# Patient Record
Sex: FEMALE | Race: WHITE | NOT HISPANIC OR LATINO
[De-identification: names, ages, dates, MRNs, and addresses within clinical notes are randomized per-mention and may not be internally consistent; named-entity substitution may affect disease eponyms.]

---

## 2022-01-01 ENCOUNTER — APPOINTMENT (OUTPATIENT)
Dept: OPHTHALMOLOGY | Facility: CLINIC | Age: 87
End: 2022-01-01

## 2022-01-01 ENCOUNTER — NON-APPOINTMENT (OUTPATIENT)
Age: 87
End: 2022-01-01

## 2022-01-01 ENCOUNTER — TRANSCRIPTION ENCOUNTER (OUTPATIENT)
Age: 87
End: 2022-01-01

## 2022-01-01 ENCOUNTER — RX RENEWAL (OUTPATIENT)
Age: 87
End: 2022-01-01

## 2022-01-01 ENCOUNTER — LABORATORY RESULT (OUTPATIENT)
Age: 87
End: 2022-01-01

## 2022-01-01 ENCOUNTER — APPOINTMENT (OUTPATIENT)
Dept: HOME HEALTH SERVICES | Facility: HOME HEALTH | Age: 87
End: 2022-01-01

## 2022-01-01 ENCOUNTER — FORM ENCOUNTER (OUTPATIENT)
Age: 87
End: 2022-01-01

## 2022-01-01 ENCOUNTER — APPOINTMENT (OUTPATIENT)
Dept: GERIATRICS | Facility: CLINIC | Age: 87
End: 2022-01-01

## 2022-01-01 ENCOUNTER — EMERGENCY (EMERGENCY)
Facility: HOSPITAL | Age: 87
LOS: 1 days | Discharge: HOME | End: 2022-01-01
Attending: EMERGENCY MEDICINE
Payer: MEDICARE

## 2022-01-01 ENCOUNTER — INPATIENT (INPATIENT)
Facility: HOSPITAL | Age: 87
LOS: 36 days | Discharge: HOME HEALTH | End: 2022-09-01
Attending: STUDENT IN AN ORGANIZED HEALTH CARE EDUCATION/TRAINING PROGRAM | Admitting: STUDENT IN AN ORGANIZED HEALTH CARE EDUCATION/TRAINING PROGRAM

## 2022-01-01 ENCOUNTER — APPOINTMENT (OUTPATIENT)
Dept: HOME HEALTH SERVICES | Facility: HOME HEALTH | Age: 87
End: 2022-01-01
Payer: MEDICARE

## 2022-01-01 VITALS
WEIGHT: 149.91 LBS | RESPIRATION RATE: 20 BRPM | SYSTOLIC BLOOD PRESSURE: 120 MMHG | OXYGEN SATURATION: 88 % | DIASTOLIC BLOOD PRESSURE: 70 MMHG | HEART RATE: 70 BPM | HEIGHT: 63 IN

## 2022-01-01 VITALS
SYSTOLIC BLOOD PRESSURE: 129 MMHG | HEART RATE: 81 BPM | OXYGEN SATURATION: 90 % | TEMPERATURE: 97 F | RESPIRATION RATE: 18 BRPM | DIASTOLIC BLOOD PRESSURE: 57 MMHG

## 2022-01-01 VITALS
SYSTOLIC BLOOD PRESSURE: 136 MMHG | DIASTOLIC BLOOD PRESSURE: 65 MMHG | RESPIRATION RATE: 20 BRPM | TEMPERATURE: 98 F | HEART RATE: 93 BPM | HEIGHT: 63 IN

## 2022-01-01 VITALS
HEART RATE: 69 BPM | RESPIRATION RATE: 18 BRPM | OXYGEN SATURATION: 100 % | DIASTOLIC BLOOD PRESSURE: 62 MMHG | SYSTOLIC BLOOD PRESSURE: 128 MMHG

## 2022-01-01 VITALS
OXYGEN SATURATION: 93 % | HEART RATE: 76 BPM | RESPIRATION RATE: 18 BRPM | TEMPERATURE: 98.8 F | SYSTOLIC BLOOD PRESSURE: 110 MMHG | DIASTOLIC BLOOD PRESSURE: 60 MMHG

## 2022-01-01 VITALS
DIASTOLIC BLOOD PRESSURE: 60 MMHG | TEMPERATURE: 97.9 F | HEART RATE: 78 BPM | OXYGEN SATURATION: 94 % | RESPIRATION RATE: 18 BRPM | SYSTOLIC BLOOD PRESSURE: 110 MMHG

## 2022-01-01 DIAGNOSIS — J44.9 CHRONIC OBSTRUCTIVE PULMONARY DISEASE, UNSPECIFIED: ICD-10-CM

## 2022-01-01 DIAGNOSIS — R74.8 ABNORMAL LEVELS OF OTHER SERUM ENZYMES: ICD-10-CM

## 2022-01-01 DIAGNOSIS — E11.9 TYPE 2 DIABETES MELLITUS WITHOUT COMPLICATIONS: ICD-10-CM

## 2022-01-01 DIAGNOSIS — B02.9 ZOSTER WITHOUT COMPLICATIONS: ICD-10-CM

## 2022-01-01 DIAGNOSIS — R33.9 RETENTION OF URINE, UNSPECIFIED: ICD-10-CM

## 2022-01-01 DIAGNOSIS — R30.0 DYSURIA: ICD-10-CM

## 2022-01-01 DIAGNOSIS — Z51.5 ENCOUNTER FOR PALLIATIVE CARE: ICD-10-CM

## 2022-01-01 DIAGNOSIS — G50.0 TRIGEMINAL NEURALGIA: ICD-10-CM

## 2022-01-01 DIAGNOSIS — Z23 ENCOUNTER FOR IMMUNIZATION: ICD-10-CM

## 2022-01-01 DIAGNOSIS — E44.0 MODERATE PROTEIN-CALORIE MALNUTRITION: ICD-10-CM

## 2022-01-01 DIAGNOSIS — Z79.899 OTHER LONG TERM (CURRENT) DRUG THERAPY: ICD-10-CM

## 2022-01-01 DIAGNOSIS — B02.9 ZOSTER W/OUT COMPLICATIONS: ICD-10-CM

## 2022-01-01 DIAGNOSIS — Z97.8 PRESENCE OF OTHER SPECIFIED DEVICES: ICD-10-CM

## 2022-01-01 DIAGNOSIS — R21 RASH AND OTHER NONSPECIFIC SKIN ERUPTION: ICD-10-CM

## 2022-01-01 DIAGNOSIS — Z95.5 PRESENCE OF CORONARY ANGIOPLASTY IMPLANT AND GRAFT: Chronic | ICD-10-CM

## 2022-01-01 DIAGNOSIS — B02.23 POSTHERPETIC POLYNEUROPATHY: ICD-10-CM

## 2022-01-01 DIAGNOSIS — K59.00 CONSTIPATION, UNSPECIFIED: ICD-10-CM

## 2022-01-01 DIAGNOSIS — G91.2 (IDIOPATHIC) NORMAL PRESSURE HYDROCEPHALUS: ICD-10-CM

## 2022-01-01 DIAGNOSIS — Z98.2 PRESENCE OF CEREBROSPINAL FLUID DRAINAGE DEVICE: ICD-10-CM

## 2022-01-01 DIAGNOSIS — E87.8 OTHER DISORDERS OF ELECTROLYTE AND FLUID BALANCE, NOT ELSEWHERE CLASSIFIED: ICD-10-CM

## 2022-01-01 DIAGNOSIS — D50.9 IRON DEFICIENCY ANEMIA, UNSPECIFIED: ICD-10-CM

## 2022-01-01 DIAGNOSIS — I65.29 OCCLUSION AND STENOSIS OF UNSPECIFIED CAROTID ARTERY: ICD-10-CM

## 2022-01-01 DIAGNOSIS — N39.0 URINARY TRACT INFECTION, SITE NOT SPECIFIED: ICD-10-CM

## 2022-01-01 DIAGNOSIS — I25.10 ATHEROSCLEROTIC HEART DISEASE OF NATIVE CORONARY ARTERY W/OUT ANGINA PECTORIS: ICD-10-CM

## 2022-01-01 DIAGNOSIS — Z29.8 ENCOUNTER FOR OTHER SPECIFIED PROPHYLACTIC MEASURES: ICD-10-CM

## 2022-01-01 DIAGNOSIS — R53.81 OTHER MALAISE: ICD-10-CM

## 2022-01-01 DIAGNOSIS — J96.11 CHRONIC RESPIRATORY FAILURE WITH HYPOXIA: ICD-10-CM

## 2022-01-01 DIAGNOSIS — G43.119 MIGRAINE WITH AURA, INTRACTABLE, W/OUT STATUS MIGRAINOSUS: ICD-10-CM

## 2022-01-01 DIAGNOSIS — B02.29 OTHER POSTHERPETIC NERVOUS SYSTEM INVOLVEMENT: ICD-10-CM

## 2022-01-01 DIAGNOSIS — Z87.09 PERSONAL HISTORY OF OTHER DISEASES OF THE RESPIRATORY SYSTEM: ICD-10-CM

## 2022-01-01 DIAGNOSIS — J69.0 PNEUMONITIS DUE TO INHALATION OF FOOD AND VOMIT: ICD-10-CM

## 2022-01-01 DIAGNOSIS — F11.20 OPIOID DEPENDENCE, UNCOMPLICATED: ICD-10-CM

## 2022-01-01 DIAGNOSIS — R45.1 RESTLESSNESS AND AGITATION: ICD-10-CM

## 2022-01-01 DIAGNOSIS — I25.10 ATHEROSCLEROTIC HEART DISEASE OF NATIVE CORONARY ARTERY WITHOUT ANGINA PECTORIS: ICD-10-CM

## 2022-01-01 DIAGNOSIS — Z87.898 PERSONAL HISTORY OF OTHER SPECIFIED CONDITIONS: ICD-10-CM

## 2022-01-01 DIAGNOSIS — I77.9 DISORDER OF ARTERIES AND ARTERIOLES, UNSPECIFIED: ICD-10-CM

## 2022-01-01 DIAGNOSIS — R13.12 DYSPHAGIA, OROPHARYNGEAL PHASE: ICD-10-CM

## 2022-01-01 DIAGNOSIS — K80.20 CALCULUS OF GALLBLADDER W/OUT CHOLECYSTITIS W/OUT OBSTRUCTION: ICD-10-CM

## 2022-01-01 DIAGNOSIS — Z86.61 PERSONAL HISTORY OF INFECTIONS OF THE CENTRAL NERVOUS SYSTEM: ICD-10-CM

## 2022-01-01 DIAGNOSIS — F51.04 PSYCHOPHYSIOLOGIC INSOMNIA: ICD-10-CM

## 2022-01-01 DIAGNOSIS — Z99.81 DEPENDENCE ON SUPPLEMENTAL OXYGEN: ICD-10-CM

## 2022-01-01 DIAGNOSIS — Z29.9 ENCOUNTER FOR PROPHYLACTIC MEASURES, UNSPECIFIED: ICD-10-CM

## 2022-01-01 DIAGNOSIS — I82.B11 ACUTE EMBOLISM AND THROMBOSIS OF RIGHT SUBCLAVIAN VEIN: ICD-10-CM

## 2022-01-01 DIAGNOSIS — K92.2 GASTROINTESTINAL HEMORRHAGE, UNSPECIFIED: ICD-10-CM

## 2022-01-01 DIAGNOSIS — R63.0 ANOREXIA: ICD-10-CM

## 2022-01-01 DIAGNOSIS — Z86.69 PERSONAL HISTORY OF OTHER DISEASES OF THE NERVOUS SYSTEM AND SENSE ORGANS: ICD-10-CM

## 2022-01-01 DIAGNOSIS — B36.9 SUPERFICIAL MYCOSIS, UNSPECIFIED: ICD-10-CM

## 2022-01-01 DIAGNOSIS — G89.29 HEADACHE, UNSPECIFIED: ICD-10-CM

## 2022-01-01 DIAGNOSIS — Z95.5 PRESENCE OF CORONARY ANGIOPLASTY IMPLANT AND GRAFT: ICD-10-CM

## 2022-01-01 DIAGNOSIS — A49.8 OTHER BACTERIAL INFECTIONS OF UNSPECIFIED SITE: ICD-10-CM

## 2022-01-01 DIAGNOSIS — Z74.01 BED CONFINEMENT STATUS: ICD-10-CM

## 2022-01-01 DIAGNOSIS — E83.42 HYPOMAGNESEMIA: ICD-10-CM

## 2022-01-01 DIAGNOSIS — Z86.39 PERSONAL HISTORY OF OTHER ENDOCRINE, NUTRITIONAL AND METABOLIC DISEASE: ICD-10-CM

## 2022-01-01 DIAGNOSIS — L89.156 PRESSURE-INDUCED DEEP TISSUE DAMAGE OF SACRAL REGION: ICD-10-CM

## 2022-01-01 DIAGNOSIS — Z99.81 CHRONIC RESPIRATORY FAILURE WITH HYPOXIA: ICD-10-CM

## 2022-01-01 DIAGNOSIS — D64.9 ANEMIA, UNSPECIFIED: ICD-10-CM

## 2022-01-01 DIAGNOSIS — B02.30 ZOSTER OCULAR DISEASE, UNSPECIFIED: ICD-10-CM

## 2022-01-01 DIAGNOSIS — J30.2 OTHER SEASONAL ALLERGIC RHINITIS: ICD-10-CM

## 2022-01-01 DIAGNOSIS — I82.621 ACUTE EMBOLISM AND THROMBOSIS OF DEEP VEINS OF RIGHT UPPER EXTREMITY: ICD-10-CM

## 2022-01-01 DIAGNOSIS — E87.6 HYPOKALEMIA: ICD-10-CM

## 2022-01-01 DIAGNOSIS — R51.9 HEADACHE, UNSPECIFIED: ICD-10-CM

## 2022-01-01 DIAGNOSIS — Z16.29 OTHER BACTERIAL INFECTIONS OF UNSPECIFIED SITE: ICD-10-CM

## 2022-01-01 DIAGNOSIS — R41.82 ALTERED MENTAL STATUS, UNSPECIFIED: ICD-10-CM

## 2022-01-01 DIAGNOSIS — B02.0 ZOSTER ENCEPHALITIS: ICD-10-CM

## 2022-01-01 DIAGNOSIS — K21.9 GASTRO-ESOPHAGEAL REFLUX DISEASE W/OUT ESOPHAGITIS: ICD-10-CM

## 2022-01-01 DIAGNOSIS — I10 ESSENTIAL (PRIMARY) HYPERTENSION: ICD-10-CM

## 2022-01-01 DIAGNOSIS — N17.9 ACUTE KIDNEY FAILURE, UNSPECIFIED: ICD-10-CM

## 2022-01-01 DIAGNOSIS — Z87.891 PERSONAL HISTORY OF NICOTINE DEPENDENCE: ICD-10-CM

## 2022-01-01 LAB
-  AMIKACIN: SIGNIFICANT CHANGE UP
-  AMOXICILLIN/CLAVULANIC ACID: SIGNIFICANT CHANGE UP
-  AMPICILLIN/SULBACTAM: SIGNIFICANT CHANGE UP
-  AMPICILLIN: SIGNIFICANT CHANGE UP
-  AZTREONAM: SIGNIFICANT CHANGE UP
-  CEFAZOLIN: SIGNIFICANT CHANGE UP
-  CEFEPIME: SIGNIFICANT CHANGE UP
-  CEFTRIAXONE: SIGNIFICANT CHANGE UP
-  CIPROFLOXACIN: SIGNIFICANT CHANGE UP
-  CLINDAMYCIN: SIGNIFICANT CHANGE UP
-  CLINDAMYCIN: SIGNIFICANT CHANGE UP
-  ERTAPENEM: SIGNIFICANT CHANGE UP
-  ERYTHROMYCIN: SIGNIFICANT CHANGE UP
-  ERYTHROMYCIN: SIGNIFICANT CHANGE UP
-  GENTAMICIN: SIGNIFICANT CHANGE UP
-  IMIPENEM: SIGNIFICANT CHANGE UP
-  LEVOFLOXACIN: SIGNIFICANT CHANGE UP
-  MEROPENEM: SIGNIFICANT CHANGE UP
-  NITROFURANTOIN: SIGNIFICANT CHANGE UP
-  OXACILLIN: SIGNIFICANT CHANGE UP
-  OXACILLIN: SIGNIFICANT CHANGE UP
-  PENICILLIN: SIGNIFICANT CHANGE UP
-  PENICILLIN: SIGNIFICANT CHANGE UP
-  PIPERACILLIN/TAZOBACTAM: SIGNIFICANT CHANGE UP
-  RIFAMPIN: SIGNIFICANT CHANGE UP
-  RIFAMPIN: SIGNIFICANT CHANGE UP
-  STAPHYLOCOCCUS EPIDERMIDIS, METHICILLIN RESISTANT: SIGNIFICANT CHANGE UP
-  TETRACYCLINE: SIGNIFICANT CHANGE UP
-  TETRACYCLINE: SIGNIFICANT CHANGE UP
-  TIGECYCLINE: SIGNIFICANT CHANGE UP
-  TOBRAMYCIN: SIGNIFICANT CHANGE UP
-  TRIMETHOPRIM/SULFAMETHOXAZOLE: SIGNIFICANT CHANGE UP
-  VANCOMYCIN: SIGNIFICANT CHANGE UP
-  VANCOMYCIN: SIGNIFICANT CHANGE UP
A1C WITH ESTIMATED AVERAGE GLUCOSE RESULT: 7.2 % — HIGH (ref 4–5.6)
ALBUMIN SERPL ELPH-MCNC: 2.6 G/DL — LOW (ref 3.3–5)
ALBUMIN SERPL ELPH-MCNC: 2.7 G/DL — LOW (ref 3.3–5)
ALBUMIN SERPL ELPH-MCNC: 2.7 G/DL — LOW (ref 3.3–5)
ALBUMIN SERPL ELPH-MCNC: 2.8 G/DL — LOW (ref 3.3–5)
ALBUMIN SERPL ELPH-MCNC: 2.9 G/DL — LOW (ref 3.3–5)
ALBUMIN SERPL ELPH-MCNC: 2.9 G/DL — LOW (ref 3.3–5)
ALBUMIN SERPL ELPH-MCNC: 3 G/DL — LOW (ref 3.3–5)
ALBUMIN SERPL ELPH-MCNC: 3.1 G/DL — LOW (ref 3.3–5)
ALBUMIN SERPL ELPH-MCNC: 3.7 G/DL — SIGNIFICANT CHANGE UP (ref 3.3–5)
ALBUMIN SERPL ELPH-MCNC: 4.4 G/DL — SIGNIFICANT CHANGE UP (ref 3.3–5)
ALP SERPL-CCNC: 102 U/L — SIGNIFICANT CHANGE UP (ref 40–120)
ALP SERPL-CCNC: 103 U/L — SIGNIFICANT CHANGE UP (ref 40–120)
ALP SERPL-CCNC: 106 U/L — SIGNIFICANT CHANGE UP (ref 40–120)
ALP SERPL-CCNC: 110 U/L — SIGNIFICANT CHANGE UP (ref 40–120)
ALP SERPL-CCNC: 110 U/L — SIGNIFICANT CHANGE UP (ref 40–120)
ALP SERPL-CCNC: 116 U/L — SIGNIFICANT CHANGE UP (ref 40–120)
ALP SERPL-CCNC: 125 U/L — HIGH (ref 40–120)
ALP SERPL-CCNC: 80 U/L — SIGNIFICANT CHANGE UP (ref 40–120)
ALP SERPL-CCNC: 87 U/L — SIGNIFICANT CHANGE UP (ref 40–120)
ALP SERPL-CCNC: 89 U/L — SIGNIFICANT CHANGE UP (ref 40–120)
ALP SERPL-CCNC: 89 U/L — SIGNIFICANT CHANGE UP (ref 40–120)
ALP SERPL-CCNC: 94 U/L — SIGNIFICANT CHANGE UP (ref 40–120)
ALP SERPL-CCNC: 97 U/L — SIGNIFICANT CHANGE UP (ref 40–120)
ALP SERPL-CCNC: 97 U/L — SIGNIFICANT CHANGE UP (ref 40–120)
ALT FLD-CCNC: 10 U/L — SIGNIFICANT CHANGE UP (ref 4–33)
ALT FLD-CCNC: 12 U/L — SIGNIFICANT CHANGE UP (ref 4–33)
ALT FLD-CCNC: 14 U/L — SIGNIFICANT CHANGE UP (ref 4–33)
ALT FLD-CCNC: 16 U/L — SIGNIFICANT CHANGE UP (ref 4–33)
ALT FLD-CCNC: 22 U/L — SIGNIFICANT CHANGE UP (ref 4–33)
ALT FLD-CCNC: 29 U/L — SIGNIFICANT CHANGE UP (ref 4–33)
ALT FLD-CCNC: 29 U/L — SIGNIFICANT CHANGE UP (ref 4–33)
ALT FLD-CCNC: 6 U/L — SIGNIFICANT CHANGE UP (ref 4–33)
ALT FLD-CCNC: 7 U/L — SIGNIFICANT CHANGE UP (ref 4–33)
ALT FLD-CCNC: 7 U/L — SIGNIFICANT CHANGE UP (ref 4–33)
ALT FLD-CCNC: 8 U/L — LOW (ref 10–45)
ALT FLD-CCNC: 8 U/L — SIGNIFICANT CHANGE UP (ref 4–33)
ALT FLD-CCNC: 8 U/L — SIGNIFICANT CHANGE UP (ref 4–33)
ALT FLD-CCNC: <5 U/L — LOW (ref 4–33)
ANION GAP SERPL CALC-SCNC: 10 MMOL/L — SIGNIFICANT CHANGE UP (ref 5–17)
ANION GAP SERPL CALC-SCNC: 10 MMOL/L — SIGNIFICANT CHANGE UP (ref 7–14)
ANION GAP SERPL CALC-SCNC: 11 MMOL/L — SIGNIFICANT CHANGE UP (ref 7–14)
ANION GAP SERPL CALC-SCNC: 12 MMOL/L — SIGNIFICANT CHANGE UP (ref 7–14)
ANION GAP SERPL CALC-SCNC: 13 MMOL/L — SIGNIFICANT CHANGE UP (ref 7–14)
ANION GAP SERPL CALC-SCNC: 13 MMOL/L — SIGNIFICANT CHANGE UP (ref 7–14)
ANION GAP SERPL CALC-SCNC: 14 MMOL/L — SIGNIFICANT CHANGE UP (ref 7–14)
ANION GAP SERPL CALC-SCNC: 14 MMOL/L — SIGNIFICANT CHANGE UP (ref 7–14)
ANION GAP SERPL CALC-SCNC: 15 MMOL/L — HIGH (ref 7–14)
ANION GAP SERPL CALC-SCNC: 15 MMOL/L — HIGH (ref 7–14)
ANION GAP SERPL CALC-SCNC: 17 MMOL/L — HIGH (ref 7–14)
ANION GAP SERPL CALC-SCNC: 24 MMOL/L — HIGH (ref 7–14)
ANION GAP SERPL CALC-SCNC: 7 MMOL/L — SIGNIFICANT CHANGE UP (ref 7–14)
ANION GAP SERPL CALC-SCNC: 8 MMOL/L — SIGNIFICANT CHANGE UP (ref 7–14)
ANION GAP SERPL CALC-SCNC: 9 MMOL/L — SIGNIFICANT CHANGE UP (ref 7–14)
ANISOCYTOSIS BLD QL: SLIGHT — SIGNIFICANT CHANGE UP
ANISOCYTOSIS BLD QL: SLIGHT — SIGNIFICANT CHANGE UP
APPEARANCE UR: ABNORMAL
APPEARANCE UR: CLEAR — SIGNIFICANT CHANGE UP
APTT BLD: 38.1 SEC — HIGH (ref 27.5–35.5)
AST SERPL-CCNC: 12 U/L — SIGNIFICANT CHANGE UP (ref 4–32)
AST SERPL-CCNC: 14 U/L — SIGNIFICANT CHANGE UP (ref 10–40)
AST SERPL-CCNC: 15 U/L — SIGNIFICANT CHANGE UP (ref 4–32)
AST SERPL-CCNC: 17 U/L — SIGNIFICANT CHANGE UP (ref 4–32)
AST SERPL-CCNC: 18 U/L — SIGNIFICANT CHANGE UP (ref 4–32)
AST SERPL-CCNC: 22 U/L — SIGNIFICANT CHANGE UP (ref 4–32)
AST SERPL-CCNC: 22 U/L — SIGNIFICANT CHANGE UP (ref 4–32)
AST SERPL-CCNC: 23 U/L — SIGNIFICANT CHANGE UP (ref 4–32)
AST SERPL-CCNC: 33 U/L — HIGH (ref 4–32)
AST SERPL-CCNC: 34 U/L — HIGH (ref 4–32)
AST SERPL-CCNC: 46 U/L — HIGH (ref 4–32)
AST SERPL-CCNC: 49 U/L — HIGH (ref 4–32)
B PERT DNA SPEC QL NAA+PROBE: SIGNIFICANT CHANGE UP
B PERT+PARAPERT DNA PNL SPEC NAA+PROBE: SIGNIFICANT CHANGE UP
BACTERIA # UR AUTO: ABNORMAL
BACTERIA # UR AUTO: ABNORMAL
BACTERIA # UR AUTO: NEGATIVE — SIGNIFICANT CHANGE UP
BASE EXCESS BLDV CALC-SCNC: -1.5 MMOL/L — SIGNIFICANT CHANGE UP (ref -2–3)
BASE EXCESS BLDV CALC-SCNC: -5.3 MMOL/L — LOW (ref -2–3)
BASOPHILS # BLD AUTO: 0 K/UL — SIGNIFICANT CHANGE UP (ref 0–0.2)
BASOPHILS # BLD AUTO: 0 K/UL — SIGNIFICANT CHANGE UP (ref 0–0.2)
BASOPHILS # BLD AUTO: 0.02 K/UL — SIGNIFICANT CHANGE UP (ref 0–0.2)
BASOPHILS # BLD AUTO: 0.03 K/UL — SIGNIFICANT CHANGE UP (ref 0–0.2)
BASOPHILS # BLD AUTO: 0.04 K/UL — SIGNIFICANT CHANGE UP (ref 0–0.2)
BASOPHILS NFR BLD AUTO: 0 % — SIGNIFICANT CHANGE UP (ref 0–2)
BASOPHILS NFR BLD AUTO: 0 % — SIGNIFICANT CHANGE UP (ref 0–2)
BASOPHILS NFR BLD AUTO: 0.2 % — SIGNIFICANT CHANGE UP (ref 0–2)
BASOPHILS NFR BLD AUTO: 0.4 % — SIGNIFICANT CHANGE UP (ref 0–2)
BASOPHILS NFR BLD AUTO: 0.4 % — SIGNIFICANT CHANGE UP (ref 0–2)
BASOPHILS NFR BLD AUTO: 0.5 % — SIGNIFICANT CHANGE UP (ref 0–2)
BILIRUB DIRECT SERPL-MCNC: <0.2 MG/DL — SIGNIFICANT CHANGE UP (ref 0–0.3)
BILIRUB INDIRECT FLD-MCNC: >0.1 MG/DL — SIGNIFICANT CHANGE UP (ref 0–1)
BILIRUB INDIRECT FLD-MCNC: SIGNIFICANT CHANGE UP MG/DL (ref 0–1)
BILIRUB INDIRECT FLD-MCNC: SIGNIFICANT CHANGE UP MG/DL (ref 0–1)
BILIRUB SERPL-MCNC: 0.2 MG/DL — SIGNIFICANT CHANGE UP (ref 0.2–1.2)
BILIRUB SERPL-MCNC: 0.3 MG/DL — SIGNIFICANT CHANGE UP (ref 0.2–1.2)
BILIRUB SERPL-MCNC: 0.4 MG/DL — SIGNIFICANT CHANGE UP (ref 0.2–1.2)
BILIRUB SERPL-MCNC: 0.4 MG/DL — SIGNIFICANT CHANGE UP (ref 0.2–1.2)
BILIRUB SERPL-MCNC: <0.2 MG/DL — SIGNIFICANT CHANGE UP (ref 0.2–1.2)
BILIRUB UR-MCNC: NEGATIVE — SIGNIFICANT CHANGE UP
BLD GP AB SCN SERPL QL: NEGATIVE — SIGNIFICANT CHANGE UP
BLOOD GAS ARTERIAL COMPREHENSIVE RESULT: SIGNIFICANT CHANGE UP
BLOOD GAS VENOUS COMPREHENSIVE RESULT: SIGNIFICANT CHANGE UP
BLOOD GAS VENOUS COMPREHENSIVE RESULT: SIGNIFICANT CHANGE UP
BORDETELLA PARAPERTUSSIS (RAPRVP): SIGNIFICANT CHANGE UP
BUN SERPL-MCNC: 10 MG/DL — SIGNIFICANT CHANGE UP (ref 7–23)
BUN SERPL-MCNC: 12 MG/DL — SIGNIFICANT CHANGE UP (ref 7–23)
BUN SERPL-MCNC: 12 MG/DL — SIGNIFICANT CHANGE UP (ref 7–23)
BUN SERPL-MCNC: 13 MG/DL — SIGNIFICANT CHANGE UP (ref 7–23)
BUN SERPL-MCNC: 13 MG/DL — SIGNIFICANT CHANGE UP (ref 7–23)
BUN SERPL-MCNC: 14 MG/DL — SIGNIFICANT CHANGE UP (ref 7–23)
BUN SERPL-MCNC: 16 MG/DL — SIGNIFICANT CHANGE UP (ref 7–23)
BUN SERPL-MCNC: 40 MG/DL — HIGH (ref 7–23)
BUN SERPL-MCNC: 5 MG/DL — LOW (ref 7–23)
BUN SERPL-MCNC: 5 MG/DL — LOW (ref 7–23)
BUN SERPL-MCNC: 6 MG/DL — LOW (ref 7–23)
BUN SERPL-MCNC: 65 MG/DL — HIGH (ref 7–23)
BUN SERPL-MCNC: 7 MG/DL — SIGNIFICANT CHANGE UP (ref 7–23)
BUN SERPL-MCNC: 8 MG/DL — SIGNIFICANT CHANGE UP (ref 7–23)
BUN SERPL-MCNC: 9 MG/DL — SIGNIFICANT CHANGE UP (ref 7–23)
C PNEUM DNA SPEC QL NAA+PROBE: SIGNIFICANT CHANGE UP
CALCIUM SERPL-MCNC: 8.1 MG/DL — LOW (ref 8.4–10.5)
CALCIUM SERPL-MCNC: 8.3 MG/DL — LOW (ref 8.4–10.5)
CALCIUM SERPL-MCNC: 8.4 MG/DL — SIGNIFICANT CHANGE UP (ref 8.4–10.5)
CALCIUM SERPL-MCNC: 8.5 MG/DL — SIGNIFICANT CHANGE UP (ref 8.4–10.5)
CALCIUM SERPL-MCNC: 8.5 MG/DL — SIGNIFICANT CHANGE UP (ref 8.4–10.5)
CALCIUM SERPL-MCNC: 8.6 MG/DL — SIGNIFICANT CHANGE UP (ref 8.4–10.5)
CALCIUM SERPL-MCNC: 8.7 MG/DL — SIGNIFICANT CHANGE UP (ref 8.4–10.5)
CALCIUM SERPL-MCNC: 8.8 MG/DL — SIGNIFICANT CHANGE UP (ref 8.4–10.5)
CALCIUM SERPL-MCNC: 8.9 MG/DL — SIGNIFICANT CHANGE UP (ref 8.4–10.5)
CALCIUM SERPL-MCNC: 8.9 MG/DL — SIGNIFICANT CHANGE UP (ref 8.4–10.5)
CALCIUM SERPL-MCNC: 9 MG/DL — SIGNIFICANT CHANGE UP (ref 8.4–10.5)
CALCIUM SERPL-MCNC: 9 MG/DL — SIGNIFICANT CHANGE UP (ref 8.4–10.5)
CALCIUM SERPL-MCNC: 9.1 MG/DL — SIGNIFICANT CHANGE UP (ref 8.4–10.5)
CALCIUM SERPL-MCNC: 9.3 MG/DL — SIGNIFICANT CHANGE UP (ref 8.4–10.5)
CALCIUM SERPL-MCNC: 9.6 MG/DL — SIGNIFICANT CHANGE UP (ref 8.4–10.5)
CHLORIDE BLDV-SCNC: 102 MMOL/L — SIGNIFICANT CHANGE UP (ref 96–108)
CHLORIDE BLDV-SCNC: 98 MMOL/L — SIGNIFICANT CHANGE UP (ref 96–108)
CHLORIDE SERPL-SCNC: 100 MMOL/L — SIGNIFICANT CHANGE UP (ref 98–107)
CHLORIDE SERPL-SCNC: 101 MMOL/L — SIGNIFICANT CHANGE UP (ref 98–107)
CHLORIDE SERPL-SCNC: 102 MMOL/L — SIGNIFICANT CHANGE UP (ref 98–107)
CHLORIDE SERPL-SCNC: 103 MMOL/L — SIGNIFICANT CHANGE UP (ref 98–107)
CHLORIDE SERPL-SCNC: 103 MMOL/L — SIGNIFICANT CHANGE UP (ref 98–107)
CHLORIDE SERPL-SCNC: 104 MMOL/L — SIGNIFICANT CHANGE UP (ref 98–107)
CHLORIDE SERPL-SCNC: 105 MMOL/L — SIGNIFICANT CHANGE UP (ref 98–107)
CHLORIDE SERPL-SCNC: 106 MMOL/L — SIGNIFICANT CHANGE UP (ref 98–107)
CHLORIDE SERPL-SCNC: 92 MMOL/L — LOW (ref 98–107)
CHLORIDE SERPL-SCNC: 96 MMOL/L — LOW (ref 98–107)
CHLORIDE SERPL-SCNC: 96 MMOL/L — LOW (ref 98–107)
CHLORIDE SERPL-SCNC: 97 MMOL/L — LOW (ref 98–107)
CHLORIDE SERPL-SCNC: 98 MMOL/L — SIGNIFICANT CHANGE UP (ref 96–108)
CHLORIDE SERPL-SCNC: 98 MMOL/L — SIGNIFICANT CHANGE UP (ref 98–107)
CHLORIDE SERPL-SCNC: 99 MMOL/L — SIGNIFICANT CHANGE UP (ref 98–107)
CO2 BLDV-SCNC: 26.3 MMOL/L — HIGH (ref 22–26)
CO2 BLDV-SCNC: 27.2 MMOL/L — HIGH (ref 22–26)
CO2 SERPL-SCNC: 19 MMOL/L — LOW (ref 22–31)
CO2 SERPL-SCNC: 21 MMOL/L — LOW (ref 22–31)
CO2 SERPL-SCNC: 22 MMOL/L — SIGNIFICANT CHANGE UP (ref 22–31)
CO2 SERPL-SCNC: 22 MMOL/L — SIGNIFICANT CHANGE UP (ref 22–31)
CO2 SERPL-SCNC: 23 MMOL/L — SIGNIFICANT CHANGE UP (ref 22–31)
CO2 SERPL-SCNC: 24 MMOL/L — SIGNIFICANT CHANGE UP (ref 22–31)
CO2 SERPL-SCNC: 25 MMOL/L — SIGNIFICANT CHANGE UP (ref 22–31)
CO2 SERPL-SCNC: 26 MMOL/L — SIGNIFICANT CHANGE UP (ref 22–31)
CO2 SERPL-SCNC: 27 MMOL/L — SIGNIFICANT CHANGE UP (ref 22–31)
CO2 SERPL-SCNC: 28 MMOL/L — SIGNIFICANT CHANGE UP (ref 22–31)
CO2 SERPL-SCNC: 29 MMOL/L — SIGNIFICANT CHANGE UP (ref 22–31)
CO2 SERPL-SCNC: 30 MMOL/L — SIGNIFICANT CHANGE UP (ref 22–31)
CO2 SERPL-SCNC: 32 MMOL/L — HIGH (ref 22–31)
COLOR SPEC: COLORLESS — SIGNIFICANT CHANGE UP
COLOR SPEC: COLORLESS — SIGNIFICANT CHANGE UP
COLOR SPEC: YELLOW — SIGNIFICANT CHANGE UP
COMMENT - URINE: SIGNIFICANT CHANGE UP
COMMENT - URINE: SIGNIFICANT CHANGE UP
CREAT SERPL-MCNC: 0.45 MG/DL — LOW (ref 0.5–1.3)
CREAT SERPL-MCNC: 0.47 MG/DL — LOW (ref 0.5–1.3)
CREAT SERPL-MCNC: 0.49 MG/DL — LOW (ref 0.5–1.3)
CREAT SERPL-MCNC: 0.52 MG/DL — SIGNIFICANT CHANGE UP (ref 0.5–1.3)
CREAT SERPL-MCNC: 0.53 MG/DL — SIGNIFICANT CHANGE UP (ref 0.5–1.3)
CREAT SERPL-MCNC: 0.53 MG/DL — SIGNIFICANT CHANGE UP (ref 0.5–1.3)
CREAT SERPL-MCNC: 0.54 MG/DL — SIGNIFICANT CHANGE UP (ref 0.5–1.3)
CREAT SERPL-MCNC: 0.55 MG/DL — SIGNIFICANT CHANGE UP (ref 0.5–1.3)
CREAT SERPL-MCNC: 0.56 MG/DL — SIGNIFICANT CHANGE UP (ref 0.5–1.3)
CREAT SERPL-MCNC: 0.56 MG/DL — SIGNIFICANT CHANGE UP (ref 0.5–1.3)
CREAT SERPL-MCNC: 0.57 MG/DL — SIGNIFICANT CHANGE UP (ref 0.5–1.3)
CREAT SERPL-MCNC: 0.59 MG/DL — SIGNIFICANT CHANGE UP (ref 0.5–1.3)
CREAT SERPL-MCNC: 0.59 MG/DL — SIGNIFICANT CHANGE UP (ref 0.5–1.3)
CREAT SERPL-MCNC: 0.61 MG/DL — SIGNIFICANT CHANGE UP (ref 0.5–1.3)
CREAT SERPL-MCNC: 0.61 MG/DL — SIGNIFICANT CHANGE UP (ref 0.5–1.3)
CREAT SERPL-MCNC: 0.62 MG/DL — SIGNIFICANT CHANGE UP (ref 0.5–1.3)
CREAT SERPL-MCNC: 0.62 MG/DL — SIGNIFICANT CHANGE UP (ref 0.5–1.3)
CREAT SERPL-MCNC: 0.63 MG/DL — SIGNIFICANT CHANGE UP (ref 0.5–1.3)
CREAT SERPL-MCNC: 0.63 MG/DL — SIGNIFICANT CHANGE UP (ref 0.5–1.3)
CREAT SERPL-MCNC: 0.64 MG/DL — SIGNIFICANT CHANGE UP (ref 0.5–1.3)
CREAT SERPL-MCNC: 0.64 MG/DL — SIGNIFICANT CHANGE UP (ref 0.5–1.3)
CREAT SERPL-MCNC: 0.65 MG/DL — SIGNIFICANT CHANGE UP (ref 0.5–1.3)
CREAT SERPL-MCNC: 0.65 MG/DL — SIGNIFICANT CHANGE UP (ref 0.5–1.3)
CREAT SERPL-MCNC: 0.67 MG/DL — SIGNIFICANT CHANGE UP (ref 0.5–1.3)
CREAT SERPL-MCNC: 0.67 MG/DL — SIGNIFICANT CHANGE UP (ref 0.5–1.3)
CREAT SERPL-MCNC: 0.89 MG/DL — SIGNIFICANT CHANGE UP (ref 0.5–1.3)
CREAT SERPL-MCNC: 0.98 MG/DL — SIGNIFICANT CHANGE UP (ref 0.5–1.3)
CREAT SERPL-MCNC: 2.84 MG/DL — HIGH (ref 0.5–1.3)
CULTURE RESULTS: SIGNIFICANT CHANGE UP
DACRYOCYTES BLD QL SMEAR: SLIGHT — SIGNIFICANT CHANGE UP
DIFF PNL FLD: ABNORMAL
DIFF PNL FLD: NEGATIVE — SIGNIFICANT CHANGE UP
EGFR: 16 ML/MIN/1.73M2 — LOW
EGFR: 57 ML/MIN/1.73M2 — LOW
EGFR: 64 ML/MIN/1.73M2 — SIGNIFICANT CHANGE UP
EGFR: 86 ML/MIN/1.73M2 — SIGNIFICANT CHANGE UP
EGFR: 87 ML/MIN/1.73M2 — SIGNIFICANT CHANGE UP
EGFR: 88 ML/MIN/1.73M2 — SIGNIFICANT CHANGE UP
EGFR: 89 ML/MIN/1.73M2 — SIGNIFICANT CHANGE UP
EGFR: 89 ML/MIN/1.73M2 — SIGNIFICANT CHANGE UP
EGFR: 90 ML/MIN/1.73M2 — SIGNIFICANT CHANGE UP
EGFR: 91 ML/MIN/1.73M2 — SIGNIFICANT CHANGE UP
EGFR: 92 ML/MIN/1.73M2 — SIGNIFICANT CHANGE UP
EGFR: 93 ML/MIN/1.73M2 — SIGNIFICANT CHANGE UP
EGFR: 94 ML/MIN/1.73M2 — SIGNIFICANT CHANGE UP
EGFR: 95 ML/MIN/1.73M2 — SIGNIFICANT CHANGE UP
EOSINOPHIL # BLD AUTO: 0 K/UL — SIGNIFICANT CHANGE UP (ref 0–0.5)
EOSINOPHIL # BLD AUTO: 0.01 K/UL — SIGNIFICANT CHANGE UP (ref 0–0.5)
EOSINOPHIL # BLD AUTO: 0.01 K/UL — SIGNIFICANT CHANGE UP (ref 0–0.5)
EOSINOPHIL # BLD AUTO: 0.15 K/UL — SIGNIFICANT CHANGE UP (ref 0–0.5)
EOSINOPHIL # BLD AUTO: 0.16 K/UL — SIGNIFICANT CHANGE UP (ref 0–0.5)
EOSINOPHIL # BLD AUTO: 0.17 K/UL — SIGNIFICANT CHANGE UP (ref 0–0.5)
EOSINOPHIL # BLD AUTO: 0.23 K/UL — SIGNIFICANT CHANGE UP (ref 0–0.5)
EOSINOPHIL # BLD AUTO: 0.26 K/UL — SIGNIFICANT CHANGE UP (ref 0–0.5)
EOSINOPHIL # BLD AUTO: 0.37 K/UL — SIGNIFICANT CHANGE UP (ref 0–0.5)
EOSINOPHIL NFR BLD AUTO: 0 % — SIGNIFICANT CHANGE UP (ref 0–6)
EOSINOPHIL NFR BLD AUTO: 0.1 % — SIGNIFICANT CHANGE UP (ref 0–6)
EOSINOPHIL NFR BLD AUTO: 0.1 % — SIGNIFICANT CHANGE UP (ref 0–6)
EOSINOPHIL NFR BLD AUTO: 1.6 % — SIGNIFICANT CHANGE UP (ref 0–6)
EOSINOPHIL NFR BLD AUTO: 1.7 % — SIGNIFICANT CHANGE UP (ref 0–6)
EOSINOPHIL NFR BLD AUTO: 1.8 % — SIGNIFICANT CHANGE UP (ref 0–6)
EOSINOPHIL NFR BLD AUTO: 3.1 % — SIGNIFICANT CHANGE UP (ref 0–6)
EOSINOPHIL NFR BLD AUTO: 3.9 % — SIGNIFICANT CHANGE UP (ref 0–6)
EOSINOPHIL NFR BLD AUTO: 4.9 % — SIGNIFICANT CHANGE UP (ref 0–6)
EPI CELLS # UR: 0 /HPF — SIGNIFICANT CHANGE UP (ref 0–5)
EPI CELLS # UR: 3 /HPF — SIGNIFICANT CHANGE UP
EPI CELLS # UR: 3 /HPF — SIGNIFICANT CHANGE UP (ref 0–5)
EPI CELLS # UR: ABNORMAL
EPI CELLS # UR: SIGNIFICANT CHANGE UP
ESTIMATED AVERAGE GLUCOSE: 160 — SIGNIFICANT CHANGE UP
FERRITIN SERPL-MCNC: 68 NG/ML — SIGNIFICANT CHANGE UP (ref 15–150)
FLUAV SUBTYP SPEC NAA+PROBE: SIGNIFICANT CHANGE UP
FLUBV RNA SPEC QL NAA+PROBE: SIGNIFICANT CHANGE UP
FOLATE SERPL-MCNC: 13 NG/ML — SIGNIFICANT CHANGE UP (ref 3.1–17.5)
GAMMA INTERFERON BACKGROUND BLD IA-ACNC: 0.03 IU/ML — SIGNIFICANT CHANGE UP
GAS PNL BLDV: 132 MMOL/L — LOW (ref 136–145)
GAS PNL BLDV: 135 MMOL/L — LOW (ref 136–145)
GIANT PLATELETS BLD QL SMEAR: PRESENT — SIGNIFICANT CHANGE UP
GLUCOSE BLDC GLUCOMTR-MCNC: 103 MG/DL — HIGH (ref 70–99)
GLUCOSE BLDC GLUCOMTR-MCNC: 104 MG/DL — HIGH (ref 70–99)
GLUCOSE BLDC GLUCOMTR-MCNC: 107 MG/DL — HIGH (ref 70–99)
GLUCOSE BLDC GLUCOMTR-MCNC: 108 MG/DL — HIGH (ref 70–99)
GLUCOSE BLDC GLUCOMTR-MCNC: 109 MG/DL — HIGH (ref 70–99)
GLUCOSE BLDC GLUCOMTR-MCNC: 109 MG/DL — HIGH (ref 70–99)
GLUCOSE BLDC GLUCOMTR-MCNC: 110 MG/DL — HIGH (ref 70–99)
GLUCOSE BLDC GLUCOMTR-MCNC: 110 MG/DL — HIGH (ref 70–99)
GLUCOSE BLDC GLUCOMTR-MCNC: 111 MG/DL — HIGH (ref 70–99)
GLUCOSE BLDC GLUCOMTR-MCNC: 112 MG/DL — HIGH (ref 70–99)
GLUCOSE BLDC GLUCOMTR-MCNC: 113 MG/DL — HIGH (ref 70–99)
GLUCOSE BLDC GLUCOMTR-MCNC: 116 MG/DL — HIGH (ref 70–99)
GLUCOSE BLDC GLUCOMTR-MCNC: 117 MG/DL — HIGH (ref 70–99)
GLUCOSE BLDC GLUCOMTR-MCNC: 117 MG/DL — HIGH (ref 70–99)
GLUCOSE BLDC GLUCOMTR-MCNC: 118 MG/DL — HIGH (ref 70–99)
GLUCOSE BLDC GLUCOMTR-MCNC: 119 MG/DL — HIGH (ref 70–99)
GLUCOSE BLDC GLUCOMTR-MCNC: 119 MG/DL — HIGH (ref 70–99)
GLUCOSE BLDC GLUCOMTR-MCNC: 120 MG/DL — HIGH (ref 70–99)
GLUCOSE BLDC GLUCOMTR-MCNC: 121 MG/DL — HIGH (ref 70–99)
GLUCOSE BLDC GLUCOMTR-MCNC: 122 MG/DL — HIGH (ref 70–99)
GLUCOSE BLDC GLUCOMTR-MCNC: 122 MG/DL — HIGH (ref 70–99)
GLUCOSE BLDC GLUCOMTR-MCNC: 123 MG/DL — HIGH (ref 70–99)
GLUCOSE BLDC GLUCOMTR-MCNC: 123 MG/DL — HIGH (ref 70–99)
GLUCOSE BLDC GLUCOMTR-MCNC: 125 MG/DL — HIGH (ref 70–99)
GLUCOSE BLDC GLUCOMTR-MCNC: 125 MG/DL — HIGH (ref 70–99)
GLUCOSE BLDC GLUCOMTR-MCNC: 127 MG/DL — HIGH (ref 70–99)
GLUCOSE BLDC GLUCOMTR-MCNC: 127 MG/DL — HIGH (ref 70–99)
GLUCOSE BLDC GLUCOMTR-MCNC: 129 MG/DL — HIGH (ref 70–99)
GLUCOSE BLDC GLUCOMTR-MCNC: 130 MG/DL — HIGH (ref 70–99)
GLUCOSE BLDC GLUCOMTR-MCNC: 131 MG/DL — HIGH (ref 70–99)
GLUCOSE BLDC GLUCOMTR-MCNC: 132 MG/DL — HIGH (ref 70–99)
GLUCOSE BLDC GLUCOMTR-MCNC: 133 MG/DL — HIGH (ref 70–99)
GLUCOSE BLDC GLUCOMTR-MCNC: 135 MG/DL — HIGH (ref 70–99)
GLUCOSE BLDC GLUCOMTR-MCNC: 135 MG/DL — HIGH (ref 70–99)
GLUCOSE BLDC GLUCOMTR-MCNC: 136 MG/DL — HIGH (ref 70–99)
GLUCOSE BLDC GLUCOMTR-MCNC: 138 MG/DL — HIGH (ref 70–99)
GLUCOSE BLDC GLUCOMTR-MCNC: 139 MG/DL — HIGH (ref 70–99)
GLUCOSE BLDC GLUCOMTR-MCNC: 139 MG/DL — HIGH (ref 70–99)
GLUCOSE BLDC GLUCOMTR-MCNC: 140 MG/DL — HIGH (ref 70–99)
GLUCOSE BLDC GLUCOMTR-MCNC: 140 MG/DL — HIGH (ref 70–99)
GLUCOSE BLDC GLUCOMTR-MCNC: 143 MG/DL — HIGH (ref 70–99)
GLUCOSE BLDC GLUCOMTR-MCNC: 143 MG/DL — HIGH (ref 70–99)
GLUCOSE BLDC GLUCOMTR-MCNC: 145 MG/DL — HIGH (ref 70–99)
GLUCOSE BLDC GLUCOMTR-MCNC: 146 MG/DL — HIGH (ref 70–99)
GLUCOSE BLDC GLUCOMTR-MCNC: 147 MG/DL — HIGH (ref 70–99)
GLUCOSE BLDC GLUCOMTR-MCNC: 148 MG/DL — HIGH (ref 70–99)
GLUCOSE BLDC GLUCOMTR-MCNC: 149 MG/DL — HIGH (ref 70–99)
GLUCOSE BLDC GLUCOMTR-MCNC: 149 MG/DL — HIGH (ref 70–99)
GLUCOSE BLDC GLUCOMTR-MCNC: 151 MG/DL — HIGH (ref 70–99)
GLUCOSE BLDC GLUCOMTR-MCNC: 152 MG/DL — HIGH (ref 70–99)
GLUCOSE BLDC GLUCOMTR-MCNC: 153 MG/DL — HIGH (ref 70–99)
GLUCOSE BLDC GLUCOMTR-MCNC: 154 MG/DL — HIGH (ref 70–99)
GLUCOSE BLDC GLUCOMTR-MCNC: 156 MG/DL — HIGH (ref 70–99)
GLUCOSE BLDC GLUCOMTR-MCNC: 157 MG/DL — HIGH (ref 70–99)
GLUCOSE BLDC GLUCOMTR-MCNC: 157 MG/DL — HIGH (ref 70–99)
GLUCOSE BLDC GLUCOMTR-MCNC: 158 MG/DL — HIGH (ref 70–99)
GLUCOSE BLDC GLUCOMTR-MCNC: 158 MG/DL — HIGH (ref 70–99)
GLUCOSE BLDC GLUCOMTR-MCNC: 160 MG/DL — HIGH (ref 70–99)
GLUCOSE BLDC GLUCOMTR-MCNC: 161 MG/DL — HIGH (ref 70–99)
GLUCOSE BLDC GLUCOMTR-MCNC: 165 MG/DL — HIGH (ref 70–99)
GLUCOSE BLDC GLUCOMTR-MCNC: 166 MG/DL — HIGH (ref 70–99)
GLUCOSE BLDC GLUCOMTR-MCNC: 166 MG/DL — HIGH (ref 70–99)
GLUCOSE BLDC GLUCOMTR-MCNC: 167 MG/DL — HIGH (ref 70–99)
GLUCOSE BLDC GLUCOMTR-MCNC: 168 MG/DL — HIGH (ref 70–99)
GLUCOSE BLDC GLUCOMTR-MCNC: 169 MG/DL — HIGH (ref 70–99)
GLUCOSE BLDC GLUCOMTR-MCNC: 170 MG/DL — HIGH (ref 70–99)
GLUCOSE BLDC GLUCOMTR-MCNC: 172 MG/DL — HIGH (ref 70–99)
GLUCOSE BLDC GLUCOMTR-MCNC: 172 MG/DL — HIGH (ref 70–99)
GLUCOSE BLDC GLUCOMTR-MCNC: 174 MG/DL — HIGH (ref 70–99)
GLUCOSE BLDC GLUCOMTR-MCNC: 177 MG/DL — HIGH (ref 70–99)
GLUCOSE BLDC GLUCOMTR-MCNC: 178 MG/DL — HIGH (ref 70–99)
GLUCOSE BLDC GLUCOMTR-MCNC: 179 MG/DL — HIGH (ref 70–99)
GLUCOSE BLDC GLUCOMTR-MCNC: 180 MG/DL — HIGH (ref 70–99)
GLUCOSE BLDC GLUCOMTR-MCNC: 182 MG/DL — HIGH (ref 70–99)
GLUCOSE BLDC GLUCOMTR-MCNC: 185 MG/DL — HIGH (ref 70–99)
GLUCOSE BLDC GLUCOMTR-MCNC: 186 MG/DL — HIGH (ref 70–99)
GLUCOSE BLDC GLUCOMTR-MCNC: 187 MG/DL — HIGH (ref 70–99)
GLUCOSE BLDC GLUCOMTR-MCNC: 189 MG/DL — HIGH (ref 70–99)
GLUCOSE BLDC GLUCOMTR-MCNC: 191 MG/DL — HIGH (ref 70–99)
GLUCOSE BLDC GLUCOMTR-MCNC: 191 MG/DL — HIGH (ref 70–99)
GLUCOSE BLDC GLUCOMTR-MCNC: 193 MG/DL — HIGH (ref 70–99)
GLUCOSE BLDC GLUCOMTR-MCNC: 196 MG/DL — HIGH (ref 70–99)
GLUCOSE BLDC GLUCOMTR-MCNC: 210 MG/DL — HIGH (ref 70–99)
GLUCOSE BLDC GLUCOMTR-MCNC: 212 MG/DL — HIGH (ref 70–99)
GLUCOSE BLDC GLUCOMTR-MCNC: 212 MG/DL — HIGH (ref 70–99)
GLUCOSE BLDC GLUCOMTR-MCNC: 219 MG/DL — HIGH (ref 70–99)
GLUCOSE BLDC GLUCOMTR-MCNC: 230 MG/DL — HIGH (ref 70–99)
GLUCOSE BLDC GLUCOMTR-MCNC: 231 MG/DL — HIGH (ref 70–99)
GLUCOSE BLDC GLUCOMTR-MCNC: 234 MG/DL — HIGH (ref 70–99)
GLUCOSE BLDC GLUCOMTR-MCNC: 240 MG/DL — HIGH (ref 70–99)
GLUCOSE BLDC GLUCOMTR-MCNC: 243 MG/DL — HIGH (ref 70–99)
GLUCOSE BLDC GLUCOMTR-MCNC: 244 MG/DL — HIGH (ref 70–99)
GLUCOSE BLDC GLUCOMTR-MCNC: 244 MG/DL — HIGH (ref 70–99)
GLUCOSE BLDC GLUCOMTR-MCNC: 249 MG/DL — HIGH (ref 70–99)
GLUCOSE BLDC GLUCOMTR-MCNC: 250 MG/DL — HIGH (ref 70–99)
GLUCOSE BLDC GLUCOMTR-MCNC: 250 MG/DL — HIGH (ref 70–99)
GLUCOSE BLDC GLUCOMTR-MCNC: 251 MG/DL — HIGH (ref 70–99)
GLUCOSE BLDC GLUCOMTR-MCNC: 256 MG/DL — HIGH (ref 70–99)
GLUCOSE BLDC GLUCOMTR-MCNC: 263 MG/DL — HIGH (ref 70–99)
GLUCOSE BLDC GLUCOMTR-MCNC: 277 MG/DL — HIGH (ref 70–99)
GLUCOSE BLDC GLUCOMTR-MCNC: 286 MG/DL — HIGH (ref 70–99)
GLUCOSE BLDC GLUCOMTR-MCNC: 295 MG/DL — HIGH (ref 70–99)
GLUCOSE BLDV-MCNC: 194 MG/DL — HIGH (ref 70–99)
GLUCOSE BLDV-MCNC: 267 MG/DL — HIGH (ref 70–99)
GLUCOSE SERPL-MCNC: 106 MG/DL — HIGH (ref 70–99)
GLUCOSE SERPL-MCNC: 107 MG/DL — HIGH (ref 70–99)
GLUCOSE SERPL-MCNC: 114 MG/DL — HIGH (ref 70–99)
GLUCOSE SERPL-MCNC: 116 MG/DL — HIGH (ref 70–99)
GLUCOSE SERPL-MCNC: 117 MG/DL — HIGH (ref 70–99)
GLUCOSE SERPL-MCNC: 118 MG/DL — HIGH (ref 70–99)
GLUCOSE SERPL-MCNC: 123 MG/DL — HIGH (ref 70–99)
GLUCOSE SERPL-MCNC: 134 MG/DL — HIGH (ref 70–99)
GLUCOSE SERPL-MCNC: 134 MG/DL — HIGH (ref 70–99)
GLUCOSE SERPL-MCNC: 137 MG/DL — HIGH (ref 70–99)
GLUCOSE SERPL-MCNC: 139 MG/DL — HIGH (ref 70–99)
GLUCOSE SERPL-MCNC: 139 MG/DL — HIGH (ref 70–99)
GLUCOSE SERPL-MCNC: 140 MG/DL — HIGH (ref 70–99)
GLUCOSE SERPL-MCNC: 142 MG/DL — HIGH (ref 70–99)
GLUCOSE SERPL-MCNC: 145 MG/DL — HIGH (ref 70–99)
GLUCOSE SERPL-MCNC: 151 MG/DL — HIGH (ref 70–99)
GLUCOSE SERPL-MCNC: 161 MG/DL — HIGH (ref 70–99)
GLUCOSE SERPL-MCNC: 162 MG/DL — HIGH (ref 70–99)
GLUCOSE SERPL-MCNC: 176 MG/DL — HIGH (ref 70–99)
GLUCOSE SERPL-MCNC: 180 MG/DL — HIGH (ref 70–99)
GLUCOSE SERPL-MCNC: 183 MG/DL — HIGH (ref 70–99)
GLUCOSE SERPL-MCNC: 186 MG/DL — HIGH (ref 70–99)
GLUCOSE SERPL-MCNC: 187 MG/DL — HIGH (ref 70–99)
GLUCOSE SERPL-MCNC: 192 MG/DL — HIGH (ref 70–99)
GLUCOSE SERPL-MCNC: 194 MG/DL — HIGH (ref 70–99)
GLUCOSE SERPL-MCNC: 196 MG/DL — HIGH (ref 70–99)
GLUCOSE SERPL-MCNC: 210 MG/DL — HIGH (ref 70–99)
GLUCOSE SERPL-MCNC: 215 MG/DL — HIGH (ref 70–99)
GLUCOSE SERPL-MCNC: 222 MG/DL — HIGH (ref 70–99)
GLUCOSE SERPL-MCNC: 241 MG/DL — HIGH (ref 70–99)
GLUCOSE SERPL-MCNC: 246 MG/DL — HIGH (ref 70–99)
GLUCOSE SERPL-MCNC: 246 MG/DL — HIGH (ref 70–99)
GLUCOSE SERPL-MCNC: 257 MG/DL — HIGH (ref 70–99)
GLUCOSE SERPL-MCNC: 273 MG/DL — HIGH (ref 70–99)
GLUCOSE SERPL-MCNC: 276 MG/DL — HIGH (ref 70–99)
GLUCOSE SERPL-MCNC: 95 MG/DL — SIGNIFICANT CHANGE UP (ref 70–99)
GLUCOSE SERPL-MCNC: 96 MG/DL — SIGNIFICANT CHANGE UP (ref 70–99)
GLUCOSE UR QL: ABNORMAL
GLUCOSE UR QL: NEGATIVE — SIGNIFICANT CHANGE UP
GRAM STN FLD: SIGNIFICANT CHANGE UP
HADV DNA SPEC QL NAA+PROBE: SIGNIFICANT CHANGE UP
HAV IGM SER-ACNC: SIGNIFICANT CHANGE UP
HBV CORE IGM SER-ACNC: SIGNIFICANT CHANGE UP
HBV SURFACE AG SER-ACNC: SIGNIFICANT CHANGE UP
HCO3 BLDV-SCNC: 24 MMOL/L — SIGNIFICANT CHANGE UP (ref 22–29)
HCO3 BLDV-SCNC: 26 MMOL/L — SIGNIFICANT CHANGE UP (ref 22–29)
HCOV 229E RNA SPEC QL NAA+PROBE: SIGNIFICANT CHANGE UP
HCOV HKU1 RNA SPEC QL NAA+PROBE: SIGNIFICANT CHANGE UP
HCOV NL63 RNA SPEC QL NAA+PROBE: SIGNIFICANT CHANGE UP
HCOV OC43 RNA SPEC QL NAA+PROBE: SIGNIFICANT CHANGE UP
HCT VFR BLD CALC: 28.9 % — LOW (ref 34.5–45)
HCT VFR BLD CALC: 29.9 % — LOW (ref 34.5–45)
HCT VFR BLD CALC: 29.9 % — LOW (ref 34.5–45)
HCT VFR BLD CALC: 30.3 % — LOW (ref 34.5–45)
HCT VFR BLD CALC: 30.9 % — LOW (ref 34.5–45)
HCT VFR BLD CALC: 30.9 % — LOW (ref 34.5–45)
HCT VFR BLD CALC: 31.1 % — LOW (ref 34.5–45)
HCT VFR BLD CALC: 31.2 % — LOW (ref 34.5–45)
HCT VFR BLD CALC: 31.4 % — LOW (ref 34.5–45)
HCT VFR BLD CALC: 31.7 % — LOW (ref 34.5–45)
HCT VFR BLD CALC: 32.1 % — LOW (ref 34.5–45)
HCT VFR BLD CALC: 32.5 % — LOW (ref 34.5–45)
HCT VFR BLD CALC: 32.6 % — LOW (ref 34.5–45)
HCT VFR BLD CALC: 32.7 % — LOW (ref 34.5–45)
HCT VFR BLD CALC: 32.9 % — LOW (ref 34.5–45)
HCT VFR BLD CALC: 33.2 % — LOW (ref 34.5–45)
HCT VFR BLD CALC: 33.6 % — LOW (ref 34.5–45)
HCT VFR BLD CALC: 33.9 % — LOW (ref 34.5–45)
HCT VFR BLD CALC: 34 % — LOW (ref 34.5–45)
HCT VFR BLD CALC: 34 % — LOW (ref 34.5–45)
HCT VFR BLD CALC: 34.5 % — SIGNIFICANT CHANGE UP (ref 34.5–45)
HCT VFR BLD CALC: 34.6 % — SIGNIFICANT CHANGE UP (ref 34.5–45)
HCT VFR BLD CALC: 34.9 % — SIGNIFICANT CHANGE UP (ref 34.5–45)
HCT VFR BLD CALC: 35 % — SIGNIFICANT CHANGE UP (ref 34.5–45)
HCT VFR BLD CALC: 35.6 % — SIGNIFICANT CHANGE UP (ref 34.5–45)
HCT VFR BLD CALC: 35.9 % — SIGNIFICANT CHANGE UP (ref 34.5–45)
HCT VFR BLD CALC: 36.1 % — SIGNIFICANT CHANGE UP (ref 34.5–45)
HCT VFR BLD CALC: 36.1 % — SIGNIFICANT CHANGE UP (ref 34.5–45)
HCT VFR BLD CALC: 36.4 % — SIGNIFICANT CHANGE UP (ref 34.5–45)
HCT VFR BLD CALC: 36.6 % — SIGNIFICANT CHANGE UP (ref 34.5–45)
HCT VFR BLD CALC: 36.9 % — SIGNIFICANT CHANGE UP (ref 34.5–45)
HCT VFR BLD CALC: 37.4 % — SIGNIFICANT CHANGE UP (ref 34.5–45)
HCT VFR BLD CALC: 38.4 % — SIGNIFICANT CHANGE UP (ref 34.5–45)
HCT VFR BLD CALC: 40.5 % — SIGNIFICANT CHANGE UP (ref 34.5–45)
HCT VFR BLD CALC: 46.4 % — HIGH (ref 34.5–45)
HCT VFR BLDA CALC: 39 % — SIGNIFICANT CHANGE UP (ref 34.5–46.5)
HCT VFR BLDA CALC: 42 % — SIGNIFICANT CHANGE UP (ref 34.5–46.5)
HCV AB S/CO SERPL IA: 0.07 S/CO — SIGNIFICANT CHANGE UP (ref 0–0.99)
HCV AB SERPL-IMP: SIGNIFICANT CHANGE UP
HGB BLD CALC-MCNC: 12.9 G/DL — SIGNIFICANT CHANGE UP (ref 11.5–15.5)
HGB BLD CALC-MCNC: 14.1 G/DL — SIGNIFICANT CHANGE UP (ref 11.5–15.5)
HGB BLD-MCNC: 10.1 G/DL — LOW (ref 11.5–15.5)
HGB BLD-MCNC: 10.2 G/DL — LOW (ref 11.5–15.5)
HGB BLD-MCNC: 10.4 G/DL — LOW (ref 11.5–15.5)
HGB BLD-MCNC: 10.5 G/DL — LOW (ref 11.5–15.5)
HGB BLD-MCNC: 10.5 G/DL — LOW (ref 11.5–15.5)
HGB BLD-MCNC: 10.6 G/DL — LOW (ref 11.5–15.5)
HGB BLD-MCNC: 10.7 G/DL — LOW (ref 11.5–15.5)
HGB BLD-MCNC: 10.8 G/DL — LOW (ref 11.5–15.5)
HGB BLD-MCNC: 11.1 G/DL — LOW (ref 11.5–15.5)
HGB BLD-MCNC: 11.2 G/DL — LOW (ref 11.5–15.5)
HGB BLD-MCNC: 11.3 G/DL — LOW (ref 11.5–15.5)
HGB BLD-MCNC: 11.4 G/DL — LOW (ref 11.5–15.5)
HGB BLD-MCNC: 11.5 G/DL — SIGNIFICANT CHANGE UP (ref 11.5–15.5)
HGB BLD-MCNC: 11.6 G/DL — SIGNIFICANT CHANGE UP (ref 11.5–15.5)
HGB BLD-MCNC: 11.9 G/DL — SIGNIFICANT CHANGE UP (ref 11.5–15.5)
HGB BLD-MCNC: 12.3 G/DL — SIGNIFICANT CHANGE UP (ref 11.5–15.5)
HGB BLD-MCNC: 13.1 G/DL — SIGNIFICANT CHANGE UP (ref 11.5–15.5)
HGB BLD-MCNC: 14.3 G/DL — SIGNIFICANT CHANGE UP (ref 11.5–15.5)
HGB BLD-MCNC: 9 G/DL — LOW (ref 11.5–15.5)
HGB BLD-MCNC: 9.4 G/DL — LOW (ref 11.5–15.5)
HGB BLD-MCNC: 9.4 G/DL — LOW (ref 11.5–15.5)
HGB BLD-MCNC: 9.7 G/DL — LOW (ref 11.5–15.5)
HGB BLD-MCNC: 9.7 G/DL — LOW (ref 11.5–15.5)
HGB BLD-MCNC: 9.8 G/DL — LOW (ref 11.5–15.5)
HGB BLD-MCNC: 9.8 G/DL — LOW (ref 11.5–15.5)
HGB BLD-MCNC: 9.9 G/DL — LOW (ref 11.5–15.5)
HGB BLD-MCNC: 9.9 G/DL — LOW (ref 11.5–15.5)
HIV 1+2 AB+HIV1 P24 AG SERPL QL IA: SIGNIFICANT CHANGE UP
HMPV RNA SPEC QL NAA+PROBE: SIGNIFICANT CHANGE UP
HPIV1 RNA SPEC QL NAA+PROBE: SIGNIFICANT CHANGE UP
HPIV2 RNA SPEC QL NAA+PROBE: SIGNIFICANT CHANGE UP
HPIV3 RNA SPEC QL NAA+PROBE: SIGNIFICANT CHANGE UP
HPIV4 RNA SPEC QL NAA+PROBE: SIGNIFICANT CHANGE UP
HYALINE CASTS # UR AUTO: 0 /LPF — SIGNIFICANT CHANGE UP (ref 0–7)
HYALINE CASTS # UR AUTO: 2 /LPF — SIGNIFICANT CHANGE UP (ref 0–7)
HYALINE CASTS # UR AUTO: 2 /LPF — SIGNIFICANT CHANGE UP (ref 0–7)
HYALINE CASTS # UR AUTO: 3 /LPF — HIGH (ref 0–2)
HYPOCHROMIA BLD QL: SLIGHT — SIGNIFICANT CHANGE UP
HYPOCHROMIA BLD QL: SLIGHT — SIGNIFICANT CHANGE UP
IANC: 10.7 K/UL — HIGH (ref 1.8–7.4)
IANC: 11.04 K/UL — HIGH (ref 1.8–7.4)
IANC: 3.87 K/UL — SIGNIFICANT CHANGE UP (ref 1.8–7.4)
IANC: 4.42 K/UL — SIGNIFICANT CHANGE UP (ref 1.8–7.4)
IANC: 5 K/UL — SIGNIFICANT CHANGE UP (ref 1.8–7.4)
IANC: 6.28 K/UL — SIGNIFICANT CHANGE UP (ref 1.8–7.4)
IANC: 6.96 K/UL — SIGNIFICANT CHANGE UP (ref 1.8–7.4)
IANC: 8.74 K/UL — HIGH (ref 1.8–7.4)
IMM GRANULOCYTES NFR BLD AUTO: 0.3 % — SIGNIFICANT CHANGE UP (ref 0–1.5)
IMM GRANULOCYTES NFR BLD AUTO: 0.4 % — SIGNIFICANT CHANGE UP (ref 0–1.5)
IMM GRANULOCYTES NFR BLD AUTO: 0.4 % — SIGNIFICANT CHANGE UP (ref 0–1.5)
IMM GRANULOCYTES NFR BLD AUTO: 0.5 % — SIGNIFICANT CHANGE UP (ref 0–1.5)
IMM GRANULOCYTES NFR BLD AUTO: 0.7 % — SIGNIFICANT CHANGE UP (ref 0–1.5)
INR BLD: 1.16 RATIO — SIGNIFICANT CHANGE UP (ref 0.88–1.16)
INR BLD: 1.31 RATIO — HIGH (ref 0.88–1.16)
IRON SATN MFR SERPL: 12 % — LOW (ref 14–50)
IRON SATN MFR SERPL: 33 UG/DL — SIGNIFICANT CHANGE UP (ref 30–160)
KETONES UR-MCNC: ABNORMAL
KETONES UR-MCNC: NEGATIVE — SIGNIFICANT CHANGE UP
LACTATE BLDV-MCNC: 1.8 MMOL/L — SIGNIFICANT CHANGE UP (ref 0.5–2)
LACTATE BLDV-MCNC: 2.8 MMOL/L — HIGH (ref 0.5–2)
LACTATE BLDV-MCNC: 7.3 MMOL/L — CRITICAL HIGH (ref 0.5–2)
LEUKOCYTE ESTERASE UR-ACNC: ABNORMAL
LYMPHOCYTES # BLD AUTO: 0.68 K/UL — LOW (ref 1–3.3)
LYMPHOCYTES # BLD AUTO: 0.76 K/UL — LOW (ref 1–3.3)
LYMPHOCYTES # BLD AUTO: 0.95 K/UL — LOW (ref 1–3.3)
LYMPHOCYTES # BLD AUTO: 0.99 K/UL — LOW (ref 1–3.3)
LYMPHOCYTES # BLD AUTO: 1.18 K/UL — SIGNIFICANT CHANGE UP (ref 1–3.3)
LYMPHOCYTES # BLD AUTO: 1.43 K/UL — SIGNIFICANT CHANGE UP (ref 1–3.3)
LYMPHOCYTES # BLD AUTO: 1.51 K/UL — SIGNIFICANT CHANGE UP (ref 1–3.3)
LYMPHOCYTES # BLD AUTO: 1.83 K/UL — SIGNIFICANT CHANGE UP (ref 1–3.3)
LYMPHOCYTES # BLD AUTO: 1.91 K/UL — SIGNIFICANT CHANGE UP (ref 1–3.3)
LYMPHOCYTES # BLD AUTO: 13.1 % — SIGNIFICANT CHANGE UP (ref 13–44)
LYMPHOCYTES # BLD AUTO: 16.9 % — SIGNIFICANT CHANGE UP (ref 13–44)
LYMPHOCYTES # BLD AUTO: 20.5 % — SIGNIFICANT CHANGE UP (ref 13–44)
LYMPHOCYTES # BLD AUTO: 25.4 % — SIGNIFICANT CHANGE UP (ref 13–44)
LYMPHOCYTES # BLD AUTO: 27.4 % — SIGNIFICANT CHANGE UP (ref 13–44)
LYMPHOCYTES # BLD AUTO: 5.1 % — LOW (ref 13–44)
LYMPHOCYTES # BLD AUTO: 7.7 % — LOW (ref 13–44)
LYMPHOCYTES # BLD AUTO: 8.5 % — LOW (ref 13–44)
LYMPHOCYTES # BLD AUTO: 9.1 % — LOW (ref 13–44)
M PNEUMO DNA SPEC QL NAA+PROBE: SIGNIFICANT CHANGE UP
M TB IFN-G BLD-IMP: NEGATIVE — SIGNIFICANT CHANGE UP
M TB IFN-G CD4+ BCKGRND COR BLD-ACNC: -0.01 IU/ML — SIGNIFICANT CHANGE UP
M TB IFN-G CD4+CD8+ BCKGRND COR BLD-ACNC: -0.01 IU/ML — SIGNIFICANT CHANGE UP
MAGNESIUM SERPL-MCNC: 1.4 MG/DL — LOW (ref 1.6–2.6)
MAGNESIUM SERPL-MCNC: 1.4 MG/DL — LOW (ref 1.6–2.6)
MAGNESIUM SERPL-MCNC: 1.6 MG/DL — SIGNIFICANT CHANGE UP (ref 1.6–2.6)
MAGNESIUM SERPL-MCNC: 1.7 MG/DL — SIGNIFICANT CHANGE UP (ref 1.6–2.6)
MAGNESIUM SERPL-MCNC: 1.8 MG/DL — SIGNIFICANT CHANGE UP (ref 1.6–2.6)
MAGNESIUM SERPL-MCNC: 1.9 MG/DL — SIGNIFICANT CHANGE UP (ref 1.6–2.6)
MAGNESIUM SERPL-MCNC: 2.1 MG/DL — SIGNIFICANT CHANGE UP (ref 1.6–2.6)
MAGNESIUM SERPL-MCNC: 2.1 MG/DL — SIGNIFICANT CHANGE UP (ref 1.6–2.6)
MANUAL SMEAR VERIFICATION: SIGNIFICANT CHANGE UP
MCHC RBC-ENTMCNC: 25.3 PG — LOW (ref 27–34)
MCHC RBC-ENTMCNC: 25.3 PG — LOW (ref 27–34)
MCHC RBC-ENTMCNC: 25.4 PG — LOW (ref 27–34)
MCHC RBC-ENTMCNC: 25.5 PG — LOW (ref 27–34)
MCHC RBC-ENTMCNC: 25.6 PG — LOW (ref 27–34)
MCHC RBC-ENTMCNC: 25.7 PG — LOW (ref 27–34)
MCHC RBC-ENTMCNC: 25.9 PG — LOW (ref 27–34)
MCHC RBC-ENTMCNC: 26 PG — LOW (ref 27–34)
MCHC RBC-ENTMCNC: 26 PG — LOW (ref 27–34)
MCHC RBC-ENTMCNC: 26.1 PG — LOW (ref 27–34)
MCHC RBC-ENTMCNC: 26.1 PG — LOW (ref 27–34)
MCHC RBC-ENTMCNC: 26.2 PG — LOW (ref 27–34)
MCHC RBC-ENTMCNC: 26.4 PG — LOW (ref 27–34)
MCHC RBC-ENTMCNC: 26.4 PG — LOW (ref 27–34)
MCHC RBC-ENTMCNC: 26.5 PG — LOW (ref 27–34)
MCHC RBC-ENTMCNC: 26.5 PG — LOW (ref 27–34)
MCHC RBC-ENTMCNC: 26.7 PG — LOW (ref 27–34)
MCHC RBC-ENTMCNC: 27.3 PG — SIGNIFICANT CHANGE UP (ref 27–34)
MCHC RBC-ENTMCNC: 29.5 GM/DL — LOW (ref 32–36)
MCHC RBC-ENTMCNC: 30.6 GM/DL — LOW (ref 32–36)
MCHC RBC-ENTMCNC: 30.6 GM/DL — LOW (ref 32–36)
MCHC RBC-ENTMCNC: 30.7 GM/DL — LOW (ref 32–36)
MCHC RBC-ENTMCNC: 30.8 GM/DL — LOW (ref 32–36)
MCHC RBC-ENTMCNC: 30.9 GM/DL — LOW (ref 32–36)
MCHC RBC-ENTMCNC: 30.9 GM/DL — LOW (ref 32–36)
MCHC RBC-ENTMCNC: 31 GM/DL — LOW (ref 32–36)
MCHC RBC-ENTMCNC: 31 GM/DL — LOW (ref 32–36)
MCHC RBC-ENTMCNC: 31.1 GM/DL — LOW (ref 32–36)
MCHC RBC-ENTMCNC: 31.1 GM/DL — LOW (ref 32–36)
MCHC RBC-ENTMCNC: 31.3 GM/DL — LOW (ref 32–36)
MCHC RBC-ENTMCNC: 31.4 GM/DL — LOW (ref 32–36)
MCHC RBC-ENTMCNC: 31.5 GM/DL — LOW (ref 32–36)
MCHC RBC-ENTMCNC: 31.7 GM/DL — LOW (ref 32–36)
MCHC RBC-ENTMCNC: 31.8 GM/DL — LOW (ref 32–36)
MCHC RBC-ENTMCNC: 31.9 GM/DL — LOW (ref 32–36)
MCHC RBC-ENTMCNC: 31.9 GM/DL — LOW (ref 32–36)
MCHC RBC-ENTMCNC: 32 GM/DL — SIGNIFICANT CHANGE UP (ref 32–36)
MCHC RBC-ENTMCNC: 32.1 GM/DL — SIGNIFICANT CHANGE UP (ref 32–36)
MCHC RBC-ENTMCNC: 32.1 GM/DL — SIGNIFICANT CHANGE UP (ref 32–36)
MCHC RBC-ENTMCNC: 32.3 GM/DL — SIGNIFICANT CHANGE UP (ref 32–36)
MCHC RBC-ENTMCNC: 32.5 GM/DL — SIGNIFICANT CHANGE UP (ref 32–36)
MCHC RBC-ENTMCNC: 32.7 GM/DL — SIGNIFICANT CHANGE UP (ref 32–36)
MCHC RBC-ENTMCNC: 32.8 GM/DL — SIGNIFICANT CHANGE UP (ref 32–36)
MCHC RBC-ENTMCNC: 32.9 GM/DL — SIGNIFICANT CHANGE UP (ref 32–36)
MCHC RBC-ENTMCNC: 33 GM/DL — SIGNIFICANT CHANGE UP (ref 32–36)
MCV RBC AUTO: 79.3 FL — LOW (ref 80–100)
MCV RBC AUTO: 79.3 FL — LOW (ref 80–100)
MCV RBC AUTO: 79.4 FL — LOW (ref 80–100)
MCV RBC AUTO: 79.4 FL — LOW (ref 80–100)
MCV RBC AUTO: 79.6 FL — LOW (ref 80–100)
MCV RBC AUTO: 79.7 FL — LOW (ref 80–100)
MCV RBC AUTO: 79.9 FL — LOW (ref 80–100)
MCV RBC AUTO: 80 FL — SIGNIFICANT CHANGE UP (ref 80–100)
MCV RBC AUTO: 80.5 FL — SIGNIFICANT CHANGE UP (ref 80–100)
MCV RBC AUTO: 80.7 FL — SIGNIFICANT CHANGE UP (ref 80–100)
MCV RBC AUTO: 80.7 FL — SIGNIFICANT CHANGE UP (ref 80–100)
MCV RBC AUTO: 81 FL — SIGNIFICANT CHANGE UP (ref 80–100)
MCV RBC AUTO: 81 FL — SIGNIFICANT CHANGE UP (ref 80–100)
MCV RBC AUTO: 81.3 FL — SIGNIFICANT CHANGE UP (ref 80–100)
MCV RBC AUTO: 81.5 FL — SIGNIFICANT CHANGE UP (ref 80–100)
MCV RBC AUTO: 81.7 FL — SIGNIFICANT CHANGE UP (ref 80–100)
MCV RBC AUTO: 81.7 FL — SIGNIFICANT CHANGE UP (ref 80–100)
MCV RBC AUTO: 82 FL — SIGNIFICANT CHANGE UP (ref 80–100)
MCV RBC AUTO: 82 FL — SIGNIFICANT CHANGE UP (ref 80–100)
MCV RBC AUTO: 82.2 FL — SIGNIFICANT CHANGE UP (ref 80–100)
MCV RBC AUTO: 82.3 FL — SIGNIFICANT CHANGE UP (ref 80–100)
MCV RBC AUTO: 82.3 FL — SIGNIFICANT CHANGE UP (ref 80–100)
MCV RBC AUTO: 82.4 FL — SIGNIFICANT CHANGE UP (ref 80–100)
MCV RBC AUTO: 82.5 FL — SIGNIFICANT CHANGE UP (ref 80–100)
MCV RBC AUTO: 82.5 FL — SIGNIFICANT CHANGE UP (ref 80–100)
MCV RBC AUTO: 82.6 FL — SIGNIFICANT CHANGE UP (ref 80–100)
MCV RBC AUTO: 82.7 FL — SIGNIFICANT CHANGE UP (ref 80–100)
MCV RBC AUTO: 82.8 FL — SIGNIFICANT CHANGE UP (ref 80–100)
MCV RBC AUTO: 82.9 FL — SIGNIFICANT CHANGE UP (ref 80–100)
MCV RBC AUTO: 82.9 FL — SIGNIFICANT CHANGE UP (ref 80–100)
MCV RBC AUTO: 83 FL — SIGNIFICANT CHANGE UP (ref 80–100)
MCV RBC AUTO: 83.2 FL — SIGNIFICANT CHANGE UP (ref 80–100)
MCV RBC AUTO: 83.4 FL — SIGNIFICANT CHANGE UP (ref 80–100)
MCV RBC AUTO: 83.6 FL — SIGNIFICANT CHANGE UP (ref 80–100)
MCV RBC AUTO: 83.8 FL — SIGNIFICANT CHANGE UP (ref 80–100)
MCV RBC AUTO: 83.9 FL — SIGNIFICANT CHANGE UP (ref 80–100)
MCV RBC AUTO: 83.9 FL — SIGNIFICANT CHANGE UP (ref 80–100)
MCV RBC AUTO: 84.2 FL — SIGNIFICANT CHANGE UP (ref 80–100)
MCV RBC AUTO: 92.5 FL — SIGNIFICANT CHANGE UP (ref 80–100)
METHOD TYPE: SIGNIFICANT CHANGE UP
MICROCYTES BLD QL: SLIGHT — SIGNIFICANT CHANGE UP
MONOCYTES # BLD AUTO: 0.54 K/UL — SIGNIFICANT CHANGE UP (ref 0–0.9)
MONOCYTES # BLD AUTO: 0.57 K/UL — SIGNIFICANT CHANGE UP (ref 0–0.9)
MONOCYTES # BLD AUTO: 0.57 K/UL — SIGNIFICANT CHANGE UP (ref 0–0.9)
MONOCYTES # BLD AUTO: 0.63 K/UL — SIGNIFICANT CHANGE UP (ref 0–0.9)
MONOCYTES # BLD AUTO: 0.67 K/UL — SIGNIFICANT CHANGE UP (ref 0–0.9)
MONOCYTES # BLD AUTO: 0.68 K/UL — SIGNIFICANT CHANGE UP (ref 0–0.9)
MONOCYTES # BLD AUTO: 0.73 K/UL — SIGNIFICANT CHANGE UP (ref 0–0.9)
MONOCYTES # BLD AUTO: 0.76 K/UL — SIGNIFICANT CHANGE UP (ref 0–0.9)
MONOCYTES # BLD AUTO: 1.25 K/UL — HIGH (ref 0–0.9)
MONOCYTES NFR BLD AUTO: 10.2 % — SIGNIFICANT CHANGE UP (ref 2–14)
MONOCYTES NFR BLD AUTO: 5.4 % — SIGNIFICANT CHANGE UP (ref 2–14)
MONOCYTES NFR BLD AUTO: 5.9 % — SIGNIFICANT CHANGE UP (ref 2–14)
MONOCYTES NFR BLD AUTO: 6 % — SIGNIFICANT CHANGE UP (ref 2–14)
MONOCYTES NFR BLD AUTO: 7 % — SIGNIFICANT CHANGE UP (ref 2–14)
MONOCYTES NFR BLD AUTO: 7.7 % — SIGNIFICANT CHANGE UP (ref 2–14)
MONOCYTES NFR BLD AUTO: 7.9 % — SIGNIFICANT CHANGE UP (ref 2–14)
MONOCYTES NFR BLD AUTO: 9.4 % — SIGNIFICANT CHANGE UP (ref 2–14)
MONOCYTES NFR BLD AUTO: 9.7 % — SIGNIFICANT CHANGE UP (ref 2–14)
MRSA PCR RESULT.: SIGNIFICANT CHANGE UP
MRSA PCR RESULT.: SIGNIFICANT CHANGE UP
NEUTROPHILS # BLD AUTO: 11.04 K/UL — HIGH (ref 1.8–7.4)
NEUTROPHILS # BLD AUTO: 11.04 K/UL — HIGH (ref 1.8–7.4)
NEUTROPHILS # BLD AUTO: 3.87 K/UL — SIGNIFICANT CHANGE UP (ref 1.8–7.4)
NEUTROPHILS # BLD AUTO: 4.42 K/UL — SIGNIFICANT CHANGE UP (ref 1.8–7.4)
NEUTROPHILS # BLD AUTO: 5 K/UL — SIGNIFICANT CHANGE UP (ref 1.8–7.4)
NEUTROPHILS # BLD AUTO: 6.28 K/UL — SIGNIFICANT CHANGE UP (ref 1.8–7.4)
NEUTROPHILS # BLD AUTO: 6.96 K/UL — SIGNIFICANT CHANGE UP (ref 1.8–7.4)
NEUTROPHILS # BLD AUTO: 7.53 K/UL — HIGH (ref 1.8–7.4)
NEUTROPHILS # BLD AUTO: 8.74 K/UL — HIGH (ref 1.8–7.4)
NEUTROPHILS NFR BLD AUTO: 57.8 % — SIGNIFICANT CHANGE UP (ref 43–77)
NEUTROPHILS NFR BLD AUTO: 59 % — SIGNIFICANT CHANGE UP (ref 43–77)
NEUTROPHILS NFR BLD AUTO: 67.9 % — SIGNIFICANT CHANGE UP (ref 43–77)
NEUTROPHILS NFR BLD AUTO: 74.5 % — SIGNIFICANT CHANGE UP (ref 43–77)
NEUTROPHILS NFR BLD AUTO: 77.6 % — HIGH (ref 43–77)
NEUTROPHILS NFR BLD AUTO: 82.9 % — HIGH (ref 43–77)
NEUTROPHILS NFR BLD AUTO: 83.4 % — HIGH (ref 43–77)
NEUTROPHILS NFR BLD AUTO: 83.8 % — HIGH (ref 43–77)
NEUTROPHILS NFR BLD AUTO: 85.4 % — HIGH (ref 43–77)
NITRITE UR-MCNC: NEGATIVE — SIGNIFICANT CHANGE UP
NITRITE UR-MCNC: POSITIVE
NRBC # BLD: 0 /100 WBCS — SIGNIFICANT CHANGE UP
NRBC # BLD: 0 /100 WBCS — SIGNIFICANT CHANGE UP (ref 0–0)
NRBC # FLD: 0 K/UL — SIGNIFICANT CHANGE UP
NRBC # FLD: 0 K/UL — SIGNIFICANT CHANGE UP (ref 0–0)
NT-PROBNP SERPL-SCNC: 468 PG/ML — HIGH (ref 0–300)
ORGANISM # SPEC MICROSCOPIC CNT: SIGNIFICANT CHANGE UP
OVALOCYTES BLD QL SMEAR: SLIGHT — SIGNIFICANT CHANGE UP
PCO2 BLDV: 52 MMHG — HIGH (ref 39–42)
PCO2 BLDV: 65 MMHG — HIGH (ref 39–42)
PH BLDV: 7.18 — LOW (ref 7.32–7.43)
PH BLDV: 7.3 — LOW (ref 7.32–7.43)
PH UR: 6 — SIGNIFICANT CHANGE UP (ref 5–8)
PH UR: 6.5 — SIGNIFICANT CHANGE UP (ref 5–8)
PH UR: 6.5 — SIGNIFICANT CHANGE UP (ref 5–8)
PH UR: 7 — SIGNIFICANT CHANGE UP (ref 5–8)
PHOSPHATE SERPL-MCNC: 2.1 MG/DL — LOW (ref 2.5–4.5)
PHOSPHATE SERPL-MCNC: 2.1 MG/DL — LOW (ref 2.5–4.5)
PHOSPHATE SERPL-MCNC: 2.2 MG/DL — LOW (ref 2.5–4.5)
PHOSPHATE SERPL-MCNC: 2.3 MG/DL — LOW (ref 2.5–4.5)
PHOSPHATE SERPL-MCNC: 2.4 MG/DL — LOW (ref 2.5–4.5)
PHOSPHATE SERPL-MCNC: 2.4 MG/DL — LOW (ref 2.5–4.5)
PHOSPHATE SERPL-MCNC: 2.6 MG/DL — SIGNIFICANT CHANGE UP (ref 2.5–4.5)
PHOSPHATE SERPL-MCNC: 2.7 MG/DL — SIGNIFICANT CHANGE UP (ref 2.5–4.5)
PHOSPHATE SERPL-MCNC: 2.7 MG/DL — SIGNIFICANT CHANGE UP (ref 2.5–4.5)
PHOSPHATE SERPL-MCNC: 2.8 MG/DL — SIGNIFICANT CHANGE UP (ref 2.5–4.5)
PHOSPHATE SERPL-MCNC: 2.8 MG/DL — SIGNIFICANT CHANGE UP (ref 2.5–4.5)
PHOSPHATE SERPL-MCNC: 2.9 MG/DL — SIGNIFICANT CHANGE UP (ref 2.5–4.5)
PHOSPHATE SERPL-MCNC: 3 MG/DL — SIGNIFICANT CHANGE UP (ref 2.5–4.5)
PHOSPHATE SERPL-MCNC: 3 MG/DL — SIGNIFICANT CHANGE UP (ref 2.5–4.5)
PHOSPHATE SERPL-MCNC: 3.1 MG/DL — SIGNIFICANT CHANGE UP (ref 2.5–4.5)
PHOSPHATE SERPL-MCNC: 3.3 MG/DL — SIGNIFICANT CHANGE UP (ref 2.5–4.5)
PHOSPHATE SERPL-MCNC: 3.3 MG/DL — SIGNIFICANT CHANGE UP (ref 2.5–4.5)
PHOSPHATE SERPL-MCNC: 3.4 MG/DL — SIGNIFICANT CHANGE UP (ref 2.5–4.5)
PHOSPHATE SERPL-MCNC: 3.5 MG/DL — SIGNIFICANT CHANGE UP (ref 2.5–4.5)
PHOSPHATE SERPL-MCNC: 3.6 MG/DL — SIGNIFICANT CHANGE UP (ref 2.5–4.5)
PHOSPHATE SERPL-MCNC: 3.8 MG/DL — SIGNIFICANT CHANGE UP (ref 2.5–4.5)
PHOSPHATE SERPL-MCNC: 3.9 MG/DL — SIGNIFICANT CHANGE UP (ref 2.5–4.5)
PHOSPHATE SERPL-MCNC: 3.9 MG/DL — SIGNIFICANT CHANGE UP (ref 2.5–4.5)
PHOSPHATE SERPL-MCNC: 4 MG/DL — SIGNIFICANT CHANGE UP (ref 2.5–4.5)
PLAT MORPH BLD: ABNORMAL
PLAT MORPH BLD: NORMAL — SIGNIFICANT CHANGE UP
PLATELET # BLD AUTO: 251 K/UL — SIGNIFICANT CHANGE UP (ref 150–400)
PLATELET # BLD AUTO: 256 K/UL — SIGNIFICANT CHANGE UP (ref 150–400)
PLATELET # BLD AUTO: 256 K/UL — SIGNIFICANT CHANGE UP (ref 150–400)
PLATELET # BLD AUTO: 265 K/UL — SIGNIFICANT CHANGE UP (ref 150–400)
PLATELET # BLD AUTO: 266 K/UL — SIGNIFICANT CHANGE UP (ref 150–400)
PLATELET # BLD AUTO: 299 K/UL — SIGNIFICANT CHANGE UP (ref 150–400)
PLATELET # BLD AUTO: 323 K/UL — SIGNIFICANT CHANGE UP (ref 150–400)
PLATELET # BLD AUTO: 323 K/UL — SIGNIFICANT CHANGE UP (ref 150–400)
PLATELET # BLD AUTO: 326 K/UL — SIGNIFICANT CHANGE UP (ref 150–400)
PLATELET # BLD AUTO: 326 K/UL — SIGNIFICANT CHANGE UP (ref 150–400)
PLATELET # BLD AUTO: 345 K/UL — SIGNIFICANT CHANGE UP (ref 150–400)
PLATELET # BLD AUTO: 346 K/UL — SIGNIFICANT CHANGE UP (ref 150–400)
PLATELET # BLD AUTO: 347 K/UL — SIGNIFICANT CHANGE UP (ref 150–400)
PLATELET # BLD AUTO: 348 K/UL — SIGNIFICANT CHANGE UP (ref 150–400)
PLATELET # BLD AUTO: 349 K/UL — SIGNIFICANT CHANGE UP (ref 150–400)
PLATELET # BLD AUTO: 352 K/UL — SIGNIFICANT CHANGE UP (ref 150–400)
PLATELET # BLD AUTO: 354 K/UL — SIGNIFICANT CHANGE UP (ref 150–400)
PLATELET # BLD AUTO: 355 K/UL — SIGNIFICANT CHANGE UP (ref 150–400)
PLATELET # BLD AUTO: 363 K/UL — SIGNIFICANT CHANGE UP (ref 150–400)
PLATELET # BLD AUTO: 377 K/UL — SIGNIFICANT CHANGE UP (ref 150–400)
PLATELET # BLD AUTO: 379 K/UL — SIGNIFICANT CHANGE UP (ref 150–400)
PLATELET # BLD AUTO: 390 K/UL — SIGNIFICANT CHANGE UP (ref 150–400)
PLATELET # BLD AUTO: 392 K/UL — SIGNIFICANT CHANGE UP (ref 150–400)
PLATELET # BLD AUTO: 393 K/UL — SIGNIFICANT CHANGE UP (ref 150–400)
PLATELET # BLD AUTO: 400 K/UL — SIGNIFICANT CHANGE UP (ref 150–400)
PLATELET # BLD AUTO: 401 K/UL — HIGH (ref 150–400)
PLATELET # BLD AUTO: 405 K/UL — HIGH (ref 150–400)
PLATELET # BLD AUTO: 407 K/UL — HIGH (ref 150–400)
PLATELET # BLD AUTO: 408 K/UL — HIGH (ref 150–400)
PLATELET # BLD AUTO: 410 K/UL — HIGH (ref 150–400)
PLATELET # BLD AUTO: 410 K/UL — HIGH (ref 150–400)
PLATELET # BLD AUTO: 412 K/UL — HIGH (ref 150–400)
PLATELET # BLD AUTO: 417 K/UL — HIGH (ref 150–400)
PLATELET # BLD AUTO: 421 K/UL — HIGH (ref 150–400)
PLATELET # BLD AUTO: 426 K/UL — HIGH (ref 150–400)
PLATELET # BLD AUTO: 427 K/UL — HIGH (ref 150–400)
PLATELET # BLD AUTO: 436 K/UL — HIGH (ref 150–400)
PLATELET # BLD AUTO: 436 K/UL — HIGH (ref 150–400)
PLATELET # BLD AUTO: 471 K/UL — HIGH (ref 150–400)
PLATELET COUNT - ESTIMATE: NORMAL — SIGNIFICANT CHANGE UP
PO2 BLDV: 28 MMHG — SIGNIFICANT CHANGE UP
PO2 BLDV: 31 MMHG — SIGNIFICANT CHANGE UP
POIKILOCYTOSIS BLD QL AUTO: SLIGHT — SIGNIFICANT CHANGE UP
POLYCHROMASIA BLD QL SMEAR: SIGNIFICANT CHANGE UP
POLYCHROMASIA BLD QL SMEAR: SLIGHT — SIGNIFICANT CHANGE UP
POTASSIUM BLDV-SCNC: 4.2 MMOL/L — SIGNIFICANT CHANGE UP (ref 3.5–5.1)
POTASSIUM BLDV-SCNC: 7.3 MMOL/L — CRITICAL HIGH (ref 3.5–5.1)
POTASSIUM SERPL-MCNC: 3.2 MMOL/L — LOW (ref 3.5–5.3)
POTASSIUM SERPL-MCNC: 3.3 MMOL/L — LOW (ref 3.5–5.3)
POTASSIUM SERPL-MCNC: 3.4 MMOL/L — LOW (ref 3.5–5.3)
POTASSIUM SERPL-MCNC: 3.5 MMOL/L — SIGNIFICANT CHANGE UP (ref 3.5–5.3)
POTASSIUM SERPL-MCNC: 3.6 MMOL/L — SIGNIFICANT CHANGE UP (ref 3.5–5.3)
POTASSIUM SERPL-MCNC: 3.7 MMOL/L — SIGNIFICANT CHANGE UP (ref 3.5–5.3)
POTASSIUM SERPL-MCNC: 3.8 MMOL/L — SIGNIFICANT CHANGE UP (ref 3.5–5.3)
POTASSIUM SERPL-MCNC: 3.9 MMOL/L — SIGNIFICANT CHANGE UP (ref 3.5–5.3)
POTASSIUM SERPL-MCNC: 4 MMOL/L — SIGNIFICANT CHANGE UP (ref 3.5–5.3)
POTASSIUM SERPL-MCNC: 4.1 MMOL/L — SIGNIFICANT CHANGE UP (ref 3.5–5.3)
POTASSIUM SERPL-MCNC: 4.1 MMOL/L — SIGNIFICANT CHANGE UP (ref 3.5–5.3)
POTASSIUM SERPL-MCNC: 4.2 MMOL/L — SIGNIFICANT CHANGE UP (ref 3.5–5.3)
POTASSIUM SERPL-MCNC: 4.2 MMOL/L — SIGNIFICANT CHANGE UP (ref 3.5–5.3)
POTASSIUM SERPL-MCNC: 4.3 MMOL/L — SIGNIFICANT CHANGE UP (ref 3.5–5.3)
POTASSIUM SERPL-MCNC: 4.5 MMOL/L — SIGNIFICANT CHANGE UP (ref 3.5–5.3)
POTASSIUM SERPL-MCNC: 4.6 MMOL/L — SIGNIFICANT CHANGE UP (ref 3.5–5.3)
POTASSIUM SERPL-MCNC: 5.2 MMOL/L — SIGNIFICANT CHANGE UP (ref 3.5–5.3)
POTASSIUM SERPL-MCNC: 5.3 MMOL/L — SIGNIFICANT CHANGE UP (ref 3.5–5.3)
POTASSIUM SERPL-SCNC: 3.2 MMOL/L — LOW (ref 3.5–5.3)
POTASSIUM SERPL-SCNC: 3.3 MMOL/L — LOW (ref 3.5–5.3)
POTASSIUM SERPL-SCNC: 3.4 MMOL/L — LOW (ref 3.5–5.3)
POTASSIUM SERPL-SCNC: 3.5 MMOL/L — SIGNIFICANT CHANGE UP (ref 3.5–5.3)
POTASSIUM SERPL-SCNC: 3.6 MMOL/L — SIGNIFICANT CHANGE UP (ref 3.5–5.3)
POTASSIUM SERPL-SCNC: 3.7 MMOL/L — SIGNIFICANT CHANGE UP (ref 3.5–5.3)
POTASSIUM SERPL-SCNC: 3.8 MMOL/L — SIGNIFICANT CHANGE UP (ref 3.5–5.3)
POTASSIUM SERPL-SCNC: 3.9 MMOL/L — SIGNIFICANT CHANGE UP (ref 3.5–5.3)
POTASSIUM SERPL-SCNC: 4 MMOL/L — SIGNIFICANT CHANGE UP (ref 3.5–5.3)
POTASSIUM SERPL-SCNC: 4.1 MMOL/L — SIGNIFICANT CHANGE UP (ref 3.5–5.3)
POTASSIUM SERPL-SCNC: 4.1 MMOL/L — SIGNIFICANT CHANGE UP (ref 3.5–5.3)
POTASSIUM SERPL-SCNC: 4.2 MMOL/L — SIGNIFICANT CHANGE UP (ref 3.5–5.3)
POTASSIUM SERPL-SCNC: 4.2 MMOL/L — SIGNIFICANT CHANGE UP (ref 3.5–5.3)
POTASSIUM SERPL-SCNC: 4.3 MMOL/L — SIGNIFICANT CHANGE UP (ref 3.5–5.3)
POTASSIUM SERPL-SCNC: 4.5 MMOL/L — SIGNIFICANT CHANGE UP (ref 3.5–5.3)
POTASSIUM SERPL-SCNC: 4.6 MMOL/L — SIGNIFICANT CHANGE UP (ref 3.5–5.3)
POTASSIUM SERPL-SCNC: 5.2 MMOL/L — SIGNIFICANT CHANGE UP (ref 3.5–5.3)
POTASSIUM SERPL-SCNC: 5.3 MMOL/L — SIGNIFICANT CHANGE UP (ref 3.5–5.3)
PROCALCITONIN SERPL-MCNC: 0.2 NG/ML — HIGH (ref 0.02–0.1)
PROT SERPL-MCNC: 5.9 G/DL — LOW (ref 6–8.3)
PROT SERPL-MCNC: 6 G/DL — SIGNIFICANT CHANGE UP (ref 6–8.3)
PROT SERPL-MCNC: 6.1 G/DL — SIGNIFICANT CHANGE UP (ref 6–8.3)
PROT SERPL-MCNC: 6.3 G/DL — SIGNIFICANT CHANGE UP (ref 6–8.3)
PROT SERPL-MCNC: 6.3 G/DL — SIGNIFICANT CHANGE UP (ref 6–8.3)
PROT SERPL-MCNC: 6.5 G/DL — SIGNIFICANT CHANGE UP (ref 6–8.3)
PROT SERPL-MCNC: 6.6 G/DL — SIGNIFICANT CHANGE UP (ref 6–8.3)
PROT SERPL-MCNC: 6.7 G/DL — SIGNIFICANT CHANGE UP (ref 6–8.3)
PROT SERPL-MCNC: 7 G/DL — SIGNIFICANT CHANGE UP (ref 6–8.3)
PROT SERPL-MCNC: 7 G/DL — SIGNIFICANT CHANGE UP (ref 6–8.3)
PROT SERPL-MCNC: 7.1 G/DL — SIGNIFICANT CHANGE UP (ref 6–8.3)
PROT SERPL-MCNC: 8.4 G/DL — HIGH (ref 6–8.3)
PROT UR-MCNC: ABNORMAL
PROT UR-MCNC: NEGATIVE — SIGNIFICANT CHANGE UP
PROT UR-MCNC: NEGATIVE — SIGNIFICANT CHANGE UP
PROTHROM AB SERPL-ACNC: 13.5 SEC — HIGH (ref 10.5–13.4)
PROTHROM AB SERPL-ACNC: 15.1 SEC — HIGH (ref 10.5–13.4)
QUANT TB PLUS MITOGEN MINUS NIL: 3.35 IU/ML — SIGNIFICANT CHANGE UP
RAPID RVP RESULT: SIGNIFICANT CHANGE UP
RBC # BLD: 3.48 M/UL — LOW (ref 3.8–5.2)
RBC # BLD: 3.55 M/UL — LOW (ref 3.8–5.2)
RBC # BLD: 3.59 M/UL — LOW (ref 3.8–5.2)
RBC # BLD: 3.61 M/UL — LOW (ref 3.8–5.2)
RBC # BLD: 3.71 M/UL — LOW (ref 3.8–5.2)
RBC # BLD: 3.73 M/UL — LOW (ref 3.8–5.2)
RBC # BLD: 3.74 M/UL — LOW (ref 3.8–5.2)
RBC # BLD: 3.75 M/UL — LOW (ref 3.8–5.2)
RBC # BLD: 3.81 M/UL — SIGNIFICANT CHANGE UP (ref 3.8–5.2)
RBC # BLD: 3.86 M/UL — SIGNIFICANT CHANGE UP (ref 3.8–5.2)
RBC # BLD: 3.89 M/UL — SIGNIFICANT CHANGE UP (ref 3.8–5.2)
RBC # BLD: 3.9 M/UL — SIGNIFICANT CHANGE UP (ref 3.8–5.2)
RBC # BLD: 3.94 M/UL — SIGNIFICANT CHANGE UP (ref 3.8–5.2)
RBC # BLD: 4.05 M/UL — SIGNIFICANT CHANGE UP (ref 3.8–5.2)
RBC # BLD: 4.06 M/UL — SIGNIFICANT CHANGE UP (ref 3.8–5.2)
RBC # BLD: 4.07 M/UL — SIGNIFICANT CHANGE UP (ref 3.8–5.2)
RBC # BLD: 4.08 M/UL — SIGNIFICANT CHANGE UP (ref 3.8–5.2)
RBC # BLD: 4.09 M/UL — SIGNIFICANT CHANGE UP (ref 3.8–5.2)
RBC # BLD: 4.1 M/UL — SIGNIFICANT CHANGE UP (ref 3.8–5.2)
RBC # BLD: 4.13 M/UL — SIGNIFICANT CHANGE UP (ref 3.8–5.2)
RBC # BLD: 4.15 M/UL — SIGNIFICANT CHANGE UP (ref 3.8–5.2)
RBC # BLD: 4.16 M/UL — SIGNIFICANT CHANGE UP (ref 3.8–5.2)
RBC # BLD: 4.29 M/UL — SIGNIFICANT CHANGE UP (ref 3.8–5.2)
RBC # BLD: 4.33 M/UL — SIGNIFICANT CHANGE UP (ref 3.8–5.2)
RBC # BLD: 4.35 M/UL — SIGNIFICANT CHANGE UP (ref 3.8–5.2)
RBC # BLD: 4.35 M/UL — SIGNIFICANT CHANGE UP (ref 3.8–5.2)
RBC # BLD: 4.36 M/UL — SIGNIFICANT CHANGE UP (ref 3.8–5.2)
RBC # BLD: 4.4 M/UL — SIGNIFICANT CHANGE UP (ref 3.8–5.2)
RBC # BLD: 4.4 M/UL — SIGNIFICANT CHANGE UP (ref 3.8–5.2)
RBC # BLD: 4.41 M/UL — SIGNIFICANT CHANGE UP (ref 3.8–5.2)
RBC # BLD: 4.45 M/UL — SIGNIFICANT CHANGE UP (ref 3.8–5.2)
RBC # BLD: 4.48 M/UL — SIGNIFICANT CHANGE UP (ref 3.8–5.2)
RBC # BLD: 4.51 M/UL — SIGNIFICANT CHANGE UP (ref 3.8–5.2)
RBC # BLD: 4.69 M/UL — SIGNIFICANT CHANGE UP (ref 3.8–5.2)
RBC # BLD: 4.71 M/UL — SIGNIFICANT CHANGE UP (ref 3.8–5.2)
RBC # BLD: 5.09 M/UL — SIGNIFICANT CHANGE UP (ref 3.8–5.2)
RBC # BLD: 5.66 M/UL — HIGH (ref 3.8–5.2)
RBC # FLD: 15.8 % — HIGH (ref 10.3–14.5)
RBC # FLD: 15.9 % — HIGH (ref 10.3–14.5)
RBC # FLD: 15.9 % — HIGH (ref 10.3–14.5)
RBC # FLD: 16.1 % — HIGH (ref 10.3–14.5)
RBC # FLD: 16.2 % — HIGH (ref 10.3–14.5)
RBC # FLD: 16.4 % — HIGH (ref 10.3–14.5)
RBC # FLD: 16.6 % — HIGH (ref 10.3–14.5)
RBC # FLD: 16.7 % — HIGH (ref 10.3–14.5)
RBC # FLD: 16.8 % — HIGH (ref 10.3–14.5)
RBC # FLD: 16.8 % — HIGH (ref 10.3–14.5)
RBC # FLD: 16.9 % — HIGH (ref 10.3–14.5)
RBC # FLD: 17 % — HIGH (ref 10.3–14.5)
RBC # FLD: 17.1 % — HIGH (ref 10.3–14.5)
RBC # FLD: 17.2 % — HIGH (ref 10.3–14.5)
RBC # FLD: 17.3 % — HIGH (ref 10.3–14.5)
RBC # FLD: 17.4 % — HIGH (ref 10.3–14.5)
RBC # FLD: 17.5 % — HIGH (ref 10.3–14.5)
RBC # FLD: 17.5 % — HIGH (ref 10.3–14.5)
RBC # FLD: 17.6 % — HIGH (ref 10.3–14.5)
RBC # FLD: 17.7 % — HIGH (ref 10.3–14.5)
RBC # FLD: 17.8 % — HIGH (ref 10.3–14.5)
RBC # FLD: 17.8 % — HIGH (ref 10.3–14.5)
RBC # FLD: 17.9 % — HIGH (ref 10.3–14.5)
RBC # FLD: 23.7 % — HIGH (ref 10.3–14.5)
RBC BLD AUTO: ABNORMAL
RBC BLD AUTO: ABNORMAL
RBC CASTS # UR COMP ASSIST: 1 /HPF — SIGNIFICANT CHANGE UP (ref 0–4)
RBC CASTS # UR COMP ASSIST: 13 /HPF — HIGH (ref 0–4)
RBC CASTS # UR COMP ASSIST: 19 /HPF — HIGH (ref 0–4)
RBC CASTS # UR COMP ASSIST: 2 /HPF — SIGNIFICANT CHANGE UP (ref 0–4)
RBC CASTS # UR COMP ASSIST: 2 /HPF — SIGNIFICANT CHANGE UP (ref 0–4)
RH IG SCN BLD-IMP: POSITIVE — SIGNIFICANT CHANGE UP
RH IG SCN BLD-IMP: POSITIVE — SIGNIFICANT CHANGE UP
RSV RNA SPEC QL NAA+PROBE: SIGNIFICANT CHANGE UP
RV+EV RNA SPEC QL NAA+PROBE: SIGNIFICANT CHANGE UP
S AUREUS DNA NOSE QL NAA+PROBE: SIGNIFICANT CHANGE UP
S AUREUS DNA NOSE QL NAA+PROBE: SIGNIFICANT CHANGE UP
SAO2 % BLDV: 35.4 % — SIGNIFICANT CHANGE UP
SAO2 % BLDV: 37.8 % — SIGNIFICANT CHANGE UP
SARS-COV-2 RNA SPEC QL NAA+PROBE: SIGNIFICANT CHANGE UP
SODIUM SERPL-SCNC: 131 MMOL/L — LOW (ref 135–145)
SODIUM SERPL-SCNC: 132 MMOL/L — LOW (ref 135–145)
SODIUM SERPL-SCNC: 133 MMOL/L — LOW (ref 135–145)
SODIUM SERPL-SCNC: 133 MMOL/L — LOW (ref 135–145)
SODIUM SERPL-SCNC: 134 MMOL/L — LOW (ref 135–145)
SODIUM SERPL-SCNC: 134 MMOL/L — LOW (ref 135–145)
SODIUM SERPL-SCNC: 135 MMOL/L — SIGNIFICANT CHANGE UP (ref 135–145)
SODIUM SERPL-SCNC: 135 MMOL/L — SIGNIFICANT CHANGE UP (ref 135–145)
SODIUM SERPL-SCNC: 136 MMOL/L — SIGNIFICANT CHANGE UP (ref 135–145)
SODIUM SERPL-SCNC: 137 MMOL/L — SIGNIFICANT CHANGE UP (ref 135–145)
SODIUM SERPL-SCNC: 138 MMOL/L — SIGNIFICANT CHANGE UP (ref 135–145)
SODIUM SERPL-SCNC: 139 MMOL/L — SIGNIFICANT CHANGE UP (ref 135–145)
SODIUM SERPL-SCNC: 140 MMOL/L — SIGNIFICANT CHANGE UP (ref 135–145)
SODIUM SERPL-SCNC: 141 MMOL/L — SIGNIFICANT CHANGE UP (ref 135–145)
SODIUM SERPL-SCNC: 143 MMOL/L — SIGNIFICANT CHANGE UP (ref 135–145)
SODIUM SERPL-SCNC: 143 MMOL/L — SIGNIFICANT CHANGE UP (ref 135–145)
SODIUM SERPL-SCNC: 144 MMOL/L — SIGNIFICANT CHANGE UP (ref 135–145)
SP GR SPEC: 1.01 — LOW (ref 1.01–1.05)
SP GR SPEC: 1.01 — LOW (ref 1.01–1.05)
SP GR SPEC: 1.02 — SIGNIFICANT CHANGE UP (ref 1.01–1.05)
SP GR SPEC: 1.02 — SIGNIFICANT CHANGE UP (ref 1–1.05)
SP GR SPEC: 1.03 — SIGNIFICANT CHANGE UP (ref 1.01–1.05)
SP GR SPEC: >1.05 (ref 1.01–1.02)
SPECIMEN SOURCE: SIGNIFICANT CHANGE UP
STOMATOCYTES BLD QL SMEAR: SLIGHT — SIGNIFICANT CHANGE UP
TARGETS BLD QL SMEAR: SLIGHT — SIGNIFICANT CHANGE UP
TIBC SERPL-MCNC: 271 UG/DL — SIGNIFICANT CHANGE UP (ref 220–430)
TROPONIN T, HIGH SENSITIVITY RESULT: 21 NG/L — SIGNIFICANT CHANGE UP (ref 0–51)
TSH SERPL-MCNC: 2.94 UIU/ML — SIGNIFICANT CHANGE UP (ref 0.27–4.2)
UIBC SERPL-MCNC: 238 UG/DL — SIGNIFICANT CHANGE UP (ref 110–370)
UROBILINOGEN FLD QL: ABNORMAL
UROBILINOGEN FLD QL: NEGATIVE — SIGNIFICANT CHANGE UP
UROBILINOGEN FLD QL: SIGNIFICANT CHANGE UP
VANCOMYCIN TROUGH SERPL-MCNC: 10.9 UG/ML — SIGNIFICANT CHANGE UP (ref 10–20)
VANCOMYCIN TROUGH SERPL-MCNC: 18.7 UG/ML — SIGNIFICANT CHANGE UP (ref 10–20)
VANCOMYCIN TROUGH SERPL-MCNC: 7.4 UG/ML — LOW (ref 10–20)
VANCOMYCIN TROUGH SERPL-MCNC: 8.1 UG/ML — LOW (ref 10–20)
VANCOMYCIN TROUGH SERPL-MCNC: 8.2 UG/ML — LOW (ref 10–20)
VANCOMYCIN TROUGH SERPL-MCNC: 8.4 UG/ML — LOW (ref 10–20)
VARIANT LYMPHS # BLD: 2.6 % — SIGNIFICANT CHANGE UP (ref 0–6)
VIT B12 SERPL-MCNC: 2000 PG/ML — HIGH (ref 200–900)
WBC # BLD: 10.48 K/UL — SIGNIFICANT CHANGE UP (ref 3.8–10.5)
WBC # BLD: 11.33 K/UL — HIGH (ref 3.8–10.5)
WBC # BLD: 11.48 K/UL — HIGH (ref 3.8–10.5)
WBC # BLD: 12.92 K/UL — HIGH (ref 3.8–10.5)
WBC # BLD: 13.32 K/UL — HIGH (ref 3.8–10.5)
WBC # BLD: 13.48 K/UL — HIGH (ref 3.8–10.5)
WBC # BLD: 4.66 K/UL — SIGNIFICANT CHANGE UP (ref 3.8–10.5)
WBC # BLD: 5.37 K/UL — SIGNIFICANT CHANGE UP (ref 3.8–10.5)
WBC # BLD: 5.42 K/UL — SIGNIFICANT CHANGE UP (ref 3.8–10.5)
WBC # BLD: 5.86 K/UL — SIGNIFICANT CHANGE UP (ref 3.8–10.5)
WBC # BLD: 5.95 K/UL — SIGNIFICANT CHANGE UP (ref 3.8–10.5)
WBC # BLD: 6.06 K/UL — SIGNIFICANT CHANGE UP (ref 3.8–10.5)
WBC # BLD: 6.16 K/UL — SIGNIFICANT CHANGE UP (ref 3.8–10.5)
WBC # BLD: 6.27 K/UL — SIGNIFICANT CHANGE UP (ref 3.8–10.5)
WBC # BLD: 6.42 K/UL — SIGNIFICANT CHANGE UP (ref 3.8–10.5)
WBC # BLD: 6.48 K/UL — SIGNIFICANT CHANGE UP (ref 3.8–10.5)
WBC # BLD: 6.69 K/UL — SIGNIFICANT CHANGE UP (ref 3.8–10.5)
WBC # BLD: 6.69 K/UL — SIGNIFICANT CHANGE UP (ref 3.8–10.5)
WBC # BLD: 6.73 K/UL — SIGNIFICANT CHANGE UP (ref 3.8–10.5)
WBC # BLD: 7.04 K/UL — SIGNIFICANT CHANGE UP (ref 3.8–10.5)
WBC # BLD: 7.3 K/UL — SIGNIFICANT CHANGE UP (ref 3.8–10.5)
WBC # BLD: 7.33 K/UL — SIGNIFICANT CHANGE UP (ref 3.8–10.5)
WBC # BLD: 7.37 K/UL — SIGNIFICANT CHANGE UP (ref 3.8–10.5)
WBC # BLD: 7.42 K/UL — SIGNIFICANT CHANGE UP (ref 3.8–10.5)
WBC # BLD: 7.48 K/UL — SIGNIFICANT CHANGE UP (ref 3.8–10.5)
WBC # BLD: 7.51 K/UL — SIGNIFICANT CHANGE UP (ref 3.8–10.5)
WBC # BLD: 7.7 K/UL — SIGNIFICANT CHANGE UP (ref 3.8–10.5)
WBC # BLD: 8.05 K/UL — SIGNIFICANT CHANGE UP (ref 3.8–10.5)
WBC # BLD: 8.08 K/UL — SIGNIFICANT CHANGE UP (ref 3.8–10.5)
WBC # BLD: 8.44 K/UL — SIGNIFICANT CHANGE UP (ref 3.8–10.5)
WBC # BLD: 8.44 K/UL — SIGNIFICANT CHANGE UP (ref 3.8–10.5)
WBC # BLD: 8.5 K/UL — SIGNIFICANT CHANGE UP (ref 3.8–10.5)
WBC # BLD: 8.5 K/UL — SIGNIFICANT CHANGE UP (ref 3.8–10.5)
WBC # BLD: 8.78 K/UL — SIGNIFICANT CHANGE UP (ref 3.8–10.5)
WBC # BLD: 8.98 K/UL — SIGNIFICANT CHANGE UP (ref 3.8–10.5)
WBC # BLD: 8.98 K/UL — SIGNIFICANT CHANGE UP (ref 3.8–10.5)
WBC # BLD: 9.32 K/UL — SIGNIFICANT CHANGE UP (ref 3.8–10.5)
WBC # BLD: 9.37 K/UL — SIGNIFICANT CHANGE UP (ref 3.8–10.5)
WBC # BLD: 9.55 K/UL — SIGNIFICANT CHANGE UP (ref 3.8–10.5)
WBC # FLD AUTO: 10.48 K/UL — SIGNIFICANT CHANGE UP (ref 3.8–10.5)
WBC # FLD AUTO: 11.33 K/UL — HIGH (ref 3.8–10.5)
WBC # FLD AUTO: 11.48 K/UL — HIGH (ref 3.8–10.5)
WBC # FLD AUTO: 12.92 K/UL — HIGH (ref 3.8–10.5)
WBC # FLD AUTO: 13.32 K/UL — HIGH (ref 3.8–10.5)
WBC # FLD AUTO: 13.48 K/UL — HIGH (ref 3.8–10.5)
WBC # FLD AUTO: 4.66 K/UL — SIGNIFICANT CHANGE UP (ref 3.8–10.5)
WBC # FLD AUTO: 5.37 K/UL — SIGNIFICANT CHANGE UP (ref 3.8–10.5)
WBC # FLD AUTO: 5.42 K/UL — SIGNIFICANT CHANGE UP (ref 3.8–10.5)
WBC # FLD AUTO: 5.86 K/UL — SIGNIFICANT CHANGE UP (ref 3.8–10.5)
WBC # FLD AUTO: 5.95 K/UL — SIGNIFICANT CHANGE UP (ref 3.8–10.5)
WBC # FLD AUTO: 6.06 K/UL — SIGNIFICANT CHANGE UP (ref 3.8–10.5)
WBC # FLD AUTO: 6.16 K/UL — SIGNIFICANT CHANGE UP (ref 3.8–10.5)
WBC # FLD AUTO: 6.27 K/UL — SIGNIFICANT CHANGE UP (ref 3.8–10.5)
WBC # FLD AUTO: 6.42 K/UL — SIGNIFICANT CHANGE UP (ref 3.8–10.5)
WBC # FLD AUTO: 6.48 K/UL — SIGNIFICANT CHANGE UP (ref 3.8–10.5)
WBC # FLD AUTO: 6.69 K/UL — SIGNIFICANT CHANGE UP (ref 3.8–10.5)
WBC # FLD AUTO: 6.69 K/UL — SIGNIFICANT CHANGE UP (ref 3.8–10.5)
WBC # FLD AUTO: 6.73 K/UL — SIGNIFICANT CHANGE UP (ref 3.8–10.5)
WBC # FLD AUTO: 7.04 K/UL — SIGNIFICANT CHANGE UP (ref 3.8–10.5)
WBC # FLD AUTO: 7.3 K/UL — SIGNIFICANT CHANGE UP (ref 3.8–10.5)
WBC # FLD AUTO: 7.33 K/UL — SIGNIFICANT CHANGE UP (ref 3.8–10.5)
WBC # FLD AUTO: 7.37 K/UL — SIGNIFICANT CHANGE UP (ref 3.8–10.5)
WBC # FLD AUTO: 7.42 K/UL — SIGNIFICANT CHANGE UP (ref 3.8–10.5)
WBC # FLD AUTO: 7.48 K/UL — SIGNIFICANT CHANGE UP (ref 3.8–10.5)
WBC # FLD AUTO: 7.51 K/UL — SIGNIFICANT CHANGE UP (ref 3.8–10.5)
WBC # FLD AUTO: 7.7 K/UL — SIGNIFICANT CHANGE UP (ref 3.8–10.5)
WBC # FLD AUTO: 8.05 K/UL — SIGNIFICANT CHANGE UP (ref 3.8–10.5)
WBC # FLD AUTO: 8.08 K/UL — SIGNIFICANT CHANGE UP (ref 3.8–10.5)
WBC # FLD AUTO: 8.44 K/UL — SIGNIFICANT CHANGE UP (ref 3.8–10.5)
WBC # FLD AUTO: 8.44 K/UL — SIGNIFICANT CHANGE UP (ref 3.8–10.5)
WBC # FLD AUTO: 8.5 K/UL — SIGNIFICANT CHANGE UP (ref 3.8–10.5)
WBC # FLD AUTO: 8.5 K/UL — SIGNIFICANT CHANGE UP (ref 3.8–10.5)
WBC # FLD AUTO: 8.78 K/UL — SIGNIFICANT CHANGE UP (ref 3.8–10.5)
WBC # FLD AUTO: 8.98 K/UL — SIGNIFICANT CHANGE UP (ref 3.8–10.5)
WBC # FLD AUTO: 8.98 K/UL — SIGNIFICANT CHANGE UP (ref 3.8–10.5)
WBC # FLD AUTO: 9.32 K/UL — SIGNIFICANT CHANGE UP (ref 3.8–10.5)
WBC # FLD AUTO: 9.37 K/UL — SIGNIFICANT CHANGE UP (ref 3.8–10.5)
WBC # FLD AUTO: 9.55 K/UL — SIGNIFICANT CHANGE UP (ref 3.8–10.5)
WBC UR QL: 440 /HPF — HIGH (ref 0–5)
WBC UR QL: 69 /HPF — HIGH (ref 0–5)
WBC UR QL: 70 /HPF — HIGH (ref 0–5)
WBC UR QL: 76 /HPF — HIGH (ref 0–5)
WBC UR QL: >200 /HPF — HIGH (ref 0–5)

## 2022-01-01 PROCEDURE — 99232 SBSQ HOSP IP/OBS MODERATE 35: CPT

## 2022-01-01 PROCEDURE — G0506: CPT

## 2022-01-01 PROCEDURE — 99223 1ST HOSP IP/OBS HIGH 75: CPT

## 2022-01-01 PROCEDURE — 99233 SBSQ HOSP IP/OBS HIGH 50: CPT

## 2022-01-01 PROCEDURE — 70496 CT ANGIOGRAPHY HEAD: CPT | Mod: MA

## 2022-01-01 PROCEDURE — 71045 X-RAY EXAM CHEST 1 VIEW: CPT | Mod: 26

## 2022-01-01 PROCEDURE — 80053 COMPREHEN METABOLIC PANEL: CPT

## 2022-01-01 PROCEDURE — 99223 1ST HOSP IP/OBS HIGH 75: CPT | Mod: GC

## 2022-01-01 PROCEDURE — 70496 CT ANGIOGRAPHY HEAD: CPT | Mod: 26,MA

## 2022-01-01 PROCEDURE — 82947 ASSAY GLUCOSE BLOOD QUANT: CPT

## 2022-01-01 PROCEDURE — 93308 TTE F-UP OR LMTD: CPT | Mod: 26

## 2022-01-01 PROCEDURE — 99291 CRITICAL CARE FIRST HOUR: CPT

## 2022-01-01 PROCEDURE — 99231 SBSQ HOSP IP/OBS SF/LOW 25: CPT

## 2022-01-01 PROCEDURE — 92012 INTRM OPH EXAM EST PATIENT: CPT

## 2022-01-01 PROCEDURE — 99222 1ST HOSP IP/OBS MODERATE 55: CPT

## 2022-01-01 PROCEDURE — 70450 CT HEAD/BRAIN W/O DYE: CPT | Mod: 26

## 2022-01-01 PROCEDURE — 67875 CLOSURE OF EYELID BY SUTURE: CPT | Mod: RT

## 2022-01-01 PROCEDURE — 99349 HOME/RES VST EST MOD MDM 40: CPT | Mod: 25

## 2022-01-01 PROCEDURE — 85025 COMPLETE CBC W/AUTO DIFF WBC: CPT

## 2022-01-01 PROCEDURE — 99233 SBSQ HOSP IP/OBS HIGH 50: CPT | Mod: GC

## 2022-01-01 PROCEDURE — 70553 MRI BRAIN STEM W/O & W/DYE: CPT | Mod: 26

## 2022-01-01 PROCEDURE — 93308 TTE F-UP OR LMTD: CPT

## 2022-01-01 PROCEDURE — 99356: CPT

## 2022-01-01 PROCEDURE — 99292 CRITICAL CARE ADDL 30 MIN: CPT

## 2022-01-01 PROCEDURE — 99285 EMERGENCY DEPT VISIT HI MDM: CPT | Mod: 25,GC

## 2022-01-01 PROCEDURE — 90662 IIV NO PRSV INCREASED AG IM: CPT

## 2022-01-01 PROCEDURE — 90792 PSYCH DIAG EVAL W/MED SRVCS: CPT

## 2022-01-01 PROCEDURE — 99239 HOSP IP/OBS DSCHRG MGMT >30: CPT

## 2022-01-01 PROCEDURE — 82803 BLOOD GASES ANY COMBINATION: CPT

## 2022-01-01 PROCEDURE — 85018 HEMOGLOBIN: CPT

## 2022-01-01 PROCEDURE — 70543 MRI ORBT/FAC/NCK W/O &W/DYE: CPT | Mod: 26

## 2022-01-01 PROCEDURE — 99349 HOME/RES VST EST MOD MDM 40: CPT | Mod: 95

## 2022-01-01 PROCEDURE — 87086 URINE CULTURE/COLONY COUNT: CPT

## 2022-01-01 PROCEDURE — 36410 VNPNXR 3YR/> PHY/QHP DX/THER: CPT

## 2022-01-01 PROCEDURE — 85014 HEMATOCRIT: CPT

## 2022-01-01 PROCEDURE — 99292 CRITICAL CARE ADDL 30 MIN: CPT | Mod: 25

## 2022-01-01 PROCEDURE — 99221 1ST HOSP IP/OBS SF/LOW 40: CPT

## 2022-01-01 PROCEDURE — 67880 REVISION OF EYELID: CPT | Mod: RT

## 2022-01-01 PROCEDURE — 81001 URINALYSIS AUTO W/SCOPE: CPT

## 2022-01-01 PROCEDURE — 93010 ELECTROCARDIOGRAM REPORT: CPT

## 2022-01-01 PROCEDURE — 99205 OFFICE O/P NEW HI 60 MIN: CPT | Mod: GC,95

## 2022-01-01 PROCEDURE — 76937 US GUIDE VASCULAR ACCESS: CPT | Mod: 26

## 2022-01-01 PROCEDURE — 82435 ASSAY OF BLOOD CHLORIDE: CPT

## 2022-01-01 PROCEDURE — 92285 EXTERNAL OCULAR PHOTOGRAPHY: CPT

## 2022-01-01 PROCEDURE — 99349 HOME/RES VST EST MOD MDM 40: CPT

## 2022-01-01 PROCEDURE — 70250 X-RAY EXAM OF SKULL: CPT | Mod: 26

## 2022-01-01 PROCEDURE — 0042T: CPT

## 2022-01-01 PROCEDURE — 99214 OFFICE O/P EST MOD 30 MIN: CPT | Mod: 57

## 2022-01-01 PROCEDURE — 74230 X-RAY XM SWLNG FUNCJ C+: CPT | Mod: 26

## 2022-01-01 PROCEDURE — 84484 ASSAY OF TROPONIN QUANT: CPT

## 2022-01-01 PROCEDURE — 99214 OFFICE O/P EST MOD 30 MIN: CPT | Mod: 25

## 2022-01-01 PROCEDURE — 70498 CT ANGIOGRAPHY NECK: CPT | Mod: 26,MA

## 2022-01-01 PROCEDURE — 83605 ASSAY OF LACTIC ACID: CPT

## 2022-01-01 PROCEDURE — 93880 EXTRACRANIAL BILAT STUDY: CPT | Mod: 26

## 2022-01-01 PROCEDURE — 99232 SBSQ HOSP IP/OBS MODERATE 35: CPT | Mod: GC

## 2022-01-01 PROCEDURE — 76536 US EXAM OF HEAD AND NECK: CPT | Mod: 26

## 2022-01-01 PROCEDURE — 77011 CT SCAN FOR LOCALIZATION: CPT | Mod: 26

## 2022-01-01 PROCEDURE — 99358 PROLONG SERVICE W/O CONTACT: CPT | Mod: NC

## 2022-01-01 PROCEDURE — 83880 ASSAY OF NATRIURETIC PEPTIDE: CPT

## 2022-01-01 PROCEDURE — 36000 PLACE NEEDLE IN VEIN: CPT | Mod: GC

## 2022-01-01 PROCEDURE — 82962 GLUCOSE BLOOD TEST: CPT

## 2022-01-01 PROCEDURE — 84295 ASSAY OF SERUM SODIUM: CPT

## 2022-01-01 PROCEDURE — 70498 CT ANGIOGRAPHY NECK: CPT | Mod: MA

## 2022-01-01 PROCEDURE — 96374 THER/PROPH/DIAG INJ IV PUSH: CPT | Mod: XU

## 2022-01-01 PROCEDURE — 93971 EXTREMITY STUDY: CPT | Mod: 26

## 2022-01-01 PROCEDURE — 99345 HOME/RES VST NEW HIGH MDM 75: CPT | Mod: 25

## 2022-01-01 PROCEDURE — G0438: CPT

## 2022-01-01 PROCEDURE — 0004A: CPT

## 2022-01-01 PROCEDURE — 82330 ASSAY OF CALCIUM: CPT

## 2022-01-01 PROCEDURE — 94640 AIRWAY INHALATION TREATMENT: CPT

## 2022-01-01 PROCEDURE — 84132 ASSAY OF SERUM POTASSIUM: CPT

## 2022-01-01 PROCEDURE — 70491 CT SOFT TISSUE NECK W/DYE: CPT | Mod: 26

## 2022-01-01 PROCEDURE — 85610 PROTHROMBIN TIME: CPT

## 2022-01-01 PROCEDURE — 99215 OFFICE O/P EST HI 40 MIN: CPT | Mod: 95

## 2022-01-01 PROCEDURE — 70450 CT HEAD/BRAIN W/O DYE: CPT | Mod: MA

## 2022-01-01 PROCEDURE — 85730 THROMBOPLASTIN TIME PARTIAL: CPT

## 2022-01-01 PROCEDURE — G0008: CPT

## 2022-01-01 RX ORDER — FUROSEMIDE 40 MG
0 TABLET ORAL
Qty: 0 | Refills: 0 | DISCHARGE

## 2022-01-01 RX ORDER — ACETAMINOPHEN 500 MG
650 TABLET ORAL EVERY 6 HOURS
Refills: 0 | Status: DISCONTINUED | OUTPATIENT
Start: 2022-01-01 | End: 2022-01-01

## 2022-01-01 RX ORDER — LORAZEPAM 2 MG/ML
2 CONCENTRATE ORAL
Qty: 1 | Refills: 0 | Status: ACTIVE | COMMUNITY
Start: 2022-01-01 | End: 1900-01-01

## 2022-01-01 RX ORDER — BUPIVACAINE 13.3 MG/ML
5 INJECTION, SUSPENSION, LIPOSOMAL INFILTRATION ONCE
Refills: 0 | Status: DISCONTINUED | OUTPATIENT
Start: 2022-01-01 | End: 2022-01-01

## 2022-01-01 RX ORDER — QUETIAPINE FUMARATE 200 MG/1
25 TABLET, FILM COATED ORAL ONCE
Refills: 0 | Status: COMPLETED | OUTPATIENT
Start: 2022-01-01 | End: 2022-01-01

## 2022-01-01 RX ORDER — URSODIOL 250 MG/1
0 TABLET, FILM COATED ORAL
Qty: 0 | Refills: 0 | DISCHARGE

## 2022-01-01 RX ORDER — MIRTAZAPINE 45 MG/1
1 TABLET, ORALLY DISINTEGRATING ORAL
Qty: 30 | Refills: 0
Start: 2022-01-01 | End: 2022-01-01

## 2022-01-01 RX ORDER — ASCORBIC ACID 60 MG
500 TABLET,CHEWABLE ORAL DAILY
Refills: 0 | Status: DISCONTINUED | OUTPATIENT
Start: 2022-01-01 | End: 2022-01-01

## 2022-01-01 RX ORDER — ACETAMINOPHEN 500 MG
1000 TABLET ORAL ONCE
Refills: 0 | Status: COMPLETED | OUTPATIENT
Start: 2022-01-01 | End: 2022-01-01

## 2022-01-01 RX ORDER — ERYTHROMYCIN 5 MG/G
5 OINTMENT OPHTHALMIC
Qty: 3 | Refills: 0 | Status: ACTIVE | COMMUNITY
Start: 2022-01-01

## 2022-01-01 RX ORDER — VANCOMYCIN HCL 1 G
1000 VIAL (EA) INTRAVENOUS EVERY 24 HOURS
Refills: 0 | Status: DISCONTINUED | OUTPATIENT
Start: 2022-01-01 | End: 2022-01-01

## 2022-01-01 RX ORDER — CYCLOSPORINE 0.5 MG/ML
0 EMULSION OPHTHALMIC
Qty: 0 | Refills: 0 | DISCHARGE

## 2022-01-01 RX ORDER — GENTAMICIN SULFATE 1 MG/G
0.1 OINTMENT TOPICAL
Qty: 3 | Refills: 1 | Status: ACTIVE | COMMUNITY
Start: 2022-01-01

## 2022-01-01 RX ORDER — AMITRIPTYLINE HCL 25 MG
10 TABLET ORAL AT BEDTIME
Refills: 0 | Status: DISCONTINUED | OUTPATIENT
Start: 2022-01-01 | End: 2022-01-01

## 2022-01-01 RX ORDER — FLUTICASONE FUROATE, UMECLIDINIUM BROMIDE AND VILANTEROL TRIFENATATE 100; 62.5; 25 UG/1; UG/1; UG/1
100-62.5-25 POWDER RESPIRATORY (INHALATION) DAILY
Refills: 0 | Status: ACTIVE | COMMUNITY
Start: 2022-01-01

## 2022-01-01 RX ORDER — GABAPENTIN 400 MG/1
200 CAPSULE ORAL ONCE
Refills: 0 | Status: COMPLETED | OUTPATIENT
Start: 2022-01-01 | End: 2022-01-01

## 2022-01-01 RX ORDER — INSULIN LISPRO 100/ML
VIAL (ML) SUBCUTANEOUS
Refills: 0 | Status: DISCONTINUED | OUTPATIENT
Start: 2022-01-01 | End: 2022-01-01

## 2022-01-01 RX ORDER — QUETIAPINE FUMARATE 200 MG/1
12.5 TABLET, FILM COATED ORAL ONCE
Refills: 0 | Status: COMPLETED | OUTPATIENT
Start: 2022-01-01 | End: 2022-01-01

## 2022-01-01 RX ORDER — PHENAZOPYRIDINE 100 MG/1
100 TABLET, FILM COATED ORAL 3 TIMES DAILY
Qty: 30 | Refills: 0 | Status: ACTIVE | COMMUNITY
Start: 2022-01-01

## 2022-01-01 RX ORDER — PETROLATUM,WHITE
1 JELLY (GRAM) TOPICAL
Qty: 396 | Refills: 0
Start: 2022-01-01 | End: 2022-01-01

## 2022-01-01 RX ORDER — GABAPENTIN 400 MG/1
400 CAPSULE ORAL THREE TIMES A DAY
Refills: 0 | Status: DISCONTINUED | OUTPATIENT
Start: 2022-01-01 | End: 2022-01-01

## 2022-01-01 RX ORDER — POTASSIUM CHLORIDE 20 MEQ
10 PACKET (EA) ORAL
Refills: 0 | Status: COMPLETED | OUTPATIENT
Start: 2022-01-01 | End: 2022-01-01

## 2022-01-01 RX ORDER — DEXTROSE 50 % IN WATER 50 %
15 SYRINGE (ML) INTRAVENOUS ONCE
Refills: 0 | Status: DISCONTINUED | OUTPATIENT
Start: 2022-01-01 | End: 2022-01-01

## 2022-01-01 RX ORDER — KETOROLAC TROMETHAMINE 30 MG/ML
15 SYRINGE (ML) INJECTION EVERY 6 HOURS
Refills: 0 | Status: DISCONTINUED | OUTPATIENT
Start: 2022-01-01 | End: 2022-01-01

## 2022-01-01 RX ORDER — SODIUM CHLORIDE 9 MG/ML
500 INJECTION INTRAMUSCULAR; INTRAVENOUS; SUBCUTANEOUS ONCE
Refills: 0 | Status: COMPLETED | OUTPATIENT
Start: 2022-01-01 | End: 2022-01-01

## 2022-01-01 RX ORDER — MORPHINE SULFATE 50 MG/1
2 CAPSULE, EXTENDED RELEASE ORAL ONCE
Refills: 0 | Status: DISCONTINUED | OUTPATIENT
Start: 2022-01-01 | End: 2022-01-01

## 2022-01-01 RX ORDER — SODIUM CHLORIDE 9 MG/ML
1000 INJECTION, SOLUTION INTRAVENOUS
Refills: 0 | Status: DISCONTINUED | OUTPATIENT
Start: 2022-01-01 | End: 2022-01-01

## 2022-01-01 RX ORDER — TRAZODONE HCL 50 MG
50 TABLET ORAL
Refills: 0 | Status: DISCONTINUED | OUTPATIENT
Start: 2022-01-01 | End: 2022-01-01

## 2022-01-01 RX ORDER — SODIUM CHLORIDE 9 MG/ML
1000 INJECTION INTRAMUSCULAR; INTRAVENOUS; SUBCUTANEOUS
Refills: 0 | Status: DISCONTINUED | OUTPATIENT
Start: 2022-01-01 | End: 2022-01-01

## 2022-01-01 RX ORDER — DULAGLUTIDE 4.5 MG/.5ML
0 INJECTION, SOLUTION SUBCUTANEOUS
Qty: 0 | Refills: 0 | DISCHARGE

## 2022-01-01 RX ORDER — OXCARBAZEPINE 300 MG/1
150 TABLET, FILM COATED ORAL EVERY 12 HOURS
Refills: 0 | Status: COMPLETED | OUTPATIENT
Start: 2022-01-01 | End: 2022-01-01

## 2022-01-01 RX ORDER — HYDROMORPHONE HYDROCHLORIDE 2 MG/ML
0.25 INJECTION INTRAMUSCULAR; INTRAVENOUS; SUBCUTANEOUS ONCE
Refills: 0 | Status: DISCONTINUED | OUTPATIENT
Start: 2022-01-01 | End: 2022-01-01

## 2022-01-01 RX ORDER — IPRATROPIUM BROMIDE AND ALBUTEROL SULFATE 2.5; .5 MG/3ML; MG/3ML
0.5-2.5 (3) SOLUTION RESPIRATORY (INHALATION)
Qty: 1080 | Refills: 3 | Status: ACTIVE | COMMUNITY
Start: 2022-01-01

## 2022-01-01 RX ORDER — MUPIROCIN 20 MG/G
1 OINTMENT TOPICAL
Qty: 22 | Refills: 0
Start: 2022-01-01 | End: 2022-01-01

## 2022-01-01 RX ORDER — CHOLECALCIFEROL (VITAMIN D3) 125 MCG
1 CAPSULE ORAL
Qty: 0 | Refills: 0 | DISCHARGE

## 2022-01-01 RX ORDER — HYDROXYZINE HCL 10 MG
20 TABLET ORAL EVERY 6 HOURS
Refills: 0 | Status: DISCONTINUED | OUTPATIENT
Start: 2022-01-01 | End: 2022-01-01

## 2022-01-01 RX ORDER — TRAZODONE HCL 50 MG
0 TABLET ORAL
Qty: 0 | Refills: 0 | DISCHARGE

## 2022-01-01 RX ORDER — LIDOCAINE 4 G/100G
1 CREAM TOPICAL
Refills: 0 | Status: DISCONTINUED | OUTPATIENT
Start: 2022-01-01 | End: 2022-01-01

## 2022-01-01 RX ORDER — OXCARBAZEPINE 300 MG/1
600 TABLET, FILM COATED ORAL
Refills: 0 | Status: DISCONTINUED | OUTPATIENT
Start: 2022-01-01 | End: 2022-01-01

## 2022-01-01 RX ORDER — AMOXICILLIN AND CLAVULANATE POTASSIUM 875; 125 MG/1; MG/1
875-125 TABLET, COATED ORAL
Qty: 14 | Refills: 0 | Status: DISCONTINUED | COMMUNITY
Start: 2022-01-01 | End: 2022-01-01

## 2022-01-01 RX ORDER — QUETIAPINE FUMARATE 200 MG/1
25 TABLET, FILM COATED ORAL EVERY 6 HOURS
Refills: 0 | Status: DISCONTINUED | OUTPATIENT
Start: 2022-01-01 | End: 2022-01-01

## 2022-01-01 RX ORDER — GABAPENTIN 400 MG/1
300 CAPSULE ORAL ONCE
Refills: 0 | Status: COMPLETED | OUTPATIENT
Start: 2022-01-01 | End: 2022-01-01

## 2022-01-01 RX ORDER — ACETAMINOPHEN 500 MG
1000 TABLET ORAL EVERY 6 HOURS
Refills: 0 | Status: COMPLETED | OUTPATIENT
Start: 2022-01-01 | End: 2022-01-01

## 2022-01-01 RX ORDER — LOPERAMIDE HCL 2 MG
0 TABLET ORAL
Qty: 0 | Refills: 0 | DISCHARGE

## 2022-01-01 RX ORDER — LACTOBACILLUS ACIDOPHILUS 100MM CELL
1 CAPSULE ORAL DAILY
Refills: 0 | Status: DISCONTINUED | OUTPATIENT
Start: 2022-01-01 | End: 2022-01-01

## 2022-01-01 RX ORDER — ACETAMINOPHEN 500 MG
325 TABLET ORAL ONCE
Refills: 0 | Status: COMPLETED | OUTPATIENT
Start: 2022-01-01 | End: 2022-01-01

## 2022-01-01 RX ORDER — SODIUM,POTASSIUM PHOSPHATES 278-250MG
1 POWDER IN PACKET (EA) ORAL ONCE
Refills: 0 | Status: COMPLETED | OUTPATIENT
Start: 2022-01-01 | End: 2022-01-01

## 2022-01-01 RX ORDER — AMLODIPINE BESYLATE 2.5 MG/1
5 TABLET ORAL DAILY
Refills: 0 | Status: DISCONTINUED | OUTPATIENT
Start: 2022-01-01 | End: 2022-01-01

## 2022-01-01 RX ORDER — IRON 18 MG
90 (18 FE) TABLET ORAL AT BEDTIME
Refills: 0 | Status: ACTIVE | COMMUNITY
Start: 2022-01-01

## 2022-01-01 RX ORDER — INSULIN GLARGINE 100 [IU]/ML
34 INJECTION, SOLUTION SUBCUTANEOUS
Qty: 0 | Refills: 0 | DISCHARGE

## 2022-01-01 RX ORDER — GENTAMICIN SULFATE 1 MG/G
0.1 OINTMENT TOPICAL
Qty: 30 | Refills: 0 | Status: DISCONTINUED | COMMUNITY
Start: 2022-01-01

## 2022-01-01 RX ORDER — MIRTAZAPINE 45 MG/1
1 TABLET, ORALLY DISINTEGRATING ORAL
Qty: 0 | Refills: 0 | DISCHARGE

## 2022-01-01 RX ORDER — MIRTAZAPINE 45 MG/1
7.5 TABLET, ORALLY DISINTEGRATING ORAL AT BEDTIME
Refills: 0 | Status: DISCONTINUED | OUTPATIENT
Start: 2022-01-01 | End: 2022-01-01

## 2022-01-01 RX ORDER — VANCOMYCIN HCL 1 G
750 VIAL (EA) INTRAVENOUS EVERY 12 HOURS
Refills: 0 | Status: COMPLETED | OUTPATIENT
Start: 2022-01-01 | End: 2022-01-01

## 2022-01-01 RX ORDER — HALOPERIDOL DECANOATE 100 MG/ML
1 INJECTION INTRAMUSCULAR EVERY 6 HOURS
Refills: 0 | Status: DISCONTINUED | OUTPATIENT
Start: 2022-01-01 | End: 2022-01-01

## 2022-01-01 RX ORDER — GABAPENTIN 400 MG/1
300 CAPSULE ORAL ONCE
Refills: 0 | Status: DISCONTINUED | OUTPATIENT
Start: 2022-01-01 | End: 2022-01-01

## 2022-01-01 RX ORDER — MUPIROCIN 20 MG/G
1 OINTMENT TOPICAL THREE TIMES A DAY
Refills: 0 | Status: DISCONTINUED | OUTPATIENT
Start: 2022-01-01 | End: 2022-01-01

## 2022-01-01 RX ORDER — GABAPENTIN 400 MG/1
100 CAPSULE ORAL THREE TIMES A DAY
Refills: 0 | Status: DISCONTINUED | OUTPATIENT
Start: 2022-01-01 | End: 2022-01-01

## 2022-01-01 RX ORDER — BUDESONIDE AND FORMOTEROL FUMARATE DIHYDRATE 160; 4.5 UG/1; UG/1
2 AEROSOL RESPIRATORY (INHALATION)
Refills: 0 | Status: DISCONTINUED | OUTPATIENT
Start: 2022-01-01 | End: 2022-01-01

## 2022-01-01 RX ORDER — ACETAMINOPHEN 80 MG
17 TABLET,CHEWABLE ORAL DAILY
Refills: 0 | Status: ACTIVE | COMMUNITY
Start: 2022-01-01

## 2022-01-01 RX ORDER — ACETAMINOPHEN 500 MG
1000 TABLET ORAL EVERY 8 HOURS
Refills: 0 | Status: DISCONTINUED | OUTPATIENT
Start: 2022-01-01 | End: 2022-01-01

## 2022-01-01 RX ORDER — ALBUTEROL 90 UG/1
2.5 AEROSOL, METERED ORAL EVERY 6 HOURS
Refills: 0 | Status: DISCONTINUED | OUTPATIENT
Start: 2022-01-01 | End: 2022-01-01

## 2022-01-01 RX ORDER — ACETAMINOPHEN 500 MG
650 TABLET ORAL ONCE
Refills: 0 | Status: COMPLETED | OUTPATIENT
Start: 2022-01-01 | End: 2022-01-01

## 2022-01-01 RX ORDER — PANTOPRAZOLE SODIUM 20 MG/1
40 TABLET, DELAYED RELEASE ORAL
Refills: 0 | Status: DISCONTINUED | OUTPATIENT
Start: 2022-01-01 | End: 2022-01-01

## 2022-01-01 RX ORDER — POLYETHYLENE GLYCOL 3350 17 G/17G
17 POWDER, FOR SOLUTION ORAL DAILY
Refills: 0 | Status: DISCONTINUED | OUTPATIENT
Start: 2022-01-01 | End: 2022-01-01

## 2022-01-01 RX ORDER — ACETAMINOPHEN 500 MG
650 TABLET ORAL EVERY 8 HOURS
Refills: 0 | Status: COMPLETED | OUTPATIENT
Start: 2022-01-01 | End: 2022-01-01

## 2022-01-01 RX ORDER — CEFAZOLIN SODIUM 1 G
VIAL (EA) INJECTION
Refills: 0 | Status: DISCONTINUED | OUTPATIENT
Start: 2022-01-01 | End: 2022-01-01

## 2022-01-01 RX ORDER — MEROPENEM 1 G/30ML
2000 INJECTION INTRAVENOUS EVERY 8 HOURS
Refills: 0 | Status: DISCONTINUED | OUTPATIENT
Start: 2022-01-01 | End: 2022-01-01

## 2022-01-01 RX ORDER — LIDOCAINE 4 G/100G
1 CREAM TOPICAL
Qty: 60 | Refills: 0
Start: 2022-01-01 | End: 2022-01-01

## 2022-01-01 RX ORDER — MAGNESIUM SULFATE 500 MG/ML
1 VIAL (ML) INJECTION ONCE
Refills: 0 | Status: COMPLETED | OUTPATIENT
Start: 2022-01-01 | End: 2022-01-01

## 2022-01-01 RX ORDER — CALCITONIN SALMON 200 [IU]/ML
0 INJECTION, SOLUTION INTRAMUSCULAR
Qty: 0 | Refills: 0 | DISCHARGE

## 2022-01-01 RX ORDER — PREDNISOLONE SODIUM PHOSPHATE 1 %
1 DROPS OPHTHALMIC (EYE) THREE TIMES A DAY
Refills: 0 | Status: DISCONTINUED | OUTPATIENT
Start: 2022-01-01 | End: 2022-01-01

## 2022-01-01 RX ORDER — VANCOMYCIN HCL 1 G
750 VIAL (EA) INTRAVENOUS ONCE
Refills: 0 | Status: COMPLETED | OUTPATIENT
Start: 2022-01-01 | End: 2022-01-01

## 2022-01-01 RX ORDER — ACETAMINOPHEN 500 MG
975 TABLET ORAL EVERY 6 HOURS
Refills: 0 | Status: COMPLETED | OUTPATIENT
Start: 2022-01-01 | End: 2022-01-01

## 2022-01-01 RX ORDER — DEXTROSE 50 % IN WATER 50 %
25 SYRINGE (ML) INTRAVENOUS ONCE
Refills: 0 | Status: DISCONTINUED | OUTPATIENT
Start: 2022-01-01 | End: 2022-01-01

## 2022-01-01 RX ORDER — ENOXAPARIN SODIUM 100 MG/ML
77 INJECTION SUBCUTANEOUS EVERY 12 HOURS
Refills: 0 | Status: DISCONTINUED | OUTPATIENT
Start: 2022-01-01 | End: 2022-01-01

## 2022-01-01 RX ORDER — SENNOSIDES 8.6 MG TABLETS 8.6 MG/1
8.6 TABLET ORAL
Refills: 2 | Status: ACTIVE | COMMUNITY
Start: 2022-01-01

## 2022-01-01 RX ORDER — METOPROLOL TARTRATE 50 MG
0 TABLET ORAL
Qty: 0 | Refills: 0 | DISCHARGE

## 2022-01-01 RX ORDER — GABAPENTIN 400 MG/1
1 CAPSULE ORAL
Qty: 90 | Refills: 0
Start: 2022-01-01 | End: 2022-01-01

## 2022-01-01 RX ORDER — ASPIRIN/CALCIUM CARB/MAGNESIUM 324 MG
324 TABLET ORAL DAILY
Refills: 0 | Status: DISCONTINUED | OUTPATIENT
Start: 2022-01-01 | End: 2022-01-01

## 2022-01-01 RX ORDER — BUPIVACAINE HCL/PF 7.5 MG/ML
5 VIAL (ML) INJECTION ONCE
Refills: 0 | Status: DISCONTINUED | OUTPATIENT
Start: 2022-01-01 | End: 2022-01-01

## 2022-01-01 RX ORDER — MORPHINE SULFATE 50 MG/1
7.5 CAPSULE, EXTENDED RELEASE ORAL EVERY 4 HOURS
Refills: 0 | Status: DISCONTINUED | OUTPATIENT
Start: 2022-01-01 | End: 2022-01-01

## 2022-01-01 RX ORDER — ASPIRIN/CALCIUM CARB/MAGNESIUM 324 MG
1 TABLET ORAL
Qty: 21 | Refills: 0
Start: 2022-01-01 | End: 2023-01-01

## 2022-01-01 RX ORDER — GABAPENTIN 400 MG/1
600 CAPSULE ORAL THREE TIMES A DAY
Refills: 0 | Status: DISCONTINUED | OUTPATIENT
Start: 2022-01-01 | End: 2022-01-01

## 2022-01-01 RX ORDER — HEPARIN SODIUM 5000 [USP'U]/ML
5000 INJECTION INTRAVENOUS; SUBCUTANEOUS EVERY 8 HOURS
Refills: 0 | Status: DISCONTINUED | OUTPATIENT
Start: 2022-01-01 | End: 2022-01-01

## 2022-01-01 RX ORDER — NYSTATIN 100000 [USP'U]/G
100000 CREAM TOPICAL 3 TIMES DAILY
Qty: 1 | Refills: 3 | Status: ACTIVE | COMMUNITY
Start: 2022-01-01

## 2022-01-01 RX ORDER — DEXTRAN 70/HYPROMELLOSE 0.1%-0.3%
0.1-0.3 DROPS OPHTHALMIC (EYE) 4 TIMES DAILY
Qty: 1 | Refills: 3 | Status: ACTIVE | COMMUNITY
Start: 2022-01-01

## 2022-01-01 RX ORDER — MUPIROCIN 20 MG/G
2 OINTMENT TOPICAL
Qty: 1 | Refills: 0 | Status: ACTIVE | COMMUNITY
Start: 2022-01-01

## 2022-01-01 RX ORDER — PREGABALIN 225 MG/1
1 CAPSULE ORAL
Qty: 0 | Refills: 0 | DISCHARGE

## 2022-01-01 RX ORDER — PIPERACILLIN AND TAZOBACTAM 4; .5 G/20ML; G/20ML
3.38 INJECTION, POWDER, LYOPHILIZED, FOR SOLUTION INTRAVENOUS EVERY 8 HOURS
Refills: 0 | Status: DISCONTINUED | OUTPATIENT
Start: 2022-01-01 | End: 2022-01-01

## 2022-01-01 RX ORDER — METOPROLOL TARTRATE 50 MG
0.5 TABLET ORAL
Qty: 0 | Refills: 0 | DISCHARGE

## 2022-01-01 RX ORDER — GENTAMICIN SULFATE 0.1 %
1 OINTMENT (GRAM) TOPICAL
Qty: 15 | Refills: 0
Start: 2022-01-01

## 2022-01-01 RX ORDER — CHLORHEXIDINE GLUCONATE 4 %
250 LIQUID (ML) TOPICAL DAILY
Refills: 0 | Status: DISCONTINUED | COMMUNITY
Start: 2022-01-01 | End: 2022-01-01

## 2022-01-01 RX ORDER — HYDROMORPHONE HYDROCHLORIDE 2 MG/ML
0.5 INJECTION INTRAMUSCULAR; INTRAVENOUS; SUBCUTANEOUS
Refills: 0 | Status: DISCONTINUED | OUTPATIENT
Start: 2022-01-01 | End: 2022-01-01

## 2022-01-01 RX ORDER — FUROSEMIDE 40 MG
1 TABLET ORAL
Qty: 0 | Refills: 0 | DISCHARGE

## 2022-01-01 RX ORDER — FLUCONAZOLE 150 MG/1
200 TABLET ORAL ONCE
Refills: 0 | Status: COMPLETED | OUTPATIENT
Start: 2022-01-01 | End: 2022-01-01

## 2022-01-01 RX ORDER — QUETIAPINE FUMARATE 200 MG/1
25 TABLET, FILM COATED ORAL DAILY
Refills: 0 | Status: DISCONTINUED | OUTPATIENT
Start: 2022-01-01 | End: 2022-01-01

## 2022-01-01 RX ORDER — CLOPIDOGREL BISULFATE 75 MG/1
75 TABLET, FILM COATED ORAL ONCE
Refills: 0 | Status: COMPLETED | OUTPATIENT
Start: 2022-01-01 | End: 2022-01-01

## 2022-01-01 RX ORDER — GABAPENTIN 400 MG/1
1 CAPSULE ORAL
Qty: 60 | Refills: 0
Start: 2022-01-01 | End: 2022-01-01

## 2022-01-01 RX ORDER — VANCOMYCIN HCL 1 G
VIAL (EA) INTRAVENOUS
Refills: 0 | Status: COMPLETED | OUTPATIENT
Start: 2022-01-01 | End: 2022-01-01

## 2022-01-01 RX ORDER — ACETAMINOPHEN 500 MG/1
500 TABLET ORAL EVERY 8 HOURS
Refills: 0 | Status: ACTIVE | COMMUNITY
Start: 2022-01-01

## 2022-01-01 RX ORDER — METHADONE HYDROCHLORIDE 40 MG/1
2.5 TABLET ORAL EVERY 12 HOURS
Refills: 0 | Status: DISCONTINUED | OUTPATIENT
Start: 2022-01-01 | End: 2022-01-01

## 2022-01-01 RX ORDER — CEFAZOLIN SODIUM 1 G
1000 VIAL (EA) INJECTION ONCE
Refills: 0 | Status: COMPLETED | OUTPATIENT
Start: 2022-01-01 | End: 2022-01-01

## 2022-01-01 RX ORDER — ACETAMINOPHEN 500 MG
1000 TABLET ORAL ONCE
Refills: 0 | Status: DISCONTINUED | OUTPATIENT
Start: 2022-01-01 | End: 2022-01-01

## 2022-01-01 RX ORDER — HYDROCORTISONE 1 %
1 OINTMENT (GRAM) TOPICAL
Refills: 0 | Status: DISCONTINUED | OUTPATIENT
Start: 2022-01-01 | End: 2022-01-01

## 2022-01-01 RX ORDER — VANCOMYCIN HCL 1 G
1000 VIAL (EA) INTRAVENOUS EVERY 12 HOURS
Refills: 0 | Status: DISCONTINUED | OUTPATIENT
Start: 2022-01-01 | End: 2022-01-01

## 2022-01-01 RX ORDER — IPRATROPIUM/ALBUTEROL SULFATE 18-103MCG
3 AEROSOL WITH ADAPTER (GRAM) INHALATION ONCE
Refills: 0 | Status: COMPLETED | OUTPATIENT
Start: 2022-01-01 | End: 2022-01-01

## 2022-01-01 RX ORDER — LORATADINE 10 MG/1
1 TABLET ORAL
Qty: 30 | Refills: 0
Start: 2022-01-01 | End: 2022-01-01

## 2022-01-01 RX ORDER — ACYCLOVIR SODIUM 500 MG
650 VIAL (EA) INTRAVENOUS EVERY 12 HOURS
Refills: 0 | Status: DISCONTINUED | OUTPATIENT
Start: 2022-01-01 | End: 2022-01-01

## 2022-01-01 RX ORDER — OXCARBAZEPINE 300 MG/1
300 TABLET, FILM COATED ORAL
Refills: 0 | Status: DISCONTINUED | OUTPATIENT
Start: 2022-01-01 | End: 2022-01-01

## 2022-01-01 RX ORDER — AMLODIPINE BESYLATE 2.5 MG/1
2.5 TABLET ORAL DAILY
Refills: 0 | Status: DISCONTINUED | OUTPATIENT
Start: 2022-01-01 | End: 2022-01-01

## 2022-01-01 RX ORDER — LIDOCAINE 5 G/100G
5 OINTMENT TOPICAL
Qty: 2 | Refills: 2 | Status: ACTIVE | COMMUNITY
Start: 2022-01-01

## 2022-01-01 RX ORDER — KETOROLAC TROMETHAMINE 30 MG/ML
15 SYRINGE (ML) INJECTION ONCE
Refills: 0 | Status: DISCONTINUED | OUTPATIENT
Start: 2022-01-01 | End: 2022-01-01

## 2022-01-01 RX ORDER — MORPHINE SULFATE 50 MG/1
0.5 CAPSULE, EXTENDED RELEASE ORAL
Qty: 15 | Refills: 0
Start: 2022-01-01 | End: 2022-01-01

## 2022-01-01 RX ORDER — FLUTICASONE FUROATE, UMECLIDINIUM BROMIDE AND VILANTEROL TRIFENATATE 200; 62.5; 25 UG/1; UG/1; UG/1
200-62.5-25 POWDER RESPIRATORY (INHALATION)
Refills: 0 | Status: DISCONTINUED | COMMUNITY
End: 2022-01-01

## 2022-01-01 RX ORDER — URSODIOL 250 MG/1
500 TABLET, FILM COATED ORAL
Refills: 0 | Status: DISCONTINUED | OUTPATIENT
Start: 2022-01-01 | End: 2022-01-01

## 2022-01-01 RX ORDER — POTASSIUM CHLORIDE 20 MEQ
20 PACKET (EA) ORAL ONCE
Refills: 0 | Status: COMPLETED | OUTPATIENT
Start: 2022-01-01 | End: 2022-01-01

## 2022-01-01 RX ORDER — POTASSIUM CHLORIDE 20 MEQ
40 PACKET (EA) ORAL ONCE
Refills: 0 | Status: COMPLETED | OUTPATIENT
Start: 2022-01-01 | End: 2022-01-01

## 2022-01-01 RX ORDER — OXCARBAZEPINE 300 MG/1
450 TABLET, FILM COATED ORAL
Refills: 0 | Status: DISCONTINUED | OUTPATIENT
Start: 2022-01-01 | End: 2022-01-01

## 2022-01-01 RX ORDER — HYDROXYZINE HCL 10 MG
20 TABLET ORAL
Refills: 0 | Status: DISCONTINUED | OUTPATIENT
Start: 2022-01-01 | End: 2022-01-01

## 2022-01-01 RX ORDER — MIRTAZAPINE 7.5 MG/1
7.5 TABLET, FILM COATED ORAL
Qty: 90 | Refills: 2 | Status: DISCONTINUED | COMMUNITY
Start: 2022-01-01 | End: 2022-01-01

## 2022-01-01 RX ORDER — LORATADINE 10 MG/1
5 TABLET ORAL ONCE
Refills: 0 | Status: COMPLETED | OUTPATIENT
Start: 2022-01-01 | End: 2022-01-01

## 2022-01-01 RX ORDER — SPIRONOLACTONE 25 MG/1
0 TABLET, FILM COATED ORAL
Qty: 0 | Refills: 0 | DISCHARGE

## 2022-01-01 RX ORDER — SPIRONOLACTONE 25 MG/1
100 TABLET, FILM COATED ORAL AT BEDTIME
Refills: 0 | Status: DISCONTINUED | OUTPATIENT
Start: 2022-01-01 | End: 2022-01-01

## 2022-01-01 RX ORDER — AMITRIPTYLINE HYDROCHLORIDE 10 MG/1
10 TABLET, FILM COATED ORAL
Qty: 90 | Refills: 3 | Status: ACTIVE | COMMUNITY
Start: 2022-01-01

## 2022-01-01 RX ORDER — GLIPIZIDE 5 MG/1
5 TABLET ORAL
Refills: 0 | Status: DISCONTINUED | COMMUNITY
End: 2022-01-01

## 2022-01-01 RX ORDER — PIPERACILLIN AND TAZOBACTAM 4; .5 G/20ML; G/20ML
3.38 INJECTION, POWDER, LYOPHILIZED, FOR SOLUTION INTRAVENOUS ONCE
Refills: 0 | Status: COMPLETED | OUTPATIENT
Start: 2022-01-01 | End: 2022-01-01

## 2022-01-01 RX ORDER — INSULIN GLARGINE 100 [IU]/ML
10 INJECTION, SOLUTION SUBCUTANEOUS
Qty: 0 | Refills: 0 | DISCHARGE

## 2022-01-01 RX ORDER — DIPHENHYDRAMINE HCL 50 MG
25 CAPSULE ORAL EVERY 6 HOURS
Refills: 0 | Status: DISCONTINUED | OUTPATIENT
Start: 2022-01-01 | End: 2022-01-01

## 2022-01-01 RX ORDER — ERTAPENEM SODIUM 1 G/1
1 INJECTION, POWDER, LYOPHILIZED, FOR SOLUTION INTRAMUSCULAR; INTRAVENOUS
Qty: 10 | Refills: 0 | Status: DISCONTINUED | COMMUNITY
Start: 2022-01-01 | End: 2022-01-01

## 2022-01-01 RX ORDER — MORPHINE SULFATE 50 MG/1
1 CAPSULE, EXTENDED RELEASE ORAL ONCE
Refills: 0 | Status: DISCONTINUED | OUTPATIENT
Start: 2022-01-01 | End: 2022-01-01

## 2022-01-01 RX ORDER — TRAZODONE HCL 50 MG
100 TABLET ORAL AT BEDTIME
Refills: 0 | Status: DISCONTINUED | OUTPATIENT
Start: 2022-01-01 | End: 2022-01-01

## 2022-01-01 RX ORDER — GABAPENTIN 400 MG/1
600 CAPSULE ORAL EVERY 12 HOURS
Refills: 0 | Status: DISCONTINUED | OUTPATIENT
Start: 2022-01-01 | End: 2022-01-01

## 2022-01-01 RX ORDER — INSULIN GLARGINE 100 [IU]/ML
10 INJECTION, SOLUTION SUBCUTANEOUS AT BEDTIME
Refills: 0 | Status: DISCONTINUED | OUTPATIENT
Start: 2022-01-01 | End: 2022-01-01

## 2022-01-01 RX ORDER — NYSTATIN CREAM 100000 [USP'U]/G
1 CREAM TOPICAL
Qty: 60 | Refills: 0
Start: 2022-01-01 | End: 2022-01-01

## 2022-01-01 RX ORDER — GABAPENTIN 400 MG/1
300 CAPSULE ORAL THREE TIMES A DAY
Refills: 0 | Status: DISCONTINUED | OUTPATIENT
Start: 2022-01-01 | End: 2022-01-01

## 2022-01-01 RX ORDER — METOPROLOL TARTRATE 50 MG
12.5 TABLET ORAL DAILY
Refills: 0 | Status: DISCONTINUED | OUTPATIENT
Start: 2022-01-01 | End: 2022-01-01

## 2022-01-01 RX ORDER — FOLIC ACID 0.8 MG
1 TABLET ORAL DAILY
Refills: 0 | Status: DISCONTINUED | OUTPATIENT
Start: 2022-01-01 | End: 2022-01-01

## 2022-01-01 RX ORDER — ACETAMINOPHEN 500 MG
2 TABLET ORAL
Qty: 0 | Refills: 0 | DISCHARGE
Start: 2022-01-01

## 2022-01-01 RX ORDER — ERTAPENEM SODIUM 1 G/1
1 INJECTION, POWDER, LYOPHILIZED, FOR SOLUTION INTRAMUSCULAR; INTRAVENOUS
Qty: 5 | Refills: 0 | Status: COMPLETED | COMMUNITY
Start: 2022-01-01 | End: 2022-01-01

## 2022-01-01 RX ORDER — GABAPENTIN 400 MG/1
300 CAPSULE ORAL EVERY 8 HOURS
Refills: 0 | Status: DISCONTINUED | OUTPATIENT
Start: 2022-01-01 | End: 2022-01-01

## 2022-01-01 RX ORDER — NIACIN 500 MG
1000 TABLET, EXTENDED RELEASE ORAL DAILY
Refills: 0 | Status: ACTIVE | COMMUNITY
Start: 2022-01-01

## 2022-01-01 RX ORDER — TRAZODONE HCL 50 MG
2 TABLET ORAL
Qty: 0 | Refills: 0 | DISCHARGE

## 2022-01-01 RX ORDER — CEFAZOLIN SODIUM 1 G
1000 VIAL (EA) INJECTION EVERY 8 HOURS
Refills: 0 | Status: DISCONTINUED | OUTPATIENT
Start: 2022-01-01 | End: 2022-01-01

## 2022-01-01 RX ORDER — ASPIRIN/CALCIUM CARB/MAGNESIUM 324 MG
81 TABLET ORAL DAILY
Refills: 0 | Status: DISCONTINUED | OUTPATIENT
Start: 2022-01-01 | End: 2022-01-01

## 2022-01-01 RX ORDER — MORPHINE SULFATE 50 MG/1
3 CAPSULE, EXTENDED RELEASE ORAL EVERY 4 HOURS
Refills: 0 | Status: DISCONTINUED | OUTPATIENT
Start: 2022-01-01 | End: 2022-01-01

## 2022-01-01 RX ORDER — TIOTROPIUM BROMIDE 18 UG/1
1 CAPSULE ORAL; RESPIRATORY (INHALATION) DAILY
Refills: 0 | Status: DISCONTINUED | OUTPATIENT
Start: 2022-01-01 | End: 2022-01-01

## 2022-01-01 RX ORDER — MOXIFLOXACIN OPHTHALMIC 5 MG/ML
0.5 SOLUTION/ DROPS OPHTHALMIC 4 TIMES DAILY
Refills: 0 | Status: ACTIVE | COMMUNITY
Start: 2022-01-01

## 2022-01-01 RX ORDER — GLUCOSAMINE HCL/CHONDROITIN SU 500-400 MG
1500 CAPSULE ORAL
Qty: 0 | Refills: 0 | DISCHARGE

## 2022-01-01 RX ORDER — HYDROCORTISONE 1 %
1 OINTMENT (GRAM) TOPICAL
Refills: 0 | Status: COMPLETED | OUTPATIENT
Start: 2022-01-01 | End: 2022-01-01

## 2022-01-01 RX ORDER — SPIRONOLACTONE 25 MG/1
2 TABLET, FILM COATED ORAL
Qty: 0 | Refills: 0 | DISCHARGE

## 2022-01-01 RX ORDER — MEROPENEM 1 G/30ML
1000 INJECTION INTRAVENOUS EVERY 8 HOURS
Refills: 0 | Status: DISCONTINUED | OUTPATIENT
Start: 2022-01-01 | End: 2022-01-01

## 2022-01-01 RX ORDER — LOSARTAN POTASSIUM 100 MG/1
50 TABLET, FILM COATED ORAL DAILY
Refills: 0 | Status: DISCONTINUED | OUTPATIENT
Start: 2022-01-01 | End: 2022-01-01

## 2022-01-01 RX ORDER — GABAPENTIN 600 MG/1
600 TABLET, COATED ORAL
Qty: 60 | Refills: 0 | Status: DISCONTINUED | COMMUNITY
Start: 2022-01-01

## 2022-01-01 RX ORDER — ASPIRIN/CALCIUM CARB/MAGNESIUM 324 MG
0 TABLET ORAL
Qty: 0 | Refills: 0 | DISCHARGE

## 2022-01-01 RX ORDER — ACYCLOVIR SODIUM 500 MG
500 VIAL (EA) INTRAVENOUS ONCE
Refills: 0 | Status: COMPLETED | OUTPATIENT
Start: 2022-01-01 | End: 2022-01-01

## 2022-01-01 RX ORDER — OXYCODONE HYDROCHLORIDE 5 MG/1
2.5 TABLET ORAL ONCE
Refills: 0 | Status: DISCONTINUED | OUTPATIENT
Start: 2022-01-01 | End: 2022-01-01

## 2022-01-01 RX ORDER — APIXABAN 2.5 MG/1
1 TABLET, FILM COATED ORAL
Qty: 60 | Refills: 0
Start: 2022-01-01 | End: 2022-01-01

## 2022-01-01 RX ORDER — METOPROLOL TARTRATE 25 MG/1
25 TABLET, FILM COATED ORAL
Refills: 0 | Status: ACTIVE | COMMUNITY
Start: 2022-01-01

## 2022-01-01 RX ORDER — PREGABALIN 225 MG/1
1000 CAPSULE ORAL DAILY
Refills: 0 | Status: DISCONTINUED | OUTPATIENT
Start: 2022-01-01 | End: 2022-01-01

## 2022-01-01 RX ORDER — MORPHINE SULFATE 50 MG/1
5 CAPSULE, EXTENDED RELEASE ORAL EVERY 4 HOURS
Refills: 0 | Status: DISCONTINUED | OUTPATIENT
Start: 2022-01-01 | End: 2022-01-01

## 2022-01-01 RX ORDER — ACETAMINOPHEN 500 MG
975 TABLET ORAL ONCE
Refills: 0 | Status: DISCONTINUED | OUTPATIENT
Start: 2022-01-01 | End: 2022-01-01

## 2022-01-01 RX ORDER — APIXABAN 2.5 MG/1
2.5 TABLET, FILM COATED ORAL
Qty: 60 | Refills: 5 | Status: DISCONTINUED | COMMUNITY
Start: 2022-01-01 | End: 2022-01-01

## 2022-01-01 RX ORDER — MIRTAZAPINE 45 MG/1
15 TABLET, ORALLY DISINTEGRATING ORAL AT BEDTIME
Refills: 0 | Status: DISCONTINUED | OUTPATIENT
Start: 2022-01-01 | End: 2022-01-01

## 2022-01-01 RX ORDER — FOLIC ACID 0.8 MG
1 TABLET ORAL
Qty: 0 | Refills: 0 | DISCHARGE

## 2022-01-01 RX ORDER — QUETIAPINE FUMARATE 200 MG/1
12.5 TABLET, FILM COATED ORAL EVERY 6 HOURS
Refills: 0 | Status: DISCONTINUED | OUTPATIENT
Start: 2022-01-01 | End: 2022-01-01

## 2022-01-01 RX ORDER — SENNA PLUS 8.6 MG/1
1 TABLET ORAL AT BEDTIME
Refills: 0 | Status: DISCONTINUED | OUTPATIENT
Start: 2022-01-01 | End: 2022-01-01

## 2022-01-01 RX ORDER — PREDNISOLONE SODIUM PHOSPHATE 1 %
1 DROPS OPHTHALMIC (EYE)
Qty: 15 | Refills: 0
Start: 2022-01-01 | End: 2022-01-01

## 2022-01-01 RX ORDER — VALACYCLOVIR 500 MG/1
0 TABLET, FILM COATED ORAL
Qty: 0 | Refills: 0 | DISCHARGE

## 2022-01-01 RX ORDER — CEFTRIAXONE 500 MG/1
1000 INJECTION, POWDER, FOR SOLUTION INTRAMUSCULAR; INTRAVENOUS EVERY 24 HOURS
Refills: 0 | Status: DISCONTINUED | OUTPATIENT
Start: 2022-01-01 | End: 2022-01-01

## 2022-01-01 RX ORDER — OLANZAPINE 15 MG/1
1.25 TABLET, FILM COATED ORAL EVERY 6 HOURS
Refills: 0 | Status: DISCONTINUED | OUTPATIENT
Start: 2022-01-01 | End: 2022-01-01

## 2022-01-01 RX ORDER — MORPHINE SULFATE 50 MG/1
7.5 CAPSULE, EXTENDED RELEASE ORAL EVERY 6 HOURS
Refills: 0 | Status: DISCONTINUED | OUTPATIENT
Start: 2022-01-01 | End: 2022-01-01

## 2022-01-01 RX ORDER — ACYCLOVIR SODIUM 500 MG
620 VIAL (EA) INTRAVENOUS EVERY 24 HOURS
Refills: 0 | Status: DISCONTINUED | OUTPATIENT
Start: 2022-01-01 | End: 2022-01-01

## 2022-01-01 RX ORDER — NYSTATIN CREAM 100000 [USP'U]/G
1 CREAM TOPICAL
Refills: 0 | Status: DISCONTINUED | OUTPATIENT
Start: 2022-01-01 | End: 2022-01-01

## 2022-01-01 RX ORDER — ACYCLOVIR SODIUM 500 MG
120 VIAL (EA) INTRAVENOUS ONCE
Refills: 0 | Status: DISCONTINUED | OUTPATIENT
Start: 2022-01-01 | End: 2022-01-01

## 2022-01-01 RX ORDER — POTASSIUM CHLORIDE 20 MEQ
10 PACKET (EA) ORAL
Refills: 0 | Status: DISCONTINUED | OUTPATIENT
Start: 2022-01-01 | End: 2022-01-01

## 2022-01-01 RX ORDER — PANTOPRAZOLE 40 MG/1
40 TABLET, DELAYED RELEASE ORAL DAILY
Qty: 90 | Refills: 3 | Status: ACTIVE | COMMUNITY
Start: 2022-01-01

## 2022-01-01 RX ORDER — TAMSULOSIN HYDROCHLORIDE 0.4 MG/1
0.4 CAPSULE ORAL AT BEDTIME
Refills: 0 | Status: DISCONTINUED | OUTPATIENT
Start: 2022-01-01 | End: 2022-01-01

## 2022-01-01 RX ORDER — MAGNESIUM SULFATE 500 MG/ML
2 VIAL (ML) INJECTION ONCE
Refills: 0 | Status: COMPLETED | OUTPATIENT
Start: 2022-01-01 | End: 2022-01-01

## 2022-01-01 RX ORDER — METHADONE HYDROCHLORIDE 40 MG/1
5 TABLET ORAL EVERY 12 HOURS
Refills: 0 | Status: DISCONTINUED | OUTPATIENT
Start: 2022-01-01 | End: 2022-01-01

## 2022-01-01 RX ORDER — CEFAZOLIN SODIUM 1 G
1000 VIAL (EA) INJECTION EVERY 12 HOURS
Refills: 0 | Status: DISCONTINUED | OUTPATIENT
Start: 2022-01-01 | End: 2022-01-01

## 2022-01-01 RX ORDER — POLYETHYLENE GLYCOL 3350 17 G/17G
17 POWDER, FOR SOLUTION ORAL
Qty: 510 | Refills: 0
Start: 2022-01-01 | End: 2022-01-01

## 2022-01-01 RX ORDER — NALOXONE HYDROCHLORIDE 4 MG/.1ML
4 SPRAY NASAL
Qty: 1 | Refills: 0 | Status: ACTIVE | COMMUNITY
Start: 2022-01-01

## 2022-01-01 RX ORDER — MORPHINE SULFATE 50 MG/1
4 CAPSULE, EXTENDED RELEASE ORAL EVERY 4 HOURS
Refills: 0 | Status: DISCONTINUED | OUTPATIENT
Start: 2022-01-01 | End: 2022-01-01

## 2022-01-01 RX ORDER — MORPHINE SULFATE 50 MG/1
15 CAPSULE, EXTENDED RELEASE ORAL EVERY 4 HOURS
Refills: 0 | Status: DISCONTINUED | OUTPATIENT
Start: 2022-01-01 | End: 2022-01-01

## 2022-01-01 RX ORDER — PETROLATUM,WHITE
1 JELLY (GRAM) TOPICAL THREE TIMES A DAY
Refills: 0 | Status: DISCONTINUED | OUTPATIENT
Start: 2022-01-01 | End: 2022-01-01

## 2022-01-01 RX ORDER — BLOOD SUGAR DIAGNOSTIC
STRIP MISCELLANEOUS
Qty: 100 | Refills: 11 | Status: ACTIVE | COMMUNITY
Start: 2022-01-01

## 2022-01-01 RX ORDER — INSULIN GLARGINE 100 [IU]/ML
0 INJECTION, SOLUTION SUBCUTANEOUS
Qty: 0 | Refills: 0 | DISCHARGE

## 2022-01-01 RX ORDER — BUDESONIDE, MICRONIZED 100 %
0.5 POWDER (GRAM) MISCELLANEOUS
Refills: 0 | Status: DISCONTINUED | OUTPATIENT
Start: 2022-01-01 | End: 2022-01-01

## 2022-01-01 RX ORDER — CEFTRIAXONE 1 G/1
1 INJECTION, POWDER, FOR SOLUTION INTRAMUSCULAR; INTRAVENOUS
Qty: 5 | Refills: 0 | Status: DISCONTINUED | COMMUNITY
Start: 2022-01-01 | End: 2022-01-01

## 2022-01-01 RX ORDER — LABETALOL HCL 100 MG
5 TABLET ORAL ONCE
Refills: 0 | Status: COMPLETED | OUTPATIENT
Start: 2022-01-01 | End: 2022-01-01

## 2022-01-01 RX ORDER — POTASSIUM CHLORIDE 20 MEQ
20 PACKET (EA) ORAL ONCE
Refills: 0 | Status: DISCONTINUED | OUTPATIENT
Start: 2022-01-01 | End: 2022-01-01

## 2022-01-01 RX ORDER — ACETAMINOPHEN 500 MG
650 TABLET ORAL EVERY 8 HOURS
Refills: 0 | Status: DISCONTINUED | OUTPATIENT
Start: 2022-01-01 | End: 2022-01-01

## 2022-01-01 RX ORDER — LABETALOL HCL 100 MG
10 TABLET ORAL ONCE
Refills: 0 | Status: DISCONTINUED | OUTPATIENT
Start: 2022-01-01 | End: 2022-01-01

## 2022-01-01 RX ORDER — OLANZAPINE 15 MG/1
2.5 TABLET, FILM COATED ORAL EVERY 6 HOURS
Refills: 0 | Status: DISCONTINUED | OUTPATIENT
Start: 2022-01-01 | End: 2022-01-01

## 2022-01-01 RX ORDER — VITAMIN K2 90 MCG
125 MCG CAPSULE ORAL
Qty: 30 | Refills: 3 | Status: ACTIVE | COMMUNITY
Start: 2022-01-01

## 2022-01-01 RX ORDER — TRAZODONE HCL 50 MG
100 TABLET ORAL ONCE
Refills: 0 | Status: DISCONTINUED | OUTPATIENT
Start: 2022-01-01 | End: 2022-01-01

## 2022-01-01 RX ORDER — METHENAMINE MANDELATE 1 G
1 TABLET ORAL
Qty: 60 | Refills: 0
Start: 2022-01-01 | End: 2022-01-01

## 2022-01-01 RX ORDER — FUROSEMIDE 40 MG
20 TABLET ORAL DAILY
Refills: 0 | Status: DISCONTINUED | OUTPATIENT
Start: 2022-01-01 | End: 2022-01-01

## 2022-01-01 RX ORDER — SENNA PLUS 8.6 MG/1
1 TABLET ORAL
Qty: 30 | Refills: 0
Start: 2022-01-01 | End: 2022-01-01

## 2022-01-01 RX ORDER — IPRATROPIUM/ALBUTEROL SULFATE 18-103MCG
3 AEROSOL WITH ADAPTER (GRAM) INHALATION EVERY 6 HOURS
Refills: 0 | Status: DISCONTINUED | OUTPATIENT
Start: 2022-01-01 | End: 2022-01-01

## 2022-01-01 RX ORDER — PREDNISOLONE ACETATE 10 MG/ML
1 SUSPENSION/ DROPS OPHTHALMIC 3 TIMES DAILY
Refills: 0 | Status: DISCONTINUED | COMMUNITY
Start: 2022-01-01 | End: 2022-01-01

## 2022-01-01 RX ORDER — MORPHINE SULFATE 50 MG/1
15 CAPSULE, EXTENDED RELEASE ORAL
Refills: 0 | Status: DISCONTINUED | OUTPATIENT
Start: 2022-01-01 | End: 2022-01-01

## 2022-01-01 RX ORDER — SODIUM CHLORIDE 9 MG/ML
1000 INJECTION INTRAMUSCULAR; INTRAVENOUS; SUBCUTANEOUS
Refills: 0 | Status: COMPLETED | OUTPATIENT
Start: 2022-01-01 | End: 2022-01-01

## 2022-01-01 RX ORDER — LORATADINE 10 MG/1
10 CAPSULE, LIQUID FILLED ORAL
Qty: 90 | Refills: 3 | Status: ACTIVE | COMMUNITY
Start: 2022-01-01

## 2022-01-01 RX ORDER — CLOPIDOGREL BISULFATE 75 MG/1
1 TABLET, FILM COATED ORAL
Qty: 21 | Refills: 0
Start: 2022-01-01 | End: 2023-01-01

## 2022-01-01 RX ORDER — BUDESONIDE 0.25 MG/2ML
0.25 INHALANT ORAL TWICE DAILY
Qty: 60 | Refills: 3 | Status: DISCONTINUED | COMMUNITY
Start: 2022-01-01 | End: 2022-01-01

## 2022-01-01 RX ORDER — SENNOSIDES 8.6 MG TABLETS 8.6 MG/1
8.6 TABLET ORAL
Refills: 0 | Status: DISCONTINUED | COMMUNITY
Start: 2022-01-01 | End: 2022-01-01

## 2022-01-01 RX ORDER — DIPHENHYDRAMINE HYDROCHLORIDE 50 MG/ML
50 INJECTION, SOLUTION INTRAMUSCULAR; INTRAVENOUS
Qty: 1 | Refills: 0 | Status: DISCONTINUED | COMMUNITY
Start: 2022-01-01 | End: 2022-01-01

## 2022-01-01 RX ORDER — MULTIVIT-MIN/FOLIC/VIT K/LYCOP 400-300MCG
500 TABLET ORAL DAILY
Refills: 0 | Status: ACTIVE | COMMUNITY
Start: 2022-01-01

## 2022-01-01 RX ORDER — AMITRIPTYLINE HCL 25 MG
1 TABLET ORAL
Qty: 30 | Refills: 0
Start: 2022-01-01 | End: 2022-01-01

## 2022-01-01 RX ORDER — LOSARTAN POTASSIUM 100 MG/1
1 TABLET, FILM COATED ORAL
Qty: 0 | Refills: 0 | DISCHARGE

## 2022-01-01 RX ORDER — BRIMONIDINE TARTRATE 2 MG/MG
1 SOLUTION/ DROPS OPHTHALMIC THREE TIMES A DAY
Refills: 0 | Status: DISCONTINUED | OUTPATIENT
Start: 2022-01-01 | End: 2022-01-01

## 2022-01-01 RX ORDER — FUROSEMIDE 40 MG
40 TABLET ORAL ONCE
Refills: 0 | Status: COMPLETED | OUTPATIENT
Start: 2022-01-01 | End: 2022-01-01

## 2022-01-01 RX ORDER — BUPIVACAINE 13.3 MG/ML
2 INJECTION, SUSPENSION, LIPOSOMAL INFILTRATION ONCE
Refills: 0 | Status: DISCONTINUED | OUTPATIENT
Start: 2022-01-01 | End: 2022-01-01

## 2022-01-01 RX ORDER — MAGNESIUM OXIDE 400 MG ORAL TABLET 241.3 MG
1 TABLET ORAL
Qty: 30 | Refills: 0
Start: 2022-01-01 | End: 2022-01-01

## 2022-01-01 RX ORDER — LACTOBACILLUS ACIDOPHILUS 100MM CELL
1 CAPSULE ORAL
Qty: 0 | Refills: 0 | DISCHARGE
Start: 2022-01-01

## 2022-01-01 RX ORDER — URSODIOL 500 MG/1
500 TABLET ORAL DAILY
Refills: 0 | Status: ACTIVE | COMMUNITY
Start: 2022-01-01

## 2022-01-01 RX ORDER — MORPHINE SULFATE 50 MG/1
2.5 CAPSULE, EXTENDED RELEASE ORAL EVERY 4 HOURS
Refills: 0 | Status: DISCONTINUED | OUTPATIENT
Start: 2022-01-01 | End: 2022-01-01

## 2022-01-01 RX ORDER — ASCORBIC ACID 60 MG
1 TABLET,CHEWABLE ORAL
Qty: 0 | Refills: 0 | DISCHARGE
Start: 2022-01-01

## 2022-01-01 RX ORDER — TAMSULOSIN HYDROCHLORIDE 0.4 MG/1
1 CAPSULE ORAL
Qty: 30 | Refills: 0
Start: 2022-01-01 | End: 2022-01-01

## 2022-01-01 RX ORDER — TRAZODONE HYDROCHLORIDE 50 MG/1
50 TABLET ORAL AT BEDTIME
Qty: 90 | Refills: 3 | Status: ACTIVE | COMMUNITY
Start: 2022-01-01

## 2022-01-01 RX ORDER — CALAMINE AND ZINC OXIDE AND PHENOL 160; 10 MG/ML; MG/ML
1 LOTION TOPICAL ONCE
Refills: 0 | Status: COMPLETED | OUTPATIENT
Start: 2022-01-01 | End: 2022-01-01

## 2022-01-01 RX ORDER — GABAPENTIN 400 MG/1
200 CAPSULE ORAL THREE TIMES A DAY
Refills: 0 | Status: DISCONTINUED | OUTPATIENT
Start: 2022-01-01 | End: 2022-01-01

## 2022-01-01 RX ORDER — FOLIC ACID 1 MG/1
1 TABLET ORAL DAILY
Qty: 90 | Refills: 3 | Status: ACTIVE | COMMUNITY
Start: 2022-01-01

## 2022-01-01 RX ORDER — LORATADINE 10 MG/1
10 TABLET ORAL DAILY
Refills: 0 | Status: DISCONTINUED | OUTPATIENT
Start: 2022-01-01 | End: 2022-01-01

## 2022-01-01 RX ORDER — ENOXAPARIN SODIUM 100 MG/ML
80 INJECTION SUBCUTANEOUS EVERY 12 HOURS
Refills: 0 | Status: DISCONTINUED | OUTPATIENT
Start: 2022-01-01 | End: 2022-01-01

## 2022-01-01 RX ORDER — URSODIOL 250 MG/1
1 TABLET, FILM COATED ORAL
Qty: 0 | Refills: 0 | DISCHARGE

## 2022-01-01 RX ORDER — DEXTROSE 50 % IN WATER 50 %
12.5 SYRINGE (ML) INTRAVENOUS ONCE
Refills: 0 | Status: DISCONTINUED | OUTPATIENT
Start: 2022-01-01 | End: 2022-01-01

## 2022-01-01 RX ORDER — ERYTHROMYCIN BASE 5 MG/GRAM
1 OINTMENT (GRAM) OPHTHALMIC (EYE)
Refills: 0 | Status: DISCONTINUED | OUTPATIENT
Start: 2022-01-01 | End: 2022-01-01

## 2022-01-01 RX ORDER — AMOXICILLIN AND CLAVULANATE POTASSIUM 500; 125 MG/1; MG/1
500-125 TABLET, FILM COATED ORAL
Qty: 4 | Refills: 0 | Status: DISCONTINUED | COMMUNITY
Start: 2022-01-01

## 2022-01-01 RX ORDER — METHENAMINE HIPPURATE 1 G/1
1 TABLET ORAL TWICE DAILY
Qty: 180 | Refills: 3 | Status: ACTIVE | COMMUNITY
Start: 2022-01-01

## 2022-01-01 RX ORDER — ACETAMINOPHEN 500 MG
650 TABLET ORAL
Refills: 0 | Status: COMPLETED | OUTPATIENT
Start: 2022-01-01 | End: 2022-01-01

## 2022-01-01 RX ORDER — EVENING PRIMROSE OIL 500 MG
500 CAPSULE ORAL DAILY
Refills: 0 | Status: ACTIVE | COMMUNITY
Start: 2022-01-01

## 2022-01-01 RX ORDER — INSULIN GLARGINE 100 [IU]/ML
100 INJECTION, SOLUTION SUBCUTANEOUS
Refills: 0 | Status: ACTIVE | COMMUNITY
Start: 2022-01-01

## 2022-01-01 RX ORDER — INSULIN LISPRO 100/ML
VIAL (ML) SUBCUTANEOUS EVERY 6 HOURS
Refills: 0 | Status: DISCONTINUED | OUTPATIENT
Start: 2022-01-01 | End: 2022-01-01

## 2022-01-01 RX ORDER — GABAPENTIN 600 MG/1
600 TABLET, COATED ORAL
Qty: 180 | Refills: 3 | Status: ACTIVE | COMMUNITY
Start: 2022-01-01

## 2022-01-01 RX ORDER — FOSFOMYCIN TROMETHAMINE 3 G/1
3 POWDER ORAL
Qty: 3 | Refills: 0 | Status: ACTIVE | COMMUNITY
Start: 2022-01-01

## 2022-01-01 RX ORDER — ASPIRIN/CALCIUM CARB/MAGNESIUM 324 MG
325 TABLET ORAL DAILY
Refills: 0 | Status: DISCONTINUED | OUTPATIENT
Start: 2022-01-01 | End: 2022-01-01

## 2022-01-01 RX ORDER — APIXABAN 2.5 MG/1
5 TABLET, FILM COATED ORAL
Refills: 0 | Status: DISCONTINUED | OUTPATIENT
Start: 2022-01-01 | End: 2022-01-01

## 2022-01-01 RX ORDER — GABAPENTIN 400 MG/1
100 CAPSULE ORAL EVERY 8 HOURS
Refills: 0 | Status: DISCONTINUED | OUTPATIENT
Start: 2022-01-01 | End: 2022-01-01

## 2022-01-01 RX ORDER — LIDOCAINE 4 G/100G
1 CREAM TOPICAL ONCE
Refills: 0 | Status: DISCONTINUED | OUTPATIENT
Start: 2022-01-01 | End: 2022-01-01

## 2022-01-01 RX ORDER — ACYCLOVIR SODIUM 500 MG
650 VIAL (EA) INTRAVENOUS EVERY 8 HOURS
Refills: 0 | Status: DISCONTINUED | OUTPATIENT
Start: 2022-01-01 | End: 2022-01-01

## 2022-01-01 RX ORDER — NALOXONE HYDROCHLORIDE 4 MG/.1ML
4 SPRAY NASAL
Qty: 1 | Refills: 0
Start: 2022-01-01 | End: 2022-01-01

## 2022-01-01 RX ORDER — SODIUM CHLORIDE 9 MG/ML
1000 INJECTION INTRAMUSCULAR; INTRAVENOUS; SUBCUTANEOUS ONCE
Refills: 0 | Status: COMPLETED | OUTPATIENT
Start: 2022-01-01 | End: 2022-01-01

## 2022-01-01 RX ORDER — LOSARTAN POTASSIUM 100 MG/1
25 TABLET, FILM COATED ORAL DAILY
Refills: 0 | Status: DISCONTINUED | OUTPATIENT
Start: 2022-01-01 | End: 2022-01-01

## 2022-01-01 RX ORDER — ENOXAPARIN SODIUM 100 MG/ML
40 INJECTION SUBCUTANEOUS EVERY 24 HOURS
Refills: 0 | Status: DISCONTINUED | OUTPATIENT
Start: 2022-01-01 | End: 2022-01-01

## 2022-01-01 RX ORDER — BUPIVACAINE HCL/PF 7.5 MG/ML
3 VIAL (ML) INJECTION ONCE
Refills: 0 | Status: DISCONTINUED | OUTPATIENT
Start: 2022-01-01 | End: 2022-01-01

## 2022-01-01 RX ORDER — VALACYCLOVIR 500 MG/1
1000 TABLET, FILM COATED ORAL EVERY 8 HOURS
Refills: 0 | Status: DISCONTINUED | OUTPATIENT
Start: 2022-01-01 | End: 2022-01-01

## 2022-01-01 RX ORDER — ACYCLOVIR SODIUM 500 MG
620 VIAL (EA) INTRAVENOUS EVERY 24 HOURS
Refills: 0 | Status: COMPLETED | OUTPATIENT
Start: 2022-01-01 | End: 2022-01-01

## 2022-01-01 RX ORDER — MORPHINE SULFATE 15 MG/1
15 TABLET ORAL
Qty: 60 | Refills: 0 | Status: ACTIVE | COMMUNITY
Start: 2022-01-01

## 2022-01-01 RX ORDER — IPRATROPIUM/ALBUTEROL SULFATE 18-103MCG
3 AEROSOL WITH ADAPTER (GRAM) INHALATION
Qty: 252 | Refills: 0
Start: 2022-01-01 | End: 2022-01-01

## 2022-01-01 RX ORDER — METHADONE HYDROCHLORIDE 40 MG/1
5 TABLET ORAL
Refills: 0 | Status: DISCONTINUED | OUTPATIENT
Start: 2022-01-01 | End: 2022-01-01

## 2022-01-01 RX ORDER — PANTOPRAZOLE SODIUM 20 MG/1
1 TABLET, DELAYED RELEASE ORAL
Qty: 30 | Refills: 0
Start: 2022-01-01 | End: 2022-01-01

## 2022-01-01 RX ORDER — FLUTICASONE FUROATE, UMECLIDINIUM BROMIDE AND VILANTEROL TRIFENATATE 200; 62.5; 25 UG/1; UG/1; UG/1
0 POWDER RESPIRATORY (INHALATION)
Qty: 0 | Refills: 0 | DISCHARGE

## 2022-01-01 RX ORDER — GABAPENTIN 400 MG/1
100 CAPSULE ORAL EVERY 12 HOURS
Refills: 0 | Status: DISCONTINUED | OUTPATIENT
Start: 2022-01-01 | End: 2022-01-01

## 2022-01-01 RX ORDER — AMLODIPINE BESYLATE 2.5 MG/1
0 TABLET ORAL
Qty: 0 | Refills: 0 | DISCHARGE

## 2022-01-01 RX ORDER — METHADONE HYDROCHLORIDE 40 MG/1
1 TABLET ORAL
Qty: 14 | Refills: 0
Start: 2022-01-01 | End: 2022-01-01

## 2022-01-01 RX ORDER — VANCOMYCIN HCL 1 G
1250 VIAL (EA) INTRAVENOUS ONCE
Refills: 0 | Status: COMPLETED | OUTPATIENT
Start: 2022-01-01 | End: 2022-01-01

## 2022-01-01 RX ORDER — PREGABALIN 300 MG/1
300 CAPSULE ORAL TWICE DAILY
Qty: 180 | Refills: 1 | Status: ACTIVE | COMMUNITY
Start: 2022-01-01

## 2022-01-01 RX ORDER — ERYTHROMYCIN BASE 5 MG/GRAM
1 OINTMENT (GRAM) OPHTHALMIC (EYE)
Qty: 3.5 | Refills: 0
Start: 2022-01-01 | End: 2022-01-01

## 2022-01-01 RX ORDER — GLUCAGON INJECTION, SOLUTION 0.5 MG/.1ML
1 INJECTION, SOLUTION SUBCUTANEOUS ONCE
Refills: 0 | Status: DISCONTINUED | OUTPATIENT
Start: 2022-01-01 | End: 2022-01-01

## 2022-01-01 RX ORDER — HYDROXYZINE HCL 10 MG
10 TABLET ORAL
Refills: 0 | Status: DISCONTINUED | OUTPATIENT
Start: 2022-01-01 | End: 2022-01-01

## 2022-01-01 RX ORDER — MORPHINE SULFATE 50 MG/1
2 CAPSULE, EXTENDED RELEASE ORAL EVERY 6 HOURS
Refills: 0 | Status: DISCONTINUED | OUTPATIENT
Start: 2022-01-01 | End: 2022-01-01

## 2022-01-01 RX ORDER — FLUTICASONE FUROATE, UMECLIDINIUM BROMIDE AND VILANTEROL TRIFENATATE 200; 62.5; 25 UG/1; UG/1; UG/1
1 POWDER RESPIRATORY (INHALATION)
Qty: 0 | Refills: 0 | DISCHARGE

## 2022-01-01 RX ORDER — MORPHINE SULFATE 50 MG/1
7.5 CAPSULE, EXTENDED RELEASE ORAL
Refills: 0 | Status: DISCONTINUED | OUTPATIENT
Start: 2022-01-01 | End: 2022-01-01

## 2022-01-01 RX ORDER — POTASSIUM CHLORIDE 20 MEQ
40 PACKET (EA) ORAL EVERY 4 HOURS
Refills: 0 | Status: COMPLETED | OUTPATIENT
Start: 2022-01-01 | End: 2022-01-01

## 2022-01-01 RX ORDER — SPIRONOLACTONE 25 MG/1
100 TABLET, FILM COATED ORAL DAILY
Refills: 0 | Status: DISCONTINUED | OUTPATIENT
Start: 2022-01-01 | End: 2022-01-01

## 2022-01-01 RX ADMIN — TIOTROPIUM BROMIDE 1 CAPSULE(S): 18 CAPSULE ORAL; RESPIRATORY (INHALATION) at 11:11

## 2022-01-01 RX ADMIN — PIPERACILLIN AND TAZOBACTAM 200 GRAM(S): 4; .5 INJECTION, POWDER, LYOPHILIZED, FOR SOLUTION INTRAVENOUS at 10:30

## 2022-01-01 RX ADMIN — MORPHINE SULFATE 1 MILLIGRAM(S): 50 CAPSULE, EXTENDED RELEASE ORAL at 16:00

## 2022-01-01 RX ADMIN — Medication 1 DROP(S): at 19:31

## 2022-01-01 RX ADMIN — Medication 1 TABLET(S): at 11:56

## 2022-01-01 RX ADMIN — Medication 1 DROP(S): at 06:46

## 2022-01-01 RX ADMIN — Medication 0.5 MILLIGRAM(S): at 21:37

## 2022-01-01 RX ADMIN — MORPHINE SULFATE 7.5 MILLIGRAM(S): 50 CAPSULE, EXTENDED RELEASE ORAL at 13:24

## 2022-01-01 RX ADMIN — NYSTATIN CREAM 1 APPLICATION(S): 100000 CREAM TOPICAL at 05:30

## 2022-01-01 RX ADMIN — MORPHINE SULFATE 4 MILLIGRAM(S): 50 CAPSULE, EXTENDED RELEASE ORAL at 15:00

## 2022-01-01 RX ADMIN — Medication 3 MILLILITER(S): at 16:04

## 2022-01-01 RX ADMIN — MORPHINE SULFATE 7.5 MILLIGRAM(S): 50 CAPSULE, EXTENDED RELEASE ORAL at 02:28

## 2022-01-01 RX ADMIN — Medication 400 MILLIGRAM(S): at 12:26

## 2022-01-01 RX ADMIN — MORPHINE SULFATE 3 MILLIGRAM(S): 50 CAPSULE, EXTENDED RELEASE ORAL at 15:09

## 2022-01-01 RX ADMIN — Medication 1 APPLICATION(S): at 21:41

## 2022-01-01 RX ADMIN — INSULIN GLARGINE 10 UNIT(S): 100 INJECTION, SOLUTION SUBCUTANEOUS at 21:45

## 2022-01-01 RX ADMIN — LIDOCAINE 1 APPLICATION(S): 4 CREAM TOPICAL at 06:20

## 2022-01-01 RX ADMIN — Medication 1 DROP(S): at 12:48

## 2022-01-01 RX ADMIN — MORPHINE SULFATE 7.5 MILLIGRAM(S): 50 CAPSULE, EXTENDED RELEASE ORAL at 05:05

## 2022-01-01 RX ADMIN — PREGABALIN 1000 MICROGRAM(S): 225 CAPSULE ORAL at 12:34

## 2022-01-01 RX ADMIN — Medication 15 MILLIGRAM(S): at 17:39

## 2022-01-01 RX ADMIN — MORPHINE SULFATE 4 MILLIGRAM(S): 50 CAPSULE, EXTENDED RELEASE ORAL at 22:27

## 2022-01-01 RX ADMIN — MORPHINE SULFATE 7.5 MILLIGRAM(S): 50 CAPSULE, EXTENDED RELEASE ORAL at 07:43

## 2022-01-01 RX ADMIN — Medication 1 DROP(S): at 07:41

## 2022-01-01 RX ADMIN — Medication 263 MILLIGRAM(S): at 22:41

## 2022-01-01 RX ADMIN — VALACYCLOVIR 1000 MILLIGRAM(S): 500 TABLET, FILM COATED ORAL at 02:27

## 2022-01-01 RX ADMIN — Medication 1000 MILLIGRAM(S): at 22:44

## 2022-01-01 RX ADMIN — Medication 1: at 08:20

## 2022-01-01 RX ADMIN — Medication 1 DROP(S): at 02:21

## 2022-01-01 RX ADMIN — ALBUTEROL 2.5 MILLIGRAM(S): 90 AEROSOL, METERED ORAL at 20:24

## 2022-01-01 RX ADMIN — ENOXAPARIN SODIUM 80 MILLIGRAM(S): 100 INJECTION SUBCUTANEOUS at 06:37

## 2022-01-01 RX ADMIN — Medication 1000 MILLIGRAM(S): at 20:38

## 2022-01-01 RX ADMIN — ALBUTEROL 2.5 MILLIGRAM(S): 90 AEROSOL, METERED ORAL at 09:32

## 2022-01-01 RX ADMIN — Medication 0.5 MILLIGRAM(S): at 09:56

## 2022-01-01 RX ADMIN — MUPIROCIN 1 APPLICATION(S): 20 OINTMENT TOPICAL at 06:04

## 2022-01-01 RX ADMIN — Medication 40 MILLIEQUIVALENT(S): at 11:37

## 2022-01-01 RX ADMIN — Medication 15 MILLIGRAM(S): at 11:48

## 2022-01-01 RX ADMIN — Medication 12.5 MILLIGRAM(S): at 07:02

## 2022-01-01 RX ADMIN — Medication 400 MILLIGRAM(S): at 23:46

## 2022-01-01 RX ADMIN — Medication 1 DROP(S): at 06:56

## 2022-01-01 RX ADMIN — Medication 300 MILLIGRAM(S): at 18:31

## 2022-01-01 RX ADMIN — INSULIN GLARGINE 10 UNIT(S): 100 INJECTION, SOLUTION SUBCUTANEOUS at 22:32

## 2022-01-01 RX ADMIN — Medication 1 TABLET(S): at 12:28

## 2022-01-01 RX ADMIN — MEROPENEM 100 MILLIGRAM(S): 1 INJECTION INTRAVENOUS at 06:46

## 2022-01-01 RX ADMIN — MORPHINE SULFATE 7.5 MILLIGRAM(S): 50 CAPSULE, EXTENDED RELEASE ORAL at 00:40

## 2022-01-01 RX ADMIN — Medication 1 APPLICATION(S): at 13:30

## 2022-01-01 RX ADMIN — Medication 150 MILLIGRAM(S): at 22:27

## 2022-01-01 RX ADMIN — NYSTATIN CREAM 1 APPLICATION(S): 100000 CREAM TOPICAL at 17:48

## 2022-01-01 RX ADMIN — INSULIN GLARGINE 10 UNIT(S): 100 INJECTION, SOLUTION SUBCUTANEOUS at 21:57

## 2022-01-01 RX ADMIN — Medication 1000 MILLIGRAM(S): at 06:52

## 2022-01-01 RX ADMIN — Medication 1 DROP(S): at 08:53

## 2022-01-01 RX ADMIN — Medication 12.5 MILLIGRAM(S): at 05:46

## 2022-01-01 RX ADMIN — CEFTRIAXONE 100 MILLIGRAM(S): 500 INJECTION, POWDER, FOR SOLUTION INTRAMUSCULAR; INTRAVENOUS at 17:57

## 2022-01-01 RX ADMIN — Medication 1000 MILLIGRAM(S): at 06:44

## 2022-01-01 RX ADMIN — Medication 2: at 21:37

## 2022-01-01 RX ADMIN — NYSTATIN CREAM 1 APPLICATION(S): 100000 CREAM TOPICAL at 18:47

## 2022-01-01 RX ADMIN — Medication 1 APPLICATION(S): at 13:54

## 2022-01-01 RX ADMIN — Medication 1 DROP(S): at 17:29

## 2022-01-01 RX ADMIN — Medication 1 DROP(S): at 06:43

## 2022-01-01 RX ADMIN — GABAPENTIN 600 MILLIGRAM(S): 400 CAPSULE ORAL at 22:39

## 2022-01-01 RX ADMIN — Medication 1 APPLICATION(S): at 07:16

## 2022-01-01 RX ADMIN — Medication 1 DROP(S): at 00:12

## 2022-01-01 RX ADMIN — PIPERACILLIN AND TAZOBACTAM 25 GRAM(S): 4; .5 INJECTION, POWDER, LYOPHILIZED, FOR SOLUTION INTRAVENOUS at 21:33

## 2022-01-01 RX ADMIN — Medication 1: at 21:31

## 2022-01-01 RX ADMIN — PREGABALIN 1000 MICROGRAM(S): 225 CAPSULE ORAL at 12:01

## 2022-01-01 RX ADMIN — GABAPENTIN 600 MILLIGRAM(S): 400 CAPSULE ORAL at 05:48

## 2022-01-01 RX ADMIN — MIRTAZAPINE 7.5 MILLIGRAM(S): 45 TABLET, ORALLY DISINTEGRATING ORAL at 22:21

## 2022-01-01 RX ADMIN — Medication 263 MILLIGRAM(S): at 11:09

## 2022-01-01 RX ADMIN — MUPIROCIN 1 APPLICATION(S): 20 OINTMENT TOPICAL at 21:18

## 2022-01-01 RX ADMIN — Medication 1 DROP(S): at 16:37

## 2022-01-01 RX ADMIN — GABAPENTIN 600 MILLIGRAM(S): 400 CAPSULE ORAL at 12:14

## 2022-01-01 RX ADMIN — Medication 100 MILLIGRAM(S): at 21:30

## 2022-01-01 RX ADMIN — Medication 1 APPLICATION(S): at 11:13

## 2022-01-01 RX ADMIN — Medication 20 MILLIGRAM(S): at 23:22

## 2022-01-01 RX ADMIN — PANTOPRAZOLE SODIUM 40 MILLIGRAM(S): 20 TABLET, DELAYED RELEASE ORAL at 06:40

## 2022-01-01 RX ADMIN — Medication 20 MILLIGRAM(S): at 00:08

## 2022-01-01 RX ADMIN — Medication 1000 MILLIGRAM(S): at 17:44

## 2022-01-01 RX ADMIN — Medication 1 DROP(S): at 04:58

## 2022-01-01 RX ADMIN — Medication 1 DROP(S): at 23:20

## 2022-01-01 RX ADMIN — Medication 3 MILLILITER(S): at 08:56

## 2022-01-01 RX ADMIN — Medication 1 DROP(S): at 21:19

## 2022-01-01 RX ADMIN — Medication 15 MILLIGRAM(S): at 06:00

## 2022-01-01 RX ADMIN — ALBUTEROL 2.5 MILLIGRAM(S): 90 AEROSOL, METERED ORAL at 09:21

## 2022-01-01 RX ADMIN — Medication 3 MILLILITER(S): at 09:17

## 2022-01-01 RX ADMIN — GABAPENTIN 300 MILLIGRAM(S): 400 CAPSULE ORAL at 21:09

## 2022-01-01 RX ADMIN — HYDROMORPHONE HYDROCHLORIDE 0.5 MILLIGRAM(S): 2 INJECTION INTRAMUSCULAR; INTRAVENOUS; SUBCUTANEOUS at 19:35

## 2022-01-01 RX ADMIN — BUDESONIDE AND FORMOTEROL FUMARATE DIHYDRATE 2 PUFF(S): 160; 4.5 AEROSOL RESPIRATORY (INHALATION) at 14:54

## 2022-01-01 RX ADMIN — Medication 100 MILLIEQUIVALENT(S): at 22:02

## 2022-01-01 RX ADMIN — MORPHINE SULFATE 7.5 MILLIGRAM(S): 50 CAPSULE, EXTENDED RELEASE ORAL at 13:10

## 2022-01-01 RX ADMIN — Medication 3 MILLILITER(S): at 04:02

## 2022-01-01 RX ADMIN — MUPIROCIN 1 APPLICATION(S): 20 OINTMENT TOPICAL at 13:42

## 2022-01-01 RX ADMIN — Medication 1: at 18:45

## 2022-01-01 RX ADMIN — LIDOCAINE 1 APPLICATION(S): 4 CREAM TOPICAL at 06:41

## 2022-01-01 RX ADMIN — LIDOCAINE 1 APPLICATION(S): 4 CREAM TOPICAL at 08:02

## 2022-01-01 RX ADMIN — METHADONE HYDROCHLORIDE 5 MILLIGRAM(S): 40 TABLET ORAL at 06:52

## 2022-01-01 RX ADMIN — Medication 0.5 MILLIGRAM(S): at 19:52

## 2022-01-01 RX ADMIN — MORPHINE SULFATE 7.5 MILLIGRAM(S): 50 CAPSULE, EXTENDED RELEASE ORAL at 10:42

## 2022-01-01 RX ADMIN — Medication 20 MILLIGRAM(S): at 06:22

## 2022-01-01 RX ADMIN — Medication 1 DROP(S): at 08:02

## 2022-01-01 RX ADMIN — Medication 1 DROP(S): at 16:44

## 2022-01-01 RX ADMIN — Medication 1 DROP(S): at 14:17

## 2022-01-01 RX ADMIN — Medication 1 DROP(S): at 18:15

## 2022-01-01 RX ADMIN — Medication 150 MILLIGRAM(S): at 21:30

## 2022-01-01 RX ADMIN — ALBUTEROL 2.5 MILLIGRAM(S): 90 AEROSOL, METERED ORAL at 21:07

## 2022-01-01 RX ADMIN — NYSTATIN CREAM 1 APPLICATION(S): 100000 CREAM TOPICAL at 17:26

## 2022-01-01 RX ADMIN — MIRTAZAPINE 7.5 MILLIGRAM(S): 45 TABLET, ORALLY DISINTEGRATING ORAL at 20:40

## 2022-01-01 RX ADMIN — GABAPENTIN 600 MILLIGRAM(S): 400 CAPSULE ORAL at 14:48

## 2022-01-01 RX ADMIN — Medication 1 APPLICATION(S): at 11:39

## 2022-01-01 RX ADMIN — SENNA PLUS 1 TABLET(S): 8.6 TABLET ORAL at 22:04

## 2022-01-01 RX ADMIN — TIOTROPIUM BROMIDE 1 CAPSULE(S): 18 CAPSULE ORAL; RESPIRATORY (INHALATION) at 12:34

## 2022-01-01 RX ADMIN — Medication 1 APPLICATION(S): at 19:45

## 2022-01-01 RX ADMIN — Medication 1 APPLICATION(S): at 11:34

## 2022-01-01 RX ADMIN — MUPIROCIN 1 APPLICATION(S): 20 OINTMENT TOPICAL at 21:21

## 2022-01-01 RX ADMIN — Medication 1 DROP(S): at 15:36

## 2022-01-01 RX ADMIN — Medication 250 MILLIGRAM(S): at 05:39

## 2022-01-01 RX ADMIN — MEROPENEM 100 MILLIGRAM(S): 1 INJECTION INTRAVENOUS at 05:27

## 2022-01-01 RX ADMIN — Medication 1 APPLICATION(S): at 18:38

## 2022-01-01 RX ADMIN — MORPHINE SULFATE 3 MILLIGRAM(S): 50 CAPSULE, EXTENDED RELEASE ORAL at 17:59

## 2022-01-01 RX ADMIN — Medication 1 APPLICATION(S): at 06:21

## 2022-01-01 RX ADMIN — Medication 1 DROP(S): at 10:48

## 2022-01-01 RX ADMIN — Medication 150 MILLIGRAM(S): at 11:25

## 2022-01-01 RX ADMIN — Medication 1 APPLICATION(S): at 00:00

## 2022-01-01 RX ADMIN — ALBUTEROL 2.5 MILLIGRAM(S): 90 AEROSOL, METERED ORAL at 09:10

## 2022-01-01 RX ADMIN — VALACYCLOVIR 1000 MILLIGRAM(S): 500 TABLET, FILM COATED ORAL at 19:06

## 2022-01-01 RX ADMIN — Medication 1 DROP(S): at 11:12

## 2022-01-01 RX ADMIN — LORATADINE 10 MILLIGRAM(S): 10 TABLET ORAL at 11:27

## 2022-01-01 RX ADMIN — Medication 650 MILLIGRAM(S): at 06:17

## 2022-01-01 RX ADMIN — HYDROMORPHONE HYDROCHLORIDE 0.5 MILLIGRAM(S): 2 INJECTION INTRAMUSCULAR; INTRAVENOUS; SUBCUTANEOUS at 23:27

## 2022-01-01 RX ADMIN — Medication 1 APPLICATION(S): at 23:31

## 2022-01-01 RX ADMIN — Medication 1 TABLET(S): at 11:40

## 2022-01-01 RX ADMIN — Medication 1: at 17:18

## 2022-01-01 RX ADMIN — Medication 1 TABLET(S): at 12:02

## 2022-01-01 RX ADMIN — Medication 1 DROP(S): at 04:04

## 2022-01-01 RX ADMIN — Medication 100 MILLIGRAM(S): at 22:22

## 2022-01-01 RX ADMIN — Medication 1 APPLICATION(S): at 12:19

## 2022-01-01 RX ADMIN — MORPHINE SULFATE 3 MILLIGRAM(S): 50 CAPSULE, EXTENDED RELEASE ORAL at 03:02

## 2022-01-01 RX ADMIN — Medication 20 MILLIGRAM(S): at 06:50

## 2022-01-01 RX ADMIN — Medication 1 APPLICATION(S): at 11:33

## 2022-01-01 RX ADMIN — Medication 1 MILLIGRAM(S): at 12:12

## 2022-01-01 RX ADMIN — Medication 1 DROP(S): at 06:39

## 2022-01-01 RX ADMIN — Medication 20 MILLIGRAM(S): at 09:27

## 2022-01-01 RX ADMIN — Medication 1: at 17:15

## 2022-01-01 RX ADMIN — Medication 1 DROP(S): at 13:34

## 2022-01-01 RX ADMIN — Medication 1 DROP(S): at 05:46

## 2022-01-01 RX ADMIN — Medication 1: at 08:52

## 2022-01-01 RX ADMIN — SODIUM CHLORIDE 50 MILLILITER(S): 9 INJECTION INTRAMUSCULAR; INTRAVENOUS; SUBCUTANEOUS at 12:48

## 2022-01-01 RX ADMIN — MEROPENEM 100 MILLIGRAM(S): 1 INJECTION INTRAVENOUS at 01:36

## 2022-01-01 RX ADMIN — PREGABALIN 1000 MICROGRAM(S): 225 CAPSULE ORAL at 12:16

## 2022-01-01 RX ADMIN — Medication 150 MILLIGRAM(S): at 22:09

## 2022-01-01 RX ADMIN — Medication 1 DROP(S): at 16:15

## 2022-01-01 RX ADMIN — MORPHINE SULFATE 3 MILLIGRAM(S): 50 CAPSULE, EXTENDED RELEASE ORAL at 13:13

## 2022-01-01 RX ADMIN — Medication 1 DROP(S): at 13:18

## 2022-01-01 RX ADMIN — NYSTATIN CREAM 1 APPLICATION(S): 100000 CREAM TOPICAL at 07:09

## 2022-01-01 RX ADMIN — LIDOCAINE 1 APPLICATION(S): 4 CREAM TOPICAL at 18:25

## 2022-01-01 RX ADMIN — Medication 1 APPLICATION(S): at 17:13

## 2022-01-01 RX ADMIN — Medication 1 PACKET(S): at 19:48

## 2022-01-01 RX ADMIN — INSULIN GLARGINE 10 UNIT(S): 100 INJECTION, SOLUTION SUBCUTANEOUS at 22:51

## 2022-01-01 RX ADMIN — MEROPENEM 100 MILLIGRAM(S): 1 INJECTION INTRAVENOUS at 18:24

## 2022-01-01 RX ADMIN — Medication 1000 MILLIGRAM(S): at 00:13

## 2022-01-01 RX ADMIN — LOSARTAN POTASSIUM 50 MILLIGRAM(S): 100 TABLET, FILM COATED ORAL at 05:44

## 2022-01-01 RX ADMIN — MIRTAZAPINE 7.5 MILLIGRAM(S): 45 TABLET, ORALLY DISINTEGRATING ORAL at 22:40

## 2022-01-01 RX ADMIN — MORPHINE SULFATE 7.5 MILLIGRAM(S): 50 CAPSULE, EXTENDED RELEASE ORAL at 14:18

## 2022-01-01 RX ADMIN — Medication 400 MILLIGRAM(S): at 12:53

## 2022-01-01 RX ADMIN — MORPHINE SULFATE 5 MILLIGRAM(S): 50 CAPSULE, EXTENDED RELEASE ORAL at 06:08

## 2022-01-01 RX ADMIN — MORPHINE SULFATE 1 MILLIGRAM(S): 50 CAPSULE, EXTENDED RELEASE ORAL at 16:30

## 2022-01-01 RX ADMIN — Medication 1000 MILLIGRAM(S): at 13:26

## 2022-01-01 RX ADMIN — MORPHINE SULFATE 2.5 MILLIGRAM(S): 50 CAPSULE, EXTENDED RELEASE ORAL at 22:14

## 2022-01-01 RX ADMIN — Medication 3 MILLILITER(S): at 21:08

## 2022-01-01 RX ADMIN — Medication 1 APPLICATION(S): at 13:01

## 2022-01-01 RX ADMIN — MUPIROCIN 1 APPLICATION(S): 20 OINTMENT TOPICAL at 06:10

## 2022-01-01 RX ADMIN — Medication 3 MILLILITER(S): at 04:09

## 2022-01-01 RX ADMIN — GABAPENTIN 300 MILLIGRAM(S): 400 CAPSULE ORAL at 13:54

## 2022-01-01 RX ADMIN — VALACYCLOVIR 1000 MILLIGRAM(S): 500 TABLET, FILM COATED ORAL at 10:13

## 2022-01-01 RX ADMIN — Medication 3 MILLILITER(S): at 21:20

## 2022-01-01 RX ADMIN — Medication 1 DROP(S): at 22:53

## 2022-01-01 RX ADMIN — Medication 650 MILLIGRAM(S): at 15:48

## 2022-01-01 RX ADMIN — Medication 1 MILLIGRAM(S): at 13:31

## 2022-01-01 RX ADMIN — Medication 650 MILLIGRAM(S): at 14:57

## 2022-01-01 RX ADMIN — GABAPENTIN 300 MILLIGRAM(S): 400 CAPSULE ORAL at 11:32

## 2022-01-01 RX ADMIN — SENNA PLUS 1 TABLET(S): 8.6 TABLET ORAL at 22:32

## 2022-01-01 RX ADMIN — Medication 1 DROP(S): at 13:15

## 2022-01-01 RX ADMIN — URSODIOL 500 MILLIGRAM(S): 250 TABLET, FILM COATED ORAL at 17:27

## 2022-01-01 RX ADMIN — MORPHINE SULFATE 3 MILLIGRAM(S): 50 CAPSULE, EXTENDED RELEASE ORAL at 04:00

## 2022-01-01 RX ADMIN — Medication 1 DROP(S): at 15:19

## 2022-01-01 RX ADMIN — Medication 1 DROP(S): at 00:19

## 2022-01-01 RX ADMIN — Medication 3 MILLILITER(S): at 20:25

## 2022-01-01 RX ADMIN — Medication 1 DROP(S): at 00:26

## 2022-01-01 RX ADMIN — Medication 1 DROP(S): at 01:06

## 2022-01-01 RX ADMIN — MIRTAZAPINE 15 MILLIGRAM(S): 45 TABLET, ORALLY DISINTEGRATING ORAL at 21:54

## 2022-01-01 RX ADMIN — POLYETHYLENE GLYCOL 3350 17 GRAM(S): 17 POWDER, FOR SOLUTION ORAL at 11:34

## 2022-01-01 RX ADMIN — NYSTATIN CREAM 1 APPLICATION(S): 100000 CREAM TOPICAL at 18:20

## 2022-01-01 RX ADMIN — Medication 1 APPLICATION(S): at 23:13

## 2022-01-01 RX ADMIN — Medication 1 DROP(S): at 09:20

## 2022-01-01 RX ADMIN — Medication 100 GRAM(S): at 22:25

## 2022-01-01 RX ADMIN — Medication 1 DROP(S): at 02:12

## 2022-01-01 RX ADMIN — Medication 1 DROP(S): at 00:09

## 2022-01-01 RX ADMIN — Medication 3 MILLILITER(S): at 10:16

## 2022-01-01 RX ADMIN — MORPHINE SULFATE 7.5 MILLIGRAM(S): 50 CAPSULE, EXTENDED RELEASE ORAL at 05:32

## 2022-01-01 RX ADMIN — Medication 1 DROP(S): at 06:05

## 2022-01-01 RX ADMIN — Medication 1000 MILLIGRAM(S): at 23:15

## 2022-01-01 RX ADMIN — MUPIROCIN 1 APPLICATION(S): 20 OINTMENT TOPICAL at 13:27

## 2022-01-01 RX ADMIN — Medication 1: at 12:21

## 2022-01-01 RX ADMIN — Medication 1 TABLET(S): at 12:08

## 2022-01-01 RX ADMIN — BRIMONIDINE TARTRATE 1 DROP(S): 2 SOLUTION/ DROPS OPHTHALMIC at 06:22

## 2022-01-01 RX ADMIN — Medication 1 DROP(S): at 11:34

## 2022-01-01 RX ADMIN — Medication 1 DROP(S): at 03:02

## 2022-01-01 RX ADMIN — ENOXAPARIN SODIUM 80 MILLIGRAM(S): 100 INJECTION SUBCUTANEOUS at 06:18

## 2022-01-01 RX ADMIN — Medication 1 DROP(S): at 23:18

## 2022-01-01 RX ADMIN — ENOXAPARIN SODIUM 80 MILLIGRAM(S): 100 INJECTION SUBCUTANEOUS at 18:47

## 2022-01-01 RX ADMIN — Medication 1 TABLET(S): at 11:15

## 2022-01-01 RX ADMIN — Medication 1 DROP(S): at 00:22

## 2022-01-01 RX ADMIN — Medication 1 DROP(S): at 12:04

## 2022-01-01 RX ADMIN — Medication 1 APPLICATION(S): at 17:47

## 2022-01-01 RX ADMIN — AMLODIPINE BESYLATE 2.5 MILLIGRAM(S): 2.5 TABLET ORAL at 07:10

## 2022-01-01 RX ADMIN — Medication 0.5 MILLIGRAM(S): at 10:41

## 2022-01-01 RX ADMIN — Medication 1 DROP(S): at 05:06

## 2022-01-01 RX ADMIN — MORPHINE SULFATE 4 MILLIGRAM(S): 50 CAPSULE, EXTENDED RELEASE ORAL at 10:20

## 2022-01-01 RX ADMIN — NYSTATIN CREAM 1 APPLICATION(S): 100000 CREAM TOPICAL at 17:18

## 2022-01-01 RX ADMIN — Medication 500 MILLIGRAM(S): at 12:24

## 2022-01-01 RX ADMIN — Medication 2: at 17:44

## 2022-01-01 RX ADMIN — Medication 250 MILLIGRAM(S): at 01:52

## 2022-01-01 RX ADMIN — Medication 1 DROP(S): at 08:21

## 2022-01-01 RX ADMIN — Medication 1 DROP(S): at 22:51

## 2022-01-01 RX ADMIN — METHADONE HYDROCHLORIDE 5 MILLIGRAM(S): 40 TABLET ORAL at 12:06

## 2022-01-01 RX ADMIN — ALBUTEROL 2.5 MILLIGRAM(S): 90 AEROSOL, METERED ORAL at 14:50

## 2022-01-01 RX ADMIN — Medication 1: at 22:02

## 2022-01-01 RX ADMIN — Medication 15 MILLIGRAM(S): at 11:40

## 2022-01-01 RX ADMIN — Medication 400 MILLIGRAM(S): at 06:31

## 2022-01-01 RX ADMIN — Medication 12.5 MILLIGRAM(S): at 07:18

## 2022-01-01 RX ADMIN — Medication 1 DROP(S): at 05:18

## 2022-01-01 RX ADMIN — MIRTAZAPINE 15 MILLIGRAM(S): 45 TABLET, ORALLY DISINTEGRATING ORAL at 21:36

## 2022-01-01 RX ADMIN — LOSARTAN POTASSIUM 50 MILLIGRAM(S): 100 TABLET, FILM COATED ORAL at 06:58

## 2022-01-01 RX ADMIN — Medication 3 MILLILITER(S): at 15:20

## 2022-01-01 RX ADMIN — Medication 1 DROP(S): at 15:02

## 2022-01-01 RX ADMIN — Medication 0.5 MILLIGRAM(S): at 20:14

## 2022-01-01 RX ADMIN — NYSTATIN CREAM 1 APPLICATION(S): 100000 CREAM TOPICAL at 06:11

## 2022-01-01 RX ADMIN — MORPHINE SULFATE 3 MILLIGRAM(S): 50 CAPSULE, EXTENDED RELEASE ORAL at 18:19

## 2022-01-01 RX ADMIN — PIPERACILLIN AND TAZOBACTAM 25 GRAM(S): 4; .5 INJECTION, POWDER, LYOPHILIZED, FOR SOLUTION INTRAVENOUS at 22:35

## 2022-01-01 RX ADMIN — Medication 50 MILLIGRAM(S): at 05:23

## 2022-01-01 RX ADMIN — HYDROMORPHONE HYDROCHLORIDE 0.5 MILLIGRAM(S): 2 INJECTION INTRAMUSCULAR; INTRAVENOUS; SUBCUTANEOUS at 10:26

## 2022-01-01 RX ADMIN — Medication 3 MILLILITER(S): at 22:42

## 2022-01-01 RX ADMIN — GABAPENTIN 300 MILLIGRAM(S): 400 CAPSULE ORAL at 21:05

## 2022-01-01 RX ADMIN — Medication 1 DROP(S): at 13:12

## 2022-01-01 RX ADMIN — Medication 1 DROP(S): at 13:40

## 2022-01-01 RX ADMIN — MEROPENEM 100 MILLIGRAM(S): 1 INJECTION INTRAVENOUS at 12:02

## 2022-01-01 RX ADMIN — Medication 650 MILLIGRAM(S): at 21:43

## 2022-01-01 RX ADMIN — Medication 1 DROP(S): at 00:50

## 2022-01-01 RX ADMIN — LIDOCAINE 1 APPLICATION(S): 4 CREAM TOPICAL at 17:51

## 2022-01-01 RX ADMIN — MORPHINE SULFATE 7.5 MILLIGRAM(S): 50 CAPSULE, EXTENDED RELEASE ORAL at 13:53

## 2022-01-01 RX ADMIN — LIDOCAINE 1 APPLICATION(S): 4 CREAM TOPICAL at 18:32

## 2022-01-01 RX ADMIN — Medication 1 MILLIGRAM(S): at 11:37

## 2022-01-01 RX ADMIN — Medication 1: at 11:46

## 2022-01-01 RX ADMIN — Medication 1 APPLICATION(S): at 11:12

## 2022-01-01 RX ADMIN — Medication 1 APPLICATION(S): at 18:36

## 2022-01-01 RX ADMIN — HYDROMORPHONE HYDROCHLORIDE 0.5 MILLIGRAM(S): 2 INJECTION INTRAMUSCULAR; INTRAVENOUS; SUBCUTANEOUS at 19:20

## 2022-01-01 RX ADMIN — MUPIROCIN 1 APPLICATION(S): 20 OINTMENT TOPICAL at 14:13

## 2022-01-01 RX ADMIN — Medication 25 MILLIGRAM(S): at 06:29

## 2022-01-01 RX ADMIN — Medication 300 MILLIGRAM(S): at 06:54

## 2022-01-01 RX ADMIN — MIRTAZAPINE 15 MILLIGRAM(S): 45 TABLET, ORALLY DISINTEGRATING ORAL at 21:24

## 2022-01-01 RX ADMIN — LOSARTAN POTASSIUM 50 MILLIGRAM(S): 100 TABLET, FILM COATED ORAL at 05:41

## 2022-01-01 RX ADMIN — MORPHINE SULFATE 7.5 MILLIGRAM(S): 50 CAPSULE, EXTENDED RELEASE ORAL at 00:15

## 2022-01-01 RX ADMIN — Medication 3 MILLILITER(S): at 03:44

## 2022-01-01 RX ADMIN — LIDOCAINE 1 APPLICATION(S): 4 CREAM TOPICAL at 06:33

## 2022-01-01 RX ADMIN — URSODIOL 500 MILLIGRAM(S): 250 TABLET, FILM COATED ORAL at 17:44

## 2022-01-01 RX ADMIN — MORPHINE SULFATE 7.5 MILLIGRAM(S): 50 CAPSULE, EXTENDED RELEASE ORAL at 11:39

## 2022-01-01 RX ADMIN — Medication 263 MILLIGRAM(S): at 17:41

## 2022-01-01 RX ADMIN — Medication 3: at 07:36

## 2022-01-01 RX ADMIN — Medication 0.5 MILLIGRAM(S): at 21:12

## 2022-01-01 RX ADMIN — Medication 1 DROP(S): at 21:22

## 2022-01-01 RX ADMIN — Medication 263 MILLIGRAM(S): at 09:31

## 2022-01-01 RX ADMIN — URSODIOL 500 MILLIGRAM(S): 250 TABLET, FILM COATED ORAL at 21:53

## 2022-01-01 RX ADMIN — Medication 25 MILLIGRAM(S): at 00:25

## 2022-01-01 RX ADMIN — MORPHINE SULFATE 7.5 MILLIGRAM(S): 50 CAPSULE, EXTENDED RELEASE ORAL at 02:15

## 2022-01-01 RX ADMIN — VALACYCLOVIR 1000 MILLIGRAM(S): 500 TABLET, FILM COATED ORAL at 02:12

## 2022-01-01 RX ADMIN — MEROPENEM 100 MILLIGRAM(S): 1 INJECTION INTRAVENOUS at 07:03

## 2022-01-01 RX ADMIN — MORPHINE SULFATE 5 MILLIGRAM(S): 50 CAPSULE, EXTENDED RELEASE ORAL at 21:45

## 2022-01-01 RX ADMIN — ENOXAPARIN SODIUM 40 MILLIGRAM(S): 100 INJECTION SUBCUTANEOUS at 18:36

## 2022-01-01 RX ADMIN — Medication 1 APPLICATION(S): at 05:49

## 2022-01-01 RX ADMIN — NYSTATIN CREAM 1 APPLICATION(S): 100000 CREAM TOPICAL at 18:38

## 2022-01-01 RX ADMIN — Medication 1 DROP(S): at 18:58

## 2022-01-01 RX ADMIN — PREGABALIN 1000 MICROGRAM(S): 225 CAPSULE ORAL at 12:31

## 2022-01-01 RX ADMIN — NYSTATIN CREAM 1 APPLICATION(S): 100000 CREAM TOPICAL at 17:28

## 2022-01-01 RX ADMIN — Medication 0.5 MILLIGRAM(S): at 08:47

## 2022-01-01 RX ADMIN — MIRTAZAPINE 7.5 MILLIGRAM(S): 45 TABLET, ORALLY DISINTEGRATING ORAL at 21:22

## 2022-01-01 RX ADMIN — Medication 1 DROP(S): at 08:01

## 2022-01-01 RX ADMIN — MIRTAZAPINE 15 MILLIGRAM(S): 45 TABLET, ORALLY DISINTEGRATING ORAL at 00:10

## 2022-01-01 RX ADMIN — Medication 1 DROP(S): at 06:35

## 2022-01-01 RX ADMIN — Medication 1 DROP(S): at 21:38

## 2022-01-01 RX ADMIN — MORPHINE SULFATE 5 MILLIGRAM(S): 50 CAPSULE, EXTENDED RELEASE ORAL at 10:12

## 2022-01-01 RX ADMIN — Medication 1 DROP(S): at 21:10

## 2022-01-01 RX ADMIN — Medication 3 MILLILITER(S): at 16:18

## 2022-01-01 RX ADMIN — Medication 1 DROP(S): at 06:50

## 2022-01-01 RX ADMIN — ALBUTEROL 2.5 MILLIGRAM(S): 90 AEROSOL, METERED ORAL at 09:12

## 2022-01-01 RX ADMIN — GABAPENTIN 400 MILLIGRAM(S): 400 CAPSULE ORAL at 12:02

## 2022-01-01 RX ADMIN — Medication 0.5 MILLIGRAM(S): at 21:19

## 2022-01-01 RX ADMIN — INSULIN GLARGINE 10 UNIT(S): 100 INJECTION, SOLUTION SUBCUTANEOUS at 22:08

## 2022-01-01 RX ADMIN — MORPHINE SULFATE 7.5 MILLIGRAM(S): 50 CAPSULE, EXTENDED RELEASE ORAL at 05:12

## 2022-01-01 RX ADMIN — ALBUTEROL 2.5 MILLIGRAM(S): 90 AEROSOL, METERED ORAL at 10:03

## 2022-01-01 RX ADMIN — INSULIN GLARGINE 10 UNIT(S): 100 INJECTION, SOLUTION SUBCUTANEOUS at 21:28

## 2022-01-01 RX ADMIN — SODIUM CHLORIDE 75 MILLILITER(S): 9 INJECTION INTRAMUSCULAR; INTRAVENOUS; SUBCUTANEOUS at 20:13

## 2022-01-01 RX ADMIN — NYSTATIN CREAM 1 APPLICATION(S): 100000 CREAM TOPICAL at 17:41

## 2022-01-01 RX ADMIN — Medication 1 APPLICATION(S): at 00:02

## 2022-01-01 RX ADMIN — Medication 324 MILLIGRAM(S): at 14:05

## 2022-01-01 RX ADMIN — Medication 1 DROP(S): at 00:28

## 2022-01-01 RX ADMIN — Medication 3 MILLILITER(S): at 03:21

## 2022-01-01 RX ADMIN — Medication 250 MILLIGRAM(S): at 15:28

## 2022-01-01 RX ADMIN — PIPERACILLIN AND TAZOBACTAM 25 GRAM(S): 4; .5 INJECTION, POWDER, LYOPHILIZED, FOR SOLUTION INTRAVENOUS at 13:26

## 2022-01-01 RX ADMIN — Medication 1 DROP(S): at 05:33

## 2022-01-01 RX ADMIN — Medication 1 DROP(S): at 09:15

## 2022-01-01 RX ADMIN — Medication 1 DROP(S): at 18:38

## 2022-01-01 RX ADMIN — MEROPENEM 100 MILLIGRAM(S): 1 INJECTION INTRAVENOUS at 22:41

## 2022-01-01 RX ADMIN — MUPIROCIN 1 APPLICATION(S): 20 OINTMENT TOPICAL at 22:20

## 2022-01-01 RX ADMIN — Medication 1 APPLICATION(S): at 23:27

## 2022-01-01 RX ADMIN — NYSTATIN CREAM 1 APPLICATION(S): 100000 CREAM TOPICAL at 05:31

## 2022-01-01 RX ADMIN — GABAPENTIN 100 MILLIGRAM(S): 400 CAPSULE ORAL at 06:54

## 2022-01-01 RX ADMIN — Medication 1 APPLICATION(S): at 03:12

## 2022-01-01 RX ADMIN — Medication 100 MILLIGRAM(S): at 22:13

## 2022-01-01 RX ADMIN — VALACYCLOVIR 1000 MILLIGRAM(S): 500 TABLET, FILM COATED ORAL at 01:13

## 2022-01-01 RX ADMIN — Medication 25 GRAM(S): at 12:15

## 2022-01-01 RX ADMIN — Medication 12.5 MILLIGRAM(S): at 06:14

## 2022-01-01 RX ADMIN — NYSTATIN CREAM 1 APPLICATION(S): 100000 CREAM TOPICAL at 17:55

## 2022-01-01 RX ADMIN — BUDESONIDE AND FORMOTEROL FUMARATE DIHYDRATE 2 PUFF(S): 160; 4.5 AEROSOL RESPIRATORY (INHALATION) at 22:08

## 2022-01-01 RX ADMIN — INSULIN GLARGINE 10 UNIT(S): 100 INJECTION, SOLUTION SUBCUTANEOUS at 22:53

## 2022-01-01 RX ADMIN — NYSTATIN CREAM 1 APPLICATION(S): 100000 CREAM TOPICAL at 08:02

## 2022-01-01 RX ADMIN — LIDOCAINE 1 APPLICATION(S): 4 CREAM TOPICAL at 18:48

## 2022-01-01 RX ADMIN — Medication 12.5 MILLIGRAM(S): at 06:42

## 2022-01-01 RX ADMIN — MUPIROCIN 1 APPLICATION(S): 20 OINTMENT TOPICAL at 21:02

## 2022-01-01 RX ADMIN — MORPHINE SULFATE 3 MILLIGRAM(S): 50 CAPSULE, EXTENDED RELEASE ORAL at 05:06

## 2022-01-01 RX ADMIN — Medication 1 DROP(S): at 06:28

## 2022-01-01 RX ADMIN — MORPHINE SULFATE 7.5 MILLIGRAM(S): 50 CAPSULE, EXTENDED RELEASE ORAL at 21:02

## 2022-01-01 RX ADMIN — MUPIROCIN 1 APPLICATION(S): 20 OINTMENT TOPICAL at 21:57

## 2022-01-01 RX ADMIN — MUPIROCIN 1 APPLICATION(S): 20 OINTMENT TOPICAL at 22:05

## 2022-01-01 RX ADMIN — Medication 3 MILLILITER(S): at 19:23

## 2022-01-01 RX ADMIN — HYDROMORPHONE HYDROCHLORIDE 0.5 MILLIGRAM(S): 2 INJECTION INTRAMUSCULAR; INTRAVENOUS; SUBCUTANEOUS at 00:00

## 2022-01-01 RX ADMIN — GABAPENTIN 100 MILLIGRAM(S): 400 CAPSULE ORAL at 18:33

## 2022-01-01 RX ADMIN — MORPHINE SULFATE 2.5 MILLIGRAM(S): 50 CAPSULE, EXTENDED RELEASE ORAL at 10:44

## 2022-01-01 RX ADMIN — Medication 3 MILLILITER(S): at 15:41

## 2022-01-01 RX ADMIN — Medication 1 APPLICATION(S): at 07:29

## 2022-01-01 RX ADMIN — Medication 250 MILLIGRAM(S): at 09:15

## 2022-01-01 RX ADMIN — Medication 1 DROP(S): at 06:59

## 2022-01-01 RX ADMIN — MORPHINE SULFATE 7.5 MILLIGRAM(S): 50 CAPSULE, EXTENDED RELEASE ORAL at 16:46

## 2022-01-01 RX ADMIN — LORATADINE 10 MILLIGRAM(S): 10 TABLET ORAL at 12:08

## 2022-01-01 RX ADMIN — Medication 3 MILLILITER(S): at 04:44

## 2022-01-01 RX ADMIN — LIDOCAINE 1 APPLICATION(S): 4 CREAM TOPICAL at 06:10

## 2022-01-01 RX ADMIN — Medication 3 MILLILITER(S): at 09:56

## 2022-01-01 RX ADMIN — Medication 1000 MILLIGRAM(S): at 15:10

## 2022-01-01 RX ADMIN — VALACYCLOVIR 1000 MILLIGRAM(S): 500 TABLET, FILM COATED ORAL at 02:47

## 2022-01-01 RX ADMIN — MORPHINE SULFATE 2 MILLIGRAM(S): 50 CAPSULE, EXTENDED RELEASE ORAL at 05:30

## 2022-01-01 RX ADMIN — SODIUM CHLORIDE 75 MILLILITER(S): 9 INJECTION, SOLUTION INTRAVENOUS at 21:23

## 2022-01-01 RX ADMIN — MORPHINE SULFATE 7.5 MILLIGRAM(S): 50 CAPSULE, EXTENDED RELEASE ORAL at 17:53

## 2022-01-01 RX ADMIN — GABAPENTIN 600 MILLIGRAM(S): 400 CAPSULE ORAL at 05:44

## 2022-01-01 RX ADMIN — HYDROMORPHONE HYDROCHLORIDE 0.5 MILLIGRAM(S): 2 INJECTION INTRAMUSCULAR; INTRAVENOUS; SUBCUTANEOUS at 10:25

## 2022-01-01 RX ADMIN — VALACYCLOVIR 1000 MILLIGRAM(S): 500 TABLET, FILM COATED ORAL at 17:49

## 2022-01-01 RX ADMIN — MIRTAZAPINE 15 MILLIGRAM(S): 45 TABLET, ORALLY DISINTEGRATING ORAL at 22:54

## 2022-01-01 RX ADMIN — Medication 1 APPLICATION(S): at 06:33

## 2022-01-01 RX ADMIN — MORPHINE SULFATE 2.5 MILLIGRAM(S): 50 CAPSULE, EXTENDED RELEASE ORAL at 14:59

## 2022-01-01 RX ADMIN — Medication 1 DROP(S): at 21:35

## 2022-01-01 RX ADMIN — Medication 0.5 MILLIGRAM(S): at 21:14

## 2022-01-01 RX ADMIN — Medication 1 DROP(S): at 03:45

## 2022-01-01 RX ADMIN — MUPIROCIN 1 APPLICATION(S): 20 OINTMENT TOPICAL at 05:24

## 2022-01-01 RX ADMIN — Medication 1 DROP(S): at 12:26

## 2022-01-01 RX ADMIN — Medication 1 DROP(S): at 13:05

## 2022-01-01 RX ADMIN — HYDROMORPHONE HYDROCHLORIDE 0.5 MILLIGRAM(S): 2 INJECTION INTRAMUSCULAR; INTRAVENOUS; SUBCUTANEOUS at 06:15

## 2022-01-01 RX ADMIN — ALBUTEROL 2.5 MILLIGRAM(S): 90 AEROSOL, METERED ORAL at 22:20

## 2022-01-01 RX ADMIN — Medication 1 APPLICATION(S): at 12:32

## 2022-01-01 RX ADMIN — Medication 250 MILLIGRAM(S): at 17:28

## 2022-01-01 RX ADMIN — Medication 1 DROP(S): at 06:10

## 2022-01-01 RX ADMIN — Medication 3 MILLILITER(S): at 22:28

## 2022-01-01 RX ADMIN — Medication 1 APPLICATION(S): at 07:41

## 2022-01-01 RX ADMIN — Medication 1 DROP(S): at 12:09

## 2022-01-01 RX ADMIN — LIDOCAINE 1 APPLICATION(S): 4 CREAM TOPICAL at 07:04

## 2022-01-01 RX ADMIN — Medication 1 DROP(S): at 18:12

## 2022-01-01 RX ADMIN — HYDROMORPHONE HYDROCHLORIDE 0.25 MILLIGRAM(S): 2 INJECTION INTRAMUSCULAR; INTRAVENOUS; SUBCUTANEOUS at 14:38

## 2022-01-01 RX ADMIN — Medication 1 DROP(S): at 06:52

## 2022-01-01 RX ADMIN — Medication 2: at 22:07

## 2022-01-01 RX ADMIN — LIDOCAINE 1 APPLICATION(S): 4 CREAM TOPICAL at 18:39

## 2022-01-01 RX ADMIN — LOSARTAN POTASSIUM 50 MILLIGRAM(S): 100 TABLET, FILM COATED ORAL at 05:25

## 2022-01-01 RX ADMIN — Medication 300 MILLIGRAM(S): at 17:42

## 2022-01-01 RX ADMIN — Medication 263 MILLIGRAM(S): at 19:07

## 2022-01-01 RX ADMIN — Medication 650 MILLIGRAM(S): at 22:17

## 2022-01-01 RX ADMIN — MEROPENEM 100 MILLIGRAM(S): 1 INJECTION INTRAVENOUS at 12:25

## 2022-01-01 RX ADMIN — Medication 1 TABLET(S): at 11:32

## 2022-01-01 RX ADMIN — Medication 3 MILLILITER(S): at 16:26

## 2022-01-01 RX ADMIN — Medication 975 MILLIGRAM(S): at 09:38

## 2022-01-01 RX ADMIN — Medication 1 DROP(S): at 07:05

## 2022-01-01 RX ADMIN — MORPHINE SULFATE 7.5 MILLIGRAM(S): 50 CAPSULE, EXTENDED RELEASE ORAL at 15:22

## 2022-01-01 RX ADMIN — ALBUTEROL 2.5 MILLIGRAM(S): 90 AEROSOL, METERED ORAL at 04:35

## 2022-01-01 RX ADMIN — Medication 1 DROP(S): at 00:03

## 2022-01-01 RX ADMIN — Medication 1 APPLICATION(S): at 18:02

## 2022-01-01 RX ADMIN — Medication 1 DROP(S): at 06:19

## 2022-01-01 RX ADMIN — Medication 1 MILLIGRAM(S): at 12:15

## 2022-01-01 RX ADMIN — MUPIROCIN 1 APPLICATION(S): 20 OINTMENT TOPICAL at 05:28

## 2022-01-01 RX ADMIN — Medication 1 DROP(S): at 15:26

## 2022-01-01 RX ADMIN — Medication 1 APPLICATION(S): at 22:54

## 2022-01-01 RX ADMIN — Medication 0.5 MILLIGRAM(S): at 21:07

## 2022-01-01 RX ADMIN — INSULIN GLARGINE 10 UNIT(S): 100 INJECTION, SOLUTION SUBCUTANEOUS at 01:18

## 2022-01-01 RX ADMIN — Medication 1 APPLICATION(S): at 11:25

## 2022-01-01 RX ADMIN — MUPIROCIN 1 APPLICATION(S): 20 OINTMENT TOPICAL at 06:59

## 2022-01-01 RX ADMIN — MORPHINE SULFATE 15 MILLIGRAM(S): 50 CAPSULE, EXTENDED RELEASE ORAL at 14:25

## 2022-01-01 RX ADMIN — MORPHINE SULFATE 2 MILLIGRAM(S): 50 CAPSULE, EXTENDED RELEASE ORAL at 17:57

## 2022-01-01 RX ADMIN — NYSTATIN CREAM 1 APPLICATION(S): 100000 CREAM TOPICAL at 17:56

## 2022-01-01 RX ADMIN — Medication 1 DROP(S): at 14:30

## 2022-01-01 RX ADMIN — PREGABALIN 1000 MICROGRAM(S): 225 CAPSULE ORAL at 11:32

## 2022-01-01 RX ADMIN — Medication 1 DROP(S): at 15:01

## 2022-01-01 RX ADMIN — Medication 1 APPLICATION(S): at 17:30

## 2022-01-01 RX ADMIN — Medication 325 MILLIGRAM(S): at 07:52

## 2022-01-01 RX ADMIN — Medication 10 MILLIGRAM(S): at 21:10

## 2022-01-01 RX ADMIN — Medication 1 APPLICATION(S): at 18:14

## 2022-01-01 RX ADMIN — Medication 400 MILLIGRAM(S): at 05:42

## 2022-01-01 RX ADMIN — Medication 1 APPLICATION(S): at 17:04

## 2022-01-01 RX ADMIN — Medication 25 MILLIGRAM(S): at 18:20

## 2022-01-01 RX ADMIN — Medication 1 DROP(S): at 22:39

## 2022-01-01 RX ADMIN — MUPIROCIN 1 APPLICATION(S): 20 OINTMENT TOPICAL at 21:19

## 2022-01-01 RX ADMIN — Medication 3 MILLILITER(S): at 22:05

## 2022-01-01 RX ADMIN — Medication 1 APPLICATION(S): at 12:02

## 2022-01-01 RX ADMIN — Medication 20 MILLIGRAM(S): at 06:09

## 2022-01-01 RX ADMIN — MORPHINE SULFATE 7.5 MILLIGRAM(S): 50 CAPSULE, EXTENDED RELEASE ORAL at 06:52

## 2022-01-01 RX ADMIN — VALACYCLOVIR 1000 MILLIGRAM(S): 500 TABLET, FILM COATED ORAL at 10:05

## 2022-01-01 RX ADMIN — GABAPENTIN 600 MILLIGRAM(S): 400 CAPSULE ORAL at 18:30

## 2022-01-01 RX ADMIN — Medication 3 MILLILITER(S): at 09:02

## 2022-01-01 RX ADMIN — Medication 1 APPLICATION(S): at 08:01

## 2022-01-01 RX ADMIN — Medication 1 DROP(S): at 23:07

## 2022-01-01 RX ADMIN — LIDOCAINE 1 APPLICATION(S): 4 CREAM TOPICAL at 17:40

## 2022-01-01 RX ADMIN — HYDROMORPHONE HYDROCHLORIDE 0.5 MILLIGRAM(S): 2 INJECTION INTRAMUSCULAR; INTRAVENOUS; SUBCUTANEOUS at 05:45

## 2022-01-01 RX ADMIN — NYSTATIN CREAM 1 APPLICATION(S): 100000 CREAM TOPICAL at 18:58

## 2022-01-01 RX ADMIN — Medication 1: at 09:14

## 2022-01-01 RX ADMIN — Medication 1 MILLIGRAM(S): at 12:33

## 2022-01-01 RX ADMIN — Medication 5 MILLIGRAM(S): at 17:41

## 2022-01-01 RX ADMIN — Medication 1 DROP(S): at 03:38

## 2022-01-01 RX ADMIN — Medication 1 APPLICATION(S): at 17:51

## 2022-01-01 RX ADMIN — Medication 1 DROP(S): at 05:34

## 2022-01-01 RX ADMIN — MORPHINE SULFATE 7.5 MILLIGRAM(S): 50 CAPSULE, EXTENDED RELEASE ORAL at 21:36

## 2022-01-01 RX ADMIN — Medication 1 DROP(S): at 01:12

## 2022-01-01 RX ADMIN — Medication 1 DROP(S): at 15:52

## 2022-01-01 RX ADMIN — Medication 1 DROP(S): at 21:25

## 2022-01-01 RX ADMIN — MORPHINE SULFATE 3 MILLIGRAM(S): 50 CAPSULE, EXTENDED RELEASE ORAL at 10:15

## 2022-01-01 RX ADMIN — BRIMONIDINE TARTRATE 1 DROP(S): 2 SOLUTION/ DROPS OPHTHALMIC at 05:48

## 2022-01-01 RX ADMIN — MUPIROCIN 1 APPLICATION(S): 20 OINTMENT TOPICAL at 22:47

## 2022-01-01 RX ADMIN — GABAPENTIN 300 MILLIGRAM(S): 400 CAPSULE ORAL at 05:40

## 2022-01-01 RX ADMIN — GABAPENTIN 300 MILLIGRAM(S): 400 CAPSULE ORAL at 06:54

## 2022-01-01 RX ADMIN — INSULIN GLARGINE 10 UNIT(S): 100 INJECTION, SOLUTION SUBCUTANEOUS at 21:49

## 2022-01-01 RX ADMIN — Medication 20 MILLIGRAM(S): at 12:27

## 2022-01-01 RX ADMIN — ENOXAPARIN SODIUM 80 MILLIGRAM(S): 100 INJECTION SUBCUTANEOUS at 18:32

## 2022-01-01 RX ADMIN — MORPHINE SULFATE 15 MILLIGRAM(S): 50 CAPSULE, EXTENDED RELEASE ORAL at 14:55

## 2022-01-01 RX ADMIN — Medication 1 APPLICATION(S): at 11:35

## 2022-01-01 RX ADMIN — Medication 100 MILLIGRAM(S): at 21:44

## 2022-01-01 RX ADMIN — Medication 1 DROP(S): at 17:16

## 2022-01-01 RX ADMIN — Medication 324 MILLIGRAM(S): at 11:34

## 2022-01-01 RX ADMIN — Medication 1 MILLIGRAM(S): at 12:08

## 2022-01-01 RX ADMIN — PREGABALIN 1000 MICROGRAM(S): 225 CAPSULE ORAL at 12:12

## 2022-01-01 RX ADMIN — Medication 1 DROP(S): at 22:22

## 2022-01-01 RX ADMIN — Medication 1 DROP(S): at 18:22

## 2022-01-01 RX ADMIN — Medication 1 APPLICATION(S): at 17:52

## 2022-01-01 RX ADMIN — MORPHINE SULFATE 5 MILLIGRAM(S): 50 CAPSULE, EXTENDED RELEASE ORAL at 02:12

## 2022-01-01 RX ADMIN — NYSTATIN CREAM 1 APPLICATION(S): 100000 CREAM TOPICAL at 18:10

## 2022-01-01 RX ADMIN — MUPIROCIN 1 APPLICATION(S): 20 OINTMENT TOPICAL at 06:37

## 2022-01-01 RX ADMIN — BRIMONIDINE TARTRATE 1 DROP(S): 2 SOLUTION/ DROPS OPHTHALMIC at 06:28

## 2022-01-01 RX ADMIN — NYSTATIN CREAM 1 APPLICATION(S): 100000 CREAM TOPICAL at 17:30

## 2022-01-01 RX ADMIN — LORATADINE 5 MILLIGRAM(S): 10 TABLET ORAL at 19:39

## 2022-01-01 RX ADMIN — VALACYCLOVIR 1000 MILLIGRAM(S): 500 TABLET, FILM COATED ORAL at 09:17

## 2022-01-01 RX ADMIN — MUPIROCIN 1 APPLICATION(S): 20 OINTMENT TOPICAL at 00:18

## 2022-01-01 RX ADMIN — FLUCONAZOLE 200 MILLIGRAM(S): 150 TABLET ORAL at 14:24

## 2022-01-01 RX ADMIN — MORPHINE SULFATE 3 MILLIGRAM(S): 50 CAPSULE, EXTENDED RELEASE ORAL at 17:49

## 2022-01-01 RX ADMIN — LIDOCAINE 1 APPLICATION(S): 4 CREAM TOPICAL at 17:26

## 2022-01-01 RX ADMIN — Medication 15 MILLIGRAM(S): at 07:29

## 2022-01-01 RX ADMIN — Medication 25 MILLIGRAM(S): at 05:05

## 2022-01-01 RX ADMIN — MEROPENEM 100 MILLIGRAM(S): 1 INJECTION INTRAVENOUS at 03:00

## 2022-01-01 RX ADMIN — Medication 1 DROP(S): at 10:00

## 2022-01-01 RX ADMIN — Medication 1 APPLICATION(S): at 06:03

## 2022-01-01 RX ADMIN — PIPERACILLIN AND TAZOBACTAM 200 GRAM(S): 4; .5 INJECTION, POWDER, LYOPHILIZED, FOR SOLUTION INTRAVENOUS at 12:27

## 2022-01-01 RX ADMIN — Medication 25 GRAM(S): at 15:19

## 2022-01-01 RX ADMIN — Medication 1 DROP(S): at 22:52

## 2022-01-01 RX ADMIN — TIOTROPIUM BROMIDE 1 CAPSULE(S): 18 CAPSULE ORAL; RESPIRATORY (INHALATION) at 09:13

## 2022-01-01 RX ADMIN — ENOXAPARIN SODIUM 80 MILLIGRAM(S): 100 INJECTION SUBCUTANEOUS at 18:21

## 2022-01-01 RX ADMIN — BUDESONIDE AND FORMOTEROL FUMARATE DIHYDRATE 2 PUFF(S): 160; 4.5 AEROSOL RESPIRATORY (INHALATION) at 21:16

## 2022-01-01 RX ADMIN — Medication 1 APPLICATION(S): at 23:17

## 2022-01-01 RX ADMIN — Medication 1 APPLICATION(S): at 06:10

## 2022-01-01 RX ADMIN — Medication 300 MILLIGRAM(S): at 07:00

## 2022-01-01 RX ADMIN — MUPIROCIN 1 APPLICATION(S): 20 OINTMENT TOPICAL at 21:10

## 2022-01-01 RX ADMIN — SENNA PLUS 1 TABLET(S): 8.6 TABLET ORAL at 22:22

## 2022-01-01 RX ADMIN — Medication 1 DROP(S): at 13:46

## 2022-01-01 RX ADMIN — MORPHINE SULFATE 4 MILLIGRAM(S): 50 CAPSULE, EXTENDED RELEASE ORAL at 19:14

## 2022-01-01 RX ADMIN — Medication 400 MILLIGRAM(S): at 19:41

## 2022-01-01 RX ADMIN — Medication 1 APPLICATION(S): at 11:10

## 2022-01-01 RX ADMIN — Medication 3 MILLILITER(S): at 22:27

## 2022-01-01 RX ADMIN — URSODIOL 500 MILLIGRAM(S): 250 TABLET, FILM COATED ORAL at 18:30

## 2022-01-01 RX ADMIN — Medication 1 DROP(S): at 06:42

## 2022-01-01 RX ADMIN — MORPHINE SULFATE 4 MILLIGRAM(S): 50 CAPSULE, EXTENDED RELEASE ORAL at 11:05

## 2022-01-01 RX ADMIN — GABAPENTIN 600 MILLIGRAM(S): 400 CAPSULE ORAL at 22:53

## 2022-01-01 RX ADMIN — ALBUTEROL 2.5 MILLIGRAM(S): 90 AEROSOL, METERED ORAL at 16:11

## 2022-01-01 RX ADMIN — BUDESONIDE AND FORMOTEROL FUMARATE DIHYDRATE 2 PUFF(S): 160; 4.5 AEROSOL RESPIRATORY (INHALATION) at 20:34

## 2022-01-01 RX ADMIN — MORPHINE SULFATE 5 MILLIGRAM(S): 50 CAPSULE, EXTENDED RELEASE ORAL at 17:49

## 2022-01-01 RX ADMIN — Medication 150 MILLIGRAM(S): at 13:41

## 2022-01-01 RX ADMIN — Medication 12.5 MILLIGRAM(S): at 05:56

## 2022-01-01 RX ADMIN — OXCARBAZEPINE 300 MILLIGRAM(S): 300 TABLET, FILM COATED ORAL at 18:08

## 2022-01-01 RX ADMIN — MORPHINE SULFATE 7.5 MILLIGRAM(S): 50 CAPSULE, EXTENDED RELEASE ORAL at 22:21

## 2022-01-01 RX ADMIN — ALBUTEROL 2.5 MILLIGRAM(S): 90 AEROSOL, METERED ORAL at 04:48

## 2022-01-01 RX ADMIN — ENOXAPARIN SODIUM 80 MILLIGRAM(S): 100 INJECTION SUBCUTANEOUS at 19:06

## 2022-01-01 RX ADMIN — ALBUTEROL 2.5 MILLIGRAM(S): 90 AEROSOL, METERED ORAL at 15:33

## 2022-01-01 RX ADMIN — SODIUM CHLORIDE 75 MILLILITER(S): 9 INJECTION, SOLUTION INTRAVENOUS at 09:57

## 2022-01-01 RX ADMIN — Medication 1 DROP(S): at 07:59

## 2022-01-01 RX ADMIN — Medication 263 MILLIGRAM(S): at 11:51

## 2022-01-01 RX ADMIN — Medication 1 APPLICATION(S): at 17:40

## 2022-01-01 RX ADMIN — Medication 1 APPLICATION(S): at 12:30

## 2022-01-01 RX ADMIN — NYSTATIN CREAM 1 APPLICATION(S): 100000 CREAM TOPICAL at 17:57

## 2022-01-01 RX ADMIN — MUPIROCIN 1 APPLICATION(S): 20 OINTMENT TOPICAL at 21:39

## 2022-01-01 RX ADMIN — Medication 20 MILLIGRAM(S): at 07:19

## 2022-01-01 RX ADMIN — METHADONE HYDROCHLORIDE 5 MILLIGRAM(S): 40 TABLET ORAL at 14:26

## 2022-01-01 RX ADMIN — Medication 0.5 MILLIGRAM(S): at 08:22

## 2022-01-01 RX ADMIN — GABAPENTIN 100 MILLIGRAM(S): 400 CAPSULE ORAL at 15:22

## 2022-01-01 RX ADMIN — URSODIOL 500 MILLIGRAM(S): 250 TABLET, FILM COATED ORAL at 18:42

## 2022-01-01 RX ADMIN — MORPHINE SULFATE 3 MILLIGRAM(S): 50 CAPSULE, EXTENDED RELEASE ORAL at 22:38

## 2022-01-01 RX ADMIN — Medication 1 APPLICATION(S): at 06:09

## 2022-01-01 RX ADMIN — Medication 1 DROP(S): at 16:32

## 2022-01-01 RX ADMIN — URSODIOL 500 MILLIGRAM(S): 250 TABLET, FILM COATED ORAL at 18:44

## 2022-01-01 RX ADMIN — Medication 1 DROP(S): at 07:35

## 2022-01-01 RX ADMIN — Medication 1 DROP(S): at 13:42

## 2022-01-01 RX ADMIN — METHADONE HYDROCHLORIDE 2.5 MILLIGRAM(S): 40 TABLET ORAL at 15:41

## 2022-01-01 RX ADMIN — MORPHINE SULFATE 2.5 MILLIGRAM(S): 50 CAPSULE, EXTENDED RELEASE ORAL at 19:58

## 2022-01-01 RX ADMIN — Medication 1: at 18:42

## 2022-01-01 RX ADMIN — Medication 150 MILLIGRAM(S): at 06:15

## 2022-01-01 RX ADMIN — Medication 3 MILLILITER(S): at 10:41

## 2022-01-01 RX ADMIN — Medication 10 MILLIGRAM(S): at 13:44

## 2022-01-01 RX ADMIN — ALBUTEROL 2.5 MILLIGRAM(S): 90 AEROSOL, METERED ORAL at 15:50

## 2022-01-01 RX ADMIN — Medication 1 APPLICATION(S): at 09:25

## 2022-01-01 RX ADMIN — Medication 1 APPLICATION(S): at 05:31

## 2022-01-01 RX ADMIN — SODIUM CHLORIDE 100 MILLILITER(S): 9 INJECTION, SOLUTION INTRAVENOUS at 21:47

## 2022-01-01 RX ADMIN — Medication 75 MILLIGRAM(S): at 13:42

## 2022-01-01 RX ADMIN — Medication 2: at 11:52

## 2022-01-01 RX ADMIN — Medication 15 MILLIGRAM(S): at 08:07

## 2022-01-01 RX ADMIN — Medication 1 DROP(S): at 14:55

## 2022-01-01 RX ADMIN — Medication 1 DROP(S): at 23:19

## 2022-01-01 RX ADMIN — HYDROMORPHONE HYDROCHLORIDE 0.5 MILLIGRAM(S): 2 INJECTION INTRAMUSCULAR; INTRAVENOUS; SUBCUTANEOUS at 14:49

## 2022-01-01 RX ADMIN — Medication 3 MILLILITER(S): at 09:38

## 2022-01-01 RX ADMIN — ENOXAPARIN SODIUM 40 MILLIGRAM(S): 100 INJECTION SUBCUTANEOUS at 17:42

## 2022-01-01 RX ADMIN — NYSTATIN CREAM 1 APPLICATION(S): 100000 CREAM TOPICAL at 06:15

## 2022-01-01 RX ADMIN — Medication 1 DROP(S): at 02:07

## 2022-01-01 RX ADMIN — Medication 400 MILLIGRAM(S): at 17:41

## 2022-01-01 RX ADMIN — MORPHINE SULFATE 7.5 MILLIGRAM(S): 50 CAPSULE, EXTENDED RELEASE ORAL at 20:39

## 2022-01-01 RX ADMIN — Medication 10 MILLIGRAM(S): at 22:15

## 2022-01-01 RX ADMIN — Medication 2: at 07:51

## 2022-01-01 RX ADMIN — LOSARTAN POTASSIUM 50 MILLIGRAM(S): 100 TABLET, FILM COATED ORAL at 06:04

## 2022-01-01 RX ADMIN — Medication 1 APPLICATION(S): at 00:10

## 2022-01-01 RX ADMIN — Medication 1 PACKET(S): at 22:04

## 2022-01-01 RX ADMIN — Medication 1 APPLICATION(S): at 17:57

## 2022-01-01 RX ADMIN — OXCARBAZEPINE 600 MILLIGRAM(S): 300 TABLET, FILM COATED ORAL at 18:58

## 2022-01-01 RX ADMIN — LIDOCAINE 1 APPLICATION(S): 4 CREAM TOPICAL at 06:44

## 2022-01-01 RX ADMIN — Medication 1 APPLICATION(S): at 05:30

## 2022-01-01 RX ADMIN — Medication 1 DROP(S): at 19:11

## 2022-01-01 RX ADMIN — Medication 1 APPLICATION(S): at 17:03

## 2022-01-01 RX ADMIN — MUPIROCIN 1 APPLICATION(S): 20 OINTMENT TOPICAL at 06:46

## 2022-01-01 RX ADMIN — Medication 1 DROP(S): at 13:50

## 2022-01-01 RX ADMIN — MORPHINE SULFATE 7.5 MILLIGRAM(S): 50 CAPSULE, EXTENDED RELEASE ORAL at 14:00

## 2022-01-01 RX ADMIN — Medication 263 MILLIGRAM(S): at 19:40

## 2022-01-01 RX ADMIN — Medication 1 DROP(S): at 03:16

## 2022-01-01 RX ADMIN — MUPIROCIN 1 APPLICATION(S): 20 OINTMENT TOPICAL at 14:28

## 2022-01-01 RX ADMIN — MORPHINE SULFATE 7.5 MILLIGRAM(S): 50 CAPSULE, EXTENDED RELEASE ORAL at 15:06

## 2022-01-01 RX ADMIN — SODIUM CHLORIDE 500 MILLILITER(S): 9 INJECTION INTRAMUSCULAR; INTRAVENOUS; SUBCUTANEOUS at 14:04

## 2022-01-01 RX ADMIN — Medication 1 APPLICATION(S): at 12:03

## 2022-01-01 RX ADMIN — LORATADINE 10 MILLIGRAM(S): 10 TABLET ORAL at 17:45

## 2022-01-01 RX ADMIN — NYSTATIN CREAM 1 APPLICATION(S): 100000 CREAM TOPICAL at 06:46

## 2022-01-01 RX ADMIN — VALACYCLOVIR 1000 MILLIGRAM(S): 500 TABLET, FILM COATED ORAL at 15:26

## 2022-01-01 RX ADMIN — Medication 3 MILLILITER(S): at 03:36

## 2022-01-01 RX ADMIN — Medication 1 DROP(S): at 05:39

## 2022-01-01 RX ADMIN — MORPHINE SULFATE 4 MILLIGRAM(S): 50 CAPSULE, EXTENDED RELEASE ORAL at 15:56

## 2022-01-01 RX ADMIN — Medication 2: at 17:27

## 2022-01-01 RX ADMIN — Medication 0.5 MILLIGRAM(S): at 09:41

## 2022-01-01 RX ADMIN — HYDROMORPHONE HYDROCHLORIDE 0.5 MILLIGRAM(S): 2 INJECTION INTRAMUSCULAR; INTRAVENOUS; SUBCUTANEOUS at 21:35

## 2022-01-01 RX ADMIN — Medication 1 DROP(S): at 20:18

## 2022-01-01 RX ADMIN — ENOXAPARIN SODIUM 80 MILLIGRAM(S): 100 INJECTION SUBCUTANEOUS at 06:16

## 2022-01-01 RX ADMIN — Medication 1000 MILLIGRAM(S): at 19:00

## 2022-01-01 RX ADMIN — Medication 1 TABLET(S): at 11:33

## 2022-01-01 RX ADMIN — PANTOPRAZOLE SODIUM 40 MILLIGRAM(S): 20 TABLET, DELAYED RELEASE ORAL at 15:31

## 2022-01-01 RX ADMIN — HYDROMORPHONE HYDROCHLORIDE 0.5 MILLIGRAM(S): 2 INJECTION INTRAMUSCULAR; INTRAVENOUS; SUBCUTANEOUS at 10:14

## 2022-01-01 RX ADMIN — Medication 1: at 17:23

## 2022-01-01 RX ADMIN — Medication 20 MILLIGRAM(S): at 05:41

## 2022-01-01 RX ADMIN — NYSTATIN CREAM 1 APPLICATION(S): 100000 CREAM TOPICAL at 18:41

## 2022-01-01 RX ADMIN — Medication 2: at 13:30

## 2022-01-01 RX ADMIN — Medication 1 APPLICATION(S): at 23:36

## 2022-01-01 RX ADMIN — ENOXAPARIN SODIUM 80 MILLIGRAM(S): 100 INJECTION SUBCUTANEOUS at 19:16

## 2022-01-01 RX ADMIN — LOSARTAN POTASSIUM 50 MILLIGRAM(S): 100 TABLET, FILM COATED ORAL at 06:46

## 2022-01-01 RX ADMIN — Medication 100 MILLIGRAM(S): at 22:41

## 2022-01-01 RX ADMIN — Medication 1 DROP(S): at 06:41

## 2022-01-01 RX ADMIN — ALBUTEROL 2.5 MILLIGRAM(S): 90 AEROSOL, METERED ORAL at 04:13

## 2022-01-01 RX ADMIN — Medication 300 MILLIGRAM(S): at 07:35

## 2022-01-01 RX ADMIN — NYSTATIN CREAM 1 APPLICATION(S): 100000 CREAM TOPICAL at 06:41

## 2022-01-01 RX ADMIN — ENOXAPARIN SODIUM 80 MILLIGRAM(S): 100 INJECTION SUBCUTANEOUS at 06:13

## 2022-01-01 RX ADMIN — Medication 3 MILLILITER(S): at 04:50

## 2022-01-01 RX ADMIN — Medication 1 DROP(S): at 23:27

## 2022-01-01 RX ADMIN — Medication 1 DROP(S): at 09:05

## 2022-01-01 RX ADMIN — Medication 1 DROP(S): at 09:59

## 2022-01-01 RX ADMIN — HYDROMORPHONE HYDROCHLORIDE 0.5 MILLIGRAM(S): 2 INJECTION INTRAMUSCULAR; INTRAVENOUS; SUBCUTANEOUS at 09:56

## 2022-01-01 RX ADMIN — Medication 1 DROP(S): at 13:32

## 2022-01-01 RX ADMIN — MORPHINE SULFATE 7.5 MILLIGRAM(S): 50 CAPSULE, EXTENDED RELEASE ORAL at 04:34

## 2022-01-01 RX ADMIN — Medication 15 MILLIGRAM(S): at 23:46

## 2022-01-01 RX ADMIN — Medication 15 MILLIGRAM(S): at 11:59

## 2022-01-01 RX ADMIN — Medication 1 DROP(S): at 09:12

## 2022-01-01 RX ADMIN — Medication 40 MILLIEQUIVALENT(S): at 12:25

## 2022-01-01 RX ADMIN — Medication 975 MILLIGRAM(S): at 13:16

## 2022-01-01 RX ADMIN — Medication 1 APPLICATION(S): at 06:51

## 2022-01-01 RX ADMIN — Medication 1: at 12:34

## 2022-01-01 RX ADMIN — Medication 20 MILLIGRAM(S): at 06:41

## 2022-01-01 RX ADMIN — Medication 20 MILLIGRAM(S): at 00:42

## 2022-01-01 RX ADMIN — MORPHINE SULFATE 7.5 MILLIGRAM(S): 50 CAPSULE, EXTENDED RELEASE ORAL at 14:10

## 2022-01-01 RX ADMIN — Medication 1 DROP(S): at 05:29

## 2022-01-01 RX ADMIN — MORPHINE SULFATE 7.5 MILLIGRAM(S): 50 CAPSULE, EXTENDED RELEASE ORAL at 02:08

## 2022-01-01 RX ADMIN — Medication 100 MILLIGRAM(S): at 21:13

## 2022-01-01 RX ADMIN — Medication 1 DROP(S): at 20:16

## 2022-01-01 RX ADMIN — Medication 1 APPLICATION(S): at 12:48

## 2022-01-01 RX ADMIN — MUPIROCIN 1 APPLICATION(S): 20 OINTMENT TOPICAL at 06:35

## 2022-01-01 RX ADMIN — VALACYCLOVIR 1000 MILLIGRAM(S): 500 TABLET, FILM COATED ORAL at 09:20

## 2022-01-01 RX ADMIN — Medication 1 DROP(S): at 06:51

## 2022-01-01 RX ADMIN — GABAPENTIN 300 MILLIGRAM(S): 400 CAPSULE ORAL at 22:22

## 2022-01-01 RX ADMIN — Medication 1 DROP(S): at 01:15

## 2022-01-01 RX ADMIN — Medication 1 DROP(S): at 09:10

## 2022-01-01 RX ADMIN — Medication 1 APPLICATION(S): at 05:22

## 2022-01-01 RX ADMIN — Medication 1 TABLET(S): at 11:18

## 2022-01-01 RX ADMIN — Medication 1 APPLICATION(S): at 12:41

## 2022-01-01 RX ADMIN — MORPHINE SULFATE 7.5 MILLIGRAM(S): 50 CAPSULE, EXTENDED RELEASE ORAL at 17:43

## 2022-01-01 RX ADMIN — INSULIN GLARGINE 10 UNIT(S): 100 INJECTION, SOLUTION SUBCUTANEOUS at 22:55

## 2022-01-01 RX ADMIN — Medication 15 MILLIGRAM(S): at 18:19

## 2022-01-01 RX ADMIN — Medication 100 MILLIGRAM(S): at 20:34

## 2022-01-01 RX ADMIN — Medication 20 MILLIGRAM(S): at 00:39

## 2022-01-01 RX ADMIN — Medication 1: at 12:02

## 2022-01-01 RX ADMIN — INSULIN GLARGINE 10 UNIT(S): 100 INJECTION, SOLUTION SUBCUTANEOUS at 22:33

## 2022-01-01 RX ADMIN — Medication 12.5 MILLIGRAM(S): at 07:14

## 2022-01-01 RX ADMIN — Medication 1 TABLET(S): at 12:16

## 2022-01-01 RX ADMIN — Medication 263 MILLIGRAM(S): at 01:43

## 2022-01-01 RX ADMIN — Medication 1 DROP(S): at 06:34

## 2022-01-01 RX ADMIN — MUPIROCIN 1 APPLICATION(S): 20 OINTMENT TOPICAL at 06:11

## 2022-01-01 RX ADMIN — Medication 0.5 MILLIGRAM(S): at 08:26

## 2022-01-01 RX ADMIN — MIRTAZAPINE 15 MILLIGRAM(S): 45 TABLET, ORALLY DISINTEGRATING ORAL at 21:44

## 2022-01-01 RX ADMIN — Medication 1: at 17:22

## 2022-01-01 RX ADMIN — Medication 3 MILLILITER(S): at 03:39

## 2022-01-01 RX ADMIN — Medication 1 DROP(S): at 09:30

## 2022-01-01 RX ADMIN — Medication 1 DROP(S): at 14:24

## 2022-01-01 RX ADMIN — Medication 1 APPLICATION(S): at 23:18

## 2022-01-01 RX ADMIN — Medication 1 DROP(S): at 20:54

## 2022-01-01 RX ADMIN — Medication 1 DROP(S): at 13:00

## 2022-01-01 RX ADMIN — QUETIAPINE FUMARATE 25 MILLIGRAM(S): 200 TABLET, FILM COATED ORAL at 09:57

## 2022-01-01 RX ADMIN — MORPHINE SULFATE 7.5 MILLIGRAM(S): 50 CAPSULE, EXTENDED RELEASE ORAL at 21:09

## 2022-01-01 RX ADMIN — MIRTAZAPINE 7.5 MILLIGRAM(S): 45 TABLET, ORALLY DISINTEGRATING ORAL at 22:41

## 2022-01-01 RX ADMIN — Medication 1 DROP(S): at 22:41

## 2022-01-01 RX ADMIN — Medication 1 DROP(S): at 03:04

## 2022-01-01 RX ADMIN — LIDOCAINE 1 APPLICATION(S): 4 CREAM TOPICAL at 07:48

## 2022-01-01 RX ADMIN — Medication 12.5 MILLIGRAM(S): at 05:05

## 2022-01-01 RX ADMIN — Medication 10 MILLIGRAM(S): at 21:36

## 2022-01-01 RX ADMIN — Medication 1 APPLICATION(S): at 18:31

## 2022-01-01 RX ADMIN — Medication 1 APPLICATION(S): at 17:45

## 2022-01-01 RX ADMIN — LOSARTAN POTASSIUM 50 MILLIGRAM(S): 100 TABLET, FILM COATED ORAL at 06:43

## 2022-01-01 RX ADMIN — Medication 3 MILLILITER(S): at 20:49

## 2022-01-01 RX ADMIN — Medication 324 MILLIGRAM(S): at 11:56

## 2022-01-01 RX ADMIN — Medication 1 APPLICATION(S): at 19:41

## 2022-01-01 RX ADMIN — ENOXAPARIN SODIUM 40 MILLIGRAM(S): 100 INJECTION SUBCUTANEOUS at 17:46

## 2022-01-01 RX ADMIN — LIDOCAINE 1 APPLICATION(S): 4 CREAM TOPICAL at 18:31

## 2022-01-01 RX ADMIN — Medication 3 MILLILITER(S): at 15:44

## 2022-01-01 RX ADMIN — Medication 1 DROP(S): at 13:54

## 2022-01-01 RX ADMIN — GABAPENTIN 600 MILLIGRAM(S): 400 CAPSULE ORAL at 21:36

## 2022-01-01 RX ADMIN — Medication 1000 MILLIGRAM(S): at 06:40

## 2022-01-01 RX ADMIN — Medication 0.5 MILLIGRAM(S): at 10:17

## 2022-01-01 RX ADMIN — LIDOCAINE 1 APPLICATION(S): 4 CREAM TOPICAL at 05:28

## 2022-01-01 RX ADMIN — Medication 3: at 11:52

## 2022-01-01 RX ADMIN — Medication 1 DROP(S): at 04:33

## 2022-01-01 RX ADMIN — URSODIOL 500 MILLIGRAM(S): 250 TABLET, FILM COATED ORAL at 19:15

## 2022-01-01 RX ADMIN — Medication 1 DROP(S): at 00:08

## 2022-01-01 RX ADMIN — HYDROMORPHONE HYDROCHLORIDE 0.5 MILLIGRAM(S): 2 INJECTION INTRAMUSCULAR; INTRAVENOUS; SUBCUTANEOUS at 12:39

## 2022-01-01 RX ADMIN — OXCARBAZEPINE 600 MILLIGRAM(S): 300 TABLET, FILM COATED ORAL at 18:37

## 2022-01-01 RX ADMIN — Medication 1000 MILLIGRAM(S): at 20:11

## 2022-01-01 RX ADMIN — VALACYCLOVIR 1000 MILLIGRAM(S): 500 TABLET, FILM COATED ORAL at 19:14

## 2022-01-01 RX ADMIN — Medication 150 MILLIGRAM(S): at 15:31

## 2022-01-01 RX ADMIN — Medication 1 DROP(S): at 17:43

## 2022-01-01 RX ADMIN — Medication 400 MILLIGRAM(S): at 12:28

## 2022-01-01 RX ADMIN — MORPHINE SULFATE 3 MILLIGRAM(S): 50 CAPSULE, EXTENDED RELEASE ORAL at 11:40

## 2022-01-01 RX ADMIN — Medication 1 DROP(S): at 23:26

## 2022-01-01 RX ADMIN — Medication 15 MILLIGRAM(S): at 00:40

## 2022-01-01 RX ADMIN — BRIMONIDINE TARTRATE 1 DROP(S): 2 SOLUTION/ DROPS OPHTHALMIC at 22:07

## 2022-01-01 RX ADMIN — Medication 1 DROP(S): at 20:13

## 2022-01-01 RX ADMIN — MORPHINE SULFATE 7.5 MILLIGRAM(S): 50 CAPSULE, EXTENDED RELEASE ORAL at 06:55

## 2022-01-01 RX ADMIN — Medication 1 APPLICATION(S): at 10:55

## 2022-01-01 RX ADMIN — NYSTATIN CREAM 1 APPLICATION(S): 100000 CREAM TOPICAL at 18:17

## 2022-01-01 RX ADMIN — Medication 1 DROP(S): at 05:26

## 2022-01-01 RX ADMIN — Medication 1 DROP(S): at 22:18

## 2022-01-01 RX ADMIN — Medication 1 DROP(S): at 22:21

## 2022-01-01 RX ADMIN — MORPHINE SULFATE 7.5 MILLIGRAM(S): 50 CAPSULE, EXTENDED RELEASE ORAL at 21:30

## 2022-01-01 RX ADMIN — MORPHINE SULFATE 2.5 MILLIGRAM(S): 50 CAPSULE, EXTENDED RELEASE ORAL at 02:07

## 2022-01-01 RX ADMIN — Medication 1 APPLICATION(S): at 17:56

## 2022-01-01 RX ADMIN — Medication 1 APPLICATION(S): at 23:16

## 2022-01-01 RX ADMIN — MUPIROCIN 1 APPLICATION(S): 20 OINTMENT TOPICAL at 13:57

## 2022-01-01 RX ADMIN — TAMSULOSIN HYDROCHLORIDE 0.4 MILLIGRAM(S): 0.4 CAPSULE ORAL at 21:13

## 2022-01-01 RX ADMIN — URSODIOL 500 MILLIGRAM(S): 250 TABLET, FILM COATED ORAL at 17:41

## 2022-01-01 RX ADMIN — MIRTAZAPINE 15 MILLIGRAM(S): 45 TABLET, ORALLY DISINTEGRATING ORAL at 21:50

## 2022-01-01 RX ADMIN — Medication 1 APPLICATION(S): at 01:00

## 2022-01-01 RX ADMIN — MUPIROCIN 1 APPLICATION(S): 20 OINTMENT TOPICAL at 22:25

## 2022-01-01 RX ADMIN — Medication 500 MILLIGRAM(S): at 13:38

## 2022-01-01 RX ADMIN — Medication 0.5 MILLIGRAM(S): at 09:32

## 2022-01-01 RX ADMIN — PANTOPRAZOLE SODIUM 40 MILLIGRAM(S): 20 TABLET, DELAYED RELEASE ORAL at 05:45

## 2022-01-01 RX ADMIN — Medication 1 APPLICATION(S): at 05:39

## 2022-01-01 RX ADMIN — Medication 1 DROP(S): at 15:24

## 2022-01-01 RX ADMIN — Medication 100 MILLIGRAM(S): at 22:32

## 2022-01-01 RX ADMIN — Medication 1 DROP(S): at 21:07

## 2022-01-01 RX ADMIN — MIRTAZAPINE 7.5 MILLIGRAM(S): 45 TABLET, ORALLY DISINTEGRATING ORAL at 21:31

## 2022-01-01 RX ADMIN — PREGABALIN 1000 MICROGRAM(S): 225 CAPSULE ORAL at 13:18

## 2022-01-01 RX ADMIN — MORPHINE SULFATE 7.5 MILLIGRAM(S): 50 CAPSULE, EXTENDED RELEASE ORAL at 06:57

## 2022-01-01 RX ADMIN — METHADONE HYDROCHLORIDE 5 MILLIGRAM(S): 40 TABLET ORAL at 18:31

## 2022-01-01 RX ADMIN — MUPIROCIN 1 APPLICATION(S): 20 OINTMENT TOPICAL at 14:24

## 2022-01-01 RX ADMIN — Medication 500 MILLIGRAM(S): at 12:15

## 2022-01-01 RX ADMIN — Medication 263 MILLIGRAM(S): at 11:45

## 2022-01-01 RX ADMIN — Medication 100 MILLIGRAM(S): at 22:08

## 2022-01-01 RX ADMIN — Medication 100 MILLIEQUIVALENT(S): at 01:50

## 2022-01-01 RX ADMIN — Medication 3 MILLILITER(S): at 10:48

## 2022-01-01 RX ADMIN — MEROPENEM 100 MILLIGRAM(S): 1 INJECTION INTRAVENOUS at 14:18

## 2022-01-01 RX ADMIN — Medication 1 DROP(S): at 12:49

## 2022-01-01 RX ADMIN — MUPIROCIN 1 APPLICATION(S): 20 OINTMENT TOPICAL at 22:52

## 2022-01-01 RX ADMIN — Medication 15 MILLIGRAM(S): at 19:48

## 2022-01-01 RX ADMIN — Medication 1 DROP(S): at 12:13

## 2022-01-01 RX ADMIN — ENOXAPARIN SODIUM 80 MILLIGRAM(S): 100 INJECTION SUBCUTANEOUS at 18:12

## 2022-01-01 RX ADMIN — MORPHINE SULFATE 7.5 MILLIGRAM(S): 50 CAPSULE, EXTENDED RELEASE ORAL at 06:02

## 2022-01-01 RX ADMIN — Medication 3 MILLILITER(S): at 03:04

## 2022-01-01 RX ADMIN — GABAPENTIN 300 MILLIGRAM(S): 400 CAPSULE ORAL at 14:18

## 2022-01-01 RX ADMIN — ENOXAPARIN SODIUM 80 MILLIGRAM(S): 100 INJECTION SUBCUTANEOUS at 17:49

## 2022-01-01 RX ADMIN — LIDOCAINE 1 APPLICATION(S): 4 CREAM TOPICAL at 17:31

## 2022-01-01 RX ADMIN — MUPIROCIN 1 APPLICATION(S): 20 OINTMENT TOPICAL at 06:43

## 2022-01-01 RX ADMIN — LIDOCAINE 1 APPLICATION(S): 4 CREAM TOPICAL at 22:36

## 2022-01-01 RX ADMIN — Medication 1 DROP(S): at 03:06

## 2022-01-01 RX ADMIN — Medication 1 PACKET(S): at 17:49

## 2022-01-01 RX ADMIN — Medication 650 MILLIGRAM(S): at 06:56

## 2022-01-01 RX ADMIN — Medication 100 MILLIGRAM(S): at 21:54

## 2022-01-01 RX ADMIN — METHADONE HYDROCHLORIDE 5 MILLIGRAM(S): 40 TABLET ORAL at 17:43

## 2022-01-01 RX ADMIN — Medication 1 APPLICATION(S): at 06:58

## 2022-01-01 RX ADMIN — MORPHINE SULFATE 7.5 MILLIGRAM(S): 50 CAPSULE, EXTENDED RELEASE ORAL at 01:13

## 2022-01-01 RX ADMIN — Medication 1000 MILLIGRAM(S): at 23:50

## 2022-01-01 RX ADMIN — Medication 1 DROP(S): at 01:33

## 2022-01-01 RX ADMIN — Medication 150 MILLIGRAM(S): at 15:09

## 2022-01-01 RX ADMIN — Medication 25 GRAM(S): at 15:30

## 2022-01-01 RX ADMIN — OXCARBAZEPINE 450 MILLIGRAM(S): 300 TABLET, FILM COATED ORAL at 17:39

## 2022-01-01 RX ADMIN — Medication 1 DROP(S): at 04:15

## 2022-01-01 RX ADMIN — SODIUM CHLORIDE 75 MILLILITER(S): 9 INJECTION, SOLUTION INTRAVENOUS at 18:20

## 2022-01-01 RX ADMIN — Medication 1 DROP(S): at 17:05

## 2022-01-01 RX ADMIN — MEROPENEM 100 MILLIGRAM(S): 1 INJECTION INTRAVENOUS at 06:24

## 2022-01-01 RX ADMIN — URSODIOL 500 MILLIGRAM(S): 250 TABLET, FILM COATED ORAL at 17:30

## 2022-01-01 RX ADMIN — Medication 100 MILLIEQUIVALENT(S): at 09:44

## 2022-01-01 RX ADMIN — Medication 1 DROP(S): at 07:15

## 2022-01-01 RX ADMIN — LIDOCAINE 1 APPLICATION(S): 4 CREAM TOPICAL at 06:51

## 2022-01-01 RX ADMIN — Medication 263 MILLIGRAM(S): at 01:03

## 2022-01-01 RX ADMIN — Medication 1 TABLET(S): at 12:31

## 2022-01-01 RX ADMIN — MUPIROCIN 1 APPLICATION(S): 20 OINTMENT TOPICAL at 22:42

## 2022-01-01 RX ADMIN — Medication 1 DROP(S): at 10:45

## 2022-01-01 RX ADMIN — Medication 100 MILLIGRAM(S): at 21:49

## 2022-01-01 RX ADMIN — Medication 1 APPLICATION(S): at 17:28

## 2022-01-01 RX ADMIN — GABAPENTIN 100 MILLIGRAM(S): 400 CAPSULE ORAL at 21:16

## 2022-01-01 RX ADMIN — LOSARTAN POTASSIUM 50 MILLIGRAM(S): 100 TABLET, FILM COATED ORAL at 05:38

## 2022-01-01 RX ADMIN — Medication 0.5 MILLIGRAM(S): at 21:15

## 2022-01-01 RX ADMIN — MUPIROCIN 1 APPLICATION(S): 20 OINTMENT TOPICAL at 14:02

## 2022-01-01 RX ADMIN — Medication 1 DROP(S): at 20:53

## 2022-01-01 RX ADMIN — Medication 20 MILLIGRAM(S): at 18:32

## 2022-01-01 RX ADMIN — ENOXAPARIN SODIUM 80 MILLIGRAM(S): 100 INJECTION SUBCUTANEOUS at 17:58

## 2022-01-01 RX ADMIN — MUPIROCIN 1 APPLICATION(S): 20 OINTMENT TOPICAL at 15:19

## 2022-01-01 RX ADMIN — VALACYCLOVIR 1000 MILLIGRAM(S): 500 TABLET, FILM COATED ORAL at 03:11

## 2022-01-01 RX ADMIN — Medication 1 MILLIGRAM(S): at 13:00

## 2022-01-01 RX ADMIN — NYSTATIN CREAM 1 APPLICATION(S): 100000 CREAM TOPICAL at 17:40

## 2022-01-01 RX ADMIN — LIDOCAINE 1 APPLICATION(S): 4 CREAM TOPICAL at 07:15

## 2022-01-01 RX ADMIN — Medication 20 MILLIGRAM(S): at 17:45

## 2022-01-01 RX ADMIN — Medication 1 DROP(S): at 14:25

## 2022-01-01 RX ADMIN — NYSTATIN CREAM 1 APPLICATION(S): 100000 CREAM TOPICAL at 06:34

## 2022-01-01 RX ADMIN — Medication 1 APPLICATION(S): at 00:17

## 2022-01-01 RX ADMIN — Medication 1 DROP(S): at 09:57

## 2022-01-01 RX ADMIN — Medication 1 DROP(S): at 21:44

## 2022-01-01 RX ADMIN — MIRTAZAPINE 7.5 MILLIGRAM(S): 45 TABLET, ORALLY DISINTEGRATING ORAL at 22:36

## 2022-01-01 RX ADMIN — MORPHINE SULFATE 7.5 MILLIGRAM(S): 50 CAPSULE, EXTENDED RELEASE ORAL at 13:21

## 2022-01-01 RX ADMIN — BRIMONIDINE TARTRATE 1 DROP(S): 2 SOLUTION/ DROPS OPHTHALMIC at 21:50

## 2022-01-01 RX ADMIN — Medication 1 APPLICATION(S): at 17:31

## 2022-01-01 RX ADMIN — Medication 1 DROP(S): at 19:33

## 2022-01-01 RX ADMIN — Medication 81 MILLIGRAM(S): at 13:12

## 2022-01-01 RX ADMIN — Medication 100 MILLIEQUIVALENT(S): at 07:44

## 2022-01-01 RX ADMIN — MORPHINE SULFATE 3 MILLIGRAM(S): 50 CAPSULE, EXTENDED RELEASE ORAL at 16:10

## 2022-01-01 RX ADMIN — Medication 10 MILLIGRAM(S): at 22:38

## 2022-01-01 RX ADMIN — LOSARTAN POTASSIUM 50 MILLIGRAM(S): 100 TABLET, FILM COATED ORAL at 05:31

## 2022-01-01 RX ADMIN — Medication 1 DROP(S): at 13:49

## 2022-01-01 RX ADMIN — Medication 250 MILLIGRAM(S): at 03:03

## 2022-01-01 RX ADMIN — SODIUM CHLORIDE 75 MILLILITER(S): 9 INJECTION, SOLUTION INTRAVENOUS at 07:45

## 2022-01-01 RX ADMIN — SODIUM CHLORIDE 75 MILLILITER(S): 9 INJECTION, SOLUTION INTRAVENOUS at 05:40

## 2022-01-01 RX ADMIN — Medication 300 MILLIGRAM(S): at 17:38

## 2022-01-01 RX ADMIN — MORPHINE SULFATE 3 MILLIGRAM(S): 50 CAPSULE, EXTENDED RELEASE ORAL at 22:08

## 2022-01-01 RX ADMIN — MORPHINE SULFATE 2 MILLIGRAM(S): 50 CAPSULE, EXTENDED RELEASE ORAL at 23:46

## 2022-01-01 RX ADMIN — HYDROMORPHONE HYDROCHLORIDE 0.25 MILLIGRAM(S): 2 INJECTION INTRAMUSCULAR; INTRAVENOUS; SUBCUTANEOUS at 18:34

## 2022-01-01 RX ADMIN — Medication 1 DROP(S): at 23:21

## 2022-01-01 RX ADMIN — MORPHINE SULFATE 2 MILLIGRAM(S): 50 CAPSULE, EXTENDED RELEASE ORAL at 10:02

## 2022-01-01 RX ADMIN — Medication 3 MILLILITER(S): at 15:16

## 2022-01-01 RX ADMIN — Medication 15 MILLIGRAM(S): at 00:07

## 2022-01-01 RX ADMIN — HYDROMORPHONE HYDROCHLORIDE 0.5 MILLIGRAM(S): 2 INJECTION INTRAMUSCULAR; INTRAVENOUS; SUBCUTANEOUS at 07:47

## 2022-01-01 RX ADMIN — Medication 1: at 17:27

## 2022-01-01 RX ADMIN — LIDOCAINE 1 APPLICATION(S): 4 CREAM TOPICAL at 06:55

## 2022-01-01 RX ADMIN — Medication 20 MILLIGRAM(S): at 17:27

## 2022-01-01 RX ADMIN — LORATADINE 10 MILLIGRAM(S): 10 TABLET ORAL at 12:34

## 2022-01-01 RX ADMIN — Medication 1 DROP(S): at 22:03

## 2022-01-01 RX ADMIN — Medication 1 DROP(S): at 11:37

## 2022-01-01 RX ADMIN — Medication 263 MILLIGRAM(S): at 22:42

## 2022-01-01 RX ADMIN — Medication 0.5 MILLIGRAM(S): at 10:29

## 2022-01-01 RX ADMIN — LIDOCAINE 1 APPLICATION(S): 4 CREAM TOPICAL at 06:02

## 2022-01-01 RX ADMIN — MORPHINE SULFATE 5 MILLIGRAM(S): 50 CAPSULE, EXTENDED RELEASE ORAL at 22:00

## 2022-01-01 RX ADMIN — URSODIOL 500 MILLIGRAM(S): 250 TABLET, FILM COATED ORAL at 17:45

## 2022-01-01 RX ADMIN — Medication 1 APPLICATION(S): at 06:06

## 2022-01-01 RX ADMIN — GABAPENTIN 600 MILLIGRAM(S): 400 CAPSULE ORAL at 06:38

## 2022-01-01 RX ADMIN — Medication 100 MILLIGRAM(S): at 21:23

## 2022-01-01 RX ADMIN — OLANZAPINE 1.25 MILLIGRAM(S): 15 TABLET, FILM COATED ORAL at 07:23

## 2022-01-01 RX ADMIN — NYSTATIN CREAM 1 APPLICATION(S): 100000 CREAM TOPICAL at 06:35

## 2022-01-01 RX ADMIN — GABAPENTIN 300 MILLIGRAM(S): 400 CAPSULE ORAL at 23:21

## 2022-01-01 RX ADMIN — Medication 1 DROP(S): at 19:14

## 2022-01-01 RX ADMIN — Medication 650 MILLIGRAM(S): at 22:07

## 2022-01-01 RX ADMIN — MEROPENEM 100 MILLIGRAM(S): 1 INJECTION INTRAVENOUS at 06:54

## 2022-01-01 RX ADMIN — MIRTAZAPINE 7.5 MILLIGRAM(S): 45 TABLET, ORALLY DISINTEGRATING ORAL at 21:40

## 2022-01-01 RX ADMIN — Medication 2: at 11:58

## 2022-01-01 RX ADMIN — Medication 1 APPLICATION(S): at 19:00

## 2022-01-01 RX ADMIN — INSULIN GLARGINE 10 UNIT(S): 100 INJECTION, SOLUTION SUBCUTANEOUS at 23:15

## 2022-01-01 RX ADMIN — POLYETHYLENE GLYCOL 3350 17 GRAM(S): 17 POWDER, FOR SOLUTION ORAL at 12:17

## 2022-01-01 RX ADMIN — Medication 400 MILLIGRAM(S): at 00:08

## 2022-01-01 RX ADMIN — MUPIROCIN 1 APPLICATION(S): 20 OINTMENT TOPICAL at 13:12

## 2022-01-01 RX ADMIN — Medication 1: at 07:34

## 2022-01-01 RX ADMIN — POLYETHYLENE GLYCOL 3350 17 GRAM(S): 17 POWDER, FOR SOLUTION ORAL at 12:18

## 2022-01-01 RX ADMIN — HEPARIN SODIUM 5000 UNIT(S): 5000 INJECTION INTRAVENOUS; SUBCUTANEOUS at 22:08

## 2022-01-01 RX ADMIN — Medication 1 DROP(S): at 22:07

## 2022-01-01 RX ADMIN — ALBUTEROL 2.5 MILLIGRAM(S): 90 AEROSOL, METERED ORAL at 04:08

## 2022-01-01 RX ADMIN — Medication 1 DROP(S): at 13:33

## 2022-01-01 RX ADMIN — Medication 300 MILLIGRAM(S): at 05:32

## 2022-01-01 RX ADMIN — Medication 1: at 17:32

## 2022-01-01 RX ADMIN — MORPHINE SULFATE 4 MILLIGRAM(S): 50 CAPSULE, EXTENDED RELEASE ORAL at 14:56

## 2022-01-01 RX ADMIN — Medication 1 DROP(S): at 06:08

## 2022-01-01 RX ADMIN — Medication 650 MILLIGRAM(S): at 23:00

## 2022-01-01 RX ADMIN — Medication 1 DROP(S): at 11:40

## 2022-01-01 RX ADMIN — TIOTROPIUM BROMIDE 1 CAPSULE(S): 18 CAPSULE ORAL; RESPIRATORY (INHALATION) at 11:54

## 2022-01-01 RX ADMIN — Medication 1 DROP(S): at 11:35

## 2022-01-01 RX ADMIN — URSODIOL 500 MILLIGRAM(S): 250 TABLET, FILM COATED ORAL at 17:37

## 2022-01-01 RX ADMIN — ENOXAPARIN SODIUM 80 MILLIGRAM(S): 100 INJECTION SUBCUTANEOUS at 17:37

## 2022-01-01 RX ADMIN — Medication 0.5 MILLIGRAM(S): at 09:19

## 2022-01-01 RX ADMIN — SODIUM CHLORIDE 500 MILLILITER(S): 9 INJECTION INTRAMUSCULAR; INTRAVENOUS; SUBCUTANEOUS at 11:32

## 2022-01-01 RX ADMIN — Medication 1: at 08:09

## 2022-01-01 RX ADMIN — Medication 1 DROP(S): at 04:05

## 2022-01-01 RX ADMIN — Medication 1 APPLICATION(S): at 06:47

## 2022-01-01 RX ADMIN — BRIMONIDINE TARTRATE 1 DROP(S): 2 SOLUTION/ DROPS OPHTHALMIC at 13:50

## 2022-01-01 RX ADMIN — HYDROMORPHONE HYDROCHLORIDE 0.5 MILLIGRAM(S): 2 INJECTION INTRAMUSCULAR; INTRAVENOUS; SUBCUTANEOUS at 14:48

## 2022-01-01 RX ADMIN — Medication 1000 MILLIGRAM(S): at 21:06

## 2022-01-01 RX ADMIN — GABAPENTIN 300 MILLIGRAM(S): 400 CAPSULE ORAL at 05:28

## 2022-01-01 RX ADMIN — Medication 0.5 MILLIGRAM(S): at 08:57

## 2022-01-01 RX ADMIN — MORPHINE SULFATE 7.5 MILLIGRAM(S): 50 CAPSULE, EXTENDED RELEASE ORAL at 22:52

## 2022-01-01 RX ADMIN — Medication 1 APPLICATION(S): at 11:36

## 2022-01-01 RX ADMIN — ALBUTEROL 2.5 MILLIGRAM(S): 90 AEROSOL, METERED ORAL at 03:23

## 2022-01-01 RX ADMIN — GABAPENTIN 300 MILLIGRAM(S): 400 CAPSULE ORAL at 17:51

## 2022-01-01 RX ADMIN — Medication 1 APPLICATION(S): at 00:11

## 2022-01-01 RX ADMIN — Medication 1 PACKET(S): at 11:32

## 2022-01-01 RX ADMIN — Medication 0.5 MILLIGRAM(S): at 22:09

## 2022-01-01 RX ADMIN — TAMSULOSIN HYDROCHLORIDE 0.4 MILLIGRAM(S): 0.4 CAPSULE ORAL at 21:36

## 2022-01-01 RX ADMIN — Medication 1 APPLICATION(S): at 05:27

## 2022-01-01 RX ADMIN — Medication 0.5 MILLIGRAM(S): at 10:28

## 2022-01-01 RX ADMIN — Medication 1000 MILLIGRAM(S): at 01:08

## 2022-01-01 RX ADMIN — LIDOCAINE 1 APPLICATION(S): 4 CREAM TOPICAL at 19:15

## 2022-01-01 RX ADMIN — LIDOCAINE 1 APPLICATION(S): 4 CREAM TOPICAL at 17:02

## 2022-01-01 RX ADMIN — Medication 1000 MILLIGRAM(S): at 23:43

## 2022-01-01 RX ADMIN — LOSARTAN POTASSIUM 50 MILLIGRAM(S): 100 TABLET, FILM COATED ORAL at 06:13

## 2022-01-01 RX ADMIN — Medication 1 DROP(S): at 06:01

## 2022-01-01 RX ADMIN — Medication 1 TABLET(S): at 11:35

## 2022-01-01 RX ADMIN — HYDROMORPHONE HYDROCHLORIDE 0.5 MILLIGRAM(S): 2 INJECTION INTRAMUSCULAR; INTRAVENOUS; SUBCUTANEOUS at 20:19

## 2022-01-01 RX ADMIN — Medication 1 APPLICATION(S): at 17:29

## 2022-01-01 RX ADMIN — Medication 20 MILLIGRAM(S): at 05:56

## 2022-01-01 RX ADMIN — Medication 1 DROP(S): at 23:40

## 2022-01-01 RX ADMIN — Medication 1 APPLICATION(S): at 06:19

## 2022-01-01 RX ADMIN — Medication 1000 MILLIGRAM(S): at 18:43

## 2022-01-01 RX ADMIN — Medication 1 DROP(S): at 13:26

## 2022-01-01 RX ADMIN — Medication 3 MILLILITER(S): at 21:35

## 2022-01-01 RX ADMIN — Medication 20 MILLIGRAM(S): at 23:14

## 2022-01-01 RX ADMIN — Medication 300 MILLIGRAM(S): at 17:49

## 2022-01-01 RX ADMIN — Medication 263 MILLIGRAM(S): at 14:05

## 2022-01-01 RX ADMIN — Medication 3 MILLILITER(S): at 14:40

## 2022-01-01 RX ADMIN — Medication 1 APPLICATION(S): at 12:10

## 2022-01-01 RX ADMIN — BRIMONIDINE TARTRATE 1 DROP(S): 2 SOLUTION/ DROPS OPHTHALMIC at 13:47

## 2022-01-01 RX ADMIN — MORPHINE SULFATE 7.5 MILLIGRAM(S): 50 CAPSULE, EXTENDED RELEASE ORAL at 02:44

## 2022-01-01 RX ADMIN — MIRTAZAPINE 7.5 MILLIGRAM(S): 45 TABLET, ORALLY DISINTEGRATING ORAL at 22:07

## 2022-01-01 RX ADMIN — HYDROMORPHONE HYDROCHLORIDE 0.5 MILLIGRAM(S): 2 INJECTION INTRAMUSCULAR; INTRAVENOUS; SUBCUTANEOUS at 13:31

## 2022-01-01 RX ADMIN — Medication 1 DROP(S): at 16:34

## 2022-01-01 RX ADMIN — Medication 3 MILLILITER(S): at 10:45

## 2022-01-01 RX ADMIN — Medication 1 DROP(S): at 14:04

## 2022-01-01 RX ADMIN — Medication 100 MILLIGRAM(S): at 21:05

## 2022-01-01 RX ADMIN — Medication 20 MILLIGRAM(S): at 00:06

## 2022-01-01 RX ADMIN — Medication 1 DROP(S): at 00:38

## 2022-01-01 RX ADMIN — PIPERACILLIN AND TAZOBACTAM 25 GRAM(S): 4; .5 INJECTION, POWDER, LYOPHILIZED, FOR SOLUTION INTRAVENOUS at 21:52

## 2022-01-01 RX ADMIN — Medication 10 MILLIGRAM(S): at 23:20

## 2022-01-01 RX ADMIN — Medication 1000 MILLIGRAM(S): at 09:55

## 2022-01-01 RX ADMIN — Medication 3 MILLILITER(S): at 09:30

## 2022-01-01 RX ADMIN — Medication 2: at 17:46

## 2022-01-01 RX ADMIN — Medication 1 DROP(S): at 20:55

## 2022-01-01 RX ADMIN — Medication 263 MILLIGRAM(S): at 23:30

## 2022-01-01 RX ADMIN — Medication 1 DROP(S): at 18:48

## 2022-01-01 RX ADMIN — Medication 263 MILLIGRAM(S): at 14:55

## 2022-01-01 RX ADMIN — Medication 263 MILLIGRAM(S): at 06:46

## 2022-01-01 RX ADMIN — Medication 1 DROP(S): at 20:59

## 2022-01-01 RX ADMIN — ALBUTEROL 2.5 MILLIGRAM(S): 90 AEROSOL, METERED ORAL at 09:56

## 2022-01-01 RX ADMIN — QUETIAPINE FUMARATE 25 MILLIGRAM(S): 200 TABLET, FILM COATED ORAL at 22:47

## 2022-01-01 RX ADMIN — Medication 1 DROP(S): at 11:21

## 2022-01-01 RX ADMIN — Medication 20 MILLIGRAM(S): at 23:42

## 2022-01-01 RX ADMIN — Medication 1 DROP(S): at 04:20

## 2022-01-01 RX ADMIN — MIRTAZAPINE 7.5 MILLIGRAM(S): 45 TABLET, ORALLY DISINTEGRATING ORAL at 22:19

## 2022-01-01 RX ADMIN — Medication 1 TABLET(S): at 11:21

## 2022-01-01 RX ADMIN — URSODIOL 500 MILLIGRAM(S): 250 TABLET, FILM COATED ORAL at 18:23

## 2022-01-01 RX ADMIN — Medication 1 APPLICATION(S): at 18:41

## 2022-01-01 RX ADMIN — MORPHINE SULFATE 7.5 MILLIGRAM(S): 50 CAPSULE, EXTENDED RELEASE ORAL at 18:40

## 2022-01-01 RX ADMIN — Medication 263 MILLIGRAM(S): at 03:28

## 2022-01-01 RX ADMIN — Medication 1 APPLICATION(S): at 12:22

## 2022-01-01 RX ADMIN — Medication 1 APPLICATION(S): at 11:49

## 2022-01-01 RX ADMIN — Medication 1 DROP(S): at 20:56

## 2022-01-01 RX ADMIN — BUDESONIDE AND FORMOTEROL FUMARATE DIHYDRATE 2 PUFF(S): 160; 4.5 AEROSOL RESPIRATORY (INHALATION) at 09:27

## 2022-01-01 RX ADMIN — HYDROMORPHONE HYDROCHLORIDE 0.5 MILLIGRAM(S): 2 INJECTION INTRAMUSCULAR; INTRAVENOUS; SUBCUTANEOUS at 16:45

## 2022-01-01 RX ADMIN — Medication 1 DROP(S): at 23:15

## 2022-01-01 RX ADMIN — MEROPENEM 100 MILLIGRAM(S): 1 INJECTION INTRAVENOUS at 18:46

## 2022-01-01 RX ADMIN — Medication 650 MILLIGRAM(S): at 22:38

## 2022-01-01 RX ADMIN — Medication 1 TABLET(S): at 13:00

## 2022-01-01 RX ADMIN — Medication 1000 MILLIGRAM(S): at 06:35

## 2022-01-01 RX ADMIN — GABAPENTIN 300 MILLIGRAM(S): 400 CAPSULE ORAL at 05:05

## 2022-01-01 RX ADMIN — LORATADINE 10 MILLIGRAM(S): 10 TABLET ORAL at 13:28

## 2022-01-01 RX ADMIN — QUETIAPINE FUMARATE 25 MILLIGRAM(S): 200 TABLET, FILM COATED ORAL at 18:08

## 2022-01-01 RX ADMIN — Medication 20 MILLIGRAM(S): at 17:39

## 2022-01-01 RX ADMIN — Medication 1 DROP(S): at 05:23

## 2022-01-01 RX ADMIN — SENNA PLUS 1 TABLET(S): 8.6 TABLET ORAL at 22:56

## 2022-01-01 RX ADMIN — Medication 20 MILLIGRAM(S): at 18:58

## 2022-01-01 RX ADMIN — Medication 0.5 MILLIGRAM(S): at 22:28

## 2022-01-01 RX ADMIN — MUPIROCIN 1 APPLICATION(S): 20 OINTMENT TOPICAL at 22:17

## 2022-01-01 RX ADMIN — MUPIROCIN 1 APPLICATION(S): 20 OINTMENT TOPICAL at 22:53

## 2022-01-01 RX ADMIN — Medication 1 DROP(S): at 22:02

## 2022-01-01 RX ADMIN — MORPHINE SULFATE 7.5 MILLIGRAM(S): 50 CAPSULE, EXTENDED RELEASE ORAL at 18:28

## 2022-01-01 RX ADMIN — Medication 400 MILLIGRAM(S): at 18:20

## 2022-01-01 RX ADMIN — Medication 1 APPLICATION(S): at 05:51

## 2022-01-01 RX ADMIN — SENNA PLUS 1 TABLET(S): 8.6 TABLET ORAL at 21:21

## 2022-01-01 RX ADMIN — Medication 1 DROP(S): at 14:27

## 2022-01-01 RX ADMIN — ENOXAPARIN SODIUM 80 MILLIGRAM(S): 100 INJECTION SUBCUTANEOUS at 05:43

## 2022-01-01 RX ADMIN — Medication 100 MILLIGRAM(S): at 22:38

## 2022-01-01 RX ADMIN — GABAPENTIN 100 MILLIGRAM(S): 400 CAPSULE ORAL at 06:22

## 2022-01-01 RX ADMIN — Medication 1: at 17:46

## 2022-01-01 RX ADMIN — Medication 650 MILLIGRAM(S): at 23:17

## 2022-01-01 RX ADMIN — Medication 1 DROP(S): at 02:15

## 2022-01-01 RX ADMIN — MORPHINE SULFATE 7.5 MILLIGRAM(S): 50 CAPSULE, EXTENDED RELEASE ORAL at 17:39

## 2022-01-01 RX ADMIN — Medication 1 APPLICATION(S): at 05:33

## 2022-01-01 RX ADMIN — Medication 100 MILLIGRAM(S): at 12:47

## 2022-01-01 RX ADMIN — Medication 20 MILLIGRAM(S): at 06:55

## 2022-01-01 RX ADMIN — LOSARTAN POTASSIUM 50 MILLIGRAM(S): 100 TABLET, FILM COATED ORAL at 06:51

## 2022-01-01 RX ADMIN — Medication 100 MILLIEQUIVALENT(S): at 10:33

## 2022-01-01 RX ADMIN — BUDESONIDE AND FORMOTEROL FUMARATE DIHYDRATE 2 PUFF(S): 160; 4.5 AEROSOL RESPIRATORY (INHALATION) at 09:23

## 2022-01-01 RX ADMIN — MUPIROCIN 1 APPLICATION(S): 20 OINTMENT TOPICAL at 21:37

## 2022-01-01 RX ADMIN — Medication 1 DROP(S): at 17:25

## 2022-01-01 RX ADMIN — Medication 1 APPLICATION(S): at 06:00

## 2022-01-01 RX ADMIN — HYDROMORPHONE HYDROCHLORIDE 0.5 MILLIGRAM(S): 2 INJECTION INTRAMUSCULAR; INTRAVENOUS; SUBCUTANEOUS at 12:25

## 2022-01-01 RX ADMIN — MUPIROCIN 1 APPLICATION(S): 20 OINTMENT TOPICAL at 06:14

## 2022-01-01 RX ADMIN — Medication 3 MILLILITER(S): at 15:45

## 2022-01-01 RX ADMIN — Medication 1: at 23:16

## 2022-01-01 RX ADMIN — Medication 15 MILLIGRAM(S): at 06:50

## 2022-01-01 RX ADMIN — APIXABAN 5 MILLIGRAM(S): 2.5 TABLET, FILM COATED ORAL at 17:38

## 2022-01-01 RX ADMIN — ALBUTEROL 2.5 MILLIGRAM(S): 90 AEROSOL, METERED ORAL at 15:49

## 2022-01-01 RX ADMIN — Medication 2: at 12:00

## 2022-01-01 RX ADMIN — Medication 324 MILLIGRAM(S): at 12:00

## 2022-01-01 RX ADMIN — OXCARBAZEPINE 600 MILLIGRAM(S): 300 TABLET, FILM COATED ORAL at 18:45

## 2022-01-01 RX ADMIN — QUETIAPINE FUMARATE 25 MILLIGRAM(S): 200 TABLET, FILM COATED ORAL at 21:16

## 2022-01-01 RX ADMIN — MUPIROCIN 1 APPLICATION(S): 20 OINTMENT TOPICAL at 20:56

## 2022-01-01 RX ADMIN — BRIMONIDINE TARTRATE 1 DROP(S): 2 SOLUTION/ DROPS OPHTHALMIC at 01:52

## 2022-01-01 RX ADMIN — MUPIROCIN 1 APPLICATION(S): 20 OINTMENT TOPICAL at 13:35

## 2022-01-01 RX ADMIN — Medication 263 MILLIGRAM(S): at 18:01

## 2022-01-01 RX ADMIN — ALBUTEROL 2.5 MILLIGRAM(S): 90 AEROSOL, METERED ORAL at 22:50

## 2022-01-01 RX ADMIN — Medication 1 DROP(S): at 06:44

## 2022-01-01 RX ADMIN — URSODIOL 500 MILLIGRAM(S): 250 TABLET, FILM COATED ORAL at 18:08

## 2022-01-01 RX ADMIN — URSODIOL 500 MILLIGRAM(S): 250 TABLET, FILM COATED ORAL at 17:47

## 2022-01-01 RX ADMIN — Medication 0.5 MILLIGRAM(S): at 09:15

## 2022-01-01 RX ADMIN — Medication 1 DROP(S): at 16:55

## 2022-01-01 RX ADMIN — Medication 1 DROP(S): at 05:42

## 2022-01-01 RX ADMIN — Medication 15 MILLIGRAM(S): at 06:10

## 2022-01-01 RX ADMIN — MUPIROCIN 1 APPLICATION(S): 20 OINTMENT TOPICAL at 05:29

## 2022-01-01 RX ADMIN — SODIUM CHLORIDE 500 MILLILITER(S): 9 INJECTION INTRAMUSCULAR; INTRAVENOUS; SUBCUTANEOUS at 10:00

## 2022-01-01 RX ADMIN — Medication 1 DROP(S): at 14:08

## 2022-01-01 RX ADMIN — MORPHINE SULFATE 7.5 MILLIGRAM(S): 50 CAPSULE, EXTENDED RELEASE ORAL at 09:44

## 2022-01-01 RX ADMIN — Medication 1 DROP(S): at 09:56

## 2022-01-01 RX ADMIN — LORATADINE 10 MILLIGRAM(S): 10 TABLET ORAL at 11:31

## 2022-01-01 RX ADMIN — MUPIROCIN 1 APPLICATION(S): 20 OINTMENT TOPICAL at 06:05

## 2022-01-01 RX ADMIN — Medication 250 MILLIGRAM(S): at 04:36

## 2022-01-01 RX ADMIN — HYDROMORPHONE HYDROCHLORIDE 0.5 MILLIGRAM(S): 2 INJECTION INTRAMUSCULAR; INTRAVENOUS; SUBCUTANEOUS at 06:03

## 2022-01-01 RX ADMIN — Medication 0.5 MILLIGRAM(S): at 22:27

## 2022-01-01 RX ADMIN — MEROPENEM 100 MILLIGRAM(S): 1 INJECTION INTRAVENOUS at 11:19

## 2022-01-01 RX ADMIN — Medication 1 DROP(S): at 13:21

## 2022-01-01 RX ADMIN — ENOXAPARIN SODIUM 80 MILLIGRAM(S): 100 INJECTION SUBCUTANEOUS at 06:44

## 2022-01-01 RX ADMIN — Medication 100 MILLIGRAM(S): at 06:22

## 2022-01-01 RX ADMIN — ENOXAPARIN SODIUM 80 MILLIGRAM(S): 100 INJECTION SUBCUTANEOUS at 06:15

## 2022-01-01 RX ADMIN — Medication 1000 MILLIGRAM(S): at 16:54

## 2022-01-01 RX ADMIN — Medication 500 MILLIGRAM(S): at 11:32

## 2022-01-01 RX ADMIN — MIRTAZAPINE 15 MILLIGRAM(S): 45 TABLET, ORALLY DISINTEGRATING ORAL at 21:26

## 2022-01-01 RX ADMIN — Medication 1 TABLET(S): at 11:27

## 2022-01-01 RX ADMIN — Medication 1 DROP(S): at 05:50

## 2022-01-01 RX ADMIN — TAMSULOSIN HYDROCHLORIDE 0.4 MILLIGRAM(S): 0.4 CAPSULE ORAL at 22:32

## 2022-01-01 RX ADMIN — Medication 1 APPLICATION(S): at 21:10

## 2022-01-01 RX ADMIN — Medication 1 DROP(S): at 01:43

## 2022-01-01 RX ADMIN — MEROPENEM 100 MILLIGRAM(S): 1 INJECTION INTRAVENOUS at 22:51

## 2022-01-01 RX ADMIN — Medication 1 APPLICATION(S): at 18:11

## 2022-01-01 RX ADMIN — OXCARBAZEPINE 600 MILLIGRAM(S): 300 TABLET, FILM COATED ORAL at 06:51

## 2022-01-01 RX ADMIN — Medication 975 MILLIGRAM(S): at 19:35

## 2022-01-01 RX ADMIN — Medication 40 MILLIGRAM(S): at 11:25

## 2022-01-01 RX ADMIN — ENOXAPARIN SODIUM 80 MILLIGRAM(S): 100 INJECTION SUBCUTANEOUS at 18:33

## 2022-01-01 RX ADMIN — Medication 1 APPLICATION(S): at 18:22

## 2022-01-01 RX ADMIN — Medication 1 DROP(S): at 08:30

## 2022-01-01 RX ADMIN — Medication 1 DROP(S): at 11:27

## 2022-01-01 RX ADMIN — MUPIROCIN 1 APPLICATION(S): 20 OINTMENT TOPICAL at 13:31

## 2022-01-01 RX ADMIN — Medication 1 DROP(S): at 11:57

## 2022-01-01 RX ADMIN — LOSARTAN POTASSIUM 50 MILLIGRAM(S): 100 TABLET, FILM COATED ORAL at 06:12

## 2022-01-01 RX ADMIN — MEROPENEM 100 MILLIGRAM(S): 1 INJECTION INTRAVENOUS at 10:48

## 2022-01-01 RX ADMIN — NYSTATIN CREAM 1 APPLICATION(S): 100000 CREAM TOPICAL at 06:10

## 2022-01-01 RX ADMIN — MORPHINE SULFATE 2.5 MILLIGRAM(S): 50 CAPSULE, EXTENDED RELEASE ORAL at 02:35

## 2022-01-01 RX ADMIN — Medication 1 DROP(S): at 07:38

## 2022-01-01 RX ADMIN — Medication 20 MILLIGRAM(S): at 17:28

## 2022-01-01 RX ADMIN — TAMSULOSIN HYDROCHLORIDE 0.4 MILLIGRAM(S): 0.4 CAPSULE ORAL at 21:11

## 2022-01-01 RX ADMIN — Medication 650 MILLIGRAM(S): at 13:57

## 2022-01-01 RX ADMIN — Medication 3 MILLILITER(S): at 16:03

## 2022-01-01 RX ADMIN — LORATADINE 10 MILLIGRAM(S): 10 TABLET ORAL at 12:17

## 2022-01-01 RX ADMIN — SODIUM CHLORIDE 50 MILLILITER(S): 9 INJECTION, SOLUTION INTRAVENOUS at 12:00

## 2022-01-01 RX ADMIN — Medication 25 MILLIGRAM(S): at 21:08

## 2022-01-01 RX ADMIN — Medication 1 APPLICATION(S): at 17:49

## 2022-01-01 RX ADMIN — MORPHINE SULFATE 7.5 MILLIGRAM(S): 50 CAPSULE, EXTENDED RELEASE ORAL at 13:56

## 2022-01-01 RX ADMIN — Medication 250 MILLIGRAM(S): at 14:39

## 2022-01-01 RX ADMIN — Medication 1 DROP(S): at 05:00

## 2022-01-01 RX ADMIN — Medication 1 TABLET(S): at 11:31

## 2022-01-01 RX ADMIN — Medication 1 DROP(S): at 10:06

## 2022-01-01 RX ADMIN — Medication 1 APPLICATION(S): at 23:40

## 2022-01-01 RX ADMIN — Medication 263 MILLIGRAM(S): at 16:17

## 2022-01-01 RX ADMIN — MEROPENEM 100 MILLIGRAM(S): 1 INJECTION INTRAVENOUS at 14:48

## 2022-01-01 RX ADMIN — Medication 3 MILLILITER(S): at 03:28

## 2022-01-01 RX ADMIN — Medication 1 DROP(S): at 06:03

## 2022-01-01 RX ADMIN — HYDROMORPHONE HYDROCHLORIDE 0.5 MILLIGRAM(S): 2 INJECTION INTRAMUSCULAR; INTRAVENOUS; SUBCUTANEOUS at 18:31

## 2022-01-01 RX ADMIN — Medication 263 MILLIGRAM(S): at 02:15

## 2022-01-01 RX ADMIN — HYDROMORPHONE HYDROCHLORIDE 0.5 MILLIGRAM(S): 2 INJECTION INTRAMUSCULAR; INTRAVENOUS; SUBCUTANEOUS at 18:48

## 2022-01-01 RX ADMIN — Medication 1 APPLICATION(S): at 12:01

## 2022-01-01 RX ADMIN — INSULIN GLARGINE 10 UNIT(S): 100 INJECTION, SOLUTION SUBCUTANEOUS at 22:01

## 2022-01-01 RX ADMIN — Medication 1 DROP(S): at 19:07

## 2022-01-01 RX ADMIN — Medication 3 MILLILITER(S): at 10:28

## 2022-01-01 RX ADMIN — ENOXAPARIN SODIUM 40 MILLIGRAM(S): 100 INJECTION SUBCUTANEOUS at 18:34

## 2022-01-01 RX ADMIN — Medication 15 MILLIGRAM(S): at 05:40

## 2022-01-01 RX ADMIN — Medication 1 DROP(S): at 17:39

## 2022-01-01 RX ADMIN — Medication 1 DROP(S): at 22:37

## 2022-01-01 RX ADMIN — Medication 3 MILLILITER(S): at 08:47

## 2022-01-01 RX ADMIN — TAMSULOSIN HYDROCHLORIDE 0.4 MILLIGRAM(S): 0.4 CAPSULE ORAL at 21:22

## 2022-01-01 RX ADMIN — Medication 100 MILLIGRAM(S): at 21:36

## 2022-01-01 RX ADMIN — Medication 250 MILLIGRAM(S): at 09:12

## 2022-01-01 RX ADMIN — Medication 3 MILLILITER(S): at 22:10

## 2022-01-01 RX ADMIN — Medication 15 MILLIGRAM(S): at 17:59

## 2022-01-01 RX ADMIN — MORPHINE SULFATE 7.5 MILLIGRAM(S): 50 CAPSULE, EXTENDED RELEASE ORAL at 23:14

## 2022-01-01 RX ADMIN — MUPIROCIN 1 APPLICATION(S): 20 OINTMENT TOPICAL at 12:09

## 2022-01-01 RX ADMIN — MORPHINE SULFATE 2 MILLIGRAM(S): 50 CAPSULE, EXTENDED RELEASE ORAL at 18:30

## 2022-01-01 RX ADMIN — MIRTAZAPINE 7.5 MILLIGRAM(S): 45 TABLET, ORALLY DISINTEGRATING ORAL at 22:22

## 2022-01-01 RX ADMIN — Medication 650 MILLIGRAM(S): at 22:19

## 2022-01-01 RX ADMIN — Medication 1 APPLICATION(S): at 18:17

## 2022-01-01 RX ADMIN — Medication 250 MILLIGRAM(S): at 15:21

## 2022-01-01 RX ADMIN — Medication 1000 MILLIGRAM(S): at 15:37

## 2022-01-01 RX ADMIN — BRIMONIDINE TARTRATE 1 DROP(S): 2 SOLUTION/ DROPS OPHTHALMIC at 05:54

## 2022-01-01 RX ADMIN — LORATADINE 10 MILLIGRAM(S): 10 TABLET ORAL at 12:14

## 2022-01-01 RX ADMIN — METHADONE HYDROCHLORIDE 5 MILLIGRAM(S): 40 TABLET ORAL at 06:37

## 2022-01-01 RX ADMIN — INSULIN GLARGINE 10 UNIT(S): 100 INJECTION, SOLUTION SUBCUTANEOUS at 21:14

## 2022-01-01 RX ADMIN — Medication 1: at 08:19

## 2022-01-01 RX ADMIN — PANTOPRAZOLE SODIUM 40 MILLIGRAM(S): 20 TABLET, DELAYED RELEASE ORAL at 18:09

## 2022-01-01 RX ADMIN — Medication 12.5 MILLIGRAM(S): at 06:52

## 2022-01-01 RX ADMIN — GABAPENTIN 600 MILLIGRAM(S): 400 CAPSULE ORAL at 14:34

## 2022-01-01 RX ADMIN — INSULIN GLARGINE 10 UNIT(S): 100 INJECTION, SOLUTION SUBCUTANEOUS at 22:24

## 2022-01-01 RX ADMIN — LOSARTAN POTASSIUM 50 MILLIGRAM(S): 100 TABLET, FILM COATED ORAL at 06:19

## 2022-01-01 RX ADMIN — MORPHINE SULFATE 15 MILLIGRAM(S): 50 CAPSULE, EXTENDED RELEASE ORAL at 03:15

## 2022-01-01 RX ADMIN — Medication 100 MILLIGRAM(S): at 21:26

## 2022-01-01 RX ADMIN — GABAPENTIN 100 MILLIGRAM(S): 400 CAPSULE ORAL at 06:58

## 2022-01-01 RX ADMIN — LORATADINE 10 MILLIGRAM(S): 10 TABLET ORAL at 11:25

## 2022-01-01 RX ADMIN — Medication 1 APPLICATION(S): at 18:59

## 2022-01-01 RX ADMIN — ENOXAPARIN SODIUM 40 MILLIGRAM(S): 100 INJECTION SUBCUTANEOUS at 19:06

## 2022-01-01 RX ADMIN — Medication 400 MILLIGRAM(S): at 12:18

## 2022-01-01 RX ADMIN — Medication 263 MILLIGRAM(S): at 15:37

## 2022-01-01 RX ADMIN — MEROPENEM 100 MILLIGRAM(S): 1 INJECTION INTRAVENOUS at 18:25

## 2022-01-01 RX ADMIN — Medication 250 MILLIGRAM(S): at 06:25

## 2022-01-01 RX ADMIN — Medication 100 MILLIGRAM(S): at 06:15

## 2022-01-01 RX ADMIN — MORPHINE SULFATE 7.5 MILLIGRAM(S): 50 CAPSULE, EXTENDED RELEASE ORAL at 21:00

## 2022-01-01 RX ADMIN — Medication 324 MILLIGRAM(S): at 11:33

## 2022-01-01 RX ADMIN — Medication 50 MILLIGRAM(S): at 21:11

## 2022-01-01 RX ADMIN — Medication 12.5 MILLIGRAM(S): at 05:35

## 2022-01-01 RX ADMIN — ENOXAPARIN SODIUM 80 MILLIGRAM(S): 100 INJECTION SUBCUTANEOUS at 06:12

## 2022-01-01 RX ADMIN — PANTOPRAZOLE SODIUM 40 MILLIGRAM(S): 20 TABLET, DELAYED RELEASE ORAL at 06:48

## 2022-01-01 RX ADMIN — Medication 1 DROP(S): at 02:33

## 2022-01-01 RX ADMIN — Medication 1 TABLET(S): at 12:12

## 2022-01-01 RX ADMIN — NYSTATIN CREAM 1 APPLICATION(S): 100000 CREAM TOPICAL at 05:47

## 2022-01-01 RX ADMIN — ENOXAPARIN SODIUM 80 MILLIGRAM(S): 100 INJECTION SUBCUTANEOUS at 17:46

## 2022-01-01 RX ADMIN — Medication 1000 MILLIGRAM(S): at 00:16

## 2022-01-01 RX ADMIN — LORATADINE 10 MILLIGRAM(S): 10 TABLET ORAL at 12:12

## 2022-01-01 RX ADMIN — GABAPENTIN 300 MILLIGRAM(S): 400 CAPSULE ORAL at 13:48

## 2022-01-01 RX ADMIN — Medication 650 MILLIGRAM(S): at 07:40

## 2022-01-01 RX ADMIN — LIDOCAINE 1 APPLICATION(S): 4 CREAM TOPICAL at 05:34

## 2022-01-01 RX ADMIN — MORPHINE SULFATE 1 MILLIGRAM(S): 50 CAPSULE, EXTENDED RELEASE ORAL at 21:30

## 2022-01-01 RX ADMIN — Medication 40 MILLIEQUIVALENT(S): at 11:10

## 2022-01-01 RX ADMIN — ENOXAPARIN SODIUM 80 MILLIGRAM(S): 100 INJECTION SUBCUTANEOUS at 17:22

## 2022-01-01 RX ADMIN — GABAPENTIN 600 MILLIGRAM(S): 400 CAPSULE ORAL at 06:52

## 2022-01-01 RX ADMIN — Medication 3 MILLILITER(S): at 22:26

## 2022-01-01 RX ADMIN — Medication 12.5 MILLIGRAM(S): at 06:23

## 2022-01-01 RX ADMIN — Medication 1 DROP(S): at 06:14

## 2022-01-01 RX ADMIN — PANTOPRAZOLE SODIUM 40 MILLIGRAM(S): 20 TABLET, DELAYED RELEASE ORAL at 06:18

## 2022-01-01 RX ADMIN — ENOXAPARIN SODIUM 40 MILLIGRAM(S): 100 INJECTION SUBCUTANEOUS at 18:41

## 2022-01-01 RX ADMIN — Medication 3 MILLILITER(S): at 16:24

## 2022-01-01 RX ADMIN — MEROPENEM 100 MILLIGRAM(S): 1 INJECTION INTRAVENOUS at 06:00

## 2022-01-01 RX ADMIN — Medication 1 DROP(S): at 17:48

## 2022-01-01 RX ADMIN — SODIUM CHLORIDE 75 MILLILITER(S): 9 INJECTION INTRAMUSCULAR; INTRAVENOUS; SUBCUTANEOUS at 01:18

## 2022-01-01 RX ADMIN — MUPIROCIN 1 APPLICATION(S): 20 OINTMENT TOPICAL at 09:26

## 2022-01-01 RX ADMIN — Medication 40 MILLIEQUIVALENT(S): at 14:28

## 2022-01-01 RX ADMIN — Medication 0.5 MILLIGRAM(S): at 20:50

## 2022-01-01 RX ADMIN — LIDOCAINE 1 APPLICATION(S): 4 CREAM TOPICAL at 17:42

## 2022-01-01 RX ADMIN — LOSARTAN POTASSIUM 50 MILLIGRAM(S): 100 TABLET, FILM COATED ORAL at 05:05

## 2022-01-01 RX ADMIN — QUETIAPINE FUMARATE 25 MILLIGRAM(S): 200 TABLET, FILM COATED ORAL at 05:10

## 2022-01-01 RX ADMIN — MORPHINE SULFATE 7.5 MILLIGRAM(S): 50 CAPSULE, EXTENDED RELEASE ORAL at 03:05

## 2022-01-01 RX ADMIN — Medication 3 MILLILITER(S): at 03:19

## 2022-01-01 RX ADMIN — Medication 15 MILLIGRAM(S): at 17:48

## 2022-01-01 RX ADMIN — SODIUM CHLORIDE 75 MILLILITER(S): 9 INJECTION, SOLUTION INTRAVENOUS at 12:56

## 2022-01-01 RX ADMIN — GABAPENTIN 100 MILLIGRAM(S): 400 CAPSULE ORAL at 21:27

## 2022-01-01 RX ADMIN — SENNA PLUS 1 TABLET(S): 8.6 TABLET ORAL at 21:36

## 2022-01-01 RX ADMIN — Medication 1 DROP(S): at 12:33

## 2022-01-01 RX ADMIN — BRIMONIDINE TARTRATE 1 DROP(S): 2 SOLUTION/ DROPS OPHTHALMIC at 21:27

## 2022-01-01 RX ADMIN — MUPIROCIN 1 APPLICATION(S): 20 OINTMENT TOPICAL at 05:30

## 2022-01-01 RX ADMIN — Medication 1 DROP(S): at 23:28

## 2022-01-01 RX ADMIN — URSODIOL 500 MILLIGRAM(S): 250 TABLET, FILM COATED ORAL at 22:14

## 2022-01-01 RX ADMIN — Medication 1 APPLICATION(S): at 05:34

## 2022-01-01 RX ADMIN — Medication 1 APPLICATION(S): at 06:45

## 2022-01-01 RX ADMIN — Medication 1 DROP(S): at 00:06

## 2022-01-01 RX ADMIN — METHADONE HYDROCHLORIDE 2.5 MILLIGRAM(S): 40 TABLET ORAL at 02:08

## 2022-01-01 RX ADMIN — GABAPENTIN 300 MILLIGRAM(S): 400 CAPSULE ORAL at 21:01

## 2022-01-01 RX ADMIN — LIDOCAINE 1 APPLICATION(S): 4 CREAM TOPICAL at 06:05

## 2022-01-01 RX ADMIN — Medication 1 DROP(S): at 12:17

## 2022-01-01 RX ADMIN — Medication 650 MILLIGRAM(S): at 15:30

## 2022-01-01 RX ADMIN — Medication 166.67 MILLIGRAM(S): at 22:02

## 2022-01-01 RX ADMIN — Medication 1000 MILLIGRAM(S): at 16:08

## 2022-01-01 RX ADMIN — OXYCODONE HYDROCHLORIDE 2.5 MILLIGRAM(S): 5 TABLET ORAL at 09:46

## 2022-01-01 RX ADMIN — VALACYCLOVIR 1000 MILLIGRAM(S): 500 TABLET, FILM COATED ORAL at 09:45

## 2022-01-01 RX ADMIN — MUPIROCIN 1 APPLICATION(S): 20 OINTMENT TOPICAL at 06:00

## 2022-01-01 RX ADMIN — VALACYCLOVIR 1000 MILLIGRAM(S): 500 TABLET, FILM COATED ORAL at 10:49

## 2022-01-01 RX ADMIN — Medication 1 DROP(S): at 21:52

## 2022-01-01 RX ADMIN — MORPHINE SULFATE 7.5 MILLIGRAM(S): 50 CAPSULE, EXTENDED RELEASE ORAL at 10:38

## 2022-01-01 RX ADMIN — Medication 650 MILLIGRAM(S): at 12:25

## 2022-01-01 RX ADMIN — MUPIROCIN 1 APPLICATION(S): 20 OINTMENT TOPICAL at 23:11

## 2022-01-01 RX ADMIN — Medication 1: at 21:50

## 2022-01-01 RX ADMIN — Medication 1 DROP(S): at 17:54

## 2022-01-01 RX ADMIN — Medication 100 MILLIGRAM(S): at 13:27

## 2022-01-01 RX ADMIN — Medication 75 MILLIGRAM(S): at 22:17

## 2022-01-01 RX ADMIN — MUPIROCIN 1 APPLICATION(S): 20 OINTMENT TOPICAL at 05:34

## 2022-01-01 RX ADMIN — LIDOCAINE 1 APPLICATION(S): 4 CREAM TOPICAL at 18:44

## 2022-01-01 RX ADMIN — Medication 1 TABLET(S): at 11:09

## 2022-01-01 RX ADMIN — Medication 1 APPLICATION(S): at 06:05

## 2022-01-01 RX ADMIN — PANTOPRAZOLE SODIUM 40 MILLIGRAM(S): 20 TABLET, DELAYED RELEASE ORAL at 05:56

## 2022-01-01 RX ADMIN — Medication 20 MILLIGRAM(S): at 14:25

## 2022-01-01 RX ADMIN — MORPHINE SULFATE 7.5 MILLIGRAM(S): 50 CAPSULE, EXTENDED RELEASE ORAL at 23:17

## 2022-01-01 RX ADMIN — Medication 1 APPLICATION(S): at 11:26

## 2022-01-01 RX ADMIN — LOSARTAN POTASSIUM 50 MILLIGRAM(S): 100 TABLET, FILM COATED ORAL at 05:28

## 2022-01-01 RX ADMIN — Medication 1 APPLICATION(S): at 09:48

## 2022-01-01 RX ADMIN — SENNA PLUS 1 TABLET(S): 8.6 TABLET ORAL at 21:31

## 2022-01-01 RX ADMIN — OXCARBAZEPINE 300 MILLIGRAM(S): 300 TABLET, FILM COATED ORAL at 17:45

## 2022-01-01 RX ADMIN — Medication 1: at 16:55

## 2022-01-01 RX ADMIN — MIRTAZAPINE 7.5 MILLIGRAM(S): 45 TABLET, ORALLY DISINTEGRATING ORAL at 22:16

## 2022-01-01 RX ADMIN — Medication 0.5 MILLIGRAM(S): at 09:51

## 2022-01-01 RX ADMIN — Medication 1: at 22:20

## 2022-01-01 RX ADMIN — AMLODIPINE BESYLATE 2.5 MILLIGRAM(S): 2.5 TABLET ORAL at 05:56

## 2022-01-01 RX ADMIN — Medication 0.5 MILLIGRAM(S): at 10:45

## 2022-01-01 RX ADMIN — OXCARBAZEPINE 300 MILLIGRAM(S): 300 TABLET, FILM COATED ORAL at 18:06

## 2022-01-01 RX ADMIN — MEROPENEM 100 MILLIGRAM(S): 1 INJECTION INTRAVENOUS at 05:58

## 2022-01-01 RX ADMIN — ENOXAPARIN SODIUM 80 MILLIGRAM(S): 100 INJECTION SUBCUTANEOUS at 06:09

## 2022-01-01 RX ADMIN — Medication 400 MILLIGRAM(S): at 14:40

## 2022-01-01 RX ADMIN — Medication 1 APPLICATION(S): at 11:30

## 2022-01-01 RX ADMIN — NYSTATIN CREAM 1 APPLICATION(S): 100000 CREAM TOPICAL at 05:53

## 2022-01-01 RX ADMIN — LIDOCAINE 1 APPLICATION(S): 4 CREAM TOPICAL at 06:07

## 2022-01-01 RX ADMIN — PANTOPRAZOLE SODIUM 40 MILLIGRAM(S): 20 TABLET, DELAYED RELEASE ORAL at 18:37

## 2022-01-01 RX ADMIN — MUPIROCIN 1 APPLICATION(S): 20 OINTMENT TOPICAL at 07:02

## 2022-01-01 RX ADMIN — LORATADINE 10 MILLIGRAM(S): 10 TABLET ORAL at 11:48

## 2022-01-01 RX ADMIN — Medication 100 MILLIGRAM(S): at 21:12

## 2022-01-01 RX ADMIN — Medication 1 APPLICATION(S): at 00:38

## 2022-01-01 RX ADMIN — Medication 300 MILLIGRAM(S): at 17:47

## 2022-01-01 RX ADMIN — INSULIN GLARGINE 10 UNIT(S): 100 INJECTION, SOLUTION SUBCUTANEOUS at 21:56

## 2022-01-01 RX ADMIN — Medication 1 DROP(S): at 12:01

## 2022-01-01 RX ADMIN — TIOTROPIUM BROMIDE 1 CAPSULE(S): 18 CAPSULE ORAL; RESPIRATORY (INHALATION) at 09:23

## 2022-01-01 RX ADMIN — Medication 0.5 MILLIGRAM(S): at 22:42

## 2022-01-01 RX ADMIN — MORPHINE SULFATE 7.5 MILLIGRAM(S): 50 CAPSULE, EXTENDED RELEASE ORAL at 23:55

## 2022-01-01 RX ADMIN — Medication 1 DROP(S): at 21:11

## 2022-01-01 RX ADMIN — MORPHINE SULFATE 2.5 MILLIGRAM(S): 50 CAPSULE, EXTENDED RELEASE ORAL at 07:05

## 2022-01-01 RX ADMIN — Medication 1 DROP(S): at 17:24

## 2022-01-01 RX ADMIN — MEROPENEM 100 MILLIGRAM(S): 1 INJECTION INTRAVENOUS at 00:02

## 2022-01-01 RX ADMIN — Medication 1 DROP(S): at 19:26

## 2022-01-01 RX ADMIN — VALACYCLOVIR 1000 MILLIGRAM(S): 500 TABLET, FILM COATED ORAL at 17:44

## 2022-01-01 RX ADMIN — Medication 250 MILLIGRAM(S): at 07:02

## 2022-01-01 RX ADMIN — BUDESONIDE AND FORMOTEROL FUMARATE DIHYDRATE 2 PUFF(S): 160; 4.5 AEROSOL RESPIRATORY (INHALATION) at 20:48

## 2022-01-01 RX ADMIN — Medication 20 MILLIGRAM(S): at 06:08

## 2022-01-01 RX ADMIN — Medication 250 MILLIGRAM(S): at 19:11

## 2022-01-01 RX ADMIN — Medication 1 DROP(S): at 11:08

## 2022-01-01 RX ADMIN — MEROPENEM 100 MILLIGRAM(S): 1 INJECTION INTRAVENOUS at 14:54

## 2022-01-01 RX ADMIN — Medication 324 MILLIGRAM(S): at 12:32

## 2022-01-01 RX ADMIN — Medication 650 MILLIGRAM(S): at 22:15

## 2022-01-01 RX ADMIN — Medication 1 APPLICATION(S): at 06:44

## 2022-01-01 RX ADMIN — MORPHINE SULFATE 2.5 MILLIGRAM(S): 50 CAPSULE, EXTENDED RELEASE ORAL at 11:20

## 2022-01-01 RX ADMIN — Medication 1 DROP(S): at 09:27

## 2022-01-01 RX ADMIN — Medication 1000 MILLIGRAM(S): at 21:48

## 2022-01-01 RX ADMIN — Medication 12.5 MILLIGRAM(S): at 05:44

## 2022-01-01 RX ADMIN — MUPIROCIN 1 APPLICATION(S): 20 OINTMENT TOPICAL at 06:56

## 2022-01-01 RX ADMIN — MORPHINE SULFATE 7.5 MILLIGRAM(S): 50 CAPSULE, EXTENDED RELEASE ORAL at 19:19

## 2022-01-01 RX ADMIN — Medication 100 MILLIGRAM(S): at 21:43

## 2022-01-01 RX ADMIN — Medication 10 MILLIGRAM(S): at 23:26

## 2022-01-01 RX ADMIN — Medication 1 DROP(S): at 04:49

## 2022-01-01 RX ADMIN — BUDESONIDE AND FORMOTEROL FUMARATE DIHYDRATE 2 PUFF(S): 160; 4.5 AEROSOL RESPIRATORY (INHALATION) at 11:53

## 2022-01-01 RX ADMIN — Medication 1 DROP(S): at 15:31

## 2022-01-01 RX ADMIN — Medication 1 DROP(S): at 17:30

## 2022-01-01 RX ADMIN — Medication 1 TABLET(S): at 12:00

## 2022-01-01 RX ADMIN — Medication 1 DROP(S): at 03:05

## 2022-01-01 RX ADMIN — MORPHINE SULFATE 3 MILLIGRAM(S): 50 CAPSULE, EXTENDED RELEASE ORAL at 15:48

## 2022-01-01 RX ADMIN — Medication 1 APPLICATION(S): at 18:39

## 2022-01-01 RX ADMIN — SODIUM CHLORIDE 50 MILLILITER(S): 9 INJECTION, SOLUTION INTRAVENOUS at 14:38

## 2022-01-01 RX ADMIN — NYSTATIN CREAM 1 APPLICATION(S): 100000 CREAM TOPICAL at 17:29

## 2022-01-01 RX ADMIN — NYSTATIN CREAM 1 APPLICATION(S): 100000 CREAM TOPICAL at 06:51

## 2022-01-01 RX ADMIN — Medication 100 MILLIGRAM(S): at 22:54

## 2022-01-01 RX ADMIN — Medication 1 APPLICATION(S): at 06:18

## 2022-01-01 RX ADMIN — Medication 0.5 MILLIGRAM(S): at 03:36

## 2022-01-01 RX ADMIN — Medication 0.5 MILLIGRAM(S): at 09:21

## 2022-01-01 RX ADMIN — Medication 100 MILLIGRAM(S): at 21:10

## 2022-01-01 RX ADMIN — Medication 1 APPLICATION(S): at 06:42

## 2022-01-01 RX ADMIN — Medication 3 MILLILITER(S): at 21:12

## 2022-01-01 RX ADMIN — MORPHINE SULFATE 7.5 MILLIGRAM(S): 50 CAPSULE, EXTENDED RELEASE ORAL at 06:18

## 2022-01-01 RX ADMIN — MIRTAZAPINE 7.5 MILLIGRAM(S): 45 TABLET, ORALLY DISINTEGRATING ORAL at 21:11

## 2022-01-01 RX ADMIN — MUPIROCIN 1 APPLICATION(S): 20 OINTMENT TOPICAL at 15:44

## 2022-01-01 RX ADMIN — Medication 1 DROP(S): at 13:27

## 2022-01-01 RX ADMIN — URSODIOL 500 MILLIGRAM(S): 250 TABLET, FILM COATED ORAL at 19:05

## 2022-01-01 RX ADMIN — Medication 1 DROP(S): at 19:27

## 2022-01-01 RX ADMIN — Medication 20 MILLIGRAM(S): at 13:48

## 2022-01-01 RX ADMIN — LIDOCAINE 1 APPLICATION(S): 4 CREAM TOPICAL at 06:43

## 2022-01-01 RX ADMIN — Medication 20 MILLIGRAM(S): at 11:59

## 2022-01-01 RX ADMIN — Medication 12.5 MILLIGRAM(S): at 06:03

## 2022-01-01 RX ADMIN — NYSTATIN CREAM 1 APPLICATION(S): 100000 CREAM TOPICAL at 17:47

## 2022-01-01 RX ADMIN — Medication 1 MILLIGRAM(S): at 11:34

## 2022-01-01 RX ADMIN — Medication 20 MILLIGRAM(S): at 06:40

## 2022-01-01 RX ADMIN — Medication 1 DROP(S): at 15:40

## 2022-01-01 RX ADMIN — CLOPIDOGREL BISULFATE 75 MILLIGRAM(S): 75 TABLET, FILM COATED ORAL at 13:12

## 2022-01-01 RX ADMIN — Medication 1: at 07:38

## 2022-01-01 RX ADMIN — MUPIROCIN 1 APPLICATION(S): 20 OINTMENT TOPICAL at 06:47

## 2022-01-01 RX ADMIN — MORPHINE SULFATE 15 MILLIGRAM(S): 50 CAPSULE, EXTENDED RELEASE ORAL at 21:38

## 2022-01-01 RX ADMIN — Medication 1 DROP(S): at 04:25

## 2022-01-01 RX ADMIN — Medication 15 MILLIGRAM(S): at 05:25

## 2022-01-01 RX ADMIN — Medication 3 MILLILITER(S): at 20:15

## 2022-01-01 RX ADMIN — VALACYCLOVIR 1000 MILLIGRAM(S): 500 TABLET, FILM COATED ORAL at 17:57

## 2022-01-01 RX ADMIN — MORPHINE SULFATE 7.5 MILLIGRAM(S): 50 CAPSULE, EXTENDED RELEASE ORAL at 18:32

## 2022-01-01 RX ADMIN — MORPHINE SULFATE 5 MILLIGRAM(S): 50 CAPSULE, EXTENDED RELEASE ORAL at 09:10

## 2022-01-01 RX ADMIN — NYSTATIN CREAM 1 APPLICATION(S): 100000 CREAM TOPICAL at 18:23

## 2022-01-01 RX ADMIN — Medication 1 DROP(S): at 22:29

## 2022-01-01 RX ADMIN — LOSARTAN POTASSIUM 50 MILLIGRAM(S): 100 TABLET, FILM COATED ORAL at 06:03

## 2022-01-01 RX ADMIN — MORPHINE SULFATE 15 MILLIGRAM(S): 50 CAPSULE, EXTENDED RELEASE ORAL at 06:35

## 2022-01-01 RX ADMIN — METHADONE HYDROCHLORIDE 5 MILLIGRAM(S): 40 TABLET ORAL at 23:59

## 2022-01-01 RX ADMIN — Medication 1 MILLIGRAM(S): at 11:27

## 2022-01-01 RX ADMIN — PIPERACILLIN AND TAZOBACTAM 25 GRAM(S): 4; .5 INJECTION, POWDER, LYOPHILIZED, FOR SOLUTION INTRAVENOUS at 15:09

## 2022-01-01 RX ADMIN — INSULIN GLARGINE 10 UNIT(S): 100 INJECTION, SOLUTION SUBCUTANEOUS at 20:54

## 2022-01-01 RX ADMIN — Medication 1 DROP(S): at 17:51

## 2022-01-01 RX ADMIN — MORPHINE SULFATE 4 MILLIGRAM(S): 50 CAPSULE, EXTENDED RELEASE ORAL at 14:23

## 2022-01-01 RX ADMIN — Medication 3 MILLILITER(S): at 03:15

## 2022-01-01 RX ADMIN — ALBUTEROL 2.5 MILLIGRAM(S): 90 AEROSOL, METERED ORAL at 03:22

## 2022-01-01 RX ADMIN — OXCARBAZEPINE 300 MILLIGRAM(S): 300 TABLET, FILM COATED ORAL at 05:05

## 2022-01-01 RX ADMIN — Medication 20 MILLIGRAM(S): at 11:26

## 2022-01-01 RX ADMIN — GABAPENTIN 100 MILLIGRAM(S): 400 CAPSULE ORAL at 05:23

## 2022-01-01 RX ADMIN — MUPIROCIN 1 APPLICATION(S): 20 OINTMENT TOPICAL at 16:10

## 2022-01-01 RX ADMIN — OXCARBAZEPINE 450 MILLIGRAM(S): 300 TABLET, FILM COATED ORAL at 07:00

## 2022-01-01 RX ADMIN — Medication 0.5 MILLIGRAM(S): at 22:16

## 2022-01-01 RX ADMIN — Medication 300 MILLIGRAM(S): at 17:35

## 2022-01-01 RX ADMIN — Medication 1 DROP(S): at 13:57

## 2022-01-01 RX ADMIN — Medication 1 APPLICATION(S): at 06:55

## 2022-01-01 RX ADMIN — URSODIOL 500 MILLIGRAM(S): 250 TABLET, FILM COATED ORAL at 17:26

## 2022-01-01 RX ADMIN — VALACYCLOVIR 1000 MILLIGRAM(S): 500 TABLET, FILM COATED ORAL at 00:38

## 2022-01-01 RX ADMIN — Medication 1 APPLICATION(S): at 05:48

## 2022-01-01 RX ADMIN — NYSTATIN CREAM 1 APPLICATION(S): 100000 CREAM TOPICAL at 06:45

## 2022-01-01 RX ADMIN — Medication 3 MILLILITER(S): at 15:25

## 2022-01-01 RX ADMIN — MORPHINE SULFATE 3 MILLIGRAM(S): 50 CAPSULE, EXTENDED RELEASE ORAL at 19:14

## 2022-01-01 RX ADMIN — Medication 1 DROP(S): at 15:00

## 2022-01-01 RX ADMIN — VALACYCLOVIR 1000 MILLIGRAM(S): 500 TABLET, FILM COATED ORAL at 18:37

## 2022-01-01 RX ADMIN — Medication 20 MILLIGRAM(S): at 17:22

## 2022-01-01 RX ADMIN — Medication 20 MILLIGRAM(S): at 05:05

## 2022-01-01 RX ADMIN — PANTOPRAZOLE SODIUM 40 MILLIGRAM(S): 20 TABLET, DELAYED RELEASE ORAL at 07:19

## 2022-01-01 RX ADMIN — INSULIN GLARGINE 10 UNIT(S): 100 INJECTION, SOLUTION SUBCUTANEOUS at 00:04

## 2022-01-01 RX ADMIN — MIRTAZAPINE 15 MILLIGRAM(S): 45 TABLET, ORALLY DISINTEGRATING ORAL at 22:08

## 2022-01-01 RX ADMIN — GABAPENTIN 600 MILLIGRAM(S): 400 CAPSULE ORAL at 17:43

## 2022-01-01 RX ADMIN — Medication 25 MILLIGRAM(S): at 21:04

## 2022-01-01 RX ADMIN — NYSTATIN CREAM 1 APPLICATION(S): 100000 CREAM TOPICAL at 19:03

## 2022-01-01 RX ADMIN — Medication 1 APPLICATION(S): at 17:44

## 2022-01-01 RX ADMIN — MIRTAZAPINE 7.5 MILLIGRAM(S): 45 TABLET, ORALLY DISINTEGRATING ORAL at 21:12

## 2022-01-01 RX ADMIN — Medication 3 MILLILITER(S): at 10:03

## 2022-01-01 RX ADMIN — MEROPENEM 100 MILLIGRAM(S): 1 INJECTION INTRAVENOUS at 12:15

## 2022-01-01 RX ADMIN — Medication 1 DROP(S): at 06:18

## 2022-01-01 RX ADMIN — NYSTATIN CREAM 1 APPLICATION(S): 100000 CREAM TOPICAL at 17:02

## 2022-01-01 RX ADMIN — VALACYCLOVIR 1000 MILLIGRAM(S): 500 TABLET, FILM COATED ORAL at 02:08

## 2022-01-01 RX ADMIN — Medication 1000 MILLIGRAM(S): at 12:23

## 2022-01-01 RX ADMIN — NYSTATIN CREAM 1 APPLICATION(S): 100000 CREAM TOPICAL at 06:49

## 2022-01-01 RX ADMIN — LOSARTAN POTASSIUM 50 MILLIGRAM(S): 100 TABLET, FILM COATED ORAL at 06:30

## 2022-01-01 RX ADMIN — MORPHINE SULFATE 7.5 MILLIGRAM(S): 50 CAPSULE, EXTENDED RELEASE ORAL at 20:59

## 2022-01-01 RX ADMIN — GABAPENTIN 100 MILLIGRAM(S): 400 CAPSULE ORAL at 13:10

## 2022-01-01 RX ADMIN — VALACYCLOVIR 1000 MILLIGRAM(S): 500 TABLET, FILM COATED ORAL at 09:44

## 2022-01-01 RX ADMIN — MORPHINE SULFATE 3 MILLIGRAM(S): 50 CAPSULE, EXTENDED RELEASE ORAL at 23:17

## 2022-01-01 RX ADMIN — Medication 3 MILLILITER(S): at 15:38

## 2022-01-01 RX ADMIN — Medication 1 DROP(S): at 01:28

## 2022-01-01 RX ADMIN — MORPHINE SULFATE 1 MILLIGRAM(S): 50 CAPSULE, EXTENDED RELEASE ORAL at 21:08

## 2022-01-01 RX ADMIN — Medication 1 APPLICATION(S): at 00:51

## 2022-01-01 RX ADMIN — Medication 400 MILLIGRAM(S): at 06:11

## 2022-01-01 RX ADMIN — Medication 1 DROP(S): at 19:05

## 2022-01-01 RX ADMIN — MUPIROCIN 1 APPLICATION(S): 20 OINTMENT TOPICAL at 22:26

## 2022-01-01 RX ADMIN — ENOXAPARIN SODIUM 80 MILLIGRAM(S): 100 INJECTION SUBCUTANEOUS at 07:16

## 2022-01-01 RX ADMIN — MORPHINE SULFATE 5 MILLIGRAM(S): 50 CAPSULE, EXTENDED RELEASE ORAL at 10:45

## 2022-01-01 RX ADMIN — VALACYCLOVIR 1000 MILLIGRAM(S): 500 TABLET, FILM COATED ORAL at 17:38

## 2022-01-01 RX ADMIN — MORPHINE SULFATE 7.5 MILLIGRAM(S): 50 CAPSULE, EXTENDED RELEASE ORAL at 17:20

## 2022-01-01 RX ADMIN — Medication 10 MILLIGRAM(S): at 21:16

## 2022-01-01 RX ADMIN — Medication 12.5 MILLIGRAM(S): at 05:41

## 2022-01-01 RX ADMIN — Medication 15 MILLIGRAM(S): at 17:54

## 2022-01-01 RX ADMIN — Medication 15 MILLIGRAM(S): at 00:37

## 2022-01-01 RX ADMIN — Medication 1 DROP(S): at 08:44

## 2022-01-01 RX ADMIN — HYDROMORPHONE HYDROCHLORIDE 0.5 MILLIGRAM(S): 2 INJECTION INTRAMUSCULAR; INTRAVENOUS; SUBCUTANEOUS at 21:14

## 2022-01-01 RX ADMIN — MUPIROCIN 1 APPLICATION(S): 20 OINTMENT TOPICAL at 15:10

## 2022-01-01 RX ADMIN — ALBUTEROL 2.5 MILLIGRAM(S): 90 AEROSOL, METERED ORAL at 09:55

## 2022-01-01 RX ADMIN — Medication 400 MILLIGRAM(S): at 01:05

## 2022-01-01 RX ADMIN — Medication 150 MILLIGRAM(S): at 13:57

## 2022-01-01 RX ADMIN — Medication 12.5 MILLIGRAM(S): at 06:30

## 2022-01-01 RX ADMIN — Medication 1 APPLICATION(S): at 00:27

## 2022-01-01 RX ADMIN — Medication 20 MILLIGRAM(S): at 11:15

## 2022-01-01 RX ADMIN — MORPHINE SULFATE 2 MILLIGRAM(S): 50 CAPSULE, EXTENDED RELEASE ORAL at 05:01

## 2022-01-01 RX ADMIN — BRIMONIDINE TARTRATE 1 DROP(S): 2 SOLUTION/ DROPS OPHTHALMIC at 07:42

## 2022-01-01 RX ADMIN — Medication 1 APPLICATION(S): at 23:11

## 2022-01-01 RX ADMIN — MORPHINE SULFATE 7.5 MILLIGRAM(S): 50 CAPSULE, EXTENDED RELEASE ORAL at 02:45

## 2022-01-01 RX ADMIN — INSULIN GLARGINE 10 UNIT(S): 100 INJECTION, SOLUTION SUBCUTANEOUS at 00:16

## 2022-01-01 RX ADMIN — OXCARBAZEPINE 300 MILLIGRAM(S): 300 TABLET, FILM COATED ORAL at 05:22

## 2022-01-01 RX ADMIN — GABAPENTIN 200 MILLIGRAM(S): 400 CAPSULE ORAL at 09:30

## 2022-01-01 RX ADMIN — Medication 1 MILLIGRAM(S): at 12:24

## 2022-01-01 RX ADMIN — OXCARBAZEPINE 600 MILLIGRAM(S): 300 TABLET, FILM COATED ORAL at 06:42

## 2022-01-01 RX ADMIN — Medication 263 MILLIGRAM(S): at 01:51

## 2022-01-01 RX ADMIN — Medication 975 MILLIGRAM(S): at 21:54

## 2022-01-01 RX ADMIN — Medication 1 DROP(S): at 04:38

## 2022-01-01 RX ADMIN — Medication 1 DROP(S): at 23:32

## 2022-01-01 RX ADMIN — MORPHINE SULFATE 7.5 MILLIGRAM(S): 50 CAPSULE, EXTENDED RELEASE ORAL at 06:14

## 2022-01-01 RX ADMIN — Medication 1 DROP(S): at 18:13

## 2022-01-01 RX ADMIN — MEROPENEM 100 MILLIGRAM(S): 1 INJECTION INTRAVENOUS at 14:02

## 2022-01-01 RX ADMIN — MORPHINE SULFATE 2 MILLIGRAM(S): 50 CAPSULE, EXTENDED RELEASE ORAL at 10:32

## 2022-01-01 RX ADMIN — Medication 20 MILLIGRAM(S): at 06:25

## 2022-01-01 RX ADMIN — QUETIAPINE FUMARATE 25 MILLIGRAM(S): 200 TABLET, FILM COATED ORAL at 00:19

## 2022-01-01 RX ADMIN — Medication 1 DROP(S): at 23:12

## 2022-01-01 RX ADMIN — Medication 0.5 MILLIGRAM(S): at 22:26

## 2022-01-01 RX ADMIN — Medication 15 MILLIGRAM(S): at 18:33

## 2022-01-01 RX ADMIN — BRIMONIDINE TARTRATE 1 DROP(S): 2 SOLUTION/ DROPS OPHTHALMIC at 13:29

## 2022-01-01 RX ADMIN — MORPHINE SULFATE 7.5 MILLIGRAM(S): 50 CAPSULE, EXTENDED RELEASE ORAL at 06:44

## 2022-01-01 RX ADMIN — Medication 1 DROP(S): at 05:24

## 2022-01-01 RX ADMIN — LOSARTAN POTASSIUM 50 MILLIGRAM(S): 100 TABLET, FILM COATED ORAL at 06:23

## 2022-01-01 RX ADMIN — GABAPENTIN 400 MILLIGRAM(S): 400 CAPSULE ORAL at 05:39

## 2022-01-01 RX ADMIN — Medication 1000 MILLIGRAM(S): at 18:50

## 2022-01-01 RX ADMIN — Medication 1 TABLET(S): at 12:49

## 2022-01-01 RX ADMIN — Medication 0.5 MILLIGRAM(S): at 20:37

## 2022-01-01 RX ADMIN — Medication 12.5 MILLIGRAM(S): at 06:08

## 2022-01-01 RX ADMIN — MEROPENEM 100 MILLIGRAM(S): 1 INJECTION INTRAVENOUS at 14:37

## 2022-01-01 RX ADMIN — Medication 1 APPLICATION(S): at 00:08

## 2022-01-01 RX ADMIN — GABAPENTIN 100 MILLIGRAM(S): 400 CAPSULE ORAL at 13:39

## 2022-01-01 RX ADMIN — Medication 1 DROP(S): at 06:09

## 2022-01-01 RX ADMIN — MORPHINE SULFATE 15 MILLIGRAM(S): 50 CAPSULE, EXTENDED RELEASE ORAL at 21:05

## 2022-01-01 RX ADMIN — MORPHINE SULFATE 5 MILLIGRAM(S): 50 CAPSULE, EXTENDED RELEASE ORAL at 14:01

## 2022-01-01 RX ADMIN — MEROPENEM 100 MILLIGRAM(S): 1 INJECTION INTRAVENOUS at 11:32

## 2022-01-01 RX ADMIN — MORPHINE SULFATE 7.5 MILLIGRAM(S): 50 CAPSULE, EXTENDED RELEASE ORAL at 14:54

## 2022-01-01 RX ADMIN — Medication 1 APPLICATION(S): at 19:06

## 2022-01-01 RX ADMIN — HYDROMORPHONE HYDROCHLORIDE 0.5 MILLIGRAM(S): 2 INJECTION INTRAMUSCULAR; INTRAVENOUS; SUBCUTANEOUS at 13:29

## 2022-01-01 RX ADMIN — CEFTRIAXONE 100 MILLIGRAM(S): 500 INJECTION, POWDER, FOR SOLUTION INTRAMUSCULAR; INTRAVENOUS at 18:25

## 2022-01-01 RX ADMIN — Medication 1 DROP(S): at 12:03

## 2022-01-01 RX ADMIN — Medication 1: at 11:37

## 2022-01-01 RX ADMIN — NYSTATIN CREAM 1 APPLICATION(S): 100000 CREAM TOPICAL at 17:51

## 2022-01-01 RX ADMIN — Medication 1 APPLICATION(S): at 19:15

## 2022-01-01 RX ADMIN — Medication 10 MILLIGRAM(S): at 22:43

## 2022-01-01 RX ADMIN — Medication 1 DROP(S): at 10:39

## 2022-01-01 RX ADMIN — Medication 3 MILLILITER(S): at 15:11

## 2022-01-01 RX ADMIN — LIDOCAINE 1 APPLICATION(S): 4 CREAM TOPICAL at 17:57

## 2022-01-01 RX ADMIN — SODIUM CHLORIDE 60 MILLILITER(S): 9 INJECTION, SOLUTION INTRAVENOUS at 23:13

## 2022-01-01 RX ADMIN — Medication 3 MILLILITER(S): at 15:28

## 2022-01-01 RX ADMIN — Medication 1 DROP(S): at 21:15

## 2022-01-01 RX ADMIN — Medication 1 APPLICATION(S): at 00:05

## 2022-01-01 RX ADMIN — MUPIROCIN 1 APPLICATION(S): 20 OINTMENT TOPICAL at 21:58

## 2022-01-01 RX ADMIN — ALBUTEROL 2.5 MILLIGRAM(S): 90 AEROSOL, METERED ORAL at 09:54

## 2022-01-01 RX ADMIN — Medication 300 MILLIGRAM(S): at 06:19

## 2022-01-01 RX ADMIN — Medication 100 MILLIGRAM(S): at 13:25

## 2022-01-01 RX ADMIN — Medication 3 MILLILITER(S): at 22:39

## 2022-01-01 RX ADMIN — Medication 250 MILLIGRAM(S): at 11:26

## 2022-01-01 RX ADMIN — HYDROMORPHONE HYDROCHLORIDE 0.5 MILLIGRAM(S): 2 INJECTION INTRAMUSCULAR; INTRAVENOUS; SUBCUTANEOUS at 23:22

## 2022-01-01 RX ADMIN — OXCARBAZEPINE 150 MILLIGRAM(S): 300 TABLET, FILM COATED ORAL at 06:50

## 2022-01-01 RX ADMIN — Medication 650 MILLIGRAM(S): at 15:06

## 2022-01-01 RX ADMIN — MORPHINE SULFATE 3 MILLIGRAM(S): 50 CAPSULE, EXTENDED RELEASE ORAL at 07:35

## 2022-01-01 RX ADMIN — Medication 1 APPLICATION(S): at 06:27

## 2022-01-01 RX ADMIN — Medication 1 DROP(S): at 14:18

## 2022-01-01 RX ADMIN — Medication 1 DROP(S): at 22:27

## 2022-01-01 RX ADMIN — MORPHINE SULFATE 4 MILLIGRAM(S): 50 CAPSULE, EXTENDED RELEASE ORAL at 09:20

## 2022-01-01 RX ADMIN — Medication 12.5 MILLIGRAM(S): at 05:25

## 2022-01-01 RX ADMIN — LIDOCAINE 1 APPLICATION(S): 4 CREAM TOPICAL at 17:52

## 2022-01-01 RX ADMIN — SODIUM CHLORIDE 75 MILLILITER(S): 9 INJECTION, SOLUTION INTRAVENOUS at 13:46

## 2022-01-01 RX ADMIN — Medication 1000 MILLIGRAM(S): at 13:10

## 2022-01-01 RX ADMIN — Medication 1: at 17:07

## 2022-01-01 RX ADMIN — Medication 1000 MILLIGRAM(S): at 22:20

## 2022-01-01 RX ADMIN — Medication 300 MILLIGRAM(S): at 06:44

## 2022-01-01 RX ADMIN — MUPIROCIN 1 APPLICATION(S): 20 OINTMENT TOPICAL at 05:52

## 2022-01-01 RX ADMIN — Medication 10 MILLIGRAM(S): at 23:27

## 2022-01-01 RX ADMIN — Medication 1 DROP(S): at 23:56

## 2022-01-01 RX ADMIN — Medication 1000 MILLIGRAM(S): at 06:07

## 2022-01-01 RX ADMIN — Medication 1 DROP(S): at 14:38

## 2022-01-01 RX ADMIN — Medication 0.5 MILLIGRAM(S): at 09:18

## 2022-01-01 RX ADMIN — MUPIROCIN 1 APPLICATION(S): 20 OINTMENT TOPICAL at 15:31

## 2022-01-01 RX ADMIN — QUETIAPINE FUMARATE 25 MILLIGRAM(S): 200 TABLET, FILM COATED ORAL at 21:17

## 2022-01-01 RX ADMIN — Medication 15 MILLIGRAM(S): at 12:21

## 2022-01-01 RX ADMIN — Medication 1 DROP(S): at 13:45

## 2022-01-01 RX ADMIN — NYSTATIN CREAM 1 APPLICATION(S): 100000 CREAM TOPICAL at 05:25

## 2022-01-01 RX ADMIN — Medication 1 DROP(S): at 17:37

## 2022-01-01 RX ADMIN — Medication 1 APPLICATION(S): at 12:05

## 2022-01-01 RX ADMIN — Medication 12.5 MILLIGRAM(S): at 06:54

## 2022-01-01 RX ADMIN — LORATADINE 10 MILLIGRAM(S): 10 TABLET ORAL at 12:26

## 2022-01-01 RX ADMIN — MUPIROCIN 1 APPLICATION(S): 20 OINTMENT TOPICAL at 22:51

## 2022-01-01 RX ADMIN — Medication 1 APPLICATION(S): at 06:13

## 2022-01-01 RX ADMIN — Medication 1 DROP(S): at 17:45

## 2022-01-01 RX ADMIN — PIPERACILLIN AND TAZOBACTAM 200 GRAM(S): 4; .5 INJECTION, POWDER, LYOPHILIZED, FOR SOLUTION INTRAVENOUS at 14:05

## 2022-01-01 RX ADMIN — Medication 1 APPLICATION(S): at 05:46

## 2022-01-01 RX ADMIN — Medication 1 DROP(S): at 13:29

## 2022-01-01 RX ADMIN — Medication 400 MILLIGRAM(S): at 17:43

## 2022-01-01 RX ADMIN — MORPHINE SULFATE 4 MILLIGRAM(S): 50 CAPSULE, EXTENDED RELEASE ORAL at 23:33

## 2022-01-01 RX ADMIN — INSULIN GLARGINE 10 UNIT(S): 100 INJECTION, SOLUTION SUBCUTANEOUS at 22:34

## 2022-01-01 RX ADMIN — QUETIAPINE FUMARATE 12.5 MILLIGRAM(S): 200 TABLET, FILM COATED ORAL at 09:27

## 2022-01-01 RX ADMIN — MORPHINE SULFATE 7.5 MILLIGRAM(S): 50 CAPSULE, EXTENDED RELEASE ORAL at 01:42

## 2022-01-01 RX ADMIN — Medication 150 MILLIGRAM(S): at 21:43

## 2022-01-01 RX ADMIN — Medication 1: at 21:00

## 2022-01-01 RX ADMIN — Medication 20 MILLIGRAM(S): at 06:19

## 2022-01-01 RX ADMIN — Medication 20 MILLIGRAM(S): at 18:23

## 2022-01-01 RX ADMIN — Medication 650 MILLIGRAM(S): at 14:33

## 2022-01-01 RX ADMIN — PREGABALIN 1000 MICROGRAM(S): 225 CAPSULE ORAL at 11:26

## 2022-01-01 RX ADMIN — LORATADINE 10 MILLIGRAM(S): 10 TABLET ORAL at 13:00

## 2022-01-01 RX ADMIN — MORPHINE SULFATE 7.5 MILLIGRAM(S): 50 CAPSULE, EXTENDED RELEASE ORAL at 22:51

## 2022-01-01 RX ADMIN — Medication 263 MILLIGRAM(S): at 01:48

## 2022-01-01 RX ADMIN — Medication 1 DROP(S): at 21:58

## 2022-01-01 RX ADMIN — Medication 1 PACKET(S): at 13:09

## 2022-01-01 RX ADMIN — Medication 263 MILLIGRAM(S): at 12:31

## 2022-01-01 RX ADMIN — Medication 1 DROP(S): at 19:00

## 2022-01-01 RX ADMIN — URSODIOL 500 MILLIGRAM(S): 250 TABLET, FILM COATED ORAL at 17:42

## 2022-01-01 RX ADMIN — MORPHINE SULFATE 2 MILLIGRAM(S): 50 CAPSULE, EXTENDED RELEASE ORAL at 12:54

## 2022-01-01 RX ADMIN — Medication 1 APPLICATION(S): at 06:04

## 2022-01-01 RX ADMIN — BRIMONIDINE TARTRATE 1 DROP(S): 2 SOLUTION/ DROPS OPHTHALMIC at 05:28

## 2022-01-01 RX ADMIN — Medication 263 MILLIGRAM(S): at 09:29

## 2022-01-01 RX ADMIN — Medication 1 DROP(S): at 17:19

## 2022-01-01 RX ADMIN — LIDOCAINE 1 APPLICATION(S): 4 CREAM TOPICAL at 05:26

## 2022-01-01 RX ADMIN — INSULIN GLARGINE 10 UNIT(S): 100 INJECTION, SOLUTION SUBCUTANEOUS at 22:26

## 2022-01-01 RX ADMIN — BUDESONIDE AND FORMOTEROL FUMARATE DIHYDRATE 2 PUFF(S): 160; 4.5 AEROSOL RESPIRATORY (INHALATION) at 09:13

## 2022-01-01 RX ADMIN — INSULIN GLARGINE 10 UNIT(S): 100 INJECTION, SOLUTION SUBCUTANEOUS at 22:43

## 2022-01-01 RX ADMIN — Medication 20 MILLIGRAM(S): at 05:46

## 2022-01-01 RX ADMIN — LOSARTAN POTASSIUM 50 MILLIGRAM(S): 100 TABLET, FILM COATED ORAL at 07:41

## 2022-01-01 RX ADMIN — Medication 250 MILLIGRAM(S): at 15:17

## 2022-01-01 RX ADMIN — Medication 1: at 22:57

## 2022-01-01 RX ADMIN — LORATADINE 10 MILLIGRAM(S): 10 TABLET ORAL at 11:37

## 2022-01-01 RX ADMIN — ENOXAPARIN SODIUM 80 MILLIGRAM(S): 100 INJECTION SUBCUTANEOUS at 06:31

## 2022-01-01 RX ADMIN — NYSTATIN CREAM 1 APPLICATION(S): 100000 CREAM TOPICAL at 05:54

## 2022-01-01 RX ADMIN — Medication 3 MILLILITER(S): at 09:51

## 2022-01-01 RX ADMIN — Medication 3 MILLILITER(S): at 08:26

## 2022-01-01 RX ADMIN — MORPHINE SULFATE 7.5 MILLIGRAM(S): 50 CAPSULE, EXTENDED RELEASE ORAL at 00:06

## 2022-01-01 RX ADMIN — Medication 0.5 MILLIGRAM(S): at 09:38

## 2022-01-01 RX ADMIN — Medication 3 MILLILITER(S): at 02:43

## 2022-01-01 RX ADMIN — Medication 1 DROP(S): at 06:49

## 2022-01-01 RX ADMIN — NYSTATIN CREAM 1 APPLICATION(S): 100000 CREAM TOPICAL at 06:19

## 2022-01-01 RX ADMIN — Medication 1 DROP(S): at 07:07

## 2022-01-01 RX ADMIN — Medication 100 MILLIGRAM(S): at 22:04

## 2022-01-01 RX ADMIN — Medication 1000 MILLIGRAM(S): at 21:31

## 2022-01-01 RX ADMIN — ENOXAPARIN SODIUM 80 MILLIGRAM(S): 100 INJECTION SUBCUTANEOUS at 18:09

## 2022-01-01 RX ADMIN — SODIUM CHLORIDE 60 MILLILITER(S): 9 INJECTION INTRAMUSCULAR; INTRAVENOUS; SUBCUTANEOUS at 10:14

## 2022-01-01 RX ADMIN — ENOXAPARIN SODIUM 80 MILLIGRAM(S): 100 INJECTION SUBCUTANEOUS at 06:48

## 2022-01-01 RX ADMIN — Medication 3 MILLILITER(S): at 04:13

## 2022-01-01 RX ADMIN — SODIUM CHLORIDE 30 MILLILITER(S): 9 INJECTION INTRAMUSCULAR; INTRAVENOUS; SUBCUTANEOUS at 00:40

## 2022-01-01 RX ADMIN — Medication 1 DROP(S): at 02:28

## 2022-01-01 RX ADMIN — Medication 1 APPLICATION(S): at 00:28

## 2022-01-01 RX ADMIN — ENOXAPARIN SODIUM 80 MILLIGRAM(S): 100 INJECTION SUBCUTANEOUS at 17:45

## 2022-01-01 RX ADMIN — Medication 1000 MILLIGRAM(S): at 19:48

## 2022-01-01 RX ADMIN — Medication 20 MILLIGRAM(S): at 06:58

## 2022-01-01 RX ADMIN — Medication 1 DROP(S): at 02:25

## 2022-01-01 RX ADMIN — ENOXAPARIN SODIUM 80 MILLIGRAM(S): 100 INJECTION SUBCUTANEOUS at 06:46

## 2022-01-01 RX ADMIN — MIRTAZAPINE 15 MILLIGRAM(S): 45 TABLET, ORALLY DISINTEGRATING ORAL at 22:38

## 2022-01-01 RX ADMIN — Medication 3 MILLILITER(S): at 11:32

## 2022-01-01 RX ADMIN — Medication 1 APPLICATION(S): at 23:56

## 2022-01-01 RX ADMIN — Medication 1: at 16:48

## 2022-01-01 RX ADMIN — Medication 3 MILLILITER(S): at 21:02

## 2022-01-01 RX ADMIN — Medication 1000 MILLIGRAM(S): at 12:58

## 2022-01-01 RX ADMIN — Medication 1 DROP(S): at 03:13

## 2022-01-01 RX ADMIN — Medication 20 MILLIGRAM(S): at 18:07

## 2022-01-01 RX ADMIN — PIPERACILLIN AND TAZOBACTAM 25 GRAM(S): 4; .5 INJECTION, POWDER, LYOPHILIZED, FOR SOLUTION INTRAVENOUS at 14:17

## 2022-01-01 RX ADMIN — MUPIROCIN 1 APPLICATION(S): 20 OINTMENT TOPICAL at 07:05

## 2022-01-01 RX ADMIN — HYDROMORPHONE HYDROCHLORIDE 0.5 MILLIGRAM(S): 2 INJECTION INTRAMUSCULAR; INTRAVENOUS; SUBCUTANEOUS at 22:54

## 2022-01-01 RX ADMIN — Medication 1000 MILLIGRAM(S): at 14:01

## 2022-01-01 RX ADMIN — Medication 1 DROP(S): at 23:47

## 2022-01-01 RX ADMIN — LIDOCAINE 1 APPLICATION(S): 4 CREAM TOPICAL at 18:24

## 2022-01-01 RX ADMIN — URSODIOL 500 MILLIGRAM(S): 250 TABLET, FILM COATED ORAL at 18:32

## 2022-01-01 RX ADMIN — URSODIOL 500 MILLIGRAM(S): 250 TABLET, FILM COATED ORAL at 17:15

## 2022-01-01 RX ADMIN — GABAPENTIN 100 MILLIGRAM(S): 400 CAPSULE ORAL at 05:55

## 2022-01-01 RX ADMIN — MORPHINE SULFATE 2.5 MILLIGRAM(S): 50 CAPSULE, EXTENDED RELEASE ORAL at 12:15

## 2022-01-01 RX ADMIN — MORPHINE SULFATE 3 MILLIGRAM(S): 50 CAPSULE, EXTENDED RELEASE ORAL at 12:13

## 2022-01-01 RX ADMIN — Medication 650 MILLIGRAM(S): at 15:18

## 2022-01-01 RX ADMIN — ALBUTEROL 2.5 MILLIGRAM(S): 90 AEROSOL, METERED ORAL at 09:19

## 2022-01-01 RX ADMIN — Medication 1 DROP(S): at 21:50

## 2022-01-01 RX ADMIN — Medication 1 DROP(S): at 21:09

## 2022-01-01 RX ADMIN — Medication 1 DROP(S): at 11:26

## 2022-01-01 RX ADMIN — Medication 1 TABLET(S): at 12:01

## 2022-01-01 RX ADMIN — NYSTATIN CREAM 1 APPLICATION(S): 100000 CREAM TOPICAL at 19:11

## 2022-01-01 RX ADMIN — LIDOCAINE 1 APPLICATION(S): 4 CREAM TOPICAL at 18:37

## 2022-01-01 RX ADMIN — INSULIN GLARGINE 10 UNIT(S): 100 INJECTION, SOLUTION SUBCUTANEOUS at 22:36

## 2022-01-01 RX ADMIN — SODIUM CHLORIDE 75 MILLILITER(S): 9 INJECTION, SOLUTION INTRAVENOUS at 05:57

## 2022-01-01 RX ADMIN — Medication 250 MILLIGRAM(S): at 19:22

## 2022-01-01 RX ADMIN — Medication 12.5 MILLIGRAM(S): at 06:18

## 2022-01-01 RX ADMIN — SODIUM CHLORIDE 50 MILLILITER(S): 9 INJECTION, SOLUTION INTRAVENOUS at 21:57

## 2022-01-01 RX ADMIN — MORPHINE SULFATE 5 MILLIGRAM(S): 50 CAPSULE, EXTENDED RELEASE ORAL at 22:10

## 2022-01-01 RX ADMIN — Medication 20 MILLIGRAM(S): at 14:36

## 2022-01-01 RX ADMIN — GABAPENTIN 300 MILLIGRAM(S): 400 CAPSULE ORAL at 13:12

## 2022-01-01 RX ADMIN — NYSTATIN CREAM 1 APPLICATION(S): 100000 CREAM TOPICAL at 18:11

## 2022-01-01 RX ADMIN — Medication 100 MILLIGRAM(S): at 22:21

## 2022-01-01 RX ADMIN — METHADONE HYDROCHLORIDE 5 MILLIGRAM(S): 40 TABLET ORAL at 07:29

## 2022-01-01 RX ADMIN — Medication 400 MILLIGRAM(S): at 11:24

## 2022-01-01 RX ADMIN — Medication 1: at 17:25

## 2022-01-01 RX ADMIN — GABAPENTIN 100 MILLIGRAM(S): 400 CAPSULE ORAL at 22:22

## 2022-01-01 RX ADMIN — Medication 1000 MILLIGRAM(S): at 13:03

## 2022-01-01 RX ADMIN — Medication 1 DROP(S): at 09:44

## 2022-01-01 RX ADMIN — Medication 263 MILLIGRAM(S): at 23:00

## 2022-01-01 RX ADMIN — Medication 1: at 23:40

## 2022-01-01 RX ADMIN — Medication 20 MILLIGRAM(S): at 06:45

## 2022-01-01 RX ADMIN — Medication 650 MILLIGRAM(S): at 06:30

## 2022-01-01 RX ADMIN — APIXABAN 5 MILLIGRAM(S): 2.5 TABLET, FILM COATED ORAL at 17:35

## 2022-01-01 RX ADMIN — ENOXAPARIN SODIUM 80 MILLIGRAM(S): 100 INJECTION SUBCUTANEOUS at 05:23

## 2022-01-01 RX ADMIN — Medication 1 APPLICATION(S): at 18:18

## 2022-01-01 RX ADMIN — NYSTATIN CREAM 1 APPLICATION(S): 100000 CREAM TOPICAL at 07:14

## 2022-01-01 RX ADMIN — LIDOCAINE 1 APPLICATION(S): 4 CREAM TOPICAL at 18:12

## 2022-01-01 RX ADMIN — Medication 1 APPLICATION(S): at 00:21

## 2022-01-01 RX ADMIN — MUPIROCIN 1 APPLICATION(S): 20 OINTMENT TOPICAL at 13:32

## 2022-01-01 RX ADMIN — MUPIROCIN 1 APPLICATION(S): 20 OINTMENT TOPICAL at 03:10

## 2022-01-01 RX ADMIN — INSULIN GLARGINE 10 UNIT(S): 100 INJECTION, SOLUTION SUBCUTANEOUS at 22:16

## 2022-01-01 RX ADMIN — Medication 1 DROP(S): at 22:35

## 2022-01-01 RX ADMIN — GABAPENTIN 100 MILLIGRAM(S): 400 CAPSULE ORAL at 06:29

## 2022-01-01 RX ADMIN — MORPHINE SULFATE 3 MILLIGRAM(S): 50 CAPSULE, EXTENDED RELEASE ORAL at 11:29

## 2022-01-01 RX ADMIN — NYSTATIN CREAM 1 APPLICATION(S): 100000 CREAM TOPICAL at 17:39

## 2022-01-01 RX ADMIN — Medication 1 DROP(S): at 11:10

## 2022-01-01 RX ADMIN — SODIUM CHLORIDE 500 MILLILITER(S): 9 INJECTION INTRAMUSCULAR; INTRAVENOUS; SUBCUTANEOUS at 07:56

## 2022-01-01 RX ADMIN — Medication 2: at 07:47

## 2022-01-01 RX ADMIN — Medication 3 MILLILITER(S): at 10:30

## 2022-01-01 RX ADMIN — Medication 1 DROP(S): at 00:46

## 2022-01-01 RX ADMIN — MORPHINE SULFATE 3 MILLIGRAM(S): 50 CAPSULE, EXTENDED RELEASE ORAL at 14:08

## 2022-01-01 RX ADMIN — Medication 1 DROP(S): at 22:47

## 2022-01-01 RX ADMIN — Medication 1 DROP(S): at 16:30

## 2022-01-01 RX ADMIN — VALACYCLOVIR 1000 MILLIGRAM(S): 500 TABLET, FILM COATED ORAL at 18:23

## 2022-01-01 RX ADMIN — Medication 1 APPLICATION(S): at 05:29

## 2022-01-01 RX ADMIN — Medication 3 MILLILITER(S): at 20:37

## 2022-01-01 RX ADMIN — Medication 1000 MILLIGRAM(S): at 15:38

## 2022-01-01 RX ADMIN — ENOXAPARIN SODIUM 80 MILLIGRAM(S): 100 INJECTION SUBCUTANEOUS at 18:58

## 2022-01-01 RX ADMIN — NYSTATIN CREAM 1 APPLICATION(S): 100000 CREAM TOPICAL at 05:26

## 2022-01-01 RX ADMIN — Medication 3 MILLILITER(S): at 15:00

## 2022-01-01 RX ADMIN — MORPHINE SULFATE 2 MILLIGRAM(S): 50 CAPSULE, EXTENDED RELEASE ORAL at 18:25

## 2022-01-01 RX ADMIN — Medication 1 DROP(S): at 07:06

## 2022-01-01 RX ADMIN — PANTOPRAZOLE SODIUM 40 MILLIGRAM(S): 20 TABLET, DELAYED RELEASE ORAL at 06:39

## 2022-01-01 RX ADMIN — NYSTATIN CREAM 1 APPLICATION(S): 100000 CREAM TOPICAL at 06:07

## 2022-01-01 RX ADMIN — OXCARBAZEPINE 300 MILLIGRAM(S): 300 TABLET, FILM COATED ORAL at 17:32

## 2022-01-01 RX ADMIN — Medication 20 MILLIGRAM(S): at 18:00

## 2022-01-01 RX ADMIN — Medication 1 DROP(S): at 15:09

## 2022-01-01 RX ADMIN — Medication 1: at 12:25

## 2022-01-01 RX ADMIN — Medication 150 MILLIGRAM(S): at 06:08

## 2022-01-01 RX ADMIN — Medication 300 MILLIGRAM(S): at 18:51

## 2022-01-01 RX ADMIN — SENNA PLUS 1 TABLET(S): 8.6 TABLET ORAL at 21:14

## 2022-01-01 RX ADMIN — Medication 1 DROP(S): at 01:26

## 2022-01-01 RX ADMIN — MORPHINE SULFATE 3 MILLIGRAM(S): 50 CAPSULE, EXTENDED RELEASE ORAL at 13:22

## 2022-01-01 RX ADMIN — Medication 0.5 MILLIGRAM(S): at 22:39

## 2022-01-01 RX ADMIN — PANTOPRAZOLE SODIUM 40 MILLIGRAM(S): 20 TABLET, DELAYED RELEASE ORAL at 06:08

## 2022-01-01 RX ADMIN — MORPHINE SULFATE 5 MILLIGRAM(S): 50 CAPSULE, EXTENDED RELEASE ORAL at 19:36

## 2022-01-01 RX ADMIN — AMLODIPINE BESYLATE 5 MILLIGRAM(S): 2.5 TABLET ORAL at 13:00

## 2022-01-01 RX ADMIN — Medication 300 MILLIGRAM(S): at 06:38

## 2022-01-01 RX ADMIN — CALAMINE AND ZINC OXIDE AND PHENOL 1 APPLICATION(S): 160; 10 LOTION TOPICAL at 06:27

## 2022-01-01 RX ADMIN — MUPIROCIN 1 APPLICATION(S): 20 OINTMENT TOPICAL at 06:42

## 2022-01-01 RX ADMIN — PIPERACILLIN AND TAZOBACTAM 25 GRAM(S): 4; .5 INJECTION, POWDER, LYOPHILIZED, FOR SOLUTION INTRAVENOUS at 21:14

## 2022-01-01 RX ADMIN — Medication 1 DROP(S): at 17:27

## 2022-01-01 RX ADMIN — ENOXAPARIN SODIUM 80 MILLIGRAM(S): 100 INJECTION SUBCUTANEOUS at 06:38

## 2022-01-01 RX ADMIN — SODIUM CHLORIDE 75 MILLILITER(S): 9 INJECTION, SOLUTION INTRAVENOUS at 06:22

## 2022-01-01 RX ADMIN — Medication 263 MILLIGRAM(S): at 12:35

## 2022-01-01 RX ADMIN — Medication 20 MILLIGRAM(S): at 06:36

## 2022-01-01 RX ADMIN — PREGABALIN 1000 MICROGRAM(S): 225 CAPSULE ORAL at 13:40

## 2022-01-01 RX ADMIN — Medication 1 APPLICATION(S): at 05:26

## 2022-01-01 RX ADMIN — LIDOCAINE 1 APPLICATION(S): 4 CREAM TOPICAL at 17:27

## 2022-01-01 RX ADMIN — VALACYCLOVIR 1000 MILLIGRAM(S): 500 TABLET, FILM COATED ORAL at 17:28

## 2022-01-01 RX ADMIN — MUPIROCIN 1 APPLICATION(S): 20 OINTMENT TOPICAL at 22:13

## 2022-01-01 RX ADMIN — Medication 1 APPLICATION(S): at 06:41

## 2022-01-01 RX ADMIN — Medication 1 APPLICATION(S): at 06:52

## 2022-01-01 RX ADMIN — BUDESONIDE AND FORMOTEROL FUMARATE DIHYDRATE 2 PUFF(S): 160; 4.5 AEROSOL RESPIRATORY (INHALATION) at 21:50

## 2022-01-01 RX ADMIN — URSODIOL 500 MILLIGRAM(S): 250 TABLET, FILM COATED ORAL at 17:46

## 2022-01-01 RX ADMIN — Medication 3 MILLILITER(S): at 09:22

## 2022-01-01 RX ADMIN — Medication 3 MILLILITER(S): at 19:51

## 2022-01-01 RX ADMIN — Medication 1 DROP(S): at 13:14

## 2022-01-01 RX ADMIN — OXCARBAZEPINE 450 MILLIGRAM(S): 300 TABLET, FILM COATED ORAL at 06:53

## 2022-01-01 RX ADMIN — Medication 1: at 07:52

## 2022-01-01 RX ADMIN — MIRTAZAPINE 7.5 MILLIGRAM(S): 45 TABLET, ORALLY DISINTEGRATING ORAL at 21:01

## 2022-01-01 RX ADMIN — Medication 400 MILLIGRAM(S): at 05:44

## 2022-01-01 RX ADMIN — Medication 263 MILLIGRAM(S): at 19:02

## 2022-01-01 RX ADMIN — Medication 1 DROP(S): at 10:15

## 2022-01-01 RX ADMIN — VALACYCLOVIR 1000 MILLIGRAM(S): 500 TABLET, FILM COATED ORAL at 01:45

## 2022-01-01 RX ADMIN — SENNA PLUS 1 TABLET(S): 8.6 TABLET ORAL at 21:44

## 2022-01-01 RX ADMIN — Medication 1 DROP(S): at 16:10

## 2022-01-01 RX ADMIN — Medication 100 MILLIGRAM(S): at 06:04

## 2022-01-01 RX ADMIN — Medication 300 MILLIGRAM(S): at 17:57

## 2022-01-01 RX ADMIN — ENOXAPARIN SODIUM 80 MILLIGRAM(S): 100 INJECTION SUBCUTANEOUS at 18:43

## 2022-01-01 RX ADMIN — Medication 100 MILLIGRAM(S): at 21:08

## 2022-01-01 RX ADMIN — HYDROMORPHONE HYDROCHLORIDE 0.5 MILLIGRAM(S): 2 INJECTION INTRAMUSCULAR; INTRAVENOUS; SUBCUTANEOUS at 06:00

## 2022-01-01 RX ADMIN — Medication 100 MILLIGRAM(S): at 05:27

## 2022-01-01 RX ADMIN — TAMSULOSIN HYDROCHLORIDE 0.4 MILLIGRAM(S): 0.4 CAPSULE ORAL at 22:22

## 2022-01-01 RX ADMIN — Medication 3: at 22:34

## 2022-01-01 RX ADMIN — ALBUTEROL 2.5 MILLIGRAM(S): 90 AEROSOL, METERED ORAL at 04:55

## 2022-01-01 RX ADMIN — Medication 1 DROP(S): at 17:57

## 2022-01-01 RX ADMIN — MORPHINE SULFATE 15 MILLIGRAM(S): 50 CAPSULE, EXTENDED RELEASE ORAL at 21:08

## 2022-01-01 RX ADMIN — HYDROMORPHONE HYDROCHLORIDE 0.25 MILLIGRAM(S): 2 INJECTION INTRAMUSCULAR; INTRAVENOUS; SUBCUTANEOUS at 18:01

## 2022-01-01 RX ADMIN — Medication 1 DROP(S): at 03:17

## 2022-01-01 RX ADMIN — Medication 20 MILLIGRAM(S): at 05:22

## 2022-01-01 RX ADMIN — Medication 1000 MILLIGRAM(S): at 12:16

## 2022-01-01 RX ADMIN — Medication 15 MILLIGRAM(S): at 12:00

## 2022-01-01 RX ADMIN — Medication 1 APPLICATION(S): at 18:42

## 2022-01-01 RX ADMIN — Medication 263 MILLIGRAM(S): at 07:22

## 2022-01-01 RX ADMIN — PREGABALIN 1000 MICROGRAM(S): 225 CAPSULE ORAL at 12:24

## 2022-01-01 RX ADMIN — URSODIOL 500 MILLIGRAM(S): 250 TABLET, FILM COATED ORAL at 17:49

## 2022-01-01 RX ADMIN — NYSTATIN CREAM 1 APPLICATION(S): 100000 CREAM TOPICAL at 18:40

## 2022-01-01 RX ADMIN — HYDROMORPHONE HYDROCHLORIDE 0.5 MILLIGRAM(S): 2 INJECTION INTRAMUSCULAR; INTRAVENOUS; SUBCUTANEOUS at 09:20

## 2022-01-01 RX ADMIN — Medication 0.5 MILLIGRAM(S): at 22:50

## 2022-01-01 RX ADMIN — Medication 1 DROP(S): at 12:41

## 2022-01-01 RX ADMIN — ENOXAPARIN SODIUM 80 MILLIGRAM(S): 100 INJECTION SUBCUTANEOUS at 18:37

## 2022-01-01 RX ADMIN — LIDOCAINE 1 APPLICATION(S): 4 CREAM TOPICAL at 05:32

## 2022-01-01 RX ADMIN — LORATADINE 10 MILLIGRAM(S): 10 TABLET ORAL at 12:28

## 2022-01-01 RX ADMIN — Medication 250 MILLIGRAM(S): at 19:01

## 2022-01-01 RX ADMIN — Medication 150 MILLIGRAM(S): at 06:49

## 2022-01-01 RX ADMIN — Medication 650 MILLIGRAM(S): at 06:40

## 2022-01-01 RX ADMIN — NYSTATIN CREAM 1 APPLICATION(S): 100000 CREAM TOPICAL at 17:44

## 2022-01-01 RX ADMIN — MORPHINE SULFATE 2.5 MILLIGRAM(S): 50 CAPSULE, EXTENDED RELEASE ORAL at 21:46

## 2022-01-01 RX ADMIN — Medication 1 DROP(S): at 21:01

## 2022-01-01 RX ADMIN — SODIUM CHLORIDE 75 MILLILITER(S): 9 INJECTION, SOLUTION INTRAVENOUS at 21:24

## 2022-01-01 RX ADMIN — Medication 1 DROP(S): at 01:34

## 2022-01-01 RX ADMIN — Medication 263 MILLIGRAM(S): at 10:02

## 2022-01-01 RX ADMIN — GABAPENTIN 600 MILLIGRAM(S): 400 CAPSULE ORAL at 17:46

## 2022-01-01 RX ADMIN — Medication 1 DROP(S): at 22:04

## 2022-01-01 RX ADMIN — MUPIROCIN 1 APPLICATION(S): 20 OINTMENT TOPICAL at 05:42

## 2022-01-01 RX ADMIN — URSODIOL 500 MILLIGRAM(S): 250 TABLET, FILM COATED ORAL at 18:36

## 2022-01-01 RX ADMIN — Medication 100 MILLIGRAM(S): at 21:02

## 2022-01-01 RX ADMIN — Medication 1 TABLET(S): at 11:57

## 2022-01-01 RX ADMIN — Medication 12.5 MILLIGRAM(S): at 05:38

## 2022-01-01 RX ADMIN — MORPHINE SULFATE 7.5 MILLIGRAM(S): 50 CAPSULE, EXTENDED RELEASE ORAL at 09:56

## 2022-01-01 RX ADMIN — ENOXAPARIN SODIUM 80 MILLIGRAM(S): 100 INJECTION SUBCUTANEOUS at 18:24

## 2022-01-01 RX ADMIN — MUPIROCIN 1 APPLICATION(S): 20 OINTMENT TOPICAL at 14:06

## 2022-01-01 RX ADMIN — MORPHINE SULFATE 5 MILLIGRAM(S): 50 CAPSULE, EXTENDED RELEASE ORAL at 06:35

## 2022-01-01 RX ADMIN — Medication 75 MILLIGRAM(S): at 06:58

## 2022-01-01 RX ADMIN — Medication 12.5 MILLIGRAM(S): at 06:12

## 2022-01-01 RX ADMIN — VALACYCLOVIR 1000 MILLIGRAM(S): 500 TABLET, FILM COATED ORAL at 09:07

## 2022-01-01 RX ADMIN — Medication 1 APPLICATION(S): at 05:40

## 2022-01-01 RX ADMIN — POLYETHYLENE GLYCOL 3350 17 GRAM(S): 17 POWDER, FOR SOLUTION ORAL at 12:48

## 2022-01-01 RX ADMIN — Medication 400 MILLIGRAM(S): at 03:28

## 2022-01-01 RX ADMIN — MORPHINE SULFATE 7.5 MILLIGRAM(S): 50 CAPSULE, EXTENDED RELEASE ORAL at 09:18

## 2022-01-01 RX ADMIN — Medication 1 DROP(S): at 05:30

## 2022-01-01 RX ADMIN — Medication 1 APPLICATION(S): at 23:37

## 2022-01-01 RX ADMIN — Medication 1 DROP(S): at 21:37

## 2022-01-01 RX ADMIN — LIDOCAINE 1 APPLICATION(S): 4 CREAM TOPICAL at 06:11

## 2022-01-01 RX ADMIN — Medication 1 DROP(S): at 17:00

## 2022-01-01 RX ADMIN — ENOXAPARIN SODIUM 40 MILLIGRAM(S): 100 INJECTION SUBCUTANEOUS at 17:47

## 2022-01-01 RX ADMIN — MUPIROCIN 1 APPLICATION(S): 20 OINTMENT TOPICAL at 06:49

## 2022-01-01 RX ADMIN — Medication 1 APPLICATION(S): at 05:28

## 2022-01-01 RX ADMIN — Medication 1 DROP(S): at 02:35

## 2022-01-01 RX ADMIN — Medication 1 APPLICATION(S): at 06:34

## 2022-01-01 RX ADMIN — Medication 20 MILLIGRAM(S): at 23:15

## 2022-01-01 RX ADMIN — Medication 975 MILLIGRAM(S): at 13:07

## 2022-01-01 RX ADMIN — Medication 20 MILLIEQUIVALENT(S): at 12:40

## 2022-01-01 RX ADMIN — Medication 1 DROP(S): at 18:43

## 2022-01-01 RX ADMIN — Medication 75 MILLIGRAM(S): at 22:26

## 2022-01-01 RX ADMIN — Medication 400 MILLIGRAM(S): at 09:59

## 2022-01-01 RX ADMIN — Medication 10 MILLIGRAM(S): at 22:39

## 2022-01-01 RX ADMIN — Medication 1 DROP(S): at 03:25

## 2022-01-01 RX ADMIN — MORPHINE SULFATE 2 MILLIGRAM(S): 50 CAPSULE, EXTENDED RELEASE ORAL at 03:25

## 2022-01-01 RX ADMIN — MIRTAZAPINE 7.5 MILLIGRAM(S): 45 TABLET, ORALLY DISINTEGRATING ORAL at 22:23

## 2022-01-01 RX ADMIN — QUETIAPINE FUMARATE 25 MILLIGRAM(S): 200 TABLET, FILM COATED ORAL at 15:00

## 2022-01-01 RX ADMIN — GABAPENTIN 400 MILLIGRAM(S): 400 CAPSULE ORAL at 21:54

## 2022-01-01 RX ADMIN — MORPHINE SULFATE 7.5 MILLIGRAM(S): 50 CAPSULE, EXTENDED RELEASE ORAL at 18:49

## 2022-01-01 RX ADMIN — PIPERACILLIN AND TAZOBACTAM 25 GRAM(S): 4; .5 INJECTION, POWDER, LYOPHILIZED, FOR SOLUTION INTRAVENOUS at 05:33

## 2022-01-01 RX ADMIN — Medication 262.4 MILLIGRAM(S): at 11:08

## 2022-01-01 RX ADMIN — PREGABALIN 1000 MICROGRAM(S): 225 CAPSULE ORAL at 11:37

## 2022-01-01 RX ADMIN — Medication 3 MILLILITER(S): at 03:20

## 2022-01-01 RX ADMIN — HYDROMORPHONE HYDROCHLORIDE 0.5 MILLIGRAM(S): 2 INJECTION INTRAMUSCULAR; INTRAVENOUS; SUBCUTANEOUS at 22:00

## 2022-01-01 RX ADMIN — Medication 100 MILLIGRAM(S): at 23:27

## 2022-01-01 RX ADMIN — GABAPENTIN 300 MILLIGRAM(S): 400 CAPSULE ORAL at 06:19

## 2022-01-01 RX ADMIN — OXYCODONE HYDROCHLORIDE 2.5 MILLIGRAM(S): 5 TABLET ORAL at 23:26

## 2022-01-01 RX ADMIN — VALACYCLOVIR 1000 MILLIGRAM(S): 500 TABLET, FILM COATED ORAL at 18:10

## 2022-01-01 RX ADMIN — Medication 1 DROP(S): at 04:34

## 2022-01-01 RX ADMIN — Medication 400 MILLIGRAM(S): at 23:31

## 2022-01-01 RX ADMIN — OXCARBAZEPINE 600 MILLIGRAM(S): 300 TABLET, FILM COATED ORAL at 06:07

## 2022-01-01 RX ADMIN — PREGABALIN 1000 MICROGRAM(S): 225 CAPSULE ORAL at 13:00

## 2022-01-01 RX ADMIN — Medication 100 MILLIGRAM(S): at 22:33

## 2022-01-01 RX ADMIN — MEROPENEM 100 MILLIGRAM(S): 1 INJECTION INTRAVENOUS at 21:37

## 2022-01-01 RX ADMIN — Medication 1 APPLICATION(S): at 11:58

## 2022-01-01 RX ADMIN — VALACYCLOVIR 1000 MILLIGRAM(S): 500 TABLET, FILM COATED ORAL at 10:39

## 2022-01-01 RX ADMIN — MORPHINE SULFATE 4 MILLIGRAM(S): 50 CAPSULE, EXTENDED RELEASE ORAL at 02:12

## 2022-01-01 RX ADMIN — Medication 1000 MILLIGRAM(S): at 05:54

## 2022-01-01 RX ADMIN — MUPIROCIN 1 APPLICATION(S): 20 OINTMENT TOPICAL at 06:52

## 2022-01-01 RX ADMIN — Medication 1 DROP(S): at 01:16

## 2022-01-01 RX ADMIN — PIPERACILLIN AND TAZOBACTAM 25 GRAM(S): 4; .5 INJECTION, POWDER, LYOPHILIZED, FOR SOLUTION INTRAVENOUS at 20:16

## 2022-01-01 RX ADMIN — Medication 100 MILLIGRAM(S): at 22:40

## 2022-01-01 RX ADMIN — Medication 0.5 MILLIGRAM(S): at 20:54

## 2022-01-01 RX ADMIN — Medication 0.5 MILLIGRAM(S): at 21:27

## 2022-01-01 RX ADMIN — MORPHINE SULFATE 7.5 MILLIGRAM(S): 50 CAPSULE, EXTENDED RELEASE ORAL at 22:48

## 2022-01-01 RX ADMIN — MORPHINE SULFATE 2.5 MILLIGRAM(S): 50 CAPSULE, EXTENDED RELEASE ORAL at 11:14

## 2022-01-01 RX ADMIN — Medication 2: at 08:23

## 2022-01-01 RX ADMIN — GABAPENTIN 300 MILLIGRAM(S): 400 CAPSULE ORAL at 21:36

## 2022-01-01 RX ADMIN — MORPHINE SULFATE 2 MILLIGRAM(S): 50 CAPSULE, EXTENDED RELEASE ORAL at 12:24

## 2022-01-01 RX ADMIN — Medication 1 APPLICATION(S): at 12:12

## 2022-01-01 RX ADMIN — Medication 400 MILLIGRAM(S): at 00:22

## 2022-01-01 RX ADMIN — URSODIOL 500 MILLIGRAM(S): 250 TABLET, FILM COATED ORAL at 18:58

## 2022-01-01 RX ADMIN — MIRTAZAPINE 7.5 MILLIGRAM(S): 45 TABLET, ORALLY DISINTEGRATING ORAL at 21:05

## 2022-01-01 RX ADMIN — Medication 15 MILLIGRAM(S): at 07:53

## 2022-01-01 RX ADMIN — Medication 1 DROP(S): at 00:42

## 2022-01-01 RX ADMIN — Medication 1 DROP(S): at 19:49

## 2022-01-01 RX ADMIN — Medication 12.5 MILLIGRAM(S): at 09:43

## 2022-01-01 RX ADMIN — Medication 1 APPLICATION(S): at 23:26

## 2022-01-01 RX ADMIN — MEROPENEM 100 MILLIGRAM(S): 1 INJECTION INTRAVENOUS at 03:02

## 2022-01-01 RX ADMIN — Medication 1000 MILLIGRAM(S): at 11:31

## 2022-01-01 RX ADMIN — Medication 1 DROP(S): at 02:26

## 2022-01-01 RX ADMIN — LIDOCAINE 1 APPLICATION(S): 4 CREAM TOPICAL at 18:08

## 2022-01-01 RX ADMIN — Medication 650 MILLIGRAM(S): at 14:24

## 2022-01-01 RX ADMIN — LIDOCAINE 1 APPLICATION(S): 4 CREAM TOPICAL at 06:17

## 2022-01-01 RX ADMIN — Medication 1: at 12:27

## 2022-01-01 RX ADMIN — Medication 0.5 MILLIGRAM(S): at 09:02

## 2022-01-01 RX ADMIN — MEROPENEM 100 MILLIGRAM(S): 1 INJECTION INTRAVENOUS at 11:56

## 2022-01-01 RX ADMIN — GABAPENTIN 300 MILLIGRAM(S): 400 CAPSULE ORAL at 22:41

## 2022-01-01 RX ADMIN — Medication 260 MILLIGRAM(S): at 14:03

## 2022-01-01 RX ADMIN — MUPIROCIN 1 APPLICATION(S): 20 OINTMENT TOPICAL at 08:02

## 2022-01-01 RX ADMIN — Medication 1 DROP(S): at 05:55

## 2022-01-01 RX ADMIN — MUPIROCIN 1 APPLICATION(S): 20 OINTMENT TOPICAL at 14:18

## 2022-01-01 RX ADMIN — Medication 0.5 MILLIGRAM(S): at 10:02

## 2022-01-01 RX ADMIN — Medication 15 MILLIGRAM(S): at 01:07

## 2022-01-01 RX ADMIN — Medication 263 MILLIGRAM(S): at 16:09

## 2022-01-01 RX ADMIN — MORPHINE SULFATE 2 MILLIGRAM(S): 50 CAPSULE, EXTENDED RELEASE ORAL at 23:13

## 2022-01-01 RX ADMIN — Medication 75 MILLIGRAM(S): at 06:53

## 2022-01-01 RX ADMIN — Medication 12.5 MILLIGRAM(S): at 05:34

## 2022-01-01 RX ADMIN — LIDOCAINE 1 APPLICATION(S): 4 CREAM TOPICAL at 19:06

## 2022-01-01 RX ADMIN — LIDOCAINE 1 APPLICATION(S): 4 CREAM TOPICAL at 06:59

## 2022-01-01 RX ADMIN — Medication 0.5 MILLIGRAM(S): at 20:24

## 2022-01-01 RX ADMIN — LIDOCAINE 1 APPLICATION(S): 4 CREAM TOPICAL at 05:41

## 2022-01-01 RX ADMIN — Medication 15 MILLIGRAM(S): at 23:13

## 2022-01-01 RX ADMIN — Medication 1 DROP(S): at 01:41

## 2022-01-01 RX ADMIN — Medication 1 DROP(S): at 17:32

## 2022-01-01 RX ADMIN — Medication 3 MILLILITER(S): at 08:22

## 2022-01-01 RX ADMIN — Medication 150 MILLIGRAM(S): at 22:49

## 2022-01-01 RX ADMIN — Medication 1 DROP(S): at 15:17

## 2022-01-01 RX ADMIN — MORPHINE SULFATE 3 MILLIGRAM(S): 50 CAPSULE, EXTENDED RELEASE ORAL at 05:30

## 2022-01-01 RX ADMIN — HYDROMORPHONE HYDROCHLORIDE 0.5 MILLIGRAM(S): 2 INJECTION INTRAMUSCULAR; INTRAVENOUS; SUBCUTANEOUS at 12:23

## 2022-01-01 RX ADMIN — Medication 2: at 23:23

## 2022-01-01 RX ADMIN — METHADONE HYDROCHLORIDE 5 MILLIGRAM(S): 40 TABLET ORAL at 05:48

## 2022-01-01 RX ADMIN — Medication 1 PACKET(S): at 09:12

## 2022-01-01 RX ADMIN — SENNA PLUS 1 TABLET(S): 8.6 TABLET ORAL at 22:37

## 2022-01-01 RX ADMIN — MORPHINE SULFATE 3 MILLIGRAM(S): 50 CAPSULE, EXTENDED RELEASE ORAL at 15:08

## 2022-01-01 RX ADMIN — MIRTAZAPINE 7.5 MILLIGRAM(S): 45 TABLET, ORALLY DISINTEGRATING ORAL at 21:08

## 2022-01-01 RX ADMIN — Medication 1 DROP(S): at 17:40

## 2022-01-01 RX ADMIN — Medication 100 MILLIGRAM(S): at 21:42

## 2022-01-01 RX ADMIN — MUPIROCIN 1 APPLICATION(S): 20 OINTMENT TOPICAL at 13:46

## 2022-01-01 RX ADMIN — MORPHINE SULFATE 4 MILLIGRAM(S): 50 CAPSULE, EXTENDED RELEASE ORAL at 07:16

## 2022-01-01 RX ADMIN — Medication 1 DROP(S): at 17:49

## 2022-01-01 RX ADMIN — MORPHINE SULFATE 7.5 MILLIGRAM(S): 50 CAPSULE, EXTENDED RELEASE ORAL at 09:08

## 2022-01-01 RX ADMIN — Medication 1 DROP(S): at 06:45

## 2022-01-01 RX ADMIN — Medication 1: at 18:19

## 2022-01-01 RX ADMIN — MORPHINE SULFATE 2.5 MILLIGRAM(S): 50 CAPSULE, EXTENDED RELEASE ORAL at 06:34

## 2022-01-01 RX ADMIN — MUPIROCIN 1 APPLICATION(S): 20 OINTMENT TOPICAL at 13:55

## 2022-01-01 RX ADMIN — ENOXAPARIN SODIUM 40 MILLIGRAM(S): 100 INJECTION SUBCUTANEOUS at 17:44

## 2022-01-01 RX ADMIN — Medication 25 MILLIGRAM(S): at 14:12

## 2022-01-01 RX ADMIN — Medication 400 MILLIGRAM(S): at 02:30

## 2022-01-01 RX ADMIN — OXYCODONE HYDROCHLORIDE 2.5 MILLIGRAM(S): 5 TABLET ORAL at 00:26

## 2022-01-01 RX ADMIN — URSODIOL 500 MILLIGRAM(S): 250 TABLET, FILM COATED ORAL at 00:08

## 2022-01-01 RX ADMIN — MORPHINE SULFATE 7.5 MILLIGRAM(S): 50 CAPSULE, EXTENDED RELEASE ORAL at 12:00

## 2022-01-01 RX ADMIN — Medication 263 MILLIGRAM(S): at 01:18

## 2022-01-01 RX ADMIN — Medication 15 MILLIGRAM(S): at 12:51

## 2022-01-01 RX ADMIN — MIRTAZAPINE 15 MILLIGRAM(S): 45 TABLET, ORALLY DISINTEGRATING ORAL at 22:05

## 2022-01-01 RX ADMIN — Medication 1 DROP(S): at 15:10

## 2022-01-01 RX ADMIN — Medication 1 DROP(S): at 07:42

## 2022-01-01 RX ADMIN — MORPHINE SULFATE 3 MILLIGRAM(S): 50 CAPSULE, EXTENDED RELEASE ORAL at 19:05

## 2022-01-01 RX ADMIN — Medication 1 DROP(S): at 21:43

## 2022-01-01 RX ADMIN — Medication 1000 MILLIGRAM(S): at 14:00

## 2022-01-01 RX ADMIN — LIDOCAINE 1 APPLICATION(S): 4 CREAM TOPICAL at 07:27

## 2022-01-01 RX ADMIN — MORPHINE SULFATE 2.5 MILLIGRAM(S): 50 CAPSULE, EXTENDED RELEASE ORAL at 16:46

## 2022-01-01 RX ADMIN — MUPIROCIN 1 APPLICATION(S): 20 OINTMENT TOPICAL at 22:28

## 2022-01-01 RX ADMIN — Medication 150 MILLIGRAM(S): at 13:56

## 2022-01-01 RX ADMIN — Medication 1: at 08:01

## 2022-01-01 RX ADMIN — GABAPENTIN 300 MILLIGRAM(S): 400 CAPSULE ORAL at 14:12

## 2022-01-01 RX ADMIN — Medication 263 MILLIGRAM(S): at 17:40

## 2022-01-01 RX ADMIN — Medication 100 MILLIGRAM(S): at 00:09

## 2022-01-01 RX ADMIN — Medication 1 DROP(S): at 12:22

## 2022-01-01 RX ADMIN — Medication 1 DROP(S): at 05:52

## 2022-01-01 RX ADMIN — MORPHINE SULFATE 7.5 MILLIGRAM(S): 50 CAPSULE, EXTENDED RELEASE ORAL at 14:55

## 2022-01-01 RX ADMIN — GABAPENTIN 300 MILLIGRAM(S): 400 CAPSULE ORAL at 06:10

## 2022-01-01 RX ADMIN — Medication 1 APPLICATION(S): at 11:57

## 2022-01-01 RX ADMIN — Medication 1 DROP(S): at 21:14

## 2022-01-01 RX ADMIN — Medication 25 MILLIGRAM(S): at 13:48

## 2022-01-01 RX ADMIN — OXCARBAZEPINE 600 MILLIGRAM(S): 300 TABLET, FILM COATED ORAL at 18:30

## 2022-01-01 RX ADMIN — Medication 1 DROP(S): at 04:00

## 2022-01-01 RX ADMIN — Medication 1 DROP(S): at 13:16

## 2022-01-01 RX ADMIN — Medication 20 MILLIGRAM(S): at 05:32

## 2022-01-01 RX ADMIN — Medication 20 MILLIGRAM(S): at 18:45

## 2022-01-01 RX ADMIN — Medication 1 DROP(S): at 15:08

## 2022-01-01 RX ADMIN — Medication 250 MILLIGRAM(S): at 19:43

## 2022-01-01 RX ADMIN — VALACYCLOVIR 1000 MILLIGRAM(S): 500 TABLET, FILM COATED ORAL at 03:22

## 2022-01-01 RX ADMIN — Medication 20 MILLIGRAM(S): at 06:13

## 2022-01-01 RX ADMIN — MORPHINE SULFATE 4 MILLIGRAM(S): 50 CAPSULE, EXTENDED RELEASE ORAL at 10:05

## 2022-01-01 RX ADMIN — Medication 250 MILLIGRAM(S): at 06:50

## 2022-01-01 RX ADMIN — Medication 1 DROP(S): at 11:38

## 2022-01-01 RX ADMIN — Medication 1 APPLICATION(S): at 12:26

## 2022-01-01 RX ADMIN — PANTOPRAZOLE SODIUM 40 MILLIGRAM(S): 20 TABLET, DELAYED RELEASE ORAL at 06:55

## 2022-01-01 RX ADMIN — Medication 1 DROP(S): at 20:58

## 2022-01-01 RX ADMIN — Medication 3: at 21:37

## 2022-01-01 RX ADMIN — GABAPENTIN 600 MILLIGRAM(S): 400 CAPSULE ORAL at 07:18

## 2022-01-01 RX ADMIN — MORPHINE SULFATE 2 MILLIGRAM(S): 50 CAPSULE, EXTENDED RELEASE ORAL at 04:58

## 2022-01-01 RX ADMIN — MORPHINE SULFATE 2 MILLIGRAM(S): 50 CAPSULE, EXTENDED RELEASE ORAL at 17:58

## 2022-01-01 RX ADMIN — Medication 1 DROP(S): at 13:09

## 2022-01-01 RX ADMIN — Medication 150 MILLIGRAM(S): at 14:24

## 2022-01-01 RX ADMIN — NYSTATIN CREAM 1 APPLICATION(S): 100000 CREAM TOPICAL at 18:32

## 2022-01-01 RX ADMIN — Medication 150 MILLIGRAM(S): at 06:14

## 2022-01-01 RX ADMIN — Medication 0.5 MILLIGRAM(S): at 10:16

## 2022-01-01 RX ADMIN — Medication 3 MILLILITER(S): at 04:07

## 2022-01-01 RX ADMIN — Medication 1 DROP(S): at 21:20

## 2022-01-01 RX ADMIN — Medication 1 APPLICATION(S): at 12:34

## 2022-01-01 RX ADMIN — Medication 1 DROP(S): at 05:01

## 2022-01-01 RX ADMIN — SODIUM CHLORIDE 75 MILLILITER(S): 9 INJECTION, SOLUTION INTRAVENOUS at 01:02

## 2022-01-01 RX ADMIN — Medication 1 DROP(S): at 01:36

## 2022-01-01 RX ADMIN — Medication 15 MILLIGRAM(S): at 17:22

## 2022-01-01 RX ADMIN — Medication 1 APPLICATION(S): at 23:21

## 2022-01-01 RX ADMIN — Medication 20 MILLIGRAM(S): at 21:36

## 2022-01-01 RX ADMIN — ENOXAPARIN SODIUM 80 MILLIGRAM(S): 100 INJECTION SUBCUTANEOUS at 18:23

## 2022-01-01 RX ADMIN — Medication 0.5 MILLIGRAM(S): at 09:54

## 2022-01-01 RX ADMIN — Medication 1 DROP(S): at 02:13

## 2022-01-01 RX ADMIN — METHADONE HYDROCHLORIDE 5 MILLIGRAM(S): 40 TABLET ORAL at 18:53

## 2022-01-01 RX ADMIN — PIPERACILLIN AND TAZOBACTAM 25 GRAM(S): 4; .5 INJECTION, POWDER, LYOPHILIZED, FOR SOLUTION INTRAVENOUS at 22:10

## 2022-01-01 RX ADMIN — Medication 12.5 MILLIGRAM(S): at 06:07

## 2022-01-01 RX ADMIN — INSULIN GLARGINE 10 UNIT(S): 100 INJECTION, SOLUTION SUBCUTANEOUS at 22:18

## 2022-01-01 RX ADMIN — MEROPENEM 100 MILLIGRAM(S): 1 INJECTION INTRAVENOUS at 06:07

## 2022-01-01 RX ADMIN — VALACYCLOVIR 1000 MILLIGRAM(S): 500 TABLET, FILM COATED ORAL at 02:28

## 2022-01-01 RX ADMIN — BUDESONIDE AND FORMOTEROL FUMARATE DIHYDRATE 2 PUFF(S): 160; 4.5 AEROSOL RESPIRATORY (INHALATION) at 08:21

## 2022-01-01 RX ADMIN — Medication 1 DROP(S): at 00:51

## 2022-01-01 RX ADMIN — Medication 1 DROP(S): at 09:26

## 2022-01-01 RX ADMIN — Medication 1 MILLIGRAM(S): at 11:32

## 2022-01-01 RX ADMIN — Medication 20 MILLIGRAM(S): at 11:35

## 2022-01-01 RX ADMIN — Medication 1 DROP(S): at 14:13

## 2022-01-01 RX ADMIN — MORPHINE SULFATE 7.5 MILLIGRAM(S): 50 CAPSULE, EXTENDED RELEASE ORAL at 07:05

## 2022-01-01 RX ADMIN — MUPIROCIN 1 APPLICATION(S): 20 OINTMENT TOPICAL at 21:11

## 2022-01-01 RX ADMIN — PANTOPRAZOLE SODIUM 40 MILLIGRAM(S): 20 TABLET, DELAYED RELEASE ORAL at 06:49

## 2022-01-01 RX ADMIN — Medication 1: at 17:31

## 2022-01-01 RX ADMIN — Medication 1: at 07:37

## 2022-01-01 RX ADMIN — Medication 2: at 18:46

## 2022-01-01 RX ADMIN — Medication 1 APPLICATION(S): at 11:16

## 2022-01-01 RX ADMIN — MORPHINE SULFATE 5 MILLIGRAM(S): 50 CAPSULE, EXTENDED RELEASE ORAL at 06:47

## 2022-01-01 RX ADMIN — Medication 1: at 17:35

## 2022-01-01 RX ADMIN — PANTOPRAZOLE SODIUM 40 MILLIGRAM(S): 20 TABLET, DELAYED RELEASE ORAL at 05:51

## 2022-01-01 RX ADMIN — URSODIOL 500 MILLIGRAM(S): 250 TABLET, FILM COATED ORAL at 17:34

## 2022-01-01 RX ADMIN — INSULIN GLARGINE 10 UNIT(S): 100 INJECTION, SOLUTION SUBCUTANEOUS at 23:13

## 2022-01-01 RX ADMIN — Medication 1 DROP(S): at 06:22

## 2022-01-01 RX ADMIN — Medication 1 DROP(S): at 09:00

## 2022-01-01 RX ADMIN — Medication 0.5 MILLIGRAM(S): at 09:10

## 2022-01-01 RX ADMIN — MORPHINE SULFATE 5 MILLIGRAM(S): 50 CAPSULE, EXTENDED RELEASE ORAL at 15:01

## 2022-01-01 RX ADMIN — Medication 250 MILLIGRAM(S): at 06:59

## 2022-01-01 RX ADMIN — Medication 263 MILLIGRAM(S): at 11:59

## 2022-01-01 RX ADMIN — Medication 1000 MILLIGRAM(S): at 07:38

## 2022-01-01 RX ADMIN — ALBUTEROL 2.5 MILLIGRAM(S): 90 AEROSOL, METERED ORAL at 15:21

## 2022-01-01 RX ADMIN — Medication 100 MILLIEQUIVALENT(S): at 09:12

## 2022-01-01 RX ADMIN — Medication 1 DROP(S): at 10:10

## 2022-01-01 RX ADMIN — Medication 1 APPLICATION(S): at 00:57

## 2022-01-01 RX ADMIN — URSODIOL 500 MILLIGRAM(S): 250 TABLET, FILM COATED ORAL at 17:33

## 2022-01-01 RX ADMIN — BRIMONIDINE TARTRATE 1 DROP(S): 2 SOLUTION/ DROPS OPHTHALMIC at 13:27

## 2022-01-01 RX ADMIN — MIRTAZAPINE 7.5 MILLIGRAM(S): 45 TABLET, ORALLY DISINTEGRATING ORAL at 21:13

## 2022-01-01 RX ADMIN — Medication 100 MILLIGRAM(S): at 04:45

## 2022-01-01 RX ADMIN — Medication 1 DROP(S): at 12:18

## 2022-01-01 RX ADMIN — Medication 1: at 17:42

## 2022-01-01 RX ADMIN — HYDROMORPHONE HYDROCHLORIDE 0.5 MILLIGRAM(S): 2 INJECTION INTRAMUSCULAR; INTRAVENOUS; SUBCUTANEOUS at 13:21

## 2022-01-01 RX ADMIN — Medication 1 APPLICATION(S): at 22:51

## 2022-01-01 RX ADMIN — Medication 1 DROP(S): at 14:02

## 2022-01-01 RX ADMIN — VALACYCLOVIR 1000 MILLIGRAM(S): 500 TABLET, FILM COATED ORAL at 03:04

## 2022-01-01 RX ADMIN — MIRTAZAPINE 7.5 MILLIGRAM(S): 45 TABLET, ORALLY DISINTEGRATING ORAL at 21:44

## 2022-01-01 RX ADMIN — Medication 1 DROP(S): at 22:28

## 2022-01-01 RX ADMIN — BRIMONIDINE TARTRATE 1 DROP(S): 2 SOLUTION/ DROPS OPHTHALMIC at 14:27

## 2022-01-01 RX ADMIN — MORPHINE SULFATE 3 MILLIGRAM(S): 50 CAPSULE, EXTENDED RELEASE ORAL at 23:21

## 2022-01-01 RX ADMIN — Medication 1 DROP(S): at 23:00

## 2022-01-01 RX ADMIN — MEROPENEM 100 MILLIGRAM(S): 1 INJECTION INTRAVENOUS at 22:52

## 2022-01-01 RX ADMIN — Medication 1 APPLICATION(S): at 06:49

## 2022-01-01 RX ADMIN — Medication 1 APPLICATION(S): at 07:04

## 2022-01-01 RX ADMIN — NYSTATIN CREAM 1 APPLICATION(S): 100000 CREAM TOPICAL at 18:24

## 2022-01-01 RX ADMIN — MORPHINE SULFATE 7.5 MILLIGRAM(S): 50 CAPSULE, EXTENDED RELEASE ORAL at 17:28

## 2022-01-01 RX ADMIN — Medication 1 DROP(S): at 05:28

## 2022-01-01 RX ADMIN — Medication 1 APPLICATION(S): at 21:16

## 2022-01-01 RX ADMIN — HYDROMORPHONE HYDROCHLORIDE 0.5 MILLIGRAM(S): 2 INJECTION INTRAMUSCULAR; INTRAVENOUS; SUBCUTANEOUS at 02:37

## 2022-01-01 RX ADMIN — Medication 1 DROP(S): at 20:39

## 2022-01-01 RX ADMIN — METHADONE HYDROCHLORIDE 5 MILLIGRAM(S): 40 TABLET ORAL at 23:58

## 2022-01-01 RX ADMIN — Medication 263 MILLIGRAM(S): at 00:46

## 2022-01-01 RX ADMIN — Medication 1 APPLICATION(S): at 17:41

## 2022-01-01 RX ADMIN — MUPIROCIN 1 APPLICATION(S): 20 OINTMENT TOPICAL at 14:41

## 2022-01-01 RX ADMIN — Medication 1 DROP(S): at 23:37

## 2022-01-01 RX ADMIN — Medication 0.5 MILLIGRAM(S): at 21:03

## 2022-01-01 RX ADMIN — INSULIN GLARGINE 10 UNIT(S): 100 INJECTION, SOLUTION SUBCUTANEOUS at 22:02

## 2022-01-01 RX ADMIN — LIDOCAINE 1 APPLICATION(S): 4 CREAM TOPICAL at 06:34

## 2022-01-01 RX ADMIN — Medication 1 DROP(S): at 15:33

## 2022-01-01 RX ADMIN — NYSTATIN CREAM 1 APPLICATION(S): 100000 CREAM TOPICAL at 06:36

## 2022-01-01 RX ADMIN — MUPIROCIN 1 APPLICATION(S): 20 OINTMENT TOPICAL at 23:40

## 2022-01-01 RX ADMIN — Medication 1 APPLICATION(S): at 12:42

## 2022-01-01 RX ADMIN — Medication 0.5 MILLIGRAM(S): at 09:12

## 2022-01-01 RX ADMIN — Medication 1 TABLET(S): at 12:23

## 2022-01-01 RX ADMIN — MEROPENEM 100 MILLIGRAM(S): 1 INJECTION INTRAVENOUS at 22:45

## 2022-01-01 RX ADMIN — LORATADINE 10 MILLIGRAM(S): 10 TABLET ORAL at 11:22

## 2022-01-01 RX ADMIN — Medication 1 DROP(S): at 01:47

## 2022-01-01 RX ADMIN — VALACYCLOVIR 1000 MILLIGRAM(S): 500 TABLET, FILM COATED ORAL at 22:38

## 2022-01-01 RX ADMIN — NYSTATIN CREAM 1 APPLICATION(S): 100000 CREAM TOPICAL at 06:42

## 2022-01-01 RX ADMIN — Medication 1 APPLICATION(S): at 08:53

## 2022-01-01 RX ADMIN — Medication 324 MILLIGRAM(S): at 11:24

## 2022-01-01 RX ADMIN — Medication 263 MILLIGRAM(S): at 01:07

## 2022-01-01 RX ADMIN — Medication 1000 MILLIGRAM(S): at 06:12

## 2022-01-01 RX ADMIN — Medication 0.5 MILLIGRAM(S): at 10:21

## 2022-01-01 RX ADMIN — NYSTATIN CREAM 1 APPLICATION(S): 100000 CREAM TOPICAL at 07:04

## 2022-01-01 RX ADMIN — Medication 1 DROP(S): at 11:58

## 2022-01-01 RX ADMIN — Medication 1 DROP(S): at 15:42

## 2022-01-01 RX ADMIN — Medication 1 DROP(S): at 04:16

## 2022-01-01 RX ADMIN — Medication 10 MILLIGRAM(S): at 21:44

## 2022-01-01 RX ADMIN — Medication 3 MILLILITER(S): at 16:21

## 2022-01-01 RX ADMIN — LORATADINE 10 MILLIGRAM(S): 10 TABLET ORAL at 12:01

## 2022-01-01 RX ADMIN — Medication 250 MILLIGRAM(S): at 00:37

## 2022-01-01 RX ADMIN — Medication 300 MILLIGRAM(S): at 05:47

## 2022-01-01 RX ADMIN — MORPHINE SULFATE 2 MILLIGRAM(S): 50 CAPSULE, EXTENDED RELEASE ORAL at 00:16

## 2022-01-01 RX ADMIN — Medication 324 MILLIGRAM(S): at 12:21

## 2022-01-01 RX ADMIN — SODIUM CHLORIDE 75 MILLILITER(S): 9 INJECTION INTRAMUSCULAR; INTRAVENOUS; SUBCUTANEOUS at 09:28

## 2022-01-01 RX ADMIN — QUETIAPINE FUMARATE 25 MILLIGRAM(S): 200 TABLET, FILM COATED ORAL at 04:42

## 2022-01-01 RX ADMIN — PIPERACILLIN AND TAZOBACTAM 25 GRAM(S): 4; .5 INJECTION, POWDER, LYOPHILIZED, FOR SOLUTION INTRAVENOUS at 21:03

## 2022-01-01 RX ADMIN — ALBUTEROL 2.5 MILLIGRAM(S): 90 AEROSOL, METERED ORAL at 04:22

## 2022-01-01 RX ADMIN — Medication 1 APPLICATION(S): at 18:32

## 2022-01-01 RX ADMIN — LIDOCAINE 1 APPLICATION(S): 4 CREAM TOPICAL at 17:44

## 2022-01-01 RX ADMIN — Medication 100 MILLIGRAM(S): at 20:40

## 2022-01-01 RX ADMIN — Medication 263 MILLIGRAM(S): at 00:04

## 2022-01-01 RX ADMIN — HEPARIN SODIUM 5000 UNIT(S): 5000 INJECTION INTRAVENOUS; SUBCUTANEOUS at 05:44

## 2022-01-01 RX ADMIN — Medication 150 MILLIGRAM(S): at 06:50

## 2022-01-01 RX ADMIN — ENOXAPARIN SODIUM 80 MILLIGRAM(S): 100 INJECTION SUBCUTANEOUS at 06:51

## 2022-01-01 RX ADMIN — LIDOCAINE 1 APPLICATION(S): 4 CREAM TOPICAL at 18:13

## 2022-01-01 RX ADMIN — Medication 1 DROP(S): at 09:22

## 2022-01-01 RX ADMIN — Medication 1 APPLICATION(S): at 08:02

## 2022-01-01 RX ADMIN — Medication 20 MILLIGRAM(S): at 06:52

## 2022-01-01 RX ADMIN — Medication 300 MILLIGRAM(S): at 12:39

## 2022-01-01 RX ADMIN — MORPHINE SULFATE 5 MILLIGRAM(S): 50 CAPSULE, EXTENDED RELEASE ORAL at 21:07

## 2022-01-01 RX ADMIN — Medication 1 APPLICATION(S): at 06:32

## 2022-01-01 RX ADMIN — Medication 1 DROP(S): at 22:42

## 2022-01-01 RX ADMIN — Medication 263 MILLIGRAM(S): at 01:17

## 2022-01-01 RX ADMIN — Medication 1 TABLET(S): at 11:38

## 2022-01-01 RX ADMIN — MEROPENEM 100 MILLIGRAM(S): 1 INJECTION INTRAVENOUS at 22:39

## 2022-01-01 RX ADMIN — Medication 1 DROP(S): at 21:13

## 2022-01-01 RX ADMIN — Medication 1 APPLICATION(S): at 18:23

## 2022-01-01 RX ADMIN — LORATADINE 10 MILLIGRAM(S): 10 TABLET ORAL at 12:23

## 2022-01-01 RX ADMIN — QUETIAPINE FUMARATE 25 MILLIGRAM(S): 200 TABLET, FILM COATED ORAL at 14:54

## 2022-01-01 RX ADMIN — MEROPENEM 100 MILLIGRAM(S): 1 INJECTION INTRAVENOUS at 19:28

## 2022-01-01 RX ADMIN — Medication 1: at 12:05

## 2022-01-01 RX ADMIN — Medication 300 MILLIGRAM(S): at 18:20

## 2022-01-01 RX ADMIN — Medication 1 DROP(S): at 00:17

## 2022-01-01 RX ADMIN — Medication 3 MILLILITER(S): at 09:15

## 2022-01-01 RX ADMIN — Medication 1 MILLIGRAM(S): at 12:01

## 2022-01-01 RX ADMIN — MORPHINE SULFATE 4 MILLIGRAM(S): 50 CAPSULE, EXTENDED RELEASE ORAL at 18:57

## 2022-01-01 RX ADMIN — Medication 110 MILLIGRAM(S): at 16:11

## 2022-01-01 RX ADMIN — PIPERACILLIN AND TAZOBACTAM 25 GRAM(S): 4; .5 INJECTION, POWDER, LYOPHILIZED, FOR SOLUTION INTRAVENOUS at 06:07

## 2022-01-01 RX ADMIN — Medication 400 MILLIGRAM(S): at 18:52

## 2022-01-01 RX ADMIN — Medication 975 MILLIGRAM(S): at 08:01

## 2022-01-01 RX ADMIN — Medication 1 DROP(S): at 22:32

## 2022-01-01 RX ADMIN — Medication 1 DROP(S): at 03:11

## 2022-01-01 RX ADMIN — Medication 0.5 MILLIGRAM(S): at 09:55

## 2022-01-01 RX ADMIN — NYSTATIN CREAM 1 APPLICATION(S): 100000 CREAM TOPICAL at 06:59

## 2022-01-01 RX ADMIN — HYDROMORPHONE HYDROCHLORIDE 0.5 MILLIGRAM(S): 2 INJECTION INTRAMUSCULAR; INTRAVENOUS; SUBCUTANEOUS at 18:44

## 2022-01-01 RX ADMIN — ALBUTEROL 2.5 MILLIGRAM(S): 90 AEROSOL, METERED ORAL at 22:35

## 2022-01-01 RX ADMIN — Medication 1 DROP(S): at 12:15

## 2022-01-01 RX ADMIN — Medication 12.5 MILLIGRAM(S): at 07:41

## 2022-01-01 RX ADMIN — MORPHINE SULFATE 7.5 MILLIGRAM(S): 50 CAPSULE, EXTENDED RELEASE ORAL at 04:42

## 2022-01-01 RX ADMIN — Medication 1 APPLICATION(S): at 17:26

## 2022-01-01 RX ADMIN — TAMSULOSIN HYDROCHLORIDE 0.4 MILLIGRAM(S): 0.4 CAPSULE ORAL at 21:00

## 2022-01-01 RX ADMIN — OXCARBAZEPINE 600 MILLIGRAM(S): 300 TABLET, FILM COATED ORAL at 06:24

## 2022-01-01 RX ADMIN — Medication 3 MILLILITER(S): at 16:09

## 2022-01-01 RX ADMIN — Medication 2: at 21:34

## 2022-01-01 RX ADMIN — Medication 1 TABLET(S): at 12:40

## 2022-01-01 RX ADMIN — Medication 12.5 MILLIGRAM(S): at 06:39

## 2022-01-01 RX ADMIN — Medication 400 MILLIGRAM(S): at 06:18

## 2022-01-01 RX ADMIN — Medication 1000 MILLIGRAM(S): at 03:45

## 2022-01-01 RX ADMIN — ALBUTEROL 2.5 MILLIGRAM(S): 90 AEROSOL, METERED ORAL at 20:53

## 2022-01-01 RX ADMIN — Medication 1 APPLICATION(S): at 21:21

## 2022-01-01 RX ADMIN — ENOXAPARIN SODIUM 80 MILLIGRAM(S): 100 INJECTION SUBCUTANEOUS at 06:55

## 2022-01-01 RX ADMIN — APIXABAN 5 MILLIGRAM(S): 2.5 TABLET, FILM COATED ORAL at 05:32

## 2022-01-01 RX ADMIN — Medication 1 APPLICATION(S): at 12:13

## 2022-01-01 RX ADMIN — Medication 3 MILLILITER(S): at 04:41

## 2022-01-01 RX ADMIN — Medication 1 DROP(S): at 14:09

## 2022-01-01 RX ADMIN — Medication 15 MILLIGRAM(S): at 00:50

## 2022-01-01 RX ADMIN — HYDROMORPHONE HYDROCHLORIDE 0.5 MILLIGRAM(S): 2 INJECTION INTRAMUSCULAR; INTRAVENOUS; SUBCUTANEOUS at 14:01

## 2022-01-01 RX ADMIN — Medication 1 DROP(S): at 01:04

## 2022-01-01 RX ADMIN — MUPIROCIN 1 APPLICATION(S): 20 OINTMENT TOPICAL at 22:35

## 2022-01-01 RX ADMIN — Medication 100 MILLIGRAM(S): at 05:40

## 2022-01-01 RX ADMIN — Medication 1 MILLIGRAM(S): at 12:18

## 2022-01-01 RX ADMIN — POLYETHYLENE GLYCOL 3350 17 GRAM(S): 17 POWDER, FOR SOLUTION ORAL at 12:08

## 2022-01-01 RX ADMIN — Medication 3 MILLILITER(S): at 10:20

## 2022-01-01 RX ADMIN — MORPHINE SULFATE 3 MILLIGRAM(S): 50 CAPSULE, EXTENDED RELEASE ORAL at 03:17

## 2022-01-01 RX ADMIN — NYSTATIN CREAM 1 APPLICATION(S): 100000 CREAM TOPICAL at 06:43

## 2022-01-01 RX ADMIN — Medication 1 APPLICATION(S): at 17:17

## 2022-01-01 RX ADMIN — Medication 1 DROP(S): at 14:29

## 2022-01-01 RX ADMIN — Medication 650 MILLIGRAM(S): at 06:53

## 2022-01-01 RX ADMIN — Medication 1 DROP(S): at 17:08

## 2022-01-01 RX ADMIN — Medication 75 MILLIGRAM(S): at 14:38

## 2022-01-01 RX ADMIN — AMLODIPINE BESYLATE 2.5 MILLIGRAM(S): 2.5 TABLET ORAL at 18:20

## 2022-01-01 RX ADMIN — Medication 1 DROP(S): at 21:26

## 2022-01-01 RX ADMIN — LIDOCAINE 1 APPLICATION(S): 4 CREAM TOPICAL at 06:45

## 2022-01-01 RX ADMIN — INSULIN GLARGINE 10 UNIT(S): 100 INJECTION, SOLUTION SUBCUTANEOUS at 21:36

## 2022-01-01 RX ADMIN — BRIMONIDINE TARTRATE 1 DROP(S): 2 SOLUTION/ DROPS OPHTHALMIC at 21:41

## 2022-01-01 RX ADMIN — Medication 1 DROP(S): at 03:36

## 2022-01-01 RX ADMIN — Medication 1: at 07:30

## 2022-01-01 RX ADMIN — Medication 650 MILLIGRAM(S): at 07:57

## 2022-01-01 RX ADMIN — Medication 324 MILLIGRAM(S): at 12:41

## 2022-01-01 RX ADMIN — MORPHINE SULFATE 4 MILLIGRAM(S): 50 CAPSULE, EXTENDED RELEASE ORAL at 18:08

## 2022-01-01 RX ADMIN — Medication 1 DROP(S): at 19:39

## 2022-01-01 RX ADMIN — Medication 1 APPLICATION(S): at 18:09

## 2022-01-01 RX ADMIN — Medication 1 DROP(S): at 18:41

## 2022-01-01 RX ADMIN — GABAPENTIN 300 MILLIGRAM(S): 400 CAPSULE ORAL at 21:45

## 2022-01-01 RX ADMIN — NYSTATIN CREAM 1 APPLICATION(S): 100000 CREAM TOPICAL at 06:14

## 2022-01-01 RX ADMIN — BRIMONIDINE TARTRATE 1 DROP(S): 2 SOLUTION/ DROPS OPHTHALMIC at 22:00

## 2022-01-01 RX ADMIN — Medication 1 DROP(S): at 09:55

## 2022-01-01 RX ADMIN — LIDOCAINE 1 APPLICATION(S): 4 CREAM TOPICAL at 05:24

## 2022-01-01 RX ADMIN — TIOTROPIUM BROMIDE 1 CAPSULE(S): 18 CAPSULE ORAL; RESPIRATORY (INHALATION) at 14:54

## 2022-01-01 RX ADMIN — ALBUTEROL 2.5 MILLIGRAM(S): 90 AEROSOL, METERED ORAL at 21:38

## 2022-01-01 RX ADMIN — PIPERACILLIN AND TAZOBACTAM 25 GRAM(S): 4; .5 INJECTION, POWDER, LYOPHILIZED, FOR SOLUTION INTRAVENOUS at 06:49

## 2022-01-01 RX ADMIN — Medication 15 MILLIGRAM(S): at 00:16

## 2022-01-01 RX ADMIN — Medication 3 MILLILITER(S): at 07:25

## 2022-01-01 RX ADMIN — Medication 400 MILLIGRAM(S): at 18:30

## 2022-01-01 RX ADMIN — LIDOCAINE 1 APPLICATION(S): 4 CREAM TOPICAL at 09:48

## 2022-01-01 RX ADMIN — NYSTATIN CREAM 1 APPLICATION(S): 100000 CREAM TOPICAL at 06:02

## 2022-01-01 RX ADMIN — Medication 0.5 MILLIGRAM(S): at 21:35

## 2022-01-01 RX ADMIN — MORPHINE SULFATE 7.5 MILLIGRAM(S): 50 CAPSULE, EXTENDED RELEASE ORAL at 07:57

## 2022-01-01 RX ADMIN — MORPHINE SULFATE 7.5 MILLIGRAM(S): 50 CAPSULE, EXTENDED RELEASE ORAL at 10:18

## 2022-01-01 RX ADMIN — Medication 1 DROP(S): at 12:24

## 2022-01-01 RX ADMIN — Medication 3: at 07:52

## 2022-01-01 RX ADMIN — ALBUTEROL 2.5 MILLIGRAM(S): 90 AEROSOL, METERED ORAL at 15:43

## 2022-01-01 RX ADMIN — SENNA PLUS 1 TABLET(S): 8.6 TABLET ORAL at 22:40

## 2022-01-01 RX ADMIN — Medication 1 TABLET(S): at 12:14

## 2022-01-01 RX ADMIN — Medication 1 DROP(S): at 11:06

## 2022-01-01 RX ADMIN — MORPHINE SULFATE 4 MILLIGRAM(S): 50 CAPSULE, EXTENDED RELEASE ORAL at 02:00

## 2022-01-01 RX ADMIN — GABAPENTIN 300 MILLIGRAM(S): 400 CAPSULE ORAL at 06:05

## 2022-01-01 RX ADMIN — LIDOCAINE 1 APPLICATION(S): 4 CREAM TOPICAL at 18:18

## 2022-01-01 RX ADMIN — Medication 1 DROP(S): at 21:02

## 2022-01-01 RX ADMIN — ALBUTEROL 2.5 MILLIGRAM(S): 90 AEROSOL, METERED ORAL at 21:06

## 2022-01-01 RX ADMIN — Medication 1 APPLICATION(S): at 12:15

## 2022-01-01 RX ADMIN — Medication 1 DROP(S): at 06:16

## 2022-01-01 RX ADMIN — Medication 1 DROP(S): at 10:13

## 2022-01-01 RX ADMIN — OXCARBAZEPINE 150 MILLIGRAM(S): 300 TABLET, FILM COATED ORAL at 19:15

## 2022-01-01 RX ADMIN — Medication 1 TABLET(S): at 12:26

## 2022-01-01 RX ADMIN — PIPERACILLIN AND TAZOBACTAM 25 GRAM(S): 4; .5 INJECTION, POWDER, LYOPHILIZED, FOR SOLUTION INTRAVENOUS at 13:58

## 2022-01-01 RX ADMIN — Medication 1 DROP(S): at 18:24

## 2022-01-01 RX ADMIN — GABAPENTIN 300 MILLIGRAM(S): 400 CAPSULE ORAL at 13:23

## 2022-01-01 RX ADMIN — Medication 1 APPLICATION(S): at 23:25

## 2022-01-01 RX ADMIN — Medication 100 MILLIEQUIVALENT(S): at 00:51

## 2022-01-01 RX ADMIN — MORPHINE SULFATE 3 MILLIGRAM(S): 50 CAPSULE, EXTENDED RELEASE ORAL at 16:09

## 2022-01-01 RX ADMIN — Medication 1 APPLICATION(S): at 18:47

## 2022-01-01 RX ADMIN — Medication 1: at 11:45

## 2022-01-01 RX ADMIN — Medication 650 MILLIGRAM(S): at 11:25

## 2022-01-01 RX ADMIN — ENOXAPARIN SODIUM 80 MILLIGRAM(S): 100 INJECTION SUBCUTANEOUS at 06:17

## 2022-01-01 RX ADMIN — Medication 0.5 MILLIGRAM(S): at 22:32

## 2022-01-01 RX ADMIN — ENOXAPARIN SODIUM 40 MILLIGRAM(S): 100 INJECTION SUBCUTANEOUS at 18:21

## 2022-01-01 RX ADMIN — MORPHINE SULFATE 2.5 MILLIGRAM(S): 50 CAPSULE, EXTENDED RELEASE ORAL at 18:22

## 2022-01-01 RX ADMIN — MORPHINE SULFATE 4 MILLIGRAM(S): 50 CAPSULE, EXTENDED RELEASE ORAL at 23:20

## 2022-01-01 RX ADMIN — Medication 100 MILLIGRAM(S): at 22:06

## 2022-01-01 RX ADMIN — Medication 650 MILLIGRAM(S): at 06:06

## 2022-01-01 RX ADMIN — ENOXAPARIN SODIUM 80 MILLIGRAM(S): 100 INJECTION SUBCUTANEOUS at 07:00

## 2022-01-01 RX ADMIN — Medication 260 MILLIGRAM(S): at 21:35

## 2022-01-01 RX ADMIN — MORPHINE SULFATE 3 MILLIGRAM(S): 50 CAPSULE, EXTENDED RELEASE ORAL at 07:38

## 2022-01-01 RX ADMIN — Medication 15 MILLIGRAM(S): at 18:30

## 2022-01-01 RX ADMIN — MORPHINE SULFATE 7.5 MILLIGRAM(S): 50 CAPSULE, EXTENDED RELEASE ORAL at 14:52

## 2022-01-01 RX ADMIN — Medication 1 DROP(S): at 11:36

## 2022-01-01 RX ADMIN — LOSARTAN POTASSIUM 50 MILLIGRAM(S): 100 TABLET, FILM COATED ORAL at 05:55

## 2022-01-01 RX ADMIN — Medication 1 DROP(S): at 05:40

## 2022-01-01 RX ADMIN — INSULIN GLARGINE 10 UNIT(S): 100 INJECTION, SOLUTION SUBCUTANEOUS at 23:40

## 2022-01-01 RX ADMIN — Medication 1 APPLICATION(S): at 18:37

## 2022-01-01 RX ADMIN — Medication 1 DROP(S): at 17:13

## 2022-01-01 RX ADMIN — LIDOCAINE 1 APPLICATION(S): 4 CREAM TOPICAL at 18:01

## 2022-01-01 RX ADMIN — MORPHINE SULFATE 7.5 MILLIGRAM(S): 50 CAPSULE, EXTENDED RELEASE ORAL at 02:04

## 2022-01-01 RX ADMIN — Medication 975 MILLIGRAM(S): at 22:42

## 2022-01-01 RX ADMIN — GABAPENTIN 600 MILLIGRAM(S): 400 CAPSULE ORAL at 06:13

## 2022-01-01 RX ADMIN — MORPHINE SULFATE 7.5 MILLIGRAM(S): 50 CAPSULE, EXTENDED RELEASE ORAL at 22:49

## 2022-01-01 RX ADMIN — Medication 650 MILLIGRAM(S): at 13:00

## 2022-01-01 RX ADMIN — ALBUTEROL 2.5 MILLIGRAM(S): 90 AEROSOL, METERED ORAL at 21:27

## 2022-01-01 RX ADMIN — URSODIOL 500 MILLIGRAM(S): 250 TABLET, FILM COATED ORAL at 17:59

## 2022-01-01 RX ADMIN — NYSTATIN CREAM 1 APPLICATION(S): 100000 CREAM TOPICAL at 18:14

## 2022-01-01 RX ADMIN — Medication 100 MILLIGRAM(S): at 22:05

## 2022-01-01 RX ADMIN — Medication 1 DROP(S): at 22:16

## 2022-01-01 RX ADMIN — Medication 0.5 MILLIGRAM(S): at 10:04

## 2022-01-01 RX ADMIN — AMLODIPINE BESYLATE 5 MILLIGRAM(S): 2.5 TABLET ORAL at 06:02

## 2022-01-01 RX ADMIN — GABAPENTIN 300 MILLIGRAM(S): 400 CAPSULE ORAL at 13:16

## 2022-01-01 RX ADMIN — MORPHINE SULFATE 2 MILLIGRAM(S): 50 CAPSULE, EXTENDED RELEASE ORAL at 06:11

## 2022-01-01 RX ADMIN — HEPARIN SODIUM 5000 UNIT(S): 5000 INJECTION INTRAVENOUS; SUBCUTANEOUS at 13:29

## 2022-01-01 RX ADMIN — ENOXAPARIN SODIUM 80 MILLIGRAM(S): 100 INJECTION SUBCUTANEOUS at 06:33

## 2022-01-01 RX ADMIN — Medication 15 MILLIGRAM(S): at 05:00

## 2022-01-01 RX ADMIN — Medication 3 MILLILITER(S): at 22:16

## 2022-01-01 RX ADMIN — Medication 3 MILLILITER(S): at 21:37

## 2022-01-01 RX ADMIN — Medication 324 MILLIGRAM(S): at 11:09

## 2022-01-01 RX ADMIN — Medication 3 MILLILITER(S): at 04:15

## 2022-01-01 RX ADMIN — Medication 1: at 00:03

## 2022-01-01 RX ADMIN — Medication 1 DROP(S): at 21:33

## 2022-01-01 RX ADMIN — Medication 1 DROP(S): at 17:04

## 2022-01-01 RX ADMIN — HYDROMORPHONE HYDROCHLORIDE 0.5 MILLIGRAM(S): 2 INJECTION INTRAMUSCULAR; INTRAVENOUS; SUBCUTANEOUS at 05:40

## 2022-01-01 RX ADMIN — Medication 1 APPLICATION(S): at 12:52

## 2022-01-01 RX ADMIN — MORPHINE SULFATE 7.5 MILLIGRAM(S): 50 CAPSULE, EXTENDED RELEASE ORAL at 10:39

## 2022-01-01 RX ADMIN — Medication 1 APPLICATION(S): at 05:56

## 2022-01-01 RX ADMIN — Medication 1 APPLICATION(S): at 17:42

## 2022-01-01 RX ADMIN — Medication 12.5 MILLIGRAM(S): at 06:05

## 2022-01-01 RX ADMIN — Medication 150 MILLIGRAM(S): at 05:13

## 2022-01-01 RX ADMIN — GABAPENTIN 300 MILLIGRAM(S): 400 CAPSULE ORAL at 10:45

## 2022-01-01 RX ADMIN — SENNA PLUS 1 TABLET(S): 8.6 TABLET ORAL at 21:01

## 2022-01-01 RX ADMIN — MORPHINE SULFATE 3 MILLIGRAM(S): 50 CAPSULE, EXTENDED RELEASE ORAL at 09:45

## 2022-01-01 RX ADMIN — MUPIROCIN 1 APPLICATION(S): 20 OINTMENT TOPICAL at 13:16

## 2022-01-01 RX ADMIN — Medication 100 MILLIGRAM(S): at 22:20

## 2022-01-01 RX ADMIN — Medication 12.5 MILLIGRAM(S): at 06:27

## 2022-01-01 RX ADMIN — MUPIROCIN 1 APPLICATION(S): 20 OINTMENT TOPICAL at 12:38

## 2022-01-01 RX ADMIN — NYSTATIN CREAM 1 APPLICATION(S): 100000 CREAM TOPICAL at 05:34

## 2022-01-01 RX ADMIN — ENOXAPARIN SODIUM 40 MILLIGRAM(S): 100 INJECTION SUBCUTANEOUS at 17:39

## 2022-01-01 RX ADMIN — Medication 1 APPLICATION(S): at 06:43

## 2022-01-01 RX ADMIN — Medication 1 DROP(S): at 21:46

## 2022-01-01 RX ADMIN — Medication 3: at 07:56

## 2022-01-01 RX ADMIN — Medication 1: at 18:22

## 2022-01-01 RX ADMIN — QUETIAPINE FUMARATE 12.5 MILLIGRAM(S): 200 TABLET, FILM COATED ORAL at 06:15

## 2022-01-01 RX ADMIN — OXCARBAZEPINE 300 MILLIGRAM(S): 300 TABLET, FILM COATED ORAL at 05:41

## 2022-01-01 RX ADMIN — Medication 263 MILLIGRAM(S): at 06:19

## 2022-01-01 RX ADMIN — POLYETHYLENE GLYCOL 3350 17 GRAM(S): 17 POWDER, FOR SOLUTION ORAL at 12:25

## 2022-01-01 RX ADMIN — MORPHINE SULFATE 3 MILLIGRAM(S): 50 CAPSULE, EXTENDED RELEASE ORAL at 18:43

## 2022-01-01 RX ADMIN — Medication 1 APPLICATION(S): at 00:19

## 2022-01-01 RX ADMIN — ALBUTEROL 2.5 MILLIGRAM(S): 90 AEROSOL, METERED ORAL at 09:38

## 2022-01-01 RX ADMIN — Medication 3 MILLILITER(S): at 14:44

## 2022-01-01 RX ADMIN — Medication 15 MILLIGRAM(S): at 18:25

## 2022-01-01 RX ADMIN — Medication 1000 MILLIGRAM(S): at 07:21

## 2022-01-01 RX ADMIN — Medication 1: at 12:53

## 2022-01-01 RX ADMIN — Medication 1 DROP(S): at 09:08

## 2022-01-01 RX ADMIN — Medication 150 MILLIGRAM(S): at 23:17

## 2022-01-01 RX ADMIN — Medication 1 DROP(S): at 09:43

## 2022-01-01 RX ADMIN — Medication 12.5 MILLIGRAM(S): at 06:34

## 2022-01-01 RX ADMIN — Medication 1 APPLICATION(S): at 17:25

## 2022-01-01 RX ADMIN — Medication 1 TABLET(S): at 12:22

## 2022-01-01 RX ADMIN — LOSARTAN POTASSIUM 50 MILLIGRAM(S): 100 TABLET, FILM COATED ORAL at 06:28

## 2022-01-01 RX ADMIN — MORPHINE SULFATE 7.5 MILLIGRAM(S): 50 CAPSULE, EXTENDED RELEASE ORAL at 11:21

## 2022-01-01 RX ADMIN — Medication 1 DROP(S): at 09:24

## 2022-01-01 RX ADMIN — MEROPENEM 100 MILLIGRAM(S): 1 INJECTION INTRAVENOUS at 23:17

## 2022-01-01 RX ADMIN — Medication 1 DROP(S): at 07:09

## 2022-01-01 RX ADMIN — Medication 1 TABLET(S): at 13:31

## 2022-01-01 RX ADMIN — MORPHINE SULFATE 3 MILLIGRAM(S): 50 CAPSULE, EXTENDED RELEASE ORAL at 22:23

## 2022-01-01 RX ADMIN — Medication 1 DROP(S): at 03:18

## 2022-01-01 RX ADMIN — MUPIROCIN 1 APPLICATION(S): 20 OINTMENT TOPICAL at 05:46

## 2022-01-01 RX ADMIN — Medication 1 DROP(S): at 12:28

## 2022-01-01 RX ADMIN — Medication 1 TABLET(S): at 12:33

## 2022-01-01 RX ADMIN — Medication 400 MILLIGRAM(S): at 06:27

## 2022-01-01 RX ADMIN — Medication 1 DROP(S): at 15:20

## 2022-01-01 RX ADMIN — Medication 1 DROP(S): at 00:37

## 2022-01-01 RX ADMIN — Medication 0.5 MILLIGRAM(S): at 09:30

## 2022-01-01 RX ADMIN — PIPERACILLIN AND TAZOBACTAM 25 GRAM(S): 4; .5 INJECTION, POWDER, LYOPHILIZED, FOR SOLUTION INTRAVENOUS at 13:57

## 2022-01-01 RX ADMIN — MORPHINE SULFATE 5 MILLIGRAM(S): 50 CAPSULE, EXTENDED RELEASE ORAL at 02:45

## 2022-01-01 RX ADMIN — Medication 1 DROP(S): at 23:17

## 2022-01-01 RX ADMIN — Medication 1 DROP(S): at 11:49

## 2022-01-01 RX ADMIN — MORPHINE SULFATE 7.5 MILLIGRAM(S): 50 CAPSULE, EXTENDED RELEASE ORAL at 18:22

## 2022-01-01 RX ADMIN — HYDROMORPHONE HYDROCHLORIDE 0.5 MILLIGRAM(S): 2 INJECTION INTRAMUSCULAR; INTRAVENOUS; SUBCUTANEOUS at 22:36

## 2022-01-01 RX ADMIN — MUPIROCIN 1 APPLICATION(S): 20 OINTMENT TOPICAL at 22:32

## 2022-01-01 RX ADMIN — NYSTATIN CREAM 1 APPLICATION(S): 100000 CREAM TOPICAL at 05:42

## 2022-01-01 RX ADMIN — LIDOCAINE 1 APPLICATION(S): 4 CREAM TOPICAL at 22:29

## 2022-01-01 RX ADMIN — MEROPENEM 100 MILLIGRAM(S): 1 INJECTION INTRAVENOUS at 02:30

## 2022-01-01 RX ADMIN — OXYCODONE HYDROCHLORIDE 2.5 MILLIGRAM(S): 5 TABLET ORAL at 12:17

## 2022-01-01 RX ADMIN — MEROPENEM 280 MILLIGRAM(S): 1 INJECTION INTRAVENOUS at 11:32

## 2022-01-01 RX ADMIN — Medication 10 MILLIGRAM(S): at 22:10

## 2022-01-01 RX ADMIN — MIRTAZAPINE 15 MILLIGRAM(S): 45 TABLET, ORALLY DISINTEGRATING ORAL at 22:41

## 2022-01-01 RX ADMIN — Medication 1 DROP(S): at 12:00

## 2022-01-01 RX ADMIN — ALBUTEROL 2.5 MILLIGRAM(S): 90 AEROSOL, METERED ORAL at 16:07

## 2022-01-01 RX ADMIN — Medication 20 MILLIGRAM(S): at 11:47

## 2022-01-01 RX ADMIN — GABAPENTIN 400 MILLIGRAM(S): 400 CAPSULE ORAL at 14:34

## 2022-01-01 RX ADMIN — Medication 400 MILLIGRAM(S): at 01:39

## 2022-01-01 RX ADMIN — GABAPENTIN 600 MILLIGRAM(S): 400 CAPSULE ORAL at 06:04

## 2022-01-01 RX ADMIN — Medication 12.5 MILLIGRAM(S): at 06:59

## 2022-01-01 RX ADMIN — MORPHINE SULFATE 5 MILLIGRAM(S): 50 CAPSULE, EXTENDED RELEASE ORAL at 07:17

## 2022-01-01 RX ADMIN — Medication 1000 MILLIGRAM(S): at 10:29

## 2022-01-01 RX ADMIN — MUPIROCIN 1 APPLICATION(S): 20 OINTMENT TOPICAL at 21:13

## 2022-01-01 RX ADMIN — Medication 100 MILLIGRAM(S): at 21:27

## 2022-01-01 RX ADMIN — NYSTATIN CREAM 1 APPLICATION(S): 100000 CREAM TOPICAL at 19:16

## 2022-01-01 RX ADMIN — QUETIAPINE FUMARATE 25 MILLIGRAM(S): 200 TABLET, FILM COATED ORAL at 17:25

## 2022-01-01 RX ADMIN — MUPIROCIN 1 APPLICATION(S): 20 OINTMENT TOPICAL at 13:25

## 2022-01-01 RX ADMIN — AMLODIPINE BESYLATE 5 MILLIGRAM(S): 2.5 TABLET ORAL at 06:29

## 2022-01-01 RX ADMIN — MORPHINE SULFATE 7.5 MILLIGRAM(S): 50 CAPSULE, EXTENDED RELEASE ORAL at 22:20

## 2022-01-01 RX ADMIN — Medication 975 MILLIGRAM(S): at 07:20

## 2022-01-01 RX ADMIN — METHADONE HYDROCHLORIDE 5 MILLIGRAM(S): 40 TABLET ORAL at 13:51

## 2022-01-01 RX ADMIN — MORPHINE SULFATE 7.5 MILLIGRAM(S): 50 CAPSULE, EXTENDED RELEASE ORAL at 22:18

## 2022-01-01 RX ADMIN — HYDROMORPHONE HYDROCHLORIDE 0.5 MILLIGRAM(S): 2 INJECTION INTRAMUSCULAR; INTRAVENOUS; SUBCUTANEOUS at 09:07

## 2022-01-01 RX ADMIN — Medication 20 MILLIGRAM(S): at 07:14

## 2022-01-01 RX ADMIN — ENOXAPARIN SODIUM 40 MILLIGRAM(S): 100 INJECTION SUBCUTANEOUS at 18:01

## 2022-01-01 RX ADMIN — OXCARBAZEPINE 450 MILLIGRAM(S): 300 TABLET, FILM COATED ORAL at 17:34

## 2022-01-01 RX ADMIN — GABAPENTIN 600 MILLIGRAM(S): 400 CAPSULE ORAL at 14:25

## 2022-01-01 RX ADMIN — Medication 25 MILLIGRAM(S): at 05:41

## 2022-01-01 RX ADMIN — PIPERACILLIN AND TAZOBACTAM 25 GRAM(S): 4; .5 INJECTION, POWDER, LYOPHILIZED, FOR SOLUTION INTRAVENOUS at 07:08

## 2022-01-01 RX ADMIN — PREGABALIN 1000 MICROGRAM(S): 225 CAPSULE ORAL at 12:09

## 2022-01-01 RX ADMIN — Medication 250 MILLIGRAM(S): at 18:19

## 2022-01-01 RX ADMIN — MUPIROCIN 1 APPLICATION(S): 20 OINTMENT TOPICAL at 13:39

## 2022-01-01 RX ADMIN — ENOXAPARIN SODIUM 80 MILLIGRAM(S): 100 INJECTION SUBCUTANEOUS at 17:29

## 2022-01-01 RX ADMIN — Medication 1 DROP(S): at 18:32

## 2022-01-01 RX ADMIN — Medication 0.5 MILLIGRAM(S): at 20:25

## 2022-01-01 RX ADMIN — Medication 1 DROP(S): at 01:21

## 2022-01-01 RX ADMIN — Medication 1 APPLICATION(S): at 17:18

## 2022-01-01 RX ADMIN — INSULIN GLARGINE 10 UNIT(S): 100 INJECTION, SOLUTION SUBCUTANEOUS at 22:07

## 2022-01-01 RX ADMIN — Medication 100 MILLIGRAM(S): at 14:27

## 2022-01-01 RX ADMIN — MORPHINE SULFATE 2 MILLIGRAM(S): 50 CAPSULE, EXTENDED RELEASE ORAL at 03:45

## 2022-01-01 RX ADMIN — Medication 1 APPLICATION(S): at 23:47

## 2022-01-01 RX ADMIN — INSULIN GLARGINE 10 UNIT(S): 100 INJECTION, SOLUTION SUBCUTANEOUS at 21:42

## 2022-01-01 RX ADMIN — GABAPENTIN 300 MILLIGRAM(S): 400 CAPSULE ORAL at 05:32

## 2022-01-01 RX ADMIN — Medication 1 DROP(S): at 08:00

## 2022-01-01 RX ADMIN — Medication 100 MILLIGRAM(S): at 03:48

## 2022-01-01 RX ADMIN — QUETIAPINE FUMARATE 25 MILLIGRAM(S): 200 TABLET, FILM COATED ORAL at 12:00

## 2022-01-01 RX ADMIN — ENOXAPARIN SODIUM 80 MILLIGRAM(S): 100 INJECTION SUBCUTANEOUS at 05:26

## 2022-01-01 RX ADMIN — Medication 1 TABLET(S): at 11:48

## 2022-01-01 RX ADMIN — Medication 650 MILLIGRAM(S): at 07:42

## 2022-01-01 RX ADMIN — Medication 15 MILLIGRAM(S): at 11:19

## 2022-01-01 RX ADMIN — MORPHINE SULFATE 5 MILLIGRAM(S): 50 CAPSULE, EXTENDED RELEASE ORAL at 13:45

## 2022-01-01 RX ADMIN — TIOTROPIUM BROMIDE 1 CAPSULE(S): 18 CAPSULE ORAL; RESPIRATORY (INHALATION) at 09:28

## 2022-01-01 RX ADMIN — Medication 1 DROP(S): at 07:58

## 2022-01-01 RX ADMIN — Medication 1 APPLICATION(S): at 17:38

## 2022-01-01 RX ADMIN — Medication 1 DROP(S): at 07:08

## 2022-01-01 RX ADMIN — BUDESONIDE AND FORMOTEROL FUMARATE DIHYDRATE 2 PUFF(S): 160; 4.5 AEROSOL RESPIRATORY (INHALATION) at 22:47

## 2022-01-01 RX ADMIN — Medication 100 MILLIGRAM(S): at 11:25

## 2022-01-01 RX ADMIN — Medication 400 MILLIGRAM(S): at 09:25

## 2022-01-01 RX ADMIN — Medication 15 MILLIGRAM(S): at 06:58

## 2022-01-01 RX ADMIN — Medication 1 DROP(S): at 17:46

## 2022-01-01 RX ADMIN — Medication 20 MILLIGRAM(S): at 11:18

## 2022-01-01 RX ADMIN — Medication 1000 MILLIGRAM(S): at 03:00

## 2022-01-01 RX ADMIN — TAMSULOSIN HYDROCHLORIDE 0.4 MILLIGRAM(S): 0.4 CAPSULE ORAL at 21:08

## 2022-01-01 RX ADMIN — POLYETHYLENE GLYCOL 3350 17 GRAM(S): 17 POWDER, FOR SOLUTION ORAL at 12:14

## 2022-01-01 RX ADMIN — MORPHINE SULFATE 2 MILLIGRAM(S): 50 CAPSULE, EXTENDED RELEASE ORAL at 05:41

## 2022-01-01 RX ADMIN — LIDOCAINE 1 APPLICATION(S): 4 CREAM TOPICAL at 17:48

## 2022-01-01 RX ADMIN — MORPHINE SULFATE 5 MILLIGRAM(S): 50 CAPSULE, EXTENDED RELEASE ORAL at 18:36

## 2022-01-01 RX ADMIN — Medication 1 DROP(S): at 19:20

## 2022-01-01 RX ADMIN — MORPHINE SULFATE 3 MILLIGRAM(S): 50 CAPSULE, EXTENDED RELEASE ORAL at 13:52

## 2022-01-01 RX ADMIN — INSULIN GLARGINE 10 UNIT(S): 100 INJECTION, SOLUTION SUBCUTANEOUS at 21:37

## 2022-01-01 RX ADMIN — Medication 10 MILLIGRAM(S): at 22:28

## 2022-01-01 RX ADMIN — Medication 10 MILLIGRAM(S): at 21:21

## 2022-01-01 RX ADMIN — Medication 1 DROP(S): at 22:48

## 2022-07-26 NOTE — ED ADULT NURSE NOTE - OBJECTIVE STATEMENT
Alert 87 y/o female presents to the ED with daughter for altered mental status. Alert 85 y/o female presents to the ED with daughter for altered mental status and shingles. She had a migraine on the right side of her face one week ago. Shingles blisters started to appear on Saturday. Patient unable to open right eye on her own. Lesions to right eye, forehead and top of right side of head. Lesions are now crusted. Patient usually ambulatory and able to completed daily activities of living. Patient now minimally verbal and not able to ambulate.

## 2022-07-26 NOTE — CONSULT NOTE ADULT - SUBJECTIVE AND OBJECTIVE BOX
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 Patient has had a worsening vesicular rash of the face since Saturday, with migraine headaches reported since last week. She was started on valacyclovir 3 days ago. Patient complained of blurry vision on Saturday. On Monday afternoon, patient noted to have change in mentation, was unable to perform her usual activities, was not eating or drinking as usual. Pt's daughter states mental status has been progressing worsening over past several days. Denies recent fevers.        Review of Systems:  INCOMPLETE   CONSTITUTIONAL: No fevers or chills  EYES/ENT: No visual changes or no throat pain   NECK: No pain or stiffness  RESPIRATORY: No hemoptysis or shortness of breath  CARDIOVASCULAR: No chest pain or palpitations  GASTROINTESTINAL: No melena or hematochezia  GENITOURINARY: No dysuria or hematuria  NEUROLOGICAL: +As stated in HPI above  SKIN: No itching, burning, rashes, or lesions   All other review of systems is negative unless indicated above.    Allergies:      PMHx:     PFHx:     PSuHx:       Medications:  MEDICATIONS  (STANDING):  brimonidine 0.2% Ophthalmic Solution 1 Drop(s) Right EYE three times a day  erythromycin   Ointment 1 Application(s) Right EYE four times a day    MEDICATIONS  (PRN):      Vitals:  T(C): 36.4 (07-26-22 @ 14:39), Max: 36.8 (07-26-22 @ 09:10)  HR: 90 (07-26-22 @ 16:12) (90 - 93)  BP: 172/81 (07-26-22 @ 18:12) (136/65 - 187/66)  RR: 18 (07-26-22 @ 16:12) (18 - 20)  SpO2: 100% (07-26-22 @ 16:12) (97% - 100%)    Physical Examination: INCOMPLETE  ***    Labs:                        14.3   13.32 )-----------( 352      ( 26 Jul 2022 10:45 )             46.4     07-26    138  |  92<L>  |  65<H>  ----------------------------<  257<H>  3.5   |  22  |  2.84<H>    Ca    9.6      26 Jul 2022 10:45    TPro  8.4<H>  /  Alb  4.4  /  TBili  0.3  /  DBili  x   /  AST  46<H>  /  ALT  29  /  AlkPhos  125<H>  07-26    CAPILLARY BLOOD GLUCOSE        LIVER FUNCTIONS - ( 26 Jul 2022 10:45 )  Alb: 4.4 g/dL / Pro: 8.4 g/dL / ALK PHOS: 125 U/L / ALT: 29 U/L / AST: 46 U/L / GGT: x             CSF:    Radiology:    A chronic infarct with associated hypodensity and volume loss involves the left parietal lobe. Right parietal lobe encephalomalacia and gliosis with volume loss is noted surrounding the shunt catheter tract.  There is no gross CT evidence for superimposed acute vascular distribution infarct. There is no evidence for acute intracranial hemorrhage.  Thin low density fluid is noted adjacent to both cerebral hemispheres which may reflect enlargement of the subarachnoid spaces from the cerebral volume loss, thin chronic subdural collections, or a combination of both. There is no evidence for acute subdural hemorrhage.  Mild patchy hypodensity without mass effect is noted in the periventricular white matter which most likely represents chronic microvascular ischemic changes given the patient's age. Generalized cerebral volume loss is noted with secondary prominence of the sulci.  Extensive right periorbital preseptal soft tissue swelling is noted concordant with the history of shingles over the right eye. A superimposed cellulitis can't be excluded. No post septal extension is appreciated.  Generalized diffuse frontal scalp soft tissue swelling is present which may be related to the shingles infection, a superimposed cellulitis, or a combination of both. The adjacent calvarium appears intact without periosteal reaction or bony destruction.  Minimal mucosal thickening involves the paranasal sinuses without associated air-fluid levels.  The mastoid air cells and middle ear cavities are clear.  Atherosclerotic calcifications involve both carotid bifurcations. Retropharyngeal deviation of the right internal carotid artery is noted indenting the posterior lateral wall of the pharynx, an anatomic variant.    IMPRESSION:  The ventricular system is not dilated, and is markedly smaller in size compared to the 2011 head CT study. Comparison with the more recent outside head CT is recommended to evaluate for any interval change.  Chronic left parietal infarct.  There is no gross CT evidence for superimposed acute vascular distribution infarct. There is no evidence for acute intracranial hemorrhage.  Extensive right periorbital preseptal soft tissue swelling is noted concordant with the history of shingles over the right eye. A superimposed cellulitis can't be excluded. No post septal extension is appreciated.  Generalized diffuse frontal scalp soft tissue swelling is present which may be related to the shingles infection, a superimposed cellulitis, or a combination of both. The adjacent calvarium appears intact without periosteal reaction or bony destruction.  If the patient has new and persistent unexplained symptoms, consider follow-up brain MRI with and without contrast for further workup, provided there are no MRI or contrast contraindications.     Neurology - Consult Note - 07-26-22  -  Sally Mcknight MD  PGY-2 Neurology  Spectra: [phone] ([facility]), [phone] ([facility])  -    Name: ADAL LIZARRAGA; 86y (1935)    Reason for Admission: Altered mental status      Chief Complaint: 87 yo F, hospitalized for shingles with vesicular eruption on face, concern for altered mentation    HPI  Patient was seen at bedside. Patient unable to provide history due to altered status. History provided by daughter at bedside. Patient complained on migraines on the R side of her head ("wrong side" for her usual migraines per patient) on Sunday. She threw up on Wednesday night. A CT scan on Thursday was at baseline. Thursday night, patient had elevated BP 200s/100s throughout the night. Her PCP added amlodipine, without improvement. On Friday, patient complained of blurry vision. On saturday, patient developed a rash on the right upper face. She was still alert and oriented on Saturday. On Sunday, daughter found the patient in bed, opening eyes, but not at her baseline activity. By Sunday night, patient was confused, sitting in front of her computer but not being able to remember her password and just staring at screen for hours. Monday, patient continued to be confused, and started fighting with her aide and not following commands. On Tuesday, patient slipped and was brought to ED by her family. Patient continues to be altered, but somewhat improved from earlier in the day per daughter.    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 Patient has had a worsening vesicular rash of the face since Saturday, with migraine headaches reported since last week. She was started on valacyclovir 3 days ago. Patient complained of blurry vision on Saturday. On Monday afternoon, patient noted to have change in mentation, was unable to perform her usual activities, was not eating or drinking as usual. Pt's daughter states mental status has been progressing worsening over past several days. Denies recent fevers.        Review of Systems: unable to obtain given AMS     Allergies: Diltiazem-rash    PMHx: MI in 1986 and 1996, s/p 2 stents. DMII. HTN.Kidney stones. Deafness. COPD. Smoked 2 packs a day x 60 years, quit 2007.  shunt (programmed at 1.0).  PFHx: Dad passed of MI at age 49. Mother passed away from [facility].  PSuHx: Smoked 2 packs a day x 60 years, quit 2007.       Medications:  MEDICATIONS  (STANDING):  brimonidine 0.2% Ophthalmic Solution 1 Drop(s) Right EYE three times a day  erythromycin   Ointment 1 Application(s) Right EYE four times a day    MEDICATIONS  (PRN):      Vitals:  T(C): 36.4 (07-26-22 @ 14:39), Max: 36.8 (07-26-22 @ 09:10)  HR: 90 (07-26-22 @ 16:12) (90 - 93)  BP: 172/81 (07-26-22 @ 18:12) (136/65 - 187/66)  RR: 18 (07-26-22 @ 16:12) (18 - 20)  SpO2: 100% (07-26-22 @ 16:12) (97% - 100%)    Physical exam    Constitutional: female patient, lying in bed, rash over R face, opening eyes to voice but not responding to commands,  Eyes: ophthalmoscopic exam unable to do as patient's R eye closed shut, swollen purulent. L eye not able to assess due to head positioning, patient not cooperative, closing eyes.  Cardiovascular: warm-to-touch    Neurological Examination:    - Mental Status: Awake/opening eyes to verbal stimulation but not appearing to understand conversation, not able to follow commands    - Cranial Nerves:  II: Visual fields difficult to assess, eye swollen shut.   III, IV, VI: Left Extraocular movements are grossly intact without nystagmus  V: cannot assess, patient scratching vesicles on R forehead.  VII: No obvious facial droop  VIII: Patient hard of hearing at baseline, does appear to open eyes to voice   IX, X: cannot assess as patient not following commands  XI: cannot assess as patient not following commands  XII: cannot assess as patient not following commands    - Motor/Strength Testing: cannot assess as patient not following commands. No movement to noxious stimuli upper extremities. Moves b/l LE in plane of bed to noxious stimuli   - cannot assess pronator drift  - Normal muscle bulk and tone throughout.    - Reflexes:   Bicep (C5/C6):                  R 2+ --- L 2+   Brachioradialis (C5/C6) :   R 2+ --- L 2+   Patella (L3/L4) :                 R 2+ --- L 2+   Ankle (S1) :                       R 2+ --- L 2+     - Plant responses down bilaterally.    - Sensory: intact to noxious stimuli on b/l LE  - Coordination: cannot assess as patient not following commands  - Gait: cannot assess as patient not following commands      Labs:                        14.3   13.32 )-----------( 352      ( 26 Jul 2022 10:45 )             46.4     07-26    138  |  92<L>  |  65<H>  ----------------------------<  257<H>  3.5   |  22  |  2.84<H>    Ca    9.6      26 Jul 2022 10:45    TPro  8.4<H>  /  Alb  4.4  /  TBili  0.3  /  DBili  x   /  AST  46<H>  /  ALT  29  /  AlkPhos  125<H>  07-26    CAPILLARY BLOOD GLUCOSE        LIVER FUNCTIONS - ( 26 Jul 2022 10:45 )  Alb: 4.4 g/dL / Pro: 8.4 g/dL / ALK PHOS: 125 U/L / ALT: 29 U/L / AST: 46 U/L / GGT: x             CSF:    Radiology:    A chronic infarct with associated hypodensity and volume loss involves the left parietal lobe. Right parietal lobe encephalomalacia and gliosis with volume loss is noted surrounding the shunt catheter tract.  There is no gross CT evidence for superimposed acute vascular distribution infarct. There is no evidence for acute intracranial hemorrhage.  Thin low density fluid is noted adjacent to both cerebral hemispheres which may reflect enlargement of the subarachnoid spaces from the cerebral volume loss, thin chronic subdural collections, or a combination of both. There is no evidence for acute subdural hemorrhage.  Mild patchy hypodensity without mass effect is noted in the periventricular white matter which most likely represents chronic microvascular ischemic changes given the patient's age. Generalized cerebral volume loss is noted with secondary prominence of the sulci.  Extensive right periorbital preseptal soft tissue swelling is noted concordant with the history of shingles over the right eye. A superimposed cellulitis can't be excluded. No post septal extension is appreciated.  Generalized diffuse frontal scalp soft tissue swelling is present which may be related to the shingles infection, a superimposed cellulitis, or a combination of both. The adjacent calvarium appears intact without periosteal reaction or bony destruction.  Minimal mucosal thickening involves the paranasal sinuses without associated air-fluid levels.  The mastoid air cells and middle ear cavities are clear.  Atherosclerotic calcifications involve both carotid bifurcations. Retropharyngeal deviation of the right internal carotid artery is noted indenting the posterior lateral wall of the pharynx, an anatomic variant.    IMPRESSION:  The ventricular system is not dilated, and is markedly smaller in size compared to the 2011 head CT study. Comparison with the more recent outside head CT is recommended to evaluate for any interval change.  Chronic left parietal infarct.  There is no gross CT evidence for superimposed acute vascular distribution infarct. There is no evidence for acute intracranial hemorrhage.  Extensive right periorbital preseptal soft tissue swelling is noted concordant with the history of shingles over the right eye. A superimposed cellulitis can't be excluded. No post septal extension is appreciated.  Generalized diffuse frontal scalp soft tissue swelling is present which may be related to the shingles infection, a superimposed cellulitis, or a combination of both. The adjacent calvarium appears intact without periosteal reaction or bony destruction.  If the patient has new and persistent unexplained symptoms, consider follow-up brain MRI with and without contrast for further workup, provided there are no MRI or contrast contraindications.     Neurology - Consult Note - 07-26-22  -  Sally Mcknight MD  PGY-2 Neurology  Spectra: [phone] ([facility]), [phone] ([facility])  -    Name: ADAL LIZARRAGA; 86y (1935)    Reason for Admission: Altered mental status      Chief Complaint: 87 yo F, hospitalized for shingles with vesicular eruption on face, concern for altered mentation    HPI  Patient was seen at bedside. Patient unable to provide history due to altered status. History provided by daughter at bedside. 87 yo F, w DM, HTN,   shunt (programmed at 1.0) for NPH. Patient complained on migraines on the R side of her head ("wrong side" for her usual migraines per patient) on Sunday. She threw up on Wednesday night. A CT scan on Thursday was at baseline. Thursday night, patient had elevated BP 200s/100s throughout the night. Her PCP added amlodipine, without improvement. On Friday, patient complained of blurry vision. On saturday, patient developed a rash on the right upper face. She was still alert and oriented on Saturday. On Sunday, daughter found the patient in bed, opening eyes, but not at her baseline activity. By Sunday night, patient was confused, sitting in front of her computer but not being able to remember her password and just staring at screen for hours. Monday, patient continued to be confused, and started fighting with her aide and not following commands. On Tuesday, patient slipped and was brought to ED by her family. Patient continues to be altered, but somewhat improved from earlier in the day per daughter.    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 Patient has had a worsening vesicular rash of the face since Saturday, with migraine headaches reported since last week. She was started on valacyclovir 3 days ago. Patient complained of blurry vision on Saturday. On Monday afternoon, patient noted to have change in mentation, was unable to perform her usual activities, was not eating or drinking as usual. Pt's daughter states mental status has been progressing worsening over past several days. Denies recent fevers.        Review of Systems: unable to obtain given AMS     Allergies: Diltiazem-rash    PMHx: MI in 1986 and 1996, s/p 2 stents. DMII. HTN. Kidney stones. Deafness. COPD. Smoked 2 packs a day x 60 years, quit 2007.  shunt (programmed at 1.0).  PFHx: Dad passed of MI at age 49. Mother passed away from [facility].  PSuHx: Smoked 2 packs a day x 60 years, quit 2007.       Medications:  MEDICATIONS  (STANDING):  brimonidine 0.2% Ophthalmic Solution 1 Drop(s) Right EYE three times a day  erythromycin   Ointment 1 Application(s) Right EYE four times a day    MEDICATIONS  (PRN):      Vitals:  T(C): 36.4 (07-26-22 @ 14:39), Max: 36.8 (07-26-22 @ 09:10)  HR: 90 (07-26-22 @ 16:12) (90 - 93)  BP: 172/81 (07-26-22 @ 18:12) (136/65 - 187/66)  RR: 18 (07-26-22 @ 16:12) (18 - 20)  SpO2: 100% (07-26-22 @ 16:12) (97% - 100%)    Physical exam    Constitutional: female patient, lying in bed, rash over R face, opening eyes to voice but not responding to commands,  Eyes: ophthalmoscopic exam unable to do as patient's R eye closed shut, swollen, purulent. L eye not able to assess due to head positioning, patient not cooperative, closing eyes.  Cardiovascular: warm-to-touch    Neurological Examination:    - Mental Status: Awake/opening eyes to verbal stimulation but not appearing to understand conversation, not able to follow commands    - Cranial Nerves:  II: Visual fields difficult to assess, eye swollen shut.   III, IV, VI: Left Extraocular movements are grossly intact without nystagmus  V: cannot assess, patient scratching vesicles on R forehead.  VII: No obvious facial droop  VIII: Patient hard of hearing at baseline, does appear to open eyes to voice   IX, X: cannot assess as patient not following commands  XI: cannot assess as patient not following commands  XII: cannot assess as patient not following commands    - Motor/Strength Testing: cannot assess as patient not following commands. No movement to noxious stimuli upper extremities. Moves b/l LE in plane of bed to noxious stimuli   - cannot assess pronator drift  - Normal muscle bulk and tone throughout.    - Reflexes:   Bicep (C5/C6):                  R 2+ --- L 2+   Brachioradialis (C5/C6) :   R 2+ --- L 2+   Patella (L3/L4) :                 R 2+ --- L 2+   Ankle (S1) :                       R 2+ --- L 2+     - Plant responses down bilaterally.    - Sensory: intact to noxious stimuli on b/l LE  - Coordination: cannot assess as patient not following commands  - Gait: cannot assess as patient not following commands      Labs:                        14.3   13.32 )-----------( 352      ( 26 Jul 2022 10:45 )             46.4     07-26    138  |  92<L>  |  65<H>  ----------------------------<  257<H>  3.5   |  22  |  2.84<H>    Ca    9.6      26 Jul 2022 10:45    TPro  8.4<H>  /  Alb  4.4  /  TBili  0.3  /  DBili  x   /  AST  46<H>  /  ALT  29  /  AlkPhos  125<H>  07-26    CAPILLARY BLOOD GLUCOSE        LIVER FUNCTIONS - ( 26 Jul 2022 10:45 )  Alb: 4.4 g/dL / Pro: 8.4 g/dL / ALK PHOS: 125 U/L / ALT: 29 U/L / AST: 46 U/L / GGT: x             CSF:    Radiology:    A chronic infarct with associated hypodensity and volume loss involves the left parietal lobe. Right parietal lobe encephalomalacia and gliosis with volume loss is noted surrounding the shunt catheter tract.  There is no gross CT evidence for superimposed acute vascular distribution infarct. There is no evidence for acute intracranial hemorrhage.  Thin low density fluid is noted adjacent to both cerebral hemispheres which may reflect enlargement of the subarachnoid spaces from the cerebral volume loss, thin chronic subdural collections, or a combination of both. There is no evidence for acute subdural hemorrhage.  Mild patchy hypodensity without mass effect is noted in the periventricular white matter which most likely represents chronic microvascular ischemic changes given the patient's age. Generalized cerebral volume loss is noted with secondary prominence of the sulci.  Extensive right periorbital preseptal soft tissue swelling is noted concordant with the history of shingles over the right eye. A superimposed cellulitis can't be excluded. No post septal extension is appreciated.  Generalized diffuse frontal scalp soft tissue swelling is present which may be related to the shingles infection, a superimposed cellulitis, or a combination of both. The adjacent calvarium appears intact without periosteal reaction or bony destruction.  Minimal mucosal thickening involves the paranasal sinuses without associated air-fluid levels.  The mastoid air cells and middle ear cavities are clear.  Atherosclerotic calcifications involve both carotid bifurcations. Retropharyngeal deviation of the right internal carotid artery is noted indenting the posterior lateral wall of the pharynx, an anatomic variant.    IMPRESSION:  The ventricular system is not dilated, and is markedly smaller in size compared to the 2011 head CT study. Comparison with the more recent outside head CT is recommended to evaluate for any interval change.  Chronic left parietal infarct.  There is no gross CT evidence for superimposed acute vascular distribution infarct. There is no evidence for acute intracranial hemorrhage.  Extensive right periorbital preseptal soft tissue swelling is noted concordant with the history of shingles over the right eye. A superimposed cellulitis can't be excluded. No post septal extension is appreciated.  Generalized diffuse frontal scalp soft tissue swelling is present which may be related to the shingles infection, a superimposed cellulitis, or a combination of both. The adjacent calvarium appears intact without periosteal reaction or bony destruction.  If the patient has new and persistent unexplained symptoms, consider follow-up brain MRI with and without contrast for further workup, provided there are no MRI or contrast contraindications.

## 2022-07-26 NOTE — H&P ADULT - PROBLEM SELECTOR PLAN 2
Patient demonstrating history of altered mentation from baseline for past day in the context of severe herpes zoster rash    -CTA: Old parietal infarct, soft tissue swelling around right eye- likely component of herpes zoster, possible cellulitis- Ventricles don't appear enlarged  - ID and Neuro consulted- f/u recs  - ID recommends LP as soon as possible- will page neuro  - On Acyclovir (renally dosed)- started 500mg in ED, added 120mg- and Cefazolin STAT then q12 per ID recs Patient demonstrating history of altered mentation from baseline for past day in the context of severe herpes zoster rash    -CTA: Old parietal infarct, soft tissue swelling around right eye- likely component of herpes zoster, possible cellulitis- Ventricles don't appear enlarged  - ID and Neuro consulted-   - ID recommends LP as soon as possible- neuro will have it done in morning (7/27)  - On Acyclovir (renally dosed)- started 500mg in ED, added 120mg- and Cefazolin STAT then q12 per ID recs Patient demonstrating history of altered mentation from baseline for past day in the context of severe herpes zoster rash    -CTA: Old parietal infarct, soft tissue swelling around right eye- likely component of herpes zoster, possible cellulitis- Ventricles don't appear enlarged  - ID and Neuro consulted- LP in AM w/ CSF analysis (including PCR for HSV, VZV, enterovirus, viral panel in addition to cytology, cell count, glucose, protein, etc), MRI Head w/ and without contrast, have lab save CSF samples- will do MRI w/o contrast given kidney concerns  - ID recommends LP as soon as possible- neuro will have it done in morning (7/27)  - On Acyclovir (renally dosed)- started 500mg in ED, added 120mg- and Cefazolin STAT then q12 per ID recs  - Will reach out to neuro in AM for LP

## 2022-07-26 NOTE — ED PROVIDER NOTE - OBJECTIVE STATEMENT
87yo F hx of CAD, DM, COPD, NPH w/ programmable shunt comes to ED for AMS and shingles. Pt on 4 days ago w/ eruption of vesicles on the R face, started on valacyclovir 3 days ago. 1 week ago had migraine headaches which shes had before. Yesterday into today more altered, unable to perform her normal activities, not eating or drinking much. Denies fevers.

## 2022-07-26 NOTE — H&P ADULT - PROBLEM SELECTOR PLAN 1
Patient demonstrating Right V1 distribution rash, treated with Valtrex since 7/24 outpatient    - Consulted ID and Neuro- f/u recs  - On Acyclovir (renally dosed)- started 500mg in ED, added 120mg- and Cefazolin STAT then q12 per ID recs  -For Opthalmicus- appreciate opthalmology recs- Start brimonidine TID in R eye, Start erythromycin ointment QID to R eye and periocular lesions  - *If patient requires MRI, must have  shunt device reset*- neuro aware Patient demonstrating Right V1 distribution rash, previous history of chicken pox as child, treated with Valtrex since 7/24 outpatient    - Consulted ID and Neuro- LP in AM w/ CSF analysis (including PCR for HSV, VZV, enterovirus, viral panel in addition to cytology, cell count, glucose, protein, etc), MRI Head w/ and without contrast, have lab save CSF samples  - On Acyclovir (renally dosed) - and Cefazolin STAT then q12 per ID recs  -For Opthalmicus- appreciate opthalmology recs- Start brimonidine TID in R eye, Start erythromycin ointment QID to R eye and periocular lesions  - *If patient requires MRI, must have  shunt device reset*- neuro aware  - PRN Tylenol for pain Patient demonstrating Right V1 distribution rash, previous history of chicken pox as child, treated with Valtrex since 7/24 outpatient    - Consulted ID and Neuro- LP in AM w/ CSF analysis (including PCR for HSV, VZV, enterovirus, viral panel in addition to cytology, cell count, glucose, protein, etc), MRI Head w/ and without contrast, have lab save CSF samples- will do MRI w/o contrast given kidney concerns  - On Acyclovir (renally dosed) - and Cefazolin STAT then q12 per ID recs  -For Opthalmicus- appreciate opthalmology recs- Start brimonidine TID in R eye, Start erythromycin ointment QID to R eye and periocular lesions  - *If patient requires MRI, must have  shunt device reset*- neuro aware  - PRN Tylenol for pain

## 2022-07-26 NOTE — H&P ADULT - PROBLEM SELECTOR PLAN 5
Patient with history of DM2 on home Lantus 34U daily, Trulicity, glipizide    - Hold oral hypoglycemics   - Patient currently NPO- [id number] while NPO  - Insulin sliding scale Patient with history of DM2 on home Lantus 34U daily, Trulicity, glipizide    - Hold oral hypoglycemics   - Patient currently NPO- [id number] while NPO  - Insulin sliding scale  - HgbA1C

## 2022-07-26 NOTE — ED PROVIDER NOTE - PHYSICAL EXAMINATION
General: NAD, chronically ill appearing  HEENT: NC, + R face V1 distribution w/ crusted vesicles, +R eye w/ drainage, and erythema, conjunctiva with some erythema and periorbital edema.   Cardiac: RRR  Chest: CTA  Abdomen: soft, non-tender  Extremities: no deformities  Skin: no rashes other than above in HEENT  Neuro: Non-verbal but opens L eye to voice and tracks  Psych: unable to assess

## 2022-07-26 NOTE — CONSULT NOTE ADULT - CONSULT REASON
Herpes zoster infection of the face with concern for encephalopathy zoster infection of the face with concern for encephalopathy

## 2022-07-26 NOTE — H&P ADULT - PROBLEM SELECTOR PLAN 10
Elevated enzymes, likely secondary to physiologic stress from herpes zoster/htn    - Monitor labs for now  - Ordered hepatitis panel Diet: NPO until dysphagia analysis    DVT Ppx: Subq heparin    - Speech and swallow evaluation after failed dysphagia screen- f/u

## 2022-07-26 NOTE — H&P ADULT - PROBLEM SELECTOR PLAN 9
Diet: NPO until dysphagia analysis    DVT Ppx: Subq heparin Diet: NPO until dysphagia analysis    DVT Ppx: Subq heparin    - Speech and swallow evaluation after failed dysphagia screen- f/u Elevated enzymes, likely secondary to physiologic stress from herpes zoster/htn    - Monitor labs for now  - Ordered hepatitis panel

## 2022-07-26 NOTE — H&P ADULT - NSHPSOCIALHISTORY_GEN_ALL_CORE
Patient lives at home with  who has dementia. They have a 24 hour [facility] health aide at home. Patient has a 120 year pack smoking history (60 years, 2 packs a day), no history of alcohol use. Patient requires aide for bathing and going to the bathroom. Patient can eat on her own, climb up half a stairway, and walks with a rolling walker (rollator)

## 2022-07-26 NOTE — H&P ADULT - PROBLEM SELECTOR PLAN 7
Patient has 120 pack year smoker, quit in 2011    -Treated with Trelegy outpatient  - Will monitor VS and physical exam findings

## 2022-07-26 NOTE — ED PROVIDER NOTE - ATTENDING CONTRIBUTION TO CARE
agree with resident note    "87yo F hx of CAD, DM, COPD, NPH w/ programmable shunt comes to ED for AMS and shingles. Pt on 4 days ago w/ eruption of vesicles on the R face, started on valacyclovir 3 days ago. 1 week ago had migraine headaches which shes had before. Yesterday into today more altered, unable to perform her normal activities, not eating or drinking much. Denies fevers."    daughter (oncologist at [facility]) states mother able to sit on chair and use computer for 6 hours, able to climb stairs if need be but willingly has been less mobile.  CT done on Thursday did not show ventricular dilatation.    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

## 2022-07-26 NOTE — H&P ADULT - NSHPREVIEWOFSYSTEMS_GEN_ALL_CORE
CONSTITUTIONAL: +weakness, No fevers or chills  EYES/ENT: No visual changes;  No vertigo or throat pain   NECK: No pain or stiffness  RESPIRATORY: No cough, wheezing, hemoptysis; No shortness of breath  CARDIOVASCULAR: No chest pain or palpitations  GASTROINTESTINAL: No abdominal or epigastric pain. No nausea, vomiting, +hematemesis; No diarrhea or constipation. No melena or hematochezia.  GENITOURINARY: No dysuria, frequency or hematuria  NEUROLOGICAL: No numbness or weakness  ENDOCRINE: No polyuria, polydipsia, heat or cold intolerance  SKIN: +Rash    *ROS history limited by AMS of patient*

## 2022-07-26 NOTE — ED PROVIDER NOTE - PROGRESS NOTE DETAILS
Ann Hoyos DO PGY-2  Patient poor candidate for LP due to mental status, habitus, requiring IR LP in the past. Empirically treating with acyclovir.

## 2022-07-26 NOTE — H&P ADULT - NSICDXPASTMEDICALHX_GEN_ALL_CORE_FT
PAST MEDICAL HISTORY:  CAD (coronary artery disease)     COPD, mild     Migraines     NPH (normal pressure hydrocephalus)     Type 2 diabetes mellitus

## 2022-07-26 NOTE — H&P ADULT - NSHPPHYSICALEXAM_GEN_ALL_CORE
ICU Vital Signs Last 24 Hrs  T(C): 36.4 (26 Jul 2022 19:38), Max: 36.8 (26 Jul 2022 09:10)  T(F): 97.6 (26 Jul 2022 19:38), Max: 98.3 (26 Jul 2022 09:10)  HR: 86 (26 Jul 2022 19:38) (86 - 93)  BP: 156/66 (26 Jul 2022 19:38) (136/65 - 187/66)  BP(mean): --  ABP: --  ABP(mean): --  RR: 18 (26 Jul 2022 19:38) (18 - 20)  SpO2: 98% (26 Jul 2022 19:38) (97% - 100%)    O2 Parameters below as of 26 Jul 2022 19:38  Patient On (Oxygen Delivery Method): room air    **Physical/Neuro exam limited by altered mentation of patient**    PHYSICAL EXAM:   GENERAL: NAD, responds with movement to verbal cues, resting in bed.  HENT: NCAT; moist mucous membranes  EYES: anicteric sclerae, moist conjunctivae  NECK: Trachea midline; supple  CHEST:  no respiratory distress  HEART:  regular rate and rhythm  ABDOMEN:  Soft, non-tender, non-distended, normoactive bowel sounds,  no masses  EXTREMITIES: No LE edema  SKIN:  +Rash on V1 distribution of right face, red, warm, erythematous  PSYCH: Appropriate affect, alert and oriented to person, place and time  NEURO: No tremor, asterixis ICU Vital Signs Last 24 Hrs  T(C): 36.4 (26 Jul 2022 19:38), Max: 36.8 (26 Jul 2022 09:10)  T(F): 97.6 (26 Jul 2022 19:38), Max: 98.3 (26 Jul 2022 09:10)  HR: 86 (26 Jul 2022 19:38) (86 - 93)  BP: 156/66 (26 Jul 2022 19:38) (136/65 - 187/66)  BP(mean): --  ABP: --  ABP(mean): --  RR: 18 (26 Jul 2022 19:38) (18 - 20)  SpO2: 98% (26 Jul 2022 19:38) (97% - 100%)    O2 Parameters below as of 26 Jul 2022 19:38  Patient On (Oxygen Delivery Method): room air    **Physical/Neuro exam limited by altered mentation of patient**    PHYSICAL EXAM:   PHYSICAL EXAM:   GENERAL: NAD, moves slightly to verbal cues, resting in bed.  HENT: NCAT; moist mucous membranes  EYES: anicteric sclerae, moist conjunctivae; Significant swelling and crusting of right eye with eyelid swollen shut; What appears to be dependent edema of the left eyelid as well  NECK: Trachea midline; supple  CHEST:  no respiratory distress  HEART:  regular rate and rhythm  ABDOMEN:  Soft, non-tender, non-distended, normoactive bowel sounds,  no masses  EXTREMITIES: No LE edema  SKIN:  +Rash on V1 distribution of right face, red, warm, erythematous  NEURO: + resting tremor, not new, at baseline

## 2022-07-26 NOTE — CONSULT NOTE ADULT - SUBJECTIVE AND OBJECTIVE BOX
[facility] DEPARTMENT OF OPHTHALMOLOGY - INITIAL ADULT CONSULT  ------------------------------------------------------------------------------------------------------------    HPI: 87yo F hx of CAD, DM, COPD, NPH w/ programmable shunt comes to ED for AMS and shingles. Pt on 4 days ago w/ eruption of vesicles on the R face, started on valacyclovir 3 days ago. 1 week ago had migraine headaches which shes had before. Yesterday into today more altered, unable to perform her normal activities, not eating or drinking much. Denies fevers.    Interval History: Pt's daughter states mental status has been progressing worsening over past several days.     PAST MEDICAL & SURGICAL HISTORY:    Past Ocular History: CEIOL OD  Ophthalmic Medications: Valtrex PO  FAMILY HISTORY: None    Social History: Lives with  and live-in aide. Unknown if smoker.      MEDICATIONS  (STANDING):  acetaminophen   IVPB .. 1000 milliGRAM(s) IV Intermittent once  labetalol Injectable 5 milliGRAM(s) IV Push once    MEDICATIONS  (PRN):    Allergies & Intolerances: NKDA    Review of Systems:  MYRON 2/2 AMS    VITALS: T(C): 36.4 (07-26-22 @ 14:39)  T(F): 97.6 (07-26-22 @ 14:39), Max: 98.3 (07-26-22 @ 09:10)  HR: 90 (07-26-22 @ 16:12) (90 - 93)  BP: 187/66 (07-26-22 @ 16:12) (136/65 - 187/66)  RR:  (18 - 20)  SpO2:  (97% - 100%)  Wt(kg): --  General: AAO x 0, minimally cooperative with exam.     Ophthalmology Exam:  Visual acuity (sc): MYRON 2/2 AMS  Pupils: Dilated and non reactive OD, round and reactive OS.   Ttono: 26 OD, 13 OS  Extraocular movements (EOMs): Full OU  Confrontational Visual Field (CVF): MYRON 2/2 AMS  Color Plates: MYRON 2/2 AMS    Pen Light Exam (PLE) - pt unable to tolerate sitting up to slit lamp.  External: Scattered vesicular, crusted lesions along V1 distribution over R side of face, wnl on L side  Lids/Lashes/Lacrimal Ducts: 1+ periorbital edema RUL and RLL, flat OS   Sclera/Conjunctiva: 1-2+ injection OD, W+Q OS  Cornea: Scattered pseudomembranes OD, Cl OS  Anterior Chamber: D+F OU    Iris: Flat OU  Lens: PCIOL OD, 3+ NS OS    Fundus Exam: dilated with 1% tropicamide and 2.5% phenylephrine  Approval obtained from primary team for dilation  Patient aware that pupils can remained dilated for at least 4-6 hours  Exam performed with 20D lens    Vitreous: wnl OU  Disc, cup/disc: sharp and pink, 0.4 OU  Macula: wnl OU  Vessels: wnl OU  Periphery: wnl OU    Labs/Imaging:    CT stereotactic:   IMPRESSION:    The ventricular system is not dilated, and is markedly smaller in size   compared to the 2011 head CT study. Comparison with the more recent   outside head CT is recommended to evaluate for any interval change.    Chronic left parietal infarct.    There is no gross CT evidence for superimposed acute vascular   distribution infarct. There is no evidence for acute intracranial   hemorrhage.    Extensive right periorbital preseptal soft tissue swelling is noted   concordantwith the history of shingles over the right eye. A   superimposed cellulitis can't be excluded. No post septal extension is   appreciated.    Generalized diffuse frontal scalp soft tissue swelling is present which   may be related to the shingles infection, a superimposed cellulitis, or a   combination of both. The adjacent calvarium appears intact without   periosteal reaction or bony destruction.       [facility] DEPARTMENT OF OPHTHALMOLOGY - INITIAL ADULT CONSULT  ------------------------------------------------------------------------------------------------------------    HPI: 87yo F hx of CAD, DM, COPD, NPH w/ programmable shunt comes to ED for AMS and shingles. Pt on 4 days ago w/ eruption of vesicles on the R face, started on valacyclovir 3 days ago. 1 week ago had migraine headaches which shes had before. Yesterday into today more altered, unable to perform her normal activities, not eating or drinking much. Denies fevers.    Interval History: Pt's daughter states mental status has been progressing worsening over past several days.     PAST MEDICAL & SURGICAL HISTORY:    Past Ocular History: CEIOL OD  Ophthalmic Medications: Valtrex PO  FAMILY HISTORY: None    Social History: Lives with  and live-in aide. Unknown if smoker.      MEDICATIONS  (STANDING):  acetaminophen   IVPB .. 1000 milliGRAM(s) IV Intermittent once  labetalol Injectable 5 milliGRAM(s) IV Push once    MEDICATIONS  (PRN):    Allergies & Intolerances: NKDA    Review of Systems:  MYRON 2/2 AMS    VITALS: T(C): 36.4 (07-26-22 @ 14:39)  T(F): 97.6 (07-26-22 @ 14:39), Max: 98.3 (07-26-22 @ 09:10)  HR: 90 (07-26-22 @ 16:12) (90 - 93)  BP: 187/66 (07-26-22 @ 16:12) (136/65 - 187/66)  RR:  (18 - 20)  SpO2:  (97% - 100%)  Wt(kg): --  General: AAO x 0, minimally cooperative with exam.     Ophthalmology Exam:  Visual acuity (sc): MYRON 2/2 AMS  Pupils: Dilated and non reactive OD, round and reactive OS.   Ttono: 26 OD, 13 OS  Extraocular movements (EOMs): Full OU  Confrontational Visual Field (CVF): MYRON 2/2 AMS  Color Plates: MYRON 2/2 AMS    Pen Light Exam (PLE) - pt unable to tolerate sitting up to slit lamp.  External: Scattered vesicular, crusted lesions along V1 distribution over R side of face, wnl on L side  Lids/Lashes/Lacrimal Ducts: 1+ periorbital edema RUL and RLL, flat OS   Sclera/Conjunctiva: 1-2+ injection OD, W+Q OS  Cornea: Scattered pseudodendrites OD, Cl OS  Anterior Chamber: D+F OU    Iris: Flat OU  Lens: PCIOL OD, 3+ NS OS    Fundus Exam: dilated with 1% tropicamide and 2.5% phenylephrine  Approval obtained from primary team for dilation  Patient aware that pupils can remained dilated for at least 4-6 hours  Exam performed with 20D lens    Vitreous: wnl OU  Disc, cup/disc: sharp and pink, 0.4 OU  Macula: wnl OU  Vessels: wnl OU  Periphery: wnl OU    Labs/Imaging:    CT stereotactic:   IMPRESSION:    The ventricular system is not dilated, and is markedly smaller in size   compared to the 2011 head CT study. Comparison with the more recent   outside head CT is recommended to evaluate for any interval change.    Chronic left parietal infarct.    There is no gross CT evidence for superimposed acute vascular   distribution infarct. There is no evidence for acute intracranial   hemorrhage.    Extensive right periorbital preseptal soft tissue swelling is noted   concordantwith the history of shingles over the right eye. A   superimposed cellulitis can't be excluded. No post septal extension is   appreciated.    Generalized diffuse frontal scalp soft tissue swelling is present which   may be related to the shingles infection, a superimposed cellulitis, or a   combination of both. The adjacent calvarium appears intact without   periosteal reaction or bony destruction.       [facility] DEPARTMENT OF OPHTHALMOLOGY - INITIAL ADULT CONSULT  ------------------------------------------------------------------------------------------------------------    HPI: 85yo F hx of CAD, DM, COPD, NPH w/ programmable shunt comes to ED for AMS and shingles. Pt on 4 days ago w/ eruption of vesicles on the R face, started on valacyclovir 3 days ago. 1 week ago had migraine headaches which shes had before. Yesterday into today more altered, unable to perform her normal activities, not eating or drinking much. Denies fevers.    Interval History: Pt's daughter states mental status has been progressing worsening over past several days.  Pt developed pain on right side of head about a week ago, then developed blurry vision a few days later and then mental status became altered.    PAST MEDICAL & SURGICAL HISTORY:    Past Ocular History: CEIOL OD  Ophthalmic Medications: Valtrex PO  FAMILY HISTORY: None, no glaucoma, no ARMD    Social History: Lives with  and live-in aide. Unknown if smoker.      MEDICATIONS  (STANDING):  acetaminophen   IVPB .. 1000 milliGRAM(s) IV Intermittent once  labetalol Injectable 5 milliGRAM(s) IV Push once    MEDICATIONS  (PRN):    Allergies & Intolerances: NKDA    Review of Systems:  MYRON 2/2 AMS    VITALS: T(C): 36.4 (07-26-22 @ 14:39)  T(F): 97.6 (07-26-22 @ 14:39), Max: 98.3 (07-26-22 @ 09:10)  HR: 90 (07-26-22 @ 16:12) (90 - 93)  BP: 187/66 (07-26-22 @ 16:12) (136/65 - 187/66)  RR:  (18 - 20)  SpO2:  (97% - 100%)  Wt(kg): --  General: AAO x 0, minimally cooperative with exam.     Ophthalmology Exam:  Visual acuity (sc): MYRON 2/2 AMS  Pupils: Dilated and non reactive OD, round and reactive OS., 7mm OD light, 2mm OS light, 7mm OD dark, 4mm OD dark  Ttono: 26 OD, 13 OS  Extraocular movements (EOMs): Full OU  Confrontational Visual Field (CVF): MYRON 2/2 AMS  Color Plates: MYRON 2/2 AMS    Pen Light Exam (PLE) - pt unable to tolerate sitting up to slit lamp due to mental status and physical state  External: Scattered vesicular, crusted lesions along V1 distribution over R side of face, wnl on L side  Lids/Lashes/Lacrimal Ducts: 1+ periorbital edema RUL and RLL, flat OS   Sclera/Conjunctiva: 1-2+ injection OD, W+Q OS  Cornea: Scattered pseudodendrites OD, Cl OS  Anterior Chamber: D+F OU    Iris: Flat OU  Lens: PCIOL OD, 3+ NS OS    Fundus Exam: dilated with 1% tropicamide and 2.5% phenylephrine  Approval obtained from primary team for dilation  Patient aware that pupils can remained dilated for at least 4-6 hours  Exam performed with 20D lens    Vitreous: wnl OU  Disc, cup/disc: sharp and pink, 0.4 OU  Macula: wnl OU  Vessels: wnl OU  Periphery: wnl OU    Labs/Imaging:    CT stereotactic:   IMPRESSION:    The ventricular system is not dilated, and is markedly smaller in size   compared to the 2011 head CT study. Comparison with the more recent   outside head CT is recommended to evaluate for any interval change.    Chronic left parietal infarct.    There is no gross CT evidence for superimposed acute vascular   distribution infarct. There is no evidence for acute intracranial   hemorrhage.    Extensive right periorbital preseptal soft tissue swelling is noted   concordantwith the history of shingles over the right eye. A   superimposed cellulitis can't be excluded. No post septal extension is   appreciated.    Generalized diffuse frontal scalp soft tissue swelling is present which   may be related to the shingles infection, a superimposed cellulitis, or a   combination of both. The adjacent calvarium appears intact without   periosteal reaction or bony destruction.

## 2022-07-26 NOTE — CONSULT NOTE ADULT - ASSESSMENT
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    wbc 13.32. IANC 10.7. BU 65. Cr 2.84. EGFR 16. AlkP 125. Resp viral panel neg.   Assessment: 85 yo F with DM, HTN,  shunt (programmed at 1.0), presents to ED w R facial rash, preceded by migraine and blurry vision, change in mentation from baseline. Afebrile. Physical exam notable for R V1-V2 dermatomal distribution vesicular rash. patient's R eye closed shut, swollen, purulent. On Neuro exam, patient opening eyes to verbal stimuli but unable to follow commands. Exam limited by AMS and inability to follow commands. Patient not responding to noxious stimuli upper extremities, but is moving R arm to try to scratch face. Lower extremities movement to noxious stimuli.  WBC 13.32. IANC 10.7. BU 65. Cr 2.84. EGFR 16. AlkP 125. Resp viral panel neg. CTH: Extensive right periorbital preseptal soft tissue swelling is noted concordant with the history of shingles over the right eye. A superimposed cellulitis can't be excluded. No post septal extension is appreciated. Generalized diffuse frontal scalp soft tissue swelling is present which may be related to the shingles infection, a superimposed cellulitis, or a combination of both. The adjacent calvarium appears intact without periosteal reaction or bony destruction.    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     -Acyclovir 10mg/kg q8h for empiric treatment  -ID consult recommended  -Recommend MRI head with and without contrast  -Spinal tap for CSF analysis (including PCR for HSV, VZV, enterovirus, viral panel in addition to cytology, cell count, glucose, protein, etc)  -Please have lab save CSF samples in case further analysis required  -EEG recommended to evaluate for possible seizure    Plan not finalized until attested by neurology attending.  Patient will be seen by neurology attending on AM rounds.      Assessment: 85 yo F with DM, HTN,  shunt (programmed at 1.0), presents to ED w R facial rash, preceded by migraine and blurry vision, change in mentation from baseline. Afebrile. Physical exam notable for R V1-V2 dermatomal distribution vesicular rash. patient's R eye closed shut, swollen, purulent. On Neuro exam, patient opening eyes to verbal stimuli but unable to follow commands. Exam limited by AMS and inability to follow commands. Patient not responding to noxious stimuli upper extremities, but is moving R arm to try to scratch face. Lower extremities movement to noxious stimuli.  WBC 13.32. IANC 10.7. BU 65. Cr 2.84. EGFR 16. AlkP 125. Resp viral panel neg. CTH: Extensive right periorbital preseptal soft tissue swelling is noted concordant with the history of shingles over the right eye. A superimposed cellulitis can't be excluded. No post septal extension is appreciated. Generalized diffuse frontal scalp soft tissue swelling is present which may be related to the shingles infection, a superimposed cellulitis, or a combination of both. The adjacent calvarium appears intact without periosteal reaction or bony destruction.    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     -Acyclovir 10mg/kg q8h (or renal dose adjustment if needed) for empiric treatment  -ID consult recommended  -Recommend MRI head with and without contrast  -Spinal tap for CSF analysis (including PCR for HSV, VZV, enterovirus, viral panel in addition to cytology, cell count, glucose, protein, etc)  -Please have lab save CSF samples in case further analysis required  -EEG recommended to evaluate for possible seizure    Plan not finalized until attested by neurology attending.  Patient will be seen by neurology attending on AM rounds.      Assessment: 87 yo F with DM, HTN,  shunt (programmed at 1.0), presents to ED w R facial rash, preceded by migraine and blurry vision, change in mentation from baseline. Afebrile. Physical exam notable for R V1-V2 dermatomal distribution vesicular rash. patient's R eye closed shut, swollen, purulent. On Neuro exam, patient opening eyes to verbal stimuli but unable to follow commands. Exam limited by AMS and inability to follow commands. Patient not responding to noxious stimuli upper extremities, but is moving R arm to try to scratch face. Lower extremities movement to noxious stimuli.  WBC 13.32. IANC 10.7. BU 65. Cr 2.84. EGFR 16. AlkP 125. Resp viral panel neg. CTH: Extensive right periorbital preseptal soft tissue swelling is noted concordant with the history of shingles over the right eye. A superimposed cellulitis can't be excluded. No post septal extension is appreciated. Generalized diffuse frontal scalp soft tissue swelling is present which may be related to the shingles infection, a superimposed cellulitis, or a combination of both. The adjacent calvarium appears intact without periosteal reaction or bony destruction.    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     -Acyclovir 10mg/kg q8h (or renal dose adjustment if needed) for empiric treatment  -ID consult recommended  -Recommend MRI head with and without contrast  -Spinal tap for CSF analysis (including PCR for HSV, VZV, enterovirus, viral panel in addition to cytology, cell count, glucose, protein, etc)  -Please have lab save CSF samples in case further analysis required  -24 hr vEEG recommended to evaluate for possible seizure    Plan not finalized until attested by neurology attending.  Patient will be seen by neurology attending on AM rounds.      Assessment: 87 yo F with DM, HTN,  shunt (programmed at 1.0), presents to ED w R facial rash, preceded by migraine and blurry vision, change in mentation from baseline. Afebrile. Physical exam notable for R V1-V2 dermatomal distribution vesicular rash. patient's R eye closed shut, swollen, purulent. On Neuro exam, patient opening eyes to verbal stimuli but unable to follow commands. Exam limited by AMS and inability to follow commands. Patient not responding to noxious stimuli upper extremities, but is moving R arm to try to scratch face. Lower extremities movement to noxious stimuli.  WBC 13.32. IANC 10.7. BU 65. Cr 2.84. EGFR 16. AlkP 125. Resp viral panel neg. CTH: Extensive right periorbital preseptal soft tissue swelling is noted concordant with the history of shingles over the right eye. A superimposed cellulitis can't be excluded. No post septal extension is appreciated. Generalized diffuse frontal scalp soft tissue swelling is present which may be related to the shingles infection, a superimposed cellulitis, or a combination of both. The adjacent calvarium appears intact without periosteal reaction or bony destruction.    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     -Acyclovir 10mg/kg q8h (or renal dose adjustment if needed) for empiric treatment  -ID consult recommended  -Recommend MRI head with and without contrast  -Spinal tap for CSF analysis (including PCR for HSV, VZV, enterovirus, viral panel in addition to cytology, cell count, glucose, protein, etc)  -Please have lab save CSF samples in case further analysis required  -24 hr vEEG recommended to evaluate for possible seizure

## 2022-07-26 NOTE — CONSULT NOTE ADULT - ASSESSMENT
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     1. HZO OD with ocular involvement  - Pt with very poor mental status, unable to assess vision, CVF, color plates.   - Pt noted to have scattered vesicular, crusted lesions along V1 distribution over R side of face, 1+ periorbital edema RUL and RLL, scattered corneal pseudodendrites OD  - CT stereotactic: extensive right periorbital preseptal soft tissue swelling is noted concordant with the history of shingles over the right eye. A superimposed cellulitis can't be excluded. No post septal extension is appreciated.  - Pt unable to tolerate sitting up at slit lamp - cannot assess anterior chamber for inflammatory reaction  - IOP noted to be 26 OD  - Start brimonidine TID in R eye  - Start erythromycin ointment QID to R eye and periocular lesions  - Recommend ID consult for HZO, possible superimposed cellulitis, and possible herpetic encephalopathy - antivirals and antibiotics per ID  - Pt has dilated and minimally reactive R pupil. 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 EOMs are full. Will discuss with neuro-ophthalmologist need for dedicated imaging (ie, MRA for dilated pupil).     2. AMS  - Obtain neurology consult for AMS with concern for possible herpetic encephalopathy - will require MRI brain, neuro should comment on need for LP  - Imaging for dilated R pupil to be discussed with neuro-ophthalmology as above.     Outpatient follow-up: Patient should follow-up with his/her ophthalmologist or with [facility] Department of Ophthalmology at the address below     [address] [place] [address]. [address]  [place] [address]  [phone]    Case seen and discussed with Dr. Kuhn, attending. To be discssed with Dr. Arevalo, neuro-ophthalmologist.     Arely KRUGER Rai, MD  PGY-3, Ophthalmology  [phone]    Also available on [employer] Teams. 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     1. HZO OD with ocular involvement  - Pt with very poor mental status, unable to assess vision, CVF, color plates.   - Pt noted to have scattered vesicular, crusted lesions along V1 distribution over R side of face, 1+ periorbital edema RUL and RLL, scattered corneal pseudodendrites OD  - CT stereotactic: extensive right periorbital preseptal soft tissue swelling is noted concordant with the history of shingles over the right eye. A superimposed cellulitis can't be excluded. No post septal extension is appreciated.  - Pt unable to tolerate sitting up at slit lamp - cannot assess anterior chamber for inflammatory reaction  - IOP noted to be 26 OD  - Start brimonidine TID in R eye  - Start erythromycin ointment QID to R eye and periocular lesions  - Recommend ID consult for HZO, possible superimposed cellulitis, and possible herpetic encephalopathy - antivirals and antibiotics per ID  - Pt has dilated and minimally reactive R pupil. 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 EOMs are full. No need for further imaging workup of this finding.     2. AMS  - Obtain neurology consult for AMS with concern for possible herpetic encephalopathy - will require MRI brain, neuro should comment on need for LP    Outpatient follow-up: Patient should follow-up with his/her ophthalmologist or with [facility] Department of Ophthalmology at the address below     [address] [place] [address]. [address]  [place] [address]  [phone]    Case seen and discussed with Dr. Kuhn, attending. Discussed with Dr. Arevalo, neuro-ophthalmologist.     Arely KRUGER Rai, MD  PGY-3, Ophthalmology  [phone]    Also available on [employer] Teams. 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     1. HZO OD with ocular involvement  - Pt with very poor mental status, unable to assess vision, CVF, color plates.   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 A superimposed cellulitis can't be excluded. No post septal extension is appreciated.  - Pt unable to tolerate sitting up at slit lamp - cannot assess anterior chamber for inflammatory reaction  - IOP noted to be 26 OD likely secondary to trabeculitis  - Start brimonidine TID in R eye  - Start erythromycin ointment QID to R eye and periocular lesions  - Recommend ID consult for HZO, possible superimposed cellulitis, and possible herpetic encephalopathy - antivirals and antibiotics per ID  - Pt has dilated and minimally reactive R pupil. May represent VZV involvement of postganglionic CN3 fibers. EOMs are full. No need for further imaging workup of this finding.     2. AMS  - Obtain neurology consult for AMS with concern for possible herpetic encephalopathy - will require MRI brain, neuro should comment on need for LP  - findings and plan discussed with daughter and primary team  - will follow    Outpatient follow-up: Patient should follow-up with his/her ophthalmologist or with [facility] Department of Ophthalmology at the address below within 1 week of discharge, sooner if symptoms worsen or change    [address] [facility] [facility]. [address]  [place] [address]  [phone]    Case seen and discussed with Dr. Kuhn, attending. Discussed with Dr. Arevalo, neuro-ophthalmologist.     Arely KRUGER Rai, MD  PGY-3, Ophthalmology  [phone]    Also available on [employer] Teams.

## 2022-07-26 NOTE — H&P ADULT - ATTENDING COMMENTS
Pt seen and examined at pts bedside w/ Team 4 on 7/26/22 at 8pm.     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     -ID, neuro and optho consulted -recs appreciated   - Monitor mental status and lesions  - C/w Acyclovir as stated above and brimonidine TID in R eye, Start erythromycin ointment QID to R eye and periocular lesions  -treating w/ ancef for possibly superimposed bacterial cellulitis   -ivfs   -F/u blood cxs  -will arrange for LP  -Arranging for brain MRI .   -VEEG  -SandS eval. NPO for now    Case d/w pts daughter Dr. Delgado at bedside w/ Team 4

## 2022-07-26 NOTE — ED PROVIDER NOTE - CLINICAL SUMMARY MEDICAL DECISION MAKING FREE TEXT BOX
Pt w/ R V1 distribution vesicles here for AMS. VSS. Concern for herpes zoster encephalitis vs sepsis vs shunt malfunction/infection. Will obtain labs, CTH/shunt study, labs, cultures, xray, urine, LP. Reassess.

## 2022-07-26 NOTE — ED ADULT TRIAGE NOTE - CHIEF COMPLAINT QUOTE
Pt with shingles to right side of face. Pt has been altered mental status x 24 hours. Started Valtrex Sunday morning. hx  shunt

## 2022-07-26 NOTE — H&P ADULT - PROBLEM SELECTOR PLAN 8
Previous history of stents in 1986 and 1996    - Monitor VS and symptoms at this time  - currently not being treated with medication

## 2022-07-26 NOTE — H&P ADULT - PROBLEM SELECTOR PLAN 3
Patient presented with history of severe hypertension at home and hypertension in the SBP ~180s while in ED    - Patient given Labetalol in ED, BP down to 172/81  - Resume oral home BP medications pending patient ability to swallow- otherwise will start IV  - Monitor VS q4

## 2022-07-26 NOTE — H&P ADULT - PROBLEM SELECTOR PLAN 4
Patient CMP significant for BUN 65 Cr 2.84, which is significantly different from baseline according to daughter    - 75cc/hr isotonic fluids  - Avoid nephrotoxic agents  - Send Ayanna and UCr to check FeNa  - Post-void bladder scan for postrenal etiology workup Patient CMP significant for BUN 65 Cr 2.84, which is significantly different from baseline according to daughter (~1.0 outpatient for creatinine per daughter)    - 75cc/hr isotonic fluids  - Avoid nephrotoxic agents  - Send Ayanna and UCr to check FeNa  - Post-void bladder scan for postrenal etiology workup

## 2022-07-26 NOTE — H&P ADULT - NSHPLABSRESULTS_GEN_ALL_CORE
14.3   13.32 )-----------( 352      ( 26 Jul 2022 10:45 )             46.4   07-26    138  |  92<L>  |  65<H>  ----------------------------<  257<H>  3.5   |  22  |  2.84<H>    Ca    9.6      26 Jul 2022 10:45    TPro  8.4<H>  /  Alb  4.4  /  TBili  0.3  /  DBili  x   /  AST  46<H>  /  ALT  29  /  AlkPhos  125<H>  07-26    Blood Gas Profile - Venous (07.26.22 @ 13:58)   pH, Venous: 7.30   pCO2, Venous: 52 mmHg   pO2, Venous: 28 mmHg   HCO3, Venous: 26 mmol/L   Base Excess, Venous: -1.5 mmol/L   Oxygen Saturation, Venous: 37.8 %   Total CO2, Venous: 27.2 mmol/L < from: CT Stereotactic Localization No Cont (07.26.22 @ 11:55) >    ACC: [id number] EXAM:  CT GUIDE STEREO LOC                          PROCEDURE DATE:  07/26/2022          INTERPRETATION:  History: 85yo F hx of CAD, DM, COPD, NPH w/ programmable  shunt comes to ED for AMS and shingles. Pt on 4 days ago w/ eruption of  vesicles on the R face, started on valacyclovir 3 days ago. 1 week ago had  migraine headaches which shes had before. Yesterday into today more   altered,  unable to perform her normal activities, not eating or drinking much.   Denies  fevers. Headaches for 5 days. Stroke in 2011. Shingles over the right   eye. Head CT on Thursday without acute findings (outside [facility]).    Description: A noncontrast head CT was performed. Axial images were   performed from the skull base to the vertex with coronal/sagittal   reconstructions.    < end of copied text >    < from: CT Stereotactic Localization No Cont (07.26.22 @ 11:55) >      IMPRESSION:    The ventricular system is not dilated, and is markedly smaller in size   compared to the 2011 head CT study. Comparison with the more recent   outside head CT is recommended to evaluate for any interval change.    Chronic left parietal infarct.    There is no gross CT evidence for superimposed acute vascular   distribution infarct. There is no evidence for acute intracranial   hemorrhage.    Extensive right periorbital preseptal soft tissue swelling is noted   concordantwith the history of shingles over the right eye. A   superimposed cellulitis can't be excluded. No post septal extension is   appreciated.    Generalized diffuse frontal scalp soft tissue swelling is present which   may be related to the shingles infection, a superimposed cellulitis, or a   combination of both. The adjacent calvarium appears intact without   periosteal reaction or bony destruction.    If the patient has new and persistent unexplained symptoms, consider   follow-up brain MRI with and without contrast for further workup,   provided there are no MRI or contrast contraindications.    --- End of Report ---            STEPHANE CISNEROS MD; Attending Radiologist  This document has been electronically signed. Jul 26 2022 12:33PM    < end of copied text >

## 2022-07-26 NOTE — H&P ADULT - PROBLEM SELECTOR PLAN 6
Hx of NPH diagnosed 2011- Installed by [facility] neurosurgery    -CTA: Old parietal infarct, soft tissue swelling around right eye- likely component of herpes zoster, possible cellulitis- Ventricles don't appear enlarged  -Has programmable  shunt *Must be reset via magnet before MRI and programmed after MRI*- Neuro aware

## 2022-07-26 NOTE — CHART NOTE - NSCHARTNOTEFT_GEN_A_CORE
Called by ED for recommendation of antivirals. Chart reviewed. 86 years old male hx of CAD, DM, COPD, NPH w/ programmable shunt comes to ED for AMS and shingles on his right face. There is a concern for encephalitis due to acute AMS. CT showed extensive right periorbital preseptal soft tissue swelling. No post septal extension is appreciated. Generalized diffuse frontal scalp soft tissue swelling, could be from shingles or SSTI.    Afebrile on admission, but with leukocytosis and CHARLINE.  Received 1 dose of 500mg IV acyclovir and 3.375gm Zosyn    RECOMMENDATIONS:  - Maintain airborne isolation  - Give another dose of IV acyclovir 620mg at 8AM tomorrow; maintain gentle IVF while on IV acyclovir  - Start IV Ancef 1gm q12hrs  - LP to rule out encephalitis; please send CSF cell count, protein, glucose, CSF PCR, HSV 1/2 PCR, gram stain, bacterial culture  - Ophthal and neuro follow up  - Formal consult note in AM      Eileen Leach D.O.  PGY-5 Infectious Diseases Fellow  Please contact me via page or text through [employer] Teams  If after 5PM or on weekends, please call [phone] Called by ED for recommendation of antivirals. Chart reviewed. 86 years old male hx of CAD, DM, COPD, NPH w/ programmable shunt comes to ED for AMS and shingles on his right face. There is a concern for encephalitis due to acute AMS. CT showed extensive right periorbital preseptal soft tissue swelling. No post septal extension is appreciated. Generalized diffuse frontal scalp soft tissue swelling, could be from shingles or SSTI.    Afebrile on admission, but with leukocytosis and CHARLINE.  Received 1 dose of 500mg IV acyclovir and 3.375gm Zosyn    RECOMMENDATIONS:  - Maintain airborne isolation  - Give another dose of IV acyclovir 620mg at 8AM tomorrow; maintain gentle IVF while on IV acyclovir  - Start IV Ancef 1gm q12hrs  - LP to rule out encephalitis; please send CSF cell count, protein, glucose, CSF PCR, HSV 1/2 PCR, gram stain, bacterial culture; or any other tests neurology requested  - Ophthal and neuro follow up  - Formal consult note in AM      Eileen Leach D.O.  PGY-5 Infectious Diseases Fellow  Please contact me via page or text through [employer] Teams  If after 5PM or on weekends, please call [phone]

## 2022-07-26 NOTE — H&P ADULT - NSICDXFAMILYHX_GEN_ALL_CORE_FT
FAMILY HISTORY:  Father  Still living? No  FH: myocardial infarction, Age at diagnosis: 41-50    Child  Still living? Yes, Estimated age: Age Unknown  FH: hypertension, Age at diagnosis: Age Unknown

## 2022-07-26 NOTE — H&P ADULT - HISTORY OF PRESENT ILLNESS
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 On 7/17, patient had right sided headache attributed to her recurrent migraines that she normally has on a daily basis, in which she took furiocet for. On 7/20 night and 7/21 morning, Patient vomited and began taking naproxen and tylenol for her symptoms. On 7/21, patient later saw her outpatient internist, who requested a CT Head for the patient, which came with findings at baseline. On 7/21 night, the patient began to scream, and checked her BP, which was at 200/80. On 7/22 morning, the patient's repeat BP was 160/100. Her internist subsequently prescribed amlodipine for the patient, and at the time her mental status was at baseline (A&Ox4). Also on 7/22, the patient began to complain of blurry vision. On 7/23 morning, the patient had the first occurrence of her rash around the right side of her face, V1 dermatome distribution, and the rash has progressively worsened since. On 7/ Patient is an 85yo female w/PMH Cardiac stents post MI (1986, 1996), COPD, NPH, DM, Migraines, and suspected HTN presenting with 3 day history of herpes zoster rash over the right V1 dermatome w/ eye involvement, now presenting with 1 day history of altered mental status. On 7/17, patient had right sided headache attributed to her recurrent migraines that she normally has on a daily basis, in which she took furiocet for. On 7/20 night and 7/21 morning, Patient vomited and began taking naproxen and tylenol for her symptoms. On 7/21, patient later saw her outpatient internist, who requested a CT Head for the patient, which came with findings at baseline. On 7/21 night, the patient began to scream, and checked her BP, which was at 200/80. On 7/22 morning, the patient's repeat BP was 160/100. Her internist subsequently prescribed amlodipine for the patient, and at the time her mental status was at baseline (A&Ox4). Also on 7/22, the patient began to complain of blurry vision. On 7/23 morning, the patient had the first occurrence of her rash around the right side of her face, V1 dermatome distribution, and the rash has progressively worsened since. On 7/24, patient started valtrex and artificial tears and began to be more sleepy. On 7/25 Patient is an 85yo female w/PMH Cardiac stents post MI (1986, 1996), COPD, NPH, DM, Migraines, and suspected HTN presenting with 3 day history of herpes zoster rash over the right V1 dermatome w/ eye involvement, now presenting with 1 day history of altered mental status. On 7/17, patient had right sided headache attributed to her recurrent migraines that she normally has on a daily basis, in which she took furiocet for. On 7/20 night and 7/21 morning, Patient vomited and began taking naproxen and tylenol for her symptoms. On 7/21, patient later saw her outpatient internist, who requested a CT Head for the patient, which came with findings at baseline. On 7/21 night, the patient began to scream, and checked her BP, which was at 200/80. On 7/22 morning, the patient's repeat BP was 160/100. Her internist subsequently prescribed amlodipine for the patient, and at the time her mental status was at baseline (A&Ox4). Also on 7/22, the patient began to complain of blurry vision. On 7/23 morning, the patient had the first occurrence of her rash around the right side of her face, V1 dermatome distribution, and the rash has progressively worsened since. On 7/24, patient started valtrex and artificial tears and began to be more sleepy. On 7/25  Patient is an 87yo female w/PMH Cardiac stents post MI (1986, 1996), COPD, NPH, DM, Migraines, and suspected HTN presenting with 3 day history of herpes zoster rash over the right V1 dermatome w/ eye involvement, now presenting with 1 day history of altered mental status. On 7/17, patient had right sided headache attributed to her recurrent migraines that she normally has on a daily basis, in which she took furiocet for. On 7/20 night and 7/21 morning, Patient vomited and began taking naproxen and tylenol for her symptoms. On 7/21, patient later saw her outpatient internist, who requested a CT Head for the patient, which came with findings at baseline. On 7/21 night, the patient began to scream, and checked her BP, which was at 200/80. On 7/22 morning, the patient's repeat BP was 160/100. Her internist subsequently prescribed amlodipine for the patient, and at the time her mental status was at baseline (A&Ox4). Also on 7/22, the patient began to complain of blurry vision. On 7/23 morning, the patient had the first occurrence of her rash around the right side of her face, V1 dermatome distribution, and the rash has progressively worsened since. On 7/24, patient started valtrex and artificial tears and began to be more sleepy. On 7/25 Patient aide said that the patient was more confused and her mentation was getting worse up until now.    In the ED, patient was started on 1x Zosyn, 1x 500mg acyclovir.

## 2022-07-26 NOTE — H&P ADULT - ASSESSMENT
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

## 2022-07-27 NOTE — PROGRESS NOTE ADULT - PROBLEM SELECTOR PLAN 4
Patient CMP significant for BUN 65 Cr 2.84, which is significantly different from baseline according to daughter (~1.0 outpatient for creatinine per daughter)    - 75cc/hr isotonic fluids  - Avoid nephrotoxic agents  - Send Ayanna and UCr to check FeNa  - Post-void bladder scan for postrenal etiology workup Patient CMP significant for BUN 65 Cr 2.84, which is significantly different from baseline according to daughter (~1.0 outpatient for creatinine per daughter)    RESOLVED    - 75cc/hr isotonic fluids  - Avoid nephrotoxic agents  - Send Ayanna and UCr to check FeNa  - Post-void bladder scan for postrenal etiology workup Patient CMP significant for BUN 65 Cr 2.84, which is significantly different from baseline according to daughter (~1.0 outpatient for creatinine per daughter)    RESOLVED    - 75cc/hr isotonic fluids  - Avoid nephrotoxic agents

## 2022-07-27 NOTE — PROGRESS NOTE ADULT - PROBLEM SELECTOR PLAN 10
Diet: NPO until dysphagia analysis    DVT Ppx: Subq heparin    - Speech and swallow evaluation after failed dysphagia screen- f/u Diet: NPO until dysphagia analysis    DVT Ppx: Subq heparin- pausing for tentative LP 7/27    - Speech and swallow evaluation after failed dysphagia screen- f/u Diet: Minced and moist (In case Thin liquids via straw only)    DVT Ppx: Subq heparin- pausing for tentative LP 7/27

## 2022-07-27 NOTE — CHART NOTE - NSCHARTNOTEFT_GEN_A_CORE
Note that BCX 4/4 with MRSE/CoNS, given no hardware (aside from VPS), would suspect this is contaminant    Continue Cefazolin (for treatment of possible local cellulitis around lesions)  DC Vanco  Check BCXs x 2  If clinical worsening or signs sepsis, switch back to vancomycin (for now would hold)    Jack Henderson MD  Contact on TEAMS messaging from 9am - 5pm  From 5pm-9am, on weekends, or if no response call [phone]

## 2022-07-27 NOTE — PROGRESS NOTE ADULT - SUBJECTIVE AND OBJECTIVE BOX
Patient is a 86y old  Female who presents with a chief complaint of Suspicion for Herpes Zoster opthalmicus/encephalitis (26 Jul 2022 18:18)      SUBJECTIVE / OVERNIGHT EVENTS:    MEDICATIONS  (STANDING):  acyclovir IVPB 620 milliGRAM(s) IV Intermittent every 24 hours  brimonidine 0.2% Ophthalmic Solution 1 Drop(s) Right EYE three times a day  budesonide  80 MICROgram(s)/formoterol 4.5 MICROgram(s) Inhaler 2 Puff(s) Inhalation two times a day  ceFAZolin   IVPB 1000 milliGRAM(s) IV Intermittent every 12 hours  ceFAZolin   IVPB      dextrose 5%. 1000 milliLiter(s) (50 mL/Hr) IV Continuous <Continuous>  dextrose 5%. 1000 milliLiter(s) (100 mL/Hr) IV Continuous <Continuous>  dextrose 50% Injectable 25 Gram(s) IV Push once  dextrose 50% Injectable 12.5 Gram(s) IV Push once  dextrose 50% Injectable 25 Gram(s) IV Push once  erythromycin   Ointment 1 Application(s) Right EYE four times a day  glucagon  Injectable 1 milliGRAM(s) IntraMuscular once  insulin glargine Injectable (LANTUS) 10 Unit(s) SubCutaneous at bedtime  insulin lispro (ADMELOG) corrective regimen sliding scale   SubCutaneous every 6 hours  sodium chloride 0.9%. 1000 milliLiter(s) (75 mL/Hr) IV Continuous <Continuous>  tiotropium 18 MICROgram(s) Capsule 1 Capsule(s) Inhalation daily    MEDICATIONS  (PRN):  acetaminophen   IVPB .. 1000 milliGRAM(s) IV Intermittent once PRN Moderate Pain (4 - 6), Severe Pain (7 - 10)  dextrose Oral Gel 15 Gram(s) Oral once PRN Blood Glucose LESS THAN 70 milliGRAM(s)/deciliter  labetalol Injectable 10 milliGRAM(s) IV Push once PRN Systolic blood pressure > 180      Vital Signs Last 24 Hrs  T(C): 37.1 (26 Jul 2022 23:00), Max: 37.1 (26 Jul 2022 23:00)  T(F): 98.7 (26 Jul 2022 23:00), Max: 98.7 (26 Jul 2022 23:00)  HR: 91 (26 Jul 2022 23:00) (85 - 93)  BP: 142/92 (26 Jul 2022 23:00) (136/65 - 187/66)  BP(mean): --  RR: 18 (26 Jul 2022 23:00) (18 - 20)  SpO2: 96% (26 Jul 2022 23:00) (96% - 100%)    Parameters below as of 26 Jul 2022 23:00  Patient On (Oxygen Delivery Method): room air      CAPILLARY BLOOD GLUCOSE      POCT Blood Glucose.: 142 mg/dL (26 Jul 2022 21:37)    I&O's Summary      PHYSICAL EXAM:  GENERAL: NAD, well-developed  HEAD:  Atraumatic, Normocephalic  EYES: EOMI, PERRLA, conjunctiva and sclera clear  NECK: Supple, No JVD  CHEST/LUNG: Clear to auscultation bilaterally; No wheeze  HEART: Regular rate and rhythm; No murmurs, rubs, or gallops  ABDOMEN: Soft, Nontender, Nondistended; Bowel sounds present  EXTREMITIES:  2+ Peripheral Pulses, No clubbing, cyanosis, or edema  PSYCH: AAOx3  NEUROLOGY: non-focal  SKIN: No rashes or lesions    LABS:                        14.3   13.32 )-----------( 352      ( 26 Jul 2022 10:45 )             46.4     07-26    138  |  92<L>  |  65<H>  ----------------------------<  257<H>  3.5   |  22  |  2.84<H>    Ca    9.6      26 Jul 2022 10:45    TPro  8.4<H>  /  Alb  4.4  /  TBili  0.3  /  DBili  x   /  AST  46<H>  /  ALT  29  /  AlkPhos  125<H>  07-26              RADIOLOGY & ADDITIONAL TESTS:    Imaging Personally Reviewed:    Consultant(s) Notes Reviewed:      Care Discussed with Consultants/Other Providers:   Patient is a 86y old  Female who presents with a chief complaint of Suspicion for Herpes Zoster opthalmicus/encephalitis (26 Jul 2022 18:18)      SUBJECTIVE / OVERNIGHT EVENTS: No acute overnight events, Patient seen at bedside with daughter. Patient able to partially respond to commands and statements, able to answer some questions. Patient complains of 6/10 facial pain. No other complaints per patient.    MEDICATIONS  (STANDING):  acyclovir IVPB 620 milliGRAM(s) IV Intermittent every 24 hours  brimonidine 0.2% Ophthalmic Solution 1 Drop(s) Right EYE three times a day  budesonide  80 MICROgram(s)/formoterol 4.5 MICROgram(s) Inhaler 2 Puff(s) Inhalation two times a day  ceFAZolin   IVPB 1000 milliGRAM(s) IV Intermittent every 12 hours  ceFAZolin   IVPB      dextrose 5%. 1000 milliLiter(s) (50 mL/Hr) IV Continuous <Continuous>  dextrose 5%. 1000 milliLiter(s) (100 mL/Hr) IV Continuous <Continuous>  dextrose 50% Injectable 25 Gram(s) IV Push once  dextrose 50% Injectable 12.5 Gram(s) IV Push once  dextrose 50% Injectable 25 Gram(s) IV Push once  erythromycin   Ointment 1 Application(s) Right EYE four times a day  glucagon  Injectable 1 milliGRAM(s) IntraMuscular once  insulin glargine Injectable (LANTUS) 10 Unit(s) SubCutaneous at bedtime  insulin lispro (ADMELOG) corrective regimen sliding scale   SubCutaneous every 6 hours  sodium chloride 0.9%. 1000 milliLiter(s) (75 mL/Hr) IV Continuous <Continuous>  tiotropium 18 MICROgram(s) Capsule 1 Capsule(s) Inhalation daily    MEDICATIONS  (PRN):  acetaminophen   IVPB .. 1000 milliGRAM(s) IV Intermittent once PRN Moderate Pain (4 - 6), Severe Pain (7 - 10)  dextrose Oral Gel 15 Gram(s) Oral once PRN Blood Glucose LESS THAN 70 milliGRAM(s)/deciliter  labetalol Injectable 10 milliGRAM(s) IV Push once PRN Systolic blood pressure > 180      Vital Signs Last 24 Hrs  T(C): 37.1 (26 Jul 2022 23:00), Max: 37.1 (26 Jul 2022 23:00)  T(F): 98.7 (26 Jul 2022 23:00), Max: 98.7 (26 Jul 2022 23:00)  HR: 91 (26 Jul 2022 23:00) (85 - 93)  BP: 142/92 (26 Jul 2022 23:00) (136/65 - 187/66)  BP(mean): --  RR: 18 (26 Jul 2022 23:00) (18 - 20)  SpO2: 96% (26 Jul 2022 23:00) (96% - 100%)    Parameters below as of 26 Jul 2022 23:00  Patient On (Oxygen Delivery Method): room air      CAPILLARY BLOOD GLUCOSE      POCT Blood Glucose.: 142 mg/dL (26 Jul 2022 21:37)    I&O's Summary    *Physical difficult given altered mental status of patient*    PHYSICAL EXAM:   GENERAL: NAD, Able to follow commands and occasionally respond to questions, mental status still limited but improving, resting in bed.  HENT: NCAT; moist mucous membranes  EYES: anicteric sclerae, moist conjunctivae; Significant swelling and crusting of right eye with eyelid swollen shut; What appears to be dependent edema of the left eyelid as well  NECK: Trachea midline; supple  CHEST:  no respiratory distress  HEART:  regular rate and rhythm  ABDOMEN:  Soft, non-tender, non-distended, normoactive bowel sounds,  no masses  EXTREMITIES: No LE edema  SKIN:  +Rash on V1 distribution of right face, red, warm, erythematous  NEURO: + resting tremor, not new, at baseline    LABS:                        14.3   13.32 )-----------( 352      ( 26 Jul 2022 10:45 )             46.4     07-26    138  |  92<L>  |  65<H>  ----------------------------<  257<H>  3.5   |  22  |  2.84<H>    Ca    9.6      26 Jul 2022 10:45    TPro  8.4<H>  /  Alb  4.4  /  TBili  0.3  /  DBili  x   /  AST  46<H>  /  ALT  29  /  AlkPhos  125<H>  07-26              RADIOLOGY & ADDITIONAL TESTS:    Imaging Personally Reviewed:    Consultant(s) Notes Reviewed:      Care Discussed with Consultants/Other Providers:   Patient is a 86y old  Female who presents with a chief complaint of Suspicion for Herpes Zoster opthalmicus/encephalitis (26 Jul 2022 18:18)      SUBJECTIVE / OVERNIGHT EVENTS: No acute overnight events, Patient seen at bedside with daughter. Patient able to partially respond to commands and statements, able to answer some questions. Patient complains of 6/10 facial pain. No other complaints per patient.    MEDICATIONS  (STANDING):  acyclovir IVPB 620 milliGRAM(s) IV Intermittent every 24 hours  brimonidine 0.2% Ophthalmic Solution 1 Drop(s) Right EYE three times a day  budesonide  80 MICROgram(s)/formoterol 4.5 MICROgram(s) Inhaler 2 Puff(s) Inhalation two times a day  ceFAZolin   IVPB 1000 milliGRAM(s) IV Intermittent every 12 hours  ceFAZolin   IVPB      dextrose 5%. 1000 milliLiter(s) (50 mL/Hr) IV Continuous <Continuous>  dextrose 5%. 1000 milliLiter(s) (100 mL/Hr) IV Continuous <Continuous>  dextrose 50% Injectable 25 Gram(s) IV Push once  dextrose 50% Injectable 12.5 Gram(s) IV Push once  dextrose 50% Injectable 25 Gram(s) IV Push once  erythromycin   Ointment 1 Application(s) Right EYE four times a day  glucagon  Injectable 1 milliGRAM(s) IntraMuscular once  insulin glargine Injectable (LANTUS) 10 Unit(s) SubCutaneous at bedtime  insulin lispro (ADMELOG) corrective regimen sliding scale   SubCutaneous every 6 hours  sodium chloride 0.9%. 1000 milliLiter(s) (75 mL/Hr) IV Continuous <Continuous>  tiotropium 18 MICROgram(s) Capsule 1 Capsule(s) Inhalation daily    MEDICATIONS  (PRN):  acetaminophen   IVPB .. 1000 milliGRAM(s) IV Intermittent once PRN Moderate Pain (4 - 6), Severe Pain (7 - 10)  dextrose Oral Gel 15 Gram(s) Oral once PRN Blood Glucose LESS THAN 70 milliGRAM(s)/deciliter  labetalol Injectable 10 milliGRAM(s) IV Push once PRN Systolic blood pressure > 180      Vital Signs Last 24 Hrs  T(C): 37.1 (26 Jul 2022 23:00), Max: 37.1 (26 Jul 2022 23:00)  T(F): 98.7 (26 Jul 2022 23:00), Max: 98.7 (26 Jul 2022 23:00)  HR: 91 (26 Jul 2022 23:00) (85 - 93)  BP: 142/92 (26 Jul 2022 23:00) (136/65 - 187/66)  BP(mean): --  RR: 18 (26 Jul 2022 23:00) (18 - 20)  SpO2: 96% (26 Jul 2022 23:00) (96% - 100%)    Parameters below as of 26 Jul 2022 23:00  Patient On (Oxygen Delivery Method): room air      CAPILLARY BLOOD GLUCOSE      POCT Blood Glucose.: 142 mg/dL (26 Jul 2022 21:37)    I&O's Summary    *Physical difficult given altered mental status of patient*    PHYSICAL EXAM:   GENERAL: NAD, Able to follow commands and occasionally respond to questions, mental status still limited but improving, resting in bed.  HENT: NCAT; moist mucous membranes  EYES: anicteric sclerae, moist conjunctivae; Significant swelling and crusting of right eye with eyelid swollen shut; What appears to be dependent edema of the left eyelid as well  NECK: Trachea midline; supple  CHEST:  no respiratory distress  HEART:  regular rate and rhythm  ABDOMEN:  Soft, non-tender, non-distended, normoactive bowel sounds,  no masses  EXTREMITIES: No LE edema  SKIN:  +Rash on V1 distribution of right face, red, warm, erythematous  NEURO: + resting tremor, not new, at baseline    LABS:                                           13.1   8.44  )-----------( 265      ( 27 Jul 2022 06:17 )             40.5   07-27    139  |  102  |  40<H>  ----------------------------<  145<H>  3.6   |  23  |  0.98    Ca    8.9      27 Jul 2022 06:17  Phos  2.8     07-27  Mg     2.10     07-27    TPro  7.1  /  Alb  3.7  /  TBili  0.3  /  DBili  x   /  AST  49<H>  /  ALT  29  /  AlkPhos  102  07-27              RADIOLOGY & ADDITIONAL TESTS:    Imaging Personally Reviewed:    Consultant(s) Notes Reviewed:      Care Discussed with Consultants/Other Providers:

## 2022-07-27 NOTE — PROGRESS NOTE ADULT - PROBLEM SELECTOR PLAN 5
Patient with history of DM2 on home Lantus 34U daily, Trulicity, glipizide    - Hold oral hypoglycemics   - Patient currently NPO- [id number] while NPO  - Insulin sliding scale  - HgbA1C Patient with history of DM2 on home Lantus 34U daily, Trulicity, glipizide    - Hold oral hypoglycemics   - Patient currently NPO- [id number] while NPO  - Insulin sliding scale and lantus 10U qhs for now   - HgbA1C 7,2

## 2022-07-27 NOTE — CONSULT NOTE ADULT - ASSESSMENT
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  Acute encephalopathy could be secondary to sepsis vs actual encephalitis  Regardless IV acyclovir will cover for encephalitis so no LP is needed unless clinical status changes  MRSE in BCx could be contaminants, can repeat BCx      IMPRESSION:  herpes zoster ophthalmicus  Positive blood culture  Encephalopathy  Leukocytosis  Fever    RECOMMENDATIONS:  - Increase IV acyclovir to 650mg q12hrs now Cr improved; maintain gentle IVF while on acyclovir  - Repeat 2 sets of blood culture STAT, then continue IV Ancef 1gm q8hrs  - No need to pursue LP for now unless clinical status changes  - Ophthal follow up  - Trend WBC, fever curve, transaminases, creatinine daily  - Will continue to follow    Patient is seen and examined with attending and case is discussed with primary team and daughter at bedside      Eileen Leach D.O.  PGY-5 Infectious Diseases Fellow  Please contact me via page or text through [employer] Teams  If after 5PM or on weekends, please call [phone]

## 2022-07-27 NOTE — PROGRESS NOTE ADULT - ASSESSMENT
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     1. HZO OD with ocular involvement  - Pt with very poor mental status, unable to assess vision, CVF, color plates.   - Pt noted to have scattered vesicular, crusted lesions along V1 distribution over R side of face, 1+ periorbital edema RUL and RLL, scattered corneal pseudodendrites OD  - CT stereotactic: extensive right periorbital preseptal soft tissue swelling is noted concordant with the history of shingles over the right eye. A superimposed cellulitis can't be excluded. No post septal extension is appreciated.  - Pt unable to tolerate sitting up at slit lamp - cannot assess anterior chamber for inflammatory reaction  - IOP noted to be 26 OD on admission, now improved to 17  - C/w brimonidine TID in R eye  - Start erythromycin ointment QID to R eye and periocular lesions  - Start preservative-free artificial tears QID OS  - Appreciate ID consult - antivirals and antibiotics per ID  - Pt has dilated and minimally reactive R pupil. 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 EOMs today with abduction deficit OD.   - MR orbits: asymmetric edema appears to involve the right orbital medial and lateral rectus muscles, as well as the superior oblique muscle, suspicious for a myositis (viral given the known herpes zoster with superimposed bacterial infection not excluded). There is no evidence for orbital abscess. The cavernous sinuses and superior ophthalmic veins remain patent. The right preseptal periorbital soft tissue swelling appears improved compared to the CT suggesting improving preseptal cellulitis/zoster.     2. 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 [address] [place] [address]  [phone]    Case to be discussed with Dr. Kuhn, attending.     Arely KRUEGR Rai, MD  PGY-3, Ophthalmology  [phone]    Also available on [employer] Teams. 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     1. HZO OD with ocular involvement  - Pt with very poor mental status, unable to assess vision, CVF, color plates.   - Pt noted to have scattered vesicular, crusted lesions along V1 distribution over R side of face, 1+ periorbital edema RUL and RLL, scattered corneal pseudodendrites OD  - CT stereotactic: extensive right periorbital preseptal soft tissue swelling is noted concordant with the history of shingles over the right eye. A superimposed cellulitis can't be excluded. No post septal extension is appreciated.  - Pt unable to tolerate sitting up at slit lamp - cannot assess anterior chamber for inflammatory reaction  - IOP noted to be 26 OD on admission, now improved to 17  - C/w brimonidine TID in R eye  - Start erythromycin ointment QID to R eye and periocular lesions  - Start preservative-free artificial tears QID OS  - Appreciate ID consult - antivirals and antibiotics per ID  - Pt has dilated and minimally reactive R pupil. 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 EOMs today with abduction deficit OD.   - MR orbits: asymmetric edema appears to involve the right orbital medial and lateral rectus muscles, as well as the superior oblique muscle, suspicious for a myositis (viral given the known herpes zoster with superimposed bacterial infection not excluded). There is no evidence for orbital abscess. The cavernous sinuses and superior ophthalmic veins remain patent. The right preseptal periorbital soft tissue swelling appears improved compared to the CT suggesting improving preseptal cellulitis/zoster.   - Will continue to monitor for improvement of EOMs on antivirals.     2. 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 [address]  [place] [address]  [phone]    Case discussed with Dr. Kuhn, attending, and Dr. Arevalo, neuro-ophthalmologist.     Arely KRUGER Rai, MD  PGY-3, Ophthalmology  [phone]    Also available on [employer] Teams.

## 2022-07-27 NOTE — SWALLOW BEDSIDE ASSESSMENT ADULT - SWALLOW EVAL: RECOMMENDED FEEDING/EATING TECHNIQUES
Liquids via straw only/allow for swallow between intakes/check mouth frequently for oral residue/pocketing/crush medication (when feasible)/maintain upright posture during/after eating for 30 mins/oral hygiene/position upright (90 degrees)/small sips/bites Liquids via straw/allow for swallow between intakes/check mouth frequently for oral residue/pocketing/crush medication (when feasible)/maintain upright posture during/after eating for 30 mins/oral hygiene/position upright (90 degrees)/small sips/bites

## 2022-07-27 NOTE — PROGRESS NOTE ADULT - PROBLEM SELECTOR PLAN 9
Elevated enzymes, likely secondary to physiologic stress from herpes zoster/htn    - Monitor labs for now  - Ordered hepatitis panel Elevated enzymes, likely secondary to physiologic stress from herpes zoster/htn, Is patient's baseline according to daughter    - Monitor labs for now  - Ordered hepatitis panel Elevated enzymes, likely secondary to physiologic stress from herpes zoster/htn, Is patient's baseline according to daughter  Improved   - Monitor labs for now  - Ordered hepatitis panel Elevated enzymes, likely secondary to physiologic stress from herpes zoster/htn, Is patient's baseline according to daughter    - Improved   - Monitor labs for now  - Ordered hepatitis panel

## 2022-07-27 NOTE — CONSULT NOTE ADULT - SUBJECTIVE AND OBJECTIVE BOX
INFECTIOUS DISEASE CONSULT NOTE    Patient is a 86y old  Female who presents with a chief complaint of Suspicion for Herpes Zoster opthalmicus/encephalitis (27 Jul 2022 07:25)    HPI:  Patient is an 87yo female w/PMH Cardiac stents post MI (1986, 1996), COPD, NPH, DM, Migraines, and suspected HTN presenting with 3 day history of herpes zoster rash over the right V1 dermatome w/ eye involvement, now presenting with 1 day history of altered mental status. On 7/17, patient had right sided headache attributed to her recurrent migraines that she normally has on a daily basis, in which she took furiocet for. On 7/20 night and 7/21 morning, Patient vomited and began taking naproxen and tylenol for her symptoms. On 7/21, patient later saw her outpatient internist, who requested a CT Head for the patient, which came with findings at baseline. On 7/21 night, the patient began to scream, and checked her BP, which was at 200/80. On 7/22 morning, the patient's repeat BP was 160/100. Her internist subsequently prescribed amlodipine for the patient, and at the time her mental status was at baseline (A&Ox4). Also on 7/22, the patient began to complain of blurry vision. On 7/23 morning, the patient had the first occurrence of her rash around the right side of her face, V1 dermatome distribution, and the rash has progressively worsened since. On 7/24, patient started valtrex and artificial tears and began to be more sleepy. On 7/25 Patient aide said that the patient was more confused and her mentation was getting worse up until now.    In the ED, patient was started on 1x Zosyn, 1x 500mg acyclovir. (26 Jul 2022 18:18)       REVIEW OF SYSTEMS:  CONSTITUTIONAL: No fever or chills  HEENT: No sore throat  RESPIRATORY: No cough, no shortness of breath  CARDIOVASCULAR: No chest pain or palpitations  GASTROINTESTINAL: No abdominal or epigastric pain  GENITOURINARY: No dysuria  NEUROLOGICAL: No headache/dizziness  MSK: No joint pain, erythema, or swelling; no back pain  SKIN: No itching, rashes  All other ROS negative except noted above    Prior hospital charts reviewed [Yes]  Primary team notes reviewed [Yes]  Other consultant notes reviewed [Yes]    PAST MEDICAL & SURGICAL HISTORY:  NPH (normal pressure hydrocephalus)      COPD, mild      CAD (coronary artery disease)      Type 2 diabetes mellitus      Migraines      Stented coronary artery          SOCIAL HISTORY:  - Born in [place], [place] in 19XX  - Currently working as / Retired  - Lives with _____; no pets  - No recent travel  - Denies tobacco use  - Denies alcohol use  - Denies illicit drug use  - Currently sexually active, has one male/female sexual partner    FAMILY HISTORY:  FH: myocardial infarction (Father)    FH: hypertension (Child)        Allergies:  diltiazem (Other; Rash)      ANTIMICROBIALS:  acyclovir IVPB 620 every 24 hours  ceFAZolin   IVPB 1000 every 12 hours  ceFAZolin   IVPB        ANTIMICROBIALS (past 90 days):  MEDICATIONS  (STANDING):    acyclovir IVPB   110 mL/Hr IV Intermittent (07-26-22 @ 16:11)    ceFAZolin   IVPB   100 mL/Hr IV Intermittent (07-26-22 @ 21:43)    ceFAZolin   IVPB   100 mL/Hr IV Intermittent (07-27-22 @ 05:27)    piperacillin/tazobactam IVPB...   200 mL/Hr IV Intermittent (07-26-22 @ 14:05)        OTHER MEDS:   MEDICATIONS  (STANDING):  budesonide  80 MICROgram(s)/formoterol 4.5 MICROgram(s) Inhaler 2 two times a day  dextrose 50% Injectable 25 once  dextrose 50% Injectable 12.5 once  dextrose 50% Injectable 25 once  dextrose Oral Gel 15 once PRN  glucagon  Injectable 1 once  insulin glargine Injectable (LANTUS) 10 at bedtime  insulin lispro (ADMELOG) corrective regimen sliding scale  every 6 hours  labetalol Injectable 10 once PRN  tiotropium 18 MICROgram(s) Capsule 1 daily      VITALS:  Vital Signs Last 24 Hrs  T(F): 98.3 (07-27-22 @ 05:34), Max: 98.7 (07-26-22 @ 23:00)    Vital Signs Last 24 Hrs  HR: 88 (07-27-22 @ 05:34) (85 - 91)  BP: 149/87 (07-27-22 @ 05:34) (142/92 - 187/66)  RR: 18 (07-27-22 @ 05:34)  SpO2: 100% (07-27-22 @ 05:34) (96% - 100%)  Wt(kg): --    EXAM:  GENERAL: NAD, lying in bed comfortably  HEAD: No head lesions  EYES: Conjunctiva pink and cornea white  ENT: Normal external ears and nose, no discharges; moist mucous membranes  NECK: Supple, nontender to palpation; no JVD  CHEST/LUNG: Clear to auscultation bilaterally  HEART: S1 S2  ABDOMEN: Soft, nontender, nondistended; normoactive bowel sounds  EXTREMITIES: No clubbing, cyanosis, or petal edema  NERVOUS SYSTEM: Alert and oriented to person, time, place and situation, speech clear. No focal deficits   MSK: No joint erythema, swelling or pain  SKIN: No rashes or lesions, no superficial thrombophlebitis    Labs:                        13.1   8.44  )-----------( 265      ( 27 Jul 2022 06:17 )             40.5     07-27    139  |  102  |  40<H>  ----------------------------<  145<H>  3.6   |  23  |  0.98    Ca    8.9      27 Jul 2022 06:17  Phos  2.8     07-27  Mg     2.10     07-27    TPro  7.1  /  Alb  3.7  /  TBili  0.3  /  DBili  x   /  AST  49<H>  /  ALT  29  /  AlkPhos  102  07-27      WBC Trend:  WBC Count: 8.44 (07-27-22 @ 06:17)  WBC Count: 13.32 (07-26-22 @ 10:45)      Auto Neutrophil #: 6.28 K/uL (07-27-22 @ 06:17)  Auto Neutrophil #: 11.04 K/uL (07-26-22 @ 10:45)      Creatine Trend:  Creatinine, Serum: 0.98 (07-27)  Creatinine, Serum: 2.84 (07-26)      Liver Biochemical Testing Trend:  Alanine Aminotransferase (ALT/SGPT): 29 (07-27)  Alanine Aminotransferase (ALT/SGPT): 29 (07-26)  Aspartate Aminotransferase (AST/SGOT): 49 (07-27-22 @ 06:17)  Aspartate Aminotransferase (AST/SGOT): 46 (07-26-22 @ 10:45)  Bilirubin Total, Serum: 0.3 (07-27)  Bilirubin Total, Serum: 0.3 (07-26)      Trend LDH      Auto Eosinophil %: 0.1 % (07-27-22 @ 06:17)  Auto Eosinophil %: 0.0 % (07-26-22 @ 10:45)          MICROBIOLOGY:        HIV-1/2 Combo Result: Nonreact (07-27-22 @ 06:17)                    Rapid RVP Result: NotDetec (07-26 @ 10:31)                        Blood Gas Venous - Lactate: 2.8 (07-26 @ 13:58)  Blood Gas Venous - Lactate: 7.3 (07-26 @ 10:45)    A1C with Estimated Average Glucose Result: 7.2 % (07-27-22 @ 06:17)      RADIOLOGY:  imaging below personally reviewed   INFECTIOUS DISEASE CONSULT NOTE    Patient is a 86y old  Female who presents with a chief complaint of Suspicion for Herpes Zoster opthalmicus/encephalitis (27 Jul 2022 07:25)    HPI:  Patient is an 85yo female w/PMH Cardiac stents post MI (1986, 1996), COPD, NPH, DM, Migraines, and suspected HTN presenting with 3 day history of herpes zoster rash over the right V1 dermatome w/ eye involvement, now presenting with 1 day history of altered mental status. On 7/17, patient had right sided headache attributed to her recurrent migraines that she normally has on a daily basis, in which she took furiocet for. On 7/20 night and 7/21 morning, Patient vomited and began taking naproxen and tylenol for her symptoms. On 7/21, patient later saw her outpatient internist, who requested a CT Head for the patient, which came with findings at baseline. On 7/21 night, the patient began to scream, and checked her BP, which was at 200/80. On 7/22 morning, the patient's repeat BP was 160/100. Her internist subsequently prescribed amlodipine for the patient, and at the time her mental status was at baseline (A&Ox4). Also on 7/22, the patient began to complain of blurry vision. On 7/23 morning, the patient had the first occurrence of her rash around the right side of her face, V1 dermatome distribution, and the rash has progressively worsened since. On 7/24, patient started valtrex and artificial tears and began to be more sleepy. On 7/25 Patient aide said that the patient was more confused and her mentation was getting worse up until now.    In the ED, patient was started on 1x Zosyn, 1x 500mg acyclovir. (26 Jul 2022 18:18)       REVIEW OF SYSTEMS:  Unable to obtain due to cognitive impairment    Prior hospital charts reviewed [Yes]  Primary team notes reviewed [Yes]  Other consultant notes reviewed [Yes]    PAST MEDICAL & SURGICAL HISTORY:  NPH (normal pressure hydrocephalus)  COPD, mild  CAD (coronary artery disease)  Type 2 diabetes mellitus  Migraines  Stented coronary artery      SOCIAL HISTORY:  Unable to obtain due to cognitive impairment    FAMILY HISTORY:  FH: myocardial infarction (Father)    FH: hypertension (Child)      Allergies:  diltiazem (Other; Rash)    ANTIMICROBIALS:  acyclovir IVPB 620 every 24 hours  ceFAZolin   IVPB 1000 every 12 hours  ceFAZolin   IVPB        ANTIMICROBIALS (past 90 days):  MEDICATIONS  (STANDING):    acyclovir IVPB   110 mL/Hr IV Intermittent (07-26-22 @ 16:11)    ceFAZolin   IVPB   100 mL/Hr IV Intermittent (07-26-22 @ 21:43)    ceFAZolin   IVPB   100 mL/Hr IV Intermittent (07-27-22 @ 05:27)    piperacillin/tazobactam IVPB...   200 mL/Hr IV Intermittent (07-26-22 @ 14:05)        OTHER MEDS:   MEDICATIONS  (STANDING):  budesonide  80 MICROgram(s)/formoterol 4.5 MICROgram(s) Inhaler 2 two times a day  dextrose 50% Injectable 25 once  dextrose 50% Injectable 12.5 once  dextrose 50% Injectable 25 once  dextrose Oral Gel 15 once PRN  glucagon  Injectable 1 once  insulin glargine Injectable (LANTUS) 10 at bedtime  insulin lispro (ADMELOG) corrective regimen sliding scale  every 6 hours  labetalol Injectable 10 once PRN  tiotropium 18 MICROgram(s) Capsule 1 daily      VITALS:  Vital Signs Last 24 Hrs  T(F): 98.3 (07-27-22 @ 05:34), Max: 98.7 (07-26-22 @ 23:00)    Vital Signs Last 24 Hrs  HR: 88 (07-27-22 @ 05:34) (85 - 91)  BP: 149/87 (07-27-22 @ 05:34) (142/92 - 187/66)  RR: 18 (07-27-22 @ 05:34)  SpO2: 100% (07-27-22 @ 05:34) (96% - 100%)  Wt(kg): --    EXAM:  GENERAL: Lying in bed, very restless  HEAD: No head lesions  EYES: Unable to assess fully as patient keeps turning her head, swelling of bilateral eyelids  ENT: Normal external ears and nose, no discharges; moist mucous membranes  NECK: Supple, nontender to palpation; no JVD  CHEST/LUNG: Clear to auscultation bilaterally  HEART: S1 S2  ABDOMEN: Soft, nontender, nondistended; normoactive bowel sounds  EXTREMITIES: No clubbing, cyanosis, or petal edema  NERVOUS SYSTEM: Awake and alert but not fully oriented  MSK: No joint erythema, swelling or pain  SKIN: Vesicular lesions on right scalp and right eye lid, with erythema, no superficial thrombophlebitis    Labs:                        13.1   8.44  )-----------( 265      ( 27 Jul 2022 06:17 )             40.5     07-27    139  |  102  |  40<H>  ----------------------------<  145<H>  3.6   |  23  |  0.98    Ca    8.9      27 Jul 2022 06:17  Phos  2.8     07-27  Mg     2.10     07-27    TPro  7.1  /  Alb  3.7  /  TBili  0.3  /  DBili  x   /  AST  49<H>  /  ALT  29  /  AlkPhos  102  07-27      WBC Trend:  WBC Count: 8.44 (07-27-22 @ 06:17)  WBC Count: 13.32 (07-26-22 @ 10:45)      Auto Neutrophil #: 6.28 K/uL (07-27-22 @ 06:17)  Auto Neutrophil #: 11.04 K/uL (07-26-22 @ 10:45)      Creatine Trend:  Creatinine, Serum: 0.98 (07-27)  Creatinine, Serum: 2.84 (07-26)      Liver Biochemical Testing Trend:  Alanine Aminotransferase (ALT/SGPT): 29 (07-27)  Alanine Aminotransferase (ALT/SGPT): 29 (07-26)  Aspartate Aminotransferase (AST/SGOT): 49 (07-27-22 @ 06:17)  Aspartate Aminotransferase (AST/SGOT): 46 (07-26-22 @ 10:45)  Bilirubin Total, Serum: 0.3 (07-27)  Bilirubin Total, Serum: 0.3 (07-26)      Trend LDH      Auto Eosinophil %: 0.1 % (07-27-22 @ 06:17)  Auto Eosinophil %: 0.0 % (07-26-22 @ 10:45)          MICROBIOLOGY:    HIV-1/2 Combo Result: Nonreact (07-27-22 @ 06:17)      Rapid RVP Result: NotDetec (07-26 @ 10:31)    Blood Gas Venous - Lactate: 2.8 (07-26 @ 13:58)  Blood Gas Venous - Lactate: 7.3 (07-26 @ 10:45)    A1C with Estimated Average Glucose Result: 7.2 % (07-27-22 @ 06:17)      RADIOLOGY:  imaging below personally reviewed    < from: CT Stereotactic Localization No Cont (07.26.22 @ 11:55) >  The ventricular system is not dilated, and is markedly smaller in size   compared to the 2011 head CT study. Comparison with the more recent   outside head CT is recommended to evaluate for any interval change.    Chronic left parietal infarct.    There is no gross CT evidence for superimposed acute vascular   distribution infarct. There is no evidence for acute intracranial   hemorrhage.    Extensive right periorbital preseptal soft tissue swelling is noted   concordantwith the history of shingles over the right eye. A   superimposed cellulitis can't be excluded. No post septal extension is   appreciated.    Generalized diffuse frontal scalp soft tissue swelling is present which   may be related to the shingles infection, a superimposed cellulitis, or a   combination of both. The adjacent calvarium appears intact without   periosteal reaction or bony destruction.    If the patient has new and persistent unexplained symptoms, consider   follow-up brain MRI with and without contrast for further workup,   provided there are no MRI or contrast contraindications.    < end of copied text >

## 2022-07-27 NOTE — PROGRESS NOTE ADULT - PROBLEM SELECTOR PLAN 6
Hx of NPH diagnosed 2011- Installed by [facility] neurosurgery    -CTA: Old parietal infarct, soft tissue swelling around right eye- likely component of herpes zoster, possible cellulitis- Ventricles don't appear enlarged  -Has programmable  shunt *Must be reset via magnet before MRI and programmed after MRI*- Neuro aware Hx of NPH diagnosed 2011- Installed by [facility] neurosurgery    -CTA: Old parietal infarct, soft tissue swelling around right eye- likely component of herpes zoster, possible cellulitis- Ventricles don't appear enlarged  -Has programmable  shunt *Must be reset via magnet before MRI and programmed after MRI*- Neuro aware  - Potentially make neurosurgery aware* Hx of NPH diagnosed 2011- Installed by [facility] neurosurgery    -CTA: Old parietal infarct, soft tissue swelling around right eye- likely component of herpes zoster, possible cellulitis- Ventricles don't appear enlarged  -Has programmable  shunt *Must be reset via magnet before MRI and programmed after MRI*- Neuro aware Hx of NPH diagnosed 2011- Installed by [facility] neurosurgery    -CTA: Old parietal infarct, soft tissue swelling around right eye- likely component of herpes zoster, possible cellulitis- Ventricles don't appear enlarged  -Has programmable  shunt *Must be programmed after MRI*- will page neurosurg [id number]

## 2022-07-27 NOTE — SWALLOW BEDSIDE ASSESSMENT ADULT - COMMENTS
Per HPI: "87yo female w/PMH Cardiac stents post MI (1986, 1996), COPD, NPH, DM, Migraines, and suspected HTN presenting with 3 day history of herpes zoster rash over the right V1 dermatome w/ eye involvement, now presenting with 1 day history of altered mental status. On 7/17, patient had right sided headache attributed to her recurrent migraines that she normally has on a daily basis, in which she took furiocet for. On 7/20 night and 7/21 morning, Patient vomited and began taking naproxen and tylenol for her symptoms. On 7/21, patient later saw her outpatient internist, who requested a CT Head for the patient, which came with findings at baseline. On 7/21 night, the patient began to scream, and checked her BP, which was at 200/80. On 7/22 morning, the patient's repeat BP was 160/100. Her internist subsequently prescribed amlodipine for the patient, and at the time her mental status was at baseline (A&Ox4). Also on 7/22, the patient began to complain of blurry vision. On 7/23 morning, the patient had the first occurrence of her rash around the right side of her face, V1 dermatome distribution, and the rash has progressively worsened since. On 7/24, patient started valtrex and artificial tears and began to be more sleepy. On 7/25 Patient aide said that the patient was more confused and her mentation was getting worse up until now."    WBC is WFL. XRAY Shunt Series 7/26 "The lungs are clear. "    Patient seen at bedside, awake/alert, during clinical swallow evaluation this PM. Patient's daughter (who is an MD at [employer] [facility]), present during assessment and served as a reliable informant. Per daughter, patient tolerates Regular solids and thin liquids in the home environment, with no reported s/sx of penetration/aspiration. Daughter reported patient is "a picky eater". Daughter also reported that patient only able to consume thin liquids via straw sips only. Patient denied pain. Patient was cooperative and receptive to PO trials. Per HPI: "87yo female w/PMH Cardiac stents post MI (1986, 1996), COPD, NPH, DM, Migraines, and suspected HTN presenting with 3 day history of herpes zoster rash over the right V1 dermatome w/ eye involvement, now presenting with 1 day history of altered mental status. On 7/17, patient had right sided headache attributed to her recurrent migraines that she normally has on a daily basis, in which she took furiocet for. On 7/20 night and 7/21 morning, Patient vomited and began taking naproxen and tylenol for her symptoms. On 7/21, patient later saw her outpatient internist, who requested a CT Head for the patient, which came with findings at baseline. On 7/21 night, the patient began to scream, and checked her BP, which was at 200/80. On 7/22 morning, the patient's repeat BP was 160/100. Her internist subsequently prescribed amlodipine for the patient, and at the time her mental status was at baseline (A&Ox4). Also on 7/22, the patient began to complain of blurry vision. On 7/23 morning, the patient had the first occurrence of her rash around the right side of her face, V1 dermatome distribution, and the rash has progressively worsened since. On 7/24, patient started valtrex and artificial tears and began to be more sleepy. On 7/25 Patient aide said that the patient was more confused and her mentation was getting worse up until now."    WBC is WFL. XRAY Shunt Series 7/26 "The lungs are clear. "    Patient seen at bedside, awake/alert, during clinical swallow evaluation this PM. Patient's daughter (who is an MD at [facility]), present during assessment and served as a reliable informant. Patient with confusion, and difficulty following commands. Per daughter, patient tolerates Regular solids and thin liquids in the home environment. Daughter reported patient is "a picky eater", and that patient only able to consume thin liquids via straw sips only. Patient denied pain and/or difficulty swallowing. Patient was cooperative and receptive to PO trials. Per HPI: "85yo female w/PMH Cardiac stents post MI (1986, 1996), COPD, NPH, DM, Migraines, and suspected HTN presenting with 3 day history of herpes zoster rash over the right V1 dermatome w/ eye involvement, now presenting with 1 day history of altered mental status. On 7/17, patient had right sided headache attributed to her recurrent migraines that she normally has on a daily basis, in which she took furiocet for. On 7/20 night and 7/21 morning, Patient vomited and began taking naproxen and tylenol for her symptoms. On 7/21, patient later saw her outpatient internist, who requested a CT Head for the patient, which came with findings at baseline. On 7/21 night, the patient began to scream, and checked her BP, which was at 200/80. On 7/22 morning, the patient's repeat BP was 160/100. Her internist subsequently prescribed amlodipine for the patient, and at the time her mental status was at baseline (A&Ox4). Also on 7/22, the patient began to complain of blurry vision. On 7/23 morning, the patient had the first occurrence of her rash around the right side of her face, V1 dermatome distribution, and the rash has progressively worsened since. On 7/24, patient started valtrex and artificial tears and began to be more sleepy. On 7/25 Patient aide said that the patient was more confused and her mentation was getting worse up until now."    WBC is WFL. XRAY Shunt Series 7/26 "The lungs are clear. "    Patient seen at bedside, awake/alert, during clinical swallow evaluation this PM. Patient's daughter (who is an MD at [facility]), present during assessment and served as a reliable informant. Patient with confusion, and difficulty following commands. Per daughter, patient tolerates Regular solids and thin liquids in the home environment. Daughter reported patient is "a picky eater", and that patient only able to consume thin liquids via straw sips. Patient denied pain and/or difficulty swallowing. Patient was cooperative and receptive to PO trials.

## 2022-07-27 NOTE — PROGRESS NOTE ADULT - PROBLEM SELECTOR PLAN 8
Previous history of stents in 1986 and 1996    - Monitor VS and symptoms at this time  - currently not being treated with medication Previous history of stents in 1986 and 1996, on aspirin 325mg outpatient    - Monitor VS and symptoms at this time

## 2022-07-27 NOTE — PROGRESS NOTE ADULT - SUBJECTIVE AND OBJECTIVE BOX
[facility] DEPARTMENT OF OPHTHALMOLOGY  ------------------------------------------------------------------------------    Interval History: No acute events overnight. Today remains with poor mental status, cannot answer interviewer's questions.      MEDICATIONS  (STANDING):  acetaminophen   IVPB .. 1000 milliGRAM(s) IV Intermittent every 6 hours  acyclovir IVPB 650 milliGRAM(s) IV Intermittent every 12 hours  aspirin enteric coated 325 milliGRAM(s) Oral daily  brimonidine 0.2% Ophthalmic Solution 1 Drop(s) Right EYE three times a day  budesonide  80 MICROgram(s)/formoterol 4.5 MICROgram(s) Inhaler 2 Puff(s) Inhalation two times a day  ceFAZolin   IVPB 1000 milliGRAM(s) IV Intermittent every 8 hours  dextrose 5%. 1000 milliLiter(s) (50 mL/Hr) IV Continuous <Continuous>  dextrose 5%. 1000 milliLiter(s) (100 mL/Hr) IV Continuous <Continuous>  dextrose 50% Injectable 25 Gram(s) IV Push once  dextrose 50% Injectable 12.5 Gram(s) IV Push once  dextrose 50% Injectable 25 Gram(s) IV Push once  erythromycin   Ointment 1 Application(s) Right EYE four times a day  glucagon  Injectable 1 milliGRAM(s) IntraMuscular once  heparin   Injectable 5000 Unit(s) SubCutaneous every 8 hours  insulin glargine Injectable (LANTUS) 10 Unit(s) SubCutaneous at bedtime  insulin lispro (ADMELOG) corrective regimen sliding scale   SubCutaneous every 6 hours  losartan 50 milliGRAM(s) Oral daily  metoprolol succinate ER 12.5 milliGRAM(s) Oral daily  mirtazapine 15 milliGRAM(s) Oral at bedtime  sodium chloride 0.9%. 1000 milliLiter(s) (75 mL/Hr) IV Continuous <Continuous>  tiotropium 18 MICROgram(s) Capsule 1 Capsule(s) Inhalation daily  traZODone 100 milliGRAM(s) Oral at bedtime  ursodiol Tablet 500 milliGRAM(s) Oral <User Schedule>    MEDICATIONS  (PRN):  dextrose Oral Gel 15 Gram(s) Oral once PRN Blood Glucose LESS THAN 70 milliGRAM(s)/deciliter      VITALS: T(C): 36.8 (07-27-22 @ 05:34)  T(F): 98.3 (07-27-22 @ 05:34), Max: 98.7 (07-26-22 @ 23:00)  HR: 88 (07-27-22 @ 05:34) (85 - 91)  BP: 149/87 (07-27-22 @ 05:34) (142/92 - 158/85)  RR:  (18 - 18)  SpO2:  (96% - 100%)  Wt(kg): --  General: AAO x 0     Ophthalmology Exam:  Visual acuity (sc): MYRON 2/2 mental status.   Pupils: 5mm, nonreactive to light OD, 4mm -> 2mm OS  Ttono: 17 OD, 13 OS  Extraocular movements (EOMs): Difficult to assess 2/2 pt cooperation, however has 70% abduction deficit OD, full adduction, grossly full supraduction and infraduction.   Confrontational Visual Field (CVF): Full OU    Pen Light Exam (PLE) - pt unable to tolerate sitting up to slit lamp.  External: Scattered vesicular, crusted lesions along V1 distribution over R side of face, wnl on L side  Lids/Lashes/Lacrimal Ducts: 1+ periorbital edema RUL and RLL, flat OS   Sclera/Conjunctiva: 1-2+ injection OD, W+Q OS  Cornea: Scattered pseudodendrites OD, 1+ SPK OS  Anterior Chamber: D+F OU    Iris: Flat OU  Lens: PCIOL OD, 3+ NS OS      IMAGING:    ACC: [id number] EXAM:  MR BRAIN [facility]                        ACC: [id number] EXAM:  MR ORBITS ONLY [facility]                          PROCEDURE DATE:  07/27/2022      IMPRESSION:    Evaluation the orbits is limited. However, asymmetric edema appears to   involve the right orbital medial and lateral rectus muscles, as well as   the superior oblique muscle, suspicious for a myositis (viral given the   known herpes zoster with superimposed bacterial infection not excluded).   There is no evidence for orbital abscess. The cavernous sinuses and   superior ophthalmic veins remain patent. The right preseptal periorbital   soft tissue swelling appears improved compared to the CT suggesting   improving preseptal cellulitis/zoster. Serial imaging follow-up is   recommended to monitor for stability.    No evidence for acute infarct, acute intracranial hemorrhage, or   intracranial abscess.    Stable ventricular size.    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

## 2022-07-27 NOTE — PROGRESS NOTE ADULT - ATTENDING COMMENTS
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 CHARLINE resolved. Course now c/b + blood cx w/ MRSE     - Monitor mental status and lesions  - C/w Acyclovir as stated above  w/ prophylactic IVF while on treatment  -Repeat blood cxs in the am. Given MRSE likely contaminant c/wing w/ ancef for now and opthalmic erythromycin qid   -Per ID holding off should hold off on LP given higher suspicion for CNS involvement and wouldn't  at this time.   -Arranging for brain and orbit MRI .   -Pending VEEG  -[facility] eval    Case d/w pts daughter Dr. Delgado and Dr. 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 CHARLINE resolved. Course now c/b + blood cx w/ MRSE     - Monitor mental status and lesions  - C/w Acyclovir as stated above  w/ prophylactic IVF while on treatment and brimonidine TID in R eye, erythromycin ointment QID to R eye and periocular lesions  -treating w/ ancef for possibly superimposed bacterial cellulitis   -Repeat blood cxs in the am. Given MRSE likely contaminant c/wing w/ ancef for now    -Per ID holding off should hold off on LP given higher suspicion for CNS involvement and wouldn't  at this time.   -Arranging for brain and orbit MRI .   -Pending VEEG  -[facility] eval    Case d/w pts daughter Dr. Delgado and Dr. Henderson from ID at bedside

## 2022-07-27 NOTE — PROGRESS NOTE ADULT - PROBLEM SELECTOR PLAN 3
Patient presented with history of severe hypertension at home and hypertension in the SBP ~180s while in ED    - Patient given Labetalol in ED, BP down to 172/81  - Resume oral home BP medications pending patient ability to swallow- otherwise will start IV  - Monitor VS q4 Patient presented with history of severe hypertension at home and hypertension in the SBP ~180s while in ED    - Patient on labetalol for BP>180 SBP, currently in 140s-150s SBP  - Resume oral home BP medications pending patient ability to swallow- otherwise will start IV labetalol prn  - Monitor VS q4 Patient presented with history of severe hypertension at home and hypertension in the SBP ~180s while in ED    - Patient on labetalol for BP>180 SBP, currently in 140s-150s SBP  - Resume oral home BP medications pending patient ability to swallow- otherwise will start IV labetalol prn  - Monitor VS q4  - Resumed home meds except: Hold Anti-HTN meds: amlodipine, furosemide, will add on if patient BP's appropriate at later time- In effort to not acutely drop patient BP Patient presented with history of severe hypertension at home and hypertension in the SBP ~180s while in ED    - Patient on labetalol for BP>180 SBP, currently in 140s-150s SBP  - Resume oral home BP medications pending patient ability to swallow- otherwise will start IV labetalol prn  - Monitor VS q4  - Resumed home meds except: Hold Anti-HTN meds: amlodipine, furosemide, spironolactone, will add on if patient BP's appropriate at later time- In effort to not acutely drop patient BP

## 2022-07-27 NOTE — PROGRESS NOTE ADULT - PROBLEM SELECTOR PLAN 2
Patient demonstrating history of altered mentation from baseline for past day in the context of severe herpes zoster rash    -CTA: Old parietal infarct, soft tissue swelling around right eye- likely component of herpes zoster, possible cellulitis- Ventricles don't appear enlarged  - ID and Neuro consulted- LP in AM w/ CSF analysis (including PCR for HSV, VZV, enterovirus, viral panel in addition to cytology, cell count, glucose, protein, etc), MRI Head w/ and without contrast, have lab save CSF samples- will do MRI w/o contrast given kidney concerns  - ID recommends LP as soon as possible- neuro will have it done in morning (7/27)  - On Acyclovir (renally dosed)- started 500mg in ED, added 120mg- and Cefazolin STAT then q12 per ID recs  - Will reach out to neuro in AM for LP Patient demonstrating history of altered mentation from baseline for past day in the context of severe herpes zoster rash    -CTA: Old parietal infarct, soft tissue swelling around right eye- likely component of herpes zoster, possible cellulitis- Ventricles don't appear enlarged  - LP via procedure team w/ CSF analysis (including PCR for HSV, VZV, enterovirus, viral panel in addition to cytology, cell count, glucose, protein, etc)  Scheduled for MRI Head w/ and without contrast, have lab save CSF samples  - On Acyclovir (renally dosed) - and Cefazolin STAT then q12 per ID recs- f/u w/ ID to update acyclovir dose Patient demonstrating history of altered mentation from baseline for past day in the context of severe herpes zoster rash    -CTA: Old parietal infarct, soft tissue swelling around right eye- likely component of herpes zoster, possible cellulitis- Ventricles don't appear enlarged  - LP cancelled secondary to ID recs- likely will not change care if done  Scheduled for MRI Head w/ and without contrast  - On Acyclovir (renally dosed) - and Cefazolin STAT then q12 per ID recs- doses updated based on renal dosing Patient demonstrating history of altered mentation from baseline for past day in the context of severe herpes zoster rash    -CTA: Old parietal infarct, soft tissue swelling around right eye- likely component of herpes zoster, possible cellulitis- Ventricles don't appear enlarged  - LP cancelled secondary to ID recs- likely will not change care if done  Scheduled for MRI Head w/ and without contrast  - On Acyclovir (renally dosed) - and Cefazolin STAT then q12 per ID recs- doses updated based on renal dosing  - Pending vEEG

## 2022-07-27 NOTE — PROGRESS NOTE ADULT - ASSESSMENT
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

## 2022-07-27 NOTE — PROGRESS NOTE ADULT - PROBLEM SELECTOR PLAN 1
Patient demonstrating Right V1 distribution rash, previous history of chicken pox as child, treated with Valtrex since 7/24 outpatient    - Consulted ID and Neuro- LP in AM w/ CSF analysis (including PCR for HSV, VZV, enterovirus, viral panel in addition to cytology, cell count, glucose, protein, etc), MRI Head w/ and without contrast, have lab save CSF samples- will do MRI w/o contrast given kidney concerns, vEEG  - On Acyclovir (renally dosed) - and Cefazolin STAT then q12 per ID recs  -For Opthalmicus- appreciate opthalmology recs- Start brimonidine TID in R eye, Start erythromycin ointment QID to R eye and periocular lesions  - *If patient requires MRI, must have  shunt device reset*- neuro aware  - PRN Tylenol for pain Patient demonstrating Right V1 distribution rash, previous history of chicken pox as child, treated with Valtrex since 7/24 outpatient    - Consulted ID and Neuro- appreciate recs   - LP via procedure team w/ CSF analysis (including PCR for HSV, VZV, enterovirus, viral panel in addition to cytology, cell count, glucose, protein, etc)  Scheduled for MRI Head w/ and without contrast, have lab save CSF samples  - On Acyclovir (renally dosed) - and Cefazolin STAT then q12 per ID recs- f/u w/ ID to update acyclovir dose  -For Opthalmicus- appreciate opthalmology recs- Start brimonidine TID in R eye, Start erythromycin ointment QID to R eye and periocular lesions  - *If patient requires MRI, must have  shunt device reset*- neuro aware  - PRN Tylenol for pain Patient demonstrating Right V1 distribution rash, previous history of chicken pox as child, treated with Valtrex since 7/24 outpatient    - Consulted ID and Neuro- appreciate recs   - LP cancelled secondary to ID recs- likely will not change care if done  - Scheduled for MRI Head w/ and without contrast  - On Acyclovir (renally dosed) - and Cefazolin STAT then q12 per ID recs- doses updated based on renal dosing  -For Opthalmicus- appreciate opthalmology recs- Start brimonidine TID in R eye, Start erythromycin ointment QID to R eye and periocular lesions  - *If patient requires MRI, must have  shunt device reset*- neuro aware  - PRN Tylenol for pain- On standing ofirmev right now, will reassess pain tomorrow to see if additional pain control is required Patient demonstrating Right V1 distribution rash, previous history of chicken pox as child, treated with Valtrex since 7/24 outpatient    - Consulted ID and Neuro- appreciate recs   - LP cancelled secondary to ID recs- likely will not change care if done  - Scheduled for MRI Head w/ and without contrast  - On Acyclovir (renally dosed) - and Cefazolin STAT then q12 per ID recs- doses updated based on renal dosing  -For Opthalmicus- appreciate opthalmology recs- Start brimonidine TID in R eye, Start erythromycin ointment QID to R eye and periocular lesions  - *If patient requires MRI, must have  shunt device reprogrammed after*- will page neurosurg [id number]  - PRN Tylenol for pain- On standing ofirmev right now, will reassess pain tomorrow to see if additional pain control is required

## 2022-07-28 NOTE — PROGRESS NOTE ADULT - PROBLEM SELECTOR PLAN 10
Diet: Minced and moist (In case Thin liquids via straw only)    DVT Ppx: Subq heparin Diet: Minced and moist (In case Thin liquids via straw only)    DVT Ppx: Subq heparin    PT&OT eval pending    Consider bowel regimen* Diet: Minced and moist (In case Thin liquids via straw only)    DVT Ppx: Subq heparin (may switch to lovenox depending on patient kidney function on repeat labs)    PT&OT eval pending

## 2022-07-28 NOTE — PROGRESS NOTE ADULT - PROBLEM SELECTOR PLAN 6
Hx of NPH diagnosed 2011- Installed by [facility] neurosurgery    -CTA: Old parietal infarct, soft tissue swelling around right eye- likely component of herpes zoster, possible cellulitis- Ventricles don't appear enlarged  -Has programmable  shunt *Must be programmed after MRI*- will page neurosurg [id number]  - No findings consistent with ventricular dilation on MRI  -  shunt reprogrammed after MRI per verbal confirmation from neurosurg team

## 2022-07-28 NOTE — CHART NOTE - NSCHARTNOTEFT_GEN_A_CORE
MR brain and orbits reviewed, as below. No acute intracranial pathology noted. 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  OK to defer LP from our standpoint as it may not  at this time. However if patient continues to be encephalopathic despite treatment of herpes zoster would recommend calling neurology again and at that time would also consider LP - either lumbar or if possible tapping the shunt. No further neuro work up recommended at this time     MR brain and orbits with and without contrast 7/27/22  IMPRESSION:  Evaluation the orbits is limited. However, asymmetric edema appears to involve the right orbital medial and lateral rectus muscles, as well as the superior oblique muscle, suspicious for a myositis (viral given the known herpes zoster with superimposed bacterial infection not excluded). There is no evidence for orbital abscess. The cavernous sinuses and superior ophthalmic veins remain patent. The right preseptal periorbital soft tissue swelling appears improved compared to the CT suggesting improving preseptal cellulitis/zoster. Serial imaging follow-up is recommended to monitor for stability.     No evidence for acute infarct, acute intracranial hemorrhage, or intracranial abscess.    Stable ventricular size.    Chronic left frontal parietal infarcts and chronic white matter changes as described.      Case discussed with neurology attending Dr. Brandon MR brain and orbits reviewed, as below. No acute intracranial pathology noted. 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  OK to defer LP from our standpoint as it may not  at this time. However if patient continues to be encephalopathic despite treatment of herpes zoster would recommend calling neurology again and at that time would also consider LP - either lumbar or if possible tapping the shunt. No further neuro work up recommended at this time. Patient did not tolerate EEG    MR brain and orbits with and without contrast 7/27/22  IMPRESSION:  Evaluation the orbits is limited. However, asymmetric edema appears to involve the right orbital medial and lateral rectus muscles, as well as the superior oblique muscle, suspicious for a myositis (viral given the known herpes zoster with superimposed bacterial infection not excluded). There is no evidence for orbital abscess. The cavernous sinuses and superior ophthalmic veins remain patent. The right preseptal periorbital soft tissue swelling appears improved compared to the CT suggesting improving preseptal cellulitis/zoster. Serial imaging follow-up is recommended to monitor for stability.     No evidence for acute infarct, acute intracranial hemorrhage, or intracranial abscess.    Stable ventricular size.    Chronic left frontal parietal infarcts and chronic white matter changes as described.      Case discussed with neurology attending Dr. Brandon

## 2022-07-28 NOTE — PROGRESS NOTE ADULT - PROBLEM SELECTOR PLAN 5
Patient with history of DM2 on home Lantus 34U daily, Trulicity, glipizide    - Hold oral hypoglycemics   - premeal and bedtime fingersticks  - Insulin sliding scale and lantus 10U qhs for now   - HgbA1C 7,2

## 2022-07-28 NOTE — PROGRESS NOTE ADULT - ASSESSMENT
87yo female w/PMH Cardiac stents post MI (1986, 1996), COPD, NPH, DM, Migraines, and suspected HTN presenting with 3 day history of herpes zoster rash  No fever, leukocytosis  R sided zoster rash, then increasing confusion  On exam with vesicles, redness at site  Optho with concern for R sided ocular involvement  2/4 BCX CoNS--suspect contam  MR shows areas of myositis, but suspect is due to viral process vs concomittant orbital cellulitis (patient is clinically improving)  Overall,  1) VZV Ophthalmicus  - Concern for VZV with ocular involvement  - Acyclovir 650mg q 12 (continue IVF while on treatment)--continue dose for now, Cr today not yet available  - Monitor lesions  - F/U Optho  2) AMS  - Likely due to component of VZV encephalopathy vs dehydration/infectious process in elderly  - Would be cross treated with Acyclovir as above  - Would hold on LP unless further uncertainty  3) Positive BCX  - CoNS--suspect contam  - F/U BCX  4) Preseptal cellulitis  - Cefazolin 1g q 8  - Monitor for improvement    Jack Henderson MD  Contact on TEAMS messaging from 9am - 5pm  From 5pm-9am, on weekends, or if no response call [phone]

## 2022-07-28 NOTE — PROGRESS NOTE ADULT - ASSESSMENT
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

## 2022-07-28 NOTE — PROGRESS NOTE ADULT - PROBLEM SELECTOR PLAN 1
Patient demonstrating Right V1 distribution rash, previous history of chicken pox as child, treated with Valtrex since 7/24 outpatient    - Consulted ID and Neuro- appreciate recs   - LP cancelled secondary to ID recs- likely will not change care if done  - Scheduled for MRI Head w/ and without contrast  - On Acyclovir (renally dosed) - and Cefazolin STAT then q12 per ID recs- doses updated based on renal dosing  -For Opthalmicus- appreciate opthalmology recs- Start brimonidine TID in R eye, Start erythromycin ointment QID to R eye and periocular lesions, start artificial tears  - *If patient requires MRI, must have  shunt device reprogrammed after*- page neurosurg [id number]  - PRN Tylenol for pain- On standing ofirmev right now, will reassess pain tomorrow to see if additional pain control is required  - MRI Head and Orbits:   Evaluation the orbits is limited. However, asymmetric edema appears to involve the right orbital medial and lateral rectus muscles, as well as the superior oblique muscle, suspicious for a myositis (viral given the known herpes zoster with superimposed bacterial infection not excluded). There is no evidence for orbital abscess. The cavernous sinuses and superior ophthalmic veins remain patent. The right preseptal periorbital soft tissue swelling appears improved compared to the CT suggesting improving preseptal cellulitis/zoster. Serial imaging follow-up is recommended to monitor for stability.    No evidence for acute infarct, acute intracranial hemorrhage, or intracranial abscess.    Stable ventricular size.  -  shunt rate reprogrammed per neurosurg verbal discussion Patient demonstrating Right V1 distribution rash, previous history of chicken pox as child, treated with Valtrex since 7/24 outpatient    **If patient requires MRI, must have  shunt device reprogrammed after*- page neurosurg [id number]**    - Consulted ID and Neuro- appreciate recs   - LP cancelled secondary to ID recs- likely will not change care if done  - Scheduled for MRI Head w/ and without contrast  - On Acyclovir (renally dosed) - and Cefazolin STAT then q12 per ID recs- doses updated based on renal dosing  -For Opthalmicus- appreciate opthalmology recs- Start brimonidine TID in R eye, Start erythromycin ointment QID to R eye and periocular lesions, start artificial tears; Tonometry results improved per optho note  - PRN Tylenol for pain- On standing ofirmev right now, will reassess pain tomorrow to see if additional pain control is required  - MRI Head and Orbits- no brain findings, sig for swelling in right orbit 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  -  shunt rate reprogrammed per neurosurg verbal discussion Patient demonstrating Right V1 distribution rash, previous history of chicken pox as child, treated with Valtrex since 7/24 outpatient    **If patient requires MRI, must have  shunt device reprogrammed after*- page neurosurg [id number]**    - Consulted ID and Neuro- appreciate recs   - LP cancelled secondary to ID recs- likely will not change care if done  - Scheduled for MRI Head w/ and without contrast  - On Acyclovir (renally dosed) - and Cefazolin STAT then q12 per ID recs- doses updated based on renal dosing  -For Opthalmicus- appreciate opthalmology recs- Start brimonidine TID in R eye, Start erythromycin ointment QID to R eye and periocular lesions, start artificial tears; Tonometry results improved per optho note  - PRN Tylenol for pain- On standing ofirmev right now, will reassess pain tomorrow to see if additional pain control is required  - MRI Head and Orbits- no brain findings, sig for swelling in right orbit 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  -  shunt rate reprogrammed per neurosurg verbal discussion  - Patient receiving midline for additional IV access  - May need special IV team/IV nurse to draw future labs given the patient is a hard stick

## 2022-07-28 NOTE — PROGRESS NOTE ADULT - PROBLEM SELECTOR PLAN 4
Patient CMP significant for BUN 65 Cr 2.84, which is significantly different from baseline according to daughter (~1.0 outpatient for creatinine per daughter)    RESOLVED    - 75cc/hr isotonic fluids  - Avoid nephrotoxic agents

## 2022-07-28 NOTE — PROGRESS NOTE ADULT - SUBJECTIVE AND OBJECTIVE BOX
CC: F/U for VZV    Saw/spoke to patient. No fevers, no chills. Mental status improving.    Allergies  diltiazem (Other; Rash)    ANTIMICROBIALS:  acyclovir IVPB 650 every 12 hours  ceFAZolin   IVPB 1000 every 8 hours    PE:    Vital Signs Last 24 Hrs  T(C): 37.7 (28 Jul 2022 05:00), Max: 37.7 (28 Jul 2022 05:00)  T(F): 99.9 (28 Jul 2022 05:00), Max: 99.9 (28 Jul 2022 05:00)  HR: 73 (28 Jul 2022 05:00) (65 - 93)  BP: 130/55 (28 Jul 2022 05:00) (122/53 - 130/55)  RR: 18 (28 Jul 2022 05:00) (18 - 18)  SpO2: 98% (28 Jul 2022 05:00) (96% - 100%)    Gen: AOx1-2, more awake  Skin: R sided rash mostly crusted, redness decreasing  CV: Nontachycardic  Resp: Breathing comfortably, RA  Abd: Soft, nontender    LABS:                        13.1   8.44  )-----------( 265      ( 27 Jul 2022 06:17 )             40.5     07-27    139  |  102  |  40<H>  ----------------------------<  145<H>  3.6   |  23  |  0.98    Ca    8.9      27 Jul 2022 06:17  Phos  2.8     07-27  Mg     2.10     07-27    TPro  7.1  /  Alb  3.7  /  TBili  0.3  /  DBili  x   /  AST  49<H>  /  ALT  29  /  AlkPhos  102  07-27    MICROBIOLOGY:    .Blood Blood  07-26-22   Growth in anaerobic bottle: Gram Positive Cocci in Clusters  Growth in aerobic bottle: Gram Positive Cocci in Clusters  --    Growth in anaerobic bottle: Gram Positive Cocci in Clusters  Growth in aerobic bottle: Gram Positive Cocci in Clusters    .Blood Blood  07-26-22   Growth in aerobic bottle: Gram Positive Cocci in Clusters  ***Blood Panel PCR results on this specimen are available  approximately 3 hours after the Gram stain result.***  Gram stain, PCR, and/or culture results may not always  correspond due to difference in methodologies.  ************************************************************  This PCR assay was performed by multiplex PCR. This  Assay tests for 66 bacterial and resistance gene targets.  Please refer to the [facility] test directory  at [email or web address] for details.  Growth in anaerobic bottle: Gram Positive Cocci in Clusters  --  Blood Culture PCR    Rapid RVP Result: NotDetec (07-26 @ 10:31)    RADIOLOGY:    7/27    IMPRESSION:    Evaluation the orbits is limited. However, asymmetric edema appears to   involve the right orbital medial and lateral rectus muscles, as well as   the superior oblique muscle, suspicious for a myositis (viral given the   known herpes zoster with superimposed bacterial infection not excluded).   There is no evidence for orbital abscess. The cavernous sinuses and   superior ophthalmic veins remain patent. The right preseptal periorbital   soft tissue swelling appears improved compared to the CT suggesting   improving preseptal cellulitis/zoster. Serial imaging follow-up is   recommended to monitor for stability.    No evidence for acute infarct, acute intracranial hemorrhage, or   intracranial abscess.    Stable ventricular size.    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

## 2022-07-28 NOTE — PROGRESS NOTE ADULT - SUBJECTIVE AND OBJECTIVE BOX
Patient is a 86y old  Female who presents with a chief complaint of Suspicion for Herpes Zoster opthalmicus/encephalitis (27 Jul 2022 18:15)      SUBJECTIVE / OVERNIGHT EVENTS:    MEDICATIONS  (STANDING):  acetaminophen   IVPB .. 1000 milliGRAM(s) IV Intermittent every 6 hours  acyclovir IVPB 650 milliGRAM(s) IV Intermittent every 12 hours  artificial tears (preservative free) Ophthalmic Solution 1 Drop(s) Left EYE four times a day  aspirin  chewable 324 milliGRAM(s) Oral daily  brimonidine 0.2% Ophthalmic Solution 1 Drop(s) Right EYE three times a day  budesonide  80 MICROgram(s)/formoterol 4.5 MICROgram(s) Inhaler 2 Puff(s) Inhalation two times a day  ceFAZolin   IVPB 1000 milliGRAM(s) IV Intermittent every 8 hours  dextrose 5%. 1000 milliLiter(s) (50 mL/Hr) IV Continuous <Continuous>  dextrose 5%. 1000 milliLiter(s) (100 mL/Hr) IV Continuous <Continuous>  dextrose 50% Injectable 25 Gram(s) IV Push once  dextrose 50% Injectable 12.5 Gram(s) IV Push once  dextrose 50% Injectable 25 Gram(s) IV Push once  erythromycin   Ointment 1 Application(s) Right EYE four times a day  glucagon  Injectable 1 milliGRAM(s) IntraMuscular once  heparin   Injectable 5000 Unit(s) SubCutaneous every 8 hours  insulin glargine Injectable (LANTUS) 10 Unit(s) SubCutaneous at bedtime  insulin lispro (ADMELOG) corrective regimen sliding scale   SubCutaneous every 6 hours  losartan 50 milliGRAM(s) Oral daily  metoprolol succinate ER 12.5 milliGRAM(s) Oral daily  mirtazapine 15 milliGRAM(s) Oral at bedtime  sodium chloride 0.9%. 1000 milliLiter(s) (75 mL/Hr) IV Continuous <Continuous>  tiotropium 18 MICROgram(s) Capsule 1 Capsule(s) Inhalation daily  traZODone 100 milliGRAM(s) Oral at bedtime  ursodiol Tablet 500 milliGRAM(s) Oral <User Schedule>    MEDICATIONS  (PRN):  dextrose Oral Gel 15 Gram(s) Oral once PRN Blood Glucose LESS THAN 70 milliGRAM(s)/deciliter      Vital Signs Last 24 Hrs  T(C): 37.7 (28 Jul 2022 05:00), Max: 37.7 (28 Jul 2022 05:00)  T(F): 99.9 (28 Jul 2022 05:00), Max: 99.9 (28 Jul 2022 05:00)  HR: 73 (28 Jul 2022 05:00) (65 - 93)  BP: 130/55 (28 Jul 2022 05:00) (122/53 - 131/65)  BP(mean): --  RR: 18 (28 Jul 2022 05:00) (17 - 18)  SpO2: 98% (28 Jul 2022 05:00) (96% - 100%)    Parameters below as of 28 Jul 2022 05:00  Patient On (Oxygen Delivery Method): room air      CAPILLARY BLOOD GLUCOSE      POCT Blood Glucose.: 120 mg/dL (27 Jul 2022 23:34)  POCT Blood Glucose.: 172 mg/dL (27 Jul 2022 17:49)  POCT Blood Glucose.: 103 mg/dL (27 Jul 2022 14:28)  POCT Blood Glucose.: 120 mg/dL (27 Jul 2022 07:54)    I&O's Summary      PHYSICAL EXAM:  GENERAL: NAD, well-developed  HEAD:  Atraumatic, Normocephalic  EYES: EOMI, PERRLA, conjunctiva and sclera clear  NECK: Supple, No JVD  CHEST/LUNG: Clear to auscultation bilaterally; No wheeze  HEART: Regular rate and rhythm; No murmurs, rubs, or gallops  ABDOMEN: Soft, Nontender, Nondistended; Bowel sounds present  EXTREMITIES:  2+ Peripheral Pulses, No clubbing, cyanosis, or edema  PSYCH: AAOx3  NEUROLOGY: non-focal  SKIN: No rashes or lesions    LABS:                        13.1   8.44  )-----------( 265      ( 27 Jul 2022 06:17 )             40.5     07-27    139  |  102  |  40<H>  ----------------------------<  145<H>  3.6   |  23  |  0.98    Ca    8.9      27 Jul 2022 06:17  Phos  2.8     07-27  Mg     2.10     07-27    TPro  7.1  /  Alb  3.7  /  TBili  0.3  /  DBili  x   /  AST  49<H>  /  ALT  29  /  AlkPhos  102  07-27              RADIOLOGY & ADDITIONAL TESTS:    Imaging Personally Reviewed:    Consultant(s) Notes Reviewed:      Care Discussed with Consultants/Other Providers:   Patient is a 86y old  Female who presents with a chief complaint of Suspicion for Herpes Zoster opthalmicus/encephalitis (27 Jul 2022 18:15)      SUBJECTIVE / OVERNIGHT EVENTS: Patient seen at bedside, sleepy, no acute events overnight.     MEDICATIONS  (STANDING):  acetaminophen   IVPB .. 1000 milliGRAM(s) IV Intermittent every 6 hours  acyclovir IVPB 650 milliGRAM(s) IV Intermittent every 12 hours  artificial tears (preservative free) Ophthalmic Solution 1 Drop(s) Left EYE four times a day  aspirin  chewable 324 milliGRAM(s) Oral daily  brimonidine 0.2% Ophthalmic Solution 1 Drop(s) Right EYE three times a day  budesonide  80 MICROgram(s)/formoterol 4.5 MICROgram(s) Inhaler 2 Puff(s) Inhalation two times a day  ceFAZolin   IVPB 1000 milliGRAM(s) IV Intermittent every 8 hours  dextrose 5%. 1000 milliLiter(s) (50 mL/Hr) IV Continuous <Continuous>  dextrose 5%. 1000 milliLiter(s) (100 mL/Hr) IV Continuous <Continuous>  dextrose 50% Injectable 25 Gram(s) IV Push once  dextrose 50% Injectable 12.5 Gram(s) IV Push once  dextrose 50% Injectable 25 Gram(s) IV Push once  erythromycin   Ointment 1 Application(s) Right EYE four times a day  glucagon  Injectable 1 milliGRAM(s) IntraMuscular once  heparin   Injectable 5000 Unit(s) SubCutaneous every 8 hours  insulin glargine Injectable (LANTUS) 10 Unit(s) SubCutaneous at bedtime  insulin lispro (ADMELOG) corrective regimen sliding scale   SubCutaneous every 6 hours  losartan 50 milliGRAM(s) Oral daily  metoprolol succinate ER 12.5 milliGRAM(s) Oral daily  mirtazapine 15 milliGRAM(s) Oral at bedtime  sodium chloride 0.9%. 1000 milliLiter(s) (75 mL/Hr) IV Continuous <Continuous>  tiotropium 18 MICROgram(s) Capsule 1 Capsule(s) Inhalation daily  traZODone 100 milliGRAM(s) Oral at bedtime  ursodiol Tablet 500 milliGRAM(s) Oral <User Schedule>    MEDICATIONS  (PRN):  dextrose Oral Gel 15 Gram(s) Oral once PRN Blood Glucose LESS THAN 70 milliGRAM(s)/deciliter      Vital Signs Last 24 Hrs  T(C): 37.7 (28 Jul 2022 05:00), Max: 37.7 (28 Jul 2022 05:00)  T(F): 99.9 (28 Jul 2022 05:00), Max: 99.9 (28 Jul 2022 05:00)  HR: 73 (28 Jul 2022 05:00) (65 - 93)  BP: 130/55 (28 Jul 2022 05:00) (122/53 - 131/65)  BP(mean): --  RR: 18 (28 Jul 2022 05:00) (17 - 18)  SpO2: 98% (28 Jul 2022 05:00) (96% - 100%)    Parameters below as of 28 Jul 2022 05:00  Patient On (Oxygen Delivery Method): room air      CAPILLARY BLOOD GLUCOSE      POCT Blood Glucose.: 120 mg/dL (27 Jul 2022 23:34)  POCT Blood Glucose.: 172 mg/dL (27 Jul 2022 17:49)  POCT Blood Glucose.: 103 mg/dL (27 Jul 2022 14:28)  POCT Blood Glucose.: 120 mg/dL (27 Jul 2022 07:54)    I&O's Summary      PHYSICAL EXAM:  GENERAL: No acute distress, uncomfortable appearing, resting in bed, A&Ox2  HEAD:  Atraumatic, Normocephalic  EYES: Abducens palsy of right eye, pupils fixed dilated; Crusted eye and eyelid with clear inflammation of sclera  NECK: Supple, no lymphadenopathy, no JVD  CHEST/LUNG: No wheezes, rales, or rhonchi  HEART: Regular rate and rhythm; No murmurs  ABDOMEN: Soft, non-tender, non-distended; normal bowel sounds, no organomegaly  EXTREMITIES:  weak pulses in pulses in RLE, 2+ edema in RLE to upper shin, no edema in LLE. + ACE bandage cvering right foot wound with mild bleeding. + edema, no erythema or pain in RUE  NEUROLOGY: A&O x 3, no focal deficits  SKIN: No rashes or lesions    LABS:                        13.1   8.44  )-----------( 265      ( 27 Jul 2022 06:17 )             40.5     07-27    139  |  102  |  40<H>  ----------------------------<  145<H>  3.6   |  23  |  0.98    Ca    8.9      27 Jul 2022 06:17  Phos  2.8     07-27  Mg     2.10     07-27    TPro  7.1  /  Alb  3.7  /  TBili  0.3  /  DBili  x   /  AST  49<H>  /  ALT  29  /  AlkPhos  102  07-27              RADIOLOGY & ADDITIONAL TESTS:    Imaging Personally Reviewed:    Consultant(s) Notes Reviewed:      Care Discussed with Consultants/Other Providers:   Patient is a 86y old  Female who presents with a chief complaint of Suspicion for Herpes Zoster opthalmicus/encephalitis (27 Jul 2022 18:15)      SUBJECTIVE / OVERNIGHT EVENTS: Patient seen at bedside, sleepy, no acute events overnight.     MEDICATIONS  (STANDING):  acetaminophen   IVPB .. 1000 milliGRAM(s) IV Intermittent every 6 hours  acyclovir IVPB 650 milliGRAM(s) IV Intermittent every 12 hours  artificial tears (preservative free) Ophthalmic Solution 1 Drop(s) Left EYE four times a day  aspirin  chewable 324 milliGRAM(s) Oral daily  brimonidine 0.2% Ophthalmic Solution 1 Drop(s) Right EYE three times a day  budesonide  80 MICROgram(s)/formoterol 4.5 MICROgram(s) Inhaler 2 Puff(s) Inhalation two times a day  ceFAZolin   IVPB 1000 milliGRAM(s) IV Intermittent every 8 hours  dextrose 5%. 1000 milliLiter(s) (50 mL/Hr) IV Continuous <Continuous>  dextrose 5%. 1000 milliLiter(s) (100 mL/Hr) IV Continuous <Continuous>  dextrose 50% Injectable 25 Gram(s) IV Push once  dextrose 50% Injectable 12.5 Gram(s) IV Push once  dextrose 50% Injectable 25 Gram(s) IV Push once  erythromycin   Ointment 1 Application(s) Right EYE four times a day  glucagon  Injectable 1 milliGRAM(s) IntraMuscular once  heparin   Injectable 5000 Unit(s) SubCutaneous every 8 hours  insulin glargine Injectable (LANTUS) 10 Unit(s) SubCutaneous at bedtime  insulin lispro (ADMELOG) corrective regimen sliding scale   SubCutaneous every 6 hours  losartan 50 milliGRAM(s) Oral daily  metoprolol succinate ER 12.5 milliGRAM(s) Oral daily  mirtazapine 15 milliGRAM(s) Oral at bedtime  sodium chloride 0.9%. 1000 milliLiter(s) (75 mL/Hr) IV Continuous <Continuous>  tiotropium 18 MICROgram(s) Capsule 1 Capsule(s) Inhalation daily  traZODone 100 milliGRAM(s) Oral at bedtime  ursodiol Tablet 500 milliGRAM(s) Oral <User Schedule>    MEDICATIONS  (PRN):  dextrose Oral Gel 15 Gram(s) Oral once PRN Blood Glucose LESS THAN 70 milliGRAM(s)/deciliter      Vital Signs Last 24 Hrs  T(C): 37.7 (28 Jul 2022 05:00), Max: 37.7 (28 Jul 2022 05:00)  T(F): 99.9 (28 Jul 2022 05:00), Max: 99.9 (28 Jul 2022 05:00)  HR: 73 (28 Jul 2022 05:00) (65 - 93)  BP: 130/55 (28 Jul 2022 05:00) (122/53 - 131/65)  BP(mean): --  RR: 18 (28 Jul 2022 05:00) (17 - 18)  SpO2: 98% (28 Jul 2022 05:00) (96% - 100%)    Parameters below as of 28 Jul 2022 05:00  Patient On (Oxygen Delivery Method): room air      CAPILLARY BLOOD GLUCOSE      POCT Blood Glucose.: 120 mg/dL (27 Jul 2022 23:34)  POCT Blood Glucose.: 172 mg/dL (27 Jul 2022 17:49)  POCT Blood Glucose.: 103 mg/dL (27 Jul 2022 14:28)  POCT Blood Glucose.: 120 mg/dL (27 Jul 2022 07:54)    I&O's Summary      PHYSICAL EXAM:  GENERAL: No acute distress, comfortable appearing, resting in bed, A&Ox2 (self and location)  HEAD:  Atraumatic, Normocephalic  EYES: Abducens palsy of right eye (improving), pupils fixed dilated (right eye); Crusted eye and eyelid with inflammation of sclera  NECK: Supple, no lymphadenopathy, no JVD  CHEST/LUNG: No wheezes, rales, or rhonchi  HEART: Regular rate and rhythm; No murmurs  ABDOMEN: Soft, +Tenderness to palpation RUQ, non-distended; normal bowel sounds, no organomegaly  Extremitis: No edema, no rash, pulses 2+  SKIN: No rashes or lesions    LABS:                        13.1   8.44  )-----------( 265      ( 27 Jul 2022 06:17 )             40.5     07-27    139  |  102  |  40<H>  ----------------------------<  145<H>  3.6   |  23  |  0.98    Ca    8.9      27 Jul 2022 06:17  Phos  2.8     07-27  Mg     2.10     07-27    TPro  7.1  /  Alb  3.7  /  TBili  0.3  /  DBili  x   /  AST  49<H>  /  ALT  29  /  AlkPhos  102  07-27              RADIOLOGY & ADDITIONAL TESTS:    Imaging Personally Reviewed:    Consultant(s) Notes Reviewed:      Care Discussed with Consultants/Other Providers:   Patient is a 86y old  Female who presents with a chief complaint of Suspicion for Herpes Zoster opthalmicus/encephalitis (27 Jul 2022 18:15)      SUBJECTIVE / OVERNIGHT EVENTS: Patient seen at bedside, sleepy, no acute events overnight.     MEDICATIONS  (STANDING):  acetaminophen   IVPB .. 1000 milliGRAM(s) IV Intermittent every 6 hours  acyclovir IVPB 650 milliGRAM(s) IV Intermittent every 12 hours  artificial tears (preservative free) Ophthalmic Solution 1 Drop(s) Left EYE four times a day  aspirin  chewable 324 milliGRAM(s) Oral daily  brimonidine 0.2% Ophthalmic Solution 1 Drop(s) Right EYE three times a day  budesonide  80 MICROgram(s)/formoterol 4.5 MICROgram(s) Inhaler 2 Puff(s) Inhalation two times a day  ceFAZolin   IVPB 1000 milliGRAM(s) IV Intermittent every 8 hours  dextrose 5%. 1000 milliLiter(s) (50 mL/Hr) IV Continuous <Continuous>  dextrose 5%. 1000 milliLiter(s) (100 mL/Hr) IV Continuous <Continuous>  dextrose 50% Injectable 25 Gram(s) IV Push once  dextrose 50% Injectable 12.5 Gram(s) IV Push once  dextrose 50% Injectable 25 Gram(s) IV Push once  erythromycin   Ointment 1 Application(s) Right EYE four times a day  glucagon  Injectable 1 milliGRAM(s) IntraMuscular once  heparin   Injectable 5000 Unit(s) SubCutaneous every 8 hours  insulin glargine Injectable (LANTUS) 10 Unit(s) SubCutaneous at bedtime  insulin lispro (ADMELOG) corrective regimen sliding scale   SubCutaneous every 6 hours  losartan 50 milliGRAM(s) Oral daily  metoprolol succinate ER 12.5 milliGRAM(s) Oral daily  mirtazapine 15 milliGRAM(s) Oral at bedtime  sodium chloride 0.9%. 1000 milliLiter(s) (75 mL/Hr) IV Continuous <Continuous>  tiotropium 18 MICROgram(s) Capsule 1 Capsule(s) Inhalation daily  traZODone 100 milliGRAM(s) Oral at bedtime  ursodiol Tablet 500 milliGRAM(s) Oral <User Schedule>    MEDICATIONS  (PRN):  dextrose Oral Gel 15 Gram(s) Oral once PRN Blood Glucose LESS THAN 70 milliGRAM(s)/deciliter      Vital Signs Last 24 Hrs  T(C): 37.7 (28 Jul 2022 05:00), Max: 37.7 (28 Jul 2022 05:00)  T(F): 99.9 (28 Jul 2022 05:00), Max: 99.9 (28 Jul 2022 05:00)  HR: 73 (28 Jul 2022 05:00) (65 - 93)  BP: 130/55 (28 Jul 2022 05:00) (122/53 - 131/65)  BP(mean): --  RR: 18 (28 Jul 2022 05:00) (17 - 18)  SpO2: 98% (28 Jul 2022 05:00) (96% - 100%)    Parameters below as of 28 Jul 2022 05:00  Patient On (Oxygen Delivery Method): room air      CAPILLARY BLOOD GLUCOSE      POCT Blood Glucose.: 120 mg/dL (27 Jul 2022 23:34)  POCT Blood Glucose.: 172 mg/dL (27 Jul 2022 17:49)  POCT Blood Glucose.: 103 mg/dL (27 Jul 2022 14:28)  POCT Blood Glucose.: 120 mg/dL (27 Jul 2022 07:54)    I&O's Summary        PHYSICAL EXAM:   GENERAL: NAD, Able to follow commands and occasionally respond to questions, mental status still limited but improving, resting in bed.  HENT: NCAT; moist mucous membranes  EYES: anicteric sclerae, moist conjunctivae; Significant swelling and crusting of right eye, however patient now able to open eyet; What appears to be dependent edema of the left eyelid as well  NECK: Trachea midline; supple  CHEST:  no respiratory distress  HEART:  regular rate and rhythm  ABDOMEN:  Soft, non-tender, non-distended, normoactive bowel sounds,  no masses  EXTREMITIES: No LE edema  SKIN:  +Rash on V1 distribution of right face, red, warm, erythematous, significantly scabbed and no new vesicles- improving  NEURO: + resting tremor, not new; A&Ox2 (self, location)    LABS:                        13.1   8.44  )-----------( 265      ( 27 Jul 2022 06:17 )             40.5     07-27    139  |  102  |  40<H>  ----------------------------<  145<H>  3.6   |  23  |  0.98    Ca    8.9      27 Jul 2022 06:17  Phos  2.8     07-27  Mg     2.10     07-27    TPro  7.1  /  Alb  3.7  /  TBili  0.3  /  DBili  x   /  AST  49<H>  /  ALT  29  /  AlkPhos  102  07-27              RADIOLOGY & ADDITIONAL TESTS:    < from: MR Orbits w/wo IV Cont (07.27.22 @ 16:55) >    ACC: [id number] EXAM:  MR BRAIN WAW                         ACC: [id number] EXAM:  MR ORBITS ONLY [facility]                          PROCEDURE DATE:  07/27/2022          INTERPRETATION:  HISTORY: Rule out encephalitis. Rule out cranial nerve   involvement. 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    shunt for normal pressure hydrocephalus.    Description: MRI of the brain and orbits with and without gadolinium   contrast was performed.    Through the brain, sagittal T1, axial T1, T2,FLAIR, diffusion-weighted,   and postcontrast T1 axial/coronal/sagittal series were performed. Through   the orbits, thin section axial and coronal T1 pre- and T1 postcontrast   fat saturation and axial/coronal T2 fat saturation series were performed.    7.5 cc intravenous Gadovist gadolinium contrast was administered, 0 cc   contrast was discarded.    Comparison is made to a head CT from 07/06/2022, brain MRI from   05/07/2011.    Evaluation of the orbits is limited by motion, and is also limited by   failure of fat suppression on the T2 fat saturation series, STIR series,   and T1 postcontrast fat saturation series. However, asymmetric edema   appears to involve the right orbital medial and lateral rectus muscles,   as well as the superior oblique muscle, suspicious for a myositis (viral   given the known herpes zoster with superimposed bacterial infection not   excluded). There is no evidence for orbital abscess. The cavernous   sinuses and superior ophthalmic veins remain patent. The right preseptal   periorbital soft tissue swelling appears improved compared to the CT.    Generalized diffuse frontal scalp soft tissue swelling is again noted   which may be related to the shingles infection, a superimposed   cellulitis, or a combination of both, overall stable compared to the   recent head CT. The adjacent calvarium appears intact without periosteal   reaction or bony destruction. There is no scalp abscess.    A right frontal parietal approach shunt catheter is again noted withtip   in the left lateral ventricle. The ventricles are stable in size compared   to the 07/26/2022 brain MRI study. Artifact from the shunt hardware   causes partial failure of CSF suppression on the FLAIR sequence over the   right cerebral hemisphere.    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 No subdural collections   are present.    Chronic left frontal parietal infarcts are noted at the vertex,   corresponding to the diffusion-weighted signal abnormalities on the   05/07/2011 MRI study. There is no evidence for acute infarct on the   diffusion-weighted series. There is no evidence for acute intracranial   hemorrhage. No areas of acute cortical edema/swellingare noted. There is   no evidence for intracranial abscess.    Mild to moderate patchy nonenhancing increased T2 and FLAIR signal   involves the white matter, most consistent with chronic microvascular   ischemic changes given the patient's age. Generalized cerebral volume   loss is noted with secondary prominence of the sulci.    A 1.5 cm enhancing extra-axial dural based mass involves the left lateral   temporal region with associated calcification on the prior CT study, most   consistent with meningioma.    Minor mucosal thickening involves paranasal sinuses without associated   air-fluid levels. Rightward nasal septal deviation and right-sided nasal   septal spurring are present. Right-sided cataract postsurgical changes   are visualized.    The mastoid air cells and middle ear cavities are overall well aerated.    IMPRESSION:    Evaluation the orbits is limited. However, asymmetric edema appears to   involve the right orbital medial and lateral rectus muscles, as well as   the superior oblique muscle, suspicious for a myositis (viral given the   known herpes zoster with superimposed bacterial infection not excluded).   There is no evidence for orbital abscess. The cavernous sinuses and   superior ophthalmic veins remain patent. The right preseptal periorbital   soft tissue swelling appears improved compared to the CT suggesting   improving preseptal cellulitis/zoster. Serial imaging follow-up is   recommended to monitor for stability.    No evidence for acute infarct, acute intracranial hemorrhage, or   intracranial abscess.    Stable ventricular size.    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    --- End of Report ---            STEPHANE CISNEROS MD; Attending Radiologist  This document has been electronically signed. Jul 27 2022  6:09PM    < end of copied text >     Patient is a 86y old  Female who presents with a chief complaint of Suspicion for Herpes Zoster opthalmicus/encephalitis (27 Jul 2022 18:15)      SUBJECTIVE / OVERNIGHT EVENTS: Patient seen at bedside, sleepy, no acute events overnight. Pt reported RUQ abdominal pain earlier that improved on reevaluation    MEDICATIONS  (STANDING):  acetaminophen   IVPB .. 1000 milliGRAM(s) IV Intermittent every 6 hours  acyclovir IVPB 650 milliGRAM(s) IV Intermittent every 12 hours  artificial tears (preservative free) Ophthalmic Solution 1 Drop(s) Left EYE four times a day  aspirin  chewable 324 milliGRAM(s) Oral daily  brimonidine 0.2% Ophthalmic Solution 1 Drop(s) Right EYE three times a day  budesonide  80 MICROgram(s)/formoterol 4.5 MICROgram(s) Inhaler 2 Puff(s) Inhalation two times a day  ceFAZolin   IVPB 1000 milliGRAM(s) IV Intermittent every 8 hours  dextrose 5%. 1000 milliLiter(s) (50 mL/Hr) IV Continuous <Continuous>  dextrose 5%. 1000 milliLiter(s) (100 mL/Hr) IV Continuous <Continuous>  dextrose 50% Injectable 25 Gram(s) IV Push once  dextrose 50% Injectable 12.5 Gram(s) IV Push once  dextrose 50% Injectable 25 Gram(s) IV Push once  erythromycin   Ointment 1 Application(s) Right EYE four times a day  glucagon  Injectable 1 milliGRAM(s) IntraMuscular once  heparin   Injectable 5000 Unit(s) SubCutaneous every 8 hours  insulin glargine Injectable (LANTUS) 10 Unit(s) SubCutaneous at bedtime  insulin lispro (ADMELOG) corrective regimen sliding scale   SubCutaneous every 6 hours  losartan 50 milliGRAM(s) Oral daily  metoprolol succinate ER 12.5 milliGRAM(s) Oral daily  mirtazapine 15 milliGRAM(s) Oral at bedtime  sodium chloride 0.9%. 1000 milliLiter(s) (75 mL/Hr) IV Continuous <Continuous>  tiotropium 18 MICROgram(s) Capsule 1 Capsule(s) Inhalation daily  traZODone 100 milliGRAM(s) Oral at bedtime  ursodiol Tablet 500 milliGRAM(s) Oral <User Schedule>    MEDICATIONS  (PRN):  dextrose Oral Gel 15 Gram(s) Oral once PRN Blood Glucose LESS THAN 70 milliGRAM(s)/deciliter      Vital Signs Last 24 Hrs  T(C): 37.7 (28 Jul 2022 05:00), Max: 37.7 (28 Jul 2022 05:00)  T(F): 99.9 (28 Jul 2022 05:00), Max: 99.9 (28 Jul 2022 05:00)  HR: 73 (28 Jul 2022 05:00) (65 - 93)  BP: 130/55 (28 Jul 2022 05:00) (122/53 - 131/65)  BP(mean): --  RR: 18 (28 Jul 2022 05:00) (17 - 18)  SpO2: 98% (28 Jul 2022 05:00) (96% - 100%)    Parameters below as of 28 Jul 2022 05:00  Patient On (Oxygen Delivery Method): room air      CAPILLARY BLOOD GLUCOSE      POCT Blood Glucose.: 120 mg/dL (27 Jul 2022 23:34)  POCT Blood Glucose.: 172 mg/dL (27 Jul 2022 17:49)  POCT Blood Glucose.: 103 mg/dL (27 Jul 2022 14:28)  POCT Blood Glucose.: 120 mg/dL (27 Jul 2022 07:54)    I&O's Summary        PHYSICAL EXAM:   GENERAL: NAD, Able to follow commands and occasionally respond to questions, mental status still limited but improving, resting in bed.  HENT: NCAT; moist mucous membranes  EYES: anicteric sclerae, moist conjunctivae; Significant swelling and crusting of right eye, however patient now able to open eyet; What appears to be dependent edema of the left eyelid as well  NECK: Trachea midline; supple  CHEST:  no respiratory distress  HEART:  regular rate and rhythm  ABDOMEN:  Soft, non-tender, non-distended, normoactive bowel sounds,  no masses  EXTREMITIES: No LE edema  SKIN:  +Rash on V1 distribution of right face, red, warm, erythematous, significantly scabbed and no new vesicles- improving  NEURO: + resting tremor, not new; A&Ox2 (self, location)    LABS:                        13.1   8.44  )-----------( 265      ( 27 Jul 2022 06:17 )             40.5     07-27    139  |  102  |  40<H>  ----------------------------<  145<H>  3.6   |  23  |  0.98    Ca    8.9      27 Jul 2022 06:17  Phos  2.8     07-27  Mg     2.10     07-27    TPro  7.1  /  Alb  3.7  /  TBili  0.3  /  DBili  x   /  AST  49<H>  /  ALT  29  /  AlkPhos  102  07-27              RADIOLOGY & ADDITIONAL TESTS:    < from: MR Orbits w/wo IV Cont (07.27.22 @ 16:55) >    ACC: [id number] EXAM:  MR BRAIN [facility]                        ACC: [id number] EXAM:  MR ORBITS ONLY [facility]                          PROCEDURE DATE:  07/27/2022          INTERPRETATION:  HISTORY: Rule out encephalitis. Rule out cranial nerve   involvement. 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    shunt for normal pressure hydrocephalus.    Description: MRI of the brain and orbits with and without gadolinium   contrast was performed.    Through the brain, sagittal T1, axial T1, T2,FLAIR, diffusion-weighted,   and postcontrast T1 axial/coronal/sagittal series were performed. Through   the orbits, thin section axial and coronal T1 pre- and T1 postcontrast   fat saturation and axial/coronal T2 fat saturation series were performed.    7.5 cc intravenous Gadovist gadolinium contrast was administered, 0 cc   contrast was discarded.    Comparison is made to a head CT from 07/06/2022, brain MRI from   05/07/2011.    Evaluation of the orbits is limited by motion, and is also limited by   failure of fat suppression on the T2 fat saturation series, STIR series,   and T1 postcontrast fat saturation series. However, asymmetric edema   appears to involve the right orbital medial and lateral rectus muscles,   as well as the superior oblique muscle, suspicious for a myositis (viral   given the known herpes zoster with superimposed bacterial infection not   excluded). There is no evidence for orbital abscess. The cavernous   sinuses and superior ophthalmic veins remain patent. The right preseptal   periorbital soft tissue swelling appears improved compared to the CT.    Generalized diffuse frontal scalp soft tissue swelling is again noted   which may be related to the shingles infection, a superimposed   cellulitis, or a combination of both, overall stable compared to the   recent head CT. The adjacent calvarium appears intact without periosteal   reaction or bony destruction. There is no scalp abscess.    A right frontal parietal approach shunt catheter is again noted withtip   in the left lateral ventricle. The ventricles are stable in size compared   to the 07/26/2022 brain MRI study. Artifact from the shunt hardware   causes partial failure of CSF suppression on the FLAIR sequence over the   right cerebral hemisphere.    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 No subdural collections   are present.    Chronic left frontal parietal infarcts are noted at the vertex,   corresponding to the diffusion-weighted signal abnormalities on the   05/07/2011 MRI study. There is no evidence for acute infarct on the   diffusion-weighted series. There is no evidence for acute intracranial   hemorrhage. No areas of acute cortical edema/swellingare noted. There is   no evidence for intracranial abscess.    Mild to moderate patchy nonenhancing increased T2 and FLAIR signal   involves the white matter, most consistent with chronic microvascular   ischemic changes given the patient's age. Generalized cerebral volume   loss is noted with secondary prominence of the sulci.    A 1.5 cm enhancing extra-axial dural based mass involves the left lateral   temporal region with associated calcification on the prior CT study, most   consistent with meningioma.    Minor mucosal thickening involves paranasal sinuses without associated   air-fluid levels. Rightward nasal septal deviation and right-sided nasal   septal spurring are present. Right-sided cataract postsurgical changes   are visualized.    The mastoid air cells and middle ear cavities are overall well aerated.    IMPRESSION:    Evaluation the orbits is limited. However, asymmetric edema appears to   involve the right orbital medial and lateral rectus muscles, as well as   the superior oblique muscle, suspicious for a myositis (viral given the   known herpes zoster with superimposed bacterial infection not excluded).   There is no evidence for orbital abscess. The cavernous sinuses and   superior ophthalmic veins remain patent. The right preseptal periorbital   soft tissue swelling appears improved compared to the CT suggesting   improving preseptal cellulitis/zoster. Serial imaging follow-up is   recommended to monitor for stability.    No evidence for acute infarct, acute intracranial hemorrhage, or   intracranial abscess.    Stable ventricular size.    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    --- End of Report ---            STEPHANE CISNEROS MD; Attending Radiologist  This document has been electronically signed. Jul 27 2022  6:09PM    < end of copied text >

## 2022-07-28 NOTE — PROGRESS NOTE ADULT - PROBLEM SELECTOR PLAN 8
Previous history of stents in 1986 and 1996, on aspirin 325mg outpatient    - Monitor VS and symptoms at this time

## 2022-07-28 NOTE — PHYSICAL THERAPY INITIAL EVALUATION ADULT - PATIENT PROFILE REVIEW, REHAB EVAL
No specific activity orders. Spoke with SULEIMAN Trivedi prior to session, pt cleared for PT evaluation./yes

## 2022-07-28 NOTE — PHYSICAL THERAPY INITIAL EVALUATION ADULT - PERTINENT HX OF CURRENT PROBLEM, REHAB EVAL
86 year old female presenting with 3 day history of herpes zoster rash over the right V1 dermatome w/ eye involvement, and altered mental status.

## 2022-07-28 NOTE — PROGRESS NOTE ADULT - PROBLEM SELECTOR PLAN 9
Elevated enzymes, likely secondary to physiologic stress from herpes zoster/htn, Is patient's baseline according to daughter    - Improved   - Monitor labs for now  - Ordered hepatitis panel Elevated enzymes, likely secondary to physiologic stress from herpes zoster/htn, Is patient's baseline according to daughter    - Improved   - Monitor labs and physical exam for now; moderate RUQ pain on physical not concerning at this time  - Hepatitis panel negative

## 2022-07-28 NOTE — PROGRESS NOTE ADULT - PROBLEM SELECTOR PLAN 7
Patient has 120 pack year smoker, quit in 2011    -Treated with Trelegy outpatient  - Will monitor VS and physical exam findings  - On inhaler inpatient

## 2022-07-28 NOTE — PHYSICAL THERAPY INITIAL EVALUATION ADULT - ADDITIONAL COMMENTS
Pt states she lives with her  in a house with 7 steps to enter and 3 steps once inside. Pt states she was ambulating short distances around the house with a rolling walker and occasionally needed assistance, as well as for ADLs, which she received from 24 hour [facility].

## 2022-07-28 NOTE — PROGRESS NOTE ADULT - PROBLEM SELECTOR PLAN 2
Patient demonstrating history of altered mentation from baseline for past day in the context of severe herpes zoster rash    -CTA: Old parietal infarct, soft tissue swelling around right eye- likely component of herpes zoster, possible cellulitis- Ventricles don't appear enlarged  - LP cancelled secondary to ID recs- likely will not change care if done  Scheduled for MRI Head w/ and without contrast  - On Acyclovir (renally dosed) - and Cefazolin STAT then q12 per ID recs- doses updated based on renal dosing  - Pending vEEG

## 2022-07-28 NOTE — PROGRESS NOTE ADULT - PROBLEM SELECTOR PLAN 3
Patient presented with history of severe hypertension at home and hypertension in the SBP ~180s while in ED    - Patient on labetalol for BP>180 SBP, currently in 140s-150s SBP  - Resume oral home BP medications pending patient ability to swallow- otherwise will start IV labetalol prn  - Monitor VS q4  - Resumed home meds except: Hold Anti-HTN meds: amlodipine, furosemide, spironolactone, will add on if patient BP's appropriate at later time- In effort to not acutely drop patient BP Patient presented with history of severe hypertension at home and hypertension in the SBP ~180s while in ED    - Patient on labetalol for BP>180 SBP, currently in 140s-150s SBP  - Resume oral home BP medications pending patient ability to swallow- otherwise will start IV labetalol prn  - Monitor VS  - Resumed home meds except: Hold Anti-HTN meds: amlodipine, furosemide, spironolactone, will add on if patient BP's appropriate at later time- In effort to not acutely drop patient BP

## 2022-07-28 NOTE — PROGRESS NOTE ADULT - ATTENDING COMMENTS
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 CHARLINE resolved. Course now c/b + blood cx w/ MRSE, However suspect this is a contaminant     Now s/p brain and orbital MRI personally reviewed and notable for asymmetric edema of the right orbital medial and lateral rectus muscles, as well as the superior oblique muscle, w/o evidence for orbital abscess and Chronic left frontal parietal infarcts and chronic white matter changes   as. Orbital MRI was noted to be a limited study     - Continue to monitor mental status and lesions  - C/w Acyclovir as stated above  w/ prophylactic IVF while on treatment and brimonidine TID in R eye, erythromycin ointment QID to R eye and periocular lesions  -treating w/ ancef for possibly superimposed bacterial cellulitis. Pending Repeat blood cxs. Howver, MRSE likely contaminant.  -Pending VEEG      Case d/w pts daughter Dr. Delgado  at bedside

## 2022-07-29 NOTE — OCCUPATIONAL THERAPY INITIAL EVALUATION ADULT - LIVES WITH, PROFILE
Pt.'s daughter via telephone reports pt. lives with her  in a house with 7 steps to enter. Once inside, pt.'s daughter reports there are 3 steps to manage and pt. stays on the main level.

## 2022-07-29 NOTE — PROGRESS NOTE ADULT - PROBLEM SELECTOR PLAN 9
Elevated enzymes, likely secondary to physiologic stress from herpes zoster/htn, Is patient's baseline according to daughter    - Improved   - Monitor labs and physical exam for now; moderate RUQ pain on physical not concerning at this time  - Hepatitis panel negative Previous history of stents in 1986 and 1996, on aspirin 325mg outpatient    - Monitor VS and symptoms at this time

## 2022-07-29 NOTE — OCCUPATIONAL THERAPY INITIAL EVALUATION ADULT - PERTINENT HX OF CURRENT PROBLEM, REHAB EVAL
Pt is an 86 year old female with hx of Cardiac stents post MI (1986, 1996), COPD, NPH, DM, Migraines, and suspected HTN, who presented to [facility] on 7/26/22 with 3 day hx of herpes zoster rash over the right V1 dermatome with eye involvement, now presenting with 1 day history of altered mental status. (See continued below)

## 2022-07-29 NOTE — PROGRESS NOTE ADULT - ATTENDING COMMENTS
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 CHARLINE resolved. Course now c/b + blood cx w/ MRSE, However suspect this is a contaminant     Now s/p brain and orbital MRI personally reviewed and notable for asymmetric edema of the right orbital medial and lateral rectus muscles, as well as the superior oblique muscle, w/o evidence for orbital abscess and Chronic left frontal parietal infarcts and chronic white matter changes   as. Orbital MRI was noted to be a limited study     - Continue to monitor mental status -Improving. Pt more alert today, with eyes open and answering questions appropriately   -continue to monitor lesions-improving and crusting   - C/w Acyclovir (now increased to 650mg q8 per ID recs)  w/ prophylactic IVF while on treatment and brimonidine TID in R eye, erythromycin ointment QID to R eye and periocular lesions  -c/w treating w/ ancef for possibly superimposed bacterial cellulitis. Pending Repeat blood cxs from 7/27 NTD.     Case d/w pts daughter Dr. Delgado  at bedside. Also d/w Dr. Henderson from ID today

## 2022-07-29 NOTE — PROGRESS NOTE ADULT - PROBLEM SELECTOR PLAN 5
Patient with history of DM2 on home Lantus 34U daily, Trulicity, glipizide    - Hold oral hypoglycemics   - premeal and bedtime fingersticks  - Insulin sliding scale and lantus 10U qhs for now   - HgbA1C 7,2 Pt with hx of rash in inguinal folds. Unable to recall which ointments were being used   - Started on nystatin powder BID   - Will continue to monitor for worsening of rash

## 2022-07-29 NOTE — OCCUPATIONAL THERAPY INITIAL EVALUATION ADULT - PRECAUTIONS/LIMITATIONS, REHAB EVAL
Right eye vision limited secondary to swelling from Herpes zoster rash; noted hard of hearing/fall precautions/hearing precautions/vision precautions

## 2022-07-29 NOTE — OCCUPATIONAL THERAPY INITIAL EVALUATION ADULT - TOILETING, PREVIOUS LEVEL OF FUNCTION, OT EVAL
commode; Pt. daughter reports pt. and pt.'s  share assistance from 24 hours/day x 7 days/week [facility]. Per pt.'s daughter, pt has benefited from some increased assistance recently./needed assist/needs device

## 2022-07-29 NOTE — PROGRESS NOTE ADULT - PROBLEM SELECTOR PLAN 6
Hx of NPH diagnosed 2011- Installed by [facility] neurosurgery    -CTA: Old parietal infarct, soft tissue swelling around right eye- likely component of herpes zoster, possible cellulitis- Ventricles don't appear enlarged  -Has programmable  shunt *Must be programmed after MRI*- will page neurosurg [id number]  - No findings consistent with ventricular dilation on MRI  -  shunt reprogrammed after MRI per verbal confirmation from neurosurg team Patient with history of DM2 on home Lantus 34U daily, Trulicity, glipizide    - Hold oral hypoglycemics   - premeal and bedtime fingersticks  - Insulin sliding scale and lantus 10U qhs for now   - HgbA1C 7,2

## 2022-07-29 NOTE — OCCUPATIONAL THERAPY INITIAL EVALUATION ADULT - MD ORDER
Occupational Therapy (OT) to evaluate and treat. Per SULEIMAN Galdamez, pt is okay to participate in OT evaluation and perform activity as tolerated.

## 2022-07-29 NOTE — PROGRESS NOTE ADULT - PROBLEM SELECTOR PLAN 8
Previous history of stents in 1986 and 1996, on aspirin 325mg outpatient    - Monitor VS and symptoms at this time Patient has 120 pack year smoker, quit in 2011    -Treated with Trelegy outpatient  - Will monitor VS and physical exam findings  - On inhaler inpatient

## 2022-07-29 NOTE — OCCUPATIONAL THERAPY INITIAL EVALUATION ADULT - GROOMING, PREVIOUS LEVEL OF FUNCTION, OT EVAL
independent Complex Repair And Epidermal Autograft Text: The defect edges were debeveled with a #15 scalpel blade.  The primary defect was closed partially with a complex linear closure.  Given the location of the defect, shape of the defect and the proximity to free margins an epidermal autograft was deemed most appropriate to repair the remaining defect.  The graft was trimmed to fit the size of the remaining defect.  The graft was then placed in the primary defect, oriented appropriately, and sutured into place.

## 2022-07-29 NOTE — PROGRESS NOTE ADULT - PROBLEM SELECTOR PLAN 1
Patient demonstrating Right V1 distribution rash, previous history of chicken pox as child, treated with Valtrex since 7/24 outpatient    **If patient requires MRI, must have  shunt device reprogrammed after*- page neurosurg [id number]**    - Consulted ID and Neuro- appreciate recs   - LP cancelled secondary to ID recs- likely will not change care if done  - Scheduled for MRI Head w/ and without contrast  - On Acyclovir (renally dosed) - and Cefazolin STAT then q12 per ID recs- doses updated based on renal dosing  -For Opthalmicus- appreciate opthalmology recs- Start brimonidine TID in R eye, Start erythromycin ointment QID to R eye and periocular lesions, start artificial tears; Tonometry results improved per optho note  - PRN Tylenol for pain- On standing ofirmev right now, will reassess pain tomorrow to see if additional pain control is required  - MRI Head and Orbits- no brain findings, sig for swelling in right orbit 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  -  shunt rate reprogrammed per neurosurg verbal discussion  - Patient receiving midline for additional IV access  - May need special IV team/IV nurse to draw future labs given the patient is a hard stick Patient demonstrating Right V1 distribution rash, previous history of chicken pox as child, treated with Valtrex since 7/24 outpatient    **If patient requires MRI, must have  shunt device reprogrammed after*- page neurosurg [id number]**    - Consulted ID and Neuro- appreciate recs   - LP cancelled secondary to ID recs- likely will not change care if done  - Scheduled for MRI Head w/ and without contrast  - On Acyclovir 650 now that CHARLINE has resolved - and Cefazolin 1G STAT then q12 per ID recs  - Will maintain gentle hydration while on acyclovir   -For Opthalmicus- appreciate opthalmology recs- Start brimonidine TID in R eye, Start erythromycin ointment QID to R eye and periocular lesions, start artificial tears; Tonometry results improved per optho note  - PRN Tylenol for pain- On standing ofirmev right now, pt pain is well controlled   - MRI Head and Orbits- no brain findings, sig for swelling in right orbit 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  -  shunt rate reprogrammed per neurosurg verbal discussion  - Patient receiving midline for additional IV access  - May need special IV team/IV nurse to draw future labs given the patient is a hard stick Patient demonstrating Right V1 distribution rash, previous history of chicken pox as child, treated with Valtrex since 7/24 outpatient    **If patient requires MRI, must have  shunt device reprogrammed after*- page neurosurg [id number]**    - Consulted ID and Neuro- appreciate recs   - LP cancelled secondary to ID recs- likely will not change care if done  - Scheduled for MRI Head w/ and without contrast  - On Acyclovir 650 now that CHARLINE has resolved - and Cefazolin 1G STAT then q12 per ID recs  - Will maintain gentle hydration while on acyclovir   -For Opthalmicus- appreciate opthalmology recs- Start brimonidine TID in R eye, Start erythromycin ointment QID to R eye and periocular lesions, start artificial tears; Tonometry results improved per optho note  - PRN Tylenol for pain- On standing ofirmev right now, pt pain is well controlled   - MRI Head and Orbits- no brain findings, sig for swelling in right orbit 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  -  shunt rate reprogrammed per neurosurg verbal discussion  - Patient s/p midline on 7/28 for additional IV access  - May need special IV team/IV nurse to draw future labs given the patient is a hard stick

## 2022-07-29 NOTE — PROGRESS NOTE ADULT - PROBLEM SELECTOR PLAN 10
Diet: Minced and moist (In case Thin liquids via straw only)    DVT Ppx: Subq heparin (may switch to lovenox depending on patient kidney function on repeat labs)    PT&OT eval pending Elevated enzymes, likely secondary to physiologic stress from herpes zoster/htn, Is patient's baseline according to daughter    - Improved   - Monitor labs and physical exam for now; moderate RUQ pain on physical not concerning at this time  - Hepatitis panel negative

## 2022-07-29 NOTE — PROGRESS NOTE ADULT - PROBLEM SELECTOR PLAN 7
Patient has 120 pack year smoker, quit in 2011    -Treated with Trelegy outpatient  - Will monitor VS and physical exam findings  - On inhaler inpatient Hx of NPH diagnosed 2011- Installed by [facility] neurosurgery    -CTA: Old parietal infarct, soft tissue swelling around right eye- likely component of herpes zoster, possible cellulitis- Ventricles don't appear enlarged  -Has programmable  shunt *Must be programmed after MRI*- will page neurosurg [id number]  - No findings consistent with ventricular dilation on MRI  -  shunt reprogrammed after MRI per verbal confirmation from neurosurg team

## 2022-07-29 NOTE — OCCUPATIONAL THERAPY INITIAL EVALUATION ADULT - DIAGNOSIS, OT EVAL
Herpes zoster rash; Bilateral UE/LE weakness; Decreased Bilateral UE fine-motor coordination; Decreased sitting balance; Decreased functional mobility; Decreased ADL management

## 2022-07-29 NOTE — PROGRESS NOTE ADULT - ATTENDING COMMENTS
85yo female w/PMH Cardiac stents post MI (1986, 1996), COPD, NPH, DM, Migraines, and suspected HTN presenting with 3 day history of herpes zoster rash  No fever, leukocytosis  R sided zoster rash, then increasing confusion  On exam with vesicles, redness at site  Optho with concern for R sided ocular involvement  2/4 BCX CoNS--suspect contam  MR shows areas of myositis, but suspect is due to viral process vs concomitant orbital cellulitis (patient is clinically improving)  Overall,  1) VZV Ophthalmicus  - Concern for VZV with ocular involvement  - Acyclovir 650mg q 8 increase dose, monitor Cr; recommend continue IVF while on acyclovir  - Monitor lesions  - F/U Optho  2) AMS  - Likely due to component of VZV encephalopathy vs dehydration/infectious process in elderly  - Would be cross treated with Acyclovir as above  - Would hold on LP unless further clinical uncertainty  3) Positive BCX  - CoNS--suspect contam  - F/U BCX--NGTD  4) Preseptal cellulitis  - Cefazolin 1g q 8, 7 days presuming improvement  - Monitor for improvement    Jack Henderson MD  Contact on TEAMS messaging from 9am - 5pm  From 5pm-9am, on weekends, or if no response call [phone]    I was physically present for the key portions of the evaluation and management service provided. I saw and examined the patient. I agree with the above history, physical, and plan except for any discrepancies which I have documented in “Attending Statements.” Please refer to “Attending Statements” for final plan.

## 2022-07-29 NOTE — PROGRESS NOTE ADULT - SUBJECTIVE AND OBJECTIVE BOX
INFECTIOUS DISEASE FOLLOW UP NOTE:    Interval History/ROS: Patient is a 86y old  Female who presents with a chief complaint of Suspicion for Herpes Zoster opthalmicus/encephalitis (29 Jul 2022 09:06)    Overnight events: Patient remains afebrile and hemodynamically stable overnight. Mental status is much improved today, able to have normal conversation with daughter at bedside. Reports itchiness of right scalp.      REVIEW OF SYSTEMS:  CONSTITUTIONAL: No fever or chills  HEENT: No sore throat  RESPIRATORY: No cough, no shortness of breath  CARDIOVASCULAR: No chest pain or palpitations  GASTROINTESTINAL: No abdominal or epigastric pain  GENITOURINARY: No dysuria  NEUROLOGICAL: No headache/dizziness  MSK: No joint pain, erythema, or swelling; no back pain  SKIN: Itchiness of right scalp, lesions are drying up; still having some erythema  All other ROS negative except noted above    Prior hospital charts reviewed [Yes]  Primary team notes reviewed [Yes]  Other consultant notes reviewed [Yes]    Allergies:  diltiazem (Other; Rash)      ANTIMICROBIALS:   acyclovir IVPB 650 every 8 hours  ceFAZolin   IVPB 1000 every 8 hours      OTHER MEDS: MEDICATIONS  (STANDING):  aspirin  chewable 324 daily  budesonide  80 MICROgram(s)/formoterol 4.5 MICROgram(s) Inhaler 2 two times a day  dextrose 50% Injectable 25 once  dextrose 50% Injectable 12.5 once  dextrose 50% Injectable 25 once  dextrose Oral Gel 15 once PRN  enoxaparin Injectable 40 every 24 hours  glucagon  Injectable 1 once  insulin glargine Injectable (LANTUS) 10 at bedtime  insulin lispro (ADMELOG) corrective regimen sliding scale  Before meals and at bedtime  losartan 50 daily  metoprolol succinate ER 12.5 daily  mirtazapine 15 at bedtime  tiotropium 18 MICROgram(s) Capsule 1 daily  traZODone 100 at bedtime  ursodiol Tablet 500 <User Schedule>      Vital Signs Last 24 Hrs  T(F): 97.5 (07-28-22 @ 17:33), Max: 99.9 (07-28-22 @ 05:00)    Vital Signs Last 24 Hrs  HR: 82 (07-28-22 @ 17:33) (82 - 82)  BP: 149/67 (07-28-22 @ 17:33) (149/67 - 149/67)  RR: 18 (07-28-22 @ 17:33)  SpO2: 96% (07-28-22 @ 17:33) (96% - 96%)  Wt(kg): --    EXAM:  GENERAL: NAD, lying in bed comfortably  HEAD: Right scalp erythema and crusted lesions  EYES: R conjunctival injection  ENT: Normal external ears and nose, no discharges; moist mucous membranes  NECK: Supple, nontender to palpation  CHEST/LUNG: Clear to auscultation bilaterally  HEART: S1 S2  ABDOMEN: Soft, nontender, nondistended; normoactive bowel sounds  EXTREMITIES: No clubbing, cyanosis, or petal edema  NERVOUS SYSTEM: Alert and oriented to person, time, place and situation, speech clear. No focal deficits   MSK: No joint erythema, swelling or pain  SKIN: No other rashes or lesions, no superficial thrombophlebitis    Labs:                        11.3   6.16  )-----------( 266      ( 29 Jul 2022 06:43 )             35.6     07-29    137  |  104  |  9   ----------------------------<  192<H>  3.4<L>   |  23  |  0.61    Ca    8.1<L>      29 Jul 2022 06:43  Phos  2.1     07-29  Mg     1.90     07-29    TPro  6.0  /  Alb  2.9<L>  /  TBili  0.3  /  DBili  x   /  AST  23  /  ALT  16  /  AlkPhos  80  07-29      WBC Trend:  WBC Count: 6.16 (07-29-22 @ 06:43)  WBC Count: 7.33 (07-28-22 @ 14:31)  WBC Count: 8.44 (07-27-22 @ 06:17)  WBC Count: 13.32 (07-26-22 @ 10:45)      Creatine Trend:  Creatinine, Serum: 0.61 (07-29)  Creatinine, Serum: 0.64 (07-28)  Creatinine, Serum: 0.98 (07-27)  Creatinine, Serum: 2.84 (07-26)      Liver Biochemical Testing Trend:  Alanine Aminotransferase (ALT/SGPT): 16 (07-29)  Alanine Aminotransferase (ALT/SGPT): 22 (07-28)  Alanine Aminotransferase (ALT/SGPT): 29 (07-27)  Alanine Aminotransferase (ALT/SGPT): 29 (07-26)  Aspartate Aminotransferase (AST/SGOT): 23 (07-29-22 @ 06:43)  Aspartate Aminotransferase (AST/SGOT): 34 (07-28-22 @ 14:31)  Aspartate Aminotransferase (AST/SGOT): 49 (07-27-22 @ 06:17)  Aspartate Aminotransferase (AST/SGOT): 46 (07-26-22 @ 10:45)  Bilirubin Total, Serum: 0.3 (07-29)  Bilirubin Total, Serum: <0.2 (07-28)  Bilirubin Total, Serum: 0.3 (07-27)  Bilirubin Total, Serum: 0.3 (07-26)      Trend LDH          MICROBIOLOGY:        Culture - Blood (collected 27 Jul 2022 19:00)  Source: .Blood Blood-Venous  Preliminary Report:    No growth to date.    Culture - Blood (collected 26 Jul 2022 10:55)  Source: .Blood Blood  Preliminary Report:    Growth in anaerobic bottle: Staphylococcus epidermidis    Coag Negative Staphylococcus    Single set isolate, possible contaminant. Contact    Microbiology if susceptibility testing clinically    indicated.    Growth in aerobic and anaerobic bottles: Staphylococcus hominis    Culture - Blood (collected 26 Jul 2022 10:45)  Source: .Blood Blood  Preliminary Report:    Growth in aerobic bottle: Staphylococcus hominis    Susceptibility to follow.    ***Blood Panel PCR results on this specimen are available    approximately 3 hours after the Gram stain result.***    Gram stain, PCR, and/or culture results may not always    correspond due to difference in methodologies.    ************************************************************    This PCR assay was performed by multiplex PCR. This    Assay tests for 66 bacterial and resistance gene targets.    Please refer to the [facility] test directory    at [email or web address] for details.    Growth in anaerobic bottle: Gram Positive Cocci in Clusters  Organism: Blood Culture PCR  Organism: Blood Culture PCR    Sensitivities:      -  Staphylococcus epidermidis, Methicillin resistant: Detec      Method Type: PCR      HIV-1/2 Combo Result: Nonreact (07-27-22 @ 06:17)    Rapid RVP Result: NotDetec (07-26 @ 10:31)      Blood Gas Venous - Lactate: 2.8 (07-26 @ 13:58)      RADIOLOGY:  imaging below personally reviewed    < from: MR Orbits w/wo IV Cont (07.27.22 @ 16:55) >  Evaluation the orbits is limited. However, asymmetric edema appears to   involve the right orbital medial and lateral rectus muscles, as well as   the superior oblique muscle, suspicious for a myositis (viral given the   known herpes zoster with superimposed bacterial infection not excluded).   There is no evidence for orbital abscess. The cavernous sinuses and   superior ophthalmic veins remain patent. The right preseptal periorbital   soft tissue swelling appears improved compared to the CT suggesting   improving preseptal cellulitis/zoster. Serial imaging follow-up is   recommended to monitor for stability.    No evidence for acute infarct, acute intracranial hemorrhage, or   intracranial abscess.    Stable ventricular size.    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    < end of copied text >

## 2022-07-29 NOTE — PROGRESS NOTE ADULT - SUBJECTIVE AND OBJECTIVE BOX
[facility] DEPARTMENT OF OPHTHALMOLOGY  ------------------------------------------------------------------------------    Interval History: Following for R HZO. No acute events overnight. Today patient's mental status much improved. Denying eye pain, though states that R sided facial rash is itchy.     MEDICATIONS  (STANDING):  acyclovir IVPB 650 milliGRAM(s) IV Intermittent every 8 hours  artificial tears (preservative free) Ophthalmic Solution 1 Drop(s) Left EYE four times a day  aspirin  chewable 324 milliGRAM(s) Oral daily  brimonidine 0.2% Ophthalmic Solution 1 Drop(s) Right EYE three times a day  budesonide  80 MICROgram(s)/formoterol 4.5 MICROgram(s) Inhaler 2 Puff(s) Inhalation two times a day  ceFAZolin   IVPB 1000 milliGRAM(s) IV Intermittent every 8 hours  dextrose 5%. 1000 milliLiter(s) (50 mL/Hr) IV Continuous <Continuous>  dextrose 5%. 1000 milliLiter(s) (100 mL/Hr) IV Continuous <Continuous>  dextrose 50% Injectable 25 Gram(s) IV Push once  dextrose 50% Injectable 12.5 Gram(s) IV Push once  dextrose 50% Injectable 25 Gram(s) IV Push once  enoxaparin Injectable 40 milliGRAM(s) SubCutaneous every 24 hours  erythromycin   Ointment 1 Application(s) Right EYE four times a day  glucagon  Injectable 1 milliGRAM(s) IntraMuscular once  insulin glargine Injectable (LANTUS) 10 Unit(s) SubCutaneous at bedtime  insulin lispro (ADMELOG) corrective regimen sliding scale   SubCutaneous Before meals and at bedtime  lactobacillus acidophilus 1 Tablet(s) Oral daily  losartan 50 milliGRAM(s) Oral daily  metoprolol succinate ER 12.5 milliGRAM(s) Oral daily  mirtazapine 15 milliGRAM(s) Oral at bedtime  nystatin Powder 1 Application(s) Topical two times a day  sodium chloride 0.9%. 1000 milliLiter(s) (50 mL/Hr) IV Continuous <Continuous>  tiotropium 18 MICROgram(s) Capsule 1 Capsule(s) Inhalation daily  traZODone 100 milliGRAM(s) Oral at bedtime  ursodiol Tablet 500 milliGRAM(s) Oral <User Schedule>    MEDICATIONS  (PRN):  dextrose Oral Gel 15 Gram(s) Oral once PRN Blood Glucose LESS THAN 70 milliGRAM(s)/deciliter      VITALS: T(C): 36.4 (07-28-22 @ 17:33)  T(F): 97.5 (07-28-22 @ 17:33), Max: 97.5 (07-28-22 @ 17:33)  HR: 82 (07-28-22 @ 17:33) (82 - 82)  BP: 149/67 (07-28-22 @ 17:33) (149/67 - 149/67)  RR:  (18 - 18)  SpO2:  (96% - 96%)  Wt(kg): --  General: AAO x 3, appropriate mood and affect    Ophthalmology Exam:  Visual acuity (sc): 20/70 OD, 20/30 OS, NIPH  Pupils: 5mm, nonreactive to light OD, 4mm -> 2mm OS  Ttono: 12 OD, 15 OS  Extraocular movements (EOMs): 60% resutriction in abduction OD, otherwise full OU  Confrontational Visual Field (CVF): Full OU  Color Plates: 12/12 OU    Pen Light Exam (PLE) - pt unable to tolerate sitting up to slit lamp.  External: Scattered vesicular, crusted lesions along V1 distribution over R side of face, wnl on L side  Lids/Lashes/Lacrimal Ducts: Trperiorbital edema RUL and RLL, flat OS   Sclera/Conjunctiva: 1-2+ injection OD, W+Q OS  Cornea: Four small pseudodendrites centrally OD (improved), Cl OS  Anterior Chamber: D+F OU    Iris: Flat OU  Lens: PCIOL OD, 3+ NS OS

## 2022-07-29 NOTE — PROGRESS NOTE ADULT - PROBLEM SELECTOR PLAN 3
Patient presented with history of severe hypertension at home and hypertension in the SBP ~180s while in ED    - Patient on labetalol for BP>180 SBP, currently in 140s-150s SBP  - Resume oral home BP medications pending patient ability to swallow- otherwise will start IV labetalol prn  - Monitor VS  - Resumed home meds except: Hold Anti-HTN meds: amlodipine, furosemide, spironolactone, will add on if patient BP's appropriate at later time- In effort to not acutely drop patient BP Patient presented with history of severe hypertension at home and hypertension in the SBP ~180s while in ED    - Patient on labetalol for BP>180 SBP, currently in 140s-150s SBP  - Resume oral home BP medications pending patient ability to swallow- otherwise will start IV labetalol prn  - Monitor VS  - Hold Anti-HTN meds: amlodipine, furosemide, spironolactone, will add on if patient BP's appropriate at later time- In effort to not acutely drop patient BP

## 2022-07-29 NOTE — PROGRESS NOTE ADULT - ASSESSMENT
86y female w/ pmhx/ochx of CAD, DM, COPD, NPH w/ programmable shunt comes to ED for AMS and shingles, found to have HZO of R side with corneal involvement and elevated IOP, now improved.     1. HZO OD with ocular involvement  - Pt with much improved mental status today.  - VA 20/70 OD, 20/30 OS. Color plates, CVF full OU   - Pt noted to have scattered vesicular, crusted lesions along V1 distribution over R side of face, 1+ periorbital edema RUL and RLL  - Four small corneal pseudodendrites remaining OD, improved  - CT stereotactic: extensive right periorbital preseptal soft tissue swelling is noted concordant with the history of shingles over the right eye. A superimposed cellulitis can't be excluded. No post septal extension is appreciated.  - Pt unable to tolerate sitting up at slit lamp - cannot assess anterior chamber for inflammatory reaction  - IOP noted to be 26 OD on admission, now improved to 12  - C/w brimonidine TID in R eye  - C/w erythromycin ointment QID to R eye and periocular lesions  - C/w preservative-free artificial tears QID OS  - Appreciate ID consult - antivirals and antibiotics per ID  - Pt has dilated and minimally reactive R pupil. 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 EOMs today with continued abduction deficit OD.   - MR orbits: asymmetric edema appears to involve the right orbital medial and lateral rectus muscles, as well as the superior oblique muscle, suspicious for a myositis (viral given the known herpes zoster with superimposed bacterial infection not excluded). There is no evidence for orbital abscess. The cavernous sinuses and superior ophthalmic veins remain patent. The right preseptal periorbital soft tissue swelling appears improved compared to the CT suggesting improving preseptal cellulitis/zoster.   - Will continue to monitor for improvement of EOMs on antivirals.     2. AMS, possibly 2/2 herpetic encephalitis - resolved  - Appreciate neurology consult  - MRI brain showing no acute pathology, MRI orbits as above  - LP deferred per neurology and ID    Outpatient follow-up: Patient should follow-up with his/her ophthalmologist or with [facility] Department of Ophthalmology at the address below     [address] [place] [address]. [address]  [place] [address]  [phone]    Case seen and discussed with Dr. Barker, attending. To be discussed with Dr. Arevalo, neuro-ophthalmologist.     Arely KRUGER Rai, MD  PGY-3, Ophthalmology  [phone] 86y female w/ pmhx/ochx of CAD, DM, COPD, NPH w/ programmable shunt comes to ED for AMS and shingles, found to have HZO of R side with corneal involvement and elevated IOP, now improved.     1. HZO OD with ocular involvement  - Pt with much improved mental status today.  - VA 20/70 OD, 20/30 OS. Color plates, CVF full OU   - Pt noted to have scattered vesicular, crusted lesions along V1 distribution over R side of face, 1+ periorbital edema RUL and RLL  - Four small corneal pseudodendrites remaining OD, improved  - CT stereotactic: extensive right periorbital preseptal soft tissue swelling is noted concordant with the history of shingles over the right eye. A superimposed cellulitis can't be excluded. No post septal extension is appreciated.  - Pt unable to tolerate sitting up at slit lamp - cannot assess anterior chamber for inflammatory reaction  - IOP noted to be 26 OD on admission, now improved to 12  - C/w brimonidine TID in R eye  - C/w erythromycin ointment QID to R eye and periocular lesions  - C/w preservative-free artificial tears QID OS  - Appreciate ID consult - antivirals and antibiotics per ID  - Pt has dilated and minimally reactive R pupil. 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 EOMs today with continued abduction deficit OD.   - MR orbits: asymmetric edema appears to involve the right orbital medial and lateral rectus muscles, as well as the superior oblique muscle, suspicious for a myositis (viral given the known herpes zoster with superimposed bacterial infection not excluded). There is no evidence for orbital abscess. The cavernous sinuses and superior ophthalmic veins remain patent. The right preseptal periorbital soft tissue swelling appears improved compared to the CT suggesting improving preseptal cellulitis/zoster.   - Will continue to monitor for improvement of EOMs on antivirals.     2. AMS, possibly 2/2 herpetic encephalitis - resolved  - Appreciate neurology consult  - MRI brain showing no acute pathology, MRI orbits as above  - LP deferred per neurology and ID    Outpatient follow-up: Patient should follow-up with his/her ophthalmologist or with [facility] Department of Ophthalmology at the address below     [address] [place] [address]. [address]  [place] [address]  [phone]    Case seen and discussed with Dr. Barker, attending. Discussed with Dr. Arevalo, neuro-ophthalmologist.     Arely KRUGER Rai, MD  PGY-3, Ophthalmology  [phone]

## 2022-07-29 NOTE — OCCUPATIONAL THERAPY INITIAL EVALUATION ADULT - ADDITIONAL COMMENTS
(See continued from above) MR Orbit w/wo IV Contrast on 7/27/22 displayed evaluation of the orbits is limited. However, asymmetric edema appears to involve the right orbital medial and lateral rectus muscles, as well as the superior oblique muscle, suspicious for a myositis (viral given the known herpes zoster with superimposed bacterial infection not excluded). There is no evidence for orbital abscess. The cavernous sinuses and superior ophthalmic veins remain patent. The right preseptal periorbital soft tissue swelling appears improved compared to the CT suggesting improving preseptal cellulitis/zoster. Serial imaging follow-up is recommended to monitor for stability. MR Head w/wo IV Contrast on 7/27/22 displayed no evidence for acute infarct, acute intracranial hemorrhage, or intracranial abscess. Stable ventricular size. 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

## 2022-07-29 NOTE — PROGRESS NOTE ADULT - PROBLEM SELECTOR PLAN 2
Patient demonstrating history of altered mentation from baseline for past day in the context of severe herpes zoster rash    -CTA: Old parietal infarct, soft tissue swelling around right eye- likely component of herpes zoster, possible cellulitis- Ventricles don't appear enlarged  - LP cancelled secondary to ID recs- likely will not change care if done  Scheduled for MRI Head w/ and without contrast  - On Acyclovir (renally dosed) - and Cefazolin STAT then q12 per ID recs- doses updated based on renal dosing  - Pending vEEG Patient demonstrating history of altered mentation from baseline for past day in the context of severe herpes zoster rash  IMPROVING  -CTA: Old parietal infarct, soft tissue swelling around right eye- likely component of herpes zoster, possible cellulitis- Ventricles don't appear enlarged  - LP cancelled secondary to ID recs- likely will not change care if done  Scheduled for MRI Head w/ and without contrast  - On Acyclovir (renally dosed) - and Cefazolin STAT then q12 per ID recs- doses updated based on renal dosing  - Pt unable to tolerate vEEG, however it is not needed

## 2022-07-29 NOTE — PROGRESS NOTE ADULT - PROBLEM SELECTOR PLAN 11
Diet: Minced and moist (In case Thin liquids via straw only)    DVT Ppx: Subq heparin (may switch to lovenox depending on patient kidney function on repeat labs)    PT&OT eval pending

## 2022-07-29 NOTE — PROGRESS NOTE ADULT - SUBJECTIVE AND OBJECTIVE BOX
PROGRESS NOTE:   Authored by Genie Pat DO , PGY1     Patient is a 86y old  Female who presents with a chief complaint of Suspicion for Herpes Zoster opthalmicus/encephalitis (28 Jul 2022 07:27)      SUBJECTIVE / OVERNIGHT EVENTS:    All other review of systems is negative unless indicated above.    MEDICATIONS  (STANDING):  acyclovir IVPB 650 milliGRAM(s) IV Intermittent every 12 hours  artificial tears (preservative free) Ophthalmic Solution 1 Drop(s) Left EYE four times a day  aspirin  chewable 324 milliGRAM(s) Oral daily  brimonidine 0.2% Ophthalmic Solution 1 Drop(s) Right EYE three times a day  budesonide  80 MICROgram(s)/formoterol 4.5 MICROgram(s) Inhaler 2 Puff(s) Inhalation two times a day  ceFAZolin   IVPB 1000 milliGRAM(s) IV Intermittent every 8 hours  dextrose 5%. 1000 milliLiter(s) (50 mL/Hr) IV Continuous <Continuous>  dextrose 5%. 1000 milliLiter(s) (100 mL/Hr) IV Continuous <Continuous>  dextrose 50% Injectable 25 Gram(s) IV Push once  dextrose 50% Injectable 12.5 Gram(s) IV Push once  dextrose 50% Injectable 25 Gram(s) IV Push once  enoxaparin Injectable 40 milliGRAM(s) SubCutaneous every 24 hours  erythromycin   Ointment 1 Application(s) Right EYE four times a day  glucagon  Injectable 1 milliGRAM(s) IntraMuscular once  insulin glargine Injectable (LANTUS) 10 Unit(s) SubCutaneous at bedtime  insulin lispro (ADMELOG) corrective regimen sliding scale   SubCutaneous Before meals and at bedtime  losartan 50 milliGRAM(s) Oral daily  metoprolol succinate ER 12.5 milliGRAM(s) Oral daily  mirtazapine 15 milliGRAM(s) Oral at bedtime  tiotropium 18 MICROgram(s) Capsule 1 Capsule(s) Inhalation daily  traZODone 100 milliGRAM(s) Oral at bedtime  ursodiol Tablet 500 milliGRAM(s) Oral <User Schedule>    MEDICATIONS  (PRN):  dextrose Oral Gel 15 Gram(s) Oral once PRN Blood Glucose LESS THAN 70 milliGRAM(s)/deciliter      CAPILLARY BLOOD GLUCOSE      POCT Blood Glucose.: 199 mg/dL (29 Jul 2022 07:35)  POCT Blood Glucose.: 174 mg/dL (28 Jul 2022 23:09)  POCT Blood Glucose.: 184 mg/dL (28 Jul 2022 18:41)  POCT Blood Glucose.: 150 mg/dL (28 Jul 2022 10:48)    I&O's Summary      PHYSICAL EXAM:  Vital Signs Last 24 Hrs  T(C): 36.4 (28 Jul 2022 17:33), Max: 36.4 (28 Jul 2022 17:33)  T(F): 97.5 (28 Jul 2022 17:33), Max: 97.5 (28 Jul 2022 17:33)  HR: 82 (28 Jul 2022 17:33) (82 - 82)  BP: 149/67 (28 Jul 2022 17:33) (149/67 - 149/67)  BP(mean): --  RR: 18 (28 Jul 2022 17:33) (18 - 18)  SpO2: 96% (28 Jul 2022 17:33) (96% - 96%)    Parameters below as of 28 Jul 2022 17:33  Patient On (Oxygen Delivery Method): room air        GENERAL: No acute distress, well-developed  HEAD:  Atraumatic, Normocephalic  EYES: EOMI, PERRLA, conjunctiva and sclera clear  NECK: Supple, no lymphadenopathy, no JVD  CHEST/LUNG: CTAB; No wheezes, rales, or rhonchi  HEART: Regular rate and rhythm; No murmurs, rubs, or gallops  ABDOMEN: Soft, non-tender, non-distended; normal bowel sounds, no organomegaly  EXTREMITIES:  2+ peripheral pulses b/l, No clubbing, cyanosis, or edema  NEUROLOGY: A&O x 3, no focal deficits  SKIN: No rashes or lesions    LABS:                        11.3   6.16  )-----------( 266      ( 29 Jul 2022 06:43 )             35.6     07-29    137  |  104  |  9   ----------------------------<  192<H>  3.4<L>   |  23  |  0.61    Ca    8.1<L>      29 Jul 2022 06:43  Phos  2.1     07-29  Mg     1.90     07-29    TPro  6.0  /  Alb  2.9<L>  /  TBili  0.3  /  DBili  x   /  AST  23  /  ALT  16  /  AlkPhos  80  07-29    PT/INR - ( 28 Jul 2022 05:30 )   PT: 13.5 sec;   INR: 1.16 ratio                   Culture - Blood (collected 27 Jul 2022 19:00)  Source: .Blood Blood-Venous  Preliminary Report (29 Jul 2022 01:02):    No growth to date.    Culture - Blood (collected 26 Jul 2022 10:55)  Source: .Blood Blood  Gram Stain (27 Jul 2022 12:22):    Growth in anaerobic bottle: Gram Positive Cocci in Clusters    Growth in aerobic bottle: Gram Positive Cocci in Clusters  Preliminary Report (28 Jul 2022 14:57):    Growth in anaerobic bottle: Staphylococcus epidermidis    Coag Negative Staphylococcus    Single set isolate, possible contaminant. Contact    Microbiology if susceptibility testing clinically    indicated.    Growth in aerobic and anaerobic bottles: Staphylococcus hominis    Culture - Blood (collected 26 Jul 2022 10:45)  Source: .Blood Blood  Gram Stain (27 Jul 2022 21:57):    Growth in aerobic bottle: Gram Positive Cocci in Clusters    Growth in anaerobic bottle: Gram Positive Cocci in Clusters  Preliminary Report (28 Jul 2022 14:56):    Growth in aerobic bottle: Staphylococcus hominis    Susceptibility to follow.    ***Blood Panel PCR results on this specimen are available    approximately 3 hours after the Gram stain result.***    Gram stain, PCR, and/or culture results may not always    correspond due to difference in methodologies.    ************************************************************    This PCR assay was performed by multiplex PCR. This    Assay tests for 66 bacterial and resistance gene targets.    Please refer to the [facility] test directory    at [email or web address] for details.    Growth in anaerobic bottle: Gram Positive Cocci in Clusters  Organism: Blood Culture PCR (27 Jul 2022 13:17)  Organism: Blood Culture PCR (27 Jul 2022 13:17)        RADIOLOGY & ADDITIONAL TESTS:  Results Reviewed:   Imaging Personally Reviewed:  Electrocardiogram Personally Reviewed:    COORDINATION OF CARE:  Care Discussed with Consultants/Other Providers [Y/N]:  Prior or Outpatient Records Reviewed [Y/N]:   PROGRESS NOTE:   Authored by Genie Pat DO , PGY1     Patient is a 86y old  Female who presents with a chief complaint of Suspicion for Herpes Zoster opthalmicus/encephalitis (28 Jul 2022 07:27)      SUBJECTIVE / OVERNIGHT EVENTS:  Patient much more alert and orientation this AM. Able to converse for several minutes. Endorses itching and some pain around the eye. States that abdominal pain has resolved. Denies CP, SOB, abdominal pain  All other review of systems is negative unless indicated above.    MEDICATIONS  (STANDING):  acyclovir IVPB 650 milliGRAM(s) IV Intermittent every 12 hours  artificial tears (preservative free) Ophthalmic Solution 1 Drop(s) Left EYE four times a day  aspirin  chewable 324 milliGRAM(s) Oral daily  brimonidine 0.2% Ophthalmic Solution 1 Drop(s) Right EYE three times a day  budesonide  80 MICROgram(s)/formoterol 4.5 MICROgram(s) Inhaler 2 Puff(s) Inhalation two times a day  ceFAZolin   IVPB 1000 milliGRAM(s) IV Intermittent every 8 hours  dextrose 5%. 1000 milliLiter(s) (50 mL/Hr) IV Continuous <Continuous>  dextrose 5%. 1000 milliLiter(s) (100 mL/Hr) IV Continuous <Continuous>  dextrose 50% Injectable 25 Gram(s) IV Push once  dextrose 50% Injectable 12.5 Gram(s) IV Push once  dextrose 50% Injectable 25 Gram(s) IV Push once  enoxaparin Injectable 40 milliGRAM(s) SubCutaneous every 24 hours  erythromycin   Ointment 1 Application(s) Right EYE four times a day  glucagon  Injectable 1 milliGRAM(s) IntraMuscular once  insulin glargine Injectable (LANTUS) 10 Unit(s) SubCutaneous at bedtime  insulin lispro (ADMELOG) corrective regimen sliding scale   SubCutaneous Before meals and at bedtime  losartan 50 milliGRAM(s) Oral daily  metoprolol succinate ER 12.5 milliGRAM(s) Oral daily  mirtazapine 15 milliGRAM(s) Oral at bedtime  tiotropium 18 MICROgram(s) Capsule 1 Capsule(s) Inhalation daily  traZODone 100 milliGRAM(s) Oral at bedtime  ursodiol Tablet 500 milliGRAM(s) Oral <User Schedule>    MEDICATIONS  (PRN):  dextrose Oral Gel 15 Gram(s) Oral once PRN Blood Glucose LESS THAN 70 milliGRAM(s)/deciliter      CAPILLARY BLOOD GLUCOSE      POCT Blood Glucose.: 199 mg/dL (29 Jul 2022 07:35)  POCT Blood Glucose.: 174 mg/dL (28 Jul 2022 23:09)  POCT Blood Glucose.: 184 mg/dL (28 Jul 2022 18:41)  POCT Blood Glucose.: 150 mg/dL (28 Jul 2022 10:48)    I&O's Summary      PHYSICAL EXAM:  Vital Signs Last 24 Hrs  T(C): 36.4 (28 Jul 2022 17:33), Max: 36.4 (28 Jul 2022 17:33)  T(F): 97.5 (28 Jul 2022 17:33), Max: 97.5 (28 Jul 2022 17:33)  HR: 82 (28 Jul 2022 17:33) (82 - 82)  BP: 149/67 (28 Jul 2022 17:33) (149/67 - 149/67)  BP(mean): --  RR: 18 (28 Jul 2022 17:33) (18 - 18)  SpO2: 96% (28 Jul 2022 17:33) (96% - 96%)    Parameters below as of 28 Jul 2022 17:33  Patient On (Oxygen Delivery Method): room air        GENERAL: NAD, Able to follow commands, Mental status progressively improving  HENT: NCAT; moist mucous membranes  EYES: anicteric sclerae, moist conjunctivae; Significant swelling and crusting of right eye, Pt able to open eye and track finger slightly. R eye not reactive to light   NECK: Trachea midline; supple  CHEST:  no respiratory distress  HEART:  regular rate and rhythm  ABDOMEN:  Soft, non-tender, non-distended, normoactive bowel sounds,  no masses  EXTREMITIES: No LE edema  SKIN:  +Rash on V1 distribution of right face, red, warm, erythematous, significantly scabbed and no new vesicles- improving  NEURO: + resting tremor, not new; A&Ox2 (self, location)      LABS:                        11.3   6.16  )-----------( 266      ( 29 Jul 2022 06:43 )             35.6     07-29    137  |  104  |  9   ----------------------------<  192<H>  3.4<L>   |  23  |  0.61    Ca    8.1<L>      29 Jul 2022 06:43  Phos  2.1     07-29  Mg     1.90     07-29    TPro  6.0  /  Alb  2.9<L>  /  TBili  0.3  /  DBili  x   /  AST  23  /  ALT  16  /  AlkPhos  80  07-29    PT/INR - ( 28 Jul 2022 05:30 )   PT: 13.5 sec;   INR: 1.16 ratio                   Culture - Blood (collected 27 Jul 2022 19:00)  Source: .Blood Blood-Venous  Preliminary Report (29 Jul 2022 01:02):    No growth to date.    Culture - Blood (collected 26 Jul 2022 10:55)  Source: .Blood Blood  Gram Stain (27 Jul 2022 12:22):    Growth in anaerobic bottle: Gram Positive Cocci in Clusters    Growth in aerobic bottle: Gram Positive Cocci in Clusters  Preliminary Report (28 Jul 2022 14:57):    Growth in anaerobic bottle: Staphylococcus epidermidis    Coag Negative Staphylococcus    Single set isolate, possible contaminant. Contact    Microbiology if susceptibility testing clinically    indicated.    Growth in aerobic and anaerobic bottles: Staphylococcus hominis    Culture - Blood (collected 26 Jul 2022 10:45)  Source: .Blood Blood  Gram Stain (27 Jul 2022 21:57):    Growth in aerobic bottle: Gram Positive Cocci in Clusters    Growth in anaerobic bottle: Gram Positive Cocci in Clusters  Preliminary Report (28 Jul 2022 14:56):    Growth in aerobic bottle: Staphylococcus hominis    Susceptibility to follow.    ***Blood Panel PCR results on this specimen are available    approximately 3 hours after the Gram stain result.***    Gram stain, PCR, and/or culture results may not always    correspond due to difference in methodologies.    ************************************************************    This PCR assay was performed by multiplex PCR. This    Assay tests for 66 bacterial and resistance gene targets.    Please refer to the [facility] test directory    at [email or web address] for details.    Growth in anaerobic bottle: Gram Positive Cocci in Clusters  Organism: Blood Culture PCR (27 Jul 2022 13:17)  Organism: Blood Culture PCR (27 Jul 2022 13:17)        RADIOLOGY & ADDITIONAL TESTS:  Results Reviewed:   Imaging Personally Reviewed:  Electrocardiogram Personally Reviewed:    COORDINATION OF CARE:  Care Discussed with Consultants/Other Providers [Y/N]:  Prior or Outpatient Records Reviewed [Y/N]:   PROGRESS NOTE:   Authored by Genie Pat DO , PGY1     Patient is a 86y old  Female who presents with a chief complaint of Suspicion for Herpes Zoster opthalmicus/encephalitis (28 Jul 2022 07:27)      SUBJECTIVE / OVERNIGHT EVENTS:  Patient much more alert and orientation this AM. Able to converse for several minutes. Endorses itching and some pain around the eye. States that abdominal pain has resolved. Denies CP, SOB, abdominal pain  All other review of systems is negative unless indicated above.    MEDICATIONS  (STANDING):  acyclovir IVPB 650 milliGRAM(s) IV Intermittent every 12 hours  artificial tears (preservative free) Ophthalmic Solution 1 Drop(s) Left EYE four times a day  aspirin  chewable 324 milliGRAM(s) Oral daily  brimonidine 0.2% Ophthalmic Solution 1 Drop(s) Right EYE three times a day  budesonide  80 MICROgram(s)/formoterol 4.5 MICROgram(s) Inhaler 2 Puff(s) Inhalation two times a day  ceFAZolin   IVPB 1000 milliGRAM(s) IV Intermittent every 8 hours  dextrose 5%. 1000 milliLiter(s) (50 mL/Hr) IV Continuous <Continuous>  dextrose 5%. 1000 milliLiter(s) (100 mL/Hr) IV Continuous <Continuous>  dextrose 50% Injectable 25 Gram(s) IV Push once  dextrose 50% Injectable 12.5 Gram(s) IV Push once  dextrose 50% Injectable 25 Gram(s) IV Push once  enoxaparin Injectable 40 milliGRAM(s) SubCutaneous every 24 hours  erythromycin   Ointment 1 Application(s) Right EYE four times a day  glucagon  Injectable 1 milliGRAM(s) IntraMuscular once  insulin glargine Injectable (LANTUS) 10 Unit(s) SubCutaneous at bedtime  insulin lispro (ADMELOG) corrective regimen sliding scale   SubCutaneous Before meals and at bedtime  losartan 50 milliGRAM(s) Oral daily  metoprolol succinate ER 12.5 milliGRAM(s) Oral daily  mirtazapine 15 milliGRAM(s) Oral at bedtime  tiotropium 18 MICROgram(s) Capsule 1 Capsule(s) Inhalation daily  traZODone 100 milliGRAM(s) Oral at bedtime  ursodiol Tablet 500 milliGRAM(s) Oral <User Schedule>    MEDICATIONS  (PRN):  dextrose Oral Gel 15 Gram(s) Oral once PRN Blood Glucose LESS THAN 70 milliGRAM(s)/deciliter      CAPILLARY BLOOD GLUCOSE      POCT Blood Glucose.: 199 mg/dL (29 Jul 2022 07:35)  POCT Blood Glucose.: 174 mg/dL (28 Jul 2022 23:09)  POCT Blood Glucose.: 184 mg/dL (28 Jul 2022 18:41)  POCT Blood Glucose.: 150 mg/dL (28 Jul 2022 10:48)    I&O's Summary      PHYSICAL EXAM:  Vital Signs Last 24 Hrs  T(C): 36.4 (28 Jul 2022 17:33), Max: 36.4 (28 Jul 2022 17:33)  T(F): 97.5 (28 Jul 2022 17:33), Max: 97.5 (28 Jul 2022 17:33)  HR: 82 (28 Jul 2022 17:33) (82 - 82)  BP: 149/67 (28 Jul 2022 17:33) (149/67 - 149/67)  BP(mean): --  RR: 18 (28 Jul 2022 17:33) (18 - 18)  SpO2: 96% (28 Jul 2022 17:33) (96% - 96%)    Parameters below as of 28 Jul 2022 17:33  Patient On (Oxygen Delivery Method): room air    GENERAL: NAD, Able to follow commands, Mental status progressively improving  HENT: NCAT; moist mucous membranes  EYES: anicteric sclerae, moist conjunctivae; Swelling and crusting of right eye-improved, Pt moving r eye more compared to prior but ROM still limited compared to L eye . R eye not reactive to light   NECK: Trachea midline; supple  CHEST:  no respiratory distress  HEART:  regular rate and rhythm  ABDOMEN:  Soft, non-tender, non-distended, normoactive bowel sounds,  no masses  EXTREMITIES: No LE edema  SKIN:  +Rash on V1 distribution of right face, red, warm, erythematous, significantly scabbed and no new vesicles- improving  NEURO: + resting tremor, not new; A&Ox2 (self, location)    LABS:                        11.3   6.16  )-----------( 266      ( 29 Jul 2022 06:43 )             35.6     07-29    137  |  104  |  9   ----------------------------<  192<H>  3.4<L>   |  23  |  0.61    Ca    8.1<L>      29 Jul 2022 06:43  Phos  2.1     07-29  Mg     1.90     07-29    TPro  6.0  /  Alb  2.9<L>  /  TBili  0.3  /  DBili  x   /  AST  23  /  ALT  16  /  AlkPhos  80  07-29    PT/INR - ( 28 Jul 2022 05:30 )   PT: 13.5 sec;   INR: 1.16 ratio        Culture - Blood (collected 27 Jul 2022 19:00)  Source: .Blood Blood-Venous  Preliminary Report (29 Jul 2022 01:02):    No growth to date.    Culture - Blood (collected 26 Jul 2022 10:55)  Source: .Blood Blood  Gram Stain (27 Jul 2022 12:22):    Growth in anaerobic bottle: Gram Positive Cocci in Clusters    Growth in aerobic bottle: Gram Positive Cocci in Clusters  Preliminary Report (28 Jul 2022 14:57):    Growth in anaerobic bottle: Staphylococcus epidermidis    Coag Negative Staphylococcus    Single set isolate, possible contaminant. Contact    Microbiology if susceptibility testing clinically    indicated.    Growth in aerobic and anaerobic bottles: Staphylococcus hominis    Culture - Blood (collected 26 Jul 2022 10:45)  Source: .Blood Blood  Gram Stain (27 Jul 2022 21:57):    Growth in aerobic bottle: Gram Positive Cocci in Clusters    Growth in anaerobic bottle: Gram Positive Cocci in Clusters  Preliminary Report (28 Jul 2022 14:56):    Growth in aerobic bottle: Staphylococcus hominis    Susceptibility to follow.    ***Blood Panel PCR results on this specimen are available    approximately 3 hours after the Gram stain result.***    Gram stain, PCR, and/or culture results may not always    correspond due to difference in methodologies.    ************************************************************    This PCR assay was performed by multiplex PCR. This    Assay tests for 66 bacterial and resistance gene targets.    Please refer to the [facility] Labs test directory    at [email or web address] for details.    Growth in anaerobic bottle: Gram Positive Cocci in Clusters  Organism: Blood Culture PCR (27 Jul 2022 13:17)  Organism: Blood Culture PCR (27 Jul 2022 13:17)        RADIOLOGY & ADDITIONAL TESTS:  Results Reviewed:   Imaging Personally Reviewed:  Electrocardiogram Personally Reviewed:    COORDINATION OF CARE:  Care Discussed with Consultants/Other Providers [Y/N]:  Prior or Outpatient Records Reviewed [Y/N]:

## 2022-07-29 NOTE — PROGRESS NOTE ADULT - ASSESSMENT
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 muscle; no acute CVA    Overall 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  Acute encephalopathy could be secondary to sepsis vs actual encephalitis, much improved today  Regardless IV acyclovir will cover for encephalitis so no LP is needed unless clinical status changes  MRSE in BCx could be contaminants  Still having double vision due to viral myositis of EO muscle      IMPRESSION:  herpes zoster ophthalmicus  Positive blood culture  Encephalopathy  Leukocytosis  Fever    RECOMMENDATIONS:  - Increase IV acyclovir to 650mg q8hrs now as Cr is stable; maintain gentle IVF while on acyclovir  - Continue IV Ancef 1gm q8hrs  - No need to pursue LP for now unless clinical status changes  - Ophthal follow up  - Trend WBC, fever curve, transaminases, creatinine daily  - Will continue to follow    Patient is seen and examined with attending and case is discussed with primary team and daughter at bedside      Eileen Leach D.O.  PGY-5 Infectious Diseases Fellow  Please contact me via page or text through [employer] Teams  If after 5PM or on weekends, please call [phone]

## 2022-07-30 NOTE — PROGRESS NOTE ADULT - PROBLEM SELECTOR PLAN 2
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, soft tissue swelling around right eye- likely component of herpes zoster, possible cellulitis- Ventricles don't appear enlarged  - LP cancelled secondary to ID recs- likely will not change care if done  - On Acyclovir (renally dosed) - and Cefazolin STAT then q12 per ID recs- doses updated based on renal dosing  - Pt unable to tolerate vEEG, however it is not needed  - MRI Head and Orbits- no brain findings, sig for swelling in right orbit 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

## 2022-07-30 NOTE — PROGRESS NOTE ADULT - PROBLEM SELECTOR PLAN 7
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

## 2022-07-30 NOTE — PROGRESS NOTE ADULT - ASSESSMENT
Patient is an 87yo female w/PMH Cardiac stents post MI (1986, 1996), COPD, NPH, DM, Migraines, and HTN presenting with 3 day history of rash over the right V1 dermatome and 1 day history of altered mental status, found to have herpes zoster rash concerning for herpes zoster opthalmicus and encephalitis.

## 2022-07-30 NOTE — PROGRESS NOTE ADULT - PROBLEM SELECTOR PLAN 12
Diet: Minced and moist (In case Thin liquids via straw only)    DVT Ppx: Subq heparin (may switch to lovenox depending on patient kidney function on repeat labs)    PT & OT eval recommand rehabilitation Diet: Minced and moist (In case Thin liquids via straw only)    DVT Ppx: Subq heparin (may switch to lovenox depending on patient kidney function on repeat labs)    PT & OT eval recommend rehabilitation Diet: Minced and moist (In case Thin liquids via straw only)    DVT Ppx: Subq heparin (may switch to lovenox depending on patient kidney function on repeat labs)    PT & OT eval recommend rehabilitation    f/u nutrition consult recs Diet: Minced and moist (In case Thin liquids via straw only)    DVT Ppx: Subq heparin (may switch to lovenox depending on patient kidney function on repeat labs)    PT & OT eval recommend rehabilitation- contact     f/u nutrition consult recs

## 2022-07-30 NOTE — PROGRESS NOTE ADULT - PROBLEM SELECTOR PLAN 1
Patient demonstrating Right V1 distribution rash, previous history of chicken pox as child, treated with Valtrex since 7/24 outpatient    **If patient requires MRI, must have  shunt device reprogrammed after*- page neurosurg [id number]**    - Consulted ID and Neuro- appreciate recs   - LP cancelled secondary to ID recs- likely will not change care if done  - On Acyclovir 650 q8 now that CHARLINE has resolved - and Cefazolin 1G q8 per ID recs  - Will maintain gentle hydration while on acyclovir   -For Opthalmicus- appreciate opthalmology recs- Start brimonidine TID in R eye, Start erythromycin ointment QID to R eye and periocular lesions, start artificial tears; Tonometry results improved per optho note  - PRN Tylenol for pain- On standing ofirmev right now, pt pain is well controlled   - MRI Head and Orbits- no brain findings, sig for swelling in right orbit 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  -  shunt rate reprogrammed per neurosurg verbal discussion  - Patient s/p midline on 7/28 for additional IV access  - May need special IV team/IV nurse to draw future labs given the patient is a hard stick Patient demonstrating Right V1 distribution rash, previous history of chicken pox as child, treated with Valtrex since 7/24 outpatient    **If patient requires MRI, must have  shunt device reprogrammed after*- page neurosurg [id number]**    - Consulted ID and Neuro- appreciate recs   - LP cancelled secondary to ID recs- likely will not change care if done  - On Acyclovir 650 q8 now that CHARLINE has resolved - and Cefazolin 1G q8 per ID recs  - Will maintain gentle hydration while on acyclovir   -For Opthalmicus- appreciate opthalmology recs- Start brimonidine TID in R eye, Start erythromycin ointment QID to R eye and periocular lesions, start artificial tears; Tonometry results improved per optho note  - PRN Tylenol for pain- On standing ofirmev right now, pt pain is well controlled   - MRI Head and Orbits- no brain findings, sig for swelling in right orbit 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  -  shunt rate reprogrammed per neurosurg verbal discussion  - Patient s/p midline on 7/28 for additional IV access  - May need special IV team/IV nurse to draw future labs given the patient is a hard stick    - Patient complains of itchiness of rash- consider renewing calamine lotion or prescribing standing benadryl Patient demonstrating Right V1 distribution rash, previous history of chicken pox as child, treated with Valtrex since 7/24 outpatient    **If patient requires MRI, must have  shunt device reprogrammed after*- page neurosurg [id number]**    - Consulted ID and Neuro- appreciate recs   - LP cancelled secondary to ID recs- likely will not change care if done  - On Acyclovir 650 q8 now that CHARLINE has resolved - and Cefazolin 1G q8 per ID recs  - Will maintain gentle hydration while on acyclovir   -For Opthalmicus- appreciate opthalmology recs- Start brimonidine TID in R eye, Start erythromycin ointment QID to R eye and periocular lesions, start artificial tears; Tonometry results improved per optho note  - PRN Tylenol for pain- On standing ofirmev right now, pt pain is well controlled   - MRI Head and Orbits- no brain findings, sig for swelling in right orbit 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  -  shunt rate reprogrammed per neurosurg verbal discussion  - Patient s/p midline on 7/28 for additional IV access  - May need special IV team/IV nurse to draw future labs given the patient is a hard stick    - Patient complains of itchiness of rash- placed on low dose benadryl- will monitor

## 2022-07-30 NOTE — PROGRESS NOTE ADULT - SUBJECTIVE AND OBJECTIVE BOX
Patient is a 86y old  Female who presents with a chief complaint of Suspicion for Herpes Zoster opthalmicus/encephalitis (29 Jul 2022 12:57)      SUBJECTIVE / OVERNIGHT EVENTS:    MEDICATIONS  (STANDING):  acetaminophen   IVPB .. 1000 milliGRAM(s) IV Intermittent every 6 hours  acyclovir IVPB 650 milliGRAM(s) IV Intermittent every 8 hours  artificial tears (preservative free) Ophthalmic Solution 1 Drop(s) Left EYE four times a day  aspirin  chewable 324 milliGRAM(s) Oral daily  brimonidine 0.2% Ophthalmic Solution 1 Drop(s) Right EYE three times a day  budesonide  80 MICROgram(s)/formoterol 4.5 MICROgram(s) Inhaler 2 Puff(s) Inhalation two times a day  ceFAZolin   IVPB 1000 milliGRAM(s) IV Intermittent every 8 hours  dextrose 5%. 1000 milliLiter(s) (50 mL/Hr) IV Continuous <Continuous>  dextrose 5%. 1000 milliLiter(s) (100 mL/Hr) IV Continuous <Continuous>  dextrose 50% Injectable 25 Gram(s) IV Push once  dextrose 50% Injectable 12.5 Gram(s) IV Push once  dextrose 50% Injectable 25 Gram(s) IV Push once  enoxaparin Injectable 40 milliGRAM(s) SubCutaneous every 24 hours  erythromycin   Ointment 1 Application(s) Right EYE four times a day  glucagon  Injectable 1 milliGRAM(s) IntraMuscular once  insulin glargine Injectable (LANTUS) 10 Unit(s) SubCutaneous at bedtime  insulin lispro (ADMELOG) corrective regimen sliding scale   SubCutaneous Before meals and at bedtime  lactobacillus acidophilus 1 Tablet(s) Oral daily  losartan 50 milliGRAM(s) Oral daily  metoprolol succinate ER 12.5 milliGRAM(s) Oral daily  mirtazapine 15 milliGRAM(s) Oral at bedtime  nystatin Powder 1 Application(s) Topical two times a day  sodium chloride 0.9%. 1000 milliLiter(s) (75 mL/Hr) IV Continuous <Continuous>  tiotropium 18 MICROgram(s) Capsule 1 Capsule(s) Inhalation daily  traZODone 100 milliGRAM(s) Oral at bedtime  ursodiol Tablet 500 milliGRAM(s) Oral <User Schedule>    MEDICATIONS  (PRN):  dextrose Oral Gel 15 Gram(s) Oral once PRN Blood Glucose LESS THAN 70 milliGRAM(s)/deciliter      Vital Signs Last 24 Hrs  T(C): 36.3 (30 Jul 2022 06:00), Max: 36.9 (29 Jul 2022 17:20)  T(F): 97.4 (30 Jul 2022 06:00), Max: 98.4 (29 Jul 2022 17:20)  HR: 78 (30 Jul 2022 06:00) (78 - 85)  BP: 174/80 (30 Jul 2022 06:00) (134/70 - 177/63)  BP(mean): --  RR: 17 (30 Jul 2022 06:00) (17 - 18)  SpO2: 95% (30 Jul 2022 06:00) (95% - 100%)    Parameters below as of 30 Jul 2022 06:00  Patient On (Oxygen Delivery Method): room air      CAPILLARY BLOOD GLUCOSE      POCT Blood Glucose.: 221 mg/dL (30 Jul 2022 07:20)  POCT Blood Glucose.: 189 mg/dL (29 Jul 2022 21:04)  POCT Blood Glucose.: 235 mg/dL (29 Jul 2022 17:02)  POCT Blood Glucose.: 152 mg/dL (29 Jul 2022 11:50)    I&O's Summary      PHYSICAL EXAM:  GENERAL: NAD, well-developed  HEAD:  Atraumatic, Normocephalic  EYES: EOMI, PERRLA, conjunctiva and sclera clear  NECK: Supple, No JVD  CHEST/LUNG: Clear to auscultation bilaterally; No wheeze  HEART: Regular rate and rhythm; No murmurs, rubs, or gallops  ABDOMEN: Soft, Nontender, Nondistended; Bowel sounds present  EXTREMITIES:  2+ Peripheral Pulses, No clubbing, cyanosis, or edema  PSYCH: AAOx3  NEUROLOGY: non-focal  SKIN: No rashes or lesions    LABS:                        11.1   5.37  )-----------( 251      ( 30 Jul 2022 05:35 )             34.6     07-30    138  |  104  |  6<L>  ----------------------------<  246<H>  3.2<L>   |  23  |  0.55    Ca    8.3<L>      30 Jul 2022 05:35  Phos  2.4     07-30  Mg     1.70     07-30    TPro  6.0  /  Alb  2.9<L>  /  TBili  0.3  /  DBili  x   /  AST  23  /  ALT  16  /  AlkPhos  80  07-29              RADIOLOGY & ADDITIONAL TESTS: Patient is a 86y old  Female who presents with a chief complaint of Suspicion for Herpes Zoster opthalmicus/encephalitis (29 Jul 2022 12:57)      SUBJECTIVE / OVERNIGHT EVENTS: Patient seen at bedside. No acute events overnight. Patient currently being treated with calamine lotion for itchiness of rash, however still complains of itchiness. Otherwise, no pain or other complaints.    MEDICATIONS  (STANDING):  acetaminophen   IVPB .. 1000 milliGRAM(s) IV Intermittent every 6 hours  acyclovir IVPB 650 milliGRAM(s) IV Intermittent every 8 hours  artificial tears (preservative free) Ophthalmic Solution 1 Drop(s) Left EYE four times a day  aspirin  chewable 324 milliGRAM(s) Oral daily  brimonidine 0.2% Ophthalmic Solution 1 Drop(s) Right EYE three times a day  budesonide  80 MICROgram(s)/formoterol 4.5 MICROgram(s) Inhaler 2 Puff(s) Inhalation two times a day  ceFAZolin   IVPB 1000 milliGRAM(s) IV Intermittent every 8 hours  dextrose 5%. 1000 milliLiter(s) (50 mL/Hr) IV Continuous <Continuous>  dextrose 5%. 1000 milliLiter(s) (100 mL/Hr) IV Continuous <Continuous>  dextrose 50% Injectable 25 Gram(s) IV Push once  dextrose 50% Injectable 12.5 Gram(s) IV Push once  dextrose 50% Injectable 25 Gram(s) IV Push once  enoxaparin Injectable 40 milliGRAM(s) SubCutaneous every 24 hours  erythromycin   Ointment 1 Application(s) Right EYE four times a day  glucagon  Injectable 1 milliGRAM(s) IntraMuscular once  insulin glargine Injectable (LANTUS) 10 Unit(s) SubCutaneous at bedtime  insulin lispro (ADMELOG) corrective regimen sliding scale   SubCutaneous Before meals and at bedtime  lactobacillus acidophilus 1 Tablet(s) Oral daily  losartan 50 milliGRAM(s) Oral daily  metoprolol succinate ER 12.5 milliGRAM(s) Oral daily  mirtazapine 15 milliGRAM(s) Oral at bedtime  nystatin Powder 1 Application(s) Topical two times a day  sodium chloride 0.9%. 1000 milliLiter(s) (75 mL/Hr) IV Continuous <Continuous>  tiotropium 18 MICROgram(s) Capsule 1 Capsule(s) Inhalation daily  traZODone 100 milliGRAM(s) Oral at bedtime  ursodiol Tablet 500 milliGRAM(s) Oral <User Schedule>    MEDICATIONS  (PRN):  dextrose Oral Gel 15 Gram(s) Oral once PRN Blood Glucose LESS THAN 70 milliGRAM(s)/deciliter      Vital Signs Last 24 Hrs  T(C): 36.3 (30 Jul 2022 06:00), Max: 36.9 (29 Jul 2022 17:20)  T(F): 97.4 (30 Jul 2022 06:00), Max: 98.4 (29 Jul 2022 17:20)  HR: 78 (30 Jul 2022 06:00) (78 - 85)  BP: 174/80 (30 Jul 2022 06:00) (134/70 - 177/63)  BP(mean): --  RR: 17 (30 Jul 2022 06:00) (17 - 18)  SpO2: 95% (30 Jul 2022 06:00) (95% - 100%)    Parameters below as of 30 Jul 2022 06:00  Patient On (Oxygen Delivery Method): room air      CAPILLARY BLOOD GLUCOSE      POCT Blood Glucose.: 221 mg/dL (30 Jul 2022 07:20)  POCT Blood Glucose.: 189 mg/dL (29 Jul 2022 21:04)  POCT Blood Glucose.: 235 mg/dL (29 Jul 2022 17:02)  POCT Blood Glucose.: 152 mg/dL (29 Jul 2022 11:50)    I&O's Summary      GENERAL: NAD, Able to follow commands, Mental status progressively improving  HENT: NCAT; moist mucous membranes  EYES: anicteric sclerae, moist conjunctivae; Swelling and crusting of right eye-improved, Pt moving r eye more compared to prior but ROM still limited compared to L eye . R eye not reactive to light   NECK: Trachea midline; supple  CHEST:  no respiratory distress  HEART:  regular rate and rhythm  ABDOMEN:  Soft, non-tender, non-distended, normoactive bowel sounds,  no masses  EXTREMITIES: No LE edema  SKIN:  +Rash on V1 distribution of right face, red, warm, erythematous, significantly scabbed and no new vesicles- improving; fungal rash in groin- covered in cream  NEURO: + resting tremor, not new      LABS:                        11.1   5.37  )-----------( 251      ( 30 Jul 2022 05:35 )             34.6     07-30    138  |  104  |  6<L>  ----------------------------<  246<H>  3.2<L>   |  23  |  0.55    Ca    8.3<L>      30 Jul 2022 05:35  Phos  2.4     07-30  Mg     1.70     07-30    TPro  6.0  /  Alb  2.9<L>  /  TBili  0.3  /  DBili  x   /  AST  23  /  ALT  16  /  AlkPhos  80  07-29              RADIOLOGY & ADDITIONAL TESTS:

## 2022-07-30 NOTE — PROGRESS NOTE ADULT - ATTENDING COMMENTS
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 CHARLINE resolved. Course now c/b + blood cx w/ MRSE, However suspect this is a contaminant.  Repeat blood cxs from 7/28 NTD.     Now s/p brain and orbital MRI personally reviewed and notable for asymmetric edema of the right orbital medial and lateral rectus muscles, as well as the superior oblique muscle, w/o evidence for orbital abscess and Chronic left frontal parietal infarcts and chronic white matter changes   as. Orbital MRI was noted to be a limited study     - Continue to monitor mental status -Continues to Improve.   -continue to monitor lesions-improving and crusting   - C/w Acyclovir as stated above  w/ prophylactic IVF while on treatment acyclovir IV and c/w brimonidine TID in R eye, erythromycin ointment QID to R eye and periocular lesions  -c/w treating w/ ancef for possibly superimposed bacterial cellulitis.  -BPs elevated this am. C/w losartan and metoprolol, will add amlodipine 2.5mg. Plan is to titrate up if needed between the Losartan or amlodipine- however holding off on increasing the losartan for now since acyclovir treatment can affect the kidneys. Will likely discharge off of lasix and spironolactone.  -Mild hypokalemia- PO supplementation PRN unless significant low levels that require IV.      Case d/w pts daughter Dr. Delgado  at bedside.

## 2022-07-30 NOTE — PROGRESS NOTE ADULT - PROBLEM SELECTOR PLAN 6
Patient with history of DM2 on home Lantus 34U daily, Trulicity, glipizide    - Hold oral hypoglycemics   - premeal and bedtime fingersticks  - HgbA1C 7,2    - Insulin sliding scale and lantus 10U qhs for now-> 7/30 used 7 units sliding scale in ~24 hours, considering increasing basal dose to 14U in context of FS glucose Patient with history of DM2 on home Lantus 34U daily, Trulicity, glipizide    - Hold oral hypoglycemics   - premeal and bedtime fingersticks  - HgbA1C 7,2    - Insulin sliding scale and lantus 10U qhs for now-> 7/30 used 7 units sliding scale in ~24 hours, considering increasing basal dose to 14U in context of FS glucose? Patient with history of DM2 on home Lantus 34U daily, Trulicity, glipizide    - Hold oral hypoglycemics   - premeal and bedtime fingersticks  - HgbA1C 7,2    - Insulin sliding scale and lantus 10U qhs for now

## 2022-07-30 NOTE — PROGRESS NOTE ADULT - PROBLEM SELECTOR PLAN 9
Previous history of stents in 1986 and 1996, on aspirin 325mg outpatient    - Monitor VS and symptoms at this time K at 3.2, Phos at 2.9    K repleted with IV/pill KCl    Phos repleted with NaKPhos

## 2022-07-30 NOTE — PROGRESS NOTE ADULT - PROBLEM SELECTOR PLAN 5
Pt with hx of rash in inguinal folds. Unable to recall which ointments were being used   - Started on nystatin powder BID   - Will continue to monitor for worsening of rash

## 2022-07-30 NOTE — PROGRESS NOTE ADULT - PROBLEM SELECTOR PLAN 3
Patient presented with history of severe hypertension at home and hypertension in the SBP ~180s while in ED    - Patient on labetalol for BP>180 SBP, currently in 140s-150s SBP  - Resume oral home BP medications pending patient ability to swallow- otherwise will start IV labetalol prn  - Monitor VS  - Hold Anti-HTN meds: amlodipine, furosemide, spironolactone, will add on if patient BP's appropriate at later time- In effort to not acutely drop patient BP    - 7/30 BPs elevated (~170 last BP)- Consider restarting spironolactone- was off of pain meds so spike in BP could be secondary to that Patient presented with history of severe hypertension at home and hypertension in the SBP ~180s while in ED    - Patient on labetalol for BP>180 SBP, currently in 140s-150s SBP  - Resume oral home BP medications pending patient ability to swallow- otherwise will start IV labetalol prn  - Monitor VS  - Hold Anti-HTN meds: amlodipine, furosemide, spironolactone will add on if patient BP's appropriate at later time- In effort to not acutely drop patient BP    - 7/30 BPs elevated (~170 last BP)- will restart __________ Patient presented with history of severe hypertension at home and hypertension in the SBP ~180s while in ED    - Patient on labetalol for BP>180 SBP, currently in 140s-150s SBP  - Resume oral home BP medications pending patient ability to swallow- otherwise will start IV labetalol prn  - Monitor VS  - Hold Anti-HTN meds: amlodipine, furosemide, spironolactone will add on if patient BP's appropriate at later time- In effort to not acutely drop patient BP    - 7/30 BPs elevated (~170 last BP)- will restart __________? Patient presented with history of severe hypertension at home and hypertension in the SBP ~180s while in ED    - Patient on labetalol for BP>180 SBP, currently in 140s-150s SBP  - Resume oral home BP medications pending patient ability to swallow- otherwise will start IV labetalol prn  - Monitor VS  - Hold Anti-HTN meds: amlodipine, furosemide, spironolactone will add on if patient BP's appropriate at later time- In effort to not acutely drop patient BP    - 7/30 BPs elevated (~170 last BP)- will restart amlodipine 2.5mg

## 2022-07-31 NOTE — PROGRESS NOTE ADULT - PROBLEM SELECTOR PLAN 9
K at 3.2, Phos at 2.9    K repleted with IV/pill KCl    Phos repleted with NaKPhos K resolved, Phos at 2.2    K repleted with IV/pill KCl    Phos repleted with NaKPhos

## 2022-07-31 NOTE — PROGRESS NOTE ADULT - PROBLEM SELECTOR PLAN 11
Elevated enzymes, likely secondary to physiologic stress from herpes zoster/htn, Is patient's baseline according to daughter    - Improved   - Monitor labs and physical exam for now; moderate RUQ pain on physical not concerning at this time  - Hepatitis panel negative

## 2022-07-31 NOTE — DISCHARGE NOTE PROVIDER - NSFOLLOWUPCLINICS_GEN_ALL_ED_FT
[facility] - Infectious Disease  Infectious Disease  [address], [place] [address]  Phone: [phone]  Fax:     [facility]  Neurology  [address], [facility] [address] [place] [address]  Phone: [phone]  Fax:      [facility] - Infectious Disease  Infectious Disease  [address], [place] [address]  Phone: [phone]  Fax:     [facility]  Neurology  [address], [facility] - [address]rd [place] [address]  Phone: [phone]  Fax:     [facility]  Ophthalmology  [address] [facility] [address]  [place] [address]  Phone: [phone]  Fax:   Follow Up Time: 2 weeks

## 2022-07-31 NOTE — PROGRESS NOTE ADULT - PROBLEM SELECTOR PLAN 1
Patient demonstrating Right V1 distribution rash, previous history of chicken pox as child, treated with Valtrex since 7/24 outpatient    **If patient requires MRI, must have  shunt device reprogrammed after*- page neurosurg [id number]**    - Consulted ID and Neuro- appreciate recs   - LP cancelled secondary to ID recs- likely will not change care if done  - On Acyclovir 650 q8 now that CHARLINE has resolved - and Cefazolin 1G q8 per ID recs  - Will maintain gentle hydration while on acyclovir   -For Opthalmicus- appreciate opthalmology recs- Start brimonidine TID in R eye, Start erythromycin ointment QID to R eye and periocular lesions, start artificial tears; Tonometry results improved per optho note  - PRN Tylenol for pain- On standing ofirmev right now, pt pain is well controlled   - MRI Head and Orbits- no brain findings, sig for swelling in right orbit 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  -  shunt rate reprogrammed per neurosurg verbal discussion  - Patient s/p midline on 7/28 for additional IV access  - May need special IV team/IV nurse to draw future labs given the patient is a hard stick    - Patient complains of itchiness of rash- placed on low dose benadryl- will monitor Patient demonstrating Right V1 distribution rash, previous history of chicken pox as child, treated with Valtrex since 7/24 outpatient    **If patient requires MRI, must have  shunt device reprogrammed after*- page neurosurg [id number]**    - Consulted ID and Neuro- appreciate recs   - LP cancelled secondary to ID recs- likely will not change care if done  - On Acyclovir 650 q8 now that CHARLINE has resolved - and Cefazolin 1G q8 per ID recs  - Will maintain gentle hydration while on acyclovir   -For Opthalmicus- appreciate opthalmology recs- Start brimonidine TID in R eye, Start erythromycin ointment QID to R eye and periocular lesions, start artificial tears; Tonometry results improved per optho note  - PRN Tylenol for pain- On standing ofirmev right now, pt pain is well controlled   - MRI Head and Orbits- no brain findings, sig for swelling in right orbit 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  -  shunt rate reprogrammed per neurosurg verbal discussion  - Patient s/p midline on 7/28 for additional IV access  - May need special IV team/IV nurse to draw future labs given the patient is a hard stick    - Patient complains of itchiness of rash- placed on low dose benadryl- consider changing to hydroxizine for better effect  - Wound care consulted- f/u  - Placed on gabapentin 300mg for suspected neuropathic pain and 1 time 2.5mg oxy for immediate pain relief Patient demonstrating Right V1 distribution rash, previous history of chicken pox as child, treated with Valtrex since 7/24 outpatient    **If patient requires MRI, must have  shunt device reprogrammed after*- page neurosurg [id number]**    - Consulted ID and Neuro- appreciate recs   - LP cancelled secondary to ID recs- likely will not change care if done  - On Acyclovir 650 q8 now that CHARLINE has resolved - and Cefazolin 1G q8 per ID recs  - Will maintain gentle hydration while on acyclovir   -For Opthalmicus- appreciate opthalmology recs- Start brimonidine TID in R eye, Start erythromycin ointment QID to R eye and periocular lesions, start artificial tears; Tonometry results improved per optho note  - PRN Tylenol for pain- On standing ofirmev right now, pt pain is well controlled   - MRI Head and Orbits- no brain findings, sig for swelling in right orbit 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  -  shunt rate reprogrammed per neurosurg verbal discussion  - Patient s/p midline on 7/28 for additional IV access  - May need special IV team/IV nurse to draw future labs given the patient is a hard stick    - Patient complains of itchiness of rash- placed on low dose benadryl- consider changing to hydroxyzine for better effect  - Wound care consulted- f/u  - Placed on gabapentin 300mg for suspected neuropathic pain and 1 time 2.5mg oxy for immediate pain relief Patient demonstrating Right V1 distribution rash, previous history of chicken pox as child, treated with Valtrex since 7/24 outpatient    **If patient requires MRI, must have  shunt device reprogrammed after*- page neurosurg [id number]**    - Consulted ID and Neuro- appreciate recs   - LP cancelled secondary to ID recs- likely will not change care if done  - On Acyclovir 650 q8 now that CHARLINE has resolved - and Cefazolin 1G q8 per ID recs  - Will maintain gentle hydration while on acyclovir   -For Opthalmicus- appreciate opthalmology recs- Start brimonidine TID in R eye, Start erythromycin ointment QID to R eye and periocular lesions, start artificial tears; Tonometry results improved per optho note  - PRN Tylenol for pain- On standing ofirmev right now, pt pain is well controlled   - MRI Head and Orbits- no brain findings, sig for swelling in right orbit 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  -  shunt rate reprogrammed per neurosurg verbal discussion  - Patient s/p midline on 7/28 for additional IV access  - May need special IV team/IV nurse to draw future labs given the patient is a hard stick    - Patient complains of itchiness of rash- hydroxyzine prn  - Wound care consulted- f/u  - Placed on gabapentin 300mg for suspected neuropathic pain and 1 time 2.5mg oxy for immediate pain relief Patient demonstrating Right V1 distribution rash, previous history of chicken pox as child, treated with Valtrex since 7/24 outpatient    **If patient requires MRI, must have  shunt device reprogrammed after*- page neurosurg [id number]**    - Consulted ID and Neuro- appreciate recs   - LP cancelled secondary to ID recs- likely will not change care if done  - On Acyclovir 650 q8 now that CHARLINE has resolved - and Cefazolin 1G q8 per ID recs  - Will maintain gentle hydration while on acyclovir   -For Opthalmicus- appreciate opthalmology recs- Start brimonidine TID in R eye, Start erythromycin ointment QID to R eye and periocular lesions, start artificial tears; Tonometry results improved per optho note  - PRN Tylenol for pain- On standing ofirmev right now, pt pain is well controlled   - MRI Head and Orbits- no brain findings, sig for swelling in right orbit 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  -  shunt rate reprogrammed per neurosurg verbal discussion  - Patient s/p midline on 7/28 for additional IV access  - May need special IV team/IV nurse to draw future labs given the patient is a hard stick  - Patient complains of itchiness of rash- hydroxyzine prn  - Wound care consulted- f/u  - Placed on gabapentin 300mg for suspected neuropathic pain and 1 time 2.5mg oxy for immediate pain relief

## 2022-07-31 NOTE — DISCHARGE NOTE PROVIDER - NSDCCPCAREPLAN_GEN_ALL_CORE_FT
PRINCIPAL DISCHARGE DIAGNOSIS  Diagnosis: Herpes zoster  Assessment and Plan of Treatment:       SECONDARY DISCHARGE DIAGNOSES  Diagnosis: Type 2 diabetes mellitus  Assessment and Plan of Treatment: Continue your medication regimen and a consistent carbohydrate diet (Meaning eating the same amount of carbohydrates at the same time each day). Monitor blood glucose levels throughout the day before meals and at bedtime. Record blood sugars and bring to outpatient providers appointment in order to be reviewed by your doctor for management modifications. If your sugars are more than 400 or less than 70 you should contact your PCP immediately. Monitor for signs/symptoms of low blood glucose, such as, dizziness, altered mental status, or cool/clammy skin. In addition, monitor for signs/symptoms of high blood glucose, such as, feeling hot, dry, fatigued, or with increased thirst/urination. Make regular podiatry appointments in order to have feet checked for wounds and uncontrolled toe nail growth to prevent infections, as well as, appointments with an ophthalmologist to monitor your vision.      Diagnosis: Herpes zoster  Assessment and Plan of Treatment:      PRINCIPAL DISCHARGE DIAGNOSIS  Diagnosis: Herpes zoster  Assessment and Plan of Treatment: Continue on Medication regimen. Call and schedule outpatient follow up with Opthamology, Pain Management,      SECONDARY DISCHARGE DIAGNOSES  Diagnosis: NPH (normal pressure hydrocephalus)  Assessment and Plan of Treatment:     Diagnosis: Hypertension  Assessment and Plan of Treatment: Continue blood pressure medication metoprolol and  lasix  regimen as directed. Monitor for any visual changes, headaches or dizziness.  Monitor blood pressure regularly.  Follow up with your PCP for further management for high blood pressure.      Diagnosis: Groin rash  Assessment and Plan of Treatment:     Diagnosis: Type 2 diabetes mellitus  Assessment and Plan of Treatment: Continue your medication regimen and a consistent carbohydrate diet (Meaning eating the same amount of carbohydrates at the same time each day). Monitor blood glucose levels throughout the day before meals and at bedtime. Record blood sugars and bring to outpatient providers appointment in order to be reviewed by your doctor for management modifications. If your sugars are more than 400 or less than 70 you should contact your PCP immediately. Monitor for signs/symptoms of low blood glucose, such as, dizziness, altered mental status, or cool/clammy skin. In addition, monitor for signs/symptoms of high blood glucose, such as, feeling hot, dry, fatigued, or with increased thirst/urination. Make regular podiatry appointments in order to have feet checked for wounds and uncontrolled toe nail growth to prevent infections, as well as, appointments with an ophthalmologist to monitor your vision.      Diagnosis: Deep vein thrombosis (DVT) of right upper extremity  Assessment and Plan of Treatment:     Diagnosis: Herpes zoster  Assessment and Plan of Treatment:      PRINCIPAL DISCHARGE DIAGNOSIS  Diagnosis: Herpes zoster  Assessment and Plan of Treatment: Continue on Medication regimen. Call and schedule outpatient follow up with Opthamology and Pain Management.      SECONDARY DISCHARGE DIAGNOSES  Diagnosis: Herpes zoster  Assessment and Plan of Treatment:     Diagnosis: Type 2 diabetes mellitus  Assessment and Plan of Treatment: Continue your medication regimen and a consistent carbohydrate diet (Meaning eating the same amount of carbohydrates at the same time each day). Monitor blood glucose levels throughout the day before meals and at bedtime. Record blood sugars and bring to outpatient providers appointment in order to be reviewed by your doctor for management modifications. If your sugars are more than 400 or less than 70 you should contact your PCP immediately. Monitor for signs/symptoms of low blood glucose, such as, dizziness, altered mental status, or cool/clammy skin. In addition, monitor for signs/symptoms of high blood glucose, such as, feeling hot, dry, fatigued, or with increased thirst/urination. Make regular podiatry appointments in order to have feet checked for wounds and uncontrolled toe nail growth to prevent infections, as well as, appointments with an ophthalmologist to monitor your vision.      Diagnosis: Hypertension  Assessment and Plan of Treatment: Continue blood pressure medication metoprolol and  lasix  regimen as directed. Monitor for any visual changes, headaches or dizziness.  Monitor blood pressure regularly.  Follow up with your PCP for further management for high blood pressure.      Diagnosis: NPH (normal pressure hydrocephalus)  Assessment and Plan of Treatment:     Diagnosis: Deep vein thrombosis (DVT) of right upper extremity  Assessment and Plan of Treatment:     Diagnosis: Groin rash  Assessment and Plan of Treatment:      PRINCIPAL DISCHARGE DIAGNOSIS  Diagnosis: Herpes zoster  Assessment and Plan of Treatment: You were admitted with herpes zoster ophthalmicus. Infectious disease was consulted and you were treated with IV antiviral and also with a course of oral antiviral medication.   Ophthalmology was also consulted and they recommend:   - continue with pred forte three times a day to right eye.   - continue with erythromycin ointment four times a day to R eye and periocular lesions  - continue with preservative-free artificial tears every 2 hours in both eyes  Neurosurgery was consulted and you received multiple nerve blocks to help with your pain.  Pain Management was also consulted to help provide an appropriate pain regimen.  Continue on prescribed medication regimen.   Call and schedule outpatient follow up with Opthamology and Pain Management.      SECONDARY DISCHARGE DIAGNOSES  Diagnosis: Type 2 diabetes mellitus  Assessment and Plan of Treatment: Your HgbA1c: 7.2  Continue your medication regimen and a consistent carbohydrate diet (Meaning eating the same amount of carbohydrates at the same time each day). Monitor blood glucose levels throughout the day before meals and at bedtime. Record blood sugars and bring to outpatient providers appointment in order to be reviewed by your doctor for management modifications. If your sugars are more than 400 or less than 70 you should contact your PCP immediately. Monitor for signs/symptoms of low blood glucose, such as, dizziness, altered mental status, or cool/clammy skin. In addition, monitor for signs/symptoms of high blood glucose, such as, feeling hot, dry, fatigued, or with increased thirst/urination. Make regular podiatry appointments in order to have feet checked for wounds and uncontrolled toe nail growth to prevent infections, as well as, appointments with an ophthalmologist to monitor your vision.      Diagnosis: Hypertension  Assessment and Plan of Treatment: Continue blood pressure medication metoprolol and  lasix  regimen as directed. Monitor for any visual changes, headaches or dizziness.  Monitor blood pressure regularly.  Follow up with your PCP for further management for high blood pressure.      Diagnosis: NPH (normal pressure hydrocephalus)  Assessment and Plan of Treatment: Please follow up with your neurologist    Diagnosis: Deep vein thrombosis (DVT) of right upper extremity  Assessment and Plan of Treatment:     Diagnosis: Groin rash  Assessment and Plan of Treatment:      PRINCIPAL DISCHARGE DIAGNOSIS  Diagnosis: Herpes zoster  Assessment and Plan of Treatment: You were admitted with herpes zoster ophthalmicus. Infectious disease was consulted and you were treated with IV antiviral and also with a course of oral antiviral medication.   Ophthalmology was also consulted and they recommend:   - continue with pred forte three times a day to right eye.   - continue with erythromycin ointment four times a day to R eye and periocular lesions  - continue with preservative-free artificial tears every 2 hours in both eyes  Neurosurgery was consulted and you received multiple nerve blocks to help with your pain.  Pain Management was also consulted to help provide an appropriate pain regimen.  Continue on prescribed medication regimen.   Call and schedule outpatient follow up with Opthamology and Pain Management.      SECONDARY DISCHARGE DIAGNOSES  Diagnosis: Pneumonia, aspiration  Assessment and Plan of Treatment:     Diagnosis: Carotid stenosis  Assessment and Plan of Treatment:     Diagnosis: Deep vein thrombosis (DVT) of right upper extremity  Assessment and Plan of Treatment:     Diagnosis: Urinary retention  Assessment and Plan of Treatment: You will be discharged with a dior due to your urinary retention and failing your voiding trials when removing the dior. Please follow up with Urology outpatient. Please call to make an appointment.    Diagnosis: UTI due to extended-spectrum beta lactamase (ESBL) producing Escherichia coli  Assessment and Plan of Treatment: You were found to have a UTI and were treated with antibiotics.    Diagnosis: Hypertension  Assessment and Plan of Treatment: Continue blood pressure medication metoprolol and  lasix  regimen as directed. Monitor for any visual changes, headaches or dizziness.  Monitor blood pressure regularly.  Follow up with your PCP for further management for high blood pressure.      Diagnosis: Groin rash  Assessment and Plan of Treatment: Continue with antifungal medication until your rash resolves.    Diagnosis: Type 2 diabetes mellitus  Assessment and Plan of Treatment: Your HgbA1c: 7.2  Continue your medication regimen and a consistent carbohydrate diet (Meaning eating the same amount of carbohydrates at the same time each day). Monitor blood glucose levels throughout the day before meals and at bedtime. Record blood sugars and bring to outpatient providers appointment in order to be reviewed by your doctor for management modifications. If your sugars are more than 400 or less than 70 you should contact your PCP immediately. Monitor for signs/symptoms of low blood glucose, such as, dizziness, altered mental status, or cool/clammy skin. In addition, monitor for signs/symptoms of high blood glucose, such as, feeling hot, dry, fatigued, or with increased thirst/urination. Make regular podiatry appointments in order to have feet checked for wounds and uncontrolled toe nail growth to prevent infections, as well as, appointments with an ophthalmologist to monitor your vision.      Diagnosis: NPH (normal pressure hydrocephalus)  Assessment and Plan of Treatment: Your CT head is unchanged from prior CT head. Please follow up with your neurologist upon discharge for further monitoring/management.    Diagnosis: COPD, mild  Assessment and Plan of Treatment: You will be going home with home oxygen due to your hypoxia. Continue inhalers as prescribed. Follow up with your PCP/ulmonologist in 1-2 weeksfrom discharge       PRINCIPAL DISCHARGE DIAGNOSIS  Diagnosis: Herpes zoster  Assessment and Plan of Treatment: You were admitted with herpes zoster ophthalmicus. Infectious disease was consulted and you were treated with IV antiviral and also with a course of oral antiviral medication.   Ophthalmology was also consulted and they recommend:   - continue with pred forte three times a day to right eye.   - continue with erythromycin ointment four times a day to R eye and periocular lesions  - continue with preservative-free artificial tears every 2 hours in both eyes  Neurosurgery was consulted and you received multiple nerve blocks to help with your pain.  Pain Management was also consulted to help provide an appropriate pain regimen.  Continue on prescribed medication regimen.   Call and schedule outpatient follow up with Opthamology and Pain Management.      SECONDARY DISCHARGE DIAGNOSES  Diagnosis: Pneumonia, aspiration  Assessment and Plan of Treatment: You were found to have pneumonia likely due to aspiration. You were found to be hypoxic and required oxygen, which you will go home one. Infectious Disease was consulted and you were treated with antibiotics which you will continue to take up to and including 9/2.    Diagnosis: Carotid stenosis  Assessment and Plan of Treatment: You had an ultrasound of your neck which showed VA duplex 50-79% stenosis in Left Internal Carotid Artery and 16-49% stenosis in Right Internal Carotid Artery , which is not hemodynamically significant. Please follow up outpatient with your primary care provider for further monitoring.    Diagnosis: Deep vein thrombosis (DVT) of right upper extremity  Assessment and Plan of Treatment: You were found to have a DVT(blood clot) in your right arm on 8/8/2022 and were started on anticoagulation. You will be going home on Eliquis 5mg twice a day. Please continue to take this and follow up with your primary care further monitoring/management.      Diagnosis: Urinary retention  Assessment and Plan of Treatment: You will be discharged with a dior due to your urinary retention and failing your voiding trials when removing the dior. Please follow up with Urology outpatient. Please call to make an appointment.    Diagnosis: UTI due to extended-spectrum beta lactamase (ESBL) producing Escherichia coli  Assessment and Plan of Treatment: You were found to have a UTI and were treated with antibiotics.    Diagnosis: Hypertension  Assessment and Plan of Treatment: Continue blood pressure medication metoprolol and  lasix  regimen as directed. Monitor for any visual changes, headaches or dizziness.  Monitor blood pressure regularly.  Follow up with your PCP for further management for high blood pressure.      Diagnosis: Groin rash  Assessment and Plan of Treatment: Continue with antifungal medication until your rash resolves.    Diagnosis: Type 2 diabetes mellitus  Assessment and Plan of Treatment: Your HgbA1c: 7.2  Continue your medication regimen and a consistent carbohydrate diet (Meaning eating the same amount of carbohydrates at the same time each day). Monitor blood glucose levels throughout the day before meals and at bedtime. Record blood sugars and bring to outpatient providers appointment in order to be reviewed by your doctor for management modifications. If your sugars are more than 400 or less than 70 you should contact your PCP immediately. Monitor for signs/symptoms of low blood glucose, such as, dizziness, altered mental status, or cool/clammy skin. In addition, monitor for signs/symptoms of high blood glucose, such as, feeling hot, dry, fatigued, or with increased thirst/urination. Make regular podiatry appointments in order to have feet checked for wounds and uncontrolled toe nail growth to prevent infections, as well as, appointments with an ophthalmologist to monitor your vision.      Diagnosis: NPH (normal pressure hydrocephalus)  Assessment and Plan of Treatment: Your CT head is unchanged from prior CT head. Please follow up with your neurologist upon discharge for further monitoring/management.    Diagnosis: COPD, mild  Assessment and Plan of Treatment: You will be going home with home oxygen due to your hypoxia. Continue inhalers as prescribed. Follow up with your PCP/ulmonologist in 1-2 weeksfrom discharge       PRINCIPAL DISCHARGE DIAGNOSIS  Diagnosis: Herpes zoster  Assessment and Plan of Treatment: You were admitted with herpes zoster ophthalmicus. Infectious disease was consulted and you were treated with IV antiviral and also with a course of oral antiviral medication.   Ophthalmology was also consulted and they recommend:   - continue with pred forte three times a day to right eye.   - continue with erythromycin ointment four times a day to R eye and periocular lesions  - continue with preservative-free artificial tears every 2 hours in both eyes  Neurosurgery was consulted and you received multiple nerve blocks to help with your pain.  Pain Management was also consulted to help provide an appropriate pain regimen please follow up outpatient with them.  Continue on prescribed medication regimen.   Call and schedule outpatient follow up with Opthamology and Pain Management.      SECONDARY DISCHARGE DIAGNOSES  Diagnosis: Pneumonia, aspiration  Assessment and Plan of Treatment: You were found to have pneumonia likely due to aspiration. You were found to be hypoxic and required oxygen, which you will go home one. Infectious Disease was consulted and you were treated with antibiotics which you will continue to take up to and including 9/2.    Diagnosis: Carotid stenosis  Assessment and Plan of Treatment: You had an ultrasound of your neck which showed VA duplex 50-79% stenosis in Left Internal Carotid Artery and 16-49% stenosis in Right Internal Carotid Artery , which is not hemodynamically significant. Please follow up outpatient with your primary care provider for further monitoring.    Diagnosis: Deep vein thrombosis (DVT) of right upper extremity  Assessment and Plan of Treatment: You were found to have a DVT(a provoked blood clot) in your right arm on 8/8/2022 and were started on anticoagulation. You will be going home on Eliquis 5mg twice a day. Please continue to take this and follow up with your primary care further/ cardiology monitoring/management.      Diagnosis: Urinary retention  Assessment and Plan of Treatment: You will be discharged with a dior due to your urinary retention and failing your voiding trials when removing the doir. Please follow up with Urology outpatient. Please call to make an appointment.    Diagnosis: UTI due to extended-spectrum beta lactamase (ESBL) producing Escherichia coli  Assessment and Plan of Treatment: You were found to have a UTI and were treated with antibiotics.    Diagnosis: Hypertension  Assessment and Plan of Treatment: Continue blood pressure medication regimen: metoprolol 12.5mg daily and lasix 20mg daily, as directed. Continue to hold Spirinolactone and Losartan. Monitor for any visual changes, headaches or dizziness.  Monitor blood pressure regularly.  Follow up with your PCP for further management for high blood pressure.      Diagnosis: Groin rash  Assessment and Plan of Treatment: Continue with antifungal medication until your rash resolves.    Diagnosis: Type 2 diabetes mellitus  Assessment and Plan of Treatment: Your HgbA1c: 7.2  Continue your medication regimen and a consistent carbohydrate diet (Meaning eating the same amount of carbohydrates at the same time each day). Monitor blood glucose levels throughout the day before meals and at bedtime. Record blood sugars and bring to outpatient providers appointment in order to be reviewed by your doctor for management modifications. If your sugars are more than 400 or less than 70 you should contact your PCP immediately. Monitor for signs/symptoms of low blood glucose, such as, dizziness, altered mental status, or cool/clammy skin. In addition, monitor for signs/symptoms of high blood glucose, such as, feeling hot, dry, fatigued, or with increased thirst/urination. Make regular podiatry appointments in order to have feet checked for wounds and uncontrolled toe nail growth to prevent infections, as well as, appointments with an ophthalmologist to monitor your vision.      Diagnosis: NPH (normal pressure hydrocephalus)  Assessment and Plan of Treatment: Your CT head is unchanged from prior CT head. Please follow up with your neurologist upon discharge for further monitoring/management.    Diagnosis: COPD, mild  Assessment and Plan of Treatment: You will be going home with home oxygen due to your hypoxia. Your goal oxygen saturation is 92% or greater.   Continue inhalers as prescribed. Follow up with your PCP/pulmonologist in 1-2 weeksfrom discharge.       PRINCIPAL DISCHARGE DIAGNOSIS  Diagnosis: Herpes zoster  Assessment and Plan of Treatment: You were admitted with herpes zoster ophthalmicus. Infectious disease was consulted and you were treated with IV antiviral and also with a course of oral antiviral medication.   Ophthalmology was also consulted and they recommend:   - continue with pred forte three times a day to right eye.   - continue with erythromycin ointment four times a day to R eye and periocular lesions  - continue with preservative-free artificial tears every 2 hours in both eyes  Neurosurgery was consulted and you received multiple nerve blocks to help with your pain.  Pain Management was also consulted to help provide an appropriate pain regimen please follow up outpatient with them.  Continue on prescribed medication regimen.   Call and schedule outpatient follow up with Opthamology and Pain Management.      SECONDARY DISCHARGE DIAGNOSES  Diagnosis: Pneumonia, aspiration  Assessment and Plan of Treatment: You were found to have pneumonia likely due to aspiration. You were found to be hypoxic and required oxygen, which you will go home one. Infectious Disease was consulted and you were treated with antibiotics which you will continue to take up to and including 9/2.    Diagnosis: Carotid stenosis  Assessment and Plan of Treatment: You had an ultrasound of your neck which showed VA duplex 50-79% stenosis in Left Internal Carotid Artery and 16-49% stenosis in Right Internal Carotid Artery , which is not hemodynamically significant. Please follow up outpatient with your primary care provider for further monitoring.    Diagnosis: Deep vein thrombosis (DVT) of right upper extremity  Assessment and Plan of Treatment: You were found to have a provoked DVT (blood clot) in your right arm on 8/8/2022 and were started on anticoagulation. You will be going home on Eliquis 5mg twice a day. Please continue to take this and follow up with your primary care provider/cardiology for further monitoring/management.      Diagnosis: Urinary retention  Assessment and Plan of Treatment: You will be discharged with a dior due to your urinary retention and failing your voiding trials when removing the dior. Please follow up with Urology outpatient. Please call to make an appointment.    Diagnosis: UTI due to extended-spectrum beta lactamase (ESBL) producing Escherichia coli  Assessment and Plan of Treatment: You were found to have a UTI and were treated with antibiotics.    Diagnosis: Hypertension  Assessment and Plan of Treatment: Continue blood pressure medication regimen: metoprolol 12.5mg daily and lasix 20mg daily, as directed. Continue to hold Spirinolactone and Losartan. Monitor for any visual changes, headaches or dizziness.  Monitor blood pressure regularly.  Follow up with your PCP for further management for high blood pressure.      Diagnosis: Groin rash  Assessment and Plan of Treatment: Continue with antifungal medication until your rash resolves.    Diagnosis: Type 2 diabetes mellitus  Assessment and Plan of Treatment: Your HgbA1c: 7.2  Continue your medication regimen and a consistent carbohydrate diet (Meaning eating the same amount of carbohydrates at the same time each day). Monitor blood glucose levels throughout the day before meals and at bedtime. Record blood sugars and bring to outpatient providers appointment in order to be reviewed by your doctor for management modifications. If your sugars are more than 400 or less than 70 you should contact your PCP immediately. Monitor for signs/symptoms of low blood glucose, such as, dizziness, altered mental status, or cool/clammy skin. In addition, monitor for signs/symptoms of high blood glucose, such as, feeling hot, dry, fatigued, or with increased thirst/urination. Make regular podiatry appointments in order to have feet checked for wounds and uncontrolled toe nail growth to prevent infections, as well as, appointments with an ophthalmologist to monitor your vision.      Diagnosis: NPH (normal pressure hydrocephalus)  Assessment and Plan of Treatment: Your CT head is unchanged from prior CT head. Please follow up with your neurologist upon discharge for further monitoring/management.    Diagnosis: COPD, mild  Assessment and Plan of Treatment: You will be going home with home oxygen due to your hypoxia. Your goal oxygen saturation is 92% or greater.   Continue inhalers as prescribed. Follow up with your PCP/pulmonologist in 1-2 weeksfrom discharge.       PRINCIPAL DISCHARGE DIAGNOSIS  Diagnosis: Herpes zoster  Assessment and Plan of Treatment: You were admitted with herpes zoster ophthalmicus. Infectious disease was consulted and you were treated with IV antiviral and also with a course of oral antiviral medication.   Ophthalmology was also consulted and they recommend:   - continue with pred forte three times a day to right eye.   - continue with erythromycin ointment four times a day to R eye and periocular lesions  - continue with preservative-free artificial tears every 2 hours in both eyes  Neurosurgery was consulted and you received multiple nerve blocks to help with your pain.  Pain Management was also consulted to help provide an appropriate pain regimen please follow up outpatient with them.  Continue on prescribed medication regimen.   Call and schedule outpatient follow up with Opthamology and Pain Management.      SECONDARY DISCHARGE DIAGNOSES  Diagnosis: Pneumonia, aspiration  Assessment and Plan of Treatment: You were found to have pneumonia likely due to aspiration. You were found to be hypoxic and required oxygen, which you will go home one. Infectious Disease was consulted and you were treated with antibiotics which you will continue to take up to and including 9/2.    Diagnosis: Carotid stenosis  Assessment and Plan of Treatment: You had an ultrasound of your neck which showed VA duplex 50-79% stenosis in Left Internal Carotid Artery and 16-49% stenosis in Right Internal Carotid Artery , which is not hemodynamically significant. Please follow up outpatient with your primary care provider for further monitoring.    Diagnosis: Deep vein thrombosis (DVT) of right upper extremity  Assessment and Plan of Treatment: You were found to have a provoked DVT (blood clot) in your right arm on 8/8/2022 and were started on anticoagulation. You will be going home on Eliquis 5mg twice a day. Please continue to take this and follow up with your primary care provider/cardiology for further monitoring/management.      Diagnosis: Urinary retention  Assessment and Plan of Treatment: You will be discharged with a dior due to your urinary retention and failing your voiding trials when removing the dior. Please follow up with Urology outpatient. Please call to make an appointment.    Diagnosis: UTI due to extended-spectrum beta lactamase (ESBL) producing Escherichia coli  Assessment and Plan of Treatment: You were found to have a UTI and were treated with antibiotics. You are being prescribed Methenamine hippurate to help prevent another UTI, especially since you are going home with a dior.    Diagnosis: Hypertension  Assessment and Plan of Treatment: Continue blood pressure medication regimen: metoprolol 12.5mg daily and lasix 20mg daily, as directed. Continue to hold Spirinolactone and Losartan. Monitor for any visual changes, headaches or dizziness.  Monitor blood pressure regularly.  Follow up with your PCP for further management for high blood pressure.      Diagnosis: Groin rash  Assessment and Plan of Treatment: Continue with antifungal medication until your rash resolves.    Diagnosis: Type 2 diabetes mellitus  Assessment and Plan of Treatment: Your HgbA1c: 7.2  Continue your medication regimen and a consistent carbohydrate diet (Meaning eating the same amount of carbohydrates at the same time each day). Monitor blood glucose levels throughout the day before meals and at bedtime. Record blood sugars and bring to outpatient providers appointment in order to be reviewed by your doctor for management modifications. If your sugars are more than 400 or less than 70 you should contact your PCP immediately. Monitor for signs/symptoms of low blood glucose, such as, dizziness, altered mental status, or cool/clammy skin. In addition, monitor for signs/symptoms of high blood glucose, such as, feeling hot, dry, fatigued, or with increased thirst/urination. Make regular podiatry appointments in order to have feet checked for wounds and uncontrolled toe nail growth to prevent infections, as well as, appointments with an ophthalmologist to monitor your vision.      Diagnosis: NPH (normal pressure hydrocephalus)  Assessment and Plan of Treatment: Your CT head is unchanged from prior CT head. Please follow up with your neurologist upon discharge for further monitoring/management.    Diagnosis: COPD, mild  Assessment and Plan of Treatment: You will be going home with home oxygen due to your hypoxia. Your goal oxygen saturation is 92% or greater.   Continue inhalers as prescribed. Follow up with your PCP/pulmonologist in 1-2 weeksfrom discharge.

## 2022-07-31 NOTE — DISCHARGE NOTE PROVIDER - PROVIDER TOKENS
PROVIDER:[TOKEN:[[id number]]],PROVIDER:[TOKEN:[[id number]]] PROVIDER:[TOKEN:[[id number]]],PROVIDER:[TOKEN:[[id number]],FOLLOWUP:[1 week]] PROVIDER:[TOKEN:[[id number]]],PROVIDER:[TOKEN:[[id number]],FOLLOWUP:[1 week]],PROVIDER:[TOKEN:[[id number]],FOLLOWUP:[1 week]] PROVIDER:[TOKEN:[[id number]]],PROVIDER:[TOKEN:[[id number]],FOLLOWUP:[1 week]],PROVIDER:[TOKEN:[[id number]],FOLLOWUP:[1-3 days]],FREE:[LAST:[Your],FIRST:[Cardiologist],PHONE:[(   )    -],FAX:[(   )    -],FOLLOWUP:[1 week]]

## 2022-07-31 NOTE — DISCHARGE NOTE PROVIDER - NPI NUMBER (FOR SYSADMIN USE ONLY) :
[[id number]],[[id number]] [[id number]],[[id number]],[[id number]] [[id number]],[[id number]],[[id number]],[UNKNOWN]

## 2022-07-31 NOTE — PROGRESS NOTE ADULT - ASSESSMENT
86y female w/ pmhx/ochx of CAD, DM, COPD, NPH w/ programmable shunt comes to ED for AMS and shingles, found to have HZO of R side with corneal involvement and elevated IOP, now improved.     1. HZO OD with ocular involvement  - Pt with much improved mental status today.  - VA 20/70 OD, 20/30 OS. Color plates, CVF full OU   - Pt noted to have scattered vesicular, crusted lesions along V1 distribution over R side of face, 1+ periorbital edema RUL and RLL  - Four small corneal pseudodendrites remaining OD, improved  - CT stereotactic: extensive right periorbital preseptal soft tissue swelling is noted concordant with the history of shingles over the right eye. A superimposed cellulitis can't be excluded. No post septal extension is appreciated.  - Pt unable to tolerate sitting up at slit lamp - cannot assess anterior chamber for inflammatory reaction  - IOP noted to be 26 OD on admission, now improved to 12  - C/w brimonidine TID in R eye  - C/w erythromycin ointment QID to R eye and periocular lesions  - C/w preservative-free artificial tears QID OS  - Appreciate ID consult - antivirals and antibiotics per ID  - Pt has dilated and minimally reactive R pupil. 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 EOMs today with continued abduction deficit OD.   - MR orbits: asymmetric edema appears to involve the right orbital medial and lateral rectus muscles, as well as the superior oblique muscle, suspicious for a myositis (viral given the known herpes zoster with superimposed bacterial infection not excluded). There is no evidence for orbital abscess. The cavernous sinuses and superior ophthalmic veins remain patent. The right preseptal periorbital soft tissue swelling appears improved compared to the CT suggesting improving preseptal cellulitis/zoster.   - Will continue to monitor for improvement of EOMs on antivirals.     2. AMS, possibly 2/2 herpetic encephalitis - resolved  - Appreciate neurology consult  - MRI brain showing no acute pathology, MRI orbits as above  - LP deferred per neurology and ID    Outpatient follow-up: Patient should follow-up with his/her ophthalmologist or with [facility] Department of Ophthalmology at the address below     [address] [place] [address]. [address]  [place] [address]  [phone]    Incomplete, to be discussed with attending.    Jluis Sims MD PGY-2 86y female w/ pmhx/ochx of CAD, DM, COPD, NPH w/ programmable shunt comes to ED for AMS and shingles, found to have HZO of R side with corneal involvement and elevated IOP, now improved.     1. HZO OD with ocular involvement  - Pt with much improved mental status today.  - VA 20/70 OD, 20/30 OS. Color plates, CVF full OU   - Pt noted to have scattered vesicular, crusted lesions along V1 distribution over R side of face, 1+ periorbital edema RUL and RLL  - Four small corneal pseudodendrites remaining OD, improved  - CT stereotactic: extensive right periorbital preseptal soft tissue swelling is noted concordant with the history of shingles over the right eye. A superimposed cellulitis can't be excluded. No post septal extension is appreciated.  - Pt unable to tolerate sitting up at slit lamp - cannot assess anterior chamber for inflammatory reaction  - IOP noted to be 26 OD on admission, Today 12 OD, 15 OS  - C/w brimonidine TID in R eye  - C/w erythromycin ointment QID to R eye and periocular lesions  - C/w preservative-free artificial tears QID OS  - Appreciate ID consult - antivirals and antibiotics per ID  - Pt has dilated and minimally reactive R pupil. 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 EOMs today with continued abduction deficit OD.   - MR orbits: asymmetric edema appears to involve the right orbital medial and lateral rectus muscles, as well as the superior oblique muscle, suspicious for a myositis (viral given the known herpes zoster with superimposed bacterial infection not excluded). There is no evidence for orbital abscess. The cavernous sinuses and superior ophthalmic veins remain patent. The right preseptal periorbital soft tissue swelling appears improved compared to the CT suggesting improving preseptal cellulitis/zoster.   - Will continue to monitor for improvement of EOMs on antivirals.     2. AMS, possibly 2/2 herpetic encephalitis - resolved  - Appreciate neurology consult  - MRI brain showing no acute pathology, MRI orbits as above  - LP deferred per neurology and ID    Outpatient follow-up: Patient should follow-up with his/her ophthalmologist or with [facility] Department of Ophthalmology at the address below     [address] [place] [address]. [address]  [place] [address]  [phone]    SDW Dr. Darvin Mas PGY 4. KANNAN Arevalo, neuroophthalmology.     Jluis Sims MD PGY-2

## 2022-07-31 NOTE — PROGRESS NOTE ADULT - PROBLEM SELECTOR PLAN 7
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

## 2022-07-31 NOTE — DISCHARGE NOTE PROVIDER - CARE PROVIDER_API CALL
ERNA KEEN  Internal Medicine  [address] [place] [address]  Phone: [phone]  Fax: [phone]  Follow Up Time:     Jonathan Stovall)  Urology  [address] [place] [address], [place] [address]  Phone: [phone]  Fax: [phone]  Follow Up Time:    ERNA KEEN  Internal Medicine  [address] [place] [address]  Phone: [phone]  Fax: [phone]  Follow Up Time:     Jonathan Stovall)  Urology  [address] [place] [address], [place] [address]  Phone: [phone]  Fax: [phone]  Follow Up Time: 1 week   ERNA KEEN  Internal Medicine  [facility] [place] [address]  Phone: [phone]  Fax: [phone]  Follow Up Time:     Jonathan Stovall)  Urology  [address] [facility], [address]  [place] [address]  Phone: [phone]  Fax: [phone]  Follow Up Time: 1 week    Gwendolyn St)  Internal Medicine  [address] [facility], [address]  [place] [address]  Phone: [phone]  Fax: ()-  Follow Up Time: 1 week   ERNA KEEN  Internal Medicine  [facility] [place] [address]  Phone: [phone]  Fax: [phone]  Follow Up Time:     Jonathan Stovall)  Urology  [address] [facility], [address]  [place] [address]  Phone: [phone]  Fax: [phone]  Follow Up Time: 1 week    Gwendolyn St)  Internal Medicine  [address] [facility], [address]  [place] [address]  Phone: [phone]  Fax: ()-  Follow Up Time: 1-3 days    Your, Cardiologist  Phone: (   )    -  Fax: (   )    -  Follow Up Time: 1 week

## 2022-07-31 NOTE — DISCHARGE NOTE PROVIDER - NSDCCAREPROVSEEN_GEN_ALL_CORE_FT
[facility] in house medicine team 4 [facility] in house medicine team 4  Ubaldo Mayfield [facility]I in house medicine team 4  Aline Gaviria Medicine ACP Team

## 2022-07-31 NOTE — PROGRESS NOTE ADULT - ATTENDING COMMENTS
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 CHARLINE resolved. Course now c/b + blood cx w/ MRSE, However suspect this is a contaminant.  Repeat blood cxs from 7/28 NTD.     Now s/p brain and orbital MRI personally reviewed and notable for asymmetric edema of the right orbital medial and lateral rectus muscles, as well as the superior oblique muscle, w/o evidence for orbital abscess and Chronic left frontal parietal infarcts and chronic white matter changes   as. Orbital MRI was noted to be a limited study     -Continue to monitor mental status -Continues to Improve.   -Continue to monitor lesions- Will consult wound care for topical treatment recs. Suspect increased neuropathic pain today- before it was more itching now it is sharp and under the skin where the lesions are. Will start gabapentin and give one dose of oxycodone 2.5 mg for now. Daughter does not want opiods standing but ok with intermittent dosing while gabapentin takes awhile to go into effect. Would alternate oxy with tylenol as well.  - C/w Acyclovir as stated above  w/ prophylactic IVF while on treatment acyclovir IV and c/w brimonidine TID in R eye, erythromycin ointment QID to R eye and periocular lesions  -c/w treating w/ ancef for possibly superimposed bacterial cellulitis.  -C/w losartan, metoprolol, and amlodipine 2.5mg. Plan is to titrate up if needed between the Losartan or amlodipine- however holding off on increasing today as pt was in acute pain and addressing her pain should also help the BP. Will likely discharge off of lasix and spironolactone     d/w pts daughter Dr. Delgado  at bedside.

## 2022-07-31 NOTE — DIETITIAN INITIAL EVALUATION ADULT - NS FNS DIET ORDER
Diet, Soft and Bite Sized:   Consistent Carbohydrate {Evening Snack} (CSTCHOSN)  Kosher (07-31-22 @ 13:04)

## 2022-07-31 NOTE — DIETITIAN INITIAL EVALUATION ADULT - PERTINENT MEDS FT
MEDICATIONS  (STANDING):  acyclovir IVPB 650 milliGRAM(s) IV Intermittent every 8 hours  amLODIPine   Tablet 2.5 milliGRAM(s) Oral daily  artificial tears (preservative free) Ophthalmic Solution 1 Drop(s) Left EYE four times a day  aspirin  chewable 324 milliGRAM(s) Oral daily  brimonidine 0.2% Ophthalmic Solution 1 Drop(s) Right EYE three times a day  budesonide  80 MICROgram(s)/formoterol 4.5 MICROgram(s) Inhaler 2 Puff(s) Inhalation two times a day  ceFAZolin   IVPB 1000 milliGRAM(s) IV Intermittent every 8 hours  dextrose 5%. 1000 milliLiter(s) (100 mL/Hr) IV Continuous <Continuous>  dextrose 5%. 1000 milliLiter(s) (50 mL/Hr) IV Continuous <Continuous>  dextrose 50% Injectable 25 Gram(s) IV Push once  dextrose 50% Injectable 12.5 Gram(s) IV Push once  dextrose 50% Injectable 25 Gram(s) IV Push once  enoxaparin Injectable 40 milliGRAM(s) SubCutaneous every 24 hours  erythromycin   Ointment 1 Application(s) Right EYE four times a day  glucagon  Injectable 1 milliGRAM(s) IntraMuscular once  insulin glargine Injectable (LANTUS) 10 Unit(s) SubCutaneous at bedtime  insulin lispro (ADMELOG) corrective regimen sliding scale   SubCutaneous Before meals and at bedtime  lactated ringers. 1000 milliLiter(s) (75 mL/Hr) IV Continuous <Continuous>  lactobacillus acidophilus 1 Tablet(s) Oral daily  losartan 50 milliGRAM(s) Oral daily  metoprolol succinate ER 12.5 milliGRAM(s) Oral daily  mirtazapine 15 milliGRAM(s) Oral at bedtime  nystatin Powder 1 Application(s) Topical two times a day  sodium chloride 0.9%. 1000 milliLiter(s) (75 mL/Hr) IV Continuous <Continuous>  tiotropium 18 MICROgram(s) Capsule 1 Capsule(s) Inhalation daily  traZODone 100 milliGRAM(s) Oral at bedtime  ursodiol Tablet 500 milliGRAM(s) Oral <User Schedule>    MEDICATIONS  (PRN):  acetaminophen     Tablet .. 650 milliGRAM(s) Oral every 6 hours PRN Mild Pain (1 - 3), Moderate Pain (4 - 6), Severe Pain (7 - 10)  dextrose Oral Gel 15 Gram(s) Oral once PRN Blood Glucose LESS THAN 70 milliGRAM(s)/deciliter  hydrOXYzine hydrochloride 10 milliGRAM(s) Oral four times a day PRN Itching

## 2022-07-31 NOTE — PROGRESS NOTE ADULT - PROBLEM SELECTOR PLAN 6
Patient with history of DM2 on home Lantus 34U daily, Trulicity, glipizide    - Hold oral hypoglycemics   - premeal and bedtime fingersticks  - HgbA1C 7,2    - Insulin sliding scale and lantus 10U qhs for now Patient with history of DM2 on home Lantus 34U daily, Trulicity, glipizide    - Hold oral hypoglycemics   - premeal and bedtime fingersticks  - HgbA1C 7,2    - Insulin sliding scale and lantus 10U qhs for now-> 7/31 8 ISS in 24 hours, can up to 15U lantus basal

## 2022-07-31 NOTE — DISCHARGE NOTE PROVIDER - NSFOLLOWUPCLINICSTOKEN_GEN_ALL_ED_FT
304522: || ||00\01||False;[id number]: || ||00\01||False; [id number]: || ||00\01||False;[id number]: || ||00\01||False;016214:2 weeks|| ||00\01||False;

## 2022-07-31 NOTE — DIETITIAN INITIAL EVALUATION ADULT - OTHER INFO
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    Nutrition interview: No recent episodes of nausea, vomiting, diarrhea or constipation, BM noted today per Pt. Denies any chewing/swallowing difficulties. Daughter would like to have Pt re-evaulated by the SLP as Pt came in with AMS on admission and is now able to eat better. Noted with upgraded diet today per MD order 7/31, Soft and bite sized. No food allergies. Stated UBW: ~170s, in line with current wt. Food preferences explored and noted. Intake is 50-75% per pt. Feeding skills: assistance required for eating from staff.    Written diet education provided to Pt and daughter on carbohydrate counting. Pt daughter stated however she doesn't think Pt will look at it as she is non-compliant.

## 2022-07-31 NOTE — PROGRESS NOTE ADULT - SUBJECTIVE AND OBJECTIVE BOX
Patient is a 86y old  Female who presents with a chief complaint of Suspicion for Herpes Zoster opthalmicus/encephalitis (30 Jul 2022 07:59)      SUBJECTIVE / OVERNIGHT EVENTS:    MEDICATIONS  (STANDING):  acyclovir IVPB 650 milliGRAM(s) IV Intermittent every 8 hours  amLODIPine   Tablet 2.5 milliGRAM(s) Oral daily  artificial tears (preservative free) Ophthalmic Solution 1 Drop(s) Left EYE four times a day  aspirin  chewable 324 milliGRAM(s) Oral daily  brimonidine 0.2% Ophthalmic Solution 1 Drop(s) Right EYE three times a day  budesonide  80 MICROgram(s)/formoterol 4.5 MICROgram(s) Inhaler 2 Puff(s) Inhalation two times a day  ceFAZolin   IVPB 1000 milliGRAM(s) IV Intermittent every 8 hours  dextrose 5%. 1000 milliLiter(s) (50 mL/Hr) IV Continuous <Continuous>  dextrose 5%. 1000 milliLiter(s) (100 mL/Hr) IV Continuous <Continuous>  dextrose 50% Injectable 25 Gram(s) IV Push once  dextrose 50% Injectable 12.5 Gram(s) IV Push once  dextrose 50% Injectable 25 Gram(s) IV Push once  enoxaparin Injectable 40 milliGRAM(s) SubCutaneous every 24 hours  erythromycin   Ointment 1 Application(s) Right EYE four times a day  glucagon  Injectable 1 milliGRAM(s) IntraMuscular once  insulin glargine Injectable (LANTUS) 10 Unit(s) SubCutaneous at bedtime  insulin lispro (ADMELOG) corrective regimen sliding scale   SubCutaneous Before meals and at bedtime  lactated ringers. 1000 milliLiter(s) (75 mL/Hr) IV Continuous <Continuous>  lactobacillus acidophilus 1 Tablet(s) Oral daily  losartan 50 milliGRAM(s) Oral daily  metoprolol succinate ER 12.5 milliGRAM(s) Oral daily  mirtazapine 15 milliGRAM(s) Oral at bedtime  nystatin Powder 1 Application(s) Topical two times a day  sodium chloride 0.9%. 1000 milliLiter(s) (75 mL/Hr) IV Continuous <Continuous>  tiotropium 18 MICROgram(s) Capsule 1 Capsule(s) Inhalation daily  traZODone 100 milliGRAM(s) Oral at bedtime  ursodiol Tablet 500 milliGRAM(s) Oral <User Schedule>    MEDICATIONS  (PRN):  acetaminophen     Tablet .. 650 milliGRAM(s) Oral every 6 hours PRN Mild Pain (1 - 3), Moderate Pain (4 - 6), Severe Pain (7 - 10)  dextrose Oral Gel 15 Gram(s) Oral once PRN Blood Glucose LESS THAN 70 milliGRAM(s)/deciliter  diphenhydrAMINE 25 milliGRAM(s) Oral every 6 hours PRN Rash and/or Itching      Vital Signs Last 24 Hrs  T(C): 36.8 (31 Jul 2022 05:00), Max: 36.8 (31 Jul 2022 05:00)  T(F): 98.2 (31 Jul 2022 05:00), Max: 98.2 (31 Jul 2022 05:00)  HR: 75 (31 Jul 2022 05:00) (58 - 80)  BP: 162/58 (31 Jul 2022 05:00) (145/70 - 162/58)  BP(mean): --  RR: 18 (31 Jul 2022 05:00) (17 - 18)  SpO2: 98% (31 Jul 2022 05:00) (95% - 98%)    Parameters below as of 31 Jul 2022 05:00  Patient On (Oxygen Delivery Method): room air      CAPILLARY BLOOD GLUCOSE      POCT Blood Glucose.: 186 mg/dL (30 Jul 2022 22:16)  POCT Blood Glucose.: 183 mg/dL (30 Jul 2022 17:24)  POCT Blood Glucose.: 219 mg/dL (30 Jul 2022 11:50)    I&O's Summary      PHYSICAL EXAM:  GENERAL: NAD, well-developed  HEAD:  Atraumatic, Normocephalic  EYES: EOMI, PERRLA, conjunctiva and sclera clear  NECK: Supple, No JVD  CHEST/LUNG: Clear to auscultation bilaterally; No wheeze  HEART: Regular rate and rhythm; No murmurs, rubs, or gallops  ABDOMEN: Soft, Nontender, Nondistended; Bowel sounds present  EXTREMITIES:  2+ Peripheral Pulses, No clubbing, cyanosis, or edema  PSYCH: AAOx3  NEUROLOGY: non-focal  SKIN: No rashes or lesions    LABS:                        11.1   5.37  )-----------( 251      ( 30 Jul 2022 05:35 )             34.6     07-30    140  |  106  |  5<L>  ----------------------------<  187<H>  3.6   |  26  |  0.52    Ca    8.3<L>      30 Jul 2022 17:32  Phos  2.2     07-30  Mg     1.60     07-30                RADIOLOGY & ADDITIONAL TESTS:    Imaging Personally Reviewed:    Consultant(s) Notes Reviewed:      Care Discussed with Consultants/Other Providers:   Patient is a 86y old  Female who presents with a chief complaint of Suspicion for Herpes Zoster opthalmicus/encephalitis (30 Jul 2022 07:59)      SUBJECTIVE / OVERNIGHT EVENTS: Patient at bedside, no acute events overnight. Patient's main complaint is a burning/stabbing pain over right head. No other complaints.    MEDICATIONS  (STANDING):  acyclovir IVPB 650 milliGRAM(s) IV Intermittent every 8 hours  amLODIPine   Tablet 2.5 milliGRAM(s) Oral daily  artificial tears (preservative free) Ophthalmic Solution 1 Drop(s) Left EYE four times a day  aspirin  chewable 324 milliGRAM(s) Oral daily  brimonidine 0.2% Ophthalmic Solution 1 Drop(s) Right EYE three times a day  budesonide  80 MICROgram(s)/formoterol 4.5 MICROgram(s) Inhaler 2 Puff(s) Inhalation two times a day  ceFAZolin   IVPB 1000 milliGRAM(s) IV Intermittent every 8 hours  dextrose 5%. 1000 milliLiter(s) (50 mL/Hr) IV Continuous <Continuous>  dextrose 5%. 1000 milliLiter(s) (100 mL/Hr) IV Continuous <Continuous>  dextrose 50% Injectable 25 Gram(s) IV Push once  dextrose 50% Injectable 12.5 Gram(s) IV Push once  dextrose 50% Injectable 25 Gram(s) IV Push once  enoxaparin Injectable 40 milliGRAM(s) SubCutaneous every 24 hours  erythromycin   Ointment 1 Application(s) Right EYE four times a day  glucagon  Injectable 1 milliGRAM(s) IntraMuscular once  insulin glargine Injectable (LANTUS) 10 Unit(s) SubCutaneous at bedtime  insulin lispro (ADMELOG) corrective regimen sliding scale   SubCutaneous Before meals and at bedtime  lactated ringers. 1000 milliLiter(s) (75 mL/Hr) IV Continuous <Continuous>  lactobacillus acidophilus 1 Tablet(s) Oral daily  losartan 50 milliGRAM(s) Oral daily  metoprolol succinate ER 12.5 milliGRAM(s) Oral daily  mirtazapine 15 milliGRAM(s) Oral at bedtime  nystatin Powder 1 Application(s) Topical two times a day  sodium chloride 0.9%. 1000 milliLiter(s) (75 mL/Hr) IV Continuous <Continuous>  tiotropium 18 MICROgram(s) Capsule 1 Capsule(s) Inhalation daily  traZODone 100 milliGRAM(s) Oral at bedtime  ursodiol Tablet 500 milliGRAM(s) Oral <User Schedule>    MEDICATIONS  (PRN):  acetaminophen     Tablet .. 650 milliGRAM(s) Oral every 6 hours PRN Mild Pain (1 - 3), Moderate Pain (4 - 6), Severe Pain (7 - 10)  dextrose Oral Gel 15 Gram(s) Oral once PRN Blood Glucose LESS THAN 70 milliGRAM(s)/deciliter  diphenhydrAMINE 25 milliGRAM(s) Oral every 6 hours PRN Rash and/or Itching      Vital Signs Last 24 Hrs  T(C): 36.8 (31 Jul 2022 05:00), Max: 36.8 (31 Jul 2022 05:00)  T(F): 98.2 (31 Jul 2022 05:00), Max: 98.2 (31 Jul 2022 05:00)  HR: 75 (31 Jul 2022 05:00) (58 - 80)  BP: 162/58 (31 Jul 2022 05:00) (145/70 - 162/58)  BP(mean): --  RR: 18 (31 Jul 2022 05:00) (17 - 18)  SpO2: 98% (31 Jul 2022 05:00) (95% - 98%)    Parameters below as of 31 Jul 2022 05:00  Patient On (Oxygen Delivery Method): room air      CAPILLARY BLOOD GLUCOSE      POCT Blood Glucose.: 186 mg/dL (30 Jul 2022 22:16)  POCT Blood Glucose.: 183 mg/dL (30 Jul 2022 17:24)  POCT Blood Glucose.: 219 mg/dL (30 Jul 2022 11:50)    I&O's Summary      PHYSICAL EXAM:  GENERAL: Acute distress from pain, well-developed  HEAD: Normocephalic, +Right V1 distribution healing herpes zoster rash  EYES: EOMI, PERRLA, +improving herpetic eye involvement with inflamed conjunctiva and sclera  NECK: Supple  CHEST/LUNG: Clear to auscultation bilaterally; No wheeze  HEART: Regular rate and rhythm; No murmurs, rubs, or gallops  ABDOMEN: Soft, Nontender, Nondistended; Bowel sounds present  EXTREMITIES:  2+ Peripheral Pulses, No clubbing, cyanosis, or edema; +groin rash  PSYCH: AAOx3  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

## 2022-07-31 NOTE — DIETITIAN INITIAL EVALUATION ADULT - PERTINENT LABORATORY DATA
07-31 Na 140 mmol/L Glu 210 mg/dL<H> K+ 3.7 mmol/L Cr 0.53 mg/dL BUN 5 mg/dL<L> Phos 2.3 mg/dL<L>  07-31 @ 11:56 POCT 140 mg/dL  07-31 @ 08:02 POCT 224 mg/dL  07-30 @ 22:16 POCT 186 mg/dL  07-30 @ 17:24 POCT 183 mg/dL    POCT Blood Glucose.: 140 mg/dL (07-31-22 @ 11:56)  A1C with Estimated Average Glucose Result: 7.2 % (07-27-22 @ 06:17)

## 2022-07-31 NOTE — DISCHARGE NOTE PROVIDER - HOSPITAL COURSE
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 On 7/17, patient had right sided headache attributed to her recurrent migraines that she normally has on a daily basis, in which she took furiocet for. On 7/20 night and 7/21 morning, Patient vomited and began taking naproxen and tylenol for her symptoms. On 7/21, patient later saw her outpatient internist, who requested a CT Head for the patient, which came with findings at baseline. On 7/21 night, the patient began to scream, and checked her BP, which was at 200/80. On 7/22 morning, the patient's repeat BP was 160/100. Her internist subsequently prescribed amlodipine for the patient, and at the time her mental status was at baseline (A&Ox4). Also on 7/22, the patient began to complain of blurry vision. On 7/23 morning, the patient had the first occurrence of her rash around the right side of her face, V1 dermatome distribution, and the rash has progressively worsened since. On 7/24, patient started valtrex and artificial tears and began to be more sleepy. On 7/25 Patient aide said that the patient was more confused and her mentation was getting worse up until now.    In the ED, patient was started on 1x Zosyn, 1x 500mg acyclovir.    While in the hospital, ID, opthalmology, and Neurology services were consulted. ID services recommended starting the patient on acyclovir and cefazolin (both renally dosed), fluids, and ordering BCx, UCx. Neuro recommended LP and vEEG. Opthalmology advised on eye involvement of herpes zoster and prescribed eye drops to address infection and high tonometry values of eye. Later in the course of the patient, ID requested canellation of the LP, and continued to renally dose both cefazolin and acyclovir. Patient received an MRI of the head and orbits that revealed muscular involvement and edema of the eye, likely from myositis secondary from herpes zoster infection. Pateint was switched to a minced and moist diet with thin liquids after mentation improved. The patient's mental status continued to improve throughout the hospital course to baseline level according to the daughter. Patient was placed on probiotic per request and nystatin powder for a groin rash. Many home meds were resumed, and all home BP meds except furosemide and spironolactone were resumed. Patients Lantus started at 10 U and was progressed to ____U by discharge. Patients diet was switched to soft diet after counseling with daughter about the risks, which put in the request for the change. Wound care consult stated ________. Gabapentin and hydroxyzine used for neuropathic pain and itching, respectively.     Patient is hemodynamically stable at discharge. Per PT and OT recs, patient was discharged to rehabilitation facility with appropriate services. Patient was discharged on ______ diet. 86 y.o. Female w/ hx HTN, DM, CAD s/p stents (1986, 1996), COPD, NPH s/p  shunt, migraines p/w herpes zoster ophthalmicus/encephalitis affecting the right V1 dermatome but developed post herpetic neuralgia, and mrstran bacteremia (treated). She is s/p IV acyclovir, but continues to have neuralgia pain. This pain  improved after NSG did R supra orbital marcaine nerve block on 8/16, 8/17and 8/22. She is currently on morphine IR 7.5mg q4hr, Lyrica and gabapentin    Herpes zoster.   ·  Plan: NSG did repeat bupivicaine supraorbital nerve block on 8/25 to help with pain  - increased lyrica to 300 mg every 8 hours on 8/21  -added Gabapentin 300mg tid for better pain control on 8/23  -Morphine increased to 7.5mg IR q4hr standing as per pain consult  -Start Methadone 5mg PO bid on 8/25; will monitor QTC and pulse oxy while inpatient as per Pain Management  -c/w valtrex 1gram PO q8hr  -c/w erythromycin ointment QID to eye & periocular lesions, artificial tears Q4H as recommended by opthalmology.  - Appreciate derm recs:  - lidocaine ointment mixed with vaseline BID, triamcinolone ointment to red areas only BID,  - apply Vaseline to crusted areas Q2H.    Pneumonia, aspiration.   ·  Plan: Patient hypoxic with O2 Sat 88% on RA, started 2LNC  CXR on 8/22 shows RML infiltrate  repeat swallow eval on 8/24 recommended maintaining current diet.   c/w Zosyn IV D#3/5; can switch to PO augmentin or Levaquin/Flagyl on discharge to complete course.    Carotid stenosis.   ·  Plan: VA duplex showing 50-79% stenosis in L ICA and 16-49% stenosis in R ICA, not hemodynamically significant.   Patient does not have symptoms concerning for poor perfusion, will continue to monitor.    Deep vein thrombosis (DVT) of right upper extremity.   ·  Plan: -Provoked RUE subclavian DVT, nPOA  -Likely provoked by multiple sticks, PICC line.   - on full dose lovenox 80mg q12h, transition  to eliquis.    Urinary retention.   ·  Plan: unclear etiology, poss immobility, poss infection, poss due to narcotics  Will maintain dior for now since multiple failed TOV  -Had candida in urine which was treated x 1 day; ID recommended against treating it.     Hypertension.   ·  Plan: Hx of severe HTN at home.  -BPs currently stable  -c/w metoprolol and  lasix   - Holding spirololactone, and losartan.    Groin rash.   ·  Plan: Hx of rash in inguinal folds.   - Continue nystatin powder BID  - Appreciate derm recs: mupirocin ointment TID to perianal erosions.    Type 2 diabetes mellitus.   ·  Plan: A1c 7.2.  Home regimen lantus 34U daily, trulicity, glipizide.  -c/w lantus 10 units qHS, SSI, adjust prn  - monitor premeal and bedtime FS.    NPH (normal pressure hydrocephalus).   ·  Plan: NPH diagnosed 2011, pt has programmable  shunt installed by [facility] neurosurg, **must be programmed after MRI (page neurosurg [id number])**. 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    COPD, mild.   ·  Plan; 120py smoking hx, quit in 2011. Uses Trelegy at home.   -encouraged incentive spirometry  - continue pulmicort and duo nebs  -now with oxygen requirement likely due to aspiration  -O2 Sats 88% at rest on RA.; Will need home oxygen.    Preventive measure.   ·  Plan: DVT ppx: on therapeutic lovenox  Diet: soft and bite sized (S&S recd minced and moist however daughter accepts risks)     Dispo:  - pt has difficult vessels, picc in place if blood draws needed  - PT/OT recommending rehab; but daughter wants to take her home pending pain control and 86 y.o. Female w/ hx HTN, DM, CAD s/p stents (1986, 1996), COPD, NPH s/p  shunt, migraines p/w herpes zoster ophthalmicus/encephalitis affecting the right V1 dermatome but developed post herpetic neuralgia, and mrstran bacteremia (treated). She is s/p IV acyclovir, but continues to have neuralgia pain. This pain  improved after NSG did R supra orbital marcaine nerve block on 8/16, 8/17and 8/22. She is currently on morphine IR 7.5mg q4hr, Lyrica and gabapentin    Herpes zoster.   -NSG did repeat bupivicaine supraorbital nerve block on 8/25 to help with pain  - increased lyrica to 300 mg every 8 hours on 8/21  -added Gabapentin 300mg tid for better pain control on 8/23  -Morphine increased to 7.5mg IR q4hr standing as per pain consult  -Start Methadone 5mg PO bid on 8/25; will monitor QTC and pulse oxy while inpatient as per Pain Management  -c/w valtrex 1gram PO q8hr  -c/w erythromycin ointment QID to eye & periocular lesions, artificial tears Q4H as recommended by opthalmology.  - Appreciate derm recs:  - lidocaine ointment mixed with vaseline BID, triamcinolone ointment to red areas only BID,  - apply Vaseline to crusted areas Q2H.    Pneumonia, aspiration.   -Patient hypoxic with O2 Sat 88% on RA, started 2LNC  -CXR on 8/22 shows RML infiltrate  -repeat swallow eval on 8/24 recommended maintaining current diet.   -c/w Zosyn IV D#3/5; can switch to PO augmentin or Levaquin/Flagyl on discharge to complete course.    Carotid stenosis.   ·  Plan: VA duplex showing 50-79% stenosis in L ICA and 16-49% stenosis in R ICA, not hemodynamically significant.   Patient does not have symptoms concerning for poor perfusion, will continue to monitor.    Deep vein thrombosis (DVT) of right upper extremity.   ·  Plan: -Provoked RUE subclavian DVT, nPOA  -Likely provoked by multiple sticks, PICC line.   - on full dose lovenox 80mg q12h, transition  to eliquis.    Urinary retention.   ·  Plan: unclear etiology, poss immobility, poss infection, poss due to narcotics  Will maintain dior for now since multiple failed TOV  -Had candida in urine which was treated x 1 day; ID recommended against treating it.     Hypertension.   ·  Plan: Hx of severe HTN at home.  -BPs currently stable  -c/w metoprolol and  lasix   - Holding spirololactone, and losartan.    Groin rash.   ·  Plan: Hx of rash in inguinal folds.   - Continue nystatin powder BID  - Appreciate derm recs: mupirocin ointment TID to perianal erosions.    Type 2 diabetes mellitus.   ·  Plan: A1c 7.2.  Home regimen lantus 34U daily, trulicity, glipizide.  -c/w lantus 10 units qHS, SSI, adjust prn  - monitor premeal and bedtime FS.    NPH (normal pressure hydrocephalus).   -NPH diagnosed 2011, pt has programmable  shunt installed by [facility] neurosurg, **must be programmed after MRI (page neurosurg [id number])**. 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    COPD, mild.   -120py smoking hx, quit in 2011. Uses Trelegy at home.   -encouraged incentive spirometry  - continue pulmicort and duo nebs  -now with oxygen requirement likely due to aspiration  -O2 Sats 88% at rest on RA.; Will need home oxygen.    Preventive measure.   DVT ppx: on therapeutic lovenox  Diet: soft and bite sized (S&S recd minced and moist however daughter accepts risks)     Dispo:  - PT/OT recommending rehab; but daughter wants to take her home    On _________ , discussed with  __________ , patient is medically cleared and optimized for discharge today to _____________ . All medications were reviewed with attending, and any new/required prescriptions were sent to mutually agreed upon pharmacy.   86 y.o. Female w/ hx HTN, DM, CAD s/p stents (1986, 1996), COPD, NPH s/p  shunt, migraines p/w herpes zoster ophthalmicus/encephalitis affecting the right V1 dermatome but developed post herpetic neuralgia, and mrstran bacteremia (treated). She is s/p IV acyclovir, but continues to have neuralgia pain. This pain  improved after NSG did R supra orbital marcaine nerve block on 8/16, 8/17and 8/22. She was on morphine IV q4hr, Lyrica and gabapentin. Methadone started on on 8/25. Improved pain control. RRT on 8/26 for fever, hypoxia, and encephalopathy. Aspiration PNA suspected (s/p zosyn) and ESBL UTI grew in catheterized sample cultures, started on meropenem (8/27-9/1), d/w ID, hold off on PIC for now, plan for oral augmentin to complete antibiotic course at time of d/c. Patient's mental status now returned to baseline but refused NGT placement so spoke to daughter about feeding patient knowing the risks of aspiration and she agreed to pleasure feeds. (Pt does not want PEG feeds in long term.)     Urinary retention s/p TOV x 2 this admission as below, Urology consulted and rec's d/c w dior and follow up w Urology on Friday for a TOV.     Ongoing issues with polypharmacy-- currently pt is oversedated w remeron, elavil, morphine, methadone, gabapentin and lyrica. Suspect this recurring aspiration events are directly related to these sedating meds. Transitioning morphine from 5 mg q4 hours scheduled to prn dosing on 8/30. Pt w Pain Mgmt outpatient follow up appt already set up. Plan for d/c 9/1 after meropenem completion. Updated PCP over the phone on 8/31.       Herpes zoster.   -NSG did repeat bupivicaine supraorbital nerve block on 8/25 to help with pain  - increased lyrica to 300 mg every 8 hours on 8/21  -added Gabapentin 300mg tid for better pain control on 8/23  -Morphine increased to 7.5mg IR q4hr standing as per pain consult  -Start Methadone 5mg PO bid on 8/25; will monitor QTC and pulse oxy while inpatient as per Pain Management  -c/w valtrex 1gram PO q8hr  -c/w erythromycin ointment QID to eye & periocular lesions, artificial tears Q4H as recommended by opthalmology.  - Appreciate derm recs:  - lidocaine ointment mixed with vaseline BID, triamcinolone ointment to red areas only BID,  - apply Vaseline to crusted areas Q2H.    Herpes zoster.   -s/p multiple bupivicaine supraorbital nerve blocks by Neurosurg to help with pain, which appears to have improved pain. Patient with fleeting episodes of pain but mostly sleeping comfortably in bed but when asked she says her pain is a 10 out of 10 in severity. Pain now better controlled  - Lyrica dose adjusted multiple times, c/w 300mg two times a day  - Gabapentin dose adjusted multiple times, c/w 600mg q12h  - Decreased Morphine from 7.5 to 2.5 mg IV q4hr standing after RRT--> then back to oral 5mg q4 hours solution of morphine as requested by daughter, then transitioned to PRN on 8/30.  - Reconsulted pain management and they advised also adding methadone 5mg bid. Spoke to pain management on 8/25 and they felt confident that methadone was appropriate for this situation and patient already had a F/U appointment with pain management clinic next week to manage methadone as outpatient.   -Spoke to daughter at bedside and explained to her the risks of starting methadone in an elderly patient and she wanted to start methadone anyway.  -Started Methadone 5mg PO bid on 8/25;  but since patient became hypoxic with RRT, decreased dose to 2.5mg bid--> back to 5mg BID  - ID consulted and now s/p 2 weeks of IV acyclovir and extended course of Valtrex.   - Ophthalmology consulted and rec c/w erythromycin ointment QID to eye & periocular lesions, artificial tears Q4H.  - Appreciate derm recs:    - lidocaine ointment mixed with vaseline BID, triamcinolone ointment to red areas only BID    - apply Vaseline to crusted areas Q2H.    Pneumonia, aspiration.   -Patient hypoxic with O2 Sat 88% on RA, started 2LNC  -CXR on 8/22 shows RML infiltrate  -repeat swallow eval on 8/24 recommended maintaining current diet.   -c/w Zosyn IV D#3/5; can switch to PO augmentin or Levaquin/Flagyl on discharge to complete course.  \Pneumonia, aspiration.   ·  Plan: Patient hypoxic with O2 Sat 88% on RA; s/p RRT on 8/26 for hypoxia  currently on 2LNC; still unable to wean so will need home oxygen  CXR on 8/22 shows RML infiltrate      Carotid stenosis.   ·  Plan: VA duplex showing 50-79% stenosis in L ICA and 16-49% stenosis in R ICA, not hemodynamically significant.   Patient does not have symptoms concerning for poor perfusion, will continue to monitor.    Deep vein thrombosis (DVT) of right upper extremity.   ·  Plan: -Provoked RUE subclavian DVT, nPOA  -Likely provoked by multiple sticks, PICC line.   - on full dose lovenox 80mg q12h, transition  to eliquis.    Urinary retention.   ·  Plan: unclear etiology, poss immobility, poss infection, poss due to narcotics  Will maintain dior for now since multiple failed TOV  -Had candida in urine which was treated x 1 day; ID recommended against treating it.     Hypertension.   ·  Plan: Hx of severe HTN at home.  -BPs currently stable  -c/w metoprolol and  lasix   - Holding spirololactone, and losartan.    Groin rash.   ·  Plan: Hx of rash in inguinal folds.   - Continue nystatin powder BID  - Appreciate derm recs: mupirocin ointment TID to perianal erosions.    Type 2 diabetes mellitus.   ·  Plan: A1c 7.2.  Home regimen lantus 34U daily, trulicity, glipizide.  -c/w lantus 10 units qHS, SSI, adjust prn  - monitor premeal and bedtime FS.    NPH (normal pressure hydrocephalus).   -NPH diagnosed 2011, pt has programmable  shunt installed by [facility] neurosurg, **must be programmed after MRI (page neurosurg [id number])**. 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    COPD, mild.   -120py smoking hx, quit in 2011. Uses Trelegy at home.   -encouraged incentive spirometry  - continue pulmicort and duo nebs  -now with oxygen requirement likely due to aspiration  -O2 Sats 88% at rest on RA.; Will need home oxygen.    Preventive measure.   DVT ppx: on therapeutic lovenox  Diet: soft and bite sized (S&S recd minced and moist however daughter accepts risks)     Dispo:  - PT/OT recommending rehab; but daughter wants to take her home    On _________ , discussed with  __________ , patient is medically cleared and optimized for discharge today to _____________ . All medications were reviewed with attending, and any new/required prescriptions were sent to mutually agreed upon pharmacy.   86 y.o. Female w/ hx HTN, DM, CAD s/p stents (1986, 1996), COPD, NPH s/p  shunt, migraines p/w herpes zoster ophthalmicus/encephalitis affecting the right V1 dermatome but developed post herpetic neuralgia, and mrstran bacteremia (treated). She is s/p IV acyclovir, but continues to have neuralgia pain. This pain  improved after NSG did R supra orbital marcaine nerve block on 8/16, 8/17and 8/22. She was on morphine IV q4hr, Lyrica and gabapentin. Methadone started on on 8/25. Improved pain control. RRT on 8/26 for fever, hypoxia, and encephalopathy. Aspiration PNA suspected (s/p zosyn) and ESBL UTI grew in catheterized sample cultures, started on meropenem (8/27-9/1), d/w ID, hold off on PIC for now, plan for oral augmentin to complete antibiotic course at time of d/c. Patient's mental status now returned to baseline but refused NGT placement so spoke to daughter about feeding patient knowing the risks of aspiration and she agreed to pleasure feeds. (Pt does not want PEG feeds in long term.)     Urinary retention s/p TOV x 2 this admission as below, Urology consulted and rec's d/c w dior and follow up w Urology on Friday for a TOV.     Ongoing issues with polypharmacy-- currently pt is oversedated w remeron, elavil, morphine, methadone, gabapentin and lyrica. Suspect this recurring aspiration events are directly related to these sedating meds. Transitioning morphine from 5 mg q4 hours scheduled to prn dosing on 8/30. Pt w Pain Mgmt outpatient follow up appt already set up. Plan for d/c 9/1 after meropenem completion. Updated PCP over the phone on 8/31.       Herpes zoster.   -s/p multiple bupivicaine supraorbital nerve blocks by Neurosurg to help with pain, which appears to have improved pain. Patient with fleeting episodes of pain but mostly sleeping comfortably in bed but when asked she says her pain is a 10 out of 10 in severity. Pain now better controlled  - Lyrica dose adjusted multiple times, c/w 300mg two times a day  - Gabapentin dose adjusted multiple times, c/w 600mg q12h  - Decreased Morphine from 7.5 to 2.5 mg IV q4hr standing after RRT--> then back to oral 5mg q4 hours solution of morphine as requested by daughter, then transitioned to PRN on 8/30.  - Reconsulted pain management and they advised also adding methadone 5mg bid. Spoke to pain management on 8/25 and they felt confident that methadone was appropriate for this situation and patient already had a F/U appointment with pain management clinic next week to manage methadone as outpatient.   -Spoke to daughter at bedside and explained to her the risks of starting methadone in an elderly patient and she wanted to start methadone anyway.  -Started Methadone 5mg PO bid on 8/25;  but since patient became hypoxic with RRT, decreased dose to 2.5mg bid--> back to 5mg BID  - ID consulted and now s/p 2 weeks of IV acyclovir and extended course of Valtrex.   - Ophthalmology consulted and rec c/w erythromycin ointment QID to eye & periocular lesions, artificial tears Q4H.  - Appreciate derm recs:    - lidocaine ointment mixed with vaseline BID, triamcinolone ointment to red areas only BID    - apply Vaseline to crusted areas Q2H.    Pneumonia, aspiration.   -Patient hypoxic with O2 Sat 88% on RA; s/p RRT on 8/26 for hypoxia  -currently on 2LNC; still unable to wean so will need home oxygen  -CXR on 8/22 shows RML infiltrate  -repeat CXR on 8/26 showed no changes.  -repeat swallow eval on 8/24 recommended maintaining current diet.   -pleasure feeds since patient would ultimately refuse PEG anyway as she did NGT placement.   -cinesophagram- nml  -received 4 days of Zosyn IV prior to RRT on 8/26  -Patient switched to Meropenem/Vanco on 8/27; still spike fevers, vanc d/c'd 8/29 given CONS in urine likely a colonizer  -Per ID, can d/c home and transition to Augmentin 500 mg po q12h until 9/2     Carotid stenosis.   -VA duplex showing 50-79% stenosis in L ICA and 16-49% stenosis in R ICA, not hemodynamically significant.   -Patient does not have symptoms concerning for poor perfusion, will continue to monitor.    Deep vein thrombosis (DVT) of right upper extremity.   -Provoked RUE subclavian DVT, nPOA  -Likely provoked by multiple sticks, PICC line.  Diagnosed on 8/8  -On full dose lovenox 80mg q12h  -can switch to eliquis on discharge.    Urinary retention.   -Had candida in urine which was treated x 1 day; ID recommended against treating it.  -unclear etiology, poss immobility, poss infection, poss due to narcotics  -indwelling urinary catheter placed 8/13, failed TOV 8/17, placed again 8/19; Failed TOV again on 8/22 and dior reinserted  -Will maintain dior for now since multiple failed TOV; dior changed on 8/26  -Treated for ESBL UTI with abx as above.    Hypertension.   - Hx of severe HTN at home.  - BPs currently stable  - c/w metoprolol and lasix   - Holding spirololactone, and losartan.    Groin rash.   - Hx of rash in inguinal folds.   - Continue nystatin powder BID  - Appreciate derm recs: mupirocin ointment TID to perianal erosions.    Type 2 diabetes mellitus.   -A1c 7.2.  Home regimen lantus 34U daily, trulicity, glipizide.  -c/w lantus 10 units qHS, SSI, adjust prn  -monitor premeal and bedtime FS.    NPH (normal pressure hydrocephalus).   -NPH diagnosed 2011, pt has programmable  shunt installed by [facility] neurosurg, **must be programmed after MRI (page neurosurg [id number])**. 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  - Repeat CT head appeared unchanged from July    COPD, mild.   -120py smoking hx, quit in 2011. Uses Trelegy at home.   -encouraged incentive spirometry  - continue pulmicort and duo nebs  -now with oxygen requirement likely due to aspiration  -O2 Sats 88% at rest on RA.; Will need home oxygen.    Preventive measure.   DVT ppx: on therapeutic lovenox  Diet: soft and bite sized (S&S recd minced and moist however daughter accepts risks)     Dispo:  - PT/OT recommending rehab; but daughter wants to take her home    On _________ , discussed with  __________ , patient is medically cleared and optimized for discharge today to _____________ . All medications were reviewed with attending, and any new/required prescriptions were sent to mutually agreed upon pharmacy.   86 y.o. Female w/ hx HTN, DM, CAD s/p stents (1986, 1996), COPD, NPH s/p  shunt, migraines p/w herpes zoster ophthalmicus/encephalitis affecting the right V1 dermatome but developed post herpetic neuralgia, and mrstran bacteremia (treated). She is s/p IV acyclovir, but continues to have neuralgia pain. This pain  improved after NSG did R supra orbital marcaine nerve block on 8/16, 8/17and 8/22. She was on morphine IV q4hr, Lyrica and gabapentin. Methadone started on on 8/25. Improved pain control. RRT on 8/26 for fever, hypoxia, and encephalopathy. Aspiration PNA suspected (s/p zosyn) and ESBL UTI grew in catheterized sample cultures, started on meropenem (8/27-9/1), d/w ID, hold off on PIC for now, plan for oral augmentin to complete antibiotic course at time of d/c. Patient's mental status now returned to baseline but refused NGT placement so spoke to daughter about feeding patient knowing the risks of aspiration and she agreed to pleasure feeds. (Pt does not want PEG feeds in long term.)     Urinary retention s/p TOV x 2 this admission as below, Urology consulted and rec's d/c w dior and follow up w Urology on Friday for a TOV.     Ongoing issues with polypharmacy-- currently pt is oversedated w remeron, elavil, morphine, methadone, gabapentin and lyrica. Suspect this recurring aspiration events are directly related to these sedating meds. Transitioning morphine from 5 mg q4 hours scheduled to prn dosing on 8/30. Pt w Pain Mgmt outpatient follow up appt already set up. Plan for d/c 9/1 after meropenem completion. Updated PCP over the phone on 8/31.       Herpes zoster.   -s/p multiple bupivicaine supraorbital nerve blocks by Neurosurg to help with pain, which appears to have improved pain. Patient with fleeting episodes of pain but mostly sleeping comfortably in bed but when asked she says her pain is a 10 out of 10 in severity. Pain now better controlled  - Lyrica dose adjusted multiple times, c/w 300mg two times a day  - Gabapentin dose adjusted multiple times, c/w 600mg q12h  - Decreased Morphine from 7.5 to 2.5 mg IV q4hr standing after RRT--> then back to oral 5mg q4 hours solution of morphine as requested by daughter, then transitioned to PRN on 8/30.  - Reconsulted pain management and they advised also adding methadone 5mg bid. Spoke to pain management on 8/25 and they felt confident that methadone was appropriate for this situation and patient already had a F/U appointment with pain management clinic next week to manage methadone as outpatient.   -Spoke to daughter at bedside and explained to her the risks of starting methadone in an elderly patient and she wanted to start methadone anyway.  -Started Methadone 5mg PO bid on 8/25;  but since patient became hypoxic with RRT, decreased dose to 2.5mg bid--> back to 5mg BID  - ID consulted and now s/p 2 weeks of IV acyclovir and extended course of Valtrex.   - Ophthalmology consulted and rec c/w erythromycin ointment QID to eye & periocular lesions, artificial tears Q4H.  - Appreciate derm recs:    - lidocaine ointment mixed with vaseline BID, triamcinolone ointment to red areas only BID    - apply Vaseline to crusted areas Q2H.    Pneumonia, aspiration.   -Patient hypoxic with O2 Sat 88% on RA; s/p RRT on 8/26 for hypoxia  -currently on 2LNC; still unable to wean so will need home oxygen  -CXR on 8/22 shows RML infiltrate  -repeat CXR on 8/26 showed no changes.  -repeat swallow eval on 8/24 recommended maintaining current diet.   -pleasure feeds since patient would ultimately refuse PEG anyway as she did NGT placement.   -cinesophagram- nml  -received 4 days of Zosyn IV prior to RRT on 8/26  -Patient switched to Meropenem/Vanco on 8/27; still spike fevers, vanc d/c'd 8/29 given CONS in urine likely a colonizer  -Per ID, can d/c home and transition to Augmentin 500 mg po q12h until 9/2     Carotid stenosis.   -VA duplex showing 50-79% stenosis in L ICA and 16-49% stenosis in R ICA, not hemodynamically significant.   -Patient does not have symptoms concerning for poor perfusion, will continue to monitor.    Deep vein thrombosis (DVT) of right upper extremity.   -Provoked RUE subclavian DVT, nPOA  -Likely provoked by multiple sticks, PICC line.  Diagnosed on 8/8  -On full dose lovenox 80mg q12h  -can switch to eliquis on discharge.    Urinary retention.   -Had candida in urine which was treated x 1 day; ID recommended against treating it.  -unclear etiology, poss immobility, poss infection, poss due to narcotics  -indwelling urinary catheter placed 8/13, failed TOV 8/17, placed again 8/19; Failed TOV again on 8/22 and dior reinserted  -Will maintain dior for now since multiple failed TOV; dior changed on 8/26  -Treated for ESBL UTI with abx as above.    Hypertension.   - Hx of severe HTN at home.  - BPs currently stable  - c/w metoprolol and lasix   - Holding spirololactone, and losartan.    Groin rash.   - Hx of rash in inguinal folds.   - Continue nystatin powder BID  - Appreciate derm recs: mupirocin ointment TID to perianal erosions.    Type 2 diabetes mellitus.   -A1c 7.2.  Home regimen lantus 34U daily, trulicity, glipizide.  -c/w lantus 10 units qHS, SSI, adjust prn  -monitor premeal and bedtime FS.    NPH (normal pressure hydrocephalus).   -NPH diagnosed 2011, pt [facility]  [facility], **must be programmed after MRI (page neurosurg [id number])**. 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  - Repeat CT head appeared unchanged from July    COPD, mild.   -120py smoking hx, quit in 2011. Uses Trelegy at home.   -encouraged incentive spirometry  - continue pulmicort and duo nebs  -now with oxygen requirement likely due to aspiration  -O2 Sats 88% at rest on RA.; Will need home oxygen.    Preventive measure.   DVT ppx: on therapeutic lovenox  Diet: soft and bite sized (S&S recd minced and moist however daughter accepts risks)     Dispo:  - PT/OT recommending rehab; but daughter wants to take her home    On _________ , discussed with Dr. Gaviria, patient is medically cleared for discharge today to home. All medications were reviewed with attending, and any new/required prescriptions were sent to mutually agreed upon pharmacy.   86 y.o. Female w/ hx HTN, DM, CAD s/p stents (1986, 1996), COPD, NPH s/p  shunt, migraines p/w herpes zoster ophthalmicus/encephalitis affecting the right V1 dermatome but developed post herpetic neuralgia, and mrstran bacteremia (treated). She is s/p IV acyclovir, but continues to have neuralgia pain. This pain  improved after NSG did R supra orbital marcaine nerve block on 8/16, 8/17and 8/22. She was on morphine IV q4hr, Lyrica and gabapentin. Methadone started on on 8/25. Improved pain control. RRT on 8/26 for fever, hypoxia, and encephalopathy. Aspiration PNA suspected (s/p zosyn) and ESBL UTI grew in catheterized sample cultures, started on meropenem (8/27-9/1), d/w ID, hold off on PIC for now, plan for oral augmentin to complete antibiotic course at time of d/c. Patient's mental status now returned to baseline but refused NGT placement so spoke to daughter about feeding patient knowing the risks of aspiration and she agreed to pleasure feeds. (Pt does not want PEG feeds in long term.)   Urinary retention s/p TOV x 2 this admission as below, Urology consulted and rec's d/c w dior and follow up w Urology on Friday for a TOV.     Ongoing issues with polypharmacy-- pt was oversedated w remeron, elavil, morphine, methadone, gabapentin and lyrica. Suspect recurring aspiration events are directly related to these sedating meds. Transitioned morphine from 5 mg q4 hours scheduled to prn dosing on 8/30. Pt w Pain Mgmt outpatient follow up appt already set up.       Herpes zoster.   -s/p multiple bupivicaine supraorbital nerve blocks by Neurosurg to help with pain, which appears to have improved pain. Patient with fleeting episodes of pain but mostly sleeping comfortably in bed but when asked she says her pain is a 10 out of 10 in severity. Pain now better controlled  - Lyrica dose adjusted multiple times, c/w 300mg two times a day  - Gabapentin dose adjusted multiple times, c/w 600mg q12h  - Decreased Morphine from 7.5 to 2.5 mg IV q4hr standing after RRT--> then back to oral 5mg q4 hours solution of morphine as requested by daughter, then transitioned to PRN on 8/30.  - Reconsulted pain management and they advised also adding methadone 5mg bid. Spoke to pain management on 8/25 and they felt confident that methadone was appropriate for this situation and patient already had a F/U appointment with pain management clinic next week to manage methadone as outpatient.   -Spoke to daughter at bedside and explained to her the risks of starting methadone in an elderly patient and she wanted to start methadone anyway.  -Started Methadone 5mg PO bid on 8/25;  but since patient became hypoxic with RRT, decreased dose to 2.5mg bid--> back to 5mg BID  - ID consulted and now s/p 2 weeks of IV acyclovir and extended course of Valtrex.   - Ophthalmology consulted and rec c/w erythromycin ointment QID to eye & periocular lesions, artificial tears Q4H.  - Appreciate derm recs:    - lidocaine ointment mixed with vaseline BID, triamcinolone ointment to red areas only BID    - apply Vaseline to crusted areas Q2H.    Pneumonia, aspiration.   -Patient hypoxic with O2 Sat 88% on RA; s/p RRT on 8/26 for hypoxia  -currently on 2LNC; still unable to wean so will need home oxygen  -CXR on 8/22 shows RML infiltrate  -repeat CXR on 8/26 showed no changes.  -repeat swallow eval on 8/24 recommended maintaining current diet.   -pleasure feeds since patient would ultimately refuse PEG anyway as she did NGT placement.   -cinesophagram- nml  -received 4 days of Zosyn IV prior to RRT on 8/26  -Patient switched to Meropenem/Vanco on 8/27; still spike fevers, vanc d/c'd 8/29 given CONS in urine likely a colonizer  -Per ID, can d/c home and transition to Augmentin 500 mg po q12h until 9/2     Carotid stenosis.   -VA duplex showing 50-79% stenosis in L ICA and 16-49% stenosis in R ICA, not hemodynamically significant.   -Patient does not have symptoms concerning for poor perfusion, will continue to monitor.    Deep vein thrombosis (DVT) of right upper extremity.   -Provoked RUE subclavian DVT, nPOA  -Likely provoked by multiple sticks, PICC line.  Diagnosed on 8/8  -On full dose lovenox 80mg q12h  -can switch to eliquis on discharge.    Urinary retention.   -Had candida in urine which was treated x 1 day; ID recommended against treating it.  -unclear etiology, poss immobility, poss infection, poss due to narcotics  -indwelling urinary catheter placed 8/13, failed TOV 8/17, placed again 8/19; Failed TOV again on 8/22 and dior reinserted  -Will maintain dior for now since multiple failed TOV; dior changed on 8/26  -Treated for ESBL UTI with abx as above.    Hypertension.   - Hx of severe HTN at home.  - BPs currently stable  - c/w metoprolol and lasix   - Holding spirololactone, and losartan.    Groin rash.   - Hx of rash in inguinal folds.   - Continue nystatin powder BID  - Appreciate derm recs: mupirocin ointment TID to perianal erosions.    Type 2 diabetes mellitus.   -A1c 7.2.  Home regimen lantus 34U daily, trulicity, glipizide.  -c/w lantus 10 units qHS, SSI, adjust prn  -monitor premeal and bedtime FS.    NPH (normal pressure hydrocephalus).   -NPH diagnosed 2011, pt has programmable  shunt installed by [facility] neurosurg, **must be programmed after MRI (page neurosurg [id number])**. 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  - Repeat CT head appeared unchanged from July    COPD, mild.   -120py smoking hx, quit in 2011. Uses Trelegy at home.   -encouraged incentive spirometry  - continue pulmicort and duo nebs  -now with oxygen requirement likely due to aspiration  -O2 Sats 88% at rest on RA.; Will need home oxygen.    Preventive measure.   DVT ppx: on therapeutic lovenox      PT/OT recommended rehab; but daughter wants to take her home. Medications discussed between attending and daughter.  Patient's PCP updated by attending over the phone on 8/31.     On 9/1/2022, discussed with Dr. Gaviria, patient is medically cleared for discharge today to home. All medications were reviewed with attending, and any new/required prescriptions were sent to mutually agreed upon pharmacy.   86 y.o. Female w/ hx HTN, DM, CAD s/p stents (1986, 1996), COPD, NPH s/p  shunt, migraines p/w herpes zoster ophthalmicus/encephalitis affecting the right V1 dermatome but developed post herpetic neuralgia, and mrstran bacteremia (treated). She is s/p IV acyclovir, but continues to have neuralgia pain. This pain  improved after NSG did R supra orbital marcaine nerve block on 8/16, 8/17and 8/22. She was on morphine IV q4hr, Lyrica and gabapentin. Methadone started on on 8/25. Improved pain control. RRT on 8/26 for fever, hypoxia, and encephalopathy. Aspiration PNA suspected (s/p zosyn) and ESBL UTI grew in catheterized sample cultures, started on meropenem (8/27-9/1), d/w ID,  plan for oral augmentin to complete antibiotic course at time of d/c for aspiration pna. Patient's mental status now returned to baseline but refused NGT placement so spoke to daughter about feeding patient knowing the risks of aspiration and she agreed to pleasure feeds. (Pt does not want PEG feeds in long term.)   Urinary retention s/p TOV x 2 this admission as below, Urology consulted and rec's d/c w dior and follow up w Urology on Friday for a TOV.     Ongoing issues with polypharmacy-- pt was oversedated w remeron, elavil, morphine, methadone, gabapentin and lyrica. Suspect recurring aspiration events are directly related to these sedating meds. Transitioned morphine from 5 mg q4 hours scheduled to prn dosing on 8/30. Pt w Pain Mgmt outpatient follow up appt already set up.       Herpes zoster.   -s/p multiple bupivicaine supraorbital nerve blocks by Neurosurg to help with pain, which appears to have improved pain. Patient with fleeting episodes of pain but mostly sleeping comfortably in bed but when asked she says her pain is a 10 out of 10 in severity. Pain now better controlled  - Lyrica dose adjusted multiple times, c/w 300mg two times a day  - Gabapentin dose adjusted multiple times, c/w 600mg q12h  - Decreased Morphine from 7.5 to 2.5 mg IV q4hr standing after RRT--> then back to oral 5mg q4 hours solution of morphine as requested by daughter, then transitioned to PRN on 8/30.  - Reconsulted pain management and they advised also adding methadone 5mg bid. Spoke to pain management on 8/25 and they felt confident that methadone was appropriate for this situation and patient already had a F/U appointment with pain management clinic next week to manage methadone as outpatient.   -Spoke to daughter at bedside and explained to her the risks of starting methadone in an elderly patient and she wanted to start methadone anyway.  -Started Methadone 5mg PO bid on 8/25;  but since patient became hypoxic with RRT, decreased dose to 2.5mg bid--> back to 5mg BID  - ID consulted and now s/p 2 weeks of IV acyclovir and extended course of Valtrex.   - Ophthalmology consulted and rec c/w erythromycin ointment QID to eye & periocular lesions, artificial tears Q4H.  - Appreciate derm recs:    - lidocaine ointment mixed with vaseline BID    - apply Vaseline to crusted areas Q2H.    Pneumonia, aspiration.   -Patient hypoxic with O2 Sat 88% on RA; s/p RRT on 8/26 for hypoxia  -currently on 2LNC; still unable to wean so will need home oxygen  -CXR on 8/22 shows RML infiltrate  -repeat CXR on 8/26 showed no changes.  -repeat swallow eval on 8/24 recommended maintaining current diet.   -pleasure feeds since patient would ultimately refuse PEG anyway as she did NGT placement.   -cinesophagram- nml  -received 4 days of Zosyn IV prior to RRT on 8/26  -Patient switched to Meropenem/Vanco on 8/27; still spike fevers, vanc d/c'd 8/29 given CONS in urine likely a colonizer  -Per ID, can d/c home and transition to Augmentin 500 mg po q12h until 9/2     Carotid stenosis.   -VA duplex showing 50-79% stenosis in L ICA and 16-49% stenosis in R ICA, not hemodynamically significant.   -Patient does not have symptoms concerning for poor perfusion    Deep vein thrombosis (DVT) of right upper extremity.   -Provoked RUE subclavian DVT, nPOA  -Likely provoked by multiple sticks, PICC line.  Diagnosed on 8/8  -On full dose lovenox 80mg q12h  -can switch to eliquis on discharge.    Urinary retention.   -Had candida in urine which was treated x 1 day; ID recommended against treating it.  -unclear etiology, poss immobility, poss infection, poss due to narcotics  -indwelling urinary catheter placed 8/13, failed TOV 8/17, placed again 8/19; Failed TOV again on 8/22 and dior reinserted  -Will maintain dior for now since multiple failed TOV; dior changed on 8/26  -Treated for ESBL UTI with abx as above.    Hypertension.   - Hx of severe HTN at home.  - BPs currently stable  - c/w metoprolol and lasix   - Holding spirololactone, and losartan.    Groin rash.   - Hx of rash in inguinal folds.   - Continue nystatin powder BID  - Appreciate derm recs: mupirocin ointment TID to perianal erosions.    Type 2 diabetes mellitus.   -A1c 7.2.  Home regimen lantus 34U daily, trulicity, glipizide.  -c/w lantus 10 units qHS, SSI, adjust prn--f/u w Endocrine  -monitor premeal and bedtime FS.    NPH (normal pressure hydrocephalus).   -NPH diagnosed 2011, pt [facility] programmable  [facility], **must be programmed after MRI (page neurosurg [id number])**. 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  - Repeat CT head appeared unchanged from July    COPD, mild.   -120py smoking hx, quit in 2011. Uses Trelegy at home.   -encouraged incentive spirometry  - continue pulmicort and duo nebs  -now with oxygen requirement likely due to aspiration  -O2 Sats 88% at rest on RA.; Will need home oxygen.    Preventive measure.   DVT ppx: on therapeutic lovenox      PT/OT recommended rehab; but daughter wants to take her home. Medications discussed between attending and daughter.  Patient's PCP updated by attending over the phone on 8/31.     On 9/1/2022, discussed with Dr. Gaviria, patient is medically cleared for discharge today to home. All medications were reviewed with attending, and any new/required prescriptions were sent to mutually agreed upon pharmacy. Patient seen and examined by attending physician on morning of discharge day. Greater than 30 minutes spent on discharge preparation and education.

## 2022-07-31 NOTE — DIETITIAN INITIAL EVALUATION ADULT - NSICDXPASTMEDICALHX_GEN_ALL_CORE_FT
PAST MEDICAL HISTORY:  CAD (coronary artery disease)     COPD (Chronic Obstructive Pulmonary Disease)     COPD, mild     Diabetes Mellitus Type II     Migraines     Migraines     Myocardial Infarction     NPH (normal pressure hydrocephalus)     Type 2 diabetes mellitus

## 2022-07-31 NOTE — DISCHARGE NOTE PROVIDER - CARE PROVIDERS DIRECT ADDRESSES
,DirectAddress_Unknown,[email or web address] ,DirectAddress_Unknown,[email or web address] ,[email or web address],[email or web address],[email or web address]

## 2022-07-31 NOTE — DISCHARGE NOTE PROVIDER - NSDCMRMEDTOKEN_GEN_ALL_CORE_FT
amLODIPine 2.5 mg oral tablet:   calcitonin: 1 spray in alternating nostrils once a day  Cosamin DS: 1500 milligram(s)/1200 milligram(s)/ orally once a day  Ecotrin 325 mg oral delayed release tablet: orally once a day (at bedtime)  Fioricet: -40 formulation as needed  folic acid 1 mg oral tablet: 1 tab(s) orally once a day  furosemide 20 mg oral tablet: orally once a day (at bedtime)  glipiZIDE 5 mg oral tablet, extended release: orally 2 times a day  Imodium: liquid as needed  Lantus Solostar Pen: 34 unit(s) subcutaneous once a day (at bedtime)  losartan 50 mg oral tablet: 1 tab(s) orally once a day  Metoprolol Succinate ER 25 mg oral tablet, extended release: 0.5 tablets orally once a day (at bedtime)  mirtazapine 15 mg oral tablet: 1 tab(s) orally once a day (at bedtime)  multivitamin: 2 softgels orally 2 times a day  Restasis: to each affected eye 2 times a day  spironolactone 50 mg oral tablet: 2 tablets orally once a day (at bedtime)  traZODone 50 mg oral tablet: 2 tablets orally once a day (at bedtime)  Trelegy Ellipta 100 mcg-62.5 mcg-25 mcg/inh inhalation powder: 1 puff(s) inhaled once a day  Trulicity Pen 1.5 mg/0.5 mL subcutaneous solution: subcutaneous once a week  ursodiol 500 mg oral tablet: orally once a day  Valtrex 1 g oral tablet: orally 3 times a day  Vitamin B-12 1000 mcg oral tablet: 1 tab(s) orally once a day  Vitamin D3 1250 mcg (50,000 intl units) oral capsule: 1 cap(s) orally once a week   amLODIPine 2.5 mg oral tablet:   calcitonin: 1 spray in alternating nostrils once a day  Cosamin DS: 1500 milligram(s)/1200 milligram(s)/ orally once a day  Ecotrin 325 mg oral delayed release tablet: orally once a day (at bedtime)  Eliquis 5 mg oral tablet: 1 tab(s) orally 2 times a day     Please price check and page [phone]  Fioricet: -40 formulation as needed  folic acid 1 mg oral tablet: 1 tab(s) orally once a day  furosemide 20 mg oral tablet: orally once a day (at bedtime)  glipiZIDE 5 mg oral tablet, extended release: orally 2 times a day  Imodium: liquid as needed  Lantus Solostar Pen: 34 unit(s) subcutaneous once a day (at bedtime)  losartan 50 mg oral tablet: 1 tab(s) orally once a day  Metoprolol Succinate ER 25 mg oral tablet, extended release: 0.5 tablets orally once a day (at bedtime)  mirtazapine 15 mg oral tablet: 1 tab(s) orally once a day (at bedtime)  multivitamin: 2 softgels orally 2 times a day  Restasis: to each affected eye 2 times a day  spironolactone 50 mg oral tablet: 2 tablets orally once a day (at bedtime)  traZODone 50 mg oral tablet: 2 tablets orally once a day (at bedtime)  Trelegy Ellipta 100 mcg-62.5 mcg-25 mcg/inh inhalation powder: 1 puff(s) inhaled once a day  Trulicity Pen 1.5 mg/0.5 mL subcutaneous solution: subcutaneous once a week  ursodiol 500 mg oral tablet: orally once a day  Valtrex 1 g oral tablet: orally 3 times a day  Vitamin B-12 1000 mcg oral tablet: 1 tab(s) orally once a day  Vitamin D3 1250 mcg (50,000 intl units) oral capsule: 1 cap(s) orally once a week   amLODIPine 2.5 mg oral tablet:   calcitonin: 1 spray in alternating nostrils once a day  Cosamin DS: 1500 milligram(s)/1200 milligram(s)/ orally once a day  Ecotrin 325 mg oral delayed release tablet: orally once a day (at bedtime)  Eliquis 5 mg oral tablet: 1 tab(s) orally 2 times a day     Please price check and page [phone]  Fioricet: -40 formulation as needed  folic acid 1 mg oral tablet: 1 tab(s) orally once a day  furosemide 20 mg oral tablet: orally once a day (at bedtime)  glipiZIDE 5 mg oral tablet, extended release: orally 2 times a day  Imodium: liquid as needed  Lantus Solostar Pen: 34 unit(s) subcutaneous once a day (at bedtime)  losartan 50 mg oral tablet: 1 tab(s) orally once a day  Metoprolol Succinate ER 25 mg oral tablet, extended release: 0.5 tablets orally once a day (at bedtime)  mirtazapine 15 mg oral tablet: 1 tab(s) orally once a day (at bedtime)  multivitamin: 2 softgels orally 2 times a day  pregabalin 300 mg oral capsule: 1 cap(s) orally every 12 hours. price check [place] ACP [phone] MDD:2 tabs  Restasis: to each affected eye 2 times a day  spironolactone 50 mg oral tablet: 2 tablets orally once a day (at bedtime)  traZODone 50 mg oral tablet: 2 tablets orally once a day (at bedtime)  Trelegy Ellipta 100 mcg-62.5 mcg-25 mcg/inh inhalation powder: 1 puff(s) inhaled once a day  Trulicity Pen 1.5 mg/0.5 mL subcutaneous solution: subcutaneous once a week  ursodiol 500 mg oral tablet: orally once a day  Valtrex 1 g oral tablet: orally 3 times a day  Vitamin B-12 1000 mcg oral tablet: 1 tab(s) orally once a day  Vitamin D3 1250 mcg (50,000 intl units) oral capsule: 1 cap(s) orally once a week   acetaminophen 500 mg oral tablet: 2 tab(s) orally every 8 hours, As needed, Severe Pain (7 - 10)  amitriptyline 10 mg oral tablet: 1 tab(s) orally once a day (at bedtime)  Eliquis 5 mg oral tablet: 1 tab(s) orally 2 times a day (please fill Rx)      erythromycin 0.5% ophthalmic ointment: 1 application to right eye, 4 times a day  folic acid 1 mg oral tablet: 1 tab(s) orally once a day  furosemide 20 mg oral tablet: orally once a day (at bedtime)  gabapentin 300 mg oral capsule: 1 cap(s) orally every 8 hours  glipiZIDE 5 mg oral tablet, extended release: orally 2 times a day  ipratropium-albuterol 0.5 mg-2.5 mg/3 mL inhalation solution: 3 milliliter(s) inhaled every 6 hours  Lantus Solostar Pen: 34 unit(s) subcutaneous once a day (at bedtime)  lidocaine 5% topical ointment: 1 application topically 2 times a day  loratadine 10 mg oral tablet: 1 tab(s) orally once a day  methadone 5 mg oral tablet: 1 tab(s) orally 2 times a day MDD:2 Hold for sedation  Metoprolol Succinate ER 25 mg oral tablet, extended release: 0.5 tablets orally once a day (at bedtime)  mirtazapine 7.5 mg oral tablet: 1 tab(s) orally once a day (at bedtime)  morphine 15 mg oral tablet: 0.5 tab(s) orally every 4 hours, As Needed MDD:3 tabs. hold for sedation or resp rate &lt; 10  multivitamin: 2 softgels orally 2 times a day  mupirocin 2% topical ointment: 1 application topically 3 times a day  Narcan 4 mg/0.1 mL nasal spray: 4 milligram(s) intranasally every 2 to 5 minutes, As Needed for over sedation, respiratory rate &lt; 8 or unresponsiveness while on pain medication   nystatin 100,000 units/g topical powder: 1 application topically 2 times a day  pantoprazole 40 mg oral delayed release tablet: 1 tab(s) orally once a day (before a meal)  petrolatum topical ointment: 1 application topically 3 times a day, As needed, inguinal fold rash  polyethylene glycol 3350 oral powder for reconstitution: 17 gram(s) orally once a day  prednisoLONE acetate 1% ophthalmic suspension: 1 drop(s) to each affected eye 3 times a day  Restasis: to each affected eye 2 times a day  senna leaf extract oral tablet: 1 tab(s) orally once a day (at bedtime)  tamsulosin 0.4 mg oral capsule: 1 cap(s) orally once a day (at bedtime)  traZODone 50 mg oral tablet: 2 tablets orally once a day (at bedtime)  Trelegy Ellipta 100 mcg-62.5 mcg-25 mcg/inh inhalation powder: 1 puff(s) inhaled once a day  Trulicity Pen 1.5 mg/0.5 mL subcutaneous solution: subcutaneous once a week  ursodiol 500 mg oral tablet: orally once a day  Valtrex 1 g oral tablet: orally 3 times a day  Vitamin B-12 1000 mcg oral tablet: 1 tab(s) orally once a day  Vitamin D3 1250 mcg (50,000 intl units) oral capsule: 1 cap(s) orally once a week   acetaminophen 500 mg oral tablet: 2 tab(s) orally every 8 hours, As needed, Severe Pain (7 - 10)  amitriptyline 10 mg oral tablet: 1 tab(s) orally once a day (at bedtime)  ascorbic acid 500 mg oral tablet: 1 tab(s) orally once a day  Augmentin 500 mg-125 mg oral tablet: 1 tab(s) orally 2 times a day   Eliquis 5 mg oral tablet: 1 tab(s) orally 2 times a day (please fill Rx)      erythromycin 0.5% ophthalmic ointment: 1 application to right eye, 4 times a day  folic acid 1 mg oral tablet: 1 tab(s) orally once a day  furosemide 20 mg oral tablet: orally once a day (at bedtime)  gabapentin 600 mg oral tablet: 1 tab(s) orally every 12 hours MDD:1200mg  gentamicin 0.1% topical ointment: Apply topically to affected area 3 times a day   glipiZIDE 5 mg oral tablet, extended release: orally 2 times a day  ipratropium-albuterol 0.5 mg-2.5 mg/3 mL inhalation solution: 3 milliliter(s) inhaled every 6 hours  lactobacillus acidophilus oral capsule: 1 cap(s) orally once a day  Lantus Solostar Pen: 34 unit(s) subcutaneous once a day (at bedtime)  lidocaine 5% topical ointment: 1 application topically 2 times a day  loratadine 10 mg oral tablet: 1 tab(s) orally once a day  methadone 5 mg oral tablet: 1 tab(s) orally 2 times a day MDD:2 Hold for sedation  methenamine hippurate 1 g oral tablet: 1 tab(s) orally 2 times a day   Metoprolol Succinate ER 25 mg oral tablet, extended release: 0.5 tablets orally once a day (at bedtime)  mirtazapine 7.5 mg oral tablet: 1 tab(s) orally once a day (at bedtime)  morphine 15 mg oral tablet: 0.5 tab(s) orally every 4 hours, As Needed MDD:3 tabs. hold for sedation or resp rate &lt; 10  multivitamin: 2 softgels orally 2 times a day  Narcan 4 mg/0.1 mL nasal spray: 4 milligram(s) intranasally every 2 to 5 minutes, As Needed for over sedation, respiratory rate &lt; 8 or unresponsiveness while on pain medication   nystatin 100,000 units/g topical powder: 1 application topically 2 times a day  pantoprazole 40 mg oral delayed release tablet: 1 tab(s) orally once a day (before a meal)  petrolatum topical ointment: 1 application topically 3 times a day, As needed, inguinal fold rash  polyethylene glycol 3350 oral powder for reconstitution: 17 gram(s) orally once a day  prednisoLONE acetate 1% ophthalmic suspension: 1 drop(s) to each affected eye 3 times a day  pregabalin 300 mg oral capsule: 1 cap(s) orally 2 times a day MDD:600  Restasis: to each affected eye 2 times a day  senna leaf extract oral tablet: 1 tab(s) orally once a day (at bedtime)  traZODone 50 mg oral tablet: 2 tablets orally once a day (at bedtime)  Trelegy Ellipta 100 mcg-62.5 mcg-25 mcg/inh inhalation powder: 1 puff(s) inhaled once a day  Trulicity Pen 1.5 mg/0.5 mL subcutaneous solution: subcutaneous once a week  ursodiol 500 mg oral tablet: orally once a day  Vitamin B-12 1000 mcg oral tablet: 1 tab(s) orally once a day  Vitamin D3 1250 mcg (50,000 intl units) oral capsule: 1 cap(s) orally once a week   acetaminophen 500 mg oral tablet: 2 tab(s) orally every 8 hours, As needed, Severe Pain (7 - 10)  amitriptyline 10 mg oral tablet: 1 tab(s) orally once a day (at bedtime)  ascorbic acid 500 mg oral tablet: 1 tab(s) orally once a day  Augmentin 500 mg-125 mg oral tablet: 1 tab(s) orally 2 times a day   Eliquis 5 mg oral tablet: 1 tab(s) orally 2 times a day (please fill Rx)      erythromycin 0.5% ophthalmic ointment: 1 application to right eye, 4 times a day  folic acid 1 mg oral tablet: 1 tab(s) orally once a day  furosemide 20 mg oral tablet: orally once a day (at bedtime)  gabapentin 600 mg oral tablet: 1 tab(s) orally every 12 hours MDD:1200mg  gentamicin 0.1% topical ointment: Apply topically to affected area 3 times a day   glipiZIDE 5 mg oral tablet, extended release: orally 2 times a day  ipratropium-albuterol 0.5 mg-2.5 mg/3 mL inhalation solution: 3 milliliter(s) inhaled every 6 hours  lactobacillus acidophilus oral capsule: 1 cap(s) orally once a day  Lantus Solostar Pen: 10 unit(s) subcutaneous once a day (at bedtime)  lidocaine 5% topical ointment: 1 application topically 2 times a day  loratadine 10 mg oral tablet: 1 tab(s) orally once a day  Mag-Ox 400 oral tablet: 1 tab(s) orally once a day   methadone 5 mg oral tablet: 1 tab(s) orally 2 times a day MDD:2 Hold for sedation  methenamine hippurate 1 g oral tablet: 1 tab(s) orally 2 times a day   Metoprolol Succinate ER 25 mg oral tablet, extended release: 0.5 tablets orally once a day (at bedtime)  mirtazapine 7.5 mg oral tablet: 1 tab(s) orally once a day (at bedtime)  morphine 15 mg oral tablet: 0.5 tab(s) orally every 4 hours, As Needed MDD:3 tabs. hold for sedation or resp rate &lt; 10  multivitamin: 2 softgels orally 2 times a day  Narcan 4 mg/0.1 mL nasal spray: 4 milligram(s) intranasally every 2 to 5 minutes, As Needed for over sedation, respiratory rate &lt; 8 or unresponsiveness while on pain medication   nystatin 100,000 units/g topical powder: 1 application topically 2 times a day  pantoprazole 40 mg oral delayed release tablet: 1 tab(s) orally once a day (before a meal)  petrolatum topical ointment: 1 application topically 3 times a day, As needed, inguinal fold rash  polyethylene glycol 3350 oral powder for reconstitution: 17 gram(s) orally once a day  prednisoLONE acetate 1% ophthalmic suspension: 1 drop(s) to each affected eye 3 times a day  pregabalin 300 mg oral capsule: 1 cap(s) orally 2 times a day MDD:600  Restasis: to each affected eye 2 times a day  senna leaf extract oral tablet: 1 tab(s) orally once a day (at bedtime)  traZODone 50 mg oral tablet: 2 tablets orally once a day (at bedtime)  Trelegy Ellipta 100 mcg-62.5 mcg-25 mcg/inh inhalation powder: 1 puff(s) inhaled once a day  ursodiol 500 mg oral tablet: orally once a day  Vitamin B-12 1000 mcg oral tablet: 1 tab(s) orally once a day  Vitamin D3 1250 mcg (50,000 intl units) oral capsule: 1 cap(s) orally once a week

## 2022-07-31 NOTE — DISCHARGE NOTE PROVIDER - NSDCFUSCHEDAPPT_GEN_ALL_CORE_FT
Gwendolyn St  [facility] Physician [facility]  GERIATRICS [address] [place] [address]   Scheduled Appointment: 08/30/2022

## 2022-07-31 NOTE — DISCHARGE NOTE PROVIDER - NSDCFUADDAPPT_GEN_ALL_CORE_FT
Please follow up with pain management Dr. Gwendolyn St in 1 week from discharge. Please follow up with your primary care physician regarding your hospitalization and for further monitoring/management.  Please follow up with Pain Management Dr. Gwendolyn St in 1-2 week from discharge.  Please follow up with Urology upon discharge.  Please follow up with Ophthalmology upon discharge.  Please follow up with Neurology upon discharge. Please follow up with your primary care physician regarding your hospitalization and for further monitoring/management.  Please follow up with Pain Management Dr. Gwendolyn St in 1-2 week from discharge.  Please follow up with Urology upon discharge.  Please follow up with Ophthalmology upon discharge.  Please follow up with Neurology upon discharge.  Please follow up with Cardiology upon discharge. Please follow up with your primary care physician regarding your hospitalization and for further monitoring/management.  Please follow up with Pain Management Dr. Gwendolyn St within the week from discharge.   Please follow up with Urology upon discharge.  Please follow up with Ophthalmology upon discharge. Please call to make an appointment.  Please follow up with Neurology upon discharge. Please call to make an appointment.  Please follow up with your Cardiologist upon discharge. Please call to make an appointment.

## 2022-07-31 NOTE — PROGRESS NOTE ADULT - PROBLEM SELECTOR PLAN 12
Diet: Minced and moist (In case Thin liquids via straw only)    DVT Ppx: Subq heparin (may switch to lovenox depending on patient kidney function on repeat labs)    PT & OT eval recommend rehabilitation- contact     f/u nutrition consult recs Diet: Minced and moist (In case Thin liquids via straw only)- **Daughter requests advancing to soft- will discuss**    DVT Ppx: Subq heparin (may switch to lovenox depending on patient kidney function on repeat labs)    PT & OT eval recommend rehabilitation- contact     f/u nutrition consult recs Diet: Minced and moist (In case Thin liquids via straw only)- **Daughter requests advancing to soft- will discuss**    DVT Ppx: Lovenox    PT & OT eval recommend rehabilitation- contact     f/u nutrition consult recs Diet: Soft (Daughter willing to accept risk as this is different than S&S recs)    DVT Ppx: Lovenox    PT & OT eval recommend rehabilitation- contact     f/u nutrition consult recs

## 2022-07-31 NOTE — PROGRESS NOTE ADULT - SUBJECTIVE AND OBJECTIVE BOX
[facility] DEPARTMENT OF OPHTHALMOLOGY  ------------------------------------------------------------------------------    Interval History: Following for R HZO. No acute events overnight. Today patient's mental status much improved. Denying eye pain or new vision changes, though states that R sided facial rash is itchy.     MEDICATIONS  (STANDING):  acyclovir IVPB 650 milliGRAM(s) IV Intermittent every 8 hours  artificial tears (preservative free) Ophthalmic Solution 1 Drop(s) Left EYE four times a day  aspirin  chewable 324 milliGRAM(s) Oral daily  brimonidine 0.2% Ophthalmic Solution 1 Drop(s) Right EYE three times a day  budesonide  80 MICROgram(s)/formoterol 4.5 MICROgram(s) Inhaler 2 Puff(s) Inhalation two times a day  ceFAZolin   IVPB 1000 milliGRAM(s) IV Intermittent every 8 hours  dextrose 5%. 1000 milliLiter(s) (50 mL/Hr) IV Continuous <Continuous>  dextrose 5%. 1000 milliLiter(s) (100 mL/Hr) IV Continuous <Continuous>  dextrose 50% Injectable 25 Gram(s) IV Push once  dextrose 50% Injectable 12.5 Gram(s) IV Push once  dextrose 50% Injectable 25 Gram(s) IV Push once  enoxaparin Injectable 40 milliGRAM(s) SubCutaneous every 24 hours  erythromycin   Ointment 1 Application(s) Right EYE four times a day  glucagon  Injectable 1 milliGRAM(s) IntraMuscular once  insulin glargine Injectable (LANTUS) 10 Unit(s) SubCutaneous at bedtime  insulin lispro (ADMELOG) corrective regimen sliding scale   SubCutaneous Before meals and at bedtime  lactobacillus acidophilus 1 Tablet(s) Oral daily  losartan 50 milliGRAM(s) Oral daily  metoprolol succinate ER 12.5 milliGRAM(s) Oral daily  mirtazapine 15 milliGRAM(s) Oral at bedtime  nystatin Powder 1 Application(s) Topical two times a day  sodium chloride 0.9%. 1000 milliLiter(s) (50 mL/Hr) IV Continuous <Continuous>  tiotropium 18 MICROgram(s) Capsule 1 Capsule(s) Inhalation daily  traZODone 100 milliGRAM(s) Oral at bedtime  ursodiol Tablet 500 milliGRAM(s) Oral <User Schedule>    MEDICATIONS  (PRN):  dextrose Oral Gel 15 Gram(s) Oral once PRN Blood Glucose LESS THAN 70 milliGRAM(s)/deciliter      VITALS: T(C): 36.4 (07-28-22 @ 17:33)  T(F): 97.5 (07-28-22 @ 17:33), Max: 97.5 (07-28-22 @ 17:33)  HR: 82 (07-28-22 @ 17:33) (82 - 82)  BP: 149/67 (07-28-22 @ 17:33) (149/67 - 149/67)  RR:  (18 - 18)  SpO2:  (96% - 96%)  Wt(kg): --  General: AAO x 3, appropriate mood and affect    Ophthalmology Exam:  Visual acuity (sc): 20/70 OD, 20/30 OS, NIPH  Pupils: 5mm, nonreactive to light OD, 4mm -> 2mm OS  Ttono: 12 OD, 15 OS  Extraocular movements (EOMs): 50% restriction in abduction OD, otherwise full OU  Confrontational Visual Field (CVF): Full OU  Color Plates: 12/12 OU    Pen Light Exam (PLE) - pt unable to tolerate sitting up to slit lamp.  External: Scattered vesicular, crusted lesions along V1 distribution over R side of face, wnl on L side  Lids/Lashes/Lacrimal Ducts: Trace periorbital edema RUL and RLL, flat OS   Sclera/Conjunctiva: 1-2+ injection OD, W+Q OS  Cornea: Four small pseudodendrites centrally OD (improved), Cl OS  Anterior Chamber: D+F OU    Iris: Flat OU  Lens: PCIOL OD, 3+ NS OS     [facility] DEPARTMENT OF OPHTHALMOLOGY  ------------------------------------------------------------------------------    Interval History: Following for R HZO. No acute events overnight. Today patient's mental status much improved. Denying eye pain or new vision changes, though states that R sided facial rash is itchy.     MEDICATIONS  (STANDING):  acyclovir IVPB 650 milliGRAM(s) IV Intermittent every 8 hours  artificial tears (preservative free) Ophthalmic Solution 1 Drop(s) Left EYE four times a day  aspirin  chewable 324 milliGRAM(s) Oral daily  brimonidine 0.2% Ophthalmic Solution 1 Drop(s) Right EYE three times a day  budesonide  80 MICROgram(s)/formoterol 4.5 MICROgram(s) Inhaler 2 Puff(s) Inhalation two times a day  ceFAZolin   IVPB 1000 milliGRAM(s) IV Intermittent every 8 hours  dextrose 5%. 1000 milliLiter(s) (50 mL/Hr) IV Continuous <Continuous>  dextrose 5%. 1000 milliLiter(s) (100 mL/Hr) IV Continuous <Continuous>  dextrose 50% Injectable 25 Gram(s) IV Push once  dextrose 50% Injectable 12.5 Gram(s) IV Push once  dextrose 50% Injectable 25 Gram(s) IV Push once  enoxaparin Injectable 40 milliGRAM(s) SubCutaneous every 24 hours  erythromycin   Ointment 1 Application(s) Right EYE four times a day  glucagon  Injectable 1 milliGRAM(s) IntraMuscular once  insulin glargine Injectable (LANTUS) 10 Unit(s) SubCutaneous at bedtime  insulin lispro (ADMELOG) corrective regimen sliding scale   SubCutaneous Before meals and at bedtime  lactobacillus acidophilus 1 Tablet(s) Oral daily  losartan 50 milliGRAM(s) Oral daily  metoprolol succinate ER 12.5 milliGRAM(s) Oral daily  mirtazapine 15 milliGRAM(s) Oral at bedtime  nystatin Powder 1 Application(s) Topical two times a day  sodium chloride 0.9%. 1000 milliLiter(s) (50 mL/Hr) IV Continuous <Continuous>  tiotropium 18 MICROgram(s) Capsule 1 Capsule(s) Inhalation daily  traZODone 100 milliGRAM(s) Oral at bedtime  ursodiol Tablet 500 milliGRAM(s) Oral <User Schedule>    MEDICATIONS  (PRN):  dextrose Oral Gel 15 Gram(s) Oral once PRN Blood Glucose LESS THAN 70 milliGRAM(s)/deciliter      VITALS: T(C): 36.4 (07-28-22 @ 17:33)  T(F): 97.5 (07-28-22 @ 17:33), Max: 97.5 (07-28-22 @ 17:33)  HR: 82 (07-28-22 @ 17:33) (82 - 82)  BP: 149/67 (07-28-22 @ 17:33) (149/67 - 149/67)  RR:  (18 - 18)  SpO2:  (96% - 96%)  Wt(kg): --  General: AAO x 3, appropriate mood and affect    Ophthalmology Exam:  Visual acuity (sc): 20/70 OD, 20/30 OS, NIPH  Pupils: 5mm, nonreactive to light OD, 4mm -> 2mm OS  Ttono: 12 OD, 15 OS  Extraocular movements (EOMs): 60% restriction in abduction OD, otherwise full OU  Confrontational Visual Field (CVF): Full OU  Color Plates: 12/12 OU    Pen Light Exam (PLE) - pt unable to tolerate sitting up to slit lamp.  External: Scattered vesicular, crusted lesions along V1 distribution over R side of face, wnl on L side  Lids/Lashes/Lacrimal Ducts: Trace periorbital edema RUL and RLL, flat OS   Sclera/Conjunctiva: 1-2+ injection OD, W+Q OS  Cornea: Four small pseudodendrites centrally OD (improved), trace SPK, Cl OS  Anterior Chamber: D+F OU    Iris: Flat OU  Lens: PCIOL OD, 3+ NS OS

## 2022-07-31 NOTE — PROGRESS NOTE ADULT - PROBLEM SELECTOR PLAN 3
Patient presented with history of severe hypertension at home and hypertension in the SBP ~180s while in ED    - Patient on labetalol for BP>180 SBP, currently in 140s-150s SBP  - Resume oral home BP medications pending patient ability to swallow- otherwise will start IV labetalol prn  - Monitor VS  - Hold Anti-HTN meds: amlodipine, furosemide, spironolactone will add on if patient BP's appropriate at later time- In effort to not acutely drop patient BP    - 7/30 BPs elevated (~170 last BP)- will restart amlodipine 2.5mg Patient presented with history of severe hypertension at home and hypertension in the SBP ~180s while in ED    - Patient on labetalol for BP>180 SBP, currently in 140s-150s SBP  - Resume oral home BP medications pending patient ability to swallow- otherwise will start IV labetalol prn  - Monitor VS  - Hold Anti-HTN meds: furosemide, spironolactone will add on if patient BP's appropriate at later time- In effort to not acutely drop patient BP Patient presented with history of severe hypertension at home and hypertension in the SBP ~180s while in ED    - Patient on labetalol for BP>180 SBP, currently in 140s-150s SBP-ADDENDUM- pt not on labetolol PRN   - C/w losartan, metoprolol, and amlodipine as ordered and can titrate as needed   - Holding: furosemide, spironolactone will add on if patient BP's appropriate at later time- In effort to not acutely drop patient BP

## 2022-07-31 NOTE — DIETITIAN INITIAL EVALUATION ADULT - ORAL INTAKE PTA/DIET HISTORY
Daughter Luna present at bedside today during time of interview, able to help answer questions. Pt lives at home with her  and an aide. They order out a lot, "[employer] eats", the aide helps them to order. No difficulty obtaining groceries. No specific diet followed PTA. Daughter states that Pt likes to eat giovana charms, and eats a lot of chocolate throughout the day and is non-compliant with [facility] diet. Food brought in from home, as Pt does not like hospital food. Was taking folic acid, B12 PTA.

## 2022-08-01 NOTE — PROGRESS NOTE ADULT - PROBLEM SELECTOR PLAN 1
Patient demonstrating Right V1 distribution rash, previous history of chicken pox as child, treated with Valtrex since 7/24 outpatient    **If patient requires MRI, must have  shunt device reprogrammed after*- page neurosurg [id number]**    - Consulted ID and Neuro- appreciate recs   - LP cancelled secondary to ID recs- likely will not change care if done  - On Acyclovir 650 q8 now that CHRALINE has resolved - and Cefazolin 1G q8 per ID recs  - Will maintain gentle hydration while on acyclovir   -For Opthalmicus- appreciate opthalmology recs- Start brimonidine TID in R eye, Start erythromycin ointment QID to R eye and periocular lesions, start artificial tears; Tonometry results improved per optho note  - PRN Tylenol for pain- On standing ofirmev right now, pt pain is well controlled   - MRI Head and Orbits- no brain findings, sig for swelling in right orbit 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  -  shunt rate reprogrammed per neurosurg verbal discussion  - Patient s/p midline on 7/28 for additional IV access  - May need special IV team/IV nurse to draw future labs given the patient is a hard stick  - Patient complains of itchiness of rash- hydroxyzine prn  - Wound care consulted- f/u  - Placed on gabapentin 300mg for suspected neuropathic pain and 1 time 2.5mg oxy for immediate pain relief Prominent rash in R V1 dermatome, appears to be worsening compared to today. Has significant pruritis and patient has been scratching affected area. Per ophtho team affected area appears notably worse with possible concern for necrotizing fasciitis. AMS noted on admission now improved.    **If patient requires MRI, must have  shunt device reprogrammed after*- page neurosurg [id number]**    - Consulted ID and Neuro- appreciate recs   - LP cancelled secondary to ID recs- likely will not change care if done  - On Acyclovir 650 q8 now that CHARLINE has resolved - and Cefazolin 1G q8 per ID recs  - Will maintain gentle hydration while on acyclovir   -For Opthalmicus- appreciate opthalmology recs- Start brimonidine TID in R eye, Start erythromycin ointment QID to R eye and periocular lesions, start artificial tears; Tonometry results improved per optho note  - PRN Tylenol for pain- On standing ofirmev right now, pt pain is well controlled   - MRI Head and Orbits- no brain findings, sig for swelling in right orbit 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  -  shunt rate reprogrammed per neurosurg verbal discussion  - Patient s/p midline on 7/28 for additional IV access  - May need special IV team/IV nurse to draw future labs given the patient is a hard stick      - Patient complains of itchiness of rash- hydroxyzine prn  - Wound care consulted- f/u  - Placed on gabapentin 300mg for suspected neuropathic pain and 1 time 2.5mg oxy for immediate pain relief Prominent rash in R V1 dermatome, appears to be worsening compared to today. Has significant pruritis and patient has been scratching affected area. Per ophtho team affected area appears notably worse with possible concern for necrotizing fasciitis. 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  - CT maxillofacial w/ and w/o contrast to r/o necrotizing fasciitis  - Plastics consulted for c/f nec fasc, appreciate evaluation and will f/u recs.  - Sx management:    - Increase hydroxyzine to 20mg QID PRN for itching    - Continue gabapentin 100mg TID for neuropathic pain, will uptitrate daily    - IV dilaudid 0.5mg Q3H PRN, PO acetaminophen 650mg Q6H PRN    - Lidocaine ointment as below  - ID following, appreciate recs:    - acyclovir 650mg Q8H    - IV hydration while on acyclovir; LR 75cc/hr    - vanc 750mg BID, monitor levels; d/c cefazolin    - repeat BCx x2 today    - no indication for LP at this time  - Ophthalmology following, appreciate recs:    - Brimonidine drops TID, erythromycin ointment QID to eye and periocular lesions, artificial tears Q4H  - Appreciate derm recs:    - lidocaine ointment mixed with vaseline BID    - apply vaseline to crusted areas Q2H

## 2022-08-01 NOTE — PROGRESS NOTE ADULT - SUBJECTIVE AND OBJECTIVE BOX
[facility] DEPARTMENT OF OPHTHALMOLOGY  ------------------------------------------------------------------------------    Interval History: Following for R HZO. Ophthalmology called to bedside to assess pt in setting of worsening brown crusting over R skin lesions, increasing edema and erythema of rash area. Pt distracted by itchiness and pain and very poorly cooperative with exam. Pt has been scratching at skin rash with her nails.     MEDICATIONS  (STANDING):  acyclovir IVPB 650 milliGRAM(s) IV Intermittent every 8 hours  albuterol/ipratropium for Nebulization. 3 milliLiter(s) Nebulizer once  amLODIPine   Tablet 5 milliGRAM(s) Oral daily  artificial tears (preservative free) Ophthalmic Solution 1 Drop(s) Left EYE four times a day  aspirin  chewable 324 milliGRAM(s) Oral daily  brimonidine 0.2% Ophthalmic Solution 1 Drop(s) Right EYE three times a day  budesonide  80 MICROgram(s)/formoterol 4.5 MICROgram(s) Inhaler 2 Puff(s) Inhalation two times a day  dextrose 5%. 1000 milliLiter(s) (50 mL/Hr) IV Continuous <Continuous>  dextrose 5%. 1000 milliLiter(s) (100 mL/Hr) IV Continuous <Continuous>  dextrose 50% Injectable 25 Gram(s) IV Push once  dextrose 50% Injectable 12.5 Gram(s) IV Push once  dextrose 50% Injectable 25 Gram(s) IV Push once  enoxaparin Injectable 40 milliGRAM(s) SubCutaneous every 24 hours  erythromycin   Ointment 1 Application(s) Right EYE four times a day  gabapentin 100 milliGRAM(s) Oral three times a day  glucagon  Injectable 1 milliGRAM(s) IntraMuscular once  insulin glargine Injectable (LANTUS) 10 Unit(s) SubCutaneous at bedtime  insulin lispro (ADMELOG) corrective regimen sliding scale   SubCutaneous Before meals and at bedtime  lactated ringers. 1000 milliLiter(s) (75 mL/Hr) IV Continuous <Continuous>  lactobacillus acidophilus 1 Tablet(s) Oral daily  lidocaine 5% Ointment 1 Application(s) Topical two times a day  losartan 50 milliGRAM(s) Oral daily  metoprolol succinate ER 12.5 milliGRAM(s) Oral daily  mirtazapine 15 milliGRAM(s) Oral at bedtime  mupirocin 2% Ointment 1 Application(s) Topical three times a day  nystatin Powder 1 Application(s) Topical two times a day  potassium chloride    Tablet ER 40 milliEquivalent(s) Oral once  tiotropium 18 MICROgram(s) Capsule 1 Capsule(s) Inhalation daily  traZODone 100 milliGRAM(s) Oral at bedtime  ursodiol Tablet 500 milliGRAM(s) Oral <User Schedule>  vancomycin  IVPB        MEDICATIONS  (PRN):  acetaminophen     Tablet .. 650 milliGRAM(s) Oral every 6 hours PRN Mild Pain (1 - 3), Moderate Pain (4 - 6), Severe Pain (7 - 10)  dextrose Oral Gel 15 Gram(s) Oral once PRN Blood Glucose LESS THAN 70 milliGRAM(s)/deciliter  HYDROmorphone  Injectable 0.5 milliGRAM(s) IV Push every 3 hours PRN Pain  hydrOXYzine hydrochloride 20 milliGRAM(s) Oral four times a day PRN Itching  QUEtiapine 25 milliGRAM(s) Oral daily PRN agitation      VITALS: T(C): 37.1 (08-01-22 @ 18:33)  T(F): 98.8 (08-01-22 @ 18:33), Max: 99.9 (08-01-22 @ 13:39)  HR: 86 (08-01-22 @ 18:33) (67 - 91)  BP: 135/67 (08-01-22 @ 18:33) (135/67 - 183/72)  RR:  (15 - 18)  SpO2:  (90% - 97%)  Wt(kg): --  General: AAO x 2, pt distracted by itchiness and pain and very poorly cooperative with exam.     Ophthalmology Exam:  Visual acuity (sc): At least 20/200 OD (pt distracted and with waxing and waning mental status), 20/30 OS  Pupils:  5mm, nonreactive to light OD, 4mm -> 2mm OS  Ttono: 6 OD, 14 OS  Extraocular movements (EOMs): 100% restriction on abduction OD, otherwise full OU  Confrontational Visual Field (CVF): MYRON 2/2 pt cooperation    Pen Light Exam (PLE) - pt unable to tolerate sitting up to slit lamp.  External: R sided forehead edema and erythema, new from yesterday, with overlying dark brown crusting, skin lesions not extending beyond boundaries of crusting lesions seen yesterday, + L preauricular edema and erythema, wnl on L side  Lids/Lashes/Lacrimal Ducts: Trace periorbital edema RUL and RLL, flat OS   Sclera/Conjunctiva: 1-2+ injection OD, W+Q OS  Cornea: 3+ SPK, no pseudodendrites OD, Cl OS  Anterior Chamber: D+F OU    Iris: Flat OU  Lens: PCIOL OD, 3+ NS OS    Fundus Exam: dilated with 1% tropicamide and 2.5% phenylephrine  Approval obtained from primary team for dilation  Patient aware that pupils can remained dilated for at least 4-6 hours  Exam performed with 20D lens    Hazy view OD    Vitreous: wnl OU   Disc, cup/disc: sharp and pink, 0.4 OU  Macula: wnl OU  Vessels: wnl OU  Periphery: grossly wnl OU

## 2022-08-01 NOTE — CONSULT NOTE ADULT - SUBJECTIVE AND OBJECTIVE BOX
Pt examined    Green crust over area of blisters surrounded by erythema with itching on forehead and right wanda-ocular    No crepitus  Pt hemodinamicaly stable with normal WBC    CT scan pending

## 2022-08-01 NOTE — PROGRESS NOTE ADULT - ASSESSMENT
85yo female w/PMH Cardiac stents post MI (1986, 1996), COPD, NPH, DM, Migraines, and suspected HTN presenting with 3 day history of herpes zoster rash  No fever, leukocytosis  R sided zoster rash, then increasing confusion  On exam with vesicles, redness at site  Optho with concern for R sided ocular involvement  2/4 BCX CoNS--suspect contam  MR shows areas of myositis, but suspect is due to viral process vs concomitant orbital cellulitis (patient is clinically improving)  Today worsening R sided facial swelling, patient has had itching and burning to site  Overall,  1) VZV Ophthalmicus  - Concern for VZV with ocular involvement  - Acyclovir 650mg q 8; recommend continue IVF while on acyclovir  - Monitor lesions  - F/U Optho  2) AMS  - Likely due to component of VZV encephalopathy vs dehydration/infectious process in elderly  - Would be cross treated with Acyclovir as above  - Would hold on LP unless further clinical uncertainty  3) Positive BCX  - CoNS--suspect contam  - F/U BCX--NGTD  4) Preseptal cellulitis  - Worsening swelling/redness to R side of face today; new infection due to scratching? Resistant bacteria?  - Vanco 750mg q 12 (monitor levels)  - DC Cefazolin  - Monitor facial swelling/redness for improvement  - Repeat BCXs x 2    Jack Henderson MD  Contact on TEAMS messaging from 9am - 5pm  From 5pm-9am, on weekends, or if no response call [phone]

## 2022-08-01 NOTE — PROGRESS NOTE ADULT - PROBLEM SELECTOR PLAN 10
Previous history of stents in 1986 and 1996, on aspirin 325mg outpatient    - Monitor VS and symptoms at this time Elevated enzymes, likely secondary to physiologic stress from herpes zoster/htn, Is patient's baseline according to daughter    - Improved   - Monitor labs and physical exam for now; moderate RUQ pain on physical not concerning at this time  - Hepatitis panel negative

## 2022-08-01 NOTE — PROGRESS NOTE ADULT - SUBJECTIVE AND OBJECTIVE BOX
INFECTIOUS DISEASE FOLLOW UP NOTE:    Interval History/ROS: Patient is a 86y old  Female who presents with a chief complaint of Suspicion for Herpes Zoster opthalmicus/encephalitis (01 Aug 2022 13:08)    Overnight events: Worsening swelling and pain on right anterior ear area. having difficulty opening her right eye again with blurry vision. Temperature was 99.9.       REVIEW OF SYSTEMS:  CONSTITUTIONAL: No fever or chills  HEENT: No sore throat  RESPIRATORY: No cough, no shortness of breath  CARDIOVASCULAR: No chest pain or palpitations  GASTROINTESTINAL: No abdominal or epigastric pain  GENITOURINARY: No dysuria  NEUROLOGICAL: No headache/dizziness  MSK: No joint pain, erythema, or swelling; no back pain  SKIN: Itchiness of right scalp, lesions are drying up; still having some erythema  All other ROS negative except noted above      Allergies:  diltiazem (Other; Rash)      ANTIMICROBIALS:   acyclovir IVPB 650 every 8 hours  ceFAZolin   IVPB 1000 every 8 hours  vancomycin  IVPB        OTHER MEDS: MEDICATIONS  (STANDING):  acetaminophen     Tablet .. 650 every 6 hours PRN  albuterol/ipratropium for Nebulization. 3 once  amLODIPine   Tablet 5 daily  aspirin  chewable 324 daily  budesonide  80 MICROgram(s)/formoterol 4.5 MICROgram(s) Inhaler 2 two times a day  dextrose 50% Injectable 25 once  dextrose 50% Injectable 12.5 once  dextrose 50% Injectable 25 once  dextrose Oral Gel 15 once PRN  enoxaparin Injectable 40 every 24 hours  gabapentin 100 three times a day  glucagon  Injectable 1 once  HYDROmorphone  Injectable 0.25 once  hydrOXYzine hydrochloride 20 four times a day PRN  insulin glargine Injectable (LANTUS) 10 at bedtime  insulin lispro (ADMELOG) corrective regimen sliding scale  Before meals and at bedtime  losartan 50 daily  metoprolol succinate ER 12.5 daily  mirtazapine 15 at bedtime  tiotropium 18 MICROgram(s) Capsule 1 daily  traZODone 100 at bedtime  ursodiol Tablet 500 <User Schedule>      Vital Signs Last 24 Hrs  T(F): 99.9 (08-01-22 @ 13:39), Max: 99.9 (07-28-22 @ 05:00)    Vital Signs Last 24 Hrs  HR: 83 (08-01-22 @ 13:39) (67 - 91)  BP: 159/88 (08-01-22 @ 13:39) (144/60 - 183/72)  RR: 16 (08-01-22 @ 13:39)  SpO2: 94% (08-01-22 @ 13:39) (90% - 97%)  Wt(kg): --    EXAM:  GENERAL: Lying in bed, complains of more pain on right scalp  HEAD: Right scalp erythema and crusted lesions; worse around her eyes and right anterior auricular area  EYES: R conjunctival injection, dilated pupil  ENT: Normal external ears and nose, no discharges; moist mucous membranes  NECK: Supple, nontender to palpation  CHEST/LUNG: Clear to auscultation bilaterally  HEART: S1 S2  ABDOMEN: Soft, nontender, nondistended; normoactive bowel sounds  EXTREMITIES: No clubbing, cyanosis, or petal edema  NERVOUS SYSTEM: Alert and oriented to person, time, place and situation, speech clear. No focal deficits   MSK: No joint erythema, swelling or pain  SKIN: No other rashes or lesions, no superficial thrombophlebitis    Labs:                        11.9   5.95  )-----------( 299      ( 31 Jul 2022 07:03 )             37.4     07-31    140  |  103  |  5<L>  ----------------------------<  210<H>  3.7   |  26  |  0.53    Ca    8.6      31 Jul 2022 07:03  Phos  2.3     07-31  Mg     1.70     07-31    TPro  6.3  /  Alb  3.1<L>  /  TBili  0.2  /  DBili  x   /  AST  23  /  ALT  14  /  AlkPhos  87  07-31      WBC Trend:  WBC Count: 5.95 (07-31-22 @ 07:03)  WBC Count: 5.37 (07-30-22 @ 05:35)  WBC Count: 6.16 (07-29-22 @ 06:43)  WBC Count: 7.33 (07-28-22 @ 14:31)      Creatine Trend:  Creatinine, Serum: 0.53 (07-31)  Creatinine, Serum: 0.52 (07-30)  Creatinine, Serum: 0.55 (07-30)  Creatinine, Serum: 0.61 (07-29)      Liver Biochemical Testing Trend:  Alanine Aminotransferase (ALT/SGPT): 14 (07-31)  Alanine Aminotransferase (ALT/SGPT): 16 (07-29)  Alanine Aminotransferase (ALT/SGPT): 22 (07-28)  Alanine Aminotransferase (ALT/SGPT): 29 (07-27)  Alanine Aminotransferase (ALT/SGPT): 29 (07-26)  Aspartate Aminotransferase (AST/SGOT): 23 (07-31-22 @ 07:03)  Aspartate Aminotransferase (AST/SGOT): 23 (07-29-22 @ 06:43)  Aspartate Aminotransferase (AST/SGOT): 34 (07-28-22 @ 14:31)  Aspartate Aminotransferase (AST/SGOT): 49 (07-27-22 @ 06:17)  Aspartate Aminotransferase (AST/SGOT): 46 (07-26-22 @ 10:45)  Bilirubin Total, Serum: 0.2 (07-31)  Bilirubin Total, Serum: 0.3 (07-29)  Bilirubin Total, Serum: <0.2 (07-28)  Bilirubin Total, Serum: 0.3 (07-27)  Bilirubin Total, Serum: 0.3 (07-26)      Trend LDH          MICROBIOLOGY:        Culture - Blood (collected 28 Jul 2022 16:00)  Source: .Blood Blood-Peripheral  Preliminary Report:    No growth to date.    Culture - Blood (collected 28 Jul 2022 15:00)  Source: .Blood Blood-Peripheral  Preliminary Report:    No growth to date.    Culture - Blood (collected 27 Jul 2022 19:00)  Source: .Blood Blood-Venous  Preliminary Report:    No growth to date.    Culture - Blood (collected 26 Jul 2022 10:55)  Source: .Blood Blood  Final Report:    Growth in anaerobic bottle: Staphylococcus epidermidis    Growth in aerobic and anaerobic bottles: Staphylococcus hominis    Coag Negative Staphylococcus    Single set isolate, possible contaminant. Contact    Microbiology if susceptibility testing clinically    indicated.    Culture - Blood (collected 26 Jul 2022 10:45)  Source: .Blood Blood  Final Report:    Growth in aerobic bottle: Staphylococcus hominis    Growth in aerobic and anaerobic bottles: Staphylococcus epidermidis    ***Blood Panel PCR results on this specimen are available    approximately 3 hours after the Gram stain result.***    Gram stain, PCR, and/or culture results may not always    correspond due to difference in methodologies.    ************************************************************    This PCR assay was performed by multiplex PCR. This    Assay tests for 66 bacterial and resistance gene targets.    Please refer to the [facility] test directory    at [email or web address] for details.  Organism: Blood Culture PCR  Staphylococcus hominis  Staphylococcus epidermidis  Organism: Staphylococcus epidermidis    Sensitivities:      -  Ampicillin/Sulbactam: S <=8/4      -  Cefazolin: S <=4      -  Clindamycin: R >4      -  Erythromycin: R >4      -  Gentamicin: S <=1 Should not be used as monotherapy      -  Oxacillin: S <=0.25      -  Penicillin: R 8      -  Rifampin: S <=1 Should not be used as monotherapy      -  Tetra/Doxy: S <=1      -  Trimethoprim/Sulfamethoxazole: S <=0.5/9.5      -  Vancomycin: S 2      Method Type: EDWARD  Organism: Staphylococcus hominis    Sensitivities:      -  Ampicillin/Sulbactam: R <=8/4      -  Cefazolin: R <=4      -  Clindamycin: S 0.5      -  Erythromycin: S <=0.25      -  Gentamicin: S <=1 Should not be used as monotherapy      -  Oxacillin: R >2      -  Penicillin: R >8      -  Rifampin: S <=1 Should not be used as monotherapy      -  Tetra/Doxy: S <=1      -  Trimethoprim/Sulfamethoxazole: R >2/38      -  Vancomycin: S 1      Method Type: EDWARD  Organism: Blood Culture PCR    Sensitivities:      -  Staphylococcus epidermidis, Methicillin resistant: Detec      Method Type: PCR      HIV-1/2 Combo Result: Nonreact (07-27-22 @ 06:17)    Rapid RVP Result: NotDetec (07-26 @ 10:31)      RADIOLOGY:  imaging below personally reviewed    < from: MR Orbits w/wo IV Cont (07.27.22 @ 16:55) >  Evaluation the orbits is limited. However, asymmetric edema appears to   involve the right orbital medial and lateral rectus muscles, as well as   the superior oblique muscle, suspicious for a myositis (viral given the   known herpes zoster with superimposed bacterial infection not excluded).   There is no evidence for orbital abscess. The cavernous sinuses and   superior ophthalmic veins remain patent. The right preseptal periorbital   soft tissue swelling appears improved compared to the CT suggesting   improving preseptal cellulitis/zoster. Serial imaging follow-up is   recommended to monitor for stability.    No evidence for acute infarct, acute intracranial hemorrhage, or   intracranial abscess.    Stable ventricular size.    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    < end of copied text >

## 2022-08-01 NOTE — CONSULT NOTE ADULT - ATTENDING COMMENTS
87yo female w/PMH Cardiac stents post MI (1986, 1996), COPD, NPH, DM, Migraines, and suspected HTN presenting with 3 day history of herpes zoster rash  No fever, leukocytosis  R sided zoster rash, then increasing confusion  On exam with vesicles, redness at site  Optho with concern for R sided ocular involvement  2/4 GPC CL  Overall,  1) VZV Ophthalmicus  - Concern for VZV with ocular involvement  - Acyclovir 650mg q 12 (continue IVF while on treatment)  - Monitor lesions  - F/U Optho  2) AMS  - Likely due to component of VZV encephalopathy?  - Would be cross treated with Acyclovir as above  - Would hold on LP unless further uncertainty  3) Positive BCX  - ID pending  - Vancomycin 1g q 24  - DC Cefazolin  - F/U BCX    Jack Henderson MD  Contact on TEAMS messaging from 9am - 5pm  From 5pm-9am, on weekends, or if no response call [phone]    I was physically present for the key portions of the evaluation and management service provided. I saw and examined the patient. I agree with the above history, physical, and plan except for any discrepancies which I have documented in “Attending Statements.” Please refer to “Attending Statements” for final plan.
Herpes zoster ophthalmicus with cellulitis, continue iv antiviral therapy and iv antibiotics. Advised to avoid scratching crusted areas as much as possible, liberal use of vaseline, may apply topical lidocaine to provide relief. Crusted skin should be moist at all times. Follow up bacterial cultures from eyelid swab. Perianal skin small ulceration, possibly pressure ulcer, mupirocin ointment BID to TID, q2hr repositioning.
I have interviewed and examined the patient and reviewed the residents note including the history, exam, assessment, and plan.  I agree with the residents assessment and plan.    Zoster keratitis, dermatitis, trabeculitis, and pupil involvment OD  Antivirals per primary team  erythromycin emma QID OD and to the lesions around the right eye  Start brimonidine TID OD  Neuro for further work up of AMS  ID for zoster management  Findings and plan discussed with daughter and primary team  Will follow    Toyin Kuhn MD
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   plan  -Acyclovir 10mg/kg q8h (or renal dose adjustment if needed) for empiric treatment  -ID consult recommended  -Recommend MRI brain with and without contrast  -Spinal tap for CSF analysis (including PCR for HSV, VZV, enterovirus, viral panel in addition to cytology, cell count, glucose, protein, etc)  -Please have lab save CSF samples in case further analysis required  -24 hr vEEG recommended to evaluate for possible seizure

## 2022-08-01 NOTE — CONSULT NOTE ADULT - ASSESSMENT
#Herpes zoster ophthalmicus with crusted lesions of the skin likely contributing to significant pruritus, and surrounding erythema concerning for cellulitis  -     Recommend mixing topical lidocaine ointment with Vaseline (plain petroleum jelly) twice daily  -     [place] use of plain petroleum jelly every 1-2 hours  -     Up-titrate gabapentin as tolerated and as per primary team      #Erosions of perianal skin – no concern for infection, frictional vs pressure vs other  -     Recommend mupirocin ointment TID to eroded areas of skin    The patient's chart was reviewed in addition to being seen and examined at bedside with the dermatology attending Dr. Mathis. Recommendations were communicated with the primary team.  Please page [phone] w/10 digit call back number for further related questions.    Lauren Peralta MD  Resident Physician, PGY3  [facility] Dermatology  Pager: [phone]  Office: [phone]   #Herpes zoster ophthalmicus with crusted lesions of the skin likely contributing to significant pruritus, and surrounding erythema concerning for cellulitis  -     continue iv antiviral therapy and iv antibiotics (acyclovir IVPB 650 milliGRAM(s) IV Intermittent every 8 hours and ceFAZolin   IVPB 1000 milliGRAM(s) IV Intermittent every 8 hours)  -     Recommend mixing topical lidocaine ointment with Vaseline (plain petroleum jelly) twice daily  -     [place] use of plain petroleum jelly every 1-2 hours  -     Up-titrate gabapentin as tolerated and as per primary team      #Right medial buttock small ulcer (no vesiculations noted) – ddx frictional vs pressure vs other  - base of ulcer is clean, no purulence noted  - Recommend mupirocin ointment TID to eroded areas of skin    The patient's chart was reviewed in addition to being seen and examined at bedside with the dermatology attending Dr. Mathis. Recommendations were communicated with the primary team.  Please page [phone] w/10 digit call back number for further related questions.    Lauren Peralta MD  Resident Physician, PGY3  [facility] Dermatology  Pager: [phone]  Office: [phone]

## 2022-08-01 NOTE — PROGRESS NOTE ADULT - PROBLEM SELECTOR PLAN 3
Patient presented with history of severe hypertension at home and hypertension in the SBP ~180s while in ED    - Patient on labetalol for BP>180 SBP, currently in 140s-150s SBP-ADDENDUM- pt not on labetolol PRN   - C/w losartan, metoprolol, and amlodipine as ordered and can titrate as needed   - Holding: furosemide, spironolactone will add on if patient BP's appropriate at later time- In effort to not acutely drop patient BP SBP > 180 this AM. Hx of severe HTN at home, SBPs noted to be >180 in ED. Will CTM.  - increase amlodipine to 5mg QD  - continue home losartan, metoprolol  - hold furosemide and spironolactone for now, avoiding acute BP drops Per pt's daughter previously on seroquel 100mg for agitation  - seroquel 25mg PRN

## 2022-08-01 NOTE — PROGRESS NOTE ADULT - SUBJECTIVE AND OBJECTIVE BOX
CC: F/U for worsening swelling    Saw/spoke to patient. Patient has had increasing itching of rash and has been scratching. Now with redness spreading along R side of face.    Allergies  diltiazem (Other; Rash)    ANTIMICROBIALS:  acyclovir IVPB 650 every 8 hours  ceFAZolin   IVPB 1000 every 8 hours  vancomycin  IVPB      PE:    Vital Signs Last 24 Hrs  T(C): 37.7 (01 Aug 2022 13:39), Max: 37.7 (01 Aug 2022 13:39)  T(F): 99.9 (01 Aug 2022 13:39), Max: 99.9 (01 Aug 2022 13:39)  HR: 83 (01 Aug 2022 13:39) (67 - 91)  BP: 159/88 (01 Aug 2022 13:39) (144/60 - 183/72)  RR: 16 (01 Aug 2022 13:39) (15 - 18)  SpO2: 94% (01 Aug 2022 13:39) (90% - 97%)    Gen: AOx2-3, awake; face now with R sided redness  CV: Nontachycardic  Resp: Breathing comfortably, RA  Abd: Soft, nontender  IV/Skin: No thrombophlebitis    LABS:                        11.9   5.95  )-----------( 299      ( 31 Jul 2022 07:03 )             37.4     07-31    140  |  103  |  5<L>  ----------------------------<  210<H>  3.7   |  26  |  0.53    Ca    8.6      31 Jul 2022 07:03  Phos  2.3     07-31  Mg     1.70     07-31    TPro  6.3  /  Alb  3.1<L>  /  TBili  0.2  /  DBili  x   /  AST  23  /  ALT  14  /  AlkPhos  87  07-31    MICROBIOLOGY:    .Blood Blood-Peripheral  07-28-22   No growth to date.  --  --    .Blood Blood-Peripheral  07-28-22   No growth to date.  --  --    .Blood Blood-Venous  07-27-22   No growth to date.  --  --    .Blood Blood  07-26-22   Growth in anaerobic bottle: Staphylococcus epidermidis  Growth in aerobic and anaerobic bottles: Staphylococcus hominis  Coag Negative Staphylococcus  Single set isolate, possible contaminant. Contact  Microbiology if susceptibility testing clinically  indicated.  --    Growth in anaerobic bottle: Gram Positive Cocci in Clusters  Growth in aerobic bottle: Gram Positive Cocci in Clusters    .Blood Blood  07-26-22   Growth in aerobic bottle: Staphylococcus hominis  Growth in aerobic and anaerobic bottles: Staphylococcus epidermidis  ***Blood Panel PCR results on this specimen are available  approximately 3 hours after the Gram stain result.***  Gram stain, PCR, and/or culture results may not always  correspond due to difference in methodologies.  ************************************************************  This PCR assay was performed by multiplex PCR. This  Assay tests for 66 bacterial and resistance gene targets.  Please refer to the [facility] test directory  at [email or web address] for details.  --  Blood Culture PCR  Staphylococcus hominis  Staphylococcus epidermidis    Rapid RVP Result: NotDetec (07-26 @ 10:31)    (otherwise reviewed)    RADIOLOGY:    7/27 MR:    IMPRESSION:    Evaluation the orbits is limited. However, asymmetric edema appears to   involve the right orbital medial and lateral rectus muscles, as well as   the superior oblique muscle, suspicious for a myositis (viral given the   known herpes zoster with superimposed bacterial infection not excluded).   There is no evidence for orbital abscess. The cavernous sinuses and   superior ophthalmic veins remain patent. The right preseptal periorbital   soft tissue swelling appears improved compared to the CT suggesting   improving preseptal cellulitis/zoster. Serial imaging follow-up is   recommended to monitor for stability.    No evidence for acute infarct, acute intracranial hemorrhage, or   intracranial abscess.    Stable ventricular size.    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

## 2022-08-01 NOTE — PROGRESS NOTE ADULT - ATTENDING COMMENTS
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     #Herpes zoster, pt with herpes zoster over the right side of the face. MRI brain and MRI orbits performed and showed asymmetric edema of right orbital medical and lateral rectus muscles + superior oblique muscle without evidence of an orbital abscess. ID has been following. C/w acyclovir 650mg q8 hours and gentle IVF. Also receiving ancef 1g q8 hours due to concern for superimposed cellulitis. Optho following - she has been receiving brimonidine TID + erythromycin ointment QID to right eye. Today, the patient was in more pain that prior days and as per other staff members as well as her daughter (Dr. Wong), edema and erythema around the right eye was significantly worsening and she was now having difficulty opening the right eye. On my exam, pt with erythema and edema tracking down her face and towards her ear. The right side of her forehead is crusted over. The patient is continuously trying to scratch her face and requires physical restraining to stop her from scratching. Infectious disease was made aware in the change in exam. Recommend discontinuing cefazolin and starting vancomycin 750mg q12 hours. Repeat blood cultures. Optho consulted, recommending CT maxillofacial with IV contrast. Dermatology consulted, topical lidocaine ointment with Vaseline (plain petroleum jelly) twice daily. For pain, plan to start dilaudid 0.5mg q3 hours PRN pain, c/w atarax and uptitrate gabapentin to 200mg TID. Additionally, will add on seroquel 25mg PRN agitation (pt was previously on seroquel 100mg for behavior and agitation). This plan was discussed with her daughter Dr. Wong at bedside.     #BP, elevated likely 2/2 pain/discomfort. Will reassess blood pressure when pain is better controlled. If elevated we can restart one of her home medications or up-titrate losartan or norvasc. Monitor closely.     Plan discussed with HS.

## 2022-08-01 NOTE — PROGRESS NOTE ADULT - PROBLEM SELECTOR PLAN 4
Patient CMP significant for BUN 65 Cr 2.84, which is significantly different from baseline according to daughter (~1.0 outpatient for creatinine per daughter)    RESOLVED    - 75cc/hr isotonic fluids  - Avoid nephrotoxic agents Hx of rash in inguinal folds. Unable to recall which ointments were being used.  - Continue nystatin powder BID  - Appreciate derm recs: mupirocin ointment TID to perianal erosions  - CTM for worsening of rash

## 2022-08-01 NOTE — PROGRESS NOTE ADULT - ATTENDING COMMENTS
I have interviewed and examined the patient and reviewed the residents note including the history, exam, assessment, and plan.  I agree with the residents assessment and plan.  I have made edits where appropriate.    86y female w/ pmhx/ochx of CAD, DM, COPD, NPH w/ programmable shunt comes to ED for AMS and shingles, found to have HZO of R side with corneal involvement and elevated IOP, now with worsening R sided forehead/facial edema and erythema, with worsening dark brown crusting.     1. HZO OD with new worsening of R sided facial rash  - Pt has been scratching at R sided facial rash with nails - concern for worsening bacterial superinfection with crusting over area of edema and erythema. Current crusting appears significantly worsened compared to yesterday, though does not extend past geographic borders from yesterday.    - VA 20/200 OD, 20/30 OS. However, pt cooperation waxing and waning, very dry eyes OD, and vaseline over eyes, all of which likely contributing to decline in vision.  - DFE with hazy view OD, likely 2/2 significantly dry eye. Disc and macula appear wnl.    - No concern for current orbital process, very low concern for necrotizing fasciitis, as pt would be expected to have more severe skin findings and nec fasc is often painless.   - Appreciate derm and plastic surgery consults.   - F/u CT maxillofacial   - Change in abx per ID  - Pt unable to tolerate sitting up at slit lamp - cannot assess anterior chamber for inflammatory reaction  - Today, IOP OD 6 - will stop brimonidine  - C/w erythromycin ointment QID to R eye and periocular lesions  - Increase preservative-free artificial tears to Q2H OU   - Pt has dilated and minimally reactive R pupil. 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   - Pt has had abduction deficit OD for past week, 2/2 myositis seen on MRI orbits. Abduction deficit today does not appear to be 2/2 any orbital process.   - Will continue to monitor for improvement of EOMs on antivirals.     2. AMS, possibly 2/2 herpetic encephalitis - resolved  - Appreciate neurology consult  - MRI brain showing no acute pathology, MRI orbits as above  - LP deferred per neurology and ID  - findings and plan discussed with daughter and primary team      Toyin Kuhn MD

## 2022-08-01 NOTE — PROGRESS NOTE ADULT - ASSESSMENT
87yo female w/PMH Cardiac stents post MI (1986, 1996), COPD, NPH, DM, Migraines, and suspected HTN presenting with vesicular rash on right face and AMS    ID is consulted for VZV infection  Rash started on 7/23, complicated with blurry vision and later AMS  Took 1 day of Valtrex prior to admission  Afebrile but with leukocytosis  CT showed extensive R preseptal soft tissue swelling, no post septal involvement noted  But Ophthal reported corneal involvement and nonreactive/dilated R pupil with limited extraocular movement  BCx 3/4 bottles MRSE, repeat BCx 7/27 NGTD  MRI showed viral myositis of EO muscle; no acute CVA    Overall 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  Acute encephalopathy could be secondary to sepsis vs actual encephalitis, much improved today  Regardless IV acyclovir will cover for encephalitis so no LP is needed unless clinical status changes  MRSE in BCx could be contaminants  Still having double vision due to viral myositis of EO muscle  Worsening cellulitis while on Ancef, will escalate to vancomycin and repeat cultures      IMPRESSION:  herpes zoster ophthalmicus  Positive blood culture  Encephalopathy  Leukocytosis  Fever    RECOMMENDATIONS:  - Repeat 2 sets of blood culture STAT  - D/C Ancef, start IV vancomycin 750mg q12hrs  - Continue IV acyclovir to 650mg q8hrs now as Cr is stable; maintain gentle IVF while on acyclovir  - Repeat CT maxillofacial with IV contrast  - Ophthal follow up  - Trend WBC, fever curve, transaminases, creatinine daily  - Will continue to follow      * THIS IS AN INCOMPLETE NOTE. FINAL RECOMMENDATION WILL BE UPDATED AFTER DISCUSSING WITH ATTENDING *         87yo female w/PMH Cardiac stents post MI (1986, 1996), COPD, NPH, DM, Migraines, and suspected HTN presenting with vesicular rash on right face and AMS    ID is consulted for VZV infection  Rash started on 7/23, complicated with blurry vision and later AMS  Took 1 day of Valtrex prior to admission  Afebrile but with leukocytosis  CT showed extensive R preseptal soft tissue swelling, no post septal involvement noted  But Ophthal reported corneal involvement and nonreactive/dilated R pupil with limited extraocular movement  BCx 3/4 bottles MRSE, repeat BCx 7/27 NGTD  MRI showed viral myositis of EO muscle; no acute CVA    Overall 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  Acute encephalopathy could be secondary to sepsis vs actual encephalitis, much improved today  Regardless IV acyclovir will cover for encephalitis so no LP is needed unless clinical status changes  MRSE in BCx could be contaminants  Still having double vision due to viral myositis of EO muscle  Worsening cellulitis while on Ancef, will escalate to vancomycin and repeat cultures      IMPRESSION:  herpes zoster ophthalmicus  Positive blood culture  Encephalopathy  Leukocytosis  Fever    RECOMMENDATIONS:  - Repeat 2 sets of blood culture STAT  - D/C Ancef, start IV vancomycin 750mg q12hrs  - Monitor vancomycin trough 30 minutes prior 4th dose, keep trough around 10 - 15; monitor Cr daily  - Continue IV acyclovir to 650mg q8hrs now as Cr is stable; maintain gentle IVF while on acyclovir  - If swelling is worsening can repeat CT maxillofacial with IV contrast  - Ophthal follow up  - Trend WBC, fever curve, transaminases, creatinine daily  - Will continue to follow      Patient is seen and examined with attending and case is discussed with primary team  Final recs referred to attending's note      Eileen Leach D.O.  PGY-5 Infectious Diseases Fellow  Please contact me via page or text through [employer] Teams  If after 5PM or on weekends, please call [phone]

## 2022-08-01 NOTE — PROGRESS NOTE ADULT - PROBLEM SELECTOR PLAN 8
Patient has 120 pack year smoker, quit in 2011    -Treated with Trelegy outpatient  - Will monitor VS and physical exam findings  - On inhaler inpatient K resolved, Phos at 2.2    K repleted with IV/pill KCl    Phos repleted with NaKPhos DVT ppx: subQ lovenox  Diet: soft and bite sized (S&S recd minced and moist however daughter accepts risks)    Dispo:  - pt has difficult vessels, needs arterial sticks  - PT/OT recommending rehab, will f/u with

## 2022-08-01 NOTE — PROGRESS NOTE ADULT - PROBLEM SELECTOR PLAN 12
Diet: Soft (Daughter willing to accept risk as this is different than S&S recs)    DVT Ppx: Lovenox    PT & OT eval recommend rehabilitation- contact     f/u nutrition consult recs

## 2022-08-01 NOTE — PROGRESS NOTE ADULT - ASSESSMENT
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 86F w/ hx CAD (MI 1986, 1996) s/p stents, COPD, NPH s/p  shunt, DM, migraines, and HTN presenting with herpes zoster ophthalmicus/encephalitis affecting the right V1 dermatome, now with acute worsening of symptoms today. 86F w/ hx CAD s/p stents (1986, 1996), COPD, NPH s/p  shunt, DM, migraines, and HTN presenting with herpes zoster ophthalmicus/encephalitis affecting the right V1 dermatome, now with acute worsening of symptoms today.

## 2022-08-01 NOTE — PROGRESS NOTE ADULT - PROBLEM SELECTOR PLAN 2
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, soft tissue swelling around right eye- likely component of herpes zoster, possible cellulitis- Ventricles don't appear enlarged  - LP cancelled secondary to ID recs- likely will not change care if done  - On Acyclovir (renally dosed) - and Cefazolin STAT then q12 per ID recs- doses updated based on renal dosing  - Pt unable to tolerate vEEG, however it is not needed  - MRI Head and Orbits- no brain findings, sig for swelling in right orbit 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 SBP > 180 this AM. Hx of severe HTN at home, SBPs noted to be >180 in ED. Will CTM.  - increase amlodipine to 5mg QD  - continue home losartan, metoprolol  - hold furosemide and spironolactone for now, avoiding acute BP drops

## 2022-08-01 NOTE — PROGRESS NOTE ADULT - ASSESSMENT
86y female w/ pmhx/ochx of CAD, DM, COPD, NPH w/ programmable shunt comes to ED for AMS and shingles, found to have HZO of R side with corneal involvement and elevated IOP, now with worsening R sided forehead/facial edema and erythema, with worsening dark brown crusting.     1. HZO OD with new worsening of R sided facial rash  - Pt has been scratching at R sided facial rash with nails - concern for worsening bacterial superinfection with crusting over area of edema and erythema. Current crusting appears significantly worsened compared to yesterday, though does not extend past geographic borders from yesterday.    - VA 20/200 OD, 20/30 OS. However, pt cooperation waxing and waning, very dry eyes OD, and vaseline over eyes, all of which likely contributing to decline in vision.  - DFE with hazy view OD, likely 2/2 significantly dry eye. Disc and macula appear wnl.    - No concern for current orbital process, very low concern for necrotizing fasciitis, as pt would be expected to have more severe skin findings and nec fasc is often painless.   - Appreciate derm and plastic surgery consults.   - F/u CT maxillofacial   - Change in abx per ID  - Pt unable to tolerate sitting up at slit lamp - cannot assess anterior chamber for inflammatory reaction  - Today, IOP OD 6 - will stop brimonidine  - C/w erythromycin ointment QID to R eye and periocular lesions  - Increase preservative-free artificial tears to Q2H OU   - Pt has dilated and minimally reactive R pupil. 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   - Pt has had abduction deficit OD for past week, 2/2 myositis seen on MRI orbits. Abduction deficit today does not appear to be 2/2 any orbital process.   - Will continue to monitor for improvement of EOMs on antivirals.     2. AMS, possibly 2/2 herpetic encephalitis - resolved  - Appreciate neurology consult  - MRI brain showing no acute pathology, MRI orbits as above  - LP deferred per neurology and ID    SDW Dr. Kuhn, attending.     Outpatient follow-up: Patient should follow-up with his/her ophthalmologist or with [facility] Department of Ophthalmology at the address below     [address] [place] [address]. [address]  [place] [address]  [phone] 86y female w/ pmhx/ochx of CAD, DM, COPD, NPH w/ programmable shunt comes to ED for AMS and shingles, found to have HZO of R side with corneal involvement and elevated IOP, now with worsening R sided forehead/facial edema and erythema, with worsening dark brown crusting.     1. HZO OD with new worsening of R sided facial rash  - Pt has been scratching at R sided facial rash with nails - concern for worsening bacterial superinfection with crusting over area of edema and erythema. Current crusting appears significantly worsened compared to yesterday, though does not extend past geographic borders from yesterday.    - VA 20/200 OD, 20/30 OS. However, pt cooperation waxing and waning, very dry eyes OD, and vaseline over eyes, all of which likely contributing to decline in vision.  - DFE with hazy view OD, likely 2/2 significantly dry eye. Disc and macula appear wnl.    - No concern for current orbital process, very low concern for necrotizing fasciitis, as pt would be expected to have more severe skin findings and nec fasc is often painless.   - Appreciate derm and plastic surgery consults.   - F/u CT maxillofacial   - Change in abx per ID  - Pt unable to tolerate sitting up at slit lamp - cannot assess anterior chamber for inflammatory reaction  - Today, IOP OD 6 - will stop brimonidine  - C/w erythromycin ointment QID to R eye and periocular lesions  - Increase preservative-free artificial tears to Q2H OU   - Pt has dilated and minimally reactive R pupil. 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   - Pt has had abduction deficit OD for past week, 2/2 myositis seen on MRI orbits. Abduction deficit today does not appear to be 2/2 any orbital process.   - Will continue to monitor for improvement of EOMs on antivirals.     2. AMS, possibly 2/2 herpetic encephalitis - resolved  - Appreciate neurology consult  - MRI brain showing no acute pathology, MRI orbits as above  - LP deferred per neurology and ID  - findings and plan discussed with daughter and primary team    SDW Dr. Kuhn, attending.     Outpatient follow-up: Patient should follow-up with his/her ophthalmologist or with [facility] Department of Ophthalmology at the address below within 2-3 days of discharge, sooner if symptoms worsen or change    [address]  [place] [address]  [phone]

## 2022-08-01 NOTE — PROVIDER CONTACT NOTE (CHANGE IN STATUS NOTIFICATION) - SITUATION
[id number] kenneth. facial swelling is spreading to right ear and left eye. Please come see the patient.

## 2022-08-01 NOTE — PROGRESS NOTE ADULT - PROBLEM SELECTOR PLAN 5
Pt with hx of rash in inguinal folds. Unable to recall which ointments were being used   - Started on nystatin powder BID   - Will continue to monitor for worsening of rash A1c 7.2. FS today ~200. Home regimen lantus 34U daily, trulicity, glipizide.  - will continue lantus 10U qHS, SSI for now given active infection  - monitor premeal and bedtime FS

## 2022-08-01 NOTE — PROGRESS NOTE ADULT - PROBLEM SELECTOR PLAN 7
Hx of NPH diagnosed 2011- Installed by [facility] neurosurgery    -CTA: Old parietal infarct, soft tissue swelling around right eye- likely component of herpes zoster, possible cellulitis- Ventricles don't appear enlarged  -Has programmable  shunt *Must be programmed after MRI*- will page neurosurg [id number]  - No findings consistent with ventricular dilation on MRI  -  shunt reprogrammed after MRI per verbal confirmation from neurosurg team Patient has 120 pack year smoker, quit in 2011    -Treated with Trelegy outpatient  - Will monitor VS and physical exam findings  - On inhaler inpatient 120py smoking hx, quit in 2011. Uses Trelegy at home. Exam unremarkable this AM however patient's daughter noted wheezing during the day.  - continue spiriva 18mcg QD  - 1x duonebs

## 2022-08-01 NOTE — CONSULT NOTE ADULT - ASSESSMENT
No evidence of necrotizing fasciitis at this time    Continue topical and systemic tx as per ID    We will follow

## 2022-08-01 NOTE — PROGRESS NOTE ADULT - PROBLEM SELECTOR PLAN 9
K resolved, Phos at 2.2    K repleted with IV/pill KCl    Phos repleted with NaKPhos Previous history of stents in 1986 and 1996, on aspirin 325mg outpatient    - Monitor VS and symptoms at this time

## 2022-08-01 NOTE — PROGRESS NOTE ADULT - PROBLEM SELECTOR PLAN 6
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

## 2022-08-01 NOTE — PROGRESS NOTE ADULT - SUBJECTIVE AND OBJECTIVE BOX
Internal Medicine   Glen Correa | PGY-1    OVERNIGHT EVENTS: No acute overnight events.    SUBJECTIVE:       MEDICATIONS  (STANDING):  acyclovir IVPB 650 milliGRAM(s) IV Intermittent every 8 hours  amLODIPine   Tablet 2.5 milliGRAM(s) Oral daily  artificial tears (preservative free) Ophthalmic Solution 1 Drop(s) Left EYE four times a day  aspirin  chewable 324 milliGRAM(s) Oral daily  brimonidine 0.2% Ophthalmic Solution 1 Drop(s) Right EYE three times a day  budesonide  80 MICROgram(s)/formoterol 4.5 MICROgram(s) Inhaler 2 Puff(s) Inhalation two times a day  ceFAZolin   IVPB 1000 milliGRAM(s) IV Intermittent every 8 hours  dextrose 5%. 1000 milliLiter(s) (100 mL/Hr) IV Continuous <Continuous>  dextrose 5%. 1000 milliLiter(s) (50 mL/Hr) IV Continuous <Continuous>  dextrose 50% Injectable 25 Gram(s) IV Push once  dextrose 50% Injectable 12.5 Gram(s) IV Push once  dextrose 50% Injectable 25 Gram(s) IV Push once  enoxaparin Injectable 40 milliGRAM(s) SubCutaneous every 24 hours  erythromycin   Ointment 1 Application(s) Right EYE four times a day  gabapentin 100 milliGRAM(s) Oral three times a day  glucagon  Injectable 1 milliGRAM(s) IntraMuscular once  insulin glargine Injectable (LANTUS) 10 Unit(s) SubCutaneous at bedtime  insulin lispro (ADMELOG) corrective regimen sliding scale   SubCutaneous Before meals and at bedtime  lactated ringers. 1000 milliLiter(s) (75 mL/Hr) IV Continuous <Continuous>  lactobacillus acidophilus 1 Tablet(s) Oral daily  losartan 50 milliGRAM(s) Oral daily  metoprolol succinate ER 12.5 milliGRAM(s) Oral daily  mirtazapine 15 milliGRAM(s) Oral at bedtime  nystatin Powder 1 Application(s) Topical two times a day  sodium chloride 0.9%. 1000 milliLiter(s) (75 mL/Hr) IV Continuous <Continuous>  tiotropium 18 MICROgram(s) Capsule 1 Capsule(s) Inhalation daily  traZODone 100 milliGRAM(s) Oral at bedtime  ursodiol Tablet 500 milliGRAM(s) Oral <User Schedule>    MEDICATIONS  (PRN):  acetaminophen     Tablet .. 650 milliGRAM(s) Oral every 6 hours PRN Mild Pain (1 - 3), Moderate Pain (4 - 6), Severe Pain (7 - 10)  dextrose Oral Gel 15 Gram(s) Oral once PRN Blood Glucose LESS THAN 70 milliGRAM(s)/deciliter  hydrOXYzine hydrochloride 10 milliGRAM(s) Oral four times a day PRN Itching    VITALS:  T(F): 97.7 (08-01-22 @ 01:19), Max: 97.9 (07-31-22 @ 14:12)  HR: 79 (08-01-22 @ 05:50) (67 - 91)  BP: 183/72 (08-01-22 @ 05:50) (144/60 - 183/72)  BP(mean): --  RR: 15 (08-01-22 @ 02:15) (15 - 18)  SpO2: 93% (08-01-22 @ 02:15) (90% - 99%)    PHYSICAL EXAM:   GENERAL: NAD, lying in bed comfortably  HEAD:  Atraumatic, normocephalic  EYES: EOMI, conjunctiva and sclera clear, no nystagmus noted  ENT: Moist mucous membranes  CHEST/LUNG: Clear to auscultation bilaterally; no rales, rhonchi, wheezing, or rubs; unlabored respirations  HEART: Regular rate and rhythm; no murmurs, rubs, or gallops, normal S1/S2  ABDOMEN: Normal bowel sounds; soft, nontender, nondistended, no organomegaly   EXTREMITIES:  No. clubbing, cyanosis, or edema  MSK: No gross deformities noted   Neurological: No focal deficits   SKIN: No rashes or lesions  PSYCH: Normal mood, affect     LABS:                      11.9   5.95  )-----------( 299      ( 31 Jul 2022 07:03 )             37.4     07-31    140  |  103  |  5<L>  ----------------------------<  210<H>  3.7   |  26  |  0.53    Ca    8.6      31 Jul 2022 07:03  Phos  2.3     07-31  Mg     1.70     07-31    TPro  6.3  /  Alb  3.1<L>  /  TBili  0.2  /  DBili  x   /  AST  23  /  ALT  14  /  AlkPhos  87  07-31            Creatinine Trend: 0.53<--, 0.52<--, 0.55<--, 0.61<--, 0.64<--, 0.98<--  I&O's Summary       Internal Medicine   Glen Correa | PGY-1    OVERNIGHT EVENTS: No acute overnight events.    SUBJECTIVE:   Patient today endorses symptoms are "terrible", worsening itching and pain in affected area. Symptoms worsening compared to yesterday. Per pt's daughter has been itching and agitated overnight and notes increased cough, requesting increased hydroxyzine + gabapentin doses.    MEDICATIONS  (STANDING):  acyclovir IVPB 650 milliGRAM(s) IV Intermittent every 8 hours  amLODIPine   Tablet 2.5 milliGRAM(s) Oral daily  artificial tears (preservative free) Ophthalmic Solution 1 Drop(s) Left EYE four times a day  aspirin  chewable 324 milliGRAM(s) Oral daily  brimonidine 0.2% Ophthalmic Solution 1 Drop(s) Right EYE three times a day  budesonide  80 MICROgram(s)/formoterol 4.5 MICROgram(s) Inhaler 2 Puff(s) Inhalation two times a day  ceFAZolin   IVPB 1000 milliGRAM(s) IV Intermittent every 8 hours  dextrose 5%. 1000 milliLiter(s) (100 mL/Hr) IV Continuous <Continuous>  dextrose 5%. 1000 milliLiter(s) (50 mL/Hr) IV Continuous <Continuous>  dextrose 50% Injectable 25 Gram(s) IV Push once  dextrose 50% Injectable 12.5 Gram(s) IV Push once  dextrose 50% Injectable 25 Gram(s) IV Push once  enoxaparin Injectable 40 milliGRAM(s) SubCutaneous every 24 hours  erythromycin   Ointment 1 Application(s) Right EYE four times a day  gabapentin 100 milliGRAM(s) Oral three times a day  glucagon  Injectable 1 milliGRAM(s) IntraMuscular once  insulin glargine Injectable (LANTUS) 10 Unit(s) SubCutaneous at bedtime  insulin lispro (ADMELOG) corrective regimen sliding scale   SubCutaneous Before meals and at bedtime  lactated ringers. 1000 milliLiter(s) (75 mL/Hr) IV Continuous <Continuous>  lactobacillus acidophilus 1 Tablet(s) Oral daily  losartan 50 milliGRAM(s) Oral daily  metoprolol succinate ER 12.5 milliGRAM(s) Oral daily  mirtazapine 15 milliGRAM(s) Oral at bedtime  nystatin Powder 1 Application(s) Topical two times a day  sodium chloride 0.9%. 1000 milliLiter(s) (75 mL/Hr) IV Continuous <Continuous>  tiotropium 18 MICROgram(s) Capsule 1 Capsule(s) Inhalation daily  traZODone 100 milliGRAM(s) Oral at bedtime  ursodiol Tablet 500 milliGRAM(s) Oral <User Schedule>    MEDICATIONS  (PRN):  acetaminophen     Tablet .. 650 milliGRAM(s) Oral every 6 hours PRN Mild Pain (1 - 3), Moderate Pain (4 - 6), Severe Pain (7 - 10)  dextrose Oral Gel 15 Gram(s) Oral once PRN Blood Glucose LESS THAN 70 milliGRAM(s)/deciliter  hydrOXYzine hydrochloride 10 milliGRAM(s) Oral four times a day PRN Itching    VITALS:  T(F): 97.7 (08-01-22 @ 01:19), Max: 97.9 (07-31-22 @ 14:12)  HR: 79 (08-01-22 @ 05:50) (67 - 91)  BP: 183/72 (08-01-22 @ 05:50) (144/60 - 183/72)  BP(mean): --  RR: 15 (08-01-22 @ 02:15) (15 - 18)  SpO2: 93% (08-01-22 @ 02:15) (90% - 99%)    PHYSICAL EXAM:   GENERAL: NAD, resting in bed, alert and appears uncomfortable  HEAD: Significant periorbital swelling with limited ability to open R eye  EYES: Left EOMI, R difficult to assess due to periorbital swelling though appears mildly/moderately injected  CHEST/LUNG: Clear to auscultation bilaterally; no rales, rhonchi, wheezing, or rubs; unlabored respirations  HEART: Regular rate and rhythm; no murmurs, rubs, or gallops, normal S1/S2  ABDOMEN: Soft, nontender, nondistended  EXTREMITIES: No clubbing, cyanosis, or edema  MSK: No gross deformities noted   Neurological: No focal deficits  SKIN: Prominent erythema + scabbing/crusting in R V1 dermatome w/ no open wounds or drainage  PSYCH: Normal mood, affect     LABS:              11.5   9.37  )-----------( 323      ( 01 Aug 2022 15:37 )             36.1     08-01  136  |  100  |  9   ----------------------------<  222<H>  3.4<L>   |  26  |  0.59    Ca    8.4      01 Aug 2022 15:37  Phos  2.8     08-01  Mg     1.60     08-01    TPro  6.1  /  Alb  2.9<L>  /  TBili  0.2  /  DBili  x   /  AST  15  /  ALT  <5<L>  /  AlkPhos  89  08-01     LIVER FUNCTIONS - ( 01 Aug 2022 15:37 )  Alb: 2.9 g/dL / Pro: 6.1 g/dL / ALK PHOS: 89 U/L / ALT: <5 U/L / AST: 15 U/L / GGT: x     CAPILLARY BLOOD GLUCOSE  POCT Blood Glucose.: 212 mg/dL (01 Aug 2022 16:58)  POCT Blood Glucose.: 191 mg/dL (01 Aug 2022 11:30)  POCT Blood Glucose.: 175 mg/dL (01 Aug 2022 07:27)  POCT Blood Glucose.: 194 mg/dL (31 Jul 2022 21:54) Internal Medicine   Glen Correa | PGY-1    OVERNIGHT EVENTS: No acute overnight events.    SUBJECTIVE:   Patient today endorses symptoms are "terrible", worsening itching and pain in affected area. Symptoms worsening compared to yesterday. Per pt's daughter ( Judith Delgado) has been itching and agitated overnight and notes increased cough, requesting increased hydroxyzine + gabapentin doses. Also notes that patient may benefit from seroquel based on prior experiences.    MEDICATIONS  (STANDING):  acyclovir IVPB 650 milliGRAM(s) IV Intermittent every 8 hours  amLODIPine   Tablet 2.5 milliGRAM(s) Oral daily  artificial tears (preservative free) Ophthalmic Solution 1 Drop(s) Left EYE four times a day  aspirin  chewable 324 milliGRAM(s) Oral daily  brimonidine 0.2% Ophthalmic Solution 1 Drop(s) Right EYE three times a day  budesonide  80 MICROgram(s)/formoterol 4.5 MICROgram(s) Inhaler 2 Puff(s) Inhalation two times a day  ceFAZolin   IVPB 1000 milliGRAM(s) IV Intermittent every 8 hours  dextrose 5%. 1000 milliLiter(s) (100 mL/Hr) IV Continuous <Continuous>  dextrose 5%. 1000 milliLiter(s) (50 mL/Hr) IV Continuous <Continuous>  dextrose 50% Injectable 25 Gram(s) IV Push once  dextrose 50% Injectable 12.5 Gram(s) IV Push once  dextrose 50% Injectable 25 Gram(s) IV Push once  enoxaparin Injectable 40 milliGRAM(s) SubCutaneous every 24 hours  erythromycin   Ointment 1 Application(s) Right EYE four times a day  gabapentin 100 milliGRAM(s) Oral three times a day  glucagon  Injectable 1 milliGRAM(s) IntraMuscular once  insulin glargine Injectable (LANTUS) 10 Unit(s) SubCutaneous at bedtime  insulin lispro (ADMELOG) corrective regimen sliding scale   SubCutaneous Before meals and at bedtime  lactated ringers. 1000 milliLiter(s) (75 mL/Hr) IV Continuous <Continuous>  lactobacillus acidophilus 1 Tablet(s) Oral daily  losartan 50 milliGRAM(s) Oral daily  metoprolol succinate ER 12.5 milliGRAM(s) Oral daily  mirtazapine 15 milliGRAM(s) Oral at bedtime  nystatin Powder 1 Application(s) Topical two times a day  sodium chloride 0.9%. 1000 milliLiter(s) (75 mL/Hr) IV Continuous <Continuous>  tiotropium 18 MICROgram(s) Capsule 1 Capsule(s) Inhalation daily  traZODone 100 milliGRAM(s) Oral at bedtime  ursodiol Tablet 500 milliGRAM(s) Oral <User Schedule>    MEDICATIONS  (PRN):  acetaminophen     Tablet .. 650 milliGRAM(s) Oral every 6 hours PRN Mild Pain (1 - 3), Moderate Pain (4 - 6), Severe Pain (7 - 10)  dextrose Oral Gel 15 Gram(s) Oral once PRN Blood Glucose LESS THAN 70 milliGRAM(s)/deciliter  hydrOXYzine hydrochloride 10 milliGRAM(s) Oral four times a day PRN Itching    VITALS:  T(F): 97.7 (08-01-22 @ 01:19), Max: 97.9 (07-31-22 @ 14:12)  HR: 79 (08-01-22 @ 05:50) (67 - 91)  BP: 183/72 (08-01-22 @ 05:50) (144/60 - 183/72)  BP(mean): --  RR: 15 (08-01-22 @ 02:15) (15 - 18)  SpO2: 93% (08-01-22 @ 02:15) (90% - 99%)    PHYSICAL EXAM:   GENERAL: NAD, resting in bed, alert and appears uncomfortable  HEAD: Significant periorbital swelling with limited ability to open R eye  EYES: Left EOMI, R difficult to assess due to periorbital swelling though appears mildly/moderately injected  CHEST/LUNG: Clear to auscultation bilaterally; no rales, rhonchi, wheezing, or rubs; unlabored respirations  HEART: Regular rate and rhythm; no murmurs, rubs, or gallops, normal S1/S2  ABDOMEN: Soft, nontender, nondistended  EXTREMITIES: No clubbing, cyanosis, or edema  MSK: No gross deformities noted   Neurological: No focal deficits  SKIN: Prominent erythema + scabbing/crusting in R V1 dermatome w/ no open wounds or drainage  PSYCH: Normal mood, affect     LABS:              11.5   9.37  )-----------( 323      ( 01 Aug 2022 15:37 )             36.1     08-01  136  |  100  |  9   ----------------------------<  222<H>  3.4<L>   |  26  |  0.59    Ca    8.4      01 Aug 2022 15:37  Phos  2.8     08-01  Mg     1.60     08-01    TPro  6.1  /  Alb  2.9<L>  /  TBili  0.2  /  DBili  x   /  AST  15  /  ALT  <5<L>  /  AlkPhos  89  08-01     LIVER FUNCTIONS - ( 01 Aug 2022 15:37 )  Alb: 2.9 g/dL / Pro: 6.1 g/dL / ALK PHOS: 89 U/L / ALT: <5 U/L / AST: 15 U/L / GGT: x     CAPILLARY BLOOD GLUCOSE  POCT Blood Glucose.: 212 mg/dL (01 Aug 2022 16:58)  POCT Blood Glucose.: 191 mg/dL (01 Aug 2022 11:30)  POCT Blood Glucose.: 175 mg/dL (01 Aug 2022 07:27)  POCT Blood Glucose.: 194 mg/dL (31 Jul 2022 21:54)

## 2022-08-01 NOTE — PROGRESS NOTE ADULT - PROBLEM SELECTOR PLAN 11
Elevated enzymes, likely secondary to physiologic stress from herpes zoster/htn, Is patient's baseline according to daughter    - Improved   - Monitor labs and physical exam for now; moderate RUQ pain on physical not concerning at this time  - Hepatitis panel negative Diet: Soft (Daughter willing to accept risk as this is different than S&S recs)    DVT Ppx: Lovenox    PT & OT eval recommend rehabilitation- contact     f/u nutrition consult recs

## 2022-08-01 NOTE — CONSULT NOTE ADULT - SUBJECTIVE AND OBJECTIVE BOX
INCOMPLETE NOTE     HPI: Patient is an 85yo female w/PMH MI now with cardiac stents, COPD, NPH, DM, Migraines, and suspected HTN admitted on 7/26 for herpes zoster opthalmicus with concern for associated AMS, currently on acyclovir 650 q8hr as well as cefazolin.      DERM: Dermatology consulted for rash associated with herpes ophthalmicus Per daughter at bedside (patient still with somewhat AMS), rash has been present since about 7/23 morning, around the R side of the face. Patient was started on Valtrex and artificial tears on 7/24 as outpatient. Rash was initially more subtle, but continued to spread, become more red, itchy, and painful, and developed blisters. The blisters have since resolved, but the redness has not really improved.      Patient’s mental status started to decline (patient became more confused) starting 7/25. At baseline patient is AOx3 but not completely independent in all ADLs. Patient’s symptoms originally started on 7/17 with headache; she does have hx of migraines so treated with furiocet. Patient had episode of vomiting. On 7/21, saw PCP, had outpatient CThead which was negative. Patient had elevated BP (measured at home) that night, 200/80, reducing on 160/100 the next day. PCP started her on amlodipine. Patient reported blurry vision starting on 7/22.      ED course: Patient given zosyn, acyclovir.      BRIEF Hospital Course: Patient started on acyclovir and cefazolin (for preseptal cellulitis). CT showing periorbital preseptal soft tissue swelling, generalized diffuse frontal scalp soft tissue swelling. MRI with asymmetric edema of the R orbital medial and lateral rectus muscles, and superior oblique muscle, susoicious for a myositis.      Patient followed by ophtho – evaluation limited originally due to AMS, but with keratisis and trabeculitis and pupillary involvement – started on erythromycin ointment and brimonidine drops. Patient also followed by neurology and ID, concern for AMS 2/2 sepsis vs herpes encephalitis. Given patient already on IV acyclovir, LP deferred. Patient’s mental status had originally been improving compared to admission, but has waxed/waned since being in the hospital.        PAST MEDICAL & SURGICAL HISTORY:  Myocardial Infarction      Diabetes Mellitus Type II      Migraines      COPD (Chronic Obstructive Pulmonary Disease)      NPH (normal pressure hydrocephalus)      COPD, mild      CAD (coronary artery disease)      Type 2 diabetes mellitus      Migraines      Stented coronary artery          REVIEW OF SYSTEMS - unable to obtain completely today due to AMS; some reported by daughter at bedside     General: no fevers/chills, no lethargy	    Skin/Breast: see HPI  	  Ophthalmologic: no eye pain or change in vision  	  ENMT: no dysphagia or change in hearing    Respiratory and Thorax: no SOB or cough  	  Cardiovascular: no palpitations or chest pain    Gastrointestinal: no abdominal pain or blood in stool     Genitourinary: no dysuria or frequency    Musculoskeletal: no joint pains or weakness	    Neurological: no weakness, numbness, or tingling      MEDICATIONS  (STANDING):  acyclovir IVPB 650 milliGRAM(s) IV Intermittent every 8 hours  albuterol/ipratropium for Nebulization. 3 milliLiter(s) Nebulizer once  amLODIPine   Tablet 5 milliGRAM(s) Oral daily  artificial tears (preservative free) Ophthalmic Solution 1 Drop(s) Left EYE four times a day  aspirin  chewable 324 milliGRAM(s) Oral daily  brimonidine 0.2% Ophthalmic Solution 1 Drop(s) Right EYE three times a day  budesonide  80 MICROgram(s)/formoterol 4.5 MICROgram(s) Inhaler 2 Puff(s) Inhalation two times a day  ceFAZolin   IVPB 1000 milliGRAM(s) IV Intermittent every 8 hours  dextrose 5%. 1000 milliLiter(s) (50 mL/Hr) IV Continuous <Continuous>  dextrose 5%. 1000 milliLiter(s) (100 mL/Hr) IV Continuous <Continuous>  dextrose 50% Injectable 25 Gram(s) IV Push once  dextrose 50% Injectable 12.5 Gram(s) IV Push once  dextrose 50% Injectable 25 Gram(s) IV Push once  enoxaparin Injectable 40 milliGRAM(s) SubCutaneous every 24 hours  erythromycin   Ointment 1 Application(s) Right EYE four times a day  gabapentin 100 milliGRAM(s) Oral three times a day  glucagon  Injectable 1 milliGRAM(s) IntraMuscular once  insulin glargine Injectable (LANTUS) 10 Unit(s) SubCutaneous at bedtime  insulin lispro (ADMELOG) corrective regimen sliding scale   SubCutaneous Before meals and at bedtime  lactated ringers. 1000 milliLiter(s) (75 mL/Hr) IV Continuous <Continuous>  lactated ringers. 1000 milliLiter(s) (75 mL/Hr) IV Continuous <Continuous>  lactobacillus acidophilus 1 Tablet(s) Oral daily  losartan 50 milliGRAM(s) Oral daily  metoprolol succinate ER 12.5 milliGRAM(s) Oral daily  mirtazapine 15 milliGRAM(s) Oral at bedtime  nystatin Powder 1 Application(s) Topical two times a day  sodium chloride 0.9%. 1000 milliLiter(s) (75 mL/Hr) IV Continuous <Continuous>  tiotropium 18 MICROgram(s) Capsule 1 Capsule(s) Inhalation daily  traZODone 100 milliGRAM(s) Oral at bedtime  ursodiol Tablet 500 milliGRAM(s) Oral <User Schedule>    MEDICATIONS  (PRN):  acetaminophen     Tablet .. 650 milliGRAM(s) Oral every 6 hours PRN Mild Pain (1 - 3), Moderate Pain (4 - 6), Severe Pain (7 - 10)  dextrose Oral Gel 15 Gram(s) Oral once PRN Blood Glucose LESS THAN 70 milliGRAM(s)/deciliter  hydrOXYzine hydrochloride 20 milliGRAM(s) Oral four times a day PRN Itching      Allergies    diltiazem (Other; Rash)    Intolerances        SOCIAL HISTORY: daughter at bedside     FAMILY HISTORY:  FH: myocardial infarction (Father)    FH: hypertension (Child)        Vital Signs Last 24 Hrs  T(C): 36.5 (01 Aug 2022 01:19), Max: 36.6 (31 Jul 2022 14:12)  T(F): 97.7 (01 Aug 2022 01:19), Max: 97.9 (31 Jul 2022 14:12)  HR: 82 (01 Aug 2022 10:32) (67 - 91)  BP: 178/79 (01 Aug 2022 10:32) (144/60 - 183/72)  BP(mean): --  RR: 15 (01 Aug 2022 02:15) (15 - 18)  SpO2: 93% (01 Aug 2022 02:15) (90% - 99%)    Parameters below as of 01 Aug 2022 02:15  Patient On (Oxygen Delivery Method): room air        PHYSICAL EXAM:    General: Well appearing, well nourished, irritable   HEENT: R eyelid and surrounding area with erythema, edema   CV: No lower extremity edema, extremities warm and well perfused, +dorsalis pedis pulses  Resp: Non labored breathing, no clubbing of extremities  GI: Non distended abdomen, no palpable hepatosplenomegaly  Lymph: No visible lymphadenopathy  Neuro: Responds appropriately to some questioning   Skin: The scalp/hair, head/face, conjunctivae/lids, lips/teeth/gums, neck, digits/nails, right and left axilla, right and left upper and lower extremities, chest, abdomen, back, buttocks and groin area. No bromhidrosis or hyperhidrosis.    Of note on skin exam:    erosions with yellow-brown crusting, with surrounding tender erythematous and plaque of the R forehead, eye, and extending toward upper cheek in V1 distribution    LABS:                        11.9   5.95  )-----------( 299      ( 31 Jul 2022 07:03 )             37.4     07-31    140  |  103  |  5<L>  ----------------------------<  210<H>  3.7   |  26  |  0.53    Ca    8.6      31 Jul 2022 07:03  Phos  2.3     07-31  Mg     1.70     07-31    TPro  6.3  /  Alb  3.1<L>  /  TBili  0.2  /  DBili  x   /  AST  23  /  ALT  14  /  AlkPhos  87  07-31      RADIOLOGY & ADDITIONAL STUDIES:    MICRO     COVID neg 7/26   RVP neg 7/26   Hep B, Hep C neg 7/27    HIV neg       Blood cx 7/28 – ngtd x 2   Blood cx 7/27 – ngtd    Blood cx 7/26 – staph epidermidis, staph hominis – ID feels contaminants           IMAGING     CT 7/26: The ventricular system is not dilated, and is markedly smaller in size    compared to the 2011 head CT study. Comparison with the more recent    outside head CT is recommended to evaluate for any interval change   Chronic left parietal infarct.   There is no gross CT evidence for superimposed acute vascular    distribution infarct. There is no evidence for acute intracranial    hemorrhage.   Extensive right periorbital preseptal soft tissue swelling is noted    concordant with the history of shingles over the right eye. A    superimposed cellulitis can't be excluded. No post septal extension is    appreciated.   Generalized diffuse frontal scalp soft tissue swelling is present which    may be related to the shingles infection, a superimposed cellulitis, or a    combination of both. The adjacent calvarium appears intact without    periosteal reaction or bony destruction.   If the patient has new and persistent unexplained symptoms, consider    follow-up brain MRI with and without contrast for further workup,    provided there are no MRI or contrast contraindications.       MR head and orbit 7/27: Evaluation the orbits is limited. However, asymmetric edema appears to involve the right orbital medial and lateral rectus muscles, as well as the superior oblique muscle, suspicious for a myositis (viral given the known herpes zoster with superimposed bacterial infection not excluded).    There is no evidence for orbital abscess. The cavernous sinuses and    superior ophthalmic veins remain patent. The right preseptal periorbital    soft tissue swelling appears improved compared to the CT suggesting    improving preseptal cellulitis/zoster. Serial imaging follow-up is    recommended to monitor for stability. No evidence for acute infarct, acute intracranial hemorrhage, or intracranial abscess.   Stable ventricular size.   Chronic left frontal parietal infarcts and chronic white matter changes    as described.      INCOMPLETE NOTE     HPI: Patient is an 87yo female w/PMH MI now with cardiac stents, COPD, NPH, DM, Migraines, and suspected HTN admitted on 7/26 for herpes zoster opthalmicus with concern for associated AMS, currently on acyclovir 650 q8hr as well as cefazolin.      DERM: Dermatology consulted for rash associated with herpes ophthalmicus Per daughter at bedside (patient still with somewhat AMS), rash has been present since about 7/23 morning, around the R side of the face. Patient was started on Valtrex and artificial tears on 7/24 as outpatient. Rash was initially more subtle, but continued to spread, become more red, itchy, and painful, and developed blisters. The blisters have since resolved, but the redness has not really improved.      Patient’s mental status started to decline (patient became more confused) starting 7/25. At baseline patient is AOx3 but not completely independent in all ADLs. Patient’s symptoms originally started on 7/17 with headache; she does have hx of migraines so treated with furiocet. Patient had episode of vomiting. On 7/21, saw PCP, had outpatient CThead which was negative. Patient had elevated BP (measured at home) that night, 200/80, reducing on 160/100 the next day. PCP started her on amlodipine. Patient reported blurry vision starting on 7/22.      ED course: Patient given zosyn, acyclovir.      BRIEF Hospital Course: Patient started on acyclovir and cefazolin (for preseptal cellulitis). CT showing periorbital preseptal soft tissue swelling, generalized diffuse frontal scalp soft tissue swelling. MRI with asymmetric edema of the R orbital medial and lateral rectus muscles, and superior oblique muscle, susoicious for a myositis.      Patient followed by ophtho – evaluation limited originally due to AMS, but with keratisis and trabeculitis and pupillary involvement – started on erythromycin ointment and brimonidine drops. Patient also followed by neurology and ID, concern for AMS 2/2 sepsis vs herpes encephalitis. Given patient already on IV acyclovir, LP deferred. Patient’s mental status had originally been improving compared to admission, but has waxed/waned since being in the hospital.        PAST MEDICAL & SURGICAL HISTORY:  Myocardial Infarction      Diabetes Mellitus Type II      Migraines      COPD (Chronic Obstructive Pulmonary Disease)      NPH (normal pressure hydrocephalus)      COPD, mild      CAD (coronary artery disease)      Type 2 diabetes mellitus      Migraines      Stented coronary artery          REVIEW OF SYSTEMS - unable to obtain completely today due to AMS; some reported by daughter at bedside     General: no fevers/chills, no lethargy	    Skin/Breast: see HPI  	  Ophthalmologic: pain and irritation of the eye  	  ENMT: no dysphagia or change in hearing    Respiratory and Thorax: no SOB or cough  	  Cardiovascular: no palpitations or chest pain    Gastrointestinal: no abdominal pain or blood in stool     Genitourinary: no dysuria or frequency    Musculoskeletal: no joint pains or weakness	    Neurological: no weakness, numbness, or tingling      MEDICATIONS  (STANDING):  acyclovir IVPB 650 milliGRAM(s) IV Intermittent every 8 hours  albuterol/ipratropium for Nebulization. 3 milliLiter(s) Nebulizer once  amLODIPine   Tablet 5 milliGRAM(s) Oral daily  artificial tears (preservative free) Ophthalmic Solution 1 Drop(s) Left EYE four times a day  aspirin  chewable 324 milliGRAM(s) Oral daily  brimonidine 0.2% Ophthalmic Solution 1 Drop(s) Right EYE three times a day  budesonide  80 MICROgram(s)/formoterol 4.5 MICROgram(s) Inhaler 2 Puff(s) Inhalation two times a day  ceFAZolin   IVPB 1000 milliGRAM(s) IV Intermittent every 8 hours  dextrose 5%. 1000 milliLiter(s) (50 mL/Hr) IV Continuous <Continuous>  dextrose 5%. 1000 milliLiter(s) (100 mL/Hr) IV Continuous <Continuous>  dextrose 50% Injectable 25 Gram(s) IV Push once  dextrose 50% Injectable 12.5 Gram(s) IV Push once  dextrose 50% Injectable 25 Gram(s) IV Push once  enoxaparin Injectable 40 milliGRAM(s) SubCutaneous every 24 hours  erythromycin   Ointment 1 Application(s) Right EYE four times a day  gabapentin 100 milliGRAM(s) Oral three times a day  glucagon  Injectable 1 milliGRAM(s) IntraMuscular once  insulin glargine Injectable (LANTUS) 10 Unit(s) SubCutaneous at bedtime  insulin lispro (ADMELOG) corrective regimen sliding scale   SubCutaneous Before meals and at bedtime  lactated ringers. 1000 milliLiter(s) (75 mL/Hr) IV Continuous <Continuous>  lactated ringers. 1000 milliLiter(s) (75 mL/Hr) IV Continuous <Continuous>  lactobacillus acidophilus 1 Tablet(s) Oral daily  losartan 50 milliGRAM(s) Oral daily  metoprolol succinate ER 12.5 milliGRAM(s) Oral daily  mirtazapine 15 milliGRAM(s) Oral at bedtime  nystatin Powder 1 Application(s) Topical two times a day  sodium chloride 0.9%. 1000 milliLiter(s) (75 mL/Hr) IV Continuous <Continuous>  tiotropium 18 MICROgram(s) Capsule 1 Capsule(s) Inhalation daily  traZODone 100 milliGRAM(s) Oral at bedtime  ursodiol Tablet 500 milliGRAM(s) Oral <User Schedule>    MEDICATIONS  (PRN):  acetaminophen     Tablet .. 650 milliGRAM(s) Oral every 6 hours PRN Mild Pain (1 - 3), Moderate Pain (4 - 6), Severe Pain (7 - 10)  dextrose Oral Gel 15 Gram(s) Oral once PRN Blood Glucose LESS THAN 70 milliGRAM(s)/deciliter  hydrOXYzine hydrochloride 20 milliGRAM(s) Oral four times a day PRN Itching      Allergies    diltiazem (Other; Rash)    Intolerances        SOCIAL HISTORY: daughter at bedside     FAMILY HISTORY:  FH: myocardial infarction (Father)    FH: hypertension (Child)        Vital Signs Last 24 Hrs  T(C): 36.5 (01 Aug 2022 01:19), Max: 36.6 (31 Jul 2022 14:12)  T(F): 97.7 (01 Aug 2022 01:19), Max: 97.9 (31 Jul 2022 14:12)  HR: 82 (01 Aug 2022 10:32) (67 - 91)  BP: 178/79 (01 Aug 2022 10:32) (144/60 - 183/72)  BP(mean): --  RR: 15 (01 Aug 2022 02:15) (15 - 18)  SpO2: 93% (01 Aug 2022 02:15) (90% - 99%)    Parameters below as of 01 Aug 2022 02:15  Patient On (Oxygen Delivery Method): room air        PHYSICAL EXAM:    General: Well appearing, well nourished, irritable   HEENT: R eyelid and surrounding area with erythema, edema   CV: No lower extremity edema, extremities warm and well perfused, +dorsalis pedis pulses  Resp: Non labored breathing, no clubbing of extremities  GI: Non distended abdomen, no palpable hepatosplenomegaly  Lymph: No visible lymphadenopathy  Neuro: Responds appropriately to some questioning   Skin: The scalp/hair, head/face, conjunctivae/lids, lips/teeth/gums, neck, digits/nails, right and left axilla, right and left upper and lower extremities, chest, abdomen, back, buttocks and groin area. No bromhidrosis or hyperhidrosis.    Of note on skin exam:    erosions with yellow-brown crusting, with surrounding tender erythematous and plaque of the R forehead, eye, and extending toward upper cheek in V1 distribution  circular ulcerations of the R perianal skin without purulence     LABS:                        11.9   5.95  )-----------( 299      ( 31 Jul 2022 07:03 )             37.4     07-31    140  |  103  |  5<L>  ----------------------------<  210<H>  3.7   |  26  |  0.53    Ca    8.6      31 Jul 2022 07:03  Phos  2.3     07-31  Mg     1.70     07-31    TPro  6.3  /  Alb  3.1<L>  /  TBili  0.2  /  DBili  x   /  AST  23  /  ALT  14  /  AlkPhos  87  07-31      RADIOLOGY & ADDITIONAL STUDIES:    MICRO     COVID neg 7/26   RVP neg 7/26   Hep B, Hep C neg 7/27    HIV neg       Blood cx 7/28 – ngtd x 2   Blood cx 7/27 – ngtd    Blood cx 7/26 – staph epidermidis, staph hominis – ID feels contaminants           IMAGING     CT 7/26: The ventricular system is not dilated, and is markedly smaller in size    compared to the 2011 head CT study. Comparison with the more recent    outside head CT is recommended to evaluate for any interval change   Chronic left parietal infarct.   There is no gross CT evidence for superimposed acute vascular    distribution infarct. There is no evidence for acute intracranial    hemorrhage.   Extensive right periorbital preseptal soft tissue swelling is noted    concordant with the history of shingles over the right eye. A    superimposed cellulitis can't be excluded. No post septal extension is    appreciated.   Generalized diffuse frontal scalp soft tissue swelling is present which    may be related to the shingles infection, a superimposed cellulitis, or a    combination of both. The adjacent calvarium appears intact without    periosteal reaction or bony destruction.   If the patient has new and persistent unexplained symptoms, consider    follow-up brain MRI with and without contrast for further workup,    provided there are no MRI or contrast contraindications.       MR head and orbit 7/27: Evaluation the orbits is limited. However, asymmetric edema appears to involve the right orbital medial and lateral rectus muscles, as well as the superior oblique muscle, suspicious for a myositis (viral given the known herpes zoster with superimposed bacterial infection not excluded).    There is no evidence for orbital abscess. The cavernous sinuses and    superior ophthalmic veins remain patent. The right preseptal periorbital    soft tissue swelling appears improved compared to the CT suggesting    improving preseptal cellulitis/zoster. Serial imaging follow-up is    recommended to monitor for stability. No evidence for acute infarct, acute intracranial hemorrhage, or intracranial abscess.   Stable ventricular size.   Chronic left frontal parietal infarcts and chronic white matter changes    as described.      INCOMPLETE NOTE     HPI: Patient is an 87yo female w/PMH MI now with cardiac stents, COPD, NPH, DM, Migraines, and suspected HTN admitted on 7/26 for herpes zoster opthalmicus with concern for associated AMS, currently on acyclovir 650 q8hr as well as cefazolin.      DERM: Dermatology consulted for rash associated with herpes ophthalmicus Per daughter at bedside (patient still with somewhat AMS), rash has been present since about 7/23 morning, around the R side of the face. Patient was started on Valtrex and artificial tears on 7/24 as outpatient. Rash was initially more subtle, but continued to spread, become more red, itchy, and painful, and developed blisters. The blisters have since resolved, but the redness has not really improved.      Patient’s mental status started to decline (patient became more confused) starting 7/25. At baseline patient is AOx3 but not completely independent in all ADLs. Patient’s symptoms originally started on 7/17 with headache; she does have hx of migraines so treated with furiocet. Patient had episode of vomiting. On 7/21, saw PCP, had outpatient CThead which was negative. Patient had elevated BP (measured at home) that night, 200/80, reducing on 160/100 the next day. PCP started her on amlodipine. Patient reported blurry vision starting on 7/22.      ED course: Patient given zosyn, acyclovir.      BRIEF Hospital Course: Patient started on acyclovir and cefazolin (for preseptal cellulitis). CT showing periorbital preseptal soft tissue swelling, generalized diffuse frontal scalp soft tissue swelling. MRI with asymmetric edema of the R orbital medial and lateral rectus muscles, and superior oblique muscle, susoicious for a myositis.      Patient followed by ophtho – evaluation limited originally due to AMS, but with keratisis and trabeculitis and pupillary involvement – started on erythromycin ointment and brimonidine drops. Patient also followed by neurology and ID, concern for AMS 2/2 sepsis vs herpes encephalitis. Given patient already on IV acyclovir, LP deferred. Patient’s mental status had originally been improving compared to admission, but has waxed/waned since being in the hospital.        PAST MEDICAL & SURGICAL HISTORY:  Myocardial Infarction      Diabetes Mellitus Type II      Migraines      COPD (Chronic Obstructive Pulmonary Disease)      NPH (normal pressure hydrocephalus)      COPD, mild      CAD (coronary artery disease)      Type 2 diabetes mellitus      Migraines      Stented coronary artery          REVIEW OF SYSTEMS - unable to obtain completely today due to AMS; some reported by daughter at bedside     General: no fevers/chills, no lethargy	    Skin/Breast: see HPI  	  Ophthalmologic: pain and irritation of the eye  	  ENMT: no dysphagia or change in hearing    Respiratory and Thorax: no SOB or cough  	  Cardiovascular: no palpitations or chest pain    Gastrointestinal: no abdominal pain or blood in stool     Genitourinary: no dysuria or frequency    Musculoskeletal: no joint pains or weakness	    Neurological: no weakness, numbness, or tingling      MEDICATIONS  (STANDING):  acyclovir IVPB 650 milliGRAM(s) IV Intermittent every 8 hours  albuterol/ipratropium for Nebulization. 3 milliLiter(s) Nebulizer once  amLODIPine   Tablet 5 milliGRAM(s) Oral daily  artificial tears (preservative free) Ophthalmic Solution 1 Drop(s) Left EYE four times a day  aspirin  chewable 324 milliGRAM(s) Oral daily  brimonidine 0.2% Ophthalmic Solution 1 Drop(s) Right EYE three times a day  budesonide  80 MICROgram(s)/formoterol 4.5 MICROgram(s) Inhaler 2 Puff(s) Inhalation two times a day  ceFAZolin   IVPB 1000 milliGRAM(s) IV Intermittent every 8 hours  dextrose 5%. 1000 milliLiter(s) (50 mL/Hr) IV Continuous <Continuous>  dextrose 5%. 1000 milliLiter(s) (100 mL/Hr) IV Continuous <Continuous>  dextrose 50% Injectable 25 Gram(s) IV Push once  dextrose 50% Injectable 12.5 Gram(s) IV Push once  dextrose 50% Injectable 25 Gram(s) IV Push once  enoxaparin Injectable 40 milliGRAM(s) SubCutaneous every 24 hours  erythromycin   Ointment 1 Application(s) Right EYE four times a day  gabapentin 100 milliGRAM(s) Oral three times a day  glucagon  Injectable 1 milliGRAM(s) IntraMuscular once  insulin glargine Injectable (LANTUS) 10 Unit(s) SubCutaneous at bedtime  insulin lispro (ADMELOG) corrective regimen sliding scale   SubCutaneous Before meals and at bedtime  lactated ringers. 1000 milliLiter(s) (75 mL/Hr) IV Continuous <Continuous>  lactated ringers. 1000 milliLiter(s) (75 mL/Hr) IV Continuous <Continuous>  lactobacillus acidophilus 1 Tablet(s) Oral daily  losartan 50 milliGRAM(s) Oral daily  metoprolol succinate ER 12.5 milliGRAM(s) Oral daily  mirtazapine 15 milliGRAM(s) Oral at bedtime  nystatin Powder 1 Application(s) Topical two times a day  sodium chloride 0.9%. 1000 milliLiter(s) (75 mL/Hr) IV Continuous <Continuous>  tiotropium 18 MICROgram(s) Capsule 1 Capsule(s) Inhalation daily  traZODone 100 milliGRAM(s) Oral at bedtime  ursodiol Tablet 500 milliGRAM(s) Oral <User Schedule>    MEDICATIONS  (PRN):  acetaminophen     Tablet .. 650 milliGRAM(s) Oral every 6 hours PRN Mild Pain (1 - 3), Moderate Pain (4 - 6), Severe Pain (7 - 10)  dextrose Oral Gel 15 Gram(s) Oral once PRN Blood Glucose LESS THAN 70 milliGRAM(s)/deciliter  hydrOXYzine hydrochloride 20 milliGRAM(s) Oral four times a day PRN Itching      Allergies    diltiazem (Other; Rash)    Intolerances        SOCIAL HISTORY: daughter at bedside    FAMILY HISTORY:  FH: myocardial infarction (Father)    FH: hypertension (Child)        Vital Signs Last 24 Hrs  T(C): 36.5 (01 Aug 2022 01:19), Max: 36.6 (31 Jul 2022 14:12)  T(F): 97.7 (01 Aug 2022 01:19), Max: 97.9 (31 Jul 2022 14:12)  HR: 82 (01 Aug 2022 10:32) (67 - 91)  BP: 178/79 (01 Aug 2022 10:32) (144/60 - 183/72)  BP(mean): --  RR: 15 (01 Aug 2022 02:15) (15 - 18)  SpO2: 93% (01 Aug 2022 02:15) (90% - 99%)    Parameters below as of 01 Aug 2022 02:15  Patient On (Oxygen Delivery Method): room air        PHYSICAL EXAM:    General: Well appearing, well nourished, irritable   HEENT: R eyelid and surrounding area with erythema, edema   CV: No lower extremity edema, extremities warm and well perfused, +dorsalis pedis pulses  Resp: Non labored breathing, no clubbing of extremities  GI: Non distended abdomen, no palpable hepatosplenomegaly  Lymph: No visible lymphadenopathy  Neuro: Responds appropriately to some questioning   Skin: The scalp/hair, head/face, conjunctivae/lids, lips/teeth/gums, neck, digits/nails, right and left axilla, right and left upper and lower extremities, chest, abdomen, back, buttocks and groin area. No bromhidrosis or hyperhidrosis.    Of note on skin exam:    erosions with yellow-brown crusting, with surrounding tender erythematous and plaque of the R forehead, eye, and extending toward upper cheek in V1 distribution  circular <1cm ulceration of the R perianal skin without purulence     LABS:                        11.9   5.95  )-----------( 299      ( 31 Jul 2022 07:03 )             37.4     07-31    140  |  103  |  5<L>  ----------------------------<  210<H>  3.7   |  26  |  0.53    Ca    8.6      31 Jul 2022 07:03  Phos  2.3     07-31  Mg     1.70     07-31    TPro  6.3  /  Alb  3.1<L>  /  TBili  0.2  /  DBili  x   /  AST  23  /  ALT  14  /  AlkPhos  87  07-31      RADIOLOGY & ADDITIONAL STUDIES:    MICRO     COVID neg 7/26   RVP neg 7/26   Hep B, Hep C neg 7/27    HIV neg       Blood cx 7/28 – ngtd x 2   Blood cx 7/27 – ngtd    Blood cx 7/26 – staph epidermidis, staph hominis – ID feels contaminants           IMAGING     CT 7/26: The ventricular system is not dilated, and is markedly smaller in size    compared to the 2011 head CT study. Comparison with the more recent    outside head CT is recommended to evaluate for any interval change   Chronic left parietal infarct.   There is no gross CT evidence for superimposed acute vascular    distribution infarct. There is no evidence for acute intracranial    hemorrhage.   Extensive right periorbital preseptal soft tissue swelling is noted    concordant with the history of shingles over the right eye. A    superimposed cellulitis can't be excluded. No post septal extension is    appreciated.   Generalized diffuse frontal scalp soft tissue swelling is present which    may be related to the shingles infection, a superimposed cellulitis, or a    combination of both. The adjacent calvarium appears intact without    periosteal reaction or bony destruction.   If the patient has new and persistent unexplained symptoms, consider    follow-up brain MRI with and without contrast for further workup,    provided there are no MRI or contrast contraindications.       MR head and orbit 7/27: Evaluation the orbits is limited. However, asymmetric edema appears to involve the right orbital medial and lateral rectus muscles, as well as the superior oblique muscle, suspicious for a myositis (viral given the known herpes zoster with superimposed bacterial infection not excluded).    There is no evidence for orbital abscess. The cavernous sinuses and    superior ophthalmic veins remain patent. The right preseptal periorbital    soft tissue swelling appears improved compared to the CT suggesting    improving preseptal cellulitis/zoster. Serial imaging follow-up is    recommended to monitor for stability. No evidence for acute infarct, acute intracranial hemorrhage, or intracranial abscess.   Stable ventricular size.   Chronic left frontal parietal infarcts and chronic white matter changes    as described.

## 2022-08-02 NOTE — PROGRESS NOTE ADULT - SUBJECTIVE AND OBJECTIVE BOX
INTERVAL HPI/OVERNIGHT EVENTS:  - Patient's daughter feels the redness may be increasing. She reports patient is still itchy and in pain   - Switched to vancomycin, seen by plastics with no intervention planned     MEDICATIONS  (STANDING):  acyclovir IVPB 650 milliGRAM(s) IV Intermittent every 8 hours  albuterol/ipratropium for Nebulization. 3 milliLiter(s) Nebulizer once  amLODIPine   Tablet 5 milliGRAM(s) Oral daily  artificial tears (preservative free) Ophthalmic Solution 1 Drop(s) Both EYES every 2 hours  aspirin  chewable 324 milliGRAM(s) Oral daily  budesonide  80 MICROgram(s)/formoterol 4.5 MICROgram(s) Inhaler 2 Puff(s) Inhalation two times a day  dextrose 5%. 1000 milliLiter(s) (100 mL/Hr) IV Continuous <Continuous>  dextrose 5%. 1000 milliLiter(s) (50 mL/Hr) IV Continuous <Continuous>  dextrose 50% Injectable 25 Gram(s) IV Push once  dextrose 50% Injectable 12.5 Gram(s) IV Push once  dextrose 50% Injectable 25 Gram(s) IV Push once  enoxaparin Injectable 40 milliGRAM(s) SubCutaneous every 24 hours  erythromycin   Ointment 1 Application(s) Right EYE four times a day  gabapentin 300 milliGRAM(s) Oral three times a day  glucagon  Injectable 1 milliGRAM(s) IntraMuscular once  insulin glargine Injectable (LANTUS) 10 Unit(s) SubCutaneous at bedtime  insulin lispro (ADMELOG) corrective regimen sliding scale   SubCutaneous Before meals and at bedtime  lactated ringers. 1000 milliLiter(s) (75 mL/Hr) IV Continuous <Continuous>  lactobacillus acidophilus 1 Tablet(s) Oral daily  lidocaine 5% Ointment 1 Application(s) Topical two times a day  losartan 50 milliGRAM(s) Oral daily  meropenem  IVPB 1000 milliGRAM(s) IV Intermittent every 8 hours  metoprolol succinate ER 12.5 milliGRAM(s) Oral daily  mirtazapine 15 milliGRAM(s) Oral at bedtime  mupirocin 2% Ointment 1 Application(s) Topical three times a day  nystatin Powder 1 Application(s) Topical two times a day  potassium chloride    Tablet ER 40 milliEquivalent(s) Oral once  tiotropium 18 MICROgram(s) Capsule 1 Capsule(s) Inhalation daily  traZODone 100 milliGRAM(s) Oral at bedtime  triamcinolone 0.1% Ointment 1 Application(s) Topical two times a day  ursodiol Tablet 500 milliGRAM(s) Oral <User Schedule>  vancomycin  IVPB 750 milliGRAM(s) IV Intermittent every 12 hours  vancomycin  IVPB        MEDICATIONS  (PRN):  acetaminophen     Tablet .. 650 milliGRAM(s) Oral every 6 hours PRN Mild Pain (1 - 3), Moderate Pain (4 - 6), Severe Pain (7 - 10)  dextrose Oral Gel 15 Gram(s) Oral once PRN Blood Glucose LESS THAN 70 milliGRAM(s)/deciliter  HYDROmorphone  Injectable 0.5 milliGRAM(s) IV Push every 3 hours PRN Pain  hydrOXYzine hydrochloride 20 milliGRAM(s) Oral four times a day PRN Itching  QUEtiapine 25 milliGRAM(s) Oral daily PRN agitation      Allergies    diltiazem (Other; Rash)    Intolerances        REVIEW OF SYSTEMS - unable to obtain from patient; per daughter, patient still uncomfortable, with pain and itch, localized to the site of the rash       Vital Signs Last 24 Hrs  T(C): 36.9 (02 Aug 2022 17:18), Max: 37.1 (01 Aug 2022 18:33)  T(F): 98.5 (02 Aug 2022 17:18), Max: 98.8 (01 Aug 2022 18:33)  HR: 99 (02 Aug 2022 17:18) (75 - 99)  BP: 112/54 (02 Aug 2022 17:18) (110/51 - 155/68)  BP(mean): --  RR: 18 (02 Aug 2022 17:18) (16 - 18)  SpO2: 92% (02 Aug 2022 17:18) (92% - 94%)    Parameters below as of 02 Aug 2022 17:18  Patient On (Oxygen Delivery Method): room air          PHYSICAL EXAM:    General: resting comfortably   HEENT: edema surrounding eyes, crusting   CV: No lower extremity edema, extremities warm and well perfused, +dorsalis pedis pulses  Resp: Non labored breathing, no clubbing of extremities  GI: Non distended abdomen, no palpable hepatosplenomegaly  Lymph: No visible lymphadenopathy  Neuro: resting comfortably   Skin: The scalp/hair, head/face, conjunctivae/lids, lips/teeth/gums, neck, digits/nails, right and left axilla, right and left upper and lower extremities, chest, abdomen, back, buttocks and groin area. No bromhidrosis or hyperhidrosis.    Of note on skin exam:  erosions with yellow-brown crusting, with surrounding edematous plaque of the upper forehead, eye; there are areas without rash/edema between additional erythematous plaques of the lower face    LABS:                        12.3   12.92 )-----------( 326      ( 02 Aug 2022 06:30 )             38.4     08-02    135  |  97<L>  |  9   ----------------------------<  273<H>  4.0   |  26  |  0.61    Ca    8.7      02 Aug 2022 06:30  Phos  2.6     08-02  Mg     1.70     08-02    TPro  6.1  /  Alb  2.9<L>  /  TBili  0.2  /  DBili  x   /  AST  15  /  ALT  <5<L>  /  AlkPhos  89  08-01          RADIOLOGY & ADDITIONAL TESTS:    COVID neg 7/26  RVP neg 7/26  Hep B, Hep C neg 7/27  HIV neg    Blood cx 7/28 – ngtd x 2  Blood cx 7/27 – ngtd  Blood cx 7/26 – staph epidermidis, staph hominis – ID feels contaminants    IMAGING    Head US 8/2 : No focal collection or hyperemia identified along the right face or neck. Recommend correlation with the same day neck CT exam.    CT 8/2: Extensive right periorbital preseptal soft tissue swelling is again noted concordant with the history of shingles over the right eye. A  superimposed cellulitis can't be excluded. Asymmetric enlargement of the  right-sided extraocular muscles appears similar compared to the  07/27/2022 orbital MRI study. There is no evidence for orbital abscess.  The superior ophthalmic veins remain patent. The cavernous sinuses appear  symmetric.    Diffuse nonspecific skin and subcutaneous soft tissue  swelling-infiltration involves the scalp, right lateral neck soft  tissues, and anterior neck soft tissues, extending to the upper right  thoracic region, most likely reflecting a cellulitis given the clinical  history, without evidence for abscess formation at this time.      CT 7/26: The ventricular system is not dilated, and is markedly smaller in size  compared to the 2011 head CT study. Comparison with the more recent  outside head CT is recommended to evaluate for any interval change  Chronic left parietal infarct.  There is no gross CT evidence for superimposed acute vascular  distribution infarct. There is no evidence for acute intracranial  hemorrhage.  Extensive right periorbital preseptal soft tissue swelling is noted  concordant with the history of shingles over the right eye. A  superimposed cellulitis can't be excluded. No post septal extension is  appreciated.  Generalized diffuse frontal scalp soft tissue swelling is present which  may be related to the shingles infection, a superimposed cellulitis, or a  combination of both. The adjacent calvarium appears intact without  periosteal reaction or bony destruction.    If the patient has new and persistent unexplained symptoms, consider  follow-up brain MRI with and without contrast for further workup,  provided there are no MRI or contrast contraindications.    MR head and orbit 7/27: Evaluation the orbits is limited. However, asymmetric edema appears to involve the right orbital medial and lateral rectus muscles, as well as the superior oblique muscle, suspicious for a myositis (viral given the known herpes zoster with superimposed bacterial infection not excluded).  There is no evidence for orbital abscess. The cavernous sinuses and  superior ophthalmic veins remain patent. The right preseptal periorbital  soft tissue swelling appears improved compared to the CT suggesting  improving preseptal cellulitis/zoster. Serial imaging follow-up is  recommended to monitor for stability. No evidence for acute infarct, acute intracranial hemorrhage, or intracranial abscess.  Stable ventricular size.  Chronic left frontal parietal infarcts and chronic white matter changes  as described.

## 2022-08-02 NOTE — PROGRESS NOTE ADULT - PROBLEM SELECTOR PLAN 5
A1c 7.2. FS today ~200. Home regimen lantus 34U daily, trulicity, glipizide.  - will continue lantus 10U qHS, SSI for now given active infection  - monitor premeal and bedtime FS Hx of rash in inguinal folds. Unable to recall which ointments were being used.  - Continue nystatin powder BID  - Appreciate derm recs: mupirocin ointment TID to perianal erosions  - CTM for worsening of rash

## 2022-08-02 NOTE — PROGRESS NOTE ADULT - PROBLEM SELECTOR PLAN 8
DVT ppx: subQ lovenox  Diet: soft and bite sized (S&S recd minced and moist however daughter accepts risks)    Dispo:  - pt has difficult vessels, needs arterial sticks  - PT/OT recommending rehab, will f/u with  120py smoking hx, quit in 2011. Uses Trelegy at home. Does not appear to have ongoing respiratory symptoms.  - continue spiriva 18mcg QD

## 2022-08-02 NOTE — PROGRESS NOTE ADULT - ASSESSMENT
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     1. HZO OD with new worsening of R sided facial rash  - Pt has been scratching at R sided facial rash with nails - concern for worsening bacterial superinfection with crusting over area of edema and erythema. Current crusting appears slightly worsened compared to yesterday, now extending to lid margin OD    - MYRON vision today as pt's mental status has significantly worsened  - CT maxillofacial showing extensive R periorbital preseptal soft tissue swelling and asymmetric enlargement of R sided extraocular muscles. No evidence of orbital cellulitis. Evidence of extension to neck.  - No evidence of nec fasc  - Appreciate derm and plastic surgery consults.    - Abx per ID  - Pt unable to tolerate sitting up at slit lamp - cannot assess anterior chamber for inflammatory reaction  - Have stopped brimonidine due to IOP of 6.   - C/w erythromycin ointment QID to R eye and periocular lesions  - C/w preservative-free artificial tears to Q2H OU   - Pt has dilated and minimally reactive R pupil. 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   - Pt has had abduction deficit OD for past week, 2/2 myositis seen on MRI orbits. Stable on CT today.   - Will continue to monitor for improvement of EOMs on antivirals.     2. 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 Kuhn, attending. To be discussed with Dr. Lao, plastics attending.     Outpatient follow-up: Patient should follow-up with his/her ophthalmologist or with [facility] Department of Ophthalmology at the address below     [address] [place] [address]. 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     1. HZO OD with new worsening of R sided facial rash  - Pt has been scratching at R sided facial rash with nails - concern for worsening bacterial superinfection with crusting over area of edema and erythema. Current crusting appears slightly worsened compared to yesterday, now extending to lid margin OD    - MYRON vision today as pt's mental status has significantly worsened  - CT maxillofacial showing extensive R periorbital preseptal soft tissue swelling and asymmetric enlargement of R sided extraocular muscles. No evidence of orbital cellulitis. Evidence of extension to neck.  - No evidence of nec fasc  - Appreciate derm and plastic surgery consults.    - Abx per ID  - Pt unable to tolerate sitting up at slit lamp - cannot assess anterior chamber for inflammatory reaction  - Have stopped brimonidine due to IOP of 6.   - C/w erythromycin ointment QID to R eye and periocular lesions  - C/w preservative-free artificial tears to Q2H OU   - Pt has dilated and minimally reactive R pupil. 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   - Pt has had abduction deficit OD for past week, 2/2 myositis seen on MRI orbits. Stable on CT today.   - Will continue to monitor for improvement of EOMs on antivirals.     2. 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 Kuhn, attending. Discussed with Dr. Lao, plastics attending.     Outpatient follow-up: Patient should follow-up with his/her ophthalmologist or with [facility] Department of Ophthalmology at the address below     [address] [place] [address]. 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     1. HZO OD with new worsening of R sided facial rash  - Pt has been scratching at R sided facial rash with nails - concern for worsening bacterial superinfection with crusting over area of edema and erythema. Current crusting appears slightly worsened compared to yesterday, now extending to lid margin OD    - MYRON vision today as pt's mental status has significantly worsened  - CT maxillofacial showing extensive R periorbital preseptal soft tissue swelling and asymmetric enlargement of R sided extraocular muscles. No evidence of orbital cellulitis. Evidence of extension to neck.  - No evidence of nec fasc  - Appreciate derm and plastic surgery consults.    - Abx per ID  - Pt unable to tolerate sitting up at slit lamp - cannot assess anterior chamber for inflammatory reaction  - Have stopped brimonidine due to IOP of 6.   - C/w erythromycin ointment QID to R eye and periocular lesions  - C/w preservative-free artificial tears to Q2H OU   - Pt has dilated and minimally reactive R pupil. 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   - Pt has had abduction deficit OD for past week, 2/2 myositis seen on MRI orbits. Stable on CT today.   - Will continue to monitor for improvement of EOMs on antivirals.     2. 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 Kuhn, attending. Discussed with Dr. Lao, plastics attending.     Outpatient follow-up: Patient should follow-up with his/her ophthalmologist or with [facility] Department of Ophthalmology at the address below within 2-3 days of discharge, sooner if symptoms worsen or change    [address] [facility] [address]. [address]  [place] [address]  [phone]

## 2022-08-02 NOTE — PROGRESS NOTE ADULT - ATTENDING COMMENTS
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     #Herpes zoster, pt with herpes zoster over the right side of the face. MRI brain and MRI orbits performed and showed asymmetric edema of right orbital medical and lateral rectus muscles + superior oblique muscle without evidence of an orbital abscess. ID has been following. C/w acyclovir 650mg q8 hours and gentle IVF. Optho following - she has been receiving brimonidine TID + erythromycin ointment QID to right eye. Yesterday, pt with worsening erythema and edema superimposed on crusting of right forehead and eye. Pt also with worsening pain. Antibiotics escalated to vancomycin and meropenem as per ID. CT maxillofacial ordered that was not completed until today. Today, pt with significant crusting extending to over her eyelid and with extensive edema and erythema over the entire right side of her face. CT scan showing significant edema and soft tissue swelling but no abscess and not concerned for nec fasc. WBC increasing but no fever/chills. BCx drawn 8/1 pending. Pt. pain better controlled today and less agitated. C/w dilaudid 0.5 IV q3 PRN, gabapentin 300mg TID (hold these medications for oversedation). Additionally, received seroquel 25mg x1 and ordered as PRN qd for agitation. Pt. sleeping comfortably and not scratching at her wound. Derm and optho on board. Derm recommending to continue current management. F/u optho recommendations. Neurosurgery was brought onto the case by daughter (physician at [facility]) to discuss whether or not the shunt may be infected/involved. Thus far, they do not think this is the case and will continue to monitor. Do not plan to sample from the shunt. F/u recommendations. Discussed plan with Dr. Wong at bedside.     #BP, improved today with better pain control.     Plan discussed with HS.

## 2022-08-02 NOTE — PROGRESS NOTE ADULT - ASSESSMENT
ASSESSMENT/PLAN:   ADAL LIZARRAGA is a 86y Female with herpes zoster opthalmicus with s/s concerning for possible necrotizing fasciitis.    - No evidence of nec fasc at this time  - F/u CT max/face  - Continue topical and systemic tx per ID  - Appreciate excellent care per primary team  - Will follow    Plastic Surgery   [facility]: [id number]

## 2022-08-02 NOTE — PROGRESS NOTE ADULT - SUBJECTIVE AND OBJECTIVE BOX
Plastic Surgery Progress Note (pg [facility]: [id number], [facility]: [phone])    SUBJECTIVE  Pt comfortable in bed. Pain well controlled, no complaints.    OBJECTIVE  ___________________________________________________  VITAL SIGNS / I&O's   Vital Signs Last 24 Hrs  T(C): 36.4 (02 Aug 2022 06:00), Max: 37.7 (01 Aug 2022 13:39)  T(F): 97.6 (02 Aug 2022 06:00), Max: 99.9 (01 Aug 2022 13:39)  HR: 89 (02 Aug 2022 06:00) (81 - 89)  BP: 133/64 (02 Aug 2022 06:00) (133/64 - 178/79)  BP(mean): --  RR: 17 (02 Aug 2022 06:00) (16 - 17)  SpO2: 94% (02 Aug 2022 06:00) (92% - 94%)    Parameters below as of 02 Aug 2022 06:00  Patient On (Oxygen Delivery Method): room air          ___________________________________________________  PHYSICAL EXAM    General: NAD  HEENT: V1 erythema/crusting stable, periorbital edema slightly increased, no lesions on nose or ear. No crepitus    ___________________________________________________  LABS                        12.3   12.92 )-----------( 326      ( 02 Aug 2022 06:30 )             38.4     02 Aug 2022 06:30    135    |  97     |  9      ----------------------------<  273    4.0     |  26     |  0.61     Ca    8.7        02 Aug 2022 06:30  Phos  2.6       02 Aug 2022 06:30  Mg     1.70      02 Aug 2022 06:30    TPro  6.1    /  Alb  2.9    /  TBili  0.2    /  DBili  x      /  AST  15     /  ALT  <5     /  AlkPhos  89     01 Aug 2022 15:37      CAPILLARY BLOOD GLUCOSE      POCT Blood Glucose.: 251 mg/dL (02 Aug 2022 07:22)  POCT Blood Glucose.: 234 mg/dL (01 Aug 2022 22:59)  POCT Blood Glucose.: 212 mg/dL (01 Aug 2022 16:58)  POCT Blood Glucose.: 191 mg/dL (01 Aug 2022 11:30)    ___________________________________________________  MICRO  Recent Cultures:  Specimen Source: .Blood Blood-Peripheral, 07-28 @ 16:00; Results   No growth to date.; Gram Stain: --; Organism: --  Specimen Source: .Blood Blood-Peripheral, 07-28 @ 15:00; Results   No growth to date.; Gram Stain: --; Organism: --  Specimen Source: .Blood Blood-Venous, 07-27 @ 19:00; Results   No Growth Final; Gram Stain: --; Organism: --  Specimen Source: .Blood Blood, 07-26 @ 10:55; Results   Growth in anaerobic bottle: Staphylococcus epidermidis  Growth in aerobic and anaerobic bottles: Staphylococcus hominis  Coag Negative Staphylococcus  Single set isolate, possible contaminant. Contact  Microbiology if susceptibility testing clinically  indicated.; Gram Stain:   Growth in anaerobic bottle: Gram Positive Cocci in Clusters  Growth in aerobic bottle: Gram Positive Cocci in Clusters; Organism: --  Specimen Source: .Blood Blood, 07-26 @ 10:45; Results   Growth in aerobic bottle: Staphylococcus hominis  Growth in aerobic and anaerobic bottles: Staphylococcus epidermidis  ***Blood Panel PCR results on this specimen are available  approximately 3 hours after the Gram stain result.***  Gram stain, PCR, and/or culture results may not always  correspond due to difference in methodologies.  ************************************************************  This PCR assay was performed by multiplex PCR. This  Assay tests for 66 bacterial and resistance gene targets.  Please refer to the [facility] Labs test directory  at [email or web address] for details.; Gram Stain:   Growth in aerobic bottle: Gram Positive Cocci in Clusters  Growth in anaerobic bottle: Gram Positive Cocci in Clusters; Organism: Blood Culture PCR  Staphylococcus hominis  Staphylococcus epidermidis    ___________________________________________________  MEDICATIONS  (STANDING):  acyclovir IVPB 650 milliGRAM(s) IV Intermittent every 8 hours  albuterol/ipratropium for Nebulization. 3 milliLiter(s) Nebulizer once  amLODIPine   Tablet 5 milliGRAM(s) Oral daily  artificial tears (preservative free) Ophthalmic Solution 1 Drop(s) Both EYES every 2 hours  aspirin  chewable 324 milliGRAM(s) Oral daily  budesonide  80 MICROgram(s)/formoterol 4.5 MICROgram(s) Inhaler 2 Puff(s) Inhalation two times a day  dextrose 5%. 1000 milliLiter(s) (50 mL/Hr) IV Continuous <Continuous>  dextrose 5%. 1000 milliLiter(s) (100 mL/Hr) IV Continuous <Continuous>  dextrose 50% Injectable 25 Gram(s) IV Push once  dextrose 50% Injectable 12.5 Gram(s) IV Push once  dextrose 50% Injectable 25 Gram(s) IV Push once  enoxaparin Injectable 40 milliGRAM(s) SubCutaneous every 24 hours  erythromycin   Ointment 1 Application(s) Right EYE four times a day  gabapentin 200 milliGRAM(s) Oral three times a day  glucagon  Injectable 1 milliGRAM(s) IntraMuscular once  insulin glargine Injectable (LANTUS) 10 Unit(s) SubCutaneous at bedtime  insulin lispro (ADMELOG) corrective regimen sliding scale   SubCutaneous Before meals and at bedtime  lactated ringers. 1000 milliLiter(s) (75 mL/Hr) IV Continuous <Continuous>  lactobacillus acidophilus 1 Tablet(s) Oral daily  lidocaine 5% Ointment 1 Application(s) Topical two times a day  losartan 50 milliGRAM(s) Oral daily  metoprolol succinate ER 12.5 milliGRAM(s) Oral daily  mirtazapine 15 milliGRAM(s) Oral at bedtime  mupirocin 2% Ointment 1 Application(s) Topical three times a day  nystatin Powder 1 Application(s) Topical two times a day  potassium chloride    Tablet ER 40 milliEquivalent(s) Oral once  tiotropium 18 MICROgram(s) Capsule 1 Capsule(s) Inhalation daily  traZODone 100 milliGRAM(s) Oral at bedtime  ursodiol Tablet 500 milliGRAM(s) Oral <User Schedule>  vancomycin  IVPB      vancomycin  IVPB 750 milliGRAM(s) IV Intermittent every 12 hours    MEDICATIONS  (PRN):  acetaminophen     Tablet .. 650 milliGRAM(s) Oral every 6 hours PRN Mild Pain (1 - 3), Moderate Pain (4 - 6), Severe Pain (7 - 10)  dextrose Oral Gel 15 Gram(s) Oral once PRN Blood Glucose LESS THAN 70 milliGRAM(s)/deciliter  HYDROmorphone  Injectable 0.5 milliGRAM(s) IV Push every 3 hours PRN Pain  hydrOXYzine hydrochloride 20 milliGRAM(s) Oral four times a day PRN Itching  QUEtiapine 25 milliGRAM(s) Oral daily PRN agitation

## 2022-08-02 NOTE — PROGRESS NOTE ADULT - PROBLEM SELECTOR PLAN 6
NPH diagnosed 2011, pt has programmable  shunt installed by [facility] neurosurg, **must be programmed after MRI (page neurosurg [id number])**. 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 A1c 7.2. FS today ~200. Home regimen lantus 34U daily, trulicity, glipizide.  - will continue lantus 10U qHS, SSI for now given active infection  - monitor premeal and bedtime FS

## 2022-08-02 NOTE — PROGRESS NOTE ADULT - PROBLEM SELECTOR PLAN 7
120py smoking hx, quit in 2011. Uses Trelegy at home. Exam unremarkable this AM however patient's daughter noted wheezing during the day.  - continue spiriva 18mcg QD  - 1x duonebs NPH diagnosed 2011, pt has programmable  shunt installed by [facility] neurosurg, **must be programmed after MRI (page neurosurg [id number])**. 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

## 2022-08-02 NOTE — PROGRESS NOTE ADULT - PROBLEM SELECTOR PLAN 4
Hx of rash in inguinal folds. Unable to recall which ointments were being used.  - Continue nystatin powder BID  - Appreciate derm recs: mupirocin ointment TID to perianal erosions  - CTM for worsening of rash SBP > 180 this admission, wnl today. Hx of severe HTN at home. Will CTM.  - continue amlodipine 5mg QD  - continue home losartan, metoprolol  - hold furosemide and spironolactone for now, avoiding acute BP drops No/HCP form given and explained

## 2022-08-02 NOTE — PROGRESS NOTE ADULT - SUBJECTIVE AND OBJECTIVE BOX
Internal Medicine   Glen Correa | PGY-1    OVERNIGHT EVENTS: No acute overnight events.    SUBJECTIVE:       MEDICATIONS  (STANDING):  acyclovir IVPB 650 milliGRAM(s) IV Intermittent every 8 hours  albuterol/ipratropium for Nebulization. 3 milliLiter(s) Nebulizer once  amLODIPine   Tablet 5 milliGRAM(s) Oral daily  artificial tears (preservative free) Ophthalmic Solution 1 Drop(s) Both EYES every 2 hours  aspirin  chewable 324 milliGRAM(s) Oral daily  budesonide  80 MICROgram(s)/formoterol 4.5 MICROgram(s) Inhaler 2 Puff(s) Inhalation two times a day  dextrose 5%. 1000 milliLiter(s) (100 mL/Hr) IV Continuous <Continuous>  dextrose 5%. 1000 milliLiter(s) (50 mL/Hr) IV Continuous <Continuous>  dextrose 50% Injectable 25 Gram(s) IV Push once  dextrose 50% Injectable 12.5 Gram(s) IV Push once  dextrose 50% Injectable 25 Gram(s) IV Push once  enoxaparin Injectable 40 milliGRAM(s) SubCutaneous every 24 hours  erythromycin   Ointment 1 Application(s) Right EYE four times a day  gabapentin 100 milliGRAM(s) Oral three times a day  glucagon  Injectable 1 milliGRAM(s) IntraMuscular once  insulin glargine Injectable (LANTUS) 10 Unit(s) SubCutaneous at bedtime  insulin lispro (ADMELOG) corrective regimen sliding scale   SubCutaneous Before meals and at bedtime  lactated ringers. 1000 milliLiter(s) (75 mL/Hr) IV Continuous <Continuous>  lactobacillus acidophilus 1 Tablet(s) Oral daily  lidocaine 5% Ointment 1 Application(s) Topical two times a day  losartan 50 milliGRAM(s) Oral daily  metoprolol succinate ER 12.5 milliGRAM(s) Oral daily  mirtazapine 15 milliGRAM(s) Oral at bedtime  mupirocin 2% Ointment 1 Application(s) Topical three times a day  nystatin Powder 1 Application(s) Topical two times a day  potassium chloride    Tablet ER 40 milliEquivalent(s) Oral once  tiotropium 18 MICROgram(s) Capsule 1 Capsule(s) Inhalation daily  traZODone 100 milliGRAM(s) Oral at bedtime  ursodiol Tablet 500 milliGRAM(s) Oral <User Schedule>  vancomycin  IVPB 750 milliGRAM(s) IV Intermittent every 12 hours  vancomycin  IVPB        MEDICATIONS  (PRN):  acetaminophen     Tablet .. 650 milliGRAM(s) Oral every 6 hours PRN Mild Pain (1 - 3), Moderate Pain (4 - 6), Severe Pain (7 - 10)  dextrose Oral Gel 15 Gram(s) Oral once PRN Blood Glucose LESS THAN 70 milliGRAM(s)/deciliter  HYDROmorphone  Injectable 0.5 milliGRAM(s) IV Push every 3 hours PRN Pain  hydrOXYzine hydrochloride 20 milliGRAM(s) Oral four times a day PRN Itching  QUEtiapine 25 milliGRAM(s) Oral daily PRN agitation    VITALS:  T(F): 97.6 (08-02-22 @ 06:00), Max: 99.9 (08-01-22 @ 13:39)  HR: 89 (08-02-22 @ 06:00) (81 - 89)  BP: 133/64 (08-02-22 @ 06:00) (133/64 - 178/79)  BP(mean): --  RR: 17 (08-02-22 @ 06:00) (16 - 17)  SpO2: 94% (08-02-22 @ 06:00) (92% - 94%)    PHYSICAL EXAM:   GENERAL: NAD, lying in bed comfortably  HEAD:  Atraumatic, normocephalic  EYES: EOMI, conjunctiva and sclera clear, no nystagmus noted  ENT: Moist mucous membranes  CHEST/LUNG: Clear to auscultation bilaterally; no rales, rhonchi, wheezing, or rubs; unlabored respirations  HEART: Regular rate and rhythm; no murmurs, rubs, or gallops, normal S1/S2  ABDOMEN: Normal bowel sounds; soft, nontender, nondistended, no organomegaly   EXTREMITIES:  No. clubbing, cyanosis, or edema  MSK: No gross deformities noted   Neurological: No focal deficits   SKIN: No rashes or lesions  PSYCH: Normal mood, affect     LABS:                      12.3   12.92 )-----------( 326      ( 02 Aug 2022 06:30 )             38.4     08-01  136  |  100  |  9   ----------------------------<  222<H>  3.4<L>   |  26  |  0.59    Ca    8.4      01 Aug 2022 15:37  Phos  2.8     08-01  Mg     1.60     08-01    TPro  6.1  /  Alb  2.9<L>  /  TBili  0.2  /  DBili  x   /  AST  15  /  ALT  <5<L>  /  AlkPhos  89  08-01    Creatinine Trend: 0.59<--, 0.53<--, 0.52<--, 0.55<--, 0.61<--, 0.64<-- Internal Medicine   Glen Correa | PGY-1    OVERNIGHT EVENTS: No acute overnight events.    SUBJECTIVE:   Today appears agitated. Yelling "get out" to daughter and not answering questions. Daughter notes increasing erythema spreading down R neck and upper chest. Pain and itching not significantly changed. Has not gotten lidocaine ointment yet. Daughter in communication with ID and neurosurgery attendings.    MEDICATIONS  (STANDING):  acyclovir IVPB 650 milliGRAM(s) IV Intermittent every 8 hours  albuterol/ipratropium for Nebulization. 3 milliLiter(s) Nebulizer once  amLODIPine   Tablet 5 milliGRAM(s) Oral daily  artificial tears (preservative free) Ophthalmic Solution 1 Drop(s) Both EYES every 2 hours  aspirin  chewable 324 milliGRAM(s) Oral daily  budesonide  80 MICROgram(s)/formoterol 4.5 MICROgram(s) Inhaler 2 Puff(s) Inhalation two times a day  dextrose 5%. 1000 milliLiter(s) (100 mL/Hr) IV Continuous <Continuous>  dextrose 5%. 1000 milliLiter(s) (50 mL/Hr) IV Continuous <Continuous>  dextrose 50% Injectable 25 Gram(s) IV Push once  dextrose 50% Injectable 12.5 Gram(s) IV Push once  dextrose 50% Injectable 25 Gram(s) IV Push once  enoxaparin Injectable 40 milliGRAM(s) SubCutaneous every 24 hours  erythromycin   Ointment 1 Application(s) Right EYE four times a day  gabapentin 100 milliGRAM(s) Oral three times a day  glucagon  Injectable 1 milliGRAM(s) IntraMuscular once  insulin glargine Injectable (LANTUS) 10 Unit(s) SubCutaneous at bedtime  insulin lispro (ADMELOG) corrective regimen sliding scale   SubCutaneous Before meals and at bedtime  lactated ringers. 1000 milliLiter(s) (75 mL/Hr) IV Continuous <Continuous>  lactobacillus acidophilus 1 Tablet(s) Oral daily  lidocaine 5% Ointment 1 Application(s) Topical two times a day  losartan 50 milliGRAM(s) Oral daily  metoprolol succinate ER 12.5 milliGRAM(s) Oral daily  mirtazapine 15 milliGRAM(s) Oral at bedtime  mupirocin 2% Ointment 1 Application(s) Topical three times a day  nystatin Powder 1 Application(s) Topical two times a day  potassium chloride    Tablet ER 40 milliEquivalent(s) Oral once  tiotropium 18 MICROgram(s) Capsule 1 Capsule(s) Inhalation daily  traZODone 100 milliGRAM(s) Oral at bedtime  ursodiol Tablet 500 milliGRAM(s) Oral <User Schedule>  vancomycin  IVPB 750 milliGRAM(s) IV Intermittent every 12 hours  vancomycin  IVPB        MEDICATIONS  (PRN):  acetaminophen     Tablet .. 650 milliGRAM(s) Oral every 6 hours PRN Mild Pain (1 - 3), Moderate Pain (4 - 6), Severe Pain (7 - 10)  dextrose Oral Gel 15 Gram(s) Oral once PRN Blood Glucose LESS THAN 70 milliGRAM(s)/deciliter  HYDROmorphone  Injectable 0.5 milliGRAM(s) IV Push every 3 hours PRN Pain  hydrOXYzine hydrochloride 20 milliGRAM(s) Oral four times a day PRN Itching  QUEtiapine 25 milliGRAM(s) Oral daily PRN agitation    VITALS:  T(F): 97.6 (08-02-22 @ 06:00), Max: 99.9 (08-01-22 @ 13:39)  HR: 89 (08-02-22 @ 06:00) (81 - 89)  BP: 133/64 (08-02-22 @ 06:00) (133/64 - 178/79)  BP(mean): --  RR: 17 (08-02-22 @ 06:00) (16 - 17)  SpO2: 94% (08-02-22 @ 06:00) (92% - 94%)    PHYSICAL EXAM:   GENERAL: appears in distress, withdrawing to stimuli and grabs/pushes away when examination attempted  HEAD: Atraumatic, normocephalic  EYES: Unable to assess R eye due to periorbital swelling and patient discomfort  CHEST/LUNG: Clear to auscultation bilaterally; no rales, rhonchi, wheezing, or rubs; unlabored respirations  HEART: Regular rate and rhythm; no murmurs, rubs, or gallops, normal S1/S2  ABDOMEN: Normal bowel sounds; soft, nontender, nondistended, no organomegaly   EXTREMITIES:  No. clubbing, cyanosis, or edema  MSK: No gross deformities noted   Neurological: No focal deficits   SKIN: Dark brown scabbing/crusting in V1 dermatome markedly more confluent compared to yesterday with background erythema. No open wounds/drainage/bleeding appreciated. Erythema tracking down along R neck and upper chest appreciated,   PSYCH: Normal mood, affect     LABS:                      12.3   12.92 )-----------( 326      ( 02 Aug 2022 06:30 )             38.4     08-01  136  |  100  |  9   ----------------------------<  222<H>  3.4<L>   |  26  |  0.59    Ca    8.4      01 Aug 2022 15:37  Phos  2.8     08-01  Mg     1.60     08-01    TPro  6.1  /  Alb  2.9<L>  /  TBili  0.2  /  DBili  x   /  AST  15  /  ALT  <5<L>  /  AlkPhos  89  08-01    Creatinine Trend: 0.59<--, 0.53<--, 0.52<--, 0.55<--, 0.61<--, 0.64<-- Internal Medicine   Glen Correa | PGY-1    OVERNIGHT EVENTS: No acute overnight events.    SUBJECTIVE:   Today appears agitated. Yelling "get out" to daughter and not answering questions. Daughter notes increasing erythema spreading down R neck and upper chest. Pain and itching not significantly changed. Has not gotten lidocaine ointment yet. Daughter in communication with ID and neurosurgery attendings.    MEDICATIONS  (STANDING):  acyclovir IVPB 650 milliGRAM(s) IV Intermittent every 8 hours  albuterol/ipratropium for Nebulization. 3 milliLiter(s) Nebulizer once  amLODIPine   Tablet 5 milliGRAM(s) Oral daily  artificial tears (preservative free) Ophthalmic Solution 1 Drop(s) Both EYES every 2 hours  aspirin  chewable 324 milliGRAM(s) Oral daily  budesonide  80 MICROgram(s)/formoterol 4.5 MICROgram(s) Inhaler 2 Puff(s) Inhalation two times a day  dextrose 5%. 1000 milliLiter(s) (100 mL/Hr) IV Continuous <Continuous>  dextrose 5%. 1000 milliLiter(s) (50 mL/Hr) IV Continuous <Continuous>  dextrose 50% Injectable 25 Gram(s) IV Push once  dextrose 50% Injectable 12.5 Gram(s) IV Push once  dextrose 50% Injectable 25 Gram(s) IV Push once  enoxaparin Injectable 40 milliGRAM(s) SubCutaneous every 24 hours  erythromycin   Ointment 1 Application(s) Right EYE four times a day  gabapentin 100 milliGRAM(s) Oral three times a day  glucagon  Injectable 1 milliGRAM(s) IntraMuscular once  insulin glargine Injectable (LANTUS) 10 Unit(s) SubCutaneous at bedtime  insulin lispro (ADMELOG) corrective regimen sliding scale   SubCutaneous Before meals and at bedtime  lactated ringers. 1000 milliLiter(s) (75 mL/Hr) IV Continuous <Continuous>  lactobacillus acidophilus 1 Tablet(s) Oral daily  lidocaine 5% Ointment 1 Application(s) Topical two times a day  losartan 50 milliGRAM(s) Oral daily  metoprolol succinate ER 12.5 milliGRAM(s) Oral daily  mirtazapine 15 milliGRAM(s) Oral at bedtime  mupirocin 2% Ointment 1 Application(s) Topical three times a day  nystatin Powder 1 Application(s) Topical two times a day  potassium chloride    Tablet ER 40 milliEquivalent(s) Oral once  tiotropium 18 MICROgram(s) Capsule 1 Capsule(s) Inhalation daily  traZODone 100 milliGRAM(s) Oral at bedtime  ursodiol Tablet 500 milliGRAM(s) Oral <User Schedule>  vancomycin  IVPB 750 milliGRAM(s) IV Intermittent every 12 hours  vancomycin  IVPB        MEDICATIONS  (PRN):  acetaminophen     Tablet .. 650 milliGRAM(s) Oral every 6 hours PRN Mild Pain (1 - 3), Moderate Pain (4 - 6), Severe Pain (7 - 10)  dextrose Oral Gel 15 Gram(s) Oral once PRN Blood Glucose LESS THAN 70 milliGRAM(s)/deciliter  HYDROmorphone  Injectable 0.5 milliGRAM(s) IV Push every 3 hours PRN Pain  hydrOXYzine hydrochloride 20 milliGRAM(s) Oral four times a day PRN Itching  QUEtiapine 25 milliGRAM(s) Oral daily PRN agitation    VITALS:  T(F): 97.6 (08-02-22 @ 06:00), Max: 99.9 (08-01-22 @ 13:39)  HR: 89 (08-02-22 @ 06:00) (81 - 89)  BP: 133/64 (08-02-22 @ 06:00) (133/64 - 178/79)  BP(mean): --  RR: 17 (08-02-22 @ 06:00) (16 - 17)  SpO2: 94% (08-02-22 @ 06:00) (92% - 94%)    PHYSICAL EXAM:   GENERAL: appears in distress, withdrawing to stimuli and grabs/pushes away when examination attempted  HEAD: Atraumatic, normocephalic  EYES: Unable to assess R eye due to periorbital swelling and patient discomfort  CHEST/LUNG: normal work of breathing on RA, CTAB however not well assessed due to patient agitation  HEART: Regular rate and rhythm; no murmurs, rubs, or gallops, normal S1/S2  ABDOMEN: Normal bowel sounds; soft, nontender, nondistended   EXTREMITIES: No clubbing, cyanosis, or edema  MSK: No gross deformities noted   Neurological: No focal deficits  SKIN: Dark brown scabbing/crusting in V1 dermatome markedly more confluent compared to yesterday. Background erythema persists. No open wounds/drainage/bleeding appreciated. Erythema tracking down along R neck and upper chest appreciated, appears to be nonpainful and not markedly edematous.  PSYCH: Normal mood, affect     LABS:              12.3   12.92 )-----------( 326      ( 02 Aug 2022 06:30 )             38.4     08-02  135  |  97<L>  |  9   ----------------------------<  273<H>  4.0   |  26  |  0.61    Ca    8.7      02 Aug 2022 06:30  Phos  2.6     08-02  Mg     1.70     08-02    TPro  6.1  /  Alb  2.9<L>  /  TBili  0.2  /  DBili  x   /  AST  15  /  ALT  <5<L>  /  AlkPhos  89  08-01    LIVER FUNCTIONS - ( 01 Aug 2022 15:37 )  Alb: 2.9 g/dL / Pro: 6.1 g/dL / ALK PHOS: 89 U/L / ALT: <5 U/L / AST: 15 U/L / GGT: x    CAPILLARY BLOOD GLUCOSE  POCT Blood Glucose.: 244 mg/dL (02 Aug 2022 13:19)  POCT Blood Glucose.: 251 mg/dL (02 Aug 2022 07:22)    IMAGING    CT Head & Neck  Extensive right periorbital preseptal soft tissue swelling is again noted   concordant with the history of shingles over the right eye. A   superimposed cellulitis can't be excluded. Asymmetric enlargement of the   right-sided extraocular muscles appears similar compared to the   07/27/2022 orbital MRI study. There is no evidence for orbital abscess.  The superior ophthalmic veins remain patent. The cavernous sinuses appear   symmetric.  Diffuse nonspecific skin and subcutaneous soft tissue   swelling-infiltration involves the scalp, right lateral neck soft   tissues, and anterior neck soft tissues, extending to the upper right   thoracic region, most likely reflecting a cellulitis given the clinical   history, without evidence for abscess formation at this time.    VA Duplex Carotids  Moderate to severe heterogenous plaque noted within the  proximal right internal carotid artery, consistent with a  50-79% stenosis (upper end of range based on grayscale  imaging).  Moderate to severe heterogenous plaque is noted throughout  the left common carotid artery.  Mild heterogenous plaque  is noted within the proximal left internal carotid artery,  consistent with a 16-49% stenosis. No hemodynamically  significant stenosis.

## 2022-08-02 NOTE — PROGRESS NOTE ADULT - ASSESSMENT
Herpes zoster ophthalmicus with crusted lesions of the skin likely contributing to significant pruritus, and surrounding edema with skip areas; though difficult to determine whether this truly represents cellulitis, skip areas between erythematous, edematous plaques may be more representative of edema associated with the HZV infection    At this time  - Triamcinolone 0.1% ointment BID to red areas of the skin (avoid the crusted ulcerations)  - Gabapentin currently ordered for 300 TID – would recommend slower up-titration of the gabapentin. Would continue at 300 mg TOTAL tor today, and up-titrate to 200 TID starting tomorrow    - Continue with liberal moisturization to crusted areas to prevent pruritus    The patient's chart was reviewed in addition to being seen and examined at bedside with the dermatology attending Dr. Palacios Recommendations were communicated with the primary team.  Please page [phone] w/10 digit call back number for further related questions.    Lauren Peralta MD  Resident Physician, PGY3  [facility]at[facility]  Pager: [phone]  Office: [phone]   Herpes zoster ophthalmicus with crusted lesions of the skin likely contributing to significant pruritus, and surrounding edema with skip areas; though difficult to determine whether this truly represents cellulitis, skip areas between erythematous, edematous plaques may be more representative of edema associated with the HZV infection    At this time  - may consider Triamcinolone 0.1% ointment BID to red areas of the skin (avoid the crusted ulcerations)  - Gabapentin currently ordered for 300 TID – would recommend slower up-titration of the gabapentin. Would continue at 300 mg TOTAL tor today, and up-titrate to 200 mg TID starting tomorrow      The patient's chart was reviewed in addition to being seen and examined at bedside with the dermatology attending Dr. Palacios Recommendations were communicated with the primary team.  Please page [phone] w/10 digit call back number for further related questions.    Lauren Peralta MD  Resident Physician, PGY3  [facility] Dermatology  Pager: [phone]  Office: [phone]

## 2022-08-02 NOTE — CHART NOTE - NSCHARTNOTEFT_GEN_A_CORE
Midline evaluated at bedside. Flushed without resistance but could not aspirate. Blood cultures drawn 8/1 - if negative for 48 hours can consider conversion to PICC line for blood draws if needed. D/w team.     [id number]

## 2022-08-02 NOTE — PROGRESS NOTE ADULT - ASSESSMENT
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 Resistant bacteria?  - Vanco 750mg q 12 (monitor levels)  - Add Meropenem 1g q 8  - Monitor facial swelling/redness for improvement  - F/U BCXs    Jack Henderson MD  Contact on TEAMS messaging from 9am - 5pm  From 5pm-9am, on weekends, or if no response call [phone]

## 2022-08-02 NOTE — PROGRESS NOTE ADULT - ATTENDING COMMENTS
Zoster ophthalmicus. On exam, I noted a crusted coalesced ulcer with scalloped borders on the forehead and surrounding erythema/edema primarily extending inferiorly. The increased redness/edema may be related to the exuberant inflammatory response that is seen in association with severe zoster infections. This may not represent worsened infection. Surrounding erythema/edema will often develop and may worsen for several days and up to ~ 1 week before it begins to improve. Edema will often track inferiorly due to gravity. Forehead lesions tend to cause significant eyelid and cheek swelling as is noted in this patient. The rash also has skip areas (areas of normal skin between areas of erythema) which would be unusual for worsening cellulitis which tends to be a contiguous problem. Absence of fever is noted but there was a new leukocytosis today.     For now, I would continue with abx as per ID and continue IV acyclovir as there are concerns for potential CNS involvement and possible CNIII involvement as per ophthalmology. I am not sure additional intervention is needed for the increased edema/erythema as this may be an expected change. We may see a further inferior extension of both erythema and edema tomorrow. Will monitor on broadened antimicrobial coverage with addition of meropenem today.     I would also recommend adjustment of the gabapentin. The medication may cause significant sedation so this may need to be carefully done in the setting of AMS. For now, I would stay on 300 mg per day. May consider increasing to 200 mg TID tomorrow although I would likely hold off on increasing the dose if the mental status does not improve.     Edmond Palacios MD, PharmD, MPH  Co-Director, Inpatient Dermatology Consultation Service, [facility]

## 2022-08-02 NOTE — PROGRESS NOTE ADULT - SUBJECTIVE AND OBJECTIVE BOX
[facility] DEPARTMENT OF OPHTHALMOLOGY  ------------------------------------------------------------------------------    Interval History: Following for R sided HZO. Pt appears to have worsened from yesterday. Worsened mental status, continues to scratch at skin lesions. Unable to participate with exam.     MEDICATIONS  (STANDING):  acyclovir IVPB 650 milliGRAM(s) IV Intermittent every 8 hours  albuterol/ipratropium for Nebulization. 3 milliLiter(s) Nebulizer once  amLODIPine   Tablet 5 milliGRAM(s) Oral daily  artificial tears (preservative free) Ophthalmic Solution 1 Drop(s) Both EYES every 2 hours  aspirin  chewable 324 milliGRAM(s) Oral daily  budesonide  80 MICROgram(s)/formoterol 4.5 MICROgram(s) Inhaler 2 Puff(s) Inhalation two times a day  dextrose 5%. 1000 milliLiter(s) (100 mL/Hr) IV Continuous <Continuous>  dextrose 5%. 1000 milliLiter(s) (50 mL/Hr) IV Continuous <Continuous>  dextrose 50% Injectable 25 Gram(s) IV Push once  dextrose 50% Injectable 12.5 Gram(s) IV Push once  dextrose 50% Injectable 25 Gram(s) IV Push once  enoxaparin Injectable 40 milliGRAM(s) SubCutaneous every 24 hours  erythromycin   Ointment 1 Application(s) Right EYE four times a day  gabapentin 300 milliGRAM(s) Oral three times a day  glucagon  Injectable 1 milliGRAM(s) IntraMuscular once  insulin glargine Injectable (LANTUS) 10 Unit(s) SubCutaneous at bedtime  insulin lispro (ADMELOG) corrective regimen sliding scale   SubCutaneous Before meals and at bedtime  lactated ringers. 1000 milliLiter(s) (75 mL/Hr) IV Continuous <Continuous>  lactobacillus acidophilus 1 Tablet(s) Oral daily  lidocaine 5% Ointment 1 Application(s) Topical two times a day  losartan 50 milliGRAM(s) Oral daily  meropenem  IVPB 1000 milliGRAM(s) IV Intermittent every 8 hours  metoprolol succinate ER 12.5 milliGRAM(s) Oral daily  mirtazapine 15 milliGRAM(s) Oral at bedtime  mupirocin 2% Ointment 1 Application(s) Topical three times a day  nystatin Powder 1 Application(s) Topical two times a day  potassium chloride    Tablet ER 40 milliEquivalent(s) Oral once  tiotropium 18 MICROgram(s) Capsule 1 Capsule(s) Inhalation daily  traZODone 100 milliGRAM(s) Oral at bedtime  triamcinolone 0.1% Ointment 1 Application(s) Topical two times a day  ursodiol Tablet 500 milliGRAM(s) Oral <User Schedule>  vancomycin  IVPB 750 milliGRAM(s) IV Intermittent every 12 hours  vancomycin  IVPB        MEDICATIONS  (PRN):  acetaminophen     Tablet .. 650 milliGRAM(s) Oral every 6 hours PRN Mild Pain (1 - 3), Moderate Pain (4 - 6), Severe Pain (7 - 10)  dextrose Oral Gel 15 Gram(s) Oral once PRN Blood Glucose LESS THAN 70 milliGRAM(s)/deciliter  HYDROmorphone  Injectable 0.5 milliGRAM(s) IV Push every 3 hours PRN Pain  hydrOXYzine hydrochloride 20 milliGRAM(s) Oral four times a day PRN Itching  QUEtiapine 25 milliGRAM(s) Oral daily PRN agitation      VITALS: T(C): 36.9 (08-02-22 @ 17:18)  T(F): 98.5 (08-02-22 @ 17:18), Max: 98.5 (08-02-22 @ 17:18)  HR: 99 (08-02-22 @ 17:18) (75 - 99)  BP: 112/54 (08-02-22 @ 17:18) (110/51 - 155/68)  RR:  (16 - 18)  SpO2:  (92% - 94%)  Wt(kg): --  General: AAO x 0, unable to participate with exam.     Ophthalmology Exam:  Visual acuity (sc): MYRON 2/2 mental status.   Pupils: 4mm, nonreactive to light OD, 4mm -> 2mm OS  Ttono: MYRON 2/2 mental status.   Extraocular movements (EOMs): MYRON 2/2 mental status.   Confrontational Visual Field (CVF): MYRON 2/2 mental status.     Pen Light Exam (PLE)  Pen Light Exam (PLE) - pt unable to tolerate sitting up to slit lamp.  External: R sided forehead edema and erythema, with overlying dark brown crusting, now extending to right lid margin OD, + R preauricular edema and erythema, wnl on L side  Lids/Lashes/Lacrimal Ducts: Trace periorbital edema RUL and RLL, flat OS   Sclera/Conjunctiva: 1-2+ injection OD, MYRON OS 2/2 cooperation  Cornea: 2+ SPK, no pseudodendrites OD,  MYRON OS 2/2 cooperation  Anterior Chamber: D+F OD, MYRON OS 2/2 cooperation  Iris: Flat OD, MYRON OS 2/2 cooperation  Lens: PCIOL OD, MYRON OS 2/2 cooperation    IMAGING:      ACC: [id number] EXAM:  CT NECK SOFT TISSUE IC                          PROCEDURE DATE:  08/02/2022      IMPRESSION:    Extensive right periorbital preseptal soft tissue swelling is again noted   concordant with the history of shingles over the right eye. A   superimposed cellulitis can't be excluded. Asymmetric enlargement of the   right-sided extraocular muscles appears similar compared to the   07/27/2022 orbital MRI study. There is no evidence for orbital abscess.  The superior ophthalmic veins remain patent. The cavernous sinuses appear   symmetric.    Diffuse nonspecific skin and subcutaneous soft tissue   swelling-infiltration involves the scalp, right lateral neck soft   tissues, and anterior neck soft tissues, extending to the upper right   thoracic region, most likely reflecting a cellulitis given the clinical   history, without evidence for abscess formation at this time.

## 2022-08-02 NOTE — PROGRESS NOTE ADULT - SUBJECTIVE AND OBJECTIVE BOX
CC: F/U for Wound infection    Saw/spoke to patient. Worsening mental status. Redness at face generally improved.    Allergies  diltiazem (Other; Rash)    ANTIMICROBIALS:  acyclovir IVPB 650 every 8 hours  meropenem  IVPB 1000 every 8 hours  vancomycin  IVPB 750 every 12 hours  vancomycin  IVPB      PE:    Vital Signs Last 24 Hrs  T(C): 36.7 (02 Aug 2022 13:30), Max: 37.1 (01 Aug 2022 18:33)  T(F): 98.1 (02 Aug 2022 13:30), Max: 98.8 (01 Aug 2022 18:33)  HR: 91 (02 Aug 2022 13:30) (75 - 91)  BP: 110/51 (02 Aug 2022 13:30) (110/51 - 155/68)  RR: 18 (02 Aug 2022 13:30) (16 - 18)  SpO2: 94% (02 Aug 2022 13:30) (92% - 94%)    Gen: AOx0-1, confused  Skin: Plaque line confluence on head at prior shingles distribution; redness/swelling around site generally improved  CV: Nontachycardic  Resp: Breathing comfortably, RA  Abd: Soft, nontender  IV/Skin: No thrombophlebitis    LABS:                        12.3   12.92 )-----------( 326      ( 02 Aug 2022 06:30 )             38.4     08-02    135  |  97<L>  |  9   ----------------------------<  273<H>  4.0   |  26  |  0.61    Ca    8.7      02 Aug 2022 06:30  Phos  2.6     08-02  Mg     1.70     08-02    TPro  6.1  /  Alb  2.9<L>  /  TBili  0.2  /  DBili  x   /  AST  15  /  ALT  <5<L>  /  AlkPhos  89  08-01    MICROBIOLOGY:    .Blood Blood-Peripheral  07-28-22   No growth to date.  --  --    .Blood Blood-Peripheral  07-28-22   No growth to date.  --  --    .Blood Blood-Venous  07-27-22   No Growth Final  --  --    .Blood Blood  07-26-22   Growth in anaerobic bottle: Staphylococcus epidermidis  Growth in aerobic and anaerobic bottles: Staphylococcus hominis  Coag Negative Staphylococcus  Single set isolate, possible contaminant. Contact  Microbiology if susceptibility testing clinically  indicated.  --    Growth in anaerobic bottle: Gram Positive Cocci in Clusters  Growth in aerobic bottle: Gram Positive Cocci in Clusters    .Blood Blood  07-26-22   Growth in aerobic bottle: Staphylococcus hominis  Growth in aerobic and anaerobic bottles: Staphylococcus epidermidis  ***Blood Panel PCR results on this specimen are available  approximately 3 hours after the Gram stain result.***  Gram stain, PCR, and/or culture results may not always  correspond due to difference in methodologies.  ************************************************************  This PCR assay was performed by multiplex PCR. This  Assay tests for 66 bacterial and resistance gene targets.  Please refer to the [facility] test directory  at [email or web address] for details.  --  Blood Culture PCR  Staphylococcus hominis  Staphylococcus epidermidis    RADIOLOGY:    8/2 CT:    IMPRESSION:    Extensive right periorbital preseptal soft tissue swelling is again noted   concordant with the history of shingles over the right eye. A   superimposed cellulitis can't be excluded. Asymmetric enlargement of the   right-sided extraocular muscles appears similar compared to the   07/27/2022 orbital MRI study. There is no evidence for orbital abscess.  The superior ophthalmic veins remain patent. The cavernous sinuses appear   symmetric.    Diffuse nonspecific skin and subcutaneous soft tissue   swelling-infiltration involves the scalp, right lateral neck soft   tissues, and anterior neck soft tissues, extending to the upper right   thoracic region, most likely reflecting a cellulitis given the clinical   history, without evidence for abscess formation at this time.

## 2022-08-02 NOTE — PROGRESS NOTE ADULT - PROBLEM SELECTOR PLAN 9
DVT ppx: subQ lovenox  Diet: soft and bite sized (S&S recd minced and moist however daughter accepts risks)    Dispo:  - pt has difficult vessels, has midline in place, per IR can place IR PICC line order on 8/4 (after 48h negative cultures) if blood draws needed  - PT/OT recommending rehab, will f/u with

## 2022-08-02 NOTE — PROGRESS NOTE ADULT - ATTENDING COMMENTS
I have interviewed and examined the patient and reviewed the residents note including the history, exam, assessment, and plan.  I agree with the residents assessment and plan.    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     1. HZO OD with new worsening of R sided facial rash  - Pt has been scratching at R sided facial rash with nails - concern for worsening bacterial superinfection with crusting over area of edema and erythema. Current crusting appears slightly worsened compared to yesterday, now extending to lid margin OD    - MYRON vision today as pt's mental status has significantly worsened  - CT maxillofacial showing extensive R periorbital preseptal soft tissue swelling and asymmetric enlargement of R sided extraocular muscles. No evidence of orbital cellulitis. Evidence of extension to neck.  - No evidence of nec fasc  - Appreciate derm and plastic surgery consults.    - Abx per ID  - Pt unable to tolerate sitting up at slit lamp - cannot assess anterior chamber for inflammatory reaction  - Have stopped brimonidine due to IOP of 6.   - C/w erythromycin ointment QID to R eye and periocular lesions  - C/w preservative-free artificial tears to Q2H OU   - Pt has dilated and minimally reactive R pupil. 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   - Pt has had abduction deficit OD for past week, 2/2 myositis seen on MRI orbits. Stable on CT today.   - Will continue to monitor for improvement of EOMs on antivirals.     2. 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

## 2022-08-02 NOTE — PROGRESS NOTE ADULT - ASSESSMENT
86F w/ hx CAD s/p stents (1986, 1996), COPD, NPH s/p  shunt, DM, migraines, and HTN presenting with herpes zoster ophthalmicus/encephalitis affecting the right V1 dermatome, now with acute worsening of symptoms today. 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

## 2022-08-02 NOTE — PROGRESS NOTE ADULT - PROBLEM SELECTOR PLAN 2
SBP > 180 this AM. Hx of severe HTN at home, SBPs noted to be >180 in ED. Will CTM.  - increase amlodipine to 5mg QD  - continue home losartan, metoprolol  - hold furosemide and spironolactone for now, avoiding acute BP drops Per pt's daughter had up to seroquel 100mg for agitation in prior admissions. S/p 1x seroquel 25mg on 8/2 for agitation. Will reassess agitation as infectious symptoms improve.  - continue seroquel 25mg QD PRN

## 2022-08-02 NOTE — PROGRESS NOTE ADULT - PROBLEM SELECTOR PLAN 1
Prominent rash in R V1 dermatome, appears to be worsening compared to today. Has significant pruritis and patient has been scratching affected area. Per ophtho team affected area appears notably worse with possible concern for necrotizing fasciitis. 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  - CT maxillofacial w/ and w/o contrast to r/o necrotizing fasciitis  - Plastics consulted for c/f nec fasc, appreciate evaluation and will f/u recs.  - Sx management:    - Increase hydroxyzine to 20mg QID PRN for itching    - Continue gabapentin 100mg TID for neuropathic pain, will uptitrate daily    - IV dilaudid 0.5mg Q3H PRN, PO acetaminophen 650mg Q6H PRN    - Lidocaine ointment as below  - ID following, appreciate recs:    - acyclovir 650mg Q8H    - IV hydration while on acyclovir; LR 75cc/hr    - vanc 750mg BID, monitor levels; d/c cefazolin    - repeat BCx x2 today    - no indication for LP at this time  - Ophthalmology following, appreciate recs:    - Brimonidine drops TID, erythromycin ointment QID to eye and periocular lesions, artificial tears Q4H  - Appreciate derm recs:    - lidocaine ointment mixed with vaseline BID    - apply vaseline to crusted areas Q2H Prominent rash in R V1 dermatome, appears to be worsening compared to today. Has significant pruritis and patient has been scratching affected area. CT imaging consistent with cellulitis extending from scalp to upper thorax with no evidence of abscess or necrotizing fasciitis. Per plastics no evidence of nec fasc, appreciate evaluation. 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  - Sx management:    - Continue hydroxyzine 20mg QID PRN for itching    - Increase gabapentin to 300mg TID for neuropathic pain, recommendations for daily titration discussed with daughter however she requests this dose starting today    - IV dilaudid 0.5mg Q3H PRN, PO acetaminophen 650mg Q6H PRN    - Lidocaine + triamcinolone ointment as below  - ID following, appreciate recs:    - acyclovir 650mg Q8H    - IV hydration while on acyclovir; LR 75cc/hr    - vanc 750mg BID, monitor levels; d/c cefazolin    - f/u 8/1 BCx x2, NGTD    - no indication for LP at this time  - Ophthalmology following, appreciate recs:    - Brimonidine drops TID, erythromycin ointment QID to eye and periocular lesions, artificial tears Q4H  - Appreciate derm recs:    - lidocaine ointment mixed with vaseline BID, triamcinolone ointment to red areas only BID    - apply vaseline to crusted areas Q2H

## 2022-08-02 NOTE — PROGRESS NOTE ADULT - PROBLEM SELECTOR PLAN 3
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 Patient does not have symptoms concerning for poor perfusion, will continue to monitor.

## 2022-08-03 NOTE — PROGRESS NOTE ADULT - PROBLEM SELECTOR PLAN 7
NPH diagnosed 2011, pt has programmable  shunt installed by [facility] neurosurg, **must be programmed after MRI (page neurosurg [id number])**. 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    Daughter agreeable for neurology consult but declines LP

## 2022-08-03 NOTE — CHART NOTE - NSCHARTNOTEFT_GEN_A_CORE
Patient was transferred from teams to [facility] prior to change of shift. RN contacted me due to concern for patient appearing lethargic. From nursing report patient was A&OX1 however only responsive to tactile stimuli now. Writer evaluated patient and responded to pain and opened eyes. She responded with tactile stimulation. Per daughter, Luna who also is an Oncologist at the hospital states her mother was like this all day from 5 AM so there is no change;would not like [facility]. Daughter requested that RN try giving medications again. Advised her that we would reattempt however if patient appeared lethargic would not be able to due to risk of aspiration. Daughter also expressed concern regarding patient's low urine output. Bladder scan ordered- 235. Advised RN to check in another 8 hours. Was started on gentle IV fluids 30 cc/hr. Daughter seemed concerned regarding patient developing CHARLINE. Was admitted with CHARLINE but since has resolved. Will advise day team of night events and to continue to monitor. Patient was transferred from teams to [facility] prior to change of shift. RN contacted me due to concern for patient appearing lethargic. From nursing report patient was A&OX1 however only responsive to tactile stimuli now. Writer evaluated patient and responded to pain and opened eyes. She responded with tactile stimulation. Per daughter, Luna who also is an Oncologist at the hospital states her mother was like this all day from 5 AM so there is no change;would not like [facility]. Daughter requested that RN try giving medications again. Advised her that we would reattempt however if patient appeared lethargic would not be able to due to risk of aspiration. Daughter also expressed concern regarding patient's low urine output. Bladder scan ordered- 235. Advised RN to check in another 8 hours. Patient already on gentle diuresis w/ LR. Daughter seemed concerned regarding patient developing CHARLINE. Was admitted with CHARLINE but since has resolved. Will advise day team of night events and to continue to monitor.

## 2022-08-03 NOTE — PROGRESS NOTE ADULT - SUBJECTIVE AND OBJECTIVE BOX
INTERVAL HPI/OVERNIGHT EVENTS:  - Patient's daughter feels patient is more interactive today  - On meropenem. Febrile to 102 this morning     MEDICATIONS  (STANDING):  acyclovir IVPB 650 milliGRAM(s) IV Intermittent every 8 hours  albuterol/ipratropium for Nebulization. 3 milliLiter(s) Nebulizer once  amLODIPine   Tablet 5 milliGRAM(s) Oral daily  artificial tears (preservative free) Ophthalmic Solution 1 Drop(s) Both EYES every 2 hours  aspirin  chewable 324 milliGRAM(s) Oral daily  budesonide  80 MICROgram(s)/formoterol 4.5 MICROgram(s) Inhaler 2 Puff(s) Inhalation two times a day  dextrose 5%. 1000 milliLiter(s) (100 mL/Hr) IV Continuous <Continuous>  dextrose 5%. 1000 milliLiter(s) (50 mL/Hr) IV Continuous <Continuous>  dextrose 50% Injectable 25 Gram(s) IV Push once  dextrose 50% Injectable 12.5 Gram(s) IV Push once  dextrose 50% Injectable 25 Gram(s) IV Push once  enoxaparin Injectable 40 milliGRAM(s) SubCutaneous every 24 hours  erythromycin   Ointment 1 Application(s) Right EYE four times a day  gabapentin 300 milliGRAM(s) Oral three times a day  glucagon  Injectable 1 milliGRAM(s) IntraMuscular once  insulin glargine Injectable (LANTUS) 10 Unit(s) SubCutaneous at bedtime  insulin lispro (ADMELOG) corrective regimen sliding scale   SubCutaneous Before meals and at bedtime  lactated ringers. 1000 milliLiter(s) (75 mL/Hr) IV Continuous <Continuous>  lactobacillus acidophilus 1 Tablet(s) Oral daily  lidocaine 5% Ointment 1 Application(s) Topical two times a day  losartan 50 milliGRAM(s) Oral daily  meropenem  IVPB 1000 milliGRAM(s) IV Intermittent every 8 hours  metoprolol succinate ER 12.5 milliGRAM(s) Oral daily  mirtazapine 15 milliGRAM(s) Oral at bedtime  mupirocin 2% Ointment 1 Application(s) Topical three times a day  nystatin Powder 1 Application(s) Topical two times a day  potassium chloride    Tablet ER 40 milliEquivalent(s) Oral once  tiotropium 18 MICROgram(s) Capsule 1 Capsule(s) Inhalation daily  traZODone 100 milliGRAM(s) Oral at bedtime  triamcinolone 0.1% Ointment 1 Application(s) Topical two times a day  ursodiol Tablet 500 milliGRAM(s) Oral <User Schedule>  vancomycin  IVPB 750 milliGRAM(s) IV Intermittent every 12 hours  vancomycin  IVPB        MEDICATIONS  (PRN):  acetaminophen     Tablet .. 650 milliGRAM(s) Oral every 6 hours PRN Mild Pain (1 - 3), Moderate Pain (4 - 6), Severe Pain (7 - 10)  dextrose Oral Gel 15 Gram(s) Oral once PRN Blood Glucose LESS THAN 70 milliGRAM(s)/deciliter  HYDROmorphone  Injectable 0.5 milliGRAM(s) IV Push every 3 hours PRN Pain  hydrOXYzine hydrochloride 20 milliGRAM(s) Oral four times a day PRN Itching  QUEtiapine 25 milliGRAM(s) Oral daily PRN agitation      Allergies    diltiazem (Other; Rash)    Intolerances        REVIEW OF SYSTEMS - unable to obtain from patient; per daughter, patient still uncomfortable, with pain and itch, localized to the site of the rash       Vital Signs Last 24 Hrs  T(C): 36.9 (02 Aug 2022 17:18), Max: 37.1 (01 Aug 2022 18:33)  T(F): 98.5 (02 Aug 2022 17:18), Max: 98.8 (01 Aug 2022 18:33)  HR: 99 (02 Aug 2022 17:18) (75 - 99)  BP: 112/54 (02 Aug 2022 17:18) (110/51 - 155/68)  BP(mean): --  RR: 18 (02 Aug 2022 17:18) (16 - 18)  SpO2: 92% (02 Aug 2022 17:18) (92% - 94%)    Parameters below as of 02 Aug 2022 17:18  Patient On (Oxygen Delivery Method): room air          PHYSICAL EXAM:    General: resting comfortably   HEENT: edema surrounding eyes, crusting   CV: No lower extremity edema, extremities warm and well perfused, +dorsalis pedis pulses  Resp: Non labored breathing, no clubbing of extremities  GI: Non distended abdomen, no palpable hepatosplenomegaly  Lymph: No visible lymphadenopathy  Neuro: resting comfortably   Skin: The scalp/hair, head/face, conjunctivae/lids, lips/teeth/gums, neck, digits/nails, right and left axilla, right and left upper and lower extremities, chest, abdomen, back, buttocks and groin area. No bromhidrosis or hyperhidrosis.    Of note on skin exam:  erosions with yellow-brown crusting, with surrounding edematous plaque of the upper forehead, improved from prior, with purpura of the eye; erythematous plaques of the mons pubis, labia majora, perianal skin with some erosions     LABS:                        12.3   12.92 )-----------( 326      ( 02 Aug 2022 06:30 )             38.4     08-02    135  |  97<L>  |  9   ----------------------------<  273<H>  4.0   |  26  |  0.61    Ca    8.7      02 Aug 2022 06:30  Phos  2.6     08-02  Mg     1.70     08-02    TPro  6.1  /  Alb  2.9<L>  /  TBili  0.2  /  DBili  x   /  AST  15  /  ALT  <5<L>  /  AlkPhos  89  08-01          RADIOLOGY & ADDITIONAL TESTS:    COVID neg 7/26  RVP neg 7/26  Hep B, Hep C neg 7/27  HIV neg    Blood cx 7/28 – ngtd x 2  Blood cx 7/27 – ngtd  Blood cx 7/26 – staph epidermidis, staph hominis – ID feels contaminants    IMAGING    Head US 8/2 : No focal collection or hyperemia identified along the right face or neck. Recommend correlation with the same day neck CT exam.    CT 8/2: Extensive right periorbital preseptal soft tissue swelling is again noted concordant with the history of shingles over the right eye. A  superimposed cellulitis can't be excluded. Asymmetric enlargement of the  right-sided extraocular muscles appears similar compared to the  07/27/2022 orbital MRI study. There is no evidence for orbital abscess.  The superior ophthalmic veins remain patent. The cavernous sinuses appear  symmetric.    Diffuse nonspecific skin and subcutaneous soft tissue  swelling-infiltration involves the scalp, right lateral neck soft  tissues, and anterior neck soft tissues, extending to the upper right  thoracic region, most likely reflecting a cellulitis given the clinical  history, without evidence for abscess formation at this time.      CT 7/26: The ventricular system is not dilated, and is markedly smaller in size  compared to the 2011 head CT study. Comparison with the more recent  outside head CT is recommended to evaluate for any interval change  Chronic left parietal infarct.  There is no gross CT evidence for superimposed acute vascular  distribution infarct. There is no evidence for acute intracranial  hemorrhage.  Extensive right periorbital preseptal soft tissue swelling is noted  concordant with the history of shingles over the right eye. A  superimposed cellulitis can't be excluded. No post septal extension is  appreciated.  Generalized diffuse frontal scalp soft tissue swelling is present which  may be related to the shingles infection, a superimposed cellulitis, or a  combination of both. The adjacent calvarium appears intact without  periosteal reaction or bony destruction.    If the patient has new and persistent unexplained symptoms, consider  follow-up brain MRI with and without contrast for further workup,  provided there are no MRI or contrast contraindications.    MR head and orbit 7/27: Evaluation the orbits is limited. However, asymmetric edema appears to involve the right orbital medial and lateral rectus muscles, as well as the superior oblique muscle, suspicious for a myositis (viral given the known herpes zoster with superimposed bacterial infection not excluded).  There is no evidence for orbital abscess. The cavernous sinuses and  superior ophthalmic veins remain patent. The right preseptal periorbital  soft tissue swelling appears improved compared to the CT suggesting  improving preseptal cellulitis/zoster. Serial imaging follow-up is  recommended to monitor for stability. No evidence for acute infarct, acute intracranial hemorrhage, or intracranial abscess.  Stable ventricular size.  Chronic left frontal parietal infarcts and chronic white matter changes  as described.     INTERVAL HPI/OVERNIGHT EVENTS:  - Patient's daughter feels patient is more interactive today  - On meropenem. Febrile to 102 this morning     MEDICATIONS  (STANDING):  acyclovir IVPB 650 milliGRAM(s) IV Intermittent every 8 hours  albuterol/ipratropium for Nebulization. 3 milliLiter(s) Nebulizer once  amLODIPine   Tablet 5 milliGRAM(s) Oral daily  artificial tears (preservative free) Ophthalmic Solution 1 Drop(s) Both EYES every 2 hours  aspirin  chewable 324 milliGRAM(s) Oral daily  budesonide  80 MICROgram(s)/formoterol 4.5 MICROgram(s) Inhaler 2 Puff(s) Inhalation two times a day  dextrose 5%. 1000 milliLiter(s) (100 mL/Hr) IV Continuous <Continuous>  dextrose 5%. 1000 milliLiter(s) (50 mL/Hr) IV Continuous <Continuous>  dextrose 50% Injectable 25 Gram(s) IV Push once  dextrose 50% Injectable 12.5 Gram(s) IV Push once  dextrose 50% Injectable 25 Gram(s) IV Push once  enoxaparin Injectable 40 milliGRAM(s) SubCutaneous every 24 hours  erythromycin   Ointment 1 Application(s) Right EYE four times a day  gabapentin 300 milliGRAM(s) Oral three times a day  glucagon  Injectable 1 milliGRAM(s) IntraMuscular once  insulin glargine Injectable (LANTUS) 10 Unit(s) SubCutaneous at bedtime  insulin lispro (ADMELOG) corrective regimen sliding scale   SubCutaneous Before meals and at bedtime  lactated ringers. 1000 milliLiter(s) (75 mL/Hr) IV Continuous <Continuous>  lactobacillus acidophilus 1 Tablet(s) Oral daily  lidocaine 5% Ointment 1 Application(s) Topical two times a day  losartan 50 milliGRAM(s) Oral daily  meropenem  IVPB 1000 milliGRAM(s) IV Intermittent every 8 hours  metoprolol succinate ER 12.5 milliGRAM(s) Oral daily  mirtazapine 15 milliGRAM(s) Oral at bedtime  mupirocin 2% Ointment 1 Application(s) Topical three times a day  nystatin Powder 1 Application(s) Topical two times a day  potassium chloride    Tablet ER 40 milliEquivalent(s) Oral once  tiotropium 18 MICROgram(s) Capsule 1 Capsule(s) Inhalation daily  traZODone 100 milliGRAM(s) Oral at bedtime  triamcinolone 0.1% Ointment 1 Application(s) Topical two times a day  ursodiol Tablet 500 milliGRAM(s) Oral <User Schedule>  vancomycin  IVPB 750 milliGRAM(s) IV Intermittent every 12 hours  vancomycin  IVPB        MEDICATIONS  (PRN):  acetaminophen     Tablet .. 650 milliGRAM(s) Oral every 6 hours PRN Mild Pain (1 - 3), Moderate Pain (4 - 6), Severe Pain (7 - 10)  dextrose Oral Gel 15 Gram(s) Oral once PRN Blood Glucose LESS THAN 70 milliGRAM(s)/deciliter  HYDROmorphone  Injectable 0.5 milliGRAM(s) IV Push every 3 hours PRN Pain  hydrOXYzine hydrochloride 20 milliGRAM(s) Oral four times a day PRN Itching  QUEtiapine 25 milliGRAM(s) Oral daily PRN agitation      Allergies    diltiazem (Other; Rash)    Intolerances        REVIEW OF SYSTEMS - unable to obtain from patient; per daughter, patient still uncomfortable, with pain and itch, localized to the site of the rash       Vital Signs Last 24 Hrs  T(C): 36.9 (02 Aug 2022 17:18), Max: 37.1 (01 Aug 2022 18:33)  T(F): 98.5 (02 Aug 2022 17:18), Max: 98.8 (01 Aug 2022 18:33)  HR: 99 (02 Aug 2022 17:18) (75 - 99)  BP: 112/54 (02 Aug 2022 17:18) (110/51 - 155/68)  BP(mean): --  RR: 18 (02 Aug 2022 17:18) (16 - 18)  SpO2: 92% (02 Aug 2022 17:18) (92% - 94%)    Parameters below as of 02 Aug 2022 17:18  Patient On (Oxygen Delivery Method): room air          PHYSICAL EXAM:    General: resting comfortably   HEENT: edema surrounding eyes, crusting   CV: No lower extremity edema, extremities warm and well perfused, +dorsalis pedis pulses  Resp: Non labored breathing, no clubbing of extremities  GI: Non distended abdomen, no palpable hepatosplenomegaly  Lymph: No visible lymphadenopathy  Neuro: resting comfortably   Skin: The scalp/hair, head/face, conjunctivae/lids, lips/teeth/gums, neck, digits/nails, right and left axilla, right and left upper and lower extremities, chest, abdomen, back, buttocks and groin area. No bromhidrosis or hyperhidrosis.    Of note on skin exam:  erosions with yellow-brown crusting, with surrounding edematous plaque of the upper forehead, improved from prior, with purpura of the eye; erythematous plaques of the mons pubis, labia majora, perianal skin with focal shallow erosions primarily on the proximal thigh/inguinal fold    LABS:                        12.3   12.92 )-----------( 326      ( 02 Aug 2022 06:30 )             38.4     08-02    135  |  97<L>  |  9   ----------------------------<  273<H>  4.0   |  26  |  0.61    Ca    8.7      02 Aug 2022 06:30  Phos  2.6     08-02  Mg     1.70     08-02    TPro  6.1  /  Alb  2.9<L>  /  TBili  0.2  /  DBili  x   /  AST  15  /  ALT  <5<L>  /  AlkPhos  89  08-01          RADIOLOGY & ADDITIONAL TESTS:    COVID neg 7/26  RVP neg 7/26  Hep B, Hep C neg 7/27  HIV neg    Blood cx 7/28 – ngtd x 2  Blood cx 7/27 – ngtd  Blood cx 7/26 – staph epidermidis, staph hominis – ID feels contaminants    IMAGING    Head US 8/2 : No focal collection or hyperemia identified along the right face or neck. Recommend correlation with the same day neck CT exam.    CT 8/2: Extensive right periorbital preseptal soft tissue swelling is again noted concordant with the history of shingles over the right eye. A  superimposed cellulitis can't be excluded. Asymmetric enlargement of the  right-sided extraocular muscles appears similar compared to the  07/27/2022 orbital MRI study. There is no evidence for orbital abscess.  The superior ophthalmic veins remain patent. The cavernous sinuses appear  symmetric.    Diffuse nonspecific skin and subcutaneous soft tissue  swelling-infiltration involves the scalp, right lateral neck soft  tissues, and anterior neck soft tissues, extending to the upper right  thoracic region, most likely reflecting a cellulitis given the clinical  history, without evidence for abscess formation at this time.      CT 7/26: The ventricular system is not dilated, and is markedly smaller in size  compared to the 2011 head CT study. Comparison with the more recent  outside head CT is recommended to evaluate for any interval change  Chronic left parietal infarct.  There is no gross CT evidence for superimposed acute vascular  distribution infarct. There is no evidence for acute intracranial  hemorrhage.  Extensive right periorbital preseptal soft tissue swelling is noted  concordant with the history of shingles over the right eye. A  superimposed cellulitis can't be excluded. No post septal extension is  appreciated.  Generalized diffuse frontal scalp soft tissue swelling is present which  may be related to the shingles infection, a superimposed cellulitis, or a  combination of both. The adjacent calvarium appears intact without  periosteal reaction or bony destruction.    If the patient has new and persistent unexplained symptoms, consider  follow-up brain MRI with and without contrast for further workup,  provided there are no MRI or contrast contraindications.    MR head and orbit 7/27: Evaluation the orbits is limited. However, asymmetric edema appears to involve the right orbital medial and lateral rectus muscles, as well as the superior oblique muscle, suspicious for a myositis (viral given the known herpes zoster with superimposed bacterial infection not excluded).  There is no evidence for orbital abscess. The cavernous sinuses and  superior ophthalmic veins remain patent. The right preseptal periorbital  soft tissue swelling appears improved compared to the CT suggesting  improving preseptal cellulitis/zoster. Serial imaging follow-up is  recommended to monitor for stability. No evidence for acute infarct, acute intracranial hemorrhage, or intracranial abscess.  Stable ventricular size.  Chronic left frontal parietal infarcts and chronic white matter changes  as described.

## 2022-08-03 NOTE — PROGRESS NOTE ADULT - PROBLEM SELECTOR PLAN 3
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 Patient does not have symptoms concerning for poor perfusion, will continue to monitor.

## 2022-08-03 NOTE — PROGRESS NOTE ADULT - PROBLEM SELECTOR PLAN 9
DVT ppx: subQ lovenox  Diet: soft and bite sized (S&S recd minced and moist however daughter accepts risks)    -Hypophosphatemia- replete phos    Dispo:  - pt has difficult vessels, has midline in place, per IR can place IR PICC line order on 8/4 (after 48h negative cultures) if blood draws needed  - PT/OT recommending rehab, will f/u with

## 2022-08-03 NOTE — PROGRESS NOTE ADULT - PROBLEM SELECTOR PLAN 2
Per pt's daughter had up to seroquel 100mg for agitation in prior admissions. S/p 1x seroquel 25mg on 8/2 for agitation. Will reassess agitation as infectious symptoms improve.  - continue seroquel 25mg QD PRN

## 2022-08-03 NOTE — PROGRESS NOTE ADULT - PROBLEM SELECTOR PLAN 6
A1c 7.2.  Home regimen lantus 34U daily, trulicity, glipizide.  - will continue lantus 10U qHS, SSI for now given active infection  - monitor premeal and bedtime FS

## 2022-08-03 NOTE — PROGRESS NOTE ADULT - PROBLEM SELECTOR PLAN 1
Prominent rash in R V1 dermatome.  CT imaging consistent with cellulitis extending from scalp to upper thorax with no evidence of abscess or necrotizing fasciitis. Per plastics no evidence of nec fasc, appreciate evaluation. 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  - Sx management:    - Continue hydroxyzine 20mg QID PRN for itching    - cont gabapentin to 300mg TID for neuropathic pain    - IV dilaudid 0.5mg Q3H PRN, PO acetaminophen 650mg Q6H PRN    - Lidocaine + triamcinolone ointment as below  - ID following, appreciate recs:    - acyclovir 650mg Q8H    - IV hydration while on acyclovir; LR 75cc/hr    - vanc 750mg BID, monitor levels; d/c cefazolin    - f/u 8/1 BCx x2, NGTD  -f/u rpt blood culture from 8/3    - no indication for LP at this time  - Ophthalmology following, appreciate recs:    - erythromycin ointment QID to eye and periocular lesions, artificial tears Q4H  - Appreciate derm recs:    - lidocaine ointment mixed with vaseline BID, triamcinolone ointment to red areas only BID    - apply vaseline to crusted areas Q2H  -Discussed with ID attending and Opthalmology attending on 8/3  -Updated and discussed with daughter Sierra Delgado at bedside on 8/3 Prominent rash in R V1 dermatome. Has significant pruritis and patient has been scratching affected area. CT imaging consistent with cellulitis extending from scalp to upper thorax with no evidence of abscess or necrotizing fasciitis. Per plastics no evidence of nec fasc, appreciate evaluation. 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  - Sx management:    - Continue hydroxyzine 20mg QID PRN for itching    - Cont gabapentin to 300mg TID for neuropathic pain    - IV dilaudid 0.5mg Q3H PRN, PO acetaminophen 650mg Q6H PRN    - Lidocaine + triamcinolone ointment as below  - ID following, appreciate recs:    - acyclovir 650mg Q8H    - IV hydration while on acyclovir; LR 75cc/hr    - vanc 750mg BID, monitor levels; d/c cefazolin    - f/u 8/1 BCx x2, NGTD    - no indication for LP at this time  - Ophthalmology following, appreciate recs:    -  erythromycin ointment QID to eye and periocular lesions, artificial tears Q4H  - Appreciate derm recs:    - lidocaine ointment mixed with vaseline BID, triamcinolone ointment to red areas only BID    - apply vaseline to crusted areas Q2H, cont vanco and meropenem, discussed with ID attending and opthalmology attending on 8/3  -Neurology consult per Opthalmology attending, daughter agreeable for consult but declines LP  -updated daughter Dr. Delgado at bedside

## 2022-08-03 NOTE — PROGRESS NOTE ADULT - SUBJECTIVE AND OBJECTIVE BOX
CC: F/U for Fever    Saw/spoke to patient. No fevers, no chills. No new complaints.    Allergies  diltiazem (Other; Rash)    ANTIMICROBIALS:  acyclovir IVPB 650 every 8 hours  meropenem  IVPB 1000 every 8 hours  vancomycin  IVPB 750 every 12 hours  vancomycin  IVPB      PE:    Vital Signs Last 24 Hrs  T(C): 37 (03 Aug 2022 11:15), Max: 39 (03 Aug 2022 06:54)  T(F): 98.6 (03 Aug 2022 11:15), Max: 102.2 (03 Aug 2022 06:54)  HR: 85 (03 Aug 2022 16:07) (60 - 99)  BP: 100/51 (03 Aug 2022 11:15) (80/44 - 151/82)  RR: 18 (03 Aug 2022 11:15) (18 - 18)  SpO2: 94% (03 Aug 2022 16:07) (92% - 98%)    Gen: AOx0-1, fatigued appearing  CV: Nontachycardic  Resp: Breathing comfortably, RA  Abd: Soft, nontender  IV/Skin: Plaque lesion on R side forehead--confluence of zoster lesion    LABS:                        11.4   9.55  )-----------( 323      ( 03 Aug 2022 08:02 )             34.9     08-03    135  |  98  |  13  ----------------------------<  241<H>  3.6   |  25  |  0.89    Ca    8.5      03 Aug 2022 08:02  Phos  2.3     08-03  Mg     1.60     08-03    TPro  6.3  /  Alb  2.7<L>  /  TBili  0.4  /  DBili  x   /  AST  22  /  ALT  6   /  AlkPhos  94  08-03    MICROBIOLOGY:  Vancomycin Level, Trough: 8.2 ug/mL (08-03-22 @ 08:02)    .Blood Blood-Venous  08-01-22   No growth to date.  --  --    .Blood Blood-Peripheral  07-28-22   No Growth Final  --  --    .Blood Blood  07-26-22   Growth in anaerobic bottle: Staphylococcus epidermidis  Growth in aerobic and anaerobic bottles: Staphylococcus hominis  Coag Negative Staphylococcus  Single set isolate, possible contaminant. Contact  Microbiology if susceptibility testing clinically  indicated.  --    Growth in anaerobic bottle: Gram Positive Cocci in Clusters  Growth in aerobic bottle: Gram Positive Cocci in Clusters    .Blood Blood  07-26-22   Growth in aerobic bottle: Staphylococcus hominis  Growth in aerobic and anaerobic bottles: Staphylococcus epidermidis  ***Blood Panel PCR results on this specimen are available  approximately 3 hours after the Gram stain result.***  Gram stain, PCR, and/or culture results may not always  correspond due to difference in methodologies.  ************************************************************  This PCR assay was performed by multiplex PCR. This  Assay tests for 66 bacterial and resistance gene targets.  Please refer to the [facility] test directory  at [email or web address] for details.  --  Blood Culture PCR  Staphylococcus hominis  Staphylococcus epidermidis    Rapid RVP Result: NotDetec (08-03 @ 06:42)    (otherwise reviewed)    RADIOLOGY:    8/2 CT:    IMPRESSION:    Extensive right periorbital preseptal soft tissue swelling is again noted   concordant with the history of shingles over the right eye. A   superimposed cellulitis can't be excluded. Asymmetric enlargement of the   right-sided extraocular muscles appears similar compared to the   07/27/2022 orbital MRI study. There is no evidence for orbital abscess.  The superior ophthalmic veins remain patent. The cavernous sinuses appear   symmetric.    Diffuse nonspecific skin and subcutaneous soft tissue   swelling-infiltration involves the scalp, right lateral neck soft   tissues, and anterior neck soft tissues, extending to the upper right   thoracic region, most likely reflecting a cellulitis given the clinical   history, without evidence for abscess formation at this time.

## 2022-08-03 NOTE — PROGRESS NOTE ADULT - PROBLEM SELECTOR PLAN 8
120py smoking hx, quit in 2011. Uses Trelegy at home. Does not appear to have ongoing respiratory symptoms.  - continue pulmicort and albuterol nebs

## 2022-08-03 NOTE — PROGRESS NOTE ADULT - ASSESSMENT
#Herpes zoster ophthalmicus with crusted lesions of the skin likely contributing to significant pruritus, and surrounding edema that appears to be improving, also with purpura around the eye today; though difficult to determine whether this truly represents cellulitis, skip areas between erythematous, edematous plaques may be more representative of edema associated with the HZV infection  At this time  - Continue Triamcinolone 0.1% ointment BID to red areas of the skin (avoid the crusted ulcerations)  - Gabapentin currently ordered for 300 TID – would recommend slower up-titration of the gabapentin. Would consider increasing to only 200 TID     #Irritant contact dermatitis of the groin/perianal area  At this time   - Hydrocortisone 2.5% ointment BID x 1 week only   - Barrier creams such as zinc oxide     The patient's chart was reviewed in addition to being seen and examined at bedside with the dermatology attending Dr. Palacios Recommendations were communicated with the primary team.  Please page [phone] w/10 digit call back number for further related questions.    Lauren Peralta MD  Resident Physician, PGY3  [facility] Dermatology  Pager: [phone]  Office: [phone]   #Herpes zoster ophthalmicus with crusted lesions of the skin likely contributing to significant pruritus, and surrounding edema that appears to be improving, also with purpura around the eye today; though difficult to determine whether this truly represents cellulitis, skip areas between erythematous, edematous plaques may be more representative of edema associated with the HZV infection  At this time  - Continue Triamcinolone 0.1% ointment BID to red areas of the skin (avoid the crusted ulcerations)  - Gabapentin currently ordered for 300 TID – would recommend slower up-titration of the gabapentin. Would consider keeping at 200 mg TID to avoid oversedation.     #Irritant contact dermatitis of the groin/perianal area 2/2   At this time   - Hydrocortisone 2.5% ointment BID x 1 week only   - Barrier creams such as zinc oxide     The patient's chart was reviewed in addition to being seen and examined at bedside with the dermatology attending Dr. Palacios Recommendations were communicated with the primary team.  Please page [phone] w/10 digit call back number for further related questions.    Lauren Peralta MD  Resident Physician, PGY3  [facility] Dermatology  Pager: [phone]  Office: [phone]

## 2022-08-03 NOTE — PROGRESS NOTE ADULT - SUBJECTIVE AND OBJECTIVE BOX
Patient is a 86y old  Female who presents with a chief complaint of Suspicion for Herpes Zoster opthalmicus/encephalitis (02 Aug 2022 19:16)      SUBJECTIVE / OVERNIGHT EVENTS: Pt seen and examined along with ACP at 12:15pm, overnight events noted, physician daughter at bedside, pt is oriented denies any pain, per daughter mental status has improved from yesterday, facial and neck erythema has improved, reports pain is under control with gabapentin, requesting toradol prn, agreeable for neurology consult but declines LP. No other new issues reported.    MEDICATIONS  (STANDING):  acyclovir IVPB 650 milliGRAM(s) IV Intermittent every 8 hours  ALBUTerol    0.083% 2.5 milliGRAM(s) Nebulizer every 6 hours  amLODIPine   Tablet 5 milliGRAM(s) Oral daily  artificial tears (preservative free) Ophthalmic Solution 1 Drop(s) Both EYES every 2 hours  aspirin  chewable 324 milliGRAM(s) Oral daily  buDESOnide    Inhalation Suspension 0.5 milliGRAM(s) Inhalation two times a day  dextrose 5%. 1000 milliLiter(s) (100 mL/Hr) IV Continuous <Continuous>  dextrose 5%. 1000 milliLiter(s) (50 mL/Hr) IV Continuous <Continuous>  dextrose 50% Injectable 25 Gram(s) IV Push once  dextrose 50% Injectable 12.5 Gram(s) IV Push once  dextrose 50% Injectable 25 Gram(s) IV Push once  enoxaparin Injectable 40 milliGRAM(s) SubCutaneous every 24 hours  erythromycin   Ointment 1 Application(s) Right EYE four times a day  gabapentin 300 milliGRAM(s) Oral three times a day  glucagon  Injectable 1 milliGRAM(s) IntraMuscular once  insulin glargine Injectable (LANTUS) 10 Unit(s) SubCutaneous at bedtime  insulin lispro (ADMELOG) corrective regimen sliding scale   SubCutaneous Before meals and at bedtime  lactated ringers. 1000 milliLiter(s) (75 mL/Hr) IV Continuous <Continuous>  lactobacillus acidophilus 1 Tablet(s) Oral daily  lidocaine 5% Ointment 1 Application(s) Topical two times a day  losartan 50 milliGRAM(s) Oral daily  meropenem  IVPB 1000 milliGRAM(s) IV Intermittent every 8 hours  metoprolol succinate ER 12.5 milliGRAM(s) Oral daily  mirtazapine 15 milliGRAM(s) Oral at bedtime  mupirocin 2% Ointment 1 Application(s) Topical three times a day  nystatin Powder 1 Application(s) Topical two times a day  potassium chloride    Tablet ER 40 milliEquivalent(s) Oral once  traZODone 100 milliGRAM(s) Oral at bedtime  triamcinolone 0.1% Ointment 1 Application(s) Topical two times a day  ursodiol Tablet 500 milliGRAM(s) Oral <User Schedule>  vancomycin  IVPB 750 milliGRAM(s) IV Intermittent every 12 hours  vancomycin  IVPB        MEDICATIONS  (PRN):  acetaminophen     Tablet .. 650 milliGRAM(s) Oral every 6 hours PRN Mild Pain (1 - 3), Moderate Pain (4 - 6), Severe Pain (7 - 10)  dextrose Oral Gel 15 Gram(s) Oral once PRN Blood Glucose LESS THAN 70 milliGRAM(s)/deciliter  HYDROmorphone  Injectable 0.5 milliGRAM(s) IV Push every 3 hours PRN Pain  hydrOXYzine hydrochloride 20 milliGRAM(s) Oral four times a day PRN Itching  QUEtiapine 25 milliGRAM(s) Oral daily PRN agitation      Vital Signs Last 24 Hrs  T(C): 37 (03 Aug 2022 11:15), Max: 39 (03 Aug 2022 06:54)  T(F): 98.6 (03 Aug 2022 11:15), Max: 102.2 (03 Aug 2022 06:54)  HR: 88 (03 Aug 2022 11:15) (60 - 99)  BP: 100/51 (03 Aug 2022 11:15) (80/44 - 151/82)  BP(mean): --  RR: 18 (03 Aug 2022 11:15) (18 - 18)  SpO2: 94% (03 Aug 2022 11:15) (92% - 98%)    Parameters below as of 03 Aug 2022 11:15  Patient On (Oxygen Delivery Method): room air      CAPILLARY BLOOD GLUCOSE      POCT Blood Glucose.: 250 mg/dL (03 Aug 2022 11:49)  POCT Blood Glucose.: 263 mg/dL (03 Aug 2022 07:40)  POCT Blood Glucose.: 256 mg/dL (02 Aug 2022 21:55)  POCT Blood Glucose.: 182 mg/dL (02 Aug 2022 17:06)    I&O's Summary    02 Aug 2022 07:01  -  03 Aug 2022 07:00  --------------------------------------------------------  IN: 0 mL / OUT: 1300 mL / NET: -1300 mL        PHYSICAL EXAM:  GENERAL: NAD, obese female  EYES: eyes closed but answers all questions, rt eye with periorbital edema  CHEST/LUNG: Coughs on deep breath, decreased bs b/l at bases  HEART: Regular rate and rhythm  ABDOMEN: Soft, Nontender, Nondistended  EXTREMITIES: no LE edema  PSYCH: Calm  NEUROLOGY: AOx3  SKIN: Greenish scabbing/crusting in V1 dermatome markedly more confluent, Background erythema persists. Erythema tracking down along R neck and upper chest improving       LABS:                        11.4   9.55  )-----------( 323      ( 03 Aug 2022 08:02 )             34.9     08-03    135  |  98  |  13  ----------------------------<  241<H>  3.6   |  25  |  0.89    Ca    8.5      03 Aug 2022 08:02  Phos  2.3     08-03  Mg     1.60     08-03    TPro  6.3  /  Alb  2.7<L>  /  TBili  0.4  /  DBili  x   /  AST  22  /  ALT  6   /  AlkPhos  94  08-03              RADIOLOGY & ADDITIONAL TESTS:    Imaging Personally Reviewed:    Consultant(s) Notes Reviewed:      Care Discussed with Consultants/Other Providers:

## 2022-08-03 NOTE — PROGRESS NOTE ADULT - PROBLEM SELECTOR PLAN 4
SBP > 180 this admission, wnl today. Hx of severe HTN at home. Will CTM.  - continue amlodipine 5mg QD  - continue home losartan, metoprolol  - hold furosemide and spironolactone for now, avoiding acute BP drops

## 2022-08-03 NOTE — PROGRESS NOTE ADULT - ASSESSMENT
87 yo female w/PMH Cardiac stents post MI (1986, 1996), COPD, NPH, DM, Migraines, and suspected HTN presenting with 3 day history of herpes zoster rash  No fever, leukocytosis  R sided zoster rash, then increasing confusion  On exam with vesicles, redness at site  Optho with concern for R sided ocular involvement  2/4 BCX CoNS--suspect contam  MR shows areas of myositis, but suspect is due to viral process vs concomitant orbital cellulitis (patient is clinically improving)  Redness/swelling improved today, note fevers  Overall,  1) VZV Ophthalmicus  - Concern for VZV with ocular involvement  - Acyclovir 650mg q 8; recommend continue IVF while on acyclovir  - Monitor lesions  - F/U Optho  2) New Fever  - Aspiration? Other occult?  - F/U BCXs  - Check UA/UCX  - Check CXR  - Monitor for further fevers  - Already on broad coverage  3) Preseptal cellulitis  - Improving on current abx  - Vanco 750mg q 12 (monitor levels--lower level okay, avoid CHARLINE)  - Meropenem 1g q 8  - Monitor facial swelling/redness for improvement  - F/U BCXs    Jack Henderson MD  Contact on TEAMS messaging from 9am - 5pm  From 5pm-9am, on weekends, or if no response call [phone]

## 2022-08-03 NOTE — PROGRESS NOTE ADULT - ASSESSMENT
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

## 2022-08-03 NOTE — PROGRESS NOTE ADULT - SUBJECTIVE AND OBJECTIVE BOX
[facility] DEPARTMENT OF OPHTHALMOLOGY  ------------------------------------------------------------------------------    Interval History: Following for R HZO. Pt remains with poor mental status. Unable to participate with exam.     MEDICATIONS  (STANDING):  acetaminophen   IVPB .. 650 milliGRAM(s) IV Intermittent <User Schedule>  acyclovir IVPB 650 milliGRAM(s) IV Intermittent every 8 hours  ALBUTerol    0.083% 2.5 milliGRAM(s) Nebulizer every 6 hours  artificial tears (preservative free) Ophthalmic Solution 1 Drop(s) Both EYES every 2 hours  aspirin  chewable 324 milliGRAM(s) Oral daily  buDESOnide    Inhalation Suspension 0.5 milliGRAM(s) Inhalation two times a day  dextrose 5%. 1000 milliLiter(s) (100 mL/Hr) IV Continuous <Continuous>  dextrose 5%. 1000 milliLiter(s) (50 mL/Hr) IV Continuous <Continuous>  dextrose 50% Injectable 25 Gram(s) IV Push once  dextrose 50% Injectable 12.5 Gram(s) IV Push once  dextrose 50% Injectable 25 Gram(s) IV Push once  enoxaparin Injectable 40 milliGRAM(s) SubCutaneous every 24 hours  erythromycin   Ointment 1 Application(s) Right EYE four times a day  gabapentin 300 milliGRAM(s) Oral three times a day  glucagon  Injectable 1 milliGRAM(s) IntraMuscular once  insulin glargine Injectable (LANTUS) 10 Unit(s) SubCutaneous at bedtime  insulin lispro (ADMELOG) corrective regimen sliding scale   SubCutaneous Before meals and at bedtime  lactated ringers. 1000 milliLiter(s) (75 mL/Hr) IV Continuous <Continuous>  lactobacillus acidophilus 1 Tablet(s) Oral daily  lidocaine 5% Ointment 1 Application(s) Topical two times a day  losartan 50 milliGRAM(s) Oral daily  meropenem  IVPB 1000 milliGRAM(s) IV Intermittent every 8 hours  metoprolol succinate ER 12.5 milliGRAM(s) Oral daily  mirtazapine 15 milliGRAM(s) Oral at bedtime  mupirocin 2% Ointment 1 Application(s) Topical three times a day  nystatin Powder 1 Application(s) Topical two times a day  potassium chloride    Tablet ER 40 milliEquivalent(s) Oral once  traZODone 100 milliGRAM(s) Oral at bedtime  triamcinolone 0.1% Ointment 1 Application(s) Topical two times a day  ursodiol Tablet 500 milliGRAM(s) Oral <User Schedule>  vancomycin  IVPB 750 milliGRAM(s) IV Intermittent every 12 hours  vancomycin  IVPB        MEDICATIONS  (PRN):  acetaminophen     Tablet .. 650 milliGRAM(s) Oral every 6 hours PRN Mild Pain (1 - 3), Moderate Pain (4 - 6), Severe Pain (7 - 10)  dextrose Oral Gel 15 Gram(s) Oral once PRN Blood Glucose LESS THAN 70 milliGRAM(s)/deciliter  HYDROmorphone  Injectable 0.5 milliGRAM(s) IV Push every 3 hours PRN Pain  hydrOXYzine hydrochloride 20 milliGRAM(s) Oral four times a day PRN Itching  QUEtiapine 25 milliGRAM(s) Oral daily PRN agitation      VITALS: T(C): 36.9 (08-03-22 @ 17:16)  T(F): 98.5 (08-03-22 @ 17:16), Max: 102.2 (08-03-22 @ 06:54)  HR: 72 (08-03-22 @ 17:16) (60 - 92)  BP: 106/45 (08-03-22 @ 17:16) (80/44 - 151/82)  RR:  (18 - 18)  SpO2:  (92% - 98%)  Wt(kg): --  General: AAO x 0, unable to participate with exam.     Ophthalmology Exam:  Visual acuity (sc): Unable to reliably assess due to mental status.   Pupils: 4mm, nonreactive to light OD, 4mm -> 2mm OS  Ttono: MYRON 2/2 mental status.   Extraocular movements (EOMs): MYRON 2/2 mental status.   Confrontational Visual Field (CVF): MYRON 2/2 mental status.     Portable Slit Lamp Exam - pt unable to tolerate sitting up to slit lamp.  External: R sided forehead edema and erythema, with overlying dark brown crusting, no longer extending to eyelid margins, + L preauricular edema and erythema, improved, wnl on L side  Lids/Lashes/Lacrimal Ducts: Trace periorbital edema RUL and RLL, flat OS   Sclera/Conjunctiva: 1-2+ injection OD, W+Q OS  Cornea: superior confluent SPK, 2+ D-folds, +bullae no pseudodendrites OD, Cl OS  Anterior Chamber: D+F OU    Iris: Flat OU  Lens: PCIOL OD, 3+ NS OS    IMAGING      ACC: [id number] EXAM:  CT NECK SOFT TISSUE IC                          PROCEDURE DATE:  08/02/2022     [facility] DEPARTMENT OF OPHTHALMOLOGY  ------------------------------------------------------------------------------    Interval History: Following for R HZO. Pt remains with poor mental status. Unable to participate with exam.     MEDICATIONS  (STANDING):  acetaminophen   IVPB .. 650 milliGRAM(s) IV Intermittent <User Schedule>  acyclovir IVPB 650 milliGRAM(s) IV Intermittent every 8 hours  ALBUTerol    0.083% 2.5 milliGRAM(s) Nebulizer every 6 hours  artificial tears (preservative free) Ophthalmic Solution 1 Drop(s) Both EYES every 2 hours  aspirin  chewable 324 milliGRAM(s) Oral daily  buDESOnide    Inhalation Suspension 0.5 milliGRAM(s) Inhalation two times a day  dextrose 5%. 1000 milliLiter(s) (100 mL/Hr) IV Continuous <Continuous>  dextrose 5%. 1000 milliLiter(s) (50 mL/Hr) IV Continuous <Continuous>  dextrose 50% Injectable 25 Gram(s) IV Push once  dextrose 50% Injectable 12.5 Gram(s) IV Push once  dextrose 50% Injectable 25 Gram(s) IV Push once  enoxaparin Injectable 40 milliGRAM(s) SubCutaneous every 24 hours  erythromycin   Ointment 1 Application(s) Right EYE four times a day  gabapentin 300 milliGRAM(s) Oral three times a day  glucagon  Injectable 1 milliGRAM(s) IntraMuscular once  insulin glargine Injectable (LANTUS) 10 Unit(s) SubCutaneous at bedtime  insulin lispro (ADMELOG) corrective regimen sliding scale   SubCutaneous Before meals and at bedtime  lactated ringers. 1000 milliLiter(s) (75 mL/Hr) IV Continuous <Continuous>  lactobacillus acidophilus 1 Tablet(s) Oral daily  lidocaine 5% Ointment 1 Application(s) Topical two times a day  losartan 50 milliGRAM(s) Oral daily  meropenem  IVPB 1000 milliGRAM(s) IV Intermittent every 8 hours  metoprolol succinate ER 12.5 milliGRAM(s) Oral daily  mirtazapine 15 milliGRAM(s) Oral at bedtime  mupirocin 2% Ointment 1 Application(s) Topical three times a day  nystatin Powder 1 Application(s) Topical two times a day  potassium chloride    Tablet ER 40 milliEquivalent(s) Oral once  traZODone 100 milliGRAM(s) Oral at bedtime  triamcinolone 0.1% Ointment 1 Application(s) Topical two times a day  ursodiol Tablet 500 milliGRAM(s) Oral <User Schedule>  vancomycin  IVPB 750 milliGRAM(s) IV Intermittent every 12 hours  vancomycin  IVPB        MEDICATIONS  (PRN):  acetaminophen     Tablet .. 650 milliGRAM(s) Oral every 6 hours PRN Mild Pain (1 - 3), Moderate Pain (4 - 6), Severe Pain (7 - 10)  dextrose Oral Gel 15 Gram(s) Oral once PRN Blood Glucose LESS THAN 70 milliGRAM(s)/deciliter  HYDROmorphone  Injectable 0.5 milliGRAM(s) IV Push every 3 hours PRN Pain  hydrOXYzine hydrochloride 20 milliGRAM(s) Oral four times a day PRN Itching  QUEtiapine 25 milliGRAM(s) Oral daily PRN agitation      VITALS: T(C): 36.9 (08-03-22 @ 17:16)  T(F): 98.5 (08-03-22 @ 17:16), Max: 102.2 (08-03-22 @ 06:54)  HR: 72 (08-03-22 @ 17:16) (60 - 92)  BP: 106/45 (08-03-22 @ 17:16) (80/44 - 151/82)  RR:  (18 - 18)  SpO2:  (92% - 98%)  Wt(kg): --  General: AAO x 0, unable to participate with exam.     Ophthalmology Exam:  Visual acuity (sc): Unable to reliably assess due to mental status.   Pupils: 4mm, nonreactive to light OD, 4mm -> 2mm OS  Ttono: 5 OD, 7 OS   Extraocular movements (EOMs): MYRON 2/2 mental status.   Confrontational Visual Field (CVF): MYRON 2/2 mental status.     Portable Slit Lamp Exam - pt unable to tolerate sitting up to slit lamp.  External: R sided forehead edema and erythema, with overlying dark brown crusting, no longer extending to eyelid margins, + L preauricular edema and erythema, improved, wnl on L side  Lids/Lashes/Lacrimal Ducts: Trace periorbital edema RUL and RLL, flat OS   Sclera/Conjunctiva: 1-2+ injection OD, W+Q OS  Cornea: superior confluent SPK, 2+ D-folds, +bullae no pseudodendrites OD, Cl OS  Anterior Chamber: D+F OU    Iris: Flat OU  Lens: PCIOL OD, 3+ NS OS    Poor view to fundus with dilation.     B-scan OD: No vitritis, RD, mass.     IMAGING      ACC: [id number] EXAM:  CT NECK SOFT TISSUE IC                          PROCEDURE DATE:  08/02/2022      IMPRESSION:    Extensive right periorbital preseptal soft tissue swelling is again noted   concordant with the history of shingles over the right eye. A   superimposed cellulitis can't be excluded. Asymmetric enlargement of the   right-sided extraocular muscles appears similar compared to the   07/27/2022 orbital MRI study. There is no evidence for orbital abscess.  The superior ophthalmic veins remain patent. The cavernous sinuses appear   symmetric.    Diffuse nonspecific skin and subcutaneous soft tissue   swelling-infiltration involves the scalp, right lateral neck soft   tissues, and anterior neck soft tissues, extending to the upper right   thoracic region, most likely reflecting a cellulitis given the clinical   history, without evidence for abscess formation at this time.         [facility] DEPARTMENT OF OPHTHALMOLOGY  ------------------------------------------------------------------------------    Interval History: Following for R HZO. Pt remains with poor mental status. Unable to participate with exam.     MEDICATIONS  (STANDING):  acetaminophen   IVPB .. 650 milliGRAM(s) IV Intermittent <User Schedule>  acyclovir IVPB 650 milliGRAM(s) IV Intermittent every 8 hours  ALBUTerol    0.083% 2.5 milliGRAM(s) Nebulizer every 6 hours  artificial tears (preservative free) Ophthalmic Solution 1 Drop(s) Both EYES every 2 hours  aspirin  chewable 324 milliGRAM(s) Oral daily  buDESOnide    Inhalation Suspension 0.5 milliGRAM(s) Inhalation two times a day  dextrose 5%. 1000 milliLiter(s) (100 mL/Hr) IV Continuous <Continuous>  dextrose 5%. 1000 milliLiter(s) (50 mL/Hr) IV Continuous <Continuous>  dextrose 50% Injectable 25 Gram(s) IV Push once  dextrose 50% Injectable 12.5 Gram(s) IV Push once  dextrose 50% Injectable 25 Gram(s) IV Push once  enoxaparin Injectable 40 milliGRAM(s) SubCutaneous every 24 hours  erythromycin   Ointment 1 Application(s) Right EYE four times a day  gabapentin 300 milliGRAM(s) Oral three times a day  glucagon  Injectable 1 milliGRAM(s) IntraMuscular once  insulin glargine Injectable (LANTUS) 10 Unit(s) SubCutaneous at bedtime  insulin lispro (ADMELOG) corrective regimen sliding scale   SubCutaneous Before meals and at bedtime  lactated ringers. 1000 milliLiter(s) (75 mL/Hr) IV Continuous <Continuous>  lactobacillus acidophilus 1 Tablet(s) Oral daily  lidocaine 5% Ointment 1 Application(s) Topical two times a day  losartan 50 milliGRAM(s) Oral daily  meropenem  IVPB 1000 milliGRAM(s) IV Intermittent every 8 hours  metoprolol succinate ER 12.5 milliGRAM(s) Oral daily  mirtazapine 15 milliGRAM(s) Oral at bedtime  mupirocin 2% Ointment 1 Application(s) Topical three times a day  nystatin Powder 1 Application(s) Topical two times a day  potassium chloride    Tablet ER 40 milliEquivalent(s) Oral once  traZODone 100 milliGRAM(s) Oral at bedtime  triamcinolone 0.1% Ointment 1 Application(s) Topical two times a day  ursodiol Tablet 500 milliGRAM(s) Oral <User Schedule>  vancomycin  IVPB 750 milliGRAM(s) IV Intermittent every 12 hours  vancomycin  IVPB        MEDICATIONS  (PRN):  acetaminophen     Tablet .. 650 milliGRAM(s) Oral every 6 hours PRN Mild Pain (1 - 3), Moderate Pain (4 - 6), Severe Pain (7 - 10)  dextrose Oral Gel 15 Gram(s) Oral once PRN Blood Glucose LESS THAN 70 milliGRAM(s)/deciliter  HYDROmorphone  Injectable 0.5 milliGRAM(s) IV Push every 3 hours PRN Pain  hydrOXYzine hydrochloride 20 milliGRAM(s) Oral four times a day PRN Itching  QUEtiapine 25 milliGRAM(s) Oral daily PRN agitation      VITALS: T(C): 36.9 (08-03-22 @ 17:16)  T(F): 98.5 (08-03-22 @ 17:16), Max: 102.2 (08-03-22 @ 06:54)  HR: 72 (08-03-22 @ 17:16) (60 - 92)  BP: 106/45 (08-03-22 @ 17:16) (80/44 - 151/82)  RR:  (18 - 18)  SpO2:  (92% - 98%)  Wt(kg): --  General: AAO x 0, unable to participate with exam.     Ophthalmology Exam:  Visual acuity (sc): Unable to reliably assess due to mental status.   Pupils: 6mm, nonreactive to light OD, pinpoint but reactive OS  Ttono: 5 OD, 7 OS   Extraocular movements (EOMs): MYRON 2/2 mental status.   Confrontational Visual Field (CVF): MYRON 2/2 mental status.     Portable Slit Lamp Exam - pt unable to tolerate sitting up to slit lamp.  External: R sided forehead edema and erythema, with overlying dark brown crusting, no longer extending to eyelid margins, + L preauricular edema and erythema, improved, wnl on L side  Lids/Lashes/Lacrimal Ducts: Trace periorbital edema RUL and RLL, flat OS   Sclera/Conjunctiva: 1-2+ injection OD, W+Q OS  Cornea: superior confluent SPK, 2+ D-folds, +bullae no pseudodendrites OD, Cl OS  Anterior Chamber: D+F OU    Iris: Flat OU  Lens: PCIOL OD, 3+ NS OS    Poor view to fundus with dilation.     B-scan OD: No vitritis, RD, mass.     IMAGING      ACC: [id number] EXAM:  CT NECK SOFT TISSUE IC                          PROCEDURE DATE:  08/02/2022      IMPRESSION:    Extensive right periorbital preseptal soft tissue swelling is again noted   concordant with the history of shingles over the right eye. A   superimposed cellulitis can't be excluded. Asymmetric enlargement of the   right-sided extraocular muscles appears similar compared to the   07/27/2022 orbital MRI study. There is no evidence for orbital abscess.  The superior ophthalmic veins remain patent. The cavernous sinuses appear   symmetric.    Diffuse nonspecific skin and subcutaneous soft tissue   swelling-infiltration involves the scalp, right lateral neck soft   tissues, and anterior neck soft tissues, extending to the upper right   thoracic region, most likely reflecting a cellulitis given the clinical   history, without evidence for abscess formation at this time.         [facility] DEPARTMENT OF OPHTHALMOLOGY  ------------------------------------------------------------------------------    Interval History: Following for R HZO. Pt remains with poor mental status. Unable to participate with exam.     MEDICATIONS  (STANDING):  acetaminophen   IVPB .. 650 milliGRAM(s) IV Intermittent <User Schedule>  acyclovir IVPB 650 milliGRAM(s) IV Intermittent every 8 hours  ALBUTerol    0.083% 2.5 milliGRAM(s) Nebulizer every 6 hours  artificial tears (preservative free) Ophthalmic Solution 1 Drop(s) Both EYES every 2 hours  aspirin  chewable 324 milliGRAM(s) Oral daily  buDESOnide    Inhalation Suspension 0.5 milliGRAM(s) Inhalation two times a day  dextrose 5%. 1000 milliLiter(s) (100 mL/Hr) IV Continuous <Continuous>  dextrose 5%. 1000 milliLiter(s) (50 mL/Hr) IV Continuous <Continuous>  dextrose 50% Injectable 25 Gram(s) IV Push once  dextrose 50% Injectable 12.5 Gram(s) IV Push once  dextrose 50% Injectable 25 Gram(s) IV Push once  enoxaparin Injectable 40 milliGRAM(s) SubCutaneous every 24 hours  erythromycin   Ointment 1 Application(s) Right EYE four times a day  gabapentin 300 milliGRAM(s) Oral three times a day  glucagon  Injectable 1 milliGRAM(s) IntraMuscular once  insulin glargine Injectable (LANTUS) 10 Unit(s) SubCutaneous at bedtime  insulin lispro (ADMELOG) corrective regimen sliding scale   SubCutaneous Before meals and at bedtime  lactated ringers. 1000 milliLiter(s) (75 mL/Hr) IV Continuous <Continuous>  lactobacillus acidophilus 1 Tablet(s) Oral daily  lidocaine 5% Ointment 1 Application(s) Topical two times a day  losartan 50 milliGRAM(s) Oral daily  meropenem  IVPB 1000 milliGRAM(s) IV Intermittent every 8 hours  metoprolol succinate ER 12.5 milliGRAM(s) Oral daily  mirtazapine 15 milliGRAM(s) Oral at bedtime  mupirocin 2% Ointment 1 Application(s) Topical three times a day  nystatin Powder 1 Application(s) Topical two times a day  potassium chloride    Tablet ER 40 milliEquivalent(s) Oral once  traZODone 100 milliGRAM(s) Oral at bedtime  triamcinolone 0.1% Ointment 1 Application(s) Topical two times a day  ursodiol Tablet 500 milliGRAM(s) Oral <User Schedule>  vancomycin  IVPB 750 milliGRAM(s) IV Intermittent every 12 hours  vancomycin  IVPB        MEDICATIONS  (PRN):  acetaminophen     Tablet .. 650 milliGRAM(s) Oral every 6 hours PRN Mild Pain (1 - 3), Moderate Pain (4 - 6), Severe Pain (7 - 10)  dextrose Oral Gel 15 Gram(s) Oral once PRN Blood Glucose LESS THAN 70 milliGRAM(s)/deciliter  HYDROmorphone  Injectable 0.5 milliGRAM(s) IV Push every 3 hours PRN Pain  hydrOXYzine hydrochloride 20 milliGRAM(s) Oral four times a day PRN Itching  QUEtiapine 25 milliGRAM(s) Oral daily PRN agitation      VITALS: T(C): 36.9 (08-03-22 @ 17:16)  T(F): 98.5 (08-03-22 @ 17:16), Max: 102.2 (08-03-22 @ 06:54)  HR: 72 (08-03-22 @ 17:16) (60 - 92)  BP: 106/45 (08-03-22 @ 17:16) (80/44 - 151/82)  RR:  (18 - 18)  SpO2:  (92% - 98%)  Wt(kg): --  General: AAO x 0, unable to participate with exam.     Ophthalmology Exam:  Visual acuity (sc): Unable to reliably assess due to mental status.   Pupils: 6mm, nonreactive to light OD, pinpoint but reactive OS, no RAPD OU  Ttono: 5 OD, 7 OS   Extraocular movements (EOMs): MYRON 2/2 mental status.   Confrontational Visual Field (CVF): MYRON 2/2 mental status.     Portable Slit Lamp Exam - pt unable to tolerate sitting up to slit lamp.  External: R sided forehead edema and erythema, with overlying dark brown crusting, no longer extending to eyelid margins, + L preauricular edema and erythema, improved, wnl on L side  Lids/Lashes/Lacrimal Ducts: Trace periorbital edema RUL and RLL, flat OS   Sclera/Conjunctiva: 1-2+ injection OD, W+Q OS  Cornea: superior confluent SPK, 2+ D-folds, +bullae no pseudodendrites OD, Cl OS  Anterior Chamber: D+F OU    Iris: Flat OU  Lens: PCIOL OD, 3+ NS OS    Poor view to fundus with dilation.     B-scan OD: No vitritis, RD, mass.     IMAGING      ACC: [id number] EXAM:  CT NECK SOFT TISSUE IC                          PROCEDURE DATE:  08/02/2022      IMPRESSION:    Extensive right periorbital preseptal soft tissue swelling is again noted   concordant with the history of shingles over the right eye. A   superimposed cellulitis can't be excluded. Asymmetric enlargement of the   right-sided extraocular muscles appears similar compared to the   07/27/2022 orbital MRI study. There is no evidence for orbital abscess.  The superior ophthalmic veins remain patent. The cavernous sinuses appear   symmetric.    Diffuse nonspecific skin and subcutaneous soft tissue   swelling-infiltration involves the scalp, right lateral neck soft   tissues, and anterior neck soft tissues, extending to the upper right   thoracic region, most likely reflecting a cellulitis given the clinical   history, without evidence for abscess formation at this time.

## 2022-08-03 NOTE — PROGRESS NOTE ADULT - ASSESSMENT
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     1. HZO OD with worsening of R sided facial rash  - Pt has been scratching at R sided facial rash with nails - concern for worsening bacterial superinfection with crusting over area of edema and erythema. Current crusting appears slightly improved compared to yesterday, no longer extending to lid margin OD    - MYRON vision today as pt's mental status remains poor  - CT maxillofacial showing extensive R periorbital preseptal soft tissue swelling and asymmetric enlargement of R sided extraocular muscles. No evidence of orbital cellulitis. Evidence of extension to neck.  - No evidence of nec fasc  - Appreciate derm and plastic surgery consults.    - Abx per ID  - Pt unable to tolerate sitting up at slit lamp - cannot assess anterior chamber for inflammatory reaction  - IOP remains symmetrically low, 5 OD, 7 OS. Likely systemic medication is causing low IOP  - Cornea with D-folds and bullae OD, concern for possible herpetic endotheliitis. Will discuss with cornea attending re: need for topical steroids.   - Poor view to fundus today with dilation, likely 2/2 edematous cornea  - B-scan OD: no vitritis, RD, mass.   - C/w erythromycin ointment QID to R eye and periocular lesions  - C/w preservative-free artificial tears to Q2H OU   - Pt has dilated and minimally reactive R pupil. 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   - Pt has had abduction deficit OD for past week, 2/2 myositis seen on MRI orbits. Stable on CT.   - Will continue to monitor for improvement of EOMs on antivirals.     2. 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 Kuhn, attending. To be discussed with Dr. Barker, cornea attending.     Outpatient follow-up: Patient should follow-up with his/her ophthalmologist or with [facility] Department of Ophthalmology at the address below     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     1. HZO OD with worsening of R sided facial rash  - Pt has been scratching at R sided facial rash with nails - concern for worsening bacterial superinfection with crusting over area of edema and erythema. Current crusting appears slightly improved compared to yesterday, no longer extending to lid margin OD    - MYRON vision today as pt's mental status remains poor  - CT maxillofacial showing extensive R periorbital preseptal soft tissue swelling and asymmetric enlargement of R sided extraocular muscles. No evidence of orbital cellulitis. Evidence of extension to neck.  - No evidence of nec fasc  - Appreciate derm and plastic surgery consults.    - Abx per ID  - Pt unable to tolerate sitting up at slit lamp - cannot assess anterior chamber for inflammatory reaction  - IOP remains symmetrically low, 5 OD, 7 OS. Likely systemic medication is causing low IOP  - Cornea with D-folds and bullae OD, concern for possible herpetic endotheliitis. Will start pred forte TID OD.   - Poor view to fundus today with dilation, likely 2/2 edematous cornea  - B-scan OD: no vitritis, RD, mass.   - C/w erythromycin ointment QID to R eye and periocular lesions  - C/w preservative-free artificial tears to Q2H OU   - Pt has dilated and minimally reactive R pupil. 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   - Pt has had abduction deficit OD for past week, 2/2 myositis seen on MRI orbits. Stable on CT.   - Will continue to monitor for improvement of EOMs on antivirals.     2. 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 Kuhn, attending. Discussed with Dr. Barker, cornea attending.     Outpatient follow-up: Patient should follow-up with his/her ophthalmologist or with [facility] Department of Ophthalmology at the address below     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     1. HZO OD with worsening of R sided facial rash  - Pt has been scratching at R sided facial rash with nails - concern for worsening bacterial superinfection with crusting over area of edema and erythema. Current crusting appears slightly improved compared to yesterday, no longer extending to lid margin OD    - MYRON vision today as pt's mental status remains poor  - CT maxillofacial showing extensive R periorbital preseptal soft tissue swelling and asymmetric enlargement of R sided extraocular muscles. No evidence of orbital cellulitis. Evidence of extension to neck.  - No evidence of nec fasc  - Appreciate derm and plastic surgery consults.    - Abx per ID  - Pt unable to tolerate sitting up at slit lamp - cannot assess anterior chamber for inflammatory reaction  - IOP remains symmetrically low, 5 OD, 7 OS. Likely systemic medication is causing low IOP  - Cornea with D-folds and bullae OD, concern for possible herpetic endotheliitis. Will start pred forte TID OD.   - Poor view to fundus today with dilation, likely 2/2 edematous cornea  - B-scan OD: no vitritis, RD, mass.   - C/w erythromycin ointment QID to R eye and periocular lesions  - C/w preservative-free artificial tears to Q2H OU   - Pt has dilated and minimally reactive R pupil. 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   - Pt has had abduction deficit OD for past week, 2/2 myositis seen on MRI orbits. Stable on CT.   - Will continue to monitor for improvement of EOMs on antivirals.     2. 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 Kuhn, attending. Discussed with Dr. Bakrer, cornea attending.     Outpatient follow-up: Patient should follow-up with his/her ophthalmologist or with [facility] Department of Ophthalmology at the address below     [address]  [place] [address] [phone]

## 2022-08-03 NOTE — PROGRESS NOTE ADULT - ATTENDING COMMENTS
Crusted plaque on the forehead is stable. No evidence of active zoster infection on the skin. Edema and erythema is improved today. It may take several days-weeks to fully resolve. Erythema with focal small erosions noted on the vulva, inguinal fold, and proximal thigh. Rash most consistent with irritant contact dermatitis, which is a common complication in patients with incontinence. Recommend barrier protection with Vaseline or Zinc oxide.    Edmond Palacios MD, PharmD, MPH  Co-Director, Inpatient Dermatology Consultation Service, [facility]

## 2022-08-03 NOTE — PROGRESS NOTE ADULT - ATTENDING COMMENTS
I have interviewed and examined the patient and reviewed the residents note including the history, exam, assessment, and plan.  I agree with the residents assessment and plan.    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     1. HZO OD with worsening of R sided facial rash  - Pt has been scratching at R sided facial rash with nails - concern for worsening bacterial superinfection with crusting over area of edema and erythema. Current crusting appears slightly improved compared to yesterday, no longer extending to lid margin OD    - MYRON vision today as pt's mental status remains poor  - CT maxillofacial showing extensive R periorbital preseptal soft tissue swelling and asymmetric enlargement of R sided extraocular muscles. No evidence of orbital cellulitis. Evidence of extension to neck.  - No evidence of nec fasc  - Appreciate derm and plastic surgery consults.    - Abx per ID  - Pt unable to tolerate sitting up at slit lamp - cannot assess anterior chamber for inflammatory reaction  - IOP remains symmetrically low, 5 OD, 7 OS. Likely systemic medication is causing low IOP  - Cornea with D-folds and bullae OD, concern for possible herpetic endotheliitis. Will start pred forte TID OD.   - Poor view to fundus today with dilation, likely 2/2 edematous cornea  - B-scan OD: no vitritis, RD, mass.   - C/w erythromycin ointment QID to R eye and periocular lesions  - C/w preservative-free artificial tears to Q2H OU   - Pt has dilated and minimally reactive R pupil. 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   - Pt has had abduction deficit OD for past week, 2/2 myositis seen on MRI orbits. Stable on CT.   - Will continue to monitor for improvement of EOMs on antivirals.     2. 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

## 2022-08-04 NOTE — PROGRESS NOTE ADULT - ATTENDING COMMENTS
Zoster ophthalmicus stable. Skin lesions are crusted. Edema/erythema is improving. Limit triamcinolone application to 1 week. As erythema/edema is improving, may stop it sooner pending the course. Will sign off. Please reconsult dermatology if there are any changes or concerns.    Edmond Palacios MD, PharmD, MPH  Co-Director, Inpatient Dermatology Consultation Service, [facility]

## 2022-08-04 NOTE — PROGRESS NOTE ADULT - SUBJECTIVE AND OBJECTIVE BOX
CC: F/U for Shingles    Saw/spoke to patient. Improved mental status today per team, but patient sleepy during my visit.    Allergies  diltiazem (Other; Rash)    ANTIMICROBIALS:  acyclovir IVPB 650 every 8 hours  meropenem  IVPB 1000 every 8 hours  vancomycin  IVPB 750 every 12 hours  vancomycin  IVPB      PE:    Vital Signs Last 24 Hrs  T(C): 36.3 (04 Aug 2022 11:08), Max: 36.9 (03 Aug 2022 17:16)  T(F): 97.4 (04 Aug 2022 11:08), Max: 98.5 (03 Aug 2022 17:16)  HR: 72 (04 Aug 2022 14:52) (72 - 87)  BP: 151/55 (04 Aug 2022 11:08) (106/45 - 160/60)  RR: 18 (04 Aug 2022 11:08) (17 - 18)  SpO2: 96% (04 Aug 2022 14:52) (91% - 99%)    Gen: AOx3, NAD, non-toxic  CV: Nontachycardic  Resp: Breathing comfortably, RA  Abd: Soft, nontender  IV/Skin: No thrombophlebitis    LABS:                        10.2   6.42  )-----------( 256      ( 04 Aug 2022 05:00 )             30.9     08-04    131<L>  |  98  |  10  ----------------------------<  276<H>  4.3   |  21<L>  |  0.55    Ca    8.3<L>      04 Aug 2022 05:00  Phos  2.7     08-04  Mg     1.70     08-04    TPro  6.3  /  Alb  2.7<L>  /  TBili  0.4  /  DBili  x   /  AST  22  /  ALT  6   /  AlkPhos  94  08-03    Urinalysis Basic - ( 03 Aug 2022 19:17 )    Color: Yellow / Appearance: Slightly Turbid / S.017 / pH: x  Gluc: x / Ketone: Negative  / Bili: Negative / Urobili: <2 mg/dL   Blood: x / Protein: 30 mg/dL / Nitrite: Negative   Leuk Esterase: Large / RBC: 50 /HPF /  /HPF   Sq Epi: x / Non Sq Epi: 1 /HPF / Bacteria: Moderate    MICROBIOLOGY:    .Blood Blood-Venous  22   No growth to date.  --  --    .Blood Blood-Venous  22   No growth to date.  --  --    .Blood Blood-Peripheral  22   No Growth Final  --  --    .Blood Blood-Peripheral  22   No Growth Final  --  --    .Blood Blood-Venous  22   No Growth Final  --  --    .Blood Blood  22   Growth in anaerobic bottle: Staphylococcus epidermidis  Growth in aerobic and anaerobic bottles: Staphylococcus hominis  Coag Negative Staphylococcus  Single set isolate, possible contaminant. Contact  Microbiology if susceptibility testing clinically  indicated.  --    Growth in anaerobic bottle: Gram Positive Cocci in Clusters  Growth in aerobic bottle: Gram Positive Cocci in Clusters    .Blood Blood  22   Growth in aerobic bottle: Staphylococcus hominis  Growth in aerobic and anaerobic bottles: Staphylococcus epidermidis  ***Blood Panel PCR results on this specimen are available  approximately 3 hours after the Gram stain result.***  Gram stain, PCR, and/or culture results may not always  correspond due to difference in methodologies.  ************************************************************  This PCR assay was performed by multiplex PCR. This  Assay tests for 66 bacterial and resistance gene targets.  Please refer to the [facility] test directory  at [email or web address] for details.  --  Blood Culture PCR  Staphylococcus hominis  Staphylococcus epidermidis    Rapid RVP Result: NotDetec ( @ 06:42)    (otherwise reviewed)    RADIOLOGY:    8/3 XR:    FINDINGS:  Lines/Devices: Right upper extremity catheter tip is in the right   axillary vein.  shunt catheter traverses the right cervical region and   hemithorax.  Heart/Vascular: The heart size is normal.  Pulmonary: Left lower lung opacity, may represent infection or   atelectasis. No pleural effusion or pneumothorax.  Bones: No acute findings.    Impression:  Left lower lung opacity, may represent infection or atelectasis.

## 2022-08-04 NOTE — PROGRESS NOTE ADULT - ASSESSMENT
87 yo female w/PMH Cardiac stents post MI (1986, 1996), COPD, NPH, DM, Migraines, and suspected HTN presenting with 3 day history of herpes zoster rash  No fever, leukocytosis  R sided zoster rash, then increasing confusion  On exam with vesicles, redness at site  Optho with concern for R sided ocular involvement  2/4 BCX CoNS--suspect contam  MR shows areas of myositis, but suspect is due to viral process vs concomitant orbital cellulitis (patient is clinically improving)  Redness/swelling improved, fevers improved?  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 Other occult?  - F/U BCXs  - Monitor for further fevers  - Already on broad coverage  3) Preseptal cellulitis  - Improving on current abx  - Vanco 750mg q 12 (monitor levels--lower level okay, avoid CHARLINE)  - Meropenem 1g q 8  - Monitor facial swelling/redness for improvement  - F/U BCXs    Jack Henderson MD  Contact on TEAMS messaging from 9am - 5pm  From 5pm-9am, on weekends, or if no response call [phone]

## 2022-08-04 NOTE — SWALLOW BEDSIDE ASSESSMENT ADULT - COMMENTS
Patient is known to this service as patient was seen for an initial assessment on 7/27 at which time minced and moist solids and thin liquids (please see note)    Attempted to see patient at bedside for a swallow assessment, however, patient receiving nebulizer treatment at this time. This service to follow up as schedule permits.

## 2022-08-04 NOTE — PROGRESS NOTE ADULT - ASSESSMENT
#Herpes zoster ophthalmicus with crusted lesions of the skin likely contributing to significant pruritus, and surrounding edema that appears to be improving  At this time  - Continue Triamcinolone 0.1% ointment BID to red areas of the skin (avoid the crusted ulcerations)    The patient's chart was reviewed in addition to being seen and examined at bedside with the dermatology attending Dr. Palacios. Please page [phone] w/10 digit call back number for further related questions.    Lauren Peralta MD  Resident Physician, PGY3  [facility] Dermatology  Pager: [phone]  Office: [phone]

## 2022-08-04 NOTE — PROGRESS NOTE ADULT - PROBLEM SELECTOR PLAN 6
A1c 7.2.  Home regimen lantus 34U daily, trulicity, glipizide.  - increase lantus to 14 units qHS, SSI for now given active infection  - monitor premeal and bedtime FS A1c 7.2.  Home regimen lantus 34U daily, trulicity, glipizide.  - increase lantus to 14 units qHS, SSI, adjust prn  - monitor premeal and bedtime FS

## 2022-08-04 NOTE — PROGRESS NOTE ADULT - ASSESSMENT
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 Rash and mental status improved today    1. HZO OD with of R sided facial rash - improving today  - Pt has been scratching at R sided facial rash with nails - concern for worsening bacterial superinfection with crusting over area of edema and erythema. Current crusting appears improved compared to yesterday, no longer extending to lid margin OD    - Mental status significantly improved today. Able to assess VA  - CT maxillofacial showing extensive R periorbital preseptal soft tissue swelling and asymmetric enlargement of R sided extraocular muscles. No evidence of orbital cellulitis. Evidence of extension to neck.  - No evidence of nec fasc  - Appreciate derm and plastic surgery consults.    - Abx per ID  - Pt unable to tolerate sitting up at slit lamp - cannot assess anterior chamber for inflammatory reaction  - IOP improved today, 12/13. Was previously symmetrically low OU likely 2/2 systemic medication effect. Will continue to hold any IOP lowering drops.   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 Improved today.   - C/w pred forte TID OD.   - B-scan 8/3 OD: no vitritis, RD, mass.   - C/w erythromycin ointment QID to R eye and periocular lesions  - C/w preservative-free artificial tears to Q2H OU   - Pt has dilated and minimally reactive R pupil. 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 Stable on CT.   - Will continue to monitor for improvement of EOMs on antivirals.     2. Intermittent AMS, possibly 2/2 herpetic encephalitis   - Appreciate neurology consult  - MRI brain showing no acute pathology, MRI orbits as above  - LP deferred per neurology and ID - reconsider as needed     SDW Dr. Kuhn, attending.     Outpatient follow-up: Patient should follow-up with his/her ophthalmologist or with [facility] Department of Ophthalmology at the address below     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 Rash and mental status improved today    1. HZO OD with of R sided facial rash - improving today  - Pt has been scratching at R sided facial rash with nails - concern for worsening bacterial superinfection with crusting over area of edema and erythema. Current crusting appears improved compared to yesterday, no longer extending to lid margin OD    - Mental status significantly improved today. Able to assess VA  - CT maxillofacial showing extensive R periorbital preseptal soft tissue swelling and asymmetric enlargement of R sided extraocular muscles. No evidence of orbital cellulitis. Evidence of extension to neck.  - No evidence of nec fasc  - Appreciate derm and plastic surgery consults.    - Abx per ID  - Pt unable to tolerate sitting up at slit lamp - cannot assess anterior chamber for inflammatory reaction  - IOP improved today, 12/13. Was previously symmetrically low OU likely 2/2 systemic medication effect. Will continue to hold any IOP lowering drops.   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 Improved today, but likely contributing to decreased vision OD.   - C/w pred forte TID OD.   - B-scan 8/3 OD: no vitritis, RD, mass.   - C/w erythromycin ointment QID to R eye and periocular lesions  - C/w preservative-free artificial tears to Q2H OU   - Pt has dilated and minimally reactive R pupil. 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 Stable on CT.   - Will continue to monitor for improvement of EOMs on antivirals.     2. Intermittent AMS, possibly 2/2 herpetic encephalitis   - Appreciate neurology consult  - MRI brain showing no acute pathology, MRI orbits as above  - LP deferred per neurology and ID - reconsider as needed   - findings and plan discussed with primary team    SDW Dr. Kuhn, attending.     Outpatient follow-up: Patient should follow-up with his/her ophthalmologist or with [facility] Department of Ophthalmology at the address below within 2-3 days of discharge, sooner if symptoms worsen or change.     [address]  [place] [address]  [phone]

## 2022-08-04 NOTE — PROGRESS NOTE ADULT - PROBLEM SELECTOR PLAN 1
Prominent rash in R V1 dermatome. Has significant pruritis and patient has been scratching affected area. CT imaging consistent with cellulitis extending from scalp to upper thorax with no evidence of abscess or necrotizing fasciitis. Per plastics no evidence of nec fasc, appreciate evaluation. 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  - Sx management:    - Continue hydroxyzine 20mg QID PRN for itching    - Cont gabapentin to 300mg TID for neuropathic pain    - IV dilaudid 0.5mg Q3H PRN, PO acetaminophen 650mg Q6H PRN    - Lidocaine + triamcinolone ointment as below  - ID following, appreciate recs:    - acyclovir 650mg Q8H    - IV hydration while on acyclovir; LR 75cc/hr    - vanc 750mg BID, monitor levels; d/c cefazolin    - f/u 8/1 BCx x2, NGTD    - no indication for LP at this time  - Ophthalmology following, appreciate recs:    -  erythromycin ointment QID to eye and periocular lesions, artificial tears Q4H  - Appreciate derm recs:    - lidocaine ointment mixed with vaseline BID, triamcinolone ointment to red areas only BID    - apply vaseline to crusted areas Q2H, cont vanco and meropenem, discussed with ID attending and opthalmology attending on 8/3  -Neurology consult per Opthalmology attending, daughter agreeable for consult but declines LP  -updated daughter Dr. Delgado on 8/4

## 2022-08-04 NOTE — PROGRESS NOTE ADULT - SUBJECTIVE AND OBJECTIVE BOX
[facility] DEPARTMENT OF OPHTHALMOLOGY  ------------------------------------------------------------------------------    Interval History: Following for R HZO. Today patient appears to have improved mental status and is more cooperative with exam. Also appears more comfortable.     MEDICATIONS  (STANDING):  acetaminophen     Tablet .. 975 milliGRAM(s) Oral every 6 hours  acyclovir IVPB 650 milliGRAM(s) IV Intermittent every 8 hours  ALBUTerol    0.083% 2.5 milliGRAM(s) Nebulizer every 6 hours  artificial tears (preservative free) Ophthalmic Solution 1 Drop(s) Both EYES every 2 hours  aspirin  chewable 324 milliGRAM(s) Oral daily  buDESOnide    Inhalation Suspension 0.5 milliGRAM(s) Inhalation two times a day  dextrose 5%. 1000 milliLiter(s) (100 mL/Hr) IV Continuous <Continuous>  dextrose 5%. 1000 milliLiter(s) (50 mL/Hr) IV Continuous <Continuous>  dextrose 50% Injectable 25 Gram(s) IV Push once  dextrose 50% Injectable 12.5 Gram(s) IV Push once  dextrose 50% Injectable 25 Gram(s) IV Push once  enoxaparin Injectable 40 milliGRAM(s) SubCutaneous every 24 hours  erythromycin   Ointment 1 Application(s) Right EYE four times a day  gabapentin 300 milliGRAM(s) Oral three times a day  glucagon  Injectable 1 milliGRAM(s) IntraMuscular once  hydrocortisone 2.5% Cream 1 Application(s) Topical two times a day  insulin glargine Injectable (LANTUS) 10 Unit(s) SubCutaneous at bedtime  insulin lispro (ADMELOG) corrective regimen sliding scale   SubCutaneous Before meals and at bedtime  ketorolac   Injectable 15 milliGRAM(s) IV Push once  lactated ringers. 1000 milliLiter(s) (75 mL/Hr) IV Continuous <Continuous>  lactobacillus acidophilus 1 Tablet(s) Oral daily  lidocaine 5% Ointment 1 Application(s) Topical two times a day  losartan 50 milliGRAM(s) Oral daily  meropenem  IVPB 1000 milliGRAM(s) IV Intermittent every 8 hours  metoprolol succinate ER 12.5 milliGRAM(s) Oral daily  mirtazapine 15 milliGRAM(s) Oral at bedtime  mupirocin 2% Ointment 1 Application(s) Topical three times a day  nystatin Powder 1 Application(s) Topical two times a day  potassium chloride    Tablet ER 40 milliEquivalent(s) Oral once  prednisoLONE acetate 1% Suspension 1 Drop(s) Right EYE three times a day  traZODone 100 milliGRAM(s) Oral at bedtime  triamcinolone 0.1% Ointment 1 Application(s) Topical two times a day  ursodiol Tablet 500 milliGRAM(s) Oral <User Schedule>  vancomycin  IVPB 750 milliGRAM(s) IV Intermittent every 12 hours  vancomycin  IVPB        MEDICATIONS  (PRN):  AQUAPHOR (petrolatum Ointment) 1 Application(s) Topical three times a day PRN inguinal fold rash  dextrose Oral Gel 15 Gram(s) Oral once PRN Blood Glucose LESS THAN 70 milliGRAM(s)/deciliter  HYDROmorphone  Injectable 0.5 milliGRAM(s) IV Push every 3 hours PRN Pain  hydrOXYzine hydrochloride 20 milliGRAM(s) Oral four times a day PRN Itching  QUEtiapine 25 milliGRAM(s) Oral daily PRN agitation      VITALS: T(C): 36.3 (08-04-22 @ 11:08)  T(F): 97.4 (08-04-22 @ 11:08), Max: 98.5 (08-03-22 @ 21:12)  HR: 72 (08-04-22 @ 14:52) (72 - 87)  BP: 151/55 (08-04-22 @ 11:08) (149/65 - 160/60)  RR:  (17 - 18)  SpO2:  (91% - 99%)  Wt(kg): --  General: AAO x 3, appropriate mood and affect    Ophthalmology Exam:  Visual acuity (sc): 20/200 OD, 20/30 OS  Pupils: 6mm, nonreactive to light OD, pinpoint but reactive OS  Ttono: 12 OD, 13 OS  Extraocular movements (EOMs): 100% abduction deficit OD, otherwise full OU    Pen Light Exam (PLE)  External: Flat OU  Lids/Lashes/Lacrimal Ducts: Flat OU    Sclera/Conjunctiva: W+Q OU  Cornea: Cl OU  Anterior Chamber: D+F OU    Iris: Flat OU  Lens: Cl OU      Arely KRUGER Rai, MD  PGY-3, Ophthalmology  Pager: [phone]    Available on [employer] Teams [facility] DEPARTMENT OF OPHTHALMOLOGY  ------------------------------------------------------------------------------    Interval History: Following for R HZO. Today patient appears to have improved mental status and is more cooperative with exam. Also appears more comfortable.     MEDICATIONS  (STANDING):  acetaminophen     Tablet .. 975 milliGRAM(s) Oral every 6 hours  acyclovir IVPB 650 milliGRAM(s) IV Intermittent every 8 hours  ALBUTerol    0.083% 2.5 milliGRAM(s) Nebulizer every 6 hours  artificial tears (preservative free) Ophthalmic Solution 1 Drop(s) Both EYES every 2 hours  aspirin  chewable 324 milliGRAM(s) Oral daily  buDESOnide    Inhalation Suspension 0.5 milliGRAM(s) Inhalation two times a day  dextrose 5%. 1000 milliLiter(s) (100 mL/Hr) IV Continuous <Continuous>  dextrose 5%. 1000 milliLiter(s) (50 mL/Hr) IV Continuous <Continuous>  dextrose 50% Injectable 25 Gram(s) IV Push once  dextrose 50% Injectable 12.5 Gram(s) IV Push once  dextrose 50% Injectable 25 Gram(s) IV Push once  enoxaparin Injectable 40 milliGRAM(s) SubCutaneous every 24 hours  erythromycin   Ointment 1 Application(s) Right EYE four times a day  gabapentin 300 milliGRAM(s) Oral three times a day  glucagon  Injectable 1 milliGRAM(s) IntraMuscular once  hydrocortisone 2.5% Cream 1 Application(s) Topical two times a day  insulin glargine Injectable (LANTUS) 10 Unit(s) SubCutaneous at bedtime  insulin lispro (ADMELOG) corrective regimen sliding scale   SubCutaneous Before meals and at bedtime  ketorolac   Injectable 15 milliGRAM(s) IV Push once  lactated ringers. 1000 milliLiter(s) (75 mL/Hr) IV Continuous <Continuous>  lactobacillus acidophilus 1 Tablet(s) Oral daily  lidocaine 5% Ointment 1 Application(s) Topical two times a day  losartan 50 milliGRAM(s) Oral daily  meropenem  IVPB 1000 milliGRAM(s) IV Intermittent every 8 hours  metoprolol succinate ER 12.5 milliGRAM(s) Oral daily  mirtazapine 15 milliGRAM(s) Oral at bedtime  mupirocin 2% Ointment 1 Application(s) Topical three times a day  nystatin Powder 1 Application(s) Topical two times a day  potassium chloride    Tablet ER 40 milliEquivalent(s) Oral once  prednisoLONE acetate 1% Suspension 1 Drop(s) Right EYE three times a day  traZODone 100 milliGRAM(s) Oral at bedtime  triamcinolone 0.1% Ointment 1 Application(s) Topical two times a day  ursodiol Tablet 500 milliGRAM(s) Oral <User Schedule>  vancomycin  IVPB 750 milliGRAM(s) IV Intermittent every 12 hours  vancomycin  IVPB        MEDICATIONS  (PRN):  AQUAPHOR (petrolatum Ointment) 1 Application(s) Topical three times a day PRN inguinal fold rash  dextrose Oral Gel 15 Gram(s) Oral once PRN Blood Glucose LESS THAN 70 milliGRAM(s)/deciliter  HYDROmorphone  Injectable 0.5 milliGRAM(s) IV Push every 3 hours PRN Pain  hydrOXYzine hydrochloride 20 milliGRAM(s) Oral four times a day PRN Itching  QUEtiapine 25 milliGRAM(s) Oral daily PRN agitation      VITALS: T(C): 36.3 (08-04-22 @ 11:08)  T(F): 97.4 (08-04-22 @ 11:08), Max: 98.5 (08-03-22 @ 21:12)  HR: 72 (08-04-22 @ 14:52) (72 - 87)  BP: 151/55 (08-04-22 @ 11:08) (149/65 - 160/60)  RR:  (17 - 18)  SpO2:  (91% - 99%)  Wt(kg): --  General: AAO x 3, appropriate mood and affect    Ophthalmology Exam:  Visual acuity (sc): 20/200 OD, 20/30 OS  Pupils: 6mm, nonreactive to light OD, pinpoint but reactive OS  Ttono: 12 OD, 13 OS  Extraocular movements (EOMs): 100% abduction deficit OD, otherwise full OU    Pen Light Exam - pt unable to tolerate sitting up to slit lamp.  External: R sided forehead edema and erythema (improved), with overlying dark brown crusting, no longer extending to eyelid margins, + L preauricular edema and erythema, improved, wnl on L side  Lids/Lashes/Lacrimal Ducts: Trace periorbital edema RUL and RLL, flat OS   Sclera/Conjunctiva: 1+ injection OD, W+Q OS  Cornea: 1+ SPK, 1+ D-folds, +bullae, no pseudodendrites OD (improved overall since yesterday), Cl OS  Anterior Chamber: D+F OU    Iris: Flat OU  Lens: PCIOL OD, 3+ NS OS [facility] DEPARTMENT OF OPHTHALMOLOGY  ------------------------------------------------------------------------------    Interval History: Following for R HZO. Today patient appears to have improved mental status and is more cooperative with exam. Also appears more comfortable.  Pt c/o pain on forehead.  No eye complaints.     MEDICATIONS  (STANDING):  acetaminophen     Tablet .. 975 milliGRAM(s) Oral every 6 hours  acyclovir IVPB 650 milliGRAM(s) IV Intermittent every 8 hours  ALBUTerol    0.083% 2.5 milliGRAM(s) Nebulizer every 6 hours  artificial tears (preservative free) Ophthalmic Solution 1 Drop(s) Both EYES every 2 hours  aspirin  chewable 324 milliGRAM(s) Oral daily  buDESOnide    Inhalation Suspension 0.5 milliGRAM(s) Inhalation two times a day  dextrose 5%. 1000 milliLiter(s) (100 mL/Hr) IV Continuous <Continuous>  dextrose 5%. 1000 milliLiter(s) (50 mL/Hr) IV Continuous <Continuous>  dextrose 50% Injectable 25 Gram(s) IV Push once  dextrose 50% Injectable 12.5 Gram(s) IV Push once  dextrose 50% Injectable 25 Gram(s) IV Push once  enoxaparin Injectable 40 milliGRAM(s) SubCutaneous every 24 hours  erythromycin   Ointment 1 Application(s) Right EYE four times a day  gabapentin 300 milliGRAM(s) Oral three times a day  glucagon  Injectable 1 milliGRAM(s) IntraMuscular once  hydrocortisone 2.5% Cream 1 Application(s) Topical two times a day  insulin glargine Injectable (LANTUS) 10 Unit(s) SubCutaneous at bedtime  insulin lispro (ADMELOG) corrective regimen sliding scale   SubCutaneous Before meals and at bedtime  ketorolac   Injectable 15 milliGRAM(s) IV Push once  lactated ringers. 1000 milliLiter(s) (75 mL/Hr) IV Continuous <Continuous>  lactobacillus acidophilus 1 Tablet(s) Oral daily  lidocaine 5% Ointment 1 Application(s) Topical two times a day  losartan 50 milliGRAM(s) Oral daily  meropenem  IVPB 1000 milliGRAM(s) IV Intermittent every 8 hours  metoprolol succinate ER 12.5 milliGRAM(s) Oral daily  mirtazapine 15 milliGRAM(s) Oral at bedtime  mupirocin 2% Ointment 1 Application(s) Topical three times a day  nystatin Powder 1 Application(s) Topical two times a day  potassium chloride    Tablet ER 40 milliEquivalent(s) Oral once  prednisoLONE acetate 1% Suspension 1 Drop(s) Right EYE three times a day  traZODone 100 milliGRAM(s) Oral at bedtime  triamcinolone 0.1% Ointment 1 Application(s) Topical two times a day  ursodiol Tablet 500 milliGRAM(s) Oral <User Schedule>  vancomycin  IVPB 750 milliGRAM(s) IV Intermittent every 12 hours  vancomycin  IVPB        MEDICATIONS  (PRN):  AQUAPHOR (petrolatum Ointment) 1 Application(s) Topical three times a day PRN inguinal fold rash  dextrose Oral Gel 15 Gram(s) Oral once PRN Blood Glucose LESS THAN 70 milliGRAM(s)/deciliter  HYDROmorphone  Injectable 0.5 milliGRAM(s) IV Push every 3 hours PRN Pain  hydrOXYzine hydrochloride 20 milliGRAM(s) Oral four times a day PRN Itching  QUEtiapine 25 milliGRAM(s) Oral daily PRN agitation      VITALS: T(C): 36.3 (08-04-22 @ 11:08)  T(F): 97.4 (08-04-22 @ 11:08), Max: 98.5 (08-03-22 @ 21:12)  HR: 72 (08-04-22 @ 14:52) (72 - 87)  BP: 151/55 (08-04-22 @ 11:08) (149/65 - 160/60)  RR:  (17 - 18)  SpO2:  (91% - 99%)  Wt(kg): --  General: AAO x 3, appropriate mood and affect    Ophthalmology Exam:  Visual acuity (sc): 20/200 OD, 20/30 OS  Pupils: 6mm, nonreactive to light OD, pinpoint but reactive OS  Ttono: 12 OD, 13 OS  Extraocular movements (EOMs): 100% abduction deficit OD, otherwise full OU    Pen Light Exam - pt unable to tolerate sitting up to slit lamp.  External: R sided forehead edema and erythema (improved), with overlying dark brown crusting, no longer extending to eyelid margins, + L preauricular edema and erythema, improved, wnl on L side  Lids/Lashes/Lacrimal Ducts: Trace periorbital edema RUL and RLL, flat OS   Sclera/Conjunctiva: 1+ injection OD, W+Q OS  Cornea: 1+ SPK, 1+ D-folds, +bullae, no pseudodendrites OD (improved overall since yesterday), Cl OS  Anterior Chamber: D+F OU    Iris: Flat OU  Lens: PCIOL OD, 3+ NS OS

## 2022-08-04 NOTE — CHART NOTE - NSCHARTNOTEFT_GEN_A_CORE
Neurology team called regarding patient's persistent altered mental status. Given deferring lumbar puncture at this time, primary team had reached out to see if further work up can be done. Given ongoing fever and abx+antiviral treatment & already established herpes infection history, neurology team consulted on 7/26/22 had agreed with patient's daughter about deferring lumbar puncture.     Earlier in admission, pt underwent MRI and result was noted below  < from: MR Head w/wo IV Cont (07.27.22 @ 16:55) >    IMPRESSION:    Evaluation the orbits is limited. However, asymmetric edema appears to   involve the right orbital medial and lateral rectus muscles, as well as   the superior oblique muscle, suspicious for a myositis (viral given the   known herpes zoster with superimposed bacterial infection not excluded).   There is no evidence for orbital abscess. The cavernous sinuses and   superior ophthalmic veins remain patent. The right preseptal periorbital   soft tissue swelling appears improved compared to the CT suggesting   improving preseptal cellulitis/zoster. Serial imaging follow-up is   recommended to monitor for stability.    No evidence for acute infarct, acute intracranial hemorrhage, or   intracranial abscess.    Stable ventricular size.    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    --- End of Report ---    < end of copied text >    Recommendations:  [] At this point, since ongoing treatment for herpres, can consider toxic metabolic work up including vitamin B1, B12, folate, TSH (T3 and T4), ammonia). If persistent AMS even after course of herpes treatment, will discuss further about lumbar puncture for altered mental status.  [] Neurocheck and vital per unit protocol    Case discussed with general neurology attending Dr. Vahid Rasheed  Neurology PGY3

## 2022-08-04 NOTE — PROGRESS NOTE ADULT - SUBJECTIVE AND OBJECTIVE BOX
Patient is a 86y old  Female who presents with a chief complaint of Suspicion for Herpes Zoster opthalmicus/encephalitis (03 Aug 2022 20:23)      SUBJECTIVE / OVERNIGHT EVENTS: Pt seen and examined along with ACP at 11:30am, no overnight events noted, afebrile, pt denies any pain currently, had straight cath overnight with 600cc urine, this am voiding per nsg, no other new issues reported.    MEDICATIONS  (STANDING):  acetaminophen     Tablet .. 975 milliGRAM(s) Oral every 6 hours  acyclovir IVPB 650 milliGRAM(s) IV Intermittent every 8 hours  ALBUTerol    0.083% 2.5 milliGRAM(s) Nebulizer every 6 hours  artificial tears (preservative free) Ophthalmic Solution 1 Drop(s) Both EYES every 2 hours  aspirin  chewable 324 milliGRAM(s) Oral daily  buDESOnide    Inhalation Suspension 0.5 milliGRAM(s) Inhalation two times a day  dextrose 5%. 1000 milliLiter(s) (100 mL/Hr) IV Continuous <Continuous>  dextrose 5%. 1000 milliLiter(s) (50 mL/Hr) IV Continuous <Continuous>  dextrose 50% Injectable 25 Gram(s) IV Push once  dextrose 50% Injectable 12.5 Gram(s) IV Push once  dextrose 50% Injectable 25 Gram(s) IV Push once  enoxaparin Injectable 40 milliGRAM(s) SubCutaneous every 24 hours  erythromycin   Ointment 1 Application(s) Right EYE four times a day  gabapentin 300 milliGRAM(s) Oral three times a day  glucagon  Injectable 1 milliGRAM(s) IntraMuscular once  hydrocortisone 2.5% Cream 1 Application(s) Topical two times a day  insulin glargine Injectable (LANTUS) 10 Unit(s) SubCutaneous at bedtime  insulin lispro (ADMELOG) corrective regimen sliding scale   SubCutaneous Before meals and at bedtime  lactated ringers. 1000 milliLiter(s) (75 mL/Hr) IV Continuous <Continuous>  lactobacillus acidophilus 1 Tablet(s) Oral daily  lidocaine 5% Ointment 1 Application(s) Topical two times a day  losartan 50 milliGRAM(s) Oral daily  meropenem  IVPB 1000 milliGRAM(s) IV Intermittent every 8 hours  metoprolol succinate ER 12.5 milliGRAM(s) Oral daily  mirtazapine 15 milliGRAM(s) Oral at bedtime  mupirocin 2% Ointment 1 Application(s) Topical three times a day  nystatin Powder 1 Application(s) Topical two times a day  potassium chloride    Tablet ER 40 milliEquivalent(s) Oral once  prednisoLONE acetate 1% Suspension 1 Drop(s) Right EYE three times a day  traZODone 100 milliGRAM(s) Oral at bedtime  triamcinolone 0.1% Ointment 1 Application(s) Topical two times a day  ursodiol Tablet 500 milliGRAM(s) Oral <User Schedule>  vancomycin  IVPB 750 milliGRAM(s) IV Intermittent every 12 hours  vancomycin  IVPB        MEDICATIONS  (PRN):  AQUAPHOR (petrolatum Ointment) 1 Application(s) Topical three times a day PRN inguinal fold rash  dextrose Oral Gel 15 Gram(s) Oral once PRN Blood Glucose LESS THAN 70 milliGRAM(s)/deciliter  HYDROmorphone  Injectable 0.5 milliGRAM(s) IV Push every 3 hours PRN Pain  hydrOXYzine hydrochloride 20 milliGRAM(s) Oral four times a day PRN Itching  QUEtiapine 25 milliGRAM(s) Oral daily PRN agitation      Vital Signs Last 24 Hrs  T(C): 36.3 (04 Aug 2022 11:08), Max: 36.9 (03 Aug 2022 17:16)  T(F): 97.4 (04 Aug 2022 11:08), Max: 98.5 (03 Aug 2022 17:16)  HR: 76 (04 Aug 2022 11:08) (72 - 87)  BP: 151/55 (04 Aug 2022 11:08) (106/45 - 160/60)  BP(mean): --  RR: 18 (04 Aug 2022 11:08) (17 - 18)  SpO2: 91% (04 Aug 2022 11:08) (91% - 99%)    Parameters below as of 04 Aug 2022 11:08  Patient On (Oxygen Delivery Method): room air      CAPILLARY BLOOD GLUCOSE      POCT Blood Glucose.: 243 mg/dL (04 Aug 2022 11:36)  POCT Blood Glucose.: 277 mg/dL (04 Aug 2022 07:08)  POCT Blood Glucose.: 295 mg/dL (03 Aug 2022 21:33)  POCT Blood Glucose.: 230 mg/dL (03 Aug 2022 16:53)    I&O's Summary    03 Aug 2022 07:01  -  04 Aug 2022 07:00  --------------------------------------------------------  IN: 0 mL / OUT: 1550 mL / NET: -1550 mL        PHYSICAL EXAM:  GENERAL: NAD, obese female  EYES:  rt eye with periorbital edema improving, opens left eye  CHEST/LUNG: decreased bs at bases  HEART: Regular rate and rhythm  ABDOMEN: Soft, Nontender, Nondistended  EXTREMITIES: no LE edema  PSYCH: Calm  NEUROLOGY: AOx3  SKIN: Greenish scabbing/crusting in V1 dermatome markedly more confluent, Background erythema improving Erythema tracking down along R neck improved    LABS:                        10.2   6.42  )-----------( 256      ( 04 Aug 2022 05:00 )             30.9     08-04    131<L>  |  98  |  10  ----------------------------<  276<H>  4.3   |  21<L>  |  0.55    Ca    8.3<L>      04 Aug 2022 05:00  Phos  2.7     08-04  Mg     1.70     08-04    TPro  6.3  /  Alb  2.7<L>  /  TBili  0.4  /  DBili  x   /  AST  22  /  ALT  6   /  AlkPhos  94  08-03          Urinalysis Basic - ( 03 Aug 2022 19:17 )    Color: Yellow / Appearance: Slightly Turbid / S.017 / pH: x  Gluc: x / Ketone: Negative  / Bili: Negative / Urobili: <2 mg/dL   Blood: x / Protein: 30 mg/dL / Nitrite: Negative   Leuk Esterase: Large / RBC: 50 /HPF /  /HPF   Sq Epi: x / Non Sq Epi: 1 /HPF / Bacteria: Moderate        RADIOLOGY & ADDITIONAL TESTS:    Imaging Personally Reviewed:    Consultant(s) Notes Reviewed:      Care Discussed with Consultants/Other Providers:

## 2022-08-04 NOTE — PROGRESS NOTE ADULT - PROBLEM SELECTOR PLAN 5
Hx of rash in inguinal folds. Unable to recall which ointments were being used.  - Continue nystatin powder BID  - Appreciate derm recs: mupirocin ointment TID to perianal erosions  - CTM for worsening of rash

## 2022-08-04 NOTE — PROGRESS NOTE ADULT - PROBLEM SELECTOR PLAN 7
NPH diagnosed 2011, pt has programmable  shunt installed by [employer] to [facility] neurosurg, **must be programmed after MRI (page neurosurg [id number])**. 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    Daughter agreeable for neurology consult but declines LP

## 2022-08-04 NOTE — PROGRESS NOTE ADULT - ATTENDING COMMENTS
I have interviewed and examined the patient and reviewed the residents note including the history, exam, assessment, and plan.  I agree with the residents assessment and plan.    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 Rash and mental status improved today    1. HZO OD with of R sided facial rash - improving today  - Pt has been scratching at R sided facial rash with nails - concern for worsening bacterial superinfection with crusting over area of edema and erythema. Current crusting appears improved compared to yesterday, no longer extending to lid margin OD    - Mental status significantly improved today. Able to assess VA  - CT maxillofacial showing extensive R periorbital preseptal soft tissue swelling and asymmetric enlargement of R sided extraocular muscles. No evidence of orbital cellulitis. Evidence of extension to neck.  - No evidence of nec fasc  - Appreciate derm and plastic surgery consults.    - Abx per ID  - Pt unable to tolerate sitting up at slit lamp - cannot assess anterior chamber for inflammatory reaction  - IOP improved today, 12/13. Was previously symmetrically low OU likely 2/2 systemic medication effect. Will continue to hold any IOP lowering drops.   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 Improved today, but likely contributing to decreased vision OD.   - C/w pred forte TID OD.   - B-scan 8/3 OD: no vitritis, RD, mass.   - C/w erythromycin ointment QID to R eye and periocular lesions  - C/w preservative-free artificial tears to Q2H OU   - Pt has dilated and minimally reactive R pupil. 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 Stable on CT.   - Will continue to monitor for improvement of EOMs on antivirals.     2. 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

## 2022-08-04 NOTE — PROGRESS NOTE ADULT - PROBLEM SELECTOR PLAN 9
DVT ppx: subQ lovenox  Diet: soft and bite sized (S&S recd minced and moist however daughter accepts risks)    -Hypophosphatemia- replete phos    Dispo:  - pt has difficult vessels, has midline in place, per IR can place IR PICC line order on 8/4 (after 48h negative cultures) if blood draws needed  - PT/OT recommending rehab, will f/u with   -PT to revaluate  -Rpt swallow eval DVT ppx: subQ lovenox  Diet: soft and bite sized (S&S recd minced and moist however daughter accepts risks)        Dispo:  - pt has difficult vessels, has midline in place, per IR can place IR PICC line order on 8/4 (after 48h negative cultures) if blood draws needed  - PT/OT recommending rehab, will f/u with   -PT to revaluate  -Rpt swallow eval

## 2022-08-04 NOTE — PROGRESS NOTE ADULT - SUBJECTIVE AND OBJECTIVE BOX
INTERVAL HPI/OVERNIGHT EVENTS:  - Patient interacting more so this afternoon, responding to some questions   - Does still have pain in the forehead and eye     MEDICATIONS  (STANDING):  acyclovir IVPB 650 milliGRAM(s) IV Intermittent every 8 hours  albuterol/ipratropium for Nebulization. 3 milliLiter(s) Nebulizer once  amLODIPine   Tablet 5 milliGRAM(s) Oral daily  artificial tears (preservative free) Ophthalmic Solution 1 Drop(s) Both EYES every 2 hours  aspirin  chewable 324 milliGRAM(s) Oral daily  budesonide  80 MICROgram(s)/formoterol 4.5 MICROgram(s) Inhaler 2 Puff(s) Inhalation two times a day  dextrose 5%. 1000 milliLiter(s) (100 mL/Hr) IV Continuous <Continuous>  dextrose 5%. 1000 milliLiter(s) (50 mL/Hr) IV Continuous <Continuous>  dextrose 50% Injectable 25 Gram(s) IV Push once  dextrose 50% Injectable 12.5 Gram(s) IV Push once  dextrose 50% Injectable 25 Gram(s) IV Push once  enoxaparin Injectable 40 milliGRAM(s) SubCutaneous every 24 hours  erythromycin   Ointment 1 Application(s) Right EYE four times a day  gabapentin 300 milliGRAM(s) Oral three times a day  glucagon  Injectable 1 milliGRAM(s) IntraMuscular once  insulin glargine Injectable (LANTUS) 10 Unit(s) SubCutaneous at bedtime  insulin lispro (ADMELOG) corrective regimen sliding scale   SubCutaneous Before meals and at bedtime  lactated ringers. 1000 milliLiter(s) (75 mL/Hr) IV Continuous <Continuous>  lactobacillus acidophilus 1 Tablet(s) Oral daily  lidocaine 5% Ointment 1 Application(s) Topical two times a day  losartan 50 milliGRAM(s) Oral daily  meropenem  IVPB 1000 milliGRAM(s) IV Intermittent every 8 hours  metoprolol succinate ER 12.5 milliGRAM(s) Oral daily  mirtazapine 15 milliGRAM(s) Oral at bedtime  mupirocin 2% Ointment 1 Application(s) Topical three times a day  nystatin Powder 1 Application(s) Topical two times a day  potassium chloride    Tablet ER 40 milliEquivalent(s) Oral once  tiotropium 18 MICROgram(s) Capsule 1 Capsule(s) Inhalation daily  traZODone 100 milliGRAM(s) Oral at bedtime  triamcinolone 0.1% Ointment 1 Application(s) Topical two times a day  ursodiol Tablet 500 milliGRAM(s) Oral <User Schedule>  vancomycin  IVPB 750 milliGRAM(s) IV Intermittent every 12 hours  vancomycin  IVPB        MEDICATIONS  (PRN):  acetaminophen     Tablet .. 650 milliGRAM(s) Oral every 6 hours PRN Mild Pain (1 - 3), Moderate Pain (4 - 6), Severe Pain (7 - 10)  dextrose Oral Gel 15 Gram(s) Oral once PRN Blood Glucose LESS THAN 70 milliGRAM(s)/deciliter  HYDROmorphone  Injectable 0.5 milliGRAM(s) IV Push every 3 hours PRN Pain  hydrOXYzine hydrochloride 20 milliGRAM(s) Oral four times a day PRN Itching  QUEtiapine 25 milliGRAM(s) Oral daily PRN agitation      Allergies    diltiazem (Other; Rash)    Intolerances        REVIEW OF SYSTEMS - unable to obtain from patient      Vital Signs Last 24 Hrs  T(C): 36.9 (02 Aug 2022 17:18), Max: 37.1 (01 Aug 2022 18:33)  T(F): 98.5 (02 Aug 2022 17:18), Max: 98.8 (01 Aug 2022 18:33)  HR: 99 (02 Aug 2022 17:18) (75 - 99)  BP: 112/54 (02 Aug 2022 17:18) (110/51 - 155/68)  BP(mean): --  RR: 18 (02 Aug 2022 17:18) (16 - 18)  SpO2: 92% (02 Aug 2022 17:18) (92% - 94%)    Parameters below as of 02 Aug 2022 17:18  Patient On (Oxygen Delivery Method): room air          PHYSICAL EXAM:    General: resting comfortably   HEENT: edema surrounding eyes, crusting   CV: No lower extremity edema, extremities warm and well perfused, +dorsalis pedis pulses  Resp: Non labored breathing, no clubbing of extremities  GI: Non distended abdomen, no palpable hepatosplenomegaly  Lymph: No visible lymphadenopathy  Neuro: resting comfortably   Skin: The scalp/hair, head/face, conjunctivae/lids, lips/teeth/gums, neck, digits/nails, right and left axilla, right and left upper and lower extremities, chest, abdomen, back, buttocks and groin area. No bromhidrosis or hyperhidrosis.    Of note on skin exam:  erosions with yellow-brown crusting, with surrounding edematous plaque of the upper forehead, improved from prior  LABS:                        12.3   12.92 )-----------( 326      ( 02 Aug 2022 06:30 )             38.4     08-02    135  |  97<L>  |  9   ----------------------------<  273<H>  4.0   |  26  |  0.61    Ca    8.7      02 Aug 2022 06:30  Phos  2.6     08-02  Mg     1.70     08-02    TPro  6.1  /  Alb  2.9<L>  /  TBili  0.2  /  DBili  x   /  AST  15  /  ALT  <5<L>  /  AlkPhos  89  08-01          RADIOLOGY & ADDITIONAL TESTS:    COVID neg 7/26  RVP neg 7/26  Hep B, Hep C neg 7/27  HIV neg    Blood cx 7/28 – ngtd x 2  Blood cx 7/27 – ngtd  Blood cx 7/26 – staph epidermidis, staph hominis – ID feels contaminants    IMAGING    Head US 8/2 : No focal collection or hyperemia identified along the right face or neck. Recommend correlation with the same day neck CT exam.    CT 8/2: Extensive right periorbital preseptal soft tissue swelling is again noted concordant with the history of shingles over the right eye. A  superimposed cellulitis can't be excluded. Asymmetric enlargement of the  right-sided extraocular muscles appears similar compared to the  07/27/2022 orbital MRI study. There is no evidence for orbital abscess.  The superior ophthalmic veins remain patent. The cavernous sinuses appear  symmetric.    Diffuse nonspecific skin and subcutaneous soft tissue  swelling-infiltration involves the scalp, right lateral neck soft  tissues, and anterior neck soft tissues, extending to the upper right  thoracic region, most likely reflecting a cellulitis given the clinical  history, without evidence for abscess formation at this time.      CT 7/26: The ventricular system is not dilated, and is markedly smaller in size  compared to the 2011 head CT study. Comparison with the more recent  outside head CT is recommended to evaluate for any interval change  Chronic left parietal infarct.  There is no gross CT evidence for superimposed acute vascular  distribution infarct. There is no evidence for acute intracranial  hemorrhage.  Extensive right periorbital preseptal soft tissue swelling is noted  concordant with the history of shingles over the right eye. A  superimposed cellulitis can't be excluded. No post septal extension is  appreciated.  Generalized diffuse frontal scalp soft tissue swelling is present which  may be related to the shingles infection, a superimposed cellulitis, or a  combination of both. The adjacent calvarium appears intact without  periosteal reaction or bony destruction.    If the patient has new and persistent unexplained symptoms, consider  follow-up brain MRI with and without contrast for further workup,  provided there are no MRI or contrast contraindications.    MR head and orbit 7/27: Evaluation the orbits is limited. However, asymmetric edema appears to involve the right orbital medial and lateral rectus muscles, as well as the superior oblique muscle, suspicious for a myositis (viral given the known herpes zoster with superimposed bacterial infection not excluded).  There is no evidence for orbital abscess. The cavernous sinuses and  superior ophthalmic veins remain patent. The right preseptal periorbital  soft tissue swelling appears improved compared to the CT suggesting  improving preseptal cellulitis/zoster. Serial imaging follow-up is  recommended to monitor for stability. No evidence for acute infarct, acute intracranial hemorrhage, or intracranial abscess.  Stable ventricular size.  Chronic left frontal parietal infarcts and chronic white matter changes  as described.     INTERVAL HPI/OVERNIGHT EVENTS:  - Patient interacting more so this afternoon, responding to some questions   - Continues to endorse pain in the forehead and eye as per the aid    MEDICATIONS  (STANDING):  acyclovir IVPB 650 milliGRAM(s) IV Intermittent every 8 hours  albuterol/ipratropium for Nebulization. 3 milliLiter(s) Nebulizer once  amLODIPine   Tablet 5 milliGRAM(s) Oral daily  artificial tears (preservative free) Ophthalmic Solution 1 Drop(s) Both EYES every 2 hours  aspirin  chewable 324 milliGRAM(s) Oral daily  budesonide  80 MICROgram(s)/formoterol 4.5 MICROgram(s) Inhaler 2 Puff(s) Inhalation two times a day  dextrose 5%. 1000 milliLiter(s) (100 mL/Hr) IV Continuous <Continuous>  dextrose 5%. 1000 milliLiter(s) (50 mL/Hr) IV Continuous <Continuous>  dextrose 50% Injectable 25 Gram(s) IV Push once  dextrose 50% Injectable 12.5 Gram(s) IV Push once  dextrose 50% Injectable 25 Gram(s) IV Push once  enoxaparin Injectable 40 milliGRAM(s) SubCutaneous every 24 hours  erythromycin   Ointment 1 Application(s) Right EYE four times a day  gabapentin 300 milliGRAM(s) Oral three times a day  glucagon  Injectable 1 milliGRAM(s) IntraMuscular once  insulin glargine Injectable (LANTUS) 10 Unit(s) SubCutaneous at bedtime  insulin lispro (ADMELOG) corrective regimen sliding scale   SubCutaneous Before meals and at bedtime  lactated ringers. 1000 milliLiter(s) (75 mL/Hr) IV Continuous <Continuous>  lactobacillus acidophilus 1 Tablet(s) Oral daily  lidocaine 5% Ointment 1 Application(s) Topical two times a day  losartan 50 milliGRAM(s) Oral daily  meropenem  IVPB 1000 milliGRAM(s) IV Intermittent every 8 hours  metoprolol succinate ER 12.5 milliGRAM(s) Oral daily  mirtazapine 15 milliGRAM(s) Oral at bedtime  mupirocin 2% Ointment 1 Application(s) Topical three times a day  nystatin Powder 1 Application(s) Topical two times a day  potassium chloride    Tablet ER 40 milliEquivalent(s) Oral once  tiotropium 18 MICROgram(s) Capsule 1 Capsule(s) Inhalation daily  traZODone 100 milliGRAM(s) Oral at bedtime  triamcinolone 0.1% Ointment 1 Application(s) Topical two times a day  ursodiol Tablet 500 milliGRAM(s) Oral <User Schedule>  vancomycin  IVPB 750 milliGRAM(s) IV Intermittent every 12 hours  vancomycin  IVPB        MEDICATIONS  (PRN):  acetaminophen     Tablet .. 650 milliGRAM(s) Oral every 6 hours PRN Mild Pain (1 - 3), Moderate Pain (4 - 6), Severe Pain (7 - 10)  dextrose Oral Gel 15 Gram(s) Oral once PRN Blood Glucose LESS THAN 70 milliGRAM(s)/deciliter  HYDROmorphone  Injectable 0.5 milliGRAM(s) IV Push every 3 hours PRN Pain  hydrOXYzine hydrochloride 20 milliGRAM(s) Oral four times a day PRN Itching  QUEtiapine 25 milliGRAM(s) Oral daily PRN agitation      Allergies    diltiazem (Other; Rash)    Intolerances        REVIEW OF SYSTEMS - unable to obtain from patient      Vital Signs Last 24 Hrs  T(C): 36.9 (02 Aug 2022 17:18), Max: 37.1 (01 Aug 2022 18:33)  T(F): 98.5 (02 Aug 2022 17:18), Max: 98.8 (01 Aug 2022 18:33)  HR: 99 (02 Aug 2022 17:18) (75 - 99)  BP: 112/54 (02 Aug 2022 17:18) (110/51 - 155/68)  BP(mean): --  RR: 18 (02 Aug 2022 17:18) (16 - 18)  SpO2: 92% (02 Aug 2022 17:18) (92% - 94%)    Parameters below as of 02 Aug 2022 17:18  Patient On (Oxygen Delivery Method): room air          PHYSICAL EXAM:    General: resting comfortably   HEENT: edema surrounding eyes, crusting   CV: No lower extremity edema, extremities warm and well perfused, +dorsalis pedis pulses  Resp: Non labored breathing, no clubbing of extremities  GI: Non distended abdomen, no palpable hepatosplenomegaly  Lymph: No visible lymphadenopathy  Neuro: resting comfortably   Skin:  No bromhidrosis or hyperhidrosis.    Of note on skin exam:  erosions with yellow-brown crusting, with surrounding edematous plaque of the upper forehead, improved from prior    LABS:                        12.3   12.92 )-----------( 326      ( 02 Aug 2022 06:30 )             38.4     08-02    135  |  97<L>  |  9   ----------------------------<  273<H>  4.0   |  26  |  0.61    Ca    8.7      02 Aug 2022 06:30  Phos  2.6     08-02  Mg     1.70     08-02    TPro  6.1  /  Alb  2.9<L>  /  TBili  0.2  /  DBili  x   /  AST  15  /  ALT  <5<L>  /  AlkPhos  89  08-01          RADIOLOGY & ADDITIONAL TESTS:    COVID neg 7/26  RVP neg 7/26  Hep B, Hep C neg 7/27  HIV neg    Blood cx 7/28 – ngtd x 2  Blood cx 7/27 – ngtd  Blood cx 7/26 – staph epidermidis, staph hominis – ID feels contaminants    IMAGING    Head US 8/2 : No focal collection or hyperemia identified along the right face or neck. Recommend correlation with the same day neck CT exam.    CT 8/2: Extensive right periorbital preseptal soft tissue swelling is again noted concordant with the history of shingles over the right eye. A  superimposed cellulitis can't be excluded. Asymmetric enlargement of the  right-sided extraocular muscles appears similar compared to the  07/27/2022 orbital MRI study. There is no evidence for orbital abscess.  The superior ophthalmic veins remain patent. The cavernous sinuses appear  symmetric.    Diffuse nonspecific skin and subcutaneous soft tissue  swelling-infiltration involves the scalp, right lateral neck soft  tissues, and anterior neck soft tissues, extending to the upper right  thoracic region, most likely reflecting a cellulitis given the clinical  history, without evidence for abscess formation at this time.      CT 7/26: The ventricular system is not dilated, and is markedly smaller in size  compared to the 2011 head CT study. Comparison with the more recent  outside head CT is recommended to evaluate for any interval change  Chronic left parietal infarct.  There is no gross CT evidence for superimposed acute vascular  distribution infarct. There is no evidence for acute intracranial  hemorrhage.  Extensive right periorbital preseptal soft tissue swelling is noted  concordant with the history of shingles over the right eye. A  superimposed cellulitis can't be excluded. No post septal extension is  appreciated.  Generalized diffuse frontal scalp soft tissue swelling is present which  may be related to the shingles infection, a superimposed cellulitis, or a  combination of both. The adjacent calvarium appears intact without  periosteal reaction or bony destruction.    If the patient has new and persistent unexplained symptoms, consider  follow-up brain MRI with and without contrast for further workup,  provided there are no MRI or contrast contraindications.    MR head and orbit 7/27: Evaluation the orbits is limited. However, asymmetric edema appears to involve the right orbital medial and lateral rectus muscles, as well as the superior oblique muscle, suspicious for a myositis (viral given the known herpes zoster with superimposed bacterial infection not excluded).  There is no evidence for orbital abscess. The cavernous sinuses and  superior ophthalmic veins remain patent. The right preseptal periorbital  soft tissue swelling appears improved compared to the CT suggesting  improving preseptal cellulitis/zoster. Serial imaging follow-up is  recommended to monitor for stability. No evidence for acute infarct, acute intracranial hemorrhage, or intracranial abscess.  Stable ventricular size.  Chronic left frontal parietal infarcts and chronic white matter changes  as described.

## 2022-08-05 NOTE — PROGRESS NOTE ADULT - PROBLEM SELECTOR PLAN 1
Prominent rash in R V1 dermatome. Has significant pruritis and patient has been scratching affected area. CT imaging consistent with cellulitis extending from scalp to upper thorax with no evidence of abscess or necrotizing fasciitis. Per plastics no evidence of nec fasc, appreciate evaluation. 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  - Sx management:    - Continue hydroxyzine 20mg QID PRN for itching    - Increase gabapentin to 400mg TID for neuropathic pain    - IV dilaudid 0.5mg Q3H PRN, PO acetaminophen 650mg Q6H PRN    - Lidocaine + triamcinolone ointment as below  - ID following, appreciate recs:    - acyclovir 650mg Q8H    - IV hydration while on acyclovir; LR 75cc/hr    - vanc 750mg BID, monitor levels; d/c cefazolin    - f/u 8/1 BCx x2, NGTD  -blood culture neg to date    - no indication for LP at this time  - Ophthalmology following, appreciate recs:    -  erythromycin ointment QID to eye and periocular lesions, artificial tears Q4H  - Appreciate derm recs:    - lidocaine ointment mixed with vaseline BID, triamcinolone ointment to red areas only BID    - apply vaseline to crusted areas Q2H, cont vanco and meropenem, discussed with ID attending and opthalmology attending on 8/3  -Neurology consult per Opthalmology attending, daughter agreeable for consult but declines LP  -updated daughter Dr. Delgado on 8/5 02-Aug-2022

## 2022-08-05 NOTE — PROGRESS NOTE ADULT - PROBLEM SELECTOR PLAN 4
SBP > 180 this admission, wnl today. Hx of severe HTN at home. Will CTM.  - continue home losartan, metoprolol  - hold furosemide and spironolactone for now, avoiding acute BP drops

## 2022-08-05 NOTE — PROGRESS NOTE ADULT - PROBLEM SELECTOR PLAN 7
NPH diagnosed 2011, pt has programmable  shunt installed by [facility] neuro[facility]urg, **must be programmed after MRI (page neurosurg [id number])**. 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    Daughter agreeable for neurology consult but declines LP

## 2022-08-05 NOTE — PROGRESS NOTE ADULT - ASSESSMENT
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 Rash and mental status continuing to improve.    1. HZO OD with of R sided facial rash - improving today  - Likely bacterial superinfection with crusting over area of edema and erythema. Current crusting continuing to improve, no longer extending to lid margin OD    - Mental status significantly improved today. VA 20/100 OS, improving from yesterday  - CT maxillofacial showing extensive R periorbital preseptal soft tissue swelling and asymmetric enlargement of R sided extraocular muscles. No evidence of orbital cellulitis. Evidence of extension to neck.  - No evidence of nec fasc  - Appreciate derm and plastic surgery consults.    - Abx per ID  - Pt unable to tolerate sitting up at slit lamp - cannot assess anterior chamber for inflammatory reaction  - IOP improved acceptable today. Was previously symmetrically low OU likely 2/2 systemic medication effect. Will continue to hold any IOP lowering drops.   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 Continuing to improve.   - C/w pred forte TID OD.   - B-scan 8/3 OD: no vitritis, RD, mass.   - C/w erythromycin ointment QID to R eye and periocular lesions  - C/w preservative-free artificial tears to Q2H OU   - Pt has dilated and minimally reactive R pupil. 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 Stable on CT.   - Will continue to monitor for improvement of EOMs on antivirals.     2. 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   - Appreciate neurology consult  - MRI brain showing no acute pathology, MRI orbits as above  - LP deferred per neurology and ID - reconsider as needed     SDW Dr. Barker, attending.     Outpatient follow-up: Patient should follow-up with his/her ophthalmologist or with [facility] Department of Ophthalmology at the address below     [address]  [place] [address]  [phone]

## 2022-08-05 NOTE — PROGRESS NOTE ADULT - ASSESSMENT
87 yo female w/PMH Cardiac stents post MI (1986, 1996), COPD, NPH, DM, Migraines, and suspected HTN presenting with 3 day history of herpes zoster rash  No fever, leukocytosis  R sided zoster rash, then increasing confusion  On exam with vesicles, redness at site  Optho with concern for R sided ocular involvement  2/4 BCX CoNS--suspect contam  MR shows areas of myositis, but suspect is due to viral process vs concomitant orbital cellulitis (patient is clinically improving)  Still uncertain cause fever--suspect aspiration event (CXR with lower lobe opacity) vs other occult; regardless of underlying cause has improved on current therapy  Overall,  1) VZV Ophthalmicus  - 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 Other occult?  - F/U BCXs  - Monitor for further fevers  - Already on broad coverage  3) Preseptal cellulitis  - Improving on current abx  - Vanco 750mg q 12 (monitor levels--lower level okay, avoid CHARLINE)  - Meropenem 1g q 8  - Plan for 7 days vanco/shaina then discontinue (empiric therapy for breakthrough SSTI vs aspiration)  - Monitor facial swelling/redness for improvement  - F/U BCXs    Jack Henderson MD  Contact on TEAMS messaging from 9am - 5pm  From 5pm-9am, on weekends, or if no response call [phone]

## 2022-08-05 NOTE — PROGRESS NOTE ADULT - SUBJECTIVE AND OBJECTIVE BOX
CC: F/U for Wound infection    Saw/spoke to patient. Unchanged. Improved.    Allergies  diltiazem (Other; Rash)    ANTIMICROBIALS:  acyclovir IVPB 650 every 8 hours  meropenem  IVPB 1000 every 8 hours  vancomycin  IVPB 750 every 12 hours  vancomycin  IVPB      PE:    Vital Signs Last 24 Hrs  T(C): 36.4 (05 Aug 2022 11:52), Max: 36.6 (04 Aug 2022 21:45)  T(F): 97.5 (05 Aug 2022 11:52), Max: 97.8 (04 Aug 2022 21:45)  HR: 77 (05 Aug 2022 15:52) (70 - 88)  BP: 154/57 (05 Aug 2022 11:52) (142/49 - 154/57)  RR: 18 (05 Aug 2022 11:52) (18 - 18)  SpO2: 99% (05 Aug 2022 15:52) (91% - 99%)    Gen: AOx3, NAD, non-toxic  CV: Nontachycardic  Resp: Breathing comfortably, RA  Abd: Soft, nontender  IV/Skin: No thrombophlebitis    LABS:                        10.5   5.42  )-----------( 326      ( 05 Aug 2022 06:07 )             33.9     08-05    139  |  103  |  7   ----------------------------<  215<H>  3.4<L>   |  29  |  0.57    Ca    8.6      05 Aug 2022 06:07  Phos  2.3     08-05  Mg     1.90     08-05    Urinalysis Basic - ( 03 Aug 2022 19:17 )    Color: Yellow / Appearance: Slightly Turbid / S.017 / pH: x  Gluc: x / Ketone: Negative  / Bili: Negative / Urobili: <2 mg/dL   Blood: x / Protein: 30 mg/dL / Nitrite: Negative   Leuk Esterase: Large / RBC: 50 /HPF /  /HPF   Sq Epi: x / Non Sq Epi: 1 /HPF / Bacteria: Moderate    MICROBIOLOGY:  Vancomycin Level, Trough: 8.1 ug/mL (22 @ 06:07)    .Blood Blood-Venous  22   No growth to date.  --  --    .Blood Blood-Venous  22   No growth to date.  --  --    .Blood Blood  22   Growth in anaerobic bottle: Staphylococcus epidermidis  Growth in aerobic and anaerobic bottles: Staphylococcus hominis  Coag Negative Staphylococcus  Single set isolate, possible contaminant. Contact  Microbiology if susceptibility testing clinically  indicated.  --    Growth in anaerobic bottle: Gram Positive Cocci in Clusters  Growth in aerobic bottle: Gram Positive Cocci in Clusters    .Blood Blood  22   Growth in aerobic bottle: Staphylococcus hominis  Growth in aerobic and anaerobic bottles: Staphylococcus epidermidis  ***Blood Panel PCR results on this specimen are available  approximately 3 hours after the Gram stain result.***  Gram stain, PCR, and/or culture results may not always  correspond due to difference in methodologies.  ************************************************************  This PCR assay was performed by multiplex PCR. This  Assay tests for 66 bacterial and resistance gene targets.  Please refer to the [facility] test directory  at [email or web address] for details.  --  Blood Culture PCR  Staphylococcus hominis  Staphylococcus epidermidis    Rapid RVP Result: Nighat ( @ 06:42)    RADIOLOGY:    8/3 XR:    FINDINGS:  Lines/Devices: Right upper extremity catheter tip is in the right   axillary vein.  shunt catheter traverses the right cervical region and   hemithorax.  Heart/Vascular: The heart size is normal.  Pulmonary: Left lower lung opacity, may represent infection or   atelectasis. No pleural effusion or pneumothorax.  Bones: No acute findings.    Impression:  Left lower lung opacity, may represent infection or atelectasis.

## 2022-08-05 NOTE — PROGRESS NOTE ADULT - PROBLEM SELECTOR PLAN 6
A1c 7.2.  Home regimen lantus 34U daily, trulicity, glipizide.  - On lantus to 14 units qHS, SSI, adjust prn  - monitor premeal and bedtime FS

## 2022-08-05 NOTE — PROGRESS NOTE ADULT - SUBJECTIVE AND OBJECTIVE BOX
Patient is a 86y old  Female who presents with a chief complaint of Suspicion for Herpes Zoster opthalmicus/encephalitis (05 Aug 2022 11:51)      SUBJECTIVE / OVERNIGHT EVENTS: Pt seen and examined along with ACP at 12:30pm, no overnight events, afebrile, pt reports pain in her rt side face/forehead and legs, no other complaints, voiding per nsg, no other new issues reported.        MEDICATIONS  (STANDING):  acetaminophen     Tablet .. 975 milliGRAM(s) Oral every 6 hours  acyclovir IVPB 650 milliGRAM(s) IV Intermittent every 8 hours  ALBUTerol    0.083% 2.5 milliGRAM(s) Nebulizer every 6 hours  artificial tears (preservative free) Ophthalmic Solution 1 Drop(s) Both EYES every 2 hours  aspirin  chewable 324 milliGRAM(s) Oral daily  buDESOnide    Inhalation Suspension 0.5 milliGRAM(s) Inhalation two times a day  dextrose 5%. 1000 milliLiter(s) (100 mL/Hr) IV Continuous <Continuous>  dextrose 5%. 1000 milliLiter(s) (50 mL/Hr) IV Continuous <Continuous>  dextrose 50% Injectable 25 Gram(s) IV Push once  dextrose 50% Injectable 12.5 Gram(s) IV Push once  dextrose 50% Injectable 25 Gram(s) IV Push once  enoxaparin Injectable 40 milliGRAM(s) SubCutaneous every 24 hours  erythromycin   Ointment 1 Application(s) Right EYE four times a day  gabapentin 400 milliGRAM(s) Oral three times a day  glucagon  Injectable 1 milliGRAM(s) IntraMuscular once  hydrocortisone 2.5% Cream 1 Application(s) Topical two times a day  insulin glargine Injectable (LANTUS) 10 Unit(s) SubCutaneous at bedtime  insulin lispro (ADMELOG) corrective regimen sliding scale   SubCutaneous Before meals and at bedtime  lactated ringers. 1000 milliLiter(s) (75 mL/Hr) IV Continuous <Continuous>  lactobacillus acidophilus 1 Tablet(s) Oral daily  lidocaine 5% Ointment 1 Application(s) Topical two times a day  losartan 50 milliGRAM(s) Oral daily  meropenem  IVPB 1000 milliGRAM(s) IV Intermittent every 8 hours  metoprolol succinate ER 12.5 milliGRAM(s) Oral daily  mirtazapine 15 milliGRAM(s) Oral at bedtime  mupirocin 2% Ointment 1 Application(s) Topical three times a day  nystatin Powder 1 Application(s) Topical two times a day  potassium chloride    Tablet ER 40 milliEquivalent(s) Oral once  prednisoLONE acetate 1% Suspension 1 Drop(s) Right EYE three times a day  traZODone 100 milliGRAM(s) Oral at bedtime  triamcinolone 0.1% Ointment 1 Application(s) Topical two times a day  ursodiol Tablet 500 milliGRAM(s) Oral <User Schedule>  vancomycin  IVPB 750 milliGRAM(s) IV Intermittent every 12 hours  vancomycin  IVPB        MEDICATIONS  (PRN):  AQUAPHOR (petrolatum Ointment) 1 Application(s) Topical three times a day PRN inguinal fold rash  dextrose Oral Gel 15 Gram(s) Oral once PRN Blood Glucose LESS THAN 70 milliGRAM(s)/deciliter  HYDROmorphone  Injectable 0.5 milliGRAM(s) IV Push every 3 hours PRN Pain  hydrOXYzine hydrochloride 20 milliGRAM(s) Oral four times a day PRN Itching  QUEtiapine 25 milliGRAM(s) Oral daily PRN agitation      Vital Signs Last 24 Hrs  T(C): 36.4 (05 Aug 2022 11:52), Max: 36.6 (04 Aug 2022 21:45)  T(F): 97.5 (05 Aug 2022 11:52), Max: 97.8 (04 Aug 2022 21:45)  HR: 88 (05 Aug 2022 11:52) (70 - 88)  BP: 154/57 (05 Aug 2022 11:52) (142/49 - 154/57)  BP(mean): --  RR: 18 (05 Aug 2022 11:52) (18 - 18)  SpO2: 91% (05 Aug 2022 11:52) (91% - 98%)    Parameters below as of 05 Aug 2022 11:52  Patient On (Oxygen Delivery Method): room air      CAPILLARY BLOOD GLUCOSE      POCT Blood Glucose.: 286 mg/dL (05 Aug 2022 11:27)  POCT Blood Glucose.: 196 mg/dL (05 Aug 2022 07:25)  POCT Blood Glucose.: 249 mg/dL (04 Aug 2022 21:22)  POCT Blood Glucose.: 244 mg/dL (04 Aug 2022 17:05)    I&O's Summary    05 Aug 2022 07:01  -  05 Aug 2022 14:17  --------------------------------------------------------  IN: 0 mL / OUT: 900 mL / NET: -900 mL        PHYSICAL EXAM:  GENERAL: NAD, obese female  EYES:  rt eye with periorbital edema improving, opens left eye  CHEST/LUNG: decreased bs at bases  HEART: Regular rate and rhythm  ABDOMEN: Soft, Nontender, Nondistended  EXTREMITIES: no LE edema  PSYCH: Calm  NEUROLOGY: AOx3  SKIN: Greenish scabbing/crusting in V1 dermatome markedly more confluent, Background erythema improving Erythema tracking down along R neck improved    LABS:                        10.5   5.42  )-----------( 326      ( 05 Aug 2022 06:07 )             33.9     08-05    139  |  103  |  7   ----------------------------<  215<H>  3.4<L>   |  29  |  0.57    Ca    8.6      05 Aug 2022 06:07  Phos  2.3     08-05  Mg     1.90     08-05            Urinalysis Basic - ( 03 Aug 2022 19:17 )    Color: Yellow / Appearance: Slightly Turbid / S.017 / pH: x  Gluc: x / Ketone: Negative  / Bili: Negative / Urobili: <2 mg/dL   Blood: x / Protein: 30 mg/dL / Nitrite: Negative   Leuk Esterase: Large / RBC: 50 /HPF /  /HPF   Sq Epi: x / Non Sq Epi: 1 /HPF / Bacteria: Moderate        RADIOLOGY & ADDITIONAL TESTS:    Imaging Personally Reviewed:    Consultant(s) Notes Reviewed:      Care Discussed with Consultants/Other Providers:

## 2022-08-05 NOTE — PROGRESS NOTE ADULT - SUBJECTIVE AND OBJECTIVE BOX
Plastic Surgery Progress Note (pg [facility]: [id number], [facility]: [phone])    SUBJECTIVE  The patient was seen and examined. No acute events overnight.    OBJECTIVE  ___________________________________________________  VITAL SIGNS / I&O's   Vital Signs Last 24 Hrs  T(C): 36.4 (05 Aug 2022 06:52), Max: 36.6 (04 Aug 2022 21:45)  T(F): 97.6 (05 Aug 2022 06:52), Max: 97.8 (04 Aug 2022 21:45)  HR: 78 (05 Aug 2022 09:56) (70 - 81)  BP: 151/61 (05 Aug 2022 06:52) (142/49 - 151/61)  BP(mean): --  RR: 18 (05 Aug 2022 06:52) (18 - 18)  SpO2: 93% (05 Aug 2022 09:56) (92% - 98%)    Parameters below as of 05 Aug 2022 06:52  Patient On (Oxygen Delivery Method): room air          05 Aug 2022 07:01  -  05 Aug 2022 11:51  --------------------------------------------------------  IN:  Total IN: 0 mL    OUT:    Voided (mL): 900 mL  Total OUT: 900 mL    Total NET: -900 mL        ___________________________________________________  PHYSICAL EXAM    General: NAD  HEENT: V1 erythema/crusting stable, periorbital edema which is improving, yellow crusting and edematous plaque present, no lesions on nose or ear. No crepitus      ___________________________________________________  LABS                        10.5   5.42  )-----------( 326      ( 05 Aug 2022 06:07 )             33.9     05 Aug 2022 06:07    139    |  103    |  7      ----------------------------<  215    3.4     |  29     |  0.57     Ca    8.6        05 Aug 2022 06:07  Phos  2.3       05 Aug 2022 06:07  Mg     1.90      05 Aug 2022 06:07        CAPILLARY BLOOD GLUCOSE      POCT Blood Glucose.: 286 mg/dL (05 Aug 2022 11:27)  POCT Blood Glucose.: 196 mg/dL (05 Aug 2022 07:25)  POCT Blood Glucose.: 249 mg/dL (04 Aug 2022 21:22)  POCT Blood Glucose.: 244 mg/dL (04 Aug 2022 17:05)        Urinalysis Basic - ( 03 Aug 2022 19:17 )    Color: Yellow / Appearance: Slightly Turbid / S.017 / pH: x  Gluc: x / Ketone: Negative  / Bili: Negative / Urobili: <2 mg/dL   Blood: x / Protein: 30 mg/dL / Nitrite: Negative   Leuk Esterase: Large / RBC: 50 /HPF /  /HPF   Sq Epi: x / Non Sq Epi: 1 /HPF / Bacteria: Moderate      ___________________________________________________  MICRO  Recent Cultures:  Specimen Source: .Blood Blood-Venous,  @ 07:44; Results   No growth to date.; Gram Stain: --; Organism: --  Specimen Source: .Blood Blood-Venous,  @ 16:45; Results   No growth to date.; Gram Stain: --; Organism: --    ___________________________________________________  MEDICATIONS  (STANDING):  acetaminophen     Tablet .. 975 milliGRAM(s) Oral every 6 hours  acyclovir IVPB 650 milliGRAM(s) IV Intermittent every 8 hours  ALBUTerol    0.083% 2.5 milliGRAM(s) Nebulizer every 6 hours  artificial tears (preservative free) Ophthalmic Solution 1 Drop(s) Both EYES every 2 hours  aspirin  chewable 324 milliGRAM(s) Oral daily  buDESOnide    Inhalation Suspension 0.5 milliGRAM(s) Inhalation two times a day  dextrose 5%. 1000 milliLiter(s) (100 mL/Hr) IV Continuous <Continuous>  dextrose 5%. 1000 milliLiter(s) (50 mL/Hr) IV Continuous <Continuous>  dextrose 50% Injectable 25 Gram(s) IV Push once  dextrose 50% Injectable 12.5 Gram(s) IV Push once  dextrose 50% Injectable 25 Gram(s) IV Push once  enoxaparin Injectable 40 milliGRAM(s) SubCutaneous every 24 hours  erythromycin   Ointment 1 Application(s) Right EYE four times a day  gabapentin 300 milliGRAM(s) Oral three times a day  glucagon  Injectable 1 milliGRAM(s) IntraMuscular once  hydrocortisone 2.5% Cream 1 Application(s) Topical two times a day  insulin glargine Injectable (LANTUS) 10 Unit(s) SubCutaneous at bedtime  insulin lispro (ADMELOG) corrective regimen sliding scale   SubCutaneous Before meals and at bedtime  lactated ringers. 1000 milliLiter(s) (75 mL/Hr) IV Continuous <Continuous>  lactobacillus acidophilus 1 Tablet(s) Oral daily  lidocaine 5% Ointment 1 Application(s) Topical two times a day  losartan 50 milliGRAM(s) Oral daily  meropenem  IVPB 1000 milliGRAM(s) IV Intermittent every 8 hours  metoprolol succinate ER 12.5 milliGRAM(s) Oral daily  mirtazapine 15 milliGRAM(s) Oral at bedtime  mupirocin 2% Ointment 1 Application(s) Topical three times a day  nystatin Powder 1 Application(s) Topical two times a day  potassium chloride    Tablet ER 40 milliEquivalent(s) Oral once  potassium phosphate / sodium phosphate Powder (PHOS-NaK) 1 Packet(s) Oral once  prednisoLONE acetate 1% Suspension 1 Drop(s) Right EYE three times a day  traZODone 100 milliGRAM(s) Oral at bedtime  triamcinolone 0.1% Ointment 1 Application(s) Topical two times a day  ursodiol Tablet 500 milliGRAM(s) Oral <User Schedule>  vancomycin  IVPB 750 milliGRAM(s) IV Intermittent every 12 hours  vancomycin  IVPB        MEDICATIONS  (PRN):  AQUAPHOR (petrolatum Ointment) 1 Application(s) Topical three times a day PRN inguinal fold rash  dextrose Oral Gel 15 Gram(s) Oral once PRN Blood Glucose LESS THAN 70 milliGRAM(s)/deciliter  HYDROmorphone  Injectable 0.5 milliGRAM(s) IV Push every 3 hours PRN Pain  hydrOXYzine hydrochloride 20 milliGRAM(s) Oral four times a day PRN Itching  QUEtiapine 25 milliGRAM(s) Oral daily PRN agitation      A/P  ADAL LIZARRAGA is a 86y Female with herpes zoster opthalmicus with s/s concerning for possible necrotizing fasciitis. CT head shows cellulitis.    - No evidence of nec fasc at this time  - Continue topical and systemic tx per ID  - no need for debridement at this time  - Appreciate excellent care per primary team          Nya Montejo  Plastic & Reconstructive Surgery, PGY-1  #[id number]

## 2022-08-05 NOTE — PROGRESS NOTE ADULT - PROBLEM SELECTOR PLAN 10
DVT ppx: subQ lovenox  Diet: soft and bite sized (S&S recd minced and moist however daughter accepts risks)        Dispo:  - pt has difficult vessels, has midline in place, per IR can place IR PICC line order on 8/4 (after 48h negative cultures) if blood draws needed  - PT/OT recommending rehab  -PT to revaluate  -Rpt swallow eval done cont current diet

## 2022-08-05 NOTE — SWALLOW BEDSIDE ASSESSMENT ADULT - COMMENTS
Hospitalist note 8/4 "86F w/ hx CAD s/p stents (1986, 1996), COPD, NPH s/p  shunt, DM, migraines, and HTN presenting with herpes zoster ophthalmicus/encephalitis affecting the right V1 dermatome, now with acute worsening of symptoms."    CXR 8/3 "Left lower lung opacity, may represent infection or atelectasis."    Patient seen at bedside this AM to reassess swallow function. Patient's daughter present at bedside to provide feeding assistance. Per patient's daughter, patient does not wear her dentures at baseline. Patient's daughter further reports patient has a lot of "phlegm", which is also consistent with her baseline. Patient able to follow simple directives, though required frequent repetition due to difficulty hearing. Patient able to verbalize wants/needs. Hospitalist note 8/4 "86F w/ hx CAD s/p stents (1986, 1996), COPD, NPH s/p  shunt, DM, migraines, and HTN presenting with herpes zoster ophthalmicus/encephalitis affecting the right V1 dermatome, now with acute worsening of symptoms."    CXR 8/3 "Left lower lung opacity, may represent infection or atelectasis."    Patient is known to this service as patient was seen for an initial assessment on 7/27 at which time minced and moist solids with thin liquid was recommended (please see note for details)    Patient seen at bedside this AM to reassess swallow function. Patient's daughter present at bedside to provide feeding assistance. Per patient's daughter, patient does not wear her dentures at baseline. Patient's daughter further reports patient has a lot of "phlegm", which is also consistent with her baseline. Patient able to follow simple directives, though required frequent repetition due to difficulty hearing. Patient able to verbalize wants/needs.

## 2022-08-06 NOTE — PROGRESS NOTE ADULT - ASSESSMENT
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 Rash and mental status continuing to improve.    1. HZO OD with of R sided facial rash - improving  - Likely bacterial superinfection with crusting over area of edema and erythema. Current crusting continuing to improve, no longer extending to lid margin OD    - Mental status stable, VA reduced from yesterday OU. Likely result of poor effort and fatigue. Pt denies vision loss, flashes, floaters or curtains.   - CT maxillofacial showing extensive R periorbital preseptal soft tissue swelling and asymmetric enlargement of R sided extraocular muscles. No evidence of orbital cellulitis. Evidence of extension to neck.  - No evidence of nec fasc  - Pt has dilated and minimally reactive R pupil. May represent VZV involvement of postganglionic CN3 fibers, although seems slightly improved than prior.   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 Stable on CT. Slight improvement today.   - Appreciate derm and plastic surgery consults.    - Abx per ID  - Pt unable to tolerate sitting up at slit lamp - cannot assess anterior chamber for inflammatory reaction  - IOP acceptable today. Was previously symmetrically low OU likely 2/2 systemic medication effect. Will continue to hold any IOP lowering drops.   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 Continuing to improve.   - C/w pred forte TID OD.   - C/w erythromycin ointment QID to R eye and periocular lesions  - C/w preservative-free artificial tears to Q2H OU   - B-scan 8/3 OD: no vitritis, RD, mass.     - Will continue to monitor for improvement of EOMs on antivirals.     2. 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   - Appreciate neurology consult  - MRI brain showing no acute pathology, MRI orbits as above  - LP deferred per neurology and ID - reconsider as needed     DW Dr. Pérez    Outpatient follow-up: Patient should follow-up with his/her ophthalmologist or with [facility] Department of Ophthalmology at the address below     [address] [place] [address]. [address]  [place] [address]  [phone]     86y female w/ pmhx/ochx of CAD, DM, COPD, NPH w/ programmable shunt comes to ED for AMS and shingles, found to have HZO of R side with corneal involvement and elevated IOP, with R sided forehead/facial edema and erythema, with dark brown crusting over R side of face. Rash and mental status continuing to improve.    1. HZO OD with of R sided facial rash - improving  - Likely bacterial superinfection with crusting over area of edema and erythema. Current crusting continuing to improve, no longer extending to lid margin OD    - Mental status stable, VA reduced from yesterday OU. Likely result of poor effort and fatigue. Pt denies vision loss, flashes, floaters or curtains.   - CT maxillofacial showing extensive R periorbital preseptal soft tissue swelling and asymmetric enlargement of R sided extraocular muscles. No evidence of orbital cellulitis. Evidence of extension to neck.  - No evidence of nec fasc  - Pt has dilated and minimally reactive R pupil. May represent VZV involvement of postganglionic CN3 fibers, although seems slightly improved than prior.   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 Stable on CT. Slight improvement today.   - Appreciate derm and plastic surgery consults.    - Abx per ID  - Pt unable to tolerate sitting up at slit lamp - cannot assess anterior chamber for inflammatory reaction  - IOP acceptable today. Was previously symmetrically low OU likely 2/2 systemic medication effect. Will continue to hold any IOP lowering drops.   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 Continuing to improve.   - C/w pred forte TID OD.   - C/w erythromycin ointment QID to R eye and periocular lesions  - C/w preservative-free artificial tears to Q2H OU   - B-scan 8/3 OD: no vitritis, RD, mass.     - Will continue to monitor for improvement of EOMs on antivirals.     2. 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   - Appreciate neurology consult  - MRI brain showing no acute pathology, MRI orbits as above  - LP deferred per neurology and ID - reconsider as needed     DW Dr. Pérez and Dr. Barker, cornea.    Outpatient follow-up: Patient should follow-up with his/her ophthalmologist or with [facility] Department of Ophthalmology at the address below     [address]  [place] [id number] [phone]

## 2022-08-06 NOTE — PROGRESS NOTE ADULT - PROBLEM SELECTOR PLAN 7
NPH diagnosed 2011, pt has programmable  shunt installed [facility], **must be programmed after MRI (page neurosurg [id number])**. 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    Daughter agreeable for neurology consult but declines LP

## 2022-08-06 NOTE — PROGRESS NOTE ADULT - SUBJECTIVE AND OBJECTIVE BOX
[facility] DEPARTMENT OF OPHTHALMOLOGY  ------------------------------------------------------------------------------  Alf Thomas MD PGY-3  Pager: [phone], also available on teams  ------------------------------------------------------------------------------    Interval History: Following for R HZO. No acute events overnight. Today patient endorse she is tired and experiencing shooting pains. mental status stable.      MEDICATIONS  (STANDING):  acyclovir IVPB 650 milliGRAM(s) IV Intermittent every 8 hours  ALBUTerol    0.083% 2.5 milliGRAM(s) Nebulizer every 6 hours  artificial tears (preservative free) Ophthalmic Solution 1 Drop(s) Both EYES every 2 hours  aspirin  chewable 324 milliGRAM(s) Oral daily  buDESOnide    Inhalation Suspension 0.5 milliGRAM(s) Inhalation two times a day  dextrose 5%. 1000 milliLiter(s) (100 mL/Hr) IV Continuous <Continuous>  dextrose 5%. 1000 milliLiter(s) (50 mL/Hr) IV Continuous <Continuous>  dextrose 50% Injectable 25 Gram(s) IV Push once  dextrose 50% Injectable 12.5 Gram(s) IV Push once  dextrose 50% Injectable 25 Gram(s) IV Push once  enoxaparin Injectable 40 milliGRAM(s) SubCutaneous every 24 hours  erythromycin   Ointment 1 Application(s) Right EYE four times a day  gabapentin 400 milliGRAM(s) Oral three times a day  glucagon  Injectable 1 milliGRAM(s) IntraMuscular once  hydrocortisone 2.5% Cream 1 Application(s) Topical two times a day  insulin glargine Injectable (LANTUS) 10 Unit(s) SubCutaneous at bedtime  insulin lispro (ADMELOG) corrective regimen sliding scale   SubCutaneous Before meals and at bedtime  lactated ringers. 1000 milliLiter(s) (75 mL/Hr) IV Continuous <Continuous>  lactobacillus acidophilus 1 Tablet(s) Oral daily  lidocaine 5% Ointment 1 Application(s) Topical two times a day  losartan 50 milliGRAM(s) Oral daily  meropenem  IVPB 1000 milliGRAM(s) IV Intermittent every 8 hours  metoprolol succinate ER 12.5 milliGRAM(s) Oral daily  mirtazapine 15 milliGRAM(s) Oral at bedtime  mupirocin 2% Ointment 1 Application(s) Topical three times a day  nystatin Powder 1 Application(s) Topical two times a day  potassium chloride    Tablet ER 40 milliEquivalent(s) Oral once  prednisoLONE acetate 1% Suspension 1 Drop(s) Right EYE three times a day  traZODone 100 milliGRAM(s) Oral at bedtime  triamcinolone 0.1% Ointment 1 Application(s) Topical two times a day  ursodiol Tablet 500 milliGRAM(s) Oral <User Schedule>  vancomycin  IVPB 750 milliGRAM(s) IV Intermittent every 12 hours  vancomycin  IVPB        MEDICATIONS  (PRN):  AQUAPHOR (petrolatum Ointment) 1 Application(s) Topical three times a day PRN inguinal fold rash  dextrose Oral Gel 15 Gram(s) Oral once PRN Blood Glucose LESS THAN 70 milliGRAM(s)/deciliter  HYDROmorphone  Injectable 0.5 milliGRAM(s) IV Push every 3 hours PRN Pain  hydrOXYzine hydrochloride 20 milliGRAM(s) Oral four times a day PRN Itching  QUEtiapine 25 milliGRAM(s) Oral daily PRN agitation      VITALS: T(C): 36.4 (08-06-22 @ 14:22)  T(F): 97.6 (08-06-22 @ 14:22), Max: 99 (08-06-22 @ 06:15)  HR: 86 (08-06-22 @ 14:22) (76 - 91)  BP: 161/59 (08-06-22 @ 14:22) (145/876 - 161/64)  RR:  (18 - 18)  SpO2:  (91% - 99%)  General: AAO x 3, appropriate mood and affect    Ophthalmology Exam:  Visual acuity (sc): 20/200 NIPH OD, 20/50 NIPH OS  Pupils: 6mm, nonreactive to light OD, pinpoint but reactive OS, no RAPD  Ttono: 13 OD, 15 OS  Extraocular movements (EOMs): 90% abduction deficit OD, otherwise full OU    Pen Light Exam - pt unable to tolerate sitting up to slit lamp.  External: R sided forehead edema and erythema (improved), with overlying dark brown crusting, no longer extending to eyelid margins, + L preauricular edema and erythema, improved, wnl on L side  Lids/Lashes/Lacrimal Ducts: Trace periorbital edema RUL and RLL, flat OS   Sclera/Conjunctiva: 1+ injection OD, W+Q OS  Cornea: 1+ SPK, 1+ D-folds, +bullae, no pseudodendrites OD (improving), Cl OS  Anterior Chamber: D+F OU    Iris: Flat OU  Lens: PCIOL OD, 3+ NS OS

## 2022-08-06 NOTE — PROGRESS NOTE ADULT - PROBLEM SELECTOR PLAN 1
Prominent rash in R V1 dermatome. Has significant pruritis and patient has been scratching affected area. CT imaging consistent with cellulitis extending from scalp to upper thorax with no evidence of abscess or necrotizing fasciitis. Per plastics no evidence of nec fasc, appreciate evaluation. 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  - Sx management:    - Continue hydroxyzine 20mg QID PRN for itching    - Increase gabapentin from 400mg to 600mg TID for better control of neuropathic pain    - IV dilaudid 0.5mg Q3H PRN, PO acetaminophen 650mg Q6H PRN    - Lidocaine + triamcinolone ointment as below  - ID following, appreciate recs:    - acyclovir 650mg Q8H    - IV hydration while on acyclovir; LR 75cc/hr    - vanc 750mg BID, monitor levels; d/c cefazolin    - f/u 8/1 BCx x2, NGTD  -blood culture neg to date    - no indication for LP at this time  - Ophthalmology following, appreciate recs:    -  erythromycin ointment QID to eye and periocular lesions, artificial tears Q4H  - Appreciate derm recs:    - lidocaine ointment mixed with vaseline BID, triamcinolone ointment to red areas only BID    - apply vaseline to crusted areas Q2H, cont vanco and meropenem, discussed with ID attending and opthalmology attending on 8/3  -Neurology consult per Opthalmology attending, daughter agreeable for consult but declines LP  -updated daughter Dr. Delgado on 8/6

## 2022-08-06 NOTE — PROGRESS NOTE ADULT - SUBJECTIVE AND OBJECTIVE BOX
Patient is a 86y old  Female who presents with a chief complaint of Suspicion for Herpes Zoster opthalmicus/encephalitis (05 Aug 2022 11:51)      SUBJECTIVE / OVERNIGHT EVENTS: Pt examined at bedside, private HHA in room. Still w/pain to rt side face/forehead and legs,  no other complaints      MEDICATIONS  (STANDING):  acyclovir IVPB 650 milliGRAM(s) IV Intermittent every 8 hours  ALBUTerol    0.083% 2.5 milliGRAM(s) Nebulizer every 6 hours  artificial tears (preservative free) Ophthalmic Solution 1 Drop(s) Both EYES every 2 hours  aspirin  chewable 324 milliGRAM(s) Oral daily  buDESOnide    Inhalation Suspension 0.5 milliGRAM(s) Inhalation two times a day  dextrose 5%. 1000 milliLiter(s) (100 mL/Hr) IV Continuous <Continuous>  dextrose 5%. 1000 milliLiter(s) (50 mL/Hr) IV Continuous <Continuous>  dextrose 50% Injectable 25 Gram(s) IV Push once  dextrose 50% Injectable 12.5 Gram(s) IV Push once  dextrose 50% Injectable 25 Gram(s) IV Push once  enoxaparin Injectable 40 milliGRAM(s) SubCutaneous every 24 hours  erythromycin   Ointment 1 Application(s) Right EYE four times a day  gabapentin 400 milliGRAM(s) Oral three times a day  glucagon  Injectable 1 milliGRAM(s) IntraMuscular once  hydrocortisone 2.5% Cream 1 Application(s) Topical two times a day  insulin glargine Injectable (LANTUS) 10 Unit(s) SubCutaneous at bedtime  insulin lispro (ADMELOG) corrective regimen sliding scale   SubCutaneous Before meals and at bedtime  lactated ringers. 1000 milliLiter(s) (75 mL/Hr) IV Continuous <Continuous>  lactobacillus acidophilus 1 Tablet(s) Oral daily  lidocaine 5% Ointment 1 Application(s) Topical two times a day  losartan 50 milliGRAM(s) Oral daily  meropenem  IVPB 1000 milliGRAM(s) IV Intermittent every 8 hours  metoprolol succinate ER 12.5 milliGRAM(s) Oral daily  mirtazapine 15 milliGRAM(s) Oral at bedtime  mupirocin 2% Ointment 1 Application(s) Topical three times a day  nystatin Powder 1 Application(s) Topical two times a day  potassium chloride    Tablet ER 40 milliEquivalent(s) Oral once  prednisoLONE acetate 1% Suspension 1 Drop(s) Right EYE three times a day  traZODone 100 milliGRAM(s) Oral at bedtime  triamcinolone 0.1% Ointment 1 Application(s) Topical two times a day  ursodiol Tablet 500 milliGRAM(s) Oral <User Schedule>  vancomycin  IVPB 750 milliGRAM(s) IV Intermittent every 12 hours  vancomycin  IVPB        MEDICATIONS  (PRN):  AQUAPHOR (petrolatum Ointment) 1 Application(s) Topical three times a day PRN inguinal fold rash  dextrose Oral Gel 15 Gram(s) Oral once PRN Blood Glucose LESS THAN 70 milliGRAM(s)/deciliter  HYDROmorphone  Injectable 0.5 milliGRAM(s) IV Push every 3 hours PRN Pain  hydrOXYzine hydrochloride 20 milliGRAM(s) Oral four times a day PRN Itching  QUEtiapine 25 milliGRAM(s) Oral daily PRN agitation        CAPILLARY BLOOD GLUCOSE      POCT Blood Glucose.: 170 mg/dL (06 Aug 2022 16:48)  POCT Blood Glucose.: 153 mg/dL (06 Aug 2022 11:29)  POCT Blood Glucose.: 174 mg/dL (06 Aug 2022 07:53)  POCT Blood Glucose.: 168 mg/dL (05 Aug 2022 22:56)      I&O's Summary    05 Aug 2022 07:  -  06 Aug 2022 07:00  --------------------------------------------------------  IN: 0 mL / OUT: 900 mL / NET: -900 mL    06 Aug 2022 07:01  -  06 Aug 2022 18:22  --------------------------------------------------------  IN: 0 mL / OUT: 1000 mL / NET: -1000 mL      PHYSICAL EXAM:  Vital Signs Last 24 Hrs  T(C): 36.4 (06 Aug 2022 14:22), Max: 37.2 (06 Aug 2022 06:15)  T(F): 97.6 (06 Aug 2022 14:22), Max: 99 (06 Aug 2022 06:15)  HR: 90 (06 Aug 2022 16:10) (76 - 93)  BP: 161/59 (06 Aug 2022 14:22) (159/63 - 161/64)  BP(mean): --  RR: 18 (06 Aug 2022 14:22) (18 - 18)  SpO2: 96% (06 Aug 2022 16:10) (94% - 99%)    Parameters below as of 06 Aug 2022 16:10  Patient On (Oxygen Delivery Method): room air        GENERAL: NAD, obese female  EYES:  rt eye with periorbital edema improving, opens left eye  CHEST/LUNG: decreased bs at bases  HEART: Regular rate and rhythm  ABDOMEN: Soft, Nontender, Nondistended  EXTREMITIES: no LE edema  PSYCH: Calm  NEUROLOGY: AOx3  SKIN: Greenish scabbing/crusting in V1 dermatome markedly more confluent, Background erythema improving Erythema tracking down along R neck improved    LABS:                                             10.7   6.69  )-----------( 355      ( 06 Aug 2022 07:56 )             32.6   08-06    137  |  102  |  6<L>  ----------------------------<  183<H>  4.0   |  26  |  0.53    Ca    8.4      06 Aug 2022 07:56  Phos  2.6     08-06  Mg     1.70     08-06          Urinalysis Basic - ( 03 Aug 2022 19:17 )    Color: Yellow / Appearance: Slightly Turbid / S.017 / pH: x  Gluc: x / Ketone: Negative  / Bili: Negative / Urobili: <2 mg/dL   Blood: x / Protein: 30 mg/dL / Nitrite: Negative   Leuk Esterase: Large / RBC: 50 /HPF /  /HPF   Sq Epi: x / Non Sq Epi: 1 /HPF / Bacteria: Moderate        RADIOLOGY & ADDITIONAL TESTS:    Imaging Personally Reviewed:    Consultant(s) Notes Reviewed:      Care Discussed with Consultants/Other Providers:

## 2022-08-07 NOTE — PROGRESS NOTE ADULT - PROBLEM SELECTOR PLAN 7
NPH diagnosed 2011, pt has programmable  shunt installed by [facility] neurosurg, **must be programmed after MRI (page neurosurg [id number])**. 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      -Daughter agreeable for neurology consult but declines LP  -blood culture neg to date: no indication for LP at this time

## 2022-08-07 NOTE — PROGRESS NOTE ADULT - PROBLEM SELECTOR PLAN 2
Per pt's daughter had up to seroquel 100mg for agitation in prior admissions. S/p 1x seroquel 25mg on 8/2 for agitation. Will reassess agitation as infectious symptoms improve.  - continue seroquel 25mg QD PRN    -C/o Rt upper arm swelling/discomfort, will order doppler to r/o DVT

## 2022-08-07 NOTE — PROGRESS NOTE ADULT - SUBJECTIVE AND OBJECTIVE BOX
Patient is a 86y old  Female who presents with a chief complaint of Suspicion for Herpes Zoster opthalmicus/encephalitis (05 Aug 2022 11:51)      SUBJECTIVE / OVERNIGHT EVENTS: Pt laying comfortably in bed, HHA and daughter present at bedside. Rt side face/forhead pain present, but slightly improved today per pt.  Dtg endorse thinks mom right arm swollen, pt also reports some discomfort of right arm.  Otherwise ok       MEDICATIONS  (STANDING):  acyclovir IVPB 650 milliGRAM(s) IV Intermittent every 8 hours  ALBUTerol    0.083% 2.5 milliGRAM(s) Nebulizer every 6 hours  artificial tears (preservative free) Ophthalmic Solution 1 Drop(s) Both EYES every 2 hours  aspirin  chewable 324 milliGRAM(s) Oral daily  buDESOnide    Inhalation Suspension 0.5 milliGRAM(s) Inhalation two times a day  dextrose 5%. 1000 milliLiter(s) (100 mL/Hr) IV Continuous <Continuous>  dextrose 5%. 1000 milliLiter(s) (50 mL/Hr) IV Continuous <Continuous>  dextrose 50% Injectable 25 Gram(s) IV Push once  dextrose 50% Injectable 12.5 Gram(s) IV Push once  dextrose 50% Injectable 25 Gram(s) IV Push once  enoxaparin Injectable 40 milliGRAM(s) SubCutaneous every 24 hours  erythromycin   Ointment 1 Application(s) Right EYE four times a day  gabapentin 600 milliGRAM(s) Oral three times a day  glucagon  Injectable 1 milliGRAM(s) IntraMuscular once  hydrocortisone 2.5% Cream 1 Application(s) Topical two times a day  insulin glargine Injectable (LANTUS) 10 Unit(s) SubCutaneous at bedtime  insulin lispro (ADMELOG) corrective regimen sliding scale   SubCutaneous Before meals and at bedtime  lactated ringers. 1000 milliLiter(s) (75 mL/Hr) IV Continuous <Continuous>  lactobacillus acidophilus 1 Tablet(s) Oral daily  lidocaine 5% Ointment 1 Application(s) Topical two times a day  losartan 50 milliGRAM(s) Oral daily  meropenem  IVPB 1000 milliGRAM(s) IV Intermittent every 8 hours  metoprolol succinate ER 12.5 milliGRAM(s) Oral daily  mirtazapine 15 milliGRAM(s) Oral at bedtime  mupirocin 2% Ointment 1 Application(s) Topical three times a day  nystatin Powder 1 Application(s) Topical two times a day  potassium chloride    Tablet ER 40 milliEquivalent(s) Oral once  prednisoLONE acetate 1% Suspension 1 Drop(s) Right EYE three times a day  traZODone 100 milliGRAM(s) Oral at bedtime  triamcinolone 0.1% Ointment 1 Application(s) Topical two times a day  ursodiol Tablet 500 milliGRAM(s) Oral <User Schedule>  vancomycin  IVPB 750 milliGRAM(s) IV Intermittent every 12 hours  vancomycin  IVPB        MEDICATIONS  (PRN):  AQUAPHOR (petrolatum Ointment) 1 Application(s) Topical three times a day PRN inguinal fold rash  dextrose Oral Gel 15 Gram(s) Oral once PRN Blood Glucose LESS THAN 70 milliGRAM(s)/deciliter  HYDROmorphone  Injectable 0.5 milliGRAM(s) IV Push every 3 hours PRN Pain  hydrOXYzine hydrochloride 20 milliGRAM(s) Oral four times a day PRN Itching  QUEtiapine 25 milliGRAM(s) Oral daily PRN agitation      CAPILLARY BLOOD GLUCOSE      POCT Blood Glucose.: 178 mg/dL (07 Aug 2022 17:09)  POCT Blood Glucose.: 169 mg/dL (07 Aug 2022 11:38)  POCT Blood Glucose.: 231 mg/dL (07 Aug 2022 07:36)  POCT Blood Glucose.: 160 mg/dL (06 Aug 2022 21:13)      I&O's Summary    06 Aug 2022 07:01  -  07 Aug 2022 07:00  --------------------------------------------------------  IN: 0 mL / OUT: 1000 mL / NET: -1000 mL        PHYSICAL EXAM:  Vital Signs Last 24 Hrs  T(C): 37.2 (07 Aug 2022 12:00), Max: 37.4 (07 Aug 2022 06:17)  T(F): 98.9 (07 Aug 2022 12:00), Max: 99.4 (07 Aug 2022 06:17)  HR: 87 (07 Aug 2022 15:52) (70 - 99)  BP: 143/60 (07 Aug 2022 12:00) (143/60 - 144/59)  BP(mean): --  RR: 18 (07 Aug 2022 12:00) (18 - 18)  SpO2: 95% (07 Aug 2022 15:52) (95% - 98%)    Parameters below as of 07 Aug 2022 15:52  Patient On (Oxygen Delivery Method): room air        GENERAL: NAD, obese female  EYES:  rt eye with periorbital edema improving, opens left eye  CHEST/LUNG: decreased bs at bases  HEART: Regular rate and rhythm  ABDOMEN: Soft, Nontender, Nondistended  EXTREMITIES: no LE edema, reported swelling of right arm with astd discomfort, palpable peripheral pulses    PSYCH: Calm  NEUROLOGY: AOx3  SKIN: Green/brown scabbing/crusting in V1 dermatome more confluent, Background erythema improving Erythema tracking down along R neck improved    LABS:                                            10.7   6.69  )-----------( 355      ( 06 Aug 2022 07:56 )             32.6   08-06    137  |  102  |  6<L>  ----------------------------<  183<H>  4.0   |  26  |  0.53    Ca    8.4      06 Aug 2022 07:56  Phos  2.6     08-06  Mg     1.70     08-06        Urinalysis Basic - ( 03 Aug 2022 19:17 )    Color: Yellow / Appearance: Slightly Turbid / S.017 / pH: x  Gluc: x / Ketone: Negative  / Bili: Negative / Urobili: <2 mg/dL   Blood: x / Protein: 30 mg/dL / Nitrite: Negative   Leuk Esterase: Large / RBC: 50 /HPF /  /HPF   Sq Epi: x / Non Sq Epi: 1 /HPF / Bacteria: Moderate        RADIOLOGY & ADDITIONAL TESTS:    Imaging Personally Reviewed:    Consultant(s) Notes Reviewed:      Care Discussed with Consultants/Other Providers:

## 2022-08-07 NOTE — PROGRESS NOTE ADULT - PROBLEM SELECTOR PLAN 1
Prominent rash in R V1 dermatome. Has significant pruritis and patient has been scratching affected area. CT imaging consistent with cellulitis extending from scalp to upper thorax with no evidence of abscess or necrotizing fasciitis. Per plastics no evidence of nec fasc, appreciate evaluation. 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  - Sx management:    - Continue hydroxyzine 20mg QID PRN for itching    - Gabapentin increased from 400mg to 600mg TID on 8/6 for better control of neuropathic pain. Pain somewhat improved today. Observe for full 24hr, if pain still poorly controlled could go up on gabapentin or could consider lyrica     - IV dilaudid 0.5mg Q3H PRN, PO acetaminophen 650mg Q6H PRN    - Lidocaine + triamcinolone ointment as below  - ID following, appreciate recs:    - acyclovir 650mg Q8H    - IV hydration while on acyclovir; LR 75cc/hr    - vanc 750mg BID, monitor levels; d/c cefazolin  -blood culture neg to date: no indication for LP at this time  - Ophthalmology following, apprec recs: erythromycin ointment QID to eye &periocular lesions, artificial tears Q4H  - Appreciate derm recs:    - lidocaine ointment mixed with vaseline BID, triamcinolone ointment to red areas only BID    - apply vaseline to crusted areas Q2H, cont vanco and meropenem  -updated daughter at bedside Dr. Delgado on 8/7

## 2022-08-07 NOTE — PROGRESS NOTE ADULT - ASSESSMENT
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 Rash and mental status continuing to improve.    1. HZO OD with of R sided facial rash - improving  - Likely bacterial superinfection with crusting over area of edema and erythema. Current crusting continuing to improve, no longer extending to lid margin OD    - Mental status stable, VA improved from yesterday, IOP wnl   - CT maxillofacial showing extensive R periorbital preseptal soft tissue swelling and asymmetric enlargement of R sided extraocular muscles. No evidence of orbital cellulitis. Evidence of extension to neck.  - No evidence of nec fasc  - Pt has dilated and minimally reactive R pupil. May represent VZV involvement of postganglionic CN3 fibers, although seems slightly improved than prior.   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 Stable on CT. Stable exam today  - Appreciate derm and plastic surgery consults.    - Abx per ID  - Pt unable to tolerate sitting up at slit lamp - cannot assess anterior chamber for inflammatory reaction  - IOP acceptable today. Was previously symmetrically low OU likely 2/2 systemic medication effect. Will continue to hold any IOP lowering drops.   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 Continuing to improve.   - C/w pred forte TID OD.   - C/w erythromycin ointment QID to R eye and periocular lesions  - C/w preservative-free artificial tears to Q2H OU   - B-scan 8/3 OD: no vitritis, RD, mass.   - Will continue to monitor for improvement of EOMs on antivirals.     2. 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   - Appreciate neurology consult  - MRI brain showing no acute pathology, MRI orbits as above  - LP deferred per neurology and ID - reconsider as needed     Outpatient follow-up: Patient should follow-up with his/her ophthalmologist or with [facility] Department of Ophthalmology at the address below     [address] [place] [address]. [address]  [place] [address]  [phone]

## 2022-08-07 NOTE — PROGRESS NOTE ADULT - SUBJECTIVE AND OBJECTIVE BOX
[facility] DEPARTMENT OF OPHTHALMOLOGY  ------------------------------------------------------------------------------    Interval History: Following for R HZO. No acute events overnight.     MEDICATIONS  (STANDING):  acyclovir IVPB 650 milliGRAM(s) IV Intermittent every 8 hours  ALBUTerol    0.083% 2.5 milliGRAM(s) Nebulizer every 6 hours  artificial tears (preservative free) Ophthalmic Solution 1 Drop(s) Both EYES every 2 hours  aspirin  chewable 324 milliGRAM(s) Oral daily  buDESOnide    Inhalation Suspension 0.5 milliGRAM(s) Inhalation two times a day  dextrose 5%. 1000 milliLiter(s) (100 mL/Hr) IV Continuous <Continuous>  dextrose 5%. 1000 milliLiter(s) (50 mL/Hr) IV Continuous <Continuous>  dextrose 50% Injectable 25 Gram(s) IV Push once  dextrose 50% Injectable 12.5 Gram(s) IV Push once  dextrose 50% Injectable 25 Gram(s) IV Push once  enoxaparin Injectable 40 milliGRAM(s) SubCutaneous every 24 hours  erythromycin   Ointment 1 Application(s) Right EYE four times a day  gabapentin 400 milliGRAM(s) Oral three times a day  glucagon  Injectable 1 milliGRAM(s) IntraMuscular once  hydrocortisone 2.5% Cream 1 Application(s) Topical two times a day  insulin glargine Injectable (LANTUS) 10 Unit(s) SubCutaneous at bedtime  insulin lispro (ADMELOG) corrective regimen sliding scale   SubCutaneous Before meals and at bedtime  lactated ringers. 1000 milliLiter(s) (75 mL/Hr) IV Continuous <Continuous>  lactobacillus acidophilus 1 Tablet(s) Oral daily  lidocaine 5% Ointment 1 Application(s) Topical two times a day  losartan 50 milliGRAM(s) Oral daily  meropenem  IVPB 1000 milliGRAM(s) IV Intermittent every 8 hours  metoprolol succinate ER 12.5 milliGRAM(s) Oral daily  mirtazapine 15 milliGRAM(s) Oral at bedtime  mupirocin 2% Ointment 1 Application(s) Topical three times a day  nystatin Powder 1 Application(s) Topical two times a day  potassium chloride    Tablet ER 40 milliEquivalent(s) Oral once  prednisoLONE acetate 1% Suspension 1 Drop(s) Right EYE three times a day  traZODone 100 milliGRAM(s) Oral at bedtime  triamcinolone 0.1% Ointment 1 Application(s) Topical two times a day  ursodiol Tablet 500 milliGRAM(s) Oral <User Schedule>  vancomycin  IVPB 750 milliGRAM(s) IV Intermittent every 12 hours  vancomycin  IVPB        MEDICATIONS  (PRN):  AQUAPHOR (petrolatum Ointment) 1 Application(s) Topical three times a day PRN inguinal fold rash  dextrose Oral Gel 15 Gram(s) Oral once PRN Blood Glucose LESS THAN 70 milliGRAM(s)/deciliter  HYDROmorphone  Injectable 0.5 milliGRAM(s) IV Push every 3 hours PRN Pain  hydrOXYzine hydrochloride 20 milliGRAM(s) Oral four times a day PRN Itching  QUEtiapine 25 milliGRAM(s) Oral daily PRN agitation      VITALS: T(C): 36.4 (08-06-22 @ 14:22)  T(F): 97.6 (08-06-22 @ 14:22), Max: 99 (08-06-22 @ 06:15)  HR: 86 (08-06-22 @ 14:22) (76 - 91)  BP: 161/59 (08-06-22 @ 14:22) (145/876 - 161/64)  RR:  (18 - 18)  SpO2:  (91% - 99%)  General: AAO x 3, appropriate mood and affect    Ophthalmology Exam:  Visual acuity (sc): 20/100 NIPH OD, 20/40 NIPH OS  Pupils: 6mm, nonreactive to light OD, pinpoint but reactive OS, no RAPD  Ttono: 17 OD, 19 OS  Extraocular movements (EOMs): 90% abduction deficit OD, otherwise full OU    Pen Light Exam - pt unable to tolerate sitting up to slit lamp.  External: R sided forehead edema and erythema (improved), with overlying dark brown crusting, no longer extending to eyelid margins, + L preauricular edema and erythema, improved, wnl on L side  Lids/Lashes/Lacrimal Ducts: Trace periorbital edema RUL and RLL, flat OS   Sclera/Conjunctiva: tr+ injection OD, W+Q OS  Cornea: 1+ SPK, 1+ D-folds, +bullae, no pseudodendrites OD, Cl OS  Anterior Chamber: D+F OU    Iris: Flat OU  Lens: PCIOL OD, 3+ NS OS

## 2022-08-08 NOTE — PROGRESS NOTE ADULT - SUBJECTIVE AND OBJECTIVE BOX
[facility] DEPARTMENT OF OPHTHALMOLOGY  ------------------------------------------------------------------------------    Interval History: Following for R HZO. Today patient in severe pain. Distracted by pain and unable to participate with exam.     MEDICATIONS  (STANDING):  acyclovir IVPB 650 milliGRAM(s) IV Intermittent every 8 hours  ALBUTerol    0.083% 2.5 milliGRAM(s) Nebulizer every 6 hours  artificial tears (preservative free) Ophthalmic Solution 1 Drop(s) Both EYES every 2 hours  buDESOnide    Inhalation Suspension 0.5 milliGRAM(s) Inhalation two times a day  dextrose 5%. 1000 milliLiter(s) (100 mL/Hr) IV Continuous <Continuous>  dextrose 5%. 1000 milliLiter(s) (50 mL/Hr) IV Continuous <Continuous>  dextrose 50% Injectable 25 Gram(s) IV Push once  dextrose 50% Injectable 12.5 Gram(s) IV Push once  dextrose 50% Injectable 25 Gram(s) IV Push once  enoxaparin Injectable 80 milliGRAM(s) SubCutaneous every 12 hours  erythromycin   Ointment 1 Application(s) Right EYE four times a day  gabapentin 600 milliGRAM(s) Oral three times a day  glucagon  Injectable 1 milliGRAM(s) IntraMuscular once  hydrocortisone 2.5% Cream 1 Application(s) Topical two times a day  insulin glargine Injectable (LANTUS) 10 Unit(s) SubCutaneous at bedtime  insulin lispro (ADMELOG) corrective regimen sliding scale   SubCutaneous Before meals and at bedtime  lactated ringers. 1000 milliLiter(s) (75 mL/Hr) IV Continuous <Continuous>  lactobacillus acidophilus 1 Tablet(s) Oral daily  lidocaine 5% Ointment 1 Application(s) Topical two times a day  losartan 50 milliGRAM(s) Oral daily  meropenem  IVPB 1000 milliGRAM(s) IV Intermittent every 8 hours  metoprolol succinate ER 12.5 milliGRAM(s) Oral daily  mirtazapine 15 milliGRAM(s) Oral at bedtime  mupirocin 2% Ointment 1 Application(s) Topical three times a day  nystatin Powder 1 Application(s) Topical two times a day  potassium chloride    Tablet ER 40 milliEquivalent(s) Oral once  prednisoLONE acetate 1% Suspension 1 Drop(s) Right EYE three times a day  traZODone 100 milliGRAM(s) Oral at bedtime  triamcinolone 0.1% Ointment 1 Application(s) Topical two times a day  ursodiol Tablet 500 milliGRAM(s) Oral <User Schedule>  vancomycin  IVPB 750 milliGRAM(s) IV Intermittent every 12 hours  vancomycin  IVPB        MEDICATIONS  (PRN):  acetaminophen     Tablet .. 650 milliGRAM(s) Oral every 6 hours PRN Temp greater or equal to 38C (100.4F), Mild Pain (1 - 3), Moderate Pain (4 - 6)  AQUAPHOR (petrolatum Ointment) 1 Application(s) Topical three times a day PRN inguinal fold rash  dextrose Oral Gel 15 Gram(s) Oral once PRN Blood Glucose LESS THAN 70 milliGRAM(s)/deciliter  HYDROmorphone  Injectable 0.5 milliGRAM(s) IV Push every 3 hours PRN Pain  hydrOXYzine hydrochloride 20 milliGRAM(s) Oral four times a day PRN Itching  QUEtiapine 12.5 milliGRAM(s) Oral every 6 hours PRN agitation      VITALS: T(C): 36.9 (08-08-22 @ 14:57)  T(F): 98.4 (08-08-22 @ 14:57), Max: 98.5 (08-08-22 @ 05:45)  HR: 88 (08-08-22 @ 15:44) (80 - 89)  BP: 143/87 (08-08-22 @ 14:57) (135/67 - 154/64)  RR:  (18 - 18)  SpO2:  (94% - 99%)  Wt(kg): --  General: AAO x 2, unable to participate in exam due to pain.     Ophthalmology Exam:  Visual acuity (sc): MYRON 2/2 mental status  Pupils: 6mm OD, nonreactive to light, 2mm OS, reactive to light  Ttono: 14 OD, 15 OS  Extraocular movements (EOMs): MYRON 2/2 mental status  Confrontational Visual Field (CVF): MYRON 2/2 mental status    Pen Light Exam - pt unable to tolerate sitting up to slit lamp.  External: R sided forehead edema and erythema (improved), with overlying dark brown crusting, no longer extending to eyelid margins, + L preauricular edema and erythema, improved, wnl on L side  Lids/Lashes/Lacrimal Ducts: Trace periorbital edema RUL and RLL, flat OS   Sclera/Conjunctiva: tr+ injection OD, W+Q OS  Cornea: 1+ D-folds, +bullae, no pseudodendrites OD, Cl OS  Anterior Chamber: D+F OU    Iris: Flat OU  Lens: PCIOL OD, 3+ NS OS   [facility] DEPARTMENT OF OPHTHALMOLOGY  ------------------------------------------------------------------------------    Interval History: Following for R HZO. Today patient in severe pain. Distracted by pain and unable to participate with exam.     MEDICATIONS  (STANDING):  acyclovir IVPB 650 milliGRAM(s) IV Intermittent every 8 hours  ALBUTerol    0.083% 2.5 milliGRAM(s) Nebulizer every 6 hours  artificial tears (preservative free) Ophthalmic Solution 1 Drop(s) Both EYES every 2 hours  buDESOnide    Inhalation Suspension 0.5 milliGRAM(s) Inhalation two times a day  dextrose 5%. 1000 milliLiter(s) (100 mL/Hr) IV Continuous <Continuous>  dextrose 5%. 1000 milliLiter(s) (50 mL/Hr) IV Continuous <Continuous>  dextrose 50% Injectable 25 Gram(s) IV Push once  dextrose 50% Injectable 12.5 Gram(s) IV Push once  dextrose 50% Injectable 25 Gram(s) IV Push once  enoxaparin Injectable 80 milliGRAM(s) SubCutaneous every 12 hours  erythromycin   Ointment 1 Application(s) Right EYE four times a day  gabapentin 600 milliGRAM(s) Oral three times a day  glucagon  Injectable 1 milliGRAM(s) IntraMuscular once  hydrocortisone 2.5% Cream 1 Application(s) Topical two times a day  insulin glargine Injectable (LANTUS) 10 Unit(s) SubCutaneous at bedtime  insulin lispro (ADMELOG) corrective regimen sliding scale   SubCutaneous Before meals and at bedtime  lactated ringers. 1000 milliLiter(s) (75 mL/Hr) IV Continuous <Continuous>  lactobacillus acidophilus 1 Tablet(s) Oral daily  lidocaine 5% Ointment 1 Application(s) Topical two times a day  losartan 50 milliGRAM(s) Oral daily  meropenem  IVPB 1000 milliGRAM(s) IV Intermittent every 8 hours  metoprolol succinate ER 12.5 milliGRAM(s) Oral daily  mirtazapine 15 milliGRAM(s) Oral at bedtime  mupirocin 2% Ointment 1 Application(s) Topical three times a day  nystatin Powder 1 Application(s) Topical two times a day  potassium chloride    Tablet ER 40 milliEquivalent(s) Oral once  prednisoLONE acetate 1% Suspension 1 Drop(s) Right EYE three times a day  traZODone 100 milliGRAM(s) Oral at bedtime  triamcinolone 0.1% Ointment 1 Application(s) Topical two times a day  ursodiol Tablet 500 milliGRAM(s) Oral <User Schedule>  vancomycin  IVPB 750 milliGRAM(s) IV Intermittent every 12 hours  vancomycin  IVPB        MEDICATIONS  (PRN):  acetaminophen     Tablet .. 650 milliGRAM(s) Oral every 6 hours PRN Temp greater or equal to 38C (100.4F), Mild Pain (1 - 3), Moderate Pain (4 - 6)  AQUAPHOR (petrolatum Ointment) 1 Application(s) Topical three times a day PRN inguinal fold rash  dextrose Oral Gel 15 Gram(s) Oral once PRN Blood Glucose LESS THAN 70 milliGRAM(s)/deciliter  HYDROmorphone  Injectable 0.5 milliGRAM(s) IV Push every 3 hours PRN Pain  hydrOXYzine hydrochloride 20 milliGRAM(s) Oral four times a day PRN Itching  QUEtiapine 12.5 milliGRAM(s) Oral every 6 hours PRN agitation      VITALS: T(C): 36.9 (08-08-22 @ 14:57)  T(F): 98.4 (08-08-22 @ 14:57), Max: 98.5 (08-08-22 @ 05:45)  HR: 88 (08-08-22 @ 15:44) (80 - 89)  BP: 143/87 (08-08-22 @ 14:57) (135/67 - 154/64)  RR:  (18 - 18)  SpO2:  (94% - 99%)  Wt(kg): --  General: AAO x 2, unable to participate in exam due to pain.     Ophthalmology Exam:  Visual acuity (sc): MYRON 2/2 mental status  Pupils: 6mm OD, nonreactive to light, 2mm OS, reactive to light  Ttono: 14 OD, 15 OS  Extraocular movements (EOMs): MYRON 2/2 mental status  Confrontational Visual Field (CVF): MYRON 2/2 mental status    Pen Light Exam - pt unable to tolerate sitting up to slit lamp.  External: R sided forehead edema and erythema (improved), with overlying dark brown crusting, no longer extending to eyelid margins, + L preauricular edema and erythema, improved, wnl on L side  Lids/Lashes/Lacrimal Ducts: Trace periorbital edema RUL and RLL, flat OS   Sclera/Conjunctiva: tr+ injection OD, W+Q OS  Cornea: 1+ D-folds, no pseudodendrites OD, Cl OS  Anterior Chamber: D+F OU    Iris: Flat OU  Lens: PCIOL OD, 3+ NS OS

## 2022-08-08 NOTE — PROGRESS NOTE ADULT - ATTENDING COMMENTS
I have interviewed and examined the patient and reviewed the residents note including the history, exam, assessment, and plan.  I agree with the residents assessment and plan.    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 Rash improving though mental status fluctuating 2/2 waxing and waning pain from zoster.    1. HZO OD with of R sided facial rash - improving  - Likely bacterial superinfection with crusting over area of edema and erythema. Current crusting continuing to improve, no longer extending to lid margin OD    - IOP wnl   - Mental status poor today due to worsened pain - MYRON VA  - CT maxillofacial showing extensive R periorbital preseptal soft tissue swelling and asymmetric enlargement of R sided extraocular muscles. No evidence of orbital cellulitis. Evidence of extension to neck.  - No evidence of nec fasc  - Pt has dilated and minimally reactive R pupil. 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 Stable on CT.   - Appreciate derm and plastic surgery consults.    - Abx and antivirals per ID  - Pt unable to tolerate sitting up at slit lamp - cannot assess anterior chamber for inflammatory reaction  - IOP acceptable today. Was previously symmetrically low OU likely 2/2 systemic medication effect. Will continue to hold any IOP lowering drops.   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 Continuing to improve.   - C/w pred forte TID OD.   - C/w erythromycin ointment QID to R eye and periocular lesions  - C/w preservative-free artificial tears to Q2H OU   - B-scan 8/3 OD: no vitritis, RD, mass.   - Will continue to monitor for improvement of EOMs on antivirals.     2. Intermittent AMS related to waxing and waning pain.   - Appreciate neurology consult  - MRI brain showing no acute pathology, MRI orbits as above  - LP deferred per neurology and ID - reconsider as needed   - Pain control per primary team.   - findings and plan discussed with patient and primary team    Toyin Kuhn MD

## 2022-08-08 NOTE — PROGRESS NOTE ADULT - NSPROGADDITIONALINFOA_GEN_ALL_CORE
goal for rehab  hoping seroquel will help with agitation assoc with pain, if improved pain control, will change dilaudid to PO and abx to complete tomorrow, then once abx complete, will dc picc and plan for rehab placement goal for rehab  hoping seroquel will help with agitation assoc with pain, if improved pain control, will change dilaudid to PO and abx to complete tomorrow, then once abx complete, will dc picc and plan for rehab placement  daughter up to date and in agreement with plan

## 2022-08-08 NOTE — PROGRESS NOTE ADULT - SUBJECTIVE AND OBJECTIVE BOX
[facility] Division of Hospital Medicine  Kaitlyn Castelan MD  Pager [phone]    Patient is a 86y old  Female who presents with a chief complaint of Suspicion for Herpes Zoster opthalmicus/encephalitis      SUBJECTIVE / OVERNIGHT EVENTS: has intermittent shooting pain in head, otherwise doing well; rash improved per daughter      MEDICATIONS  (STANDING):  acyclovir IVPB 650 milliGRAM(s) IV Intermittent every 8 hours  ALBUTerol    0.083% 2.5 milliGRAM(s) Nebulizer every 6 hours  artificial tears (preservative free) Ophthalmic Solution 1 Drop(s) Both EYES every 2 hours  buDESOnide    Inhalation Suspension 0.5 milliGRAM(s) Inhalation two times a day  dextrose 5%. 1000 milliLiter(s) (100 mL/Hr) IV Continuous <Continuous>  dextrose 5%. 1000 milliLiter(s) (50 mL/Hr) IV Continuous <Continuous>  dextrose 50% Injectable 25 Gram(s) IV Push once  dextrose 50% Injectable 12.5 Gram(s) IV Push once  dextrose 50% Injectable 25 Gram(s) IV Push once  enoxaparin Injectable 77 milliGRAM(s) SubCutaneous every 12 hours  erythromycin   Ointment 1 Application(s) Right EYE four times a day  gabapentin 600 milliGRAM(s) Oral three times a day  glucagon  Injectable 1 milliGRAM(s) IntraMuscular once  hydrocortisone 2.5% Cream 1 Application(s) Topical two times a day  insulin glargine Injectable (LANTUS) 10 Unit(s) SubCutaneous at bedtime  insulin lispro (ADMELOG) corrective regimen sliding scale   SubCutaneous Before meals and at bedtime  lactated ringers. 1000 milliLiter(s) (75 mL/Hr) IV Continuous <Continuous>  lactobacillus acidophilus 1 Tablet(s) Oral daily  lidocaine 5% Ointment 1 Application(s) Topical two times a day  losartan 50 milliGRAM(s) Oral daily  meropenem  IVPB 1000 milliGRAM(s) IV Intermittent every 8 hours  metoprolol succinate ER 12.5 milliGRAM(s) Oral daily  mirtazapine 15 milliGRAM(s) Oral at bedtime  mupirocin 2% Ointment 1 Application(s) Topical three times a day  nystatin Powder 1 Application(s) Topical two times a day  potassium chloride    Tablet ER 40 milliEquivalent(s) Oral once  prednisoLONE acetate 1% Suspension 1 Drop(s) Right EYE three times a day  traZODone 100 milliGRAM(s) Oral at bedtime  triamcinolone 0.1% Ointment 1 Application(s) Topical two times a day  ursodiol Tablet 500 milliGRAM(s) Oral <User Schedule>  vancomycin  IVPB 750 milliGRAM(s) IV Intermittent every 12 hours  vancomycin  IVPB        MEDICATIONS  (PRN):  acetaminophen     Tablet .. 650 milliGRAM(s) Oral every 6 hours PRN Temp greater or equal to 38C (100.4F), Mild Pain (1 - 3), Moderate Pain (4 - 6)  AQUAPHOR (petrolatum Ointment) 1 Application(s) Topical three times a day PRN inguinal fold rash  dextrose Oral Gel 15 Gram(s) Oral once PRN Blood Glucose LESS THAN 70 milliGRAM(s)/deciliter  HYDROmorphone  Injectable 0.5 milliGRAM(s) IV Push every 3 hours PRN Pain  hydrOXYzine hydrochloride 20 milliGRAM(s) Oral four times a day PRN Itching  QUEtiapine 12.5 milliGRAM(s) Oral every 6 hours PRN agitation      CAPILLARY BLOOD GLUCOSE  POCT Blood Glucose.: 179 mg/dL (08 Aug 2022 11:35)  POCT Blood Glucose.: 167 mg/dL (08 Aug 2022 07:57)  POCT Blood Glucose.: 177 mg/dL (07 Aug 2022 22:48)  POCT Blood Glucose.: 178 mg/dL (07 Aug 2022 17:09)      PHYSICAL EXAM:  Vital Signs Last 24 Hrs  T(F): 98.5 (08 Aug 2022 05:45), Max: 98.5 (08 Aug 2022 05:45)  HR: 87 (08 Aug 2022 09:26) (80 - 89)  BP: 142/65 (08 Aug 2022 05:45) (135/67 - 154/64)  RR: 18 (08 Aug 2022 05:45) (18 - 18)  SpO2: 98% (08 Aug 2022 09:26) (94% - 99%)    Parameters below as of 08 Aug 2022 09:26  Patient On (Oxygen Delivery Method): room air        CONSTITUTIONAL: NAD, intermittently yells ouch, ouch  EYES: R eye periorbital area swollen, erythema, improved per daughter  ENMT: Moist oral mucosa, no pharyngeal injection or exudates; normal dentition  RESPIRATORY: Normal respiratory effort; grossly b/l AE  CARDIOVASCULAR: Regular rate and rhythm; No lower extremity edema;   ABDOMEN: Nontender to palpation, normoactive bowel sounds  MUSCULOSKELETAL:  no clubbing or cyanosis of digits; no joint swelling or tenderness to palpation; RUE swelling  PSYCH: calm coop, yelling in pain at times; affect appropriate  NEUROLOGY: CN 2-12 are intact and symmetric; no gross sensory deficits   SKIN: large sloughing area of skin above R eye    LABS:                        11.1   8.50  )-----------( 392      ( 08 Aug 2022 05:34 )             35.0     08-08    134<L>  |  97<L>  |  9   ----------------------------<  180<H>  3.6   |  26  |  0.55    Ca    8.7      08 Aug 2022 05:34  Phos  2.6     08-08  Mg     1.80     08-08

## 2022-08-08 NOTE — PROGRESS NOTE ADULT - ASSESSMENT
87 yo female w/PMH Cardiac stents post MI (1986, 1996), COPD, NPH, DM, Migraines, and suspected HTN presenting with 3 day history of herpes zoster rash  No fever, leukocytosis  R sided zoster rash, then increasing confusion  On exam with vesicles, redness at site  Optho with concern for R sided ocular involvement  2/4 BCX CoNS--suspect contam  MR shows areas of myositis, but suspect is due to viral process vs concomitant orbital cellulitis (patient is clinically improving)  Improved. 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 Other occult?  - F/U BCXs  - Monitor for further fevers  - Already on broad coverage  3) Preseptal cellulitis  - Improving on current abx  - Vanco 750mg q 12 (monitor levels--lower level okay, avoid CHARLINE)  - Meropenem 1g q 8  - Complete 7 days of Vanco/Meropenem then discontinue  - Monitor facial swelling/redness for improvement  - F/U BCXs    Signing off. Please call with further questions or change in status.    Jack Henderson MD  Contact on TEAMS messaging from 9am - 5pm  From 5pm-9am, on weekends, or if no response call [phone]

## 2022-08-08 NOTE — PROGRESS NOTE ADULT - SUBJECTIVE AND OBJECTIVE BOX
CC: F/U for wound infection    Saw/spoke to patient. No fevers, no chills. Having pain at site.    Allergies  diltiazem (Other; Rash)    ANTIMICROBIALS:  acyclovir IVPB 650 every 8 hours  meropenem  IVPB 1000 every 8 hours  vancomycin  IVPB 750 every 12 hours  vancomycin  IVPB      PE:    Vital Signs Last 24 Hrs  T(C): 36.9 (08 Aug 2022 14:57), Max: 36.9 (08 Aug 2022 05:45)  T(F): 98.4 (08 Aug 2022 14:57), Max: 98.5 (08 Aug 2022 05:45)  HR: 88 (08 Aug 2022 15:44) (80 - 89)  BP: 143/87 (08 Aug 2022 14:57) (135/67 - 154/64)  RR: 18 (08 Aug 2022 14:57) (18 - 18)  SpO2: 97% (08 Aug 2022 15:44) (94% - 99%)    Gen: AOx2-3, having pain at eye rash/lesions  CV: Nontachycardic  Resp: Breathing comfortably, RA  Abd: Soft, nontender  IV/Skin: No thrombophlebitis    LABS:                        11.1   8.50  )-----------( 392      ( 08 Aug 2022 05:34 )             35.0     08-08    134<L>  |  97<L>  |  9   ----------------------------<  180<H>  3.6   |  26  |  0.55    Ca    8.7      08 Aug 2022 05:34  Phos  2.6     08-08  Mg     1.80     08-08    MICROBIOLOGY:    .Blood Blood-Venous  08-03-22   No Growth Final  --  --    .Blood Blood-Venous  08-01-22   No Growth Final  --  --    .Blood Blood-Peripheral  07-28-22   No Growth Final  --  --    .Blood Blood-Peripheral  07-28-22   No Growth Final  --  --    .Blood Blood-Venous  07-27-22   No Growth Final  --  --    .Blood Blood  07-26-22   Growth in anaerobic bottle: Staphylococcus epidermidis  Growth in aerobic and anaerobic bottles: Staphylococcus hominis  Coag Negative Staphylococcus  Single set isolate, possible contaminant. Contact  Microbiology if susceptibility testing clinically  indicated.  --    Growth in anaerobic bottle: Gram Positive Cocci in Clusters  Growth in aerobic bottle: Gram Positive Cocci in Clusters    .Blood Blood  07-26-22   Growth in aerobic bottle: Staphylococcus hominis  Growth in aerobic and anaerobic bottles: Staphylococcus epidermidis  ***Blood Panel PCR results on this specimen are available  approximately 3 hours after the Gram stain result.***  Gram stain, PCR, and/or culture results may not always  correspond due to difference in methodologies.  ************************************************************  This PCR assay was performed by multiplex PCR. This  Assay tests for 66 bacterial and resistance gene targets.  Please refer to the [facility] test directory  at [email or web address] for details.  --  Blood Culture PCR  Staphylococcus hominis  Staphylococcus epidermidis    Rapid RVP Result: Nighat (08-03 @ 06:42)    (otherwise reviewed)    RADIOLOGY:    8/3 XR:    Impression:  Left lower lung opacity, may represent infection or atelectasis.

## 2022-08-08 NOTE — PROGRESS NOTE ADULT - PROBLEM SELECTOR PLAN 1
Prominent rash in R V1 dermatome.   Has significant pruritis and patient has been scratching affected area. CT imaging consistent with cellulitis extending from scalp to upper thorax with no evidence of abscess or necrotizing fasciitis. Per plastics no evidence of nec fasc, appreciate evaluation.   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  - Sx management:    - Continue hydroxyzine 20mg QID PRN for itching    - Gabapentin increased from 400mg to 600mg TID on 8/6 for better control of neuropathic pain  added seroquel q6 PRN for agitation    - IV dilaudid 0.5mg Q3H PRN,   PO acetaminophen 650mg Q6H PRN    - Lidocaine + triamcinolone ointment as below  - ID following, appreciate recs:    - acyclovir 650mg Q8H    - IV hydration while on acyclovir; LR 75cc/hr    - vanc 750mg BID, monitor levels; d/c cefazolin  -blood culture neg to date: no indication for LP at this time  - Ophthalmology following, apprec recs: erythromycin ointment QID to eye &periocular lesions, artificial tears Q4H  - Appreciate derm recs:    - lidocaine ointment mixed with vaseline BID, triamcinolone ointment to red areas only BID    - apply vaseline to crusted areas Q2H, cont vanco and meropenem  -updated daughter at bedside Dr. Delgado on 8/7

## 2022-08-08 NOTE — PROGRESS NOTE ADULT - PROBLEM SELECTOR PLAN 5
Hx of rash in inguinal folds.   - Continue nystatin powder BID  - Appreciate derm recs: mupirocin ointment TID to perianal erosions

## 2022-08-08 NOTE — PROGRESS NOTE ADULT - ASSESSMENT
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 Rash improving though mental status fluctuating 2/2 waxing and waning pain from zoster.    1. HZO OD with of R sided facial rash - improving  - Likely bacterial superinfection with crusting over area of edema and erythema. Current crusting continuing to improve, no longer extending to lid margin OD    - IOP wnl   - Mental status poor today due to worsened pain - [facility]  - CT maxillofacial showing extensive R periorbital preseptal soft tissue swelling and asymmetric enlargement of R sided extraocular muscles. No evidence of orbital cellulitis. Evidence of extension to neck.  - No evidence of nec fasc  - Pt has dilated and minimally reactive R pupil. 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 Stable on CT.   - Appreciate derm and plastic surgery consults.    - Abx and antivirals per ID  - Pt unable to tolerate sitting up at slit lamp - cannot assess anterior chamber for inflammatory reaction  - IOP acceptable today. Was previously symmetrically low OU likely 2/2 systemic medication effect. Will continue to hold any IOP lowering drops.   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 Continuing to improve.   - C/w pred forte TID OD.   - C/w erythromycin ointment QID to R eye and periocular lesions  - C/w preservative-free artificial tears to Q2H OU   - B-scan 8/3 OD: no vitritis, RD, mass.   - Will continue to monitor for improvement of EOMs on antivirals.     2. Intermittent AMS related to waxing and waning pain.   - Appreciate neurology consult  - MRI brain showing no acute pathology, MRI orbits as above  - LP deferred per neurology and ID - reconsider as needed   - Pain control per primary team.     Outpatient follow-up: Patient should follow-up with his/her ophthalmologist or with [facility] Department of Ophthalmology at the address below     [address] [place] [address]. 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 Rash improving though mental status fluctuating 2/2 waxing and waning pain from zoster.    1. HZO OD with of R sided facial rash - improving  - Likely bacterial superinfection with crusting over area of edema and erythema. Current crusting continuing to improve, no longer extending to lid margin OD    - IOP wnl   - Mental status poor today due to worsened pain - [facility]  - CT maxillofacial showing extensive R periorbital preseptal soft tissue swelling and asymmetric enlargement of R sided extraocular muscles. No evidence of orbital cellulitis. Evidence of extension to neck.  - No evidence of nec fasc  - Pt has dilated and minimally reactive R pupil. 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 Stable on CT.   - Appreciate derm and plastic surgery consults.    - Abx and antivirals per ID  - Pt unable to tolerate sitting up at slit lamp - cannot assess anterior chamber for inflammatory reaction  - IOP acceptable today. Was previously symmetrically low OU likely 2/2 systemic medication effect. Will continue to hold any IOP lowering drops.   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 Continuing to improve.   - C/w pred forte TID OD.   - C/w erythromycin ointment QID to R eye and periocular lesions  - C/w preservative-free artificial tears to Q2H OU   - B-scan 8/3 OD: no vitritis, RD, mass.   - Will continue to monitor for improvement of EOMs on antivirals.     2. Intermittent AMS related to waxing and waning pain.   - Appreciate neurology consult  - MRI brain showing no acute pathology, MRI orbits as above  - LP deferred per neurology and ID - reconsider as needed   - Pain control per primary team.     SDW Dr. Kuhn, attending.     Outpatient follow-up: Patient should follow-up with his/her ophthalmologist or with [facility] Department of Ophthalmology at the address below     [address] 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 Rash improving though mental status fluctuating 2/2 waxing and waning pain from zoster.    1. HZO OD with of R sided facial rash - improving  - Likely bacterial superinfection with crusting over area of edema and erythema. Current crusting continuing to improve, no longer extending to lid margin OD    - IOP wnl   - Mental status poor today due to worsened pain - [facility]  - CT maxillofacial showing extensive R periorbital preseptal soft tissue swelling and asymmetric enlargement of R sided extraocular muscles. No evidence of orbital cellulitis. Evidence of extension to neck.  - No evidence of nec fasc  - Pt has dilated and minimally reactive R pupil. 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 Stable on CT.   - Appreciate derm and plastic surgery consults.    - Abx and antivirals per ID  - Pt unable to tolerate sitting up at slit lamp - cannot assess anterior chamber for inflammatory reaction  - IOP acceptable today. Was previously symmetrically low OU likely 2/2 systemic medication effect. Will continue to hold any IOP lowering drops.   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 Continuing to improve.   - C/w pred forte TID OD.   - C/w erythromycin ointment QID to R eye and periocular lesions  - C/w preservative-free artificial tears to Q2H OU   - B-scan 8/3 OD: no vitritis, RD, mass.   - Will continue to monitor for improvement of EOMs on antivirals.     2. Intermittent AMS related to waxing and waning pain.   - Appreciate neurology consult  - MRI brain showing no acute pathology, MRI orbits as above  - LP deferred per neurology and ID - reconsider as needed   - Pain control per primary team.   - findings and plan discussed with patient and primary team    SDW Dr. Kuhn, attending.     Outpatient follow-up: Patient should follow-up with his/her ophthalmologist or with [facility] Department of Ophthalmology at the address below     [address] [facility]. [address]  [place], [place] [id number] [phone]

## 2022-08-08 NOTE — PROGRESS NOTE ADULT - PROBLEM SELECTOR PLAN 2
- continue seroquel 25mg QD PRN    -C/o Rt upper arm swelling/discomfort, doppler with age indeterminant DVT, start full dose lovenox  will remove PICC once complete abx and able to change dilaudid to PO

## 2022-08-08 NOTE — PROGRESS NOTE ADULT - PROBLEM SELECTOR PLAN 4
Hx of severe HTN at home.  - continue home losartan, metoprolol  - hold furosemide and spironolactone for now, avoiding acute BP drops  BP control has been adequate, cont to monitor

## 2022-08-08 NOTE — PROGRESS NOTE ADULT - PROBLEM SELECTOR PLAN 7
NPH diagnosed 2011, pt has programmable  shunt [facility] neuro[facility]urg, **must be programmed after MRI (page neurosurg [id number])**. 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      -Daughter agreeable for neurology consult but declines LP  -blood culture neg to date: no indication for LP at this time

## 2022-08-09 NOTE — CONSULT NOTE ADULT - NSCONSULTADDITIONALINFOA_GEN_ALL_CORE
Case reviewed with primary team.     Thank you for allowing us to participate in your patient's care. Please page [phone] for any questions/concerns.

## 2022-08-09 NOTE — PROGRESS NOTE ADULT - ATTENDING COMMENTS
Ms. 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  I agree with management as above.  We will continue to follow to manage agents for neuropathic pain.   Pain management f/u  D/W hospitalist, PA, daughter.     Thank you

## 2022-08-09 NOTE — PROGRESS NOTE ADULT - PROBLEM SELECTOR PLAN 1
Prominent rash in R V1 dermatome.   Has significant pruritis and patient has been scratching affected area. CT imaging consistent with cellulitis extending from scalp to upper thorax with no evidence of abscess or necrotizing fasciitis. Per plastics no evidence of nec fasc, appreciate evaluation.   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  - Sx management:    - Continue hydroxyzine 20mg QID PRN for itching    - Gabapentin 600mg TID for better control of neuropathic pain  added seroquel q6 PRN for agitation  pt still with sig pain and poor impulse control due to dementia; neuro, pain mgmt, psych to see pt; may also have pall care help  PO acetaminophen 650mg Q6H PRN    - Lidocaine + triamcinolone ointment as below  - ID following, appreciate recs:    - acyclovir 650mg Q8H    - IV hydration while on acyclovir; LR 75cc/hr    - vanc 750mg BID, monitor levels; d/c cefazolin  -blood culture neg to date: no indication for LP at this time  - Ophthalmology following, apprec recs: erythromycin ointment QID to eye &periocular lesions, artificial tears Q4H  - Appreciate derm recs:    - lidocaine ointment mixed with vaseline BID, triamcinolone ointment to red areas only BID    - apply vaseline to crusted areas Q2H, cont vanco and meropenem  -updated daughter at bedside Dr. Delgado on 8/7

## 2022-08-09 NOTE — PROGRESS NOTE ADULT - SUBJECTIVE AND OBJECTIVE BOX
Neurology Progress Note    SUBJECTIVE/OBJECTIVE/INTERVAL EVENTS: Patient seen and examined at bedside w/ neuro attending and team.     INTERVAL HISTORY: no improvement on her pain.  Pain management came to speak with daughter at bedside and had recommended weaning the gabapentin and starting with Lyrica.  Patient agitated overnight    REVIEW OF SYSTEMS: Otherwise denies fever, chills, headaches, vision changes, blurry vision, double vision, nausea, vomiting, hearing change, focal weakness, focal numbness, parasthesias, bowel/ bladder incontinence.  Few questions of a 10-system ROS was performed and is negative except for those items noted above and/or in the HPI.    VITALS & EXAMINATION:  Vital Signs Last 24 Hrs  T(C): 36.5 (09 Aug 2022 10:00), Max: 37.2 (08 Aug 2022 21:45)  T(F): 97.7 (09 Aug 2022 10:00), Max: 99 (08 Aug 2022 21:45)  HR: 78 (09 Aug 2022 15:33) (78 - 97)  BP: 158/61 (09 Aug 2022 10:00) (136/71 - 161/56)  BP(mean): --  RR: 18 (09 Aug 2022 10:00) (18 - 18)  SpO2: 98% (09 Aug 2022 15:33) (94% - 98%)    Parameters below as of 09 Aug 2022 15:33  Patient On (Oxygen Delivery Method): room air        General:  Constitutional: Female, appears stated age   Head: Normocephalic; Eyes: clear sclera; significant crusting of the right forehead and balding of the right cranium to the coronal suture.  area looks excoriated  Extremities: No cyanosis; Skin: No lauri edema of LE  Resp: breathing comfortably     Neurological (>12):  MS: Awake, screams in pain but does not follow basic commands  Language: Speech is clear, fluent, normal volume  CNs: does not open eyes upon request; V1 and V2 shows hypersensitivity to light stroking; no facial asymmetry b/l, full. Hearing diminished in the right ear    Motor - Normal bulk and tone throughout. can raise bilateral arms against gravity to try and rip off the covering on her head.      LABORATORY:  CBC                       10.5   6.06  )-----------( 348      ( 09 Aug 2022 06:25 )             32.9     Chem 08-09    138  |  102  |  7   ----------------------------<  196<H>  3.9   |  26  |  0.54    Ca    8.6      09 Aug 2022 06:25  Phos  2.7     08-09  Mg     1.70     08-09      LFTs   Coagulopathy   Lipid Panel   A1c   Cardiac enzymes     U/A   CSF  Immunological  Other Neurology Progress Note    SUBJECTIVE/OBJECTIVE/INTERVAL EVENTS: Patient seen and examined at bedside w/ neuro attending and team.     INTERVAL HISTORY: no improvement on her pain.  Pain management came to speak with daughter at bedside and had recommended weaning the gabapentin and starting with Lyrica.  Patient agitated overnight.  She is having severe pain and in severe distress with paroxysms of pain.    REVIEW OF SYSTEMS: Otherwise denies fever, chills, headaches, vision changes, blurry vision, double vision, nausea, vomiting, hearing change, focal weakness, focal numbness, parasthesias, bowel/ bladder incontinence.  Few questions of a 10-system ROS was performed and is negative except for those items noted above and/or in the HPI.    VITALS & EXAMINATION:  Vital Signs Last 24 Hrs  T(C): 36.5 (09 Aug 2022 10:00), Max: 37.2 (08 Aug 2022 21:45)  T(F): 97.7 (09 Aug 2022 10:00), Max: 99 (08 Aug 2022 21:45)  HR: 78 (09 Aug 2022 15:33) (78 - 97)  BP: 158/61 (09 Aug 2022 10:00) (136/71 - 161/56)  BP(mean): --  RR: 18 (09 Aug 2022 10:00) (18 - 18)  SpO2: 98% (09 Aug 2022 15:33) (94% - 98%)    Parameters below as of 09 Aug 2022 15:33  Patient On (Oxygen Delivery Method): room air        General:  Constitutional: Female, appears stated age   Head: Normocephalic; Eyes: clear sclera; significant crusting of the right forehead and balding of the right cranium to the coronal suture.  area looks excoriated  Extremities: No cyanosis; Skin: No lauri edema of LE  Resp: breathing comfortably     Neurological (>12):  MS: Awake, screams in pain but does not follow basic commands  Language: Speech is clear, fluent, normal volume  CNs: does not open eyes upon request; V1 and V2 shows hypersensitivity to light stroking; no facial asymmetry b/l, full. Hearing diminished in the right ear    Motor - Normal bulk and tone throughout. can raise bilateral arms against gravity to try and rip off the covering on her head.      LABORATORY:  CBC                       10.5   6.06  )-----------( 348      ( 09 Aug 2022 06:25 )             32.9     Chem 08-09    138  |  102  |  7   ----------------------------<  196<H>  3.9   |  26  |  0.54    Ca    8.6      09 Aug 2022 06:25  Phos  2.7     08-09  Mg     1.70     08-09      LFTs   Coagulopathy   Lipid Panel   A1c   Cardiac enzymes     U/A   CSF  Immunological  Other

## 2022-08-09 NOTE — BH CONSULTATION LIAISON ASSESSMENT NOTE - RISK ASSESSMENT
Risk factors: Pain, medical issues, passive SI, not on psychotropics   Protective factors: No PPHx, supportive family, residential stability  Risk factors: Pain, medical issues, passive SI, not on psychotropics   Protective factors: No PPHx, supportive family, residential stability     Not at acute risk of harm to self or others at this time.

## 2022-08-09 NOTE — CONSULT NOTE ADULT - SUBJECTIVE AND OBJECTIVE BOX
HPI:  Patient is an 85yo female w/PMH Cardiac stents post MI (1986, 1996), COPD, NPH, DM, Migraines, and suspected HTN presenting with 3 day history of herpes zoster rash over the right V1 dermatome w/ eye involvement, now presenting with 1 day history of altered mental status. On 7/17, patient had right sided headache attributed to her recurrent migraines that she normally has on a daily basis, in which she took furiocet for. On 7/20 night and 7/21 morning, Patient vomited and began taking naproxen and tylenol for her symptoms. On 7/21, patient later saw her outpatient internist, who requested a CT Head for the patient, which came with findings at baseline. On 7/21 night, the patient began to scream, and checked her BP, which was at 200/80. On 7/22 morning, the patient's repeat BP was 160/100. Her internist subsequently prescribed amlodipine for the patient, and at the time her mental status was at baseline (A&Ox4). Also on 7/22, the patient began to complain of blurry vision. On 7/23 morning, the patient had the first occurrence of her rash around the right side of her face, V1 dermatome distribution, and the rash has progressively worsened since. On 7/24, patient started valtrex and artificial tears and began to be more sleepy. On 7/25 Patient aide said that the patient was more confused and her mentation was getting worse up until now.    In the ED, patient was started on 1x Zosyn, 1x 500mg acyclovir. (26 Jul 2022 18:18)      Interval History:   Patient seen and examined with daughter at bedside. Patient reports having constant with intermittent spikes of sharp and like "pins and needles" pain along right side of head at site of shingles. She describes the pain as "excruciating".   Per daughter, patient's pain not controlled with prn doses of IV Dilaudid.; she reports patient had better relief with IV Morphine dose earlier this morning but that pain returned shortly thereafter.   Denies constipation.     PERTINENT PM/SXH:   Myocardial Infarction  Diabetes Mellitus Type II  Migraines  COPD (Chronic Obstructive Pulmonary Disease)  NPH (normal pressure hydrocephalus)  COPD, mild  CAD (coronary artery disease)  Type 2 diabetes mellitus  Migraines  HTN (hypertension)  Stented coronary artery    FAMILY HISTORY:  FH: myocardial infarction (Father)  FH: hypertension (Child)    ITEMS NOT CHECKED ARE NOT PRESENT    SOCIAL HISTORY:   Significant other/partner[x ]  Children[ x]  Tenriism/Spirituality: Adventism  Substance hx:  [ ]   Tobacco hx:  [ x]   Alcohol hx: [ ]   Home Opioid hx:  [ ] I-Stop Reference No:  Living Situation: [ x]Home  [ ]Long term care  [ ]Rehab [ ]Other      ADVANCE DIRECTIVES:    MOLST  [ ]  Living Will  [ ]   DECISION MAKER(s):  [ ] Health Care Proxy(s)  [x ] Surrogate(s)  [ ] Guardian           Name(s): Phone Number(s):  Daughter Sierra Peñaloza: [phone]    BASELINE (I)ADL(s) (prior to admission):  [place]: [ ]Total  [x ] Moderate [ ]Dependent    Allergies    diltiazem (Other; Rash)    Intolerances    MEDICATIONS  (STANDING):  acetaminophen   IVPB .. 1000 milliGRAM(s) IV Intermittent every 6 hours  acyclovir IVPB 650 milliGRAM(s) IV Intermittent every 8 hours  ALBUTerol    0.083% 2.5 milliGRAM(s) Nebulizer every 6 hours  artificial tears (preservative free) Ophthalmic Solution 1 Drop(s) Both EYES every 2 hours  buDESOnide    Inhalation Suspension 0.5 milliGRAM(s) Inhalation two times a day  dextrose 5%. 1000 milliLiter(s) (100 mL/Hr) IV Continuous <Continuous>  dextrose 5%. 1000 milliLiter(s) (50 mL/Hr) IV Continuous <Continuous>  dextrose 50% Injectable 25 Gram(s) IV Push once  dextrose 50% Injectable 12.5 Gram(s) IV Push once  dextrose 50% Injectable 25 Gram(s) IV Push once  enoxaparin Injectable 80 milliGRAM(s) SubCutaneous every 12 hours  erythromycin   Ointment 1 Application(s) Right EYE four times a day  gabapentin 300 milliGRAM(s) Oral every 8 hours  glucagon  Injectable 1 milliGRAM(s) IntraMuscular once  hydrocortisone 2.5% Cream 1 Application(s) Topical two times a day  hydrOXYzine hydrochloride 20 milliGRAM(s) Oral every 6 hours  insulin glargine Injectable (LANTUS) 10 Unit(s) SubCutaneous at bedtime  insulin lispro (ADMELOG) corrective regimen sliding scale   SubCutaneous Before meals and at bedtime  ketorolac   Injectable 15 milliGRAM(s) IV Push every 6 hours  lactated ringers. 1000 milliLiter(s) (75 mL/Hr) IV Continuous <Continuous>  lactobacillus acidophilus 1 Tablet(s) Oral daily  lidocaine 5% Ointment 1 Application(s) Topical two times a day  losartan 50 milliGRAM(s) Oral daily  metoprolol succinate ER 12.5 milliGRAM(s) Oral daily  mirtazapine 7.5 milliGRAM(s) Oral at bedtime  morphine  - Injectable 2 milliGRAM(s) IV Push every 6 hours  mupirocin 2% Ointment 1 Application(s) Topical three times a day  nystatin Powder 1 Application(s) Topical two times a day  OXcarbazepine 300 milliGRAM(s) Oral two times a day  potassium chloride    Tablet ER 40 milliEquivalent(s) Oral once  prednisoLONE acetate 1% Suspension 1 Drop(s) Right EYE three times a day  pregabalin 25 milliGRAM(s) Oral every 8 hours  traZODone 100 milliGRAM(s) Oral at bedtime  triamcinolone 0.1% Ointment 1 Application(s) Topical two times a day  ursodiol Tablet 500 milliGRAM(s) Oral <User Schedule>    MEDICATIONS  (PRN):  AQUAPHOR (petrolatum Ointment) 1 Application(s) Topical three times a day PRN inguinal fold rash  dextrose Oral Gel 15 Gram(s) Oral once PRN Blood Glucose LESS THAN 70 milliGRAM(s)/deciliter  morphine  IR 15 milliGRAM(s) Oral every 3 hours PRN mod and severe pain      PRESENT SYMPTOMS: [ ]Unable to self-report due to altered mental status  [ ] CPOT [ ] PAINADs [ ] RDOS  Source if other than patient:  [ ]Family   [ ]Team     Pain: [x ]yes [ ]no  QOL impact - severe  Location -  right side of forehead at shingles site               Aggravating factors - palpation  Quality - sharp , "pins and needles"   Radiation - none   Timing- constant with intermittent spikes  Severity (0-10 scale): 10  Minimal acceptable level (0-10 scale):     CPOT:    https://www.sccm.org/getattachment/[email or web address]-8a8k-9833z0617g5f/Critical-Care-Pain-Observation-Tool-(CPOT)      PAIN AD Score:     [email or web address] (press ctrl +  left click to view)    Dyspnea:                           [ ]Mild [ ]Moderate [ ]Severe  RDOS:  0 to 2  minimal or no respiratory distress   3  mild distress  4 to 6 moderate distress  >7 severe distress  [email or web address]://[email or web address]/[email or web address]_Distress_Observation_Scale.pdf (Ctrl +  left click to view)     Anxiety:                             [ ]Mild [ ]Moderate [ ]Severe  Agitation:                          [ ]Mild [ ]Moderate [ ]Severe  Fatigue:                             [ ]Mild [ ]Moderate [ ]Severe  Nausea:                             [ ]Mild [ ]Moderate [ ]Severe  Loss of appetite:              [ ]Mild [ ]Moderate [ ]Severe  Constipation:                   [ ]Mild [ ]Moderate [ ]Severe  Diarrhea:                          [ ]Mild [ ]Moderate [ ]Severe    PCSSQ[Palliative Care Spiritual Screening Question]   Severity (0-10):  Score of 4 or > indicate consideration of Chaplaincy referral.  Chaplaincy Referral: [ ] yes [ ] refused [ ] following    Other Symptoms:  [x ]All other review of systems negative     Palliative Performance Status Version 2:   60      %    http://npcrc.org/files/news/palliative_performance_scale_ppsv2.pdf    PHYSICAL EXAM:  Vital Signs Last 24 Hrs  T(C): 36.7 (09 Aug 2022 21:15), Max: 37.3 (09 Aug 2022 17:45)  T(F): 98 (09 Aug 2022 21:15), Max: 99.1 (09 Aug 2022 17:45)  HR: 76 (09 Aug 2022 21:15) (76 - 82)  BP: 132/60 (09 Aug 2022 21:15) (132/60 - 161/56)  BP(mean): --  RR: 18 (09 Aug 2022 21:15) (18 - 18)  SpO2: 100% (09 Aug 2022 21:15) (94% - 100%)    Parameters below as of 09 Aug 2022 21:15  Patient On (Oxygen Delivery Method): room air         I&O's Summary      GENERAL:  [x ]Alert  [x ]Oriented x 3  [ ]Lethargic  [ ]Cachexia  [ ]Unarousable  [ x]Verbal  [ ]Non-Verbal    Behavioral:   [ ] Anxiety  [ ] Delirium [ ] Agitation [ ] Calm  [ x] Other - intermittent episodes of distress due to pain episode  HEENT: Right eye closed, Crusted lesions at right forehead along V1 dermatome  [ ]Normal  [ ] Temporal Wasting  [ ]Dry mouth   [ ]ET Tube/Trach  [ ]Oral lesions  [ ] Mucositis  PULMONARY:   [x ]Clear [ ]Tachypnea  [ ]Audible excessive secretions   [ ]Rhonchi        [ ]Right [ ]Left [ ]Bilateral  [ ]Crackles        [ ]Right [ ]Left [ ]Bilateral  [ ]Wheezing     [ ]Right [ ]Left [ ]Bilateral  [ ]Diminished breath sounds [ ]right [ ]left [ ]bilateral  CARDIOVASCULAR:    [ x]Regular [ ]Irregular [ ]Tachy  [ ]Shawn [ ]Murmur [ ]Other  GASTROINTESTINAL:  [ x]Soft  [ ]Distended   [ ]+BS  [ x]Non tender [ ]Tender  [ ]PEG [ ]OGT/ NGT  Last BM: 8/9  GENITOURINARY:  [ ]Normal [ x] Incontinent   [ ]Oliguria/Anuria   [ ]Grace  MUSCULOSKELETAL:   [x ]Normal   [ ]Weakness  [ ]Bed/Wheelchair bound [ ]Edema  [  ] amputation  [  ] contraction  NEUROLOGIC: spontaneous movement of all extremities   [ ]No focal deficits  [ ]Cognitive impairment  [ ]Dysphagia [ ]Dysarthria [ ]Paresis [ ]Other   SKIN: Crusted lesions along right forehead along V1 dermatome. See Nursing Skin Assessment for further details  [ ]Normal    [ ]Rash  [ ]Pressure ulcer(s)       Present on admission [ ]y [ ]n   [  ]  Wound    [  ] hyperpigmentation    CRITICAL CARE:  [ ] Shock Present  [ ]Septic [ ]Cardiogenic [ ]Neurologic [ ]Hypovolemic  [ ]  Vasopressors [ ]  Inotropes   [ ]Respiratory failure present [ ]Mechanical ventilation [ ]Non-invasive ventilatory support [ ]High flow    [ ]Acute  [ ]Chronic [ ]Hypoxic  [ ]Hypercarbic [ ]Other  [ ]Other organ failure     LABS:                        10.5   6.06  )-----------( 348      ( 09 Aug 2022 06:25 )             32.9   08-09    138  |  102  |  7   ----------------------------<  196<H>  3.9   |  26  |  0.54    Ca    8.6      09 Aug 2022 06:25  Phos  2.7     08-09  Mg     1.70     08-09        CAPILLARY BLOOD GLUCOSE      POCT Blood Glucose.: 145 mg/dL (09 Aug 2022 20:47)  POCT Blood Glucose.: 151 mg/dL (09 Aug 2022 18:13)  POCT Blood Glucose.: 240 mg/dL (09 Aug 2022 11:42)  POCT Blood Glucose.: 166 mg/dL (09 Aug 2022 07:40)      RADIOLOGY & ADDITIONAL STUDIES:  VA Duplex RUE 8/8/2022  Age-indeterminate, non-occlusive deep vein thrombosis  noted in the right subclavian vein.    CXRAY 8/3/2022  Impression:  Left lower lung opacity, may represent infection or atelectasis.    CT 8/2/2022  Extensive right periorbital preseptal soft tissue swelling is again noted   concordant with the history of shingles over the right eye. A   superimposed cellulitis can't be excluded. Asymmetric enlargement of the   right-sided extraocular muscles appears similar compared to the   07/27/2022 orbital MRI study. There is no evidence for orbital abscess.  The superior ophthalmic veins remain patent. The cavernous sinuses appear   symmetric.    Diffuse nonspecific skin and subcutaneous soft tissue   swelling-infiltration involves the scalp, right lateral neck soft   tissues, and anterior neck soft tissues, extending to the upper right   thoracic region, most likely reflecting a cellulitis given the clinical   history, without evidence for abscess formation at this time.      PROTEIN CALORIE MALNUTRITION PRESENT: [ ]mild [ ]moderate [ ]severe [ ]underweight [ ]morbid obesity  https://www.andeal.org/vault/2440/web/files/ONC/Table_Clinical%20Characteristics%20to%20Document%20Malnutrition-White%20JV%20et%20al%202012.pdf    Height (cm): 160 (07-26-22 @ 09:10)  Weight (kg): 77.111 (07-26-22 @ 13:30)  BMI (kg/m2): 30.1 (07-26-22 @ 13:30)    [ ]PPSV2 < or = to 30% [ ]significant weight loss  [ ]poor nutritional intake  [ ]anasarca [ ]Artificial Nutrition      REFERRALS:   [ ]Chaplaincy  [ ]Hospice  [ ]Child Life  [ ]Social Work  [ x]Case management [ ]Holistic Therapy     ************************************************************************  GERIATRIC MEDICINE COORDINATION OF CARE DOCUMENTATION  [x] Inpatient Consult  [ ] Other:  ************************************************************************    HPI reviewed     ------------------------------------------------------------------------    MEDICATION REVIEW:  --- Pls refer to current medicatons in the body of this note  ISTOP REFERENCE:  [id number]. No prescriptions found on ISTOP  --- PRN usage: In past 24 hours, received   ------------------------------------------------------------------------  COORDINATION OF CARE:  --- Palliative Care consulted for:  --- Patient assessed:   --- Patient previously seen by Palliative Care service: NO  ADVANCE CARE PLANNING  --- Code status:   --- MOLST reviewed in chart: NONE  --- HCP/ Surrogate:   --- GOC document found in Alpha: NONE  --- HCP/ Living will/ Other advanced directives in Alpha: NONE  ------------------------------------------------------------------------  CARE PROVIDER DOCUMENTATION:  --- SW/CM notes:  --- PT recs:  --- Sp/Sw recs:   --- Nutrition recs:  PLAN OF CARE  --- Known admissions in past year:  --- Current admit date:   --- LOS:  --- LACE score:   --- Current dispo plan: TO BE DETERMINED  ------------------------------------------------------------------------  --- Chart reviewed: 30 Minutes [including time used to gather, review and transfer data to this note]    Prolonged services rendered, as part of this patient's care provided by Palliative Medicine, include: i.chart review for provider and ancillary service documentation, ii.pertinent diagnostics including laboratory and imaging studies,iii. medication review including PRN use, iv. admission history including previous palliative care encounters and GOC notes, v.advance care planning documents including HCP and MOLST forms in Alpha. Part of Palliative Medicine extended evaluation and management also involves coordination of care with our IDT, the primary and consulting gregg, and unit CM/SW and Hospice if eligible. Recommendations based on the information gathered and discussed are outline in the AP of our notes.    ************************************************************************ HPI:  Patient is an 87yo female w/PMH Cardiac stents post MI (1986, 1996), COPD, NPH, DM, Migraines, and suspected HTN presenting with 3 day history of herpes zoster rash over the right V1 dermatome w/ eye involvement, now presenting with 1 day history of altered mental status. On 7/17, patient had right sided headache attributed to her recurrent migraines that she normally has on a daily basis, in which she took furiocet for. On 7/20 night and 7/21 morning, Patient vomited and began taking naproxen and tylenol for her symptoms. On 7/21, patient later saw her outpatient internist, who requested a CT Head for the patient, which came with findings at baseline. On 7/21 night, the patient began to scream, and checked her BP, which was at 200/80. On 7/22 morning, the patient's repeat BP was 160/100. Her internist subsequently prescribed amlodipine for the patient, and at the time her mental status was at baseline (A&Ox4). Also on 7/22, the patient began to complain of blurry vision. On 7/23 morning, the patient had the first occurrence of her rash around the right side of her face, V1 dermatome distribution, and the rash has progressively worsened since. On 7/24, patient started valtrex and artificial tears and began to be more sleepy. On 7/25 Patient aide said that the patient was more confused and her mentation was getting worse up until now.    In the ED, patient was started on 1x Zosyn, 1x 500mg acyclovir. (26 Jul 2022 18:18)      Interval History:   Patient seen and examined with daughter at bedside. Patient reports having constant with intermittent spikes of sharp and like "pins and needles" pain along right side of head at site of shingles. She describes the pain as "excruciating".   Per daughter, patient's pain not controlled with prn doses of IV Dilaudid.; she reports patient had better relief with IV Morphine dose earlier this morning but that pain returned shortly thereafter.   Denies constipation.     PERTINENT PM/SXH:   Myocardial Infarction  Diabetes Mellitus Type II  Migraines  COPD (Chronic Obstructive Pulmonary Disease)  NPH (normal pressure hydrocephalus)  COPD, mild  CAD (coronary artery disease)  Type 2 diabetes mellitus  Migraines  HTN (hypertension)  Stented coronary artery    FAMILY HISTORY:  FH: myocardial infarction (Father)  FH: hypertension (Child)    ITEMS NOT CHECKED ARE NOT PRESENT    SOCIAL HISTORY:   Significant other/partner[x ]  Children[ x]  Sabianist/Spirituality: Druze  Substance hx:  [ ]   Tobacco hx:  [ x]   Alcohol hx: [ ]   Home Opioid hx:  [ ] I-Stop Reference No:  Living Situation: [ x]Home  [ ]Long term care  [ ]Rehab [ ]Other      ADVANCE DIRECTIVES:    MOLST  [ ]  Living Will  [ ]   DECISION MAKER(s):  [ ] Health Care Proxy(s)  [x ] Surrogate(s)  [ ] Guardian           Name(s): Phone Number(s):  Daughter Sierra Peñaloza: [phone]    BASELINE (I)ADL(s) (prior to admission):  [place]: [ ]Total  [x ] Moderate [ ]Dependent    Allergies    diltiazem (Other; Rash)    Intolerances    MEDICATIONS  (STANDING):  acetaminophen   IVPB .. 1000 milliGRAM(s) IV Intermittent every 6 hours  acyclovir IVPB 650 milliGRAM(s) IV Intermittent every 8 hours  ALBUTerol    0.083% 2.5 milliGRAM(s) Nebulizer every 6 hours  artificial tears (preservative free) Ophthalmic Solution 1 Drop(s) Both EYES every 2 hours  buDESOnide    Inhalation Suspension 0.5 milliGRAM(s) Inhalation two times a day  dextrose 5%. 1000 milliLiter(s) (100 mL/Hr) IV Continuous <Continuous>  dextrose 5%. 1000 milliLiter(s) (50 mL/Hr) IV Continuous <Continuous>  dextrose 50% Injectable 25 Gram(s) IV Push once  dextrose 50% Injectable 12.5 Gram(s) IV Push once  dextrose 50% Injectable 25 Gram(s) IV Push once  enoxaparin Injectable 80 milliGRAM(s) SubCutaneous every 12 hours  erythromycin   Ointment 1 Application(s) Right EYE four times a day  gabapentin 300 milliGRAM(s) Oral every 8 hours  glucagon  Injectable 1 milliGRAM(s) IntraMuscular once  hydrocortisone 2.5% Cream 1 Application(s) Topical two times a day  hydrOXYzine hydrochloride 20 milliGRAM(s) Oral every 6 hours  insulin glargine Injectable (LANTUS) 10 Unit(s) SubCutaneous at bedtime  insulin lispro (ADMELOG) corrective regimen sliding scale   SubCutaneous Before meals and at bedtime  ketorolac   Injectable 15 milliGRAM(s) IV Push every 6 hours  lactated ringers. 1000 milliLiter(s) (75 mL/Hr) IV Continuous <Continuous>  lactobacillus acidophilus 1 Tablet(s) Oral daily  lidocaine 5% Ointment 1 Application(s) Topical two times a day  losartan 50 milliGRAM(s) Oral daily  metoprolol succinate ER 12.5 milliGRAM(s) Oral daily  mirtazapine 7.5 milliGRAM(s) Oral at bedtime  morphine  - Injectable 2 milliGRAM(s) IV Push every 6 hours  mupirocin 2% Ointment 1 Application(s) Topical three times a day  nystatin Powder 1 Application(s) Topical two times a day  OXcarbazepine 300 milliGRAM(s) Oral two times a day  potassium chloride    Tablet ER 40 milliEquivalent(s) Oral once  prednisoLONE acetate 1% Suspension 1 Drop(s) Right EYE three times a day  pregabalin 25 milliGRAM(s) Oral every 8 hours  traZODone 100 milliGRAM(s) Oral at bedtime  triamcinolone 0.1% Ointment 1 Application(s) Topical two times a day  ursodiol Tablet 500 milliGRAM(s) Oral <User Schedule>    MEDICATIONS  (PRN):  AQUAPHOR (petrolatum Ointment) 1 Application(s) Topical three times a day PRN inguinal fold rash  dextrose Oral Gel 15 Gram(s) Oral once PRN Blood Glucose LESS THAN 70 milliGRAM(s)/deciliter  morphine  IR 15 milliGRAM(s) Oral every 3 hours PRN mod and severe pain      PRESENT SYMPTOMS: [ ]Unable to self-report due to altered mental status  [ ] CPOT [ ] PAINADs [ ] RDOS  Source if other than patient:  [ ]Family   [ ]Team     Pain: [x ]yes [ ]no  QOL impact - severe  Location -  right side of forehead at shingles site               Aggravating factors - palpation  Quality - sharp , "pins and needles"   Radiation - none   Timing- constant with intermittent spikes  Severity (0-10 scale): 10  Minimal acceptable level (0-10 scale):     CPOT:    https://www.sccm.org/getattachment/fpi61a44-6d7o-[email or web address]-5344y4098s5t/Critical-Care-Pain-Observation-Tool-(CPOT)      PAIN AD Score:     [email or web address]/[email or web address] (press ctrl +  left click to view)    Dyspnea:                           [ ]Mild [ ]Moderate [ ]Severe  RDOS:  0 to 2  minimal or no respiratory distress   3  mild distress  4 to 6 moderate distress  >7 severe distress  [email or web address]/Respiratory_Distress_Observation_Scale.pdf (Ctrl +  left click to view)     Anxiety:                             [ ]Mild [ ]Moderate [ ]Severe  Agitation:                          [ ]Mild [ ]Moderate [ ]Severe  Fatigue:                             [ ]Mild [ ]Moderate [ ]Severe  Nausea:                             [ ]Mild [ ]Moderate [ ]Severe  Loss of appetite:              [ ]Mild [ ]Moderate [ ]Severe  Constipation:                   [ ]Mild [ ]Moderate [ ]Severe  Diarrhea:                          [ ]Mild [ ]Moderate [ ]Severe    PCSSQ[Palliative Care Spiritual Screening Question]   Severity (0-10):  Score of 4 or > indicate consideration of Chaplaincy referral.  Chaplaincy Referral: [ ] yes [ ] refused [ ] following    Other Symptoms:  [x ]All other review of systems negative     Palliative Performance Status Version 2:   60      %    http://npcrc.org/files/news/palliative_performance_scale_[email or web address].pdf    PHYSICAL EXAM:  Vital Signs Last 24 Hrs  T(C): 36.7 (09 Aug 2022 21:15), Max: 37.3 (09 Aug 2022 17:45)  T(F): 98 (09 Aug 2022 21:15), Max: 99.1 (09 Aug 2022 17:45)  HR: 76 (09 Aug 2022 21:15) (76 - 82)  BP: 132/60 (09 Aug 2022 21:15) (132/60 - 161/56)  BP(mean): --  RR: 18 (09 Aug 2022 21:15) (18 - 18)  SpO2: 100% (09 Aug 2022 21:15) (94% - 100%)    Parameters below as of 09 Aug 2022 21:15  Patient On (Oxygen Delivery Method): room air         I&O's Summary      GENERAL:  [x ]Alert  [x ]Oriented x 3  [ ]Lethargic  [ ]Cachexia  [ ]Unarousable  [ x]Verbal  [ ]Non-Verbal    Behavioral:   [ ] Anxiety  [ ] Delirium [ ] Agitation [ ] Calm  [ x] Other - intermittent episodes of distress due to pain episode  HEENT: Right eye closed, Crusted lesions at right forehead along V1 dermatome  [ ]Normal  [ ] Temporal Wasting  [ ]Dry mouth   [ ]ET Tube/Trach  [ ]Oral lesions  [ ] Mucositis  PULMONARY:   [x ]Clear [ ]Tachypnea  [ ]Audible excessive secretions   [ ]Rhonchi        [ ]Right [ ]Left [ ]Bilateral  [ ]Crackles        [ ]Right [ ]Left [ ]Bilateral  [ ]Wheezing     [ ]Right [ ]Left [ ]Bilateral  [ ]Diminished breath sounds [ ]right [ ]left [ ]bilateral  CARDIOVASCULAR:    [ x]Regular [ ]Irregular [ ]Tachy  [ ]Shawn [ ]Murmur [ ]Other  GASTROINTESTINAL:  [ x]Soft  [ ]Distended   [ ]+BS  [ x]Non tender [ ]Tender  [ ]PEG [ ]OGT/ NGT  Last BM: 8/9  GENITOURINARY:  [ ]Normal [ x] Incontinent   [ ]Oliguria/Anuria   [ ]Grace  MUSCULOSKELETAL:   [x ]Normal   [ ]Weakness  [ ]Bed/Wheelchair bound [ ]Edema  [  ] amputation  [  ] contraction  NEUROLOGIC: spontaneous movement of all extremities   [ ]No focal deficits  [ ]Cognitive impairment  [ ]Dysphagia [ ]Dysarthria [ ]Paresis [ ]Other   SKIN: Crusted lesions along right forehead along V1 dermatome. See Nursing Skin Assessment for further details  [ ]Normal    [ ]Rash  [ ]Pressure ulcer(s)       Present on admission [ ]y [ ]n   [  ]  Wound    [  ] hyperpigmentation    CRITICAL CARE:  [ ] Shock Present  [ ]Septic [ ]Cardiogenic [ ]Neurologic [ ]Hypovolemic  [ ]  Vasopressors [ ]  Inotropes   [ ]Respiratory failure present [ ]Mechanical ventilation [ ]Non-invasive ventilatory support [ ]High flow    [ ]Acute  [ ]Chronic [ ]Hypoxic  [ ]Hypercarbic [ ]Other  [ ]Other organ failure     LABS:                        10.5   6.06  )-----------( 348      ( 09 Aug 2022 06:25 )             32.9   08-09    138  |  102  |  7   ----------------------------<  196<H>  3.9   |  26  |  0.54    Ca    8.6      09 Aug 2022 06:25  Phos  2.7     08-09  Mg     1.70     08-09        CAPILLARY BLOOD GLUCOSE      POCT Blood Glucose.: 145 mg/dL (09 Aug 2022 20:47)  POCT Blood Glucose.: 151 mg/dL (09 Aug 2022 18:13)  POCT Blood Glucose.: 240 mg/dL (09 Aug 2022 11:42)  POCT Blood Glucose.: 166 mg/dL (09 Aug 2022 07:40)      RADIOLOGY & ADDITIONAL STUDIES:  VA Duplex RUE 8/8/2022  Age-indeterminate, non-occlusive deep vein thrombosis  noted in the right subclavian vein.    CXRAY 8/3/2022  Impression:  Left lower lung opacity, may represent infection or atelectasis.    CT 8/2/2022  Extensive right periorbital preseptal soft tissue swelling is again noted   concordant with the history of shingles over the right eye. A   superimposed cellulitis can't be excluded. Asymmetric enlargement of the   right-sided extraocular muscles appears similar compared to the   07/27/2022 orbital MRI study. There is no evidence for orbital abscess.  The superior ophthalmic veins remain patent. The cavernous sinuses appear   symmetric.    Diffuse nonspecific skin and subcutaneous soft tissue   swelling-infiltration involves the scalp, right lateral neck soft   tissues, and anterior neck soft tissues, extending to the upper right   thoracic region, most likely reflecting a cellulitis given the clinical   history, without evidence for abscess formation at this time.      PROTEIN CALORIE MALNUTRITION PRESENT: [ ]mild [ ]moderate [ ]severe [ ]underweight [ ]morbid obesity  https://www.andeal.org/vault/2440/web/files/ONC/Table_Clinical%20Characteristics%20to%[email or web address]-White%20JV%20et%[email or web address].pdf    Height (cm): 160 (07-26-22 @ 09:10)  Weight (kg): 77.111 (07-26-22 @ 13:30)  BMI (kg/m2): 30.1 (07-26-22 @ 13:30)    [ ]PPSV2 < or = to 30% [ ]significant weight loss  [ ]poor nutritional intake  [ ]anasarca [ ]Artificial Nutrition      REFERRALS:   [ ]Chaplaincy  [ ]Hospice  [ ]Child Life  [ ]Social Work  [ x]Case management [ ]Holistic Therapy     ************************************************************************  GERIATRIC MEDICINE COORDINATION OF CARE DOCUMENTATION  [x] Inpatient Consult  [ ] Other:  ************************************************************************    HPI reviewed     ------------------------------------------------------------------------    MEDICATION REVIEW:  --- Pls refer to current medicatons in the body of this note  ISTOP REFERENCE:  [id number]. No prescriptions found on ISTOP  --- PRN usage: In past 24 hours, received IV Morphine 2mg x1, PO Tylenol 650mg x1, IV Dilaudid 0.5mg x6  ------------------------------------------------------------------------  COORDINATION OF CARE:  --- Patient assessed: 8/9  ADVANCE CARE PLANNING  --- HCP/ Surrogate: Children  --- GOC document found in [facility]: NONE  ------------------------------------------------------------------------  CARE PROVIDER DOCUMENTATION:  --- SW/CM notes reviewed  --- Medicine notes reviewed   --- Pain Medicine notes reviewed  --- Opthalmology notes reviewed  --- Infectious Disease notes reviewed  --- Plastic Surgery notes reviewed  --- Dermatology notes reviewed  --- Neurology notes reviewed  PLAN OF CARE  --- Known admissions in past year: 1  --- Current admit date: 7/26/22  --- LOS: 14 days  --- LACE score: 15  --- Current dispo plan: TO BE DETERMINED  ------------------------------------------------------------------------  --- Chart reviewed: 30 Minutes [including time used to gather, review and transfer data to this note]    Prolonged services rendered, as part of this patient's care provided by Palliative Medicine, include: i.chart review for provider and ancillary service documentation, ii.pertinent diagnostics including laboratory and imaging studies,iii. medication review including PRN use, iv. admission history including previous palliative care encounters and GOC notes, v.advance care planning documents including HCP and MOLST forms in [facility]. Part of Palliative Medicine extended evaluation and management also involves coordination of care with our IDT, the primary and consulting gregg, and unit /SW and Hospice if eligible. Recommendations based on the information gathered and discussed are outline in the AP of our notes.    ************************************************************************

## 2022-08-09 NOTE — CONSULT NOTE ADULT - SUBJECTIVE AND OBJECTIVE BOX
Chief Complaint: Right facial pain    HPI:  Patient is an 85yo female w/PMH Cardiac stents post MI (1986, 1996), COPD, NPH, DM, Migraines, and suspected HTN presenting with 3 day history of herpes zoster rash over the right V1 dermatome w/ eye involvement, now presenting with 1 day history of altered mental status. On 7/17, patient had right sided headache attributed to her recurrent migraines that she normally has on a daily basis, in which she took furiocet for. On 7/20 night and 7/21 morning, Patient vomited and began taking naproxen and tylenol for her symptoms. On 7/21, patient later saw her outpatient internist, who requested a CT Head for the patient, which came with findings at baseline. On 7/21 night, the patient began to scream, and checked her BP, which was at 200/80. On 7/22 morning, the patient's repeat BP was 160/100. Her internist subsequently prescribed amlodipine for the patient, and at the time her mental status was at baseline (A&Ox4). Also on 7/22, the patient began to complain of blurry vision. On 7/23 morning, the patient had the first occurrence of her rash around the right side of her face, V1 dermatome distribution, and the rash has progressively worsened since. On 7/24, patient started valtrex and artificial tears and began to be more sleepy. On 7/25 Patient aide said that the patient was more confused and her mentation was getting worse up until now.    In the ED, patient was started on 1x Zosyn, 1x 500mg acyclovir. (26 Jul 2022 18:18)      PAST MEDICAL & SURGICAL HISTORY:  Myocardial Infarction      Diabetes Mellitus Type II      Migraines      COPD (Chronic Obstructive Pulmonary Disease)      NPH (normal pressure hydrocephalus)      COPD, mild      CAD (coronary artery disease)      Type 2 diabetes mellitus      Migraines      Stented coronary artery          FAMILY HISTORY:  FH: myocardial infarction (Father)    FH: hypertension (Child)        SOCIAL HISTORY:  [ ] Denies Smoking, Alcohol, or Drug Use    Allergies    diltiazem (Other; Rash)    Intolerances        PAIN MEDICATIONS:  acetaminophen     Tablet .. 650 milliGRAM(s) Oral every 6 hours PRN  gabapentin 600 milliGRAM(s) Oral three times a day  hydrOXYzine hydrochloride 20 milliGRAM(s) Oral every 6 hours  mirtazapine 15 milliGRAM(s) Oral at bedtime  morphine  IR 15 milliGRAM(s) Oral every 4 hours PRN  QUEtiapine 12.5 milliGRAM(s) Oral every 6 hours PRN  traZODone 100 milliGRAM(s) Oral at bedtime      Heme:  enoxaparin Injectable 80 milliGRAM(s) SubCutaneous every 12 hours    Antibiotics:  acyclovir IVPB 650 milliGRAM(s) IV Intermittent every 8 hours    Cardiovascular:  losartan 50 milliGRAM(s) Oral daily  metoprolol succinate ER 12.5 milliGRAM(s) Oral daily    GI:  ursodiol Tablet 500 milliGRAM(s) Oral <User Schedule>    Endocrine:  dextrose 50% Injectable 25 Gram(s) IV Push once  dextrose 50% Injectable 12.5 Gram(s) IV Push once  dextrose 50% Injectable 25 Gram(s) IV Push once  dextrose Oral Gel 15 Gram(s) Oral once PRN  glucagon  Injectable 1 milliGRAM(s) IntraMuscular once  insulin glargine Injectable (LANTUS) 10 Unit(s) SubCutaneous at bedtime  insulin lispro (ADMELOG) corrective regimen sliding scale   SubCutaneous Before meals and at bedtime    All Other Medications:  AQUAPHOR (petrolatum Ointment) 1 Application(s) Topical three times a day PRN  artificial tears (preservative free) Ophthalmic Solution 1 Drop(s) Both EYES every 2 hours  dextrose 5%. 1000 milliLiter(s) IV Continuous <Continuous>  dextrose 5%. 1000 milliLiter(s) IV Continuous <Continuous>  erythromycin   Ointment 1 Application(s) Right EYE four times a day  hydrocortisone 2.5% Cream 1 Application(s) Topical two times a day  lactated ringers. 1000 milliLiter(s) IV Continuous <Continuous>  lactobacillus acidophilus 1 Tablet(s) Oral daily  lidocaine 5% Ointment 1 Application(s) Topical two times a day  mupirocin 2% Ointment 1 Application(s) Topical three times a day  nystatin Powder 1 Application(s) Topical two times a day  potassium chloride    Tablet ER 40 milliEquivalent(s) Oral once  prednisoLONE acetate 1% Suspension 1 Drop(s) Right EYE three times a day  triamcinolone 0.1% Ointment 1 Application(s) Topical two times a day          Vital Signs Last 24 Hrs  T(C): 36.5 (09 Aug 2022 10:00), Max: 37.2 (08 Aug 2022 21:45)  T(F): 97.7 (09 Aug 2022 10:00), Max: 99 (08 Aug 2022 21:45)  HR: 78 (09 Aug 2022 10:00) (78 - 97)  BP: 158/61 (09 Aug 2022 10:00) (136/71 - 161/56)  BP(mean): --  RR: 18 (09 Aug 2022 10:00) (18 - 18)  SpO2: 94% (09 Aug 2022 10:00) (94% - 98%)    Parameters below as of 09 Aug 2022 10:00  Patient On (Oxygen Delivery Method): room air        PAIN SCORE:         SCALE USED: (1-10 VNRS)             PHYSICAL EXAM:    SKIN: Rashes noted from scalp to the right side of face.      LABS:                          10.5   6.06  )-----------( 348      ( 09 Aug 2022 06:25 )             32.9     08-09    138  |  102  |  7   ----------------------------<  196<H>  3.9   |  26  |  0.54    Ca    8.6      09 Aug 2022 06:25  Phos  2.7     08-09  Mg     1.70     08-09        Patient seen at bedside, sleeping at the time. 24-hour aide at bedside states patient appears more comfortable today after receiving the IV Morphine dose. Patient had severe pain yesterday all day and night and the IV Dilaudid did not help her with her pain as per 24 hours aide. Patient states she has severe pain on the right side of her face feels like" needles". Patient had few episodes of severe pain on her face and had multiple attempts to scratch the rash. Patient’s rash on scalp got worse since she came in as per the aide. Discussed patient with patient’s daughter at bedside (Dr. Delgado) who stated that her mother has been in severe pain and the Morphine worked better but she feels like patient needs something long acting for her pain, along with adjustments in her nerve pain medications. Patient’s daughter states patient had episodes of agitation all night but was not given any Seroquel. Daughter reports that her mother suffers from migraine and takes Fioricet for migraines at home very frequent.  Discussed the need for chronic pain management follow up outpatient with daughter, patient’s daughter states she would like to keep her within the [facility] system and would like to follow up with Dr. Hallman. Explained to daughter that contact information to Dr. Hallman’s office will be given to primary team.   Patient alert and oriented. Answered questions appropriately. Not in any apparent distress.  Discussed patient with chronic pain management doctor, Dr. Hallman and his recommendations are as follows. Dr. Hallman to call patient’s daughter Dr. Delgado to discuss the plan.  RECOMMENDATIONS:  Recommend discontinuing current orders for Acetaminophen, instead order:  PO Acetaminophen 650mg Q6H standing X 2 days then PRN for pain. Not to be given within 6 hours of last dose of Acetaminophen.  2)	Recommend changing PO Morphine orders to:  PO Morphine 15mg Q3H PRN for moderate and severe pain. Hold for over sedation. Not to be given within one hour of any immediate acting opioid.  3)	Recommend PO Methadone 5mg BID. Hold for sedation. Obtain baseline EKG prior to starting patient on Methadone and then routine EKGs while patient on Methadone.  4)	Recommend changing PO Gabapentin orders to:  PO Gabapentin 300mg Q8H. Hold for sedation. Discontinue after titrating Lyrica dose to 50mg Q8H in a week.  5)	Recommend PO Lyrica 25mg q8h. Hold for sedation. Recommend titrating Lyrica dose to 50mg Q8H in a week. Once Lyrica dose is titrated up to 50mg Q8H, discontinue Gabapentin.  6)	Recommend follow up with chronic pain management outpatient upon discharge. Recommend that an appointment be made with Dr. Hallman prior to discharge from hospital. Dr. Hallman’s office contact# [phone]. Patient will need outpatient pain management to manage Methadone outpatient and safe weaning of opioids.  Confirm with health insurance carrier if the visits will be covered.  7)	Recommend continuous pulse oximetry while patient on opioids and sedating medications.  I STOP reviewed and no medications found.  Will follow up tomorrow.         Chief Complaint: Right facial pain    HPI:  Patient is an 87yo female w/PMH Cardiac stents post MI (1986, 1996), COPD, NPH, DM, Migraines, and suspected HTN presenting with 3 day history of herpes zoster rash over the right V1 dermatome w/ eye involvement, now presenting with 1 day history of altered mental status. On 7/17, patient had right sided headache attributed to her recurrent migraines that she normally has on a daily basis, in which she took furiocet for. On 7/20 night and 7/21 morning, Patient vomited and began taking naproxen and tylenol for her symptoms. On 7/21, patient later saw her outpatient internist, who requested a CT Head for the patient, which came with findings at baseline. On 7/21 night, the patient began to scream, and checked her BP, which was at 200/80. On 7/22 morning, the patient's repeat BP was 160/100. Her internist subsequently prescribed amlodipine for the patient, and at the time her mental status was at baseline (A&Ox4). Also on 7/22, the patient began to complain of blurry vision. On 7/23 morning, the patient had the first occurrence of her rash around the right side of her face, V1 dermatome distribution, and the rash has progressively worsened since. On 7/24, patient started valtrex and artificial tears and began to be more sleepy. On 7/25 Patient aide said that the patient was more confused and her mentation was getting worse up until now.    In the ED, patient was started on 1x Zosyn, 1x 500mg acyclovir. (26 Jul 2022 18:18)      PAST MEDICAL & SURGICAL HISTORY:  Myocardial Infarction      Diabetes Mellitus Type II      Migraines      COPD (Chronic Obstructive Pulmonary Disease)      NPH (normal pressure hydrocephalus)      COPD, mild      CAD (coronary artery disease)      Type 2 diabetes mellitus      Migraines      Stented coronary artery          FAMILY HISTORY:  FH: myocardial infarction (Father)    FH: hypertension (Child)        SOCIAL HISTORY:  [ ] Denies Smoking, Alcohol, or Drug Use    Allergies    diltiazem (Other; Rash)    Intolerances        PAIN MEDICATIONS:  acetaminophen     Tablet .. 650 milliGRAM(s) Oral every 6 hours PRN  gabapentin 600 milliGRAM(s) Oral three times a day  hydrOXYzine hydrochloride 20 milliGRAM(s) Oral every 6 hours  mirtazapine 15 milliGRAM(s) Oral at bedtime  morphine  IR 15 milliGRAM(s) Oral every 4 hours PRN  QUEtiapine 12.5 milliGRAM(s) Oral every 6 hours PRN  traZODone 100 milliGRAM(s) Oral at bedtime      Heme:  enoxaparin Injectable 80 milliGRAM(s) SubCutaneous every 12 hours    Antibiotics:  acyclovir IVPB 650 milliGRAM(s) IV Intermittent every 8 hours    Cardiovascular:  losartan 50 milliGRAM(s) Oral daily  metoprolol succinate ER 12.5 milliGRAM(s) Oral daily    GI:  ursodiol Tablet 500 milliGRAM(s) Oral <User Schedule>    Endocrine:  dextrose 50% Injectable 25 Gram(s) IV Push once  dextrose 50% Injectable 12.5 Gram(s) IV Push once  dextrose 50% Injectable 25 Gram(s) IV Push once  dextrose Oral Gel 15 Gram(s) Oral once PRN  glucagon  Injectable 1 milliGRAM(s) IntraMuscular once  insulin glargine Injectable (LANTUS) 10 Unit(s) SubCutaneous at bedtime  insulin lispro (ADMELOG) corrective regimen sliding scale   SubCutaneous Before meals and at bedtime    All Other Medications:  AQUAPHOR (petrolatum Ointment) 1 Application(s) Topical three times a day PRN  artificial tears (preservative free) Ophthalmic Solution 1 Drop(s) Both EYES every 2 hours  dextrose 5%. 1000 milliLiter(s) IV Continuous <Continuous>  dextrose 5%. 1000 milliLiter(s) IV Continuous <Continuous>  erythromycin   Ointment 1 Application(s) Right EYE four times a day  hydrocortisone 2.5% Cream 1 Application(s) Topical two times a day  lactated ringers. 1000 milliLiter(s) IV Continuous <Continuous>  lactobacillus acidophilus 1 Tablet(s) Oral daily  lidocaine 5% Ointment 1 Application(s) Topical two times a day  mupirocin 2% Ointment 1 Application(s) Topical three times a day  nystatin Powder 1 Application(s) Topical two times a day  potassium chloride    Tablet ER 40 milliEquivalent(s) Oral once  prednisoLONE acetate 1% Suspension 1 Drop(s) Right EYE three times a day  triamcinolone 0.1% Ointment 1 Application(s) Topical two times a day          Vital Signs Last 24 Hrs  T(C): 36.5 (09 Aug 2022 10:00), Max: 37.2 (08 Aug 2022 21:45)  T(F): 97.7 (09 Aug 2022 10:00), Max: 99 (08 Aug 2022 21:45)  HR: 78 (09 Aug 2022 10:00) (78 - 97)  BP: 158/61 (09 Aug 2022 10:00) (136/71 - 161/56)  BP(mean): --  RR: 18 (09 Aug 2022 10:00) (18 - 18)  SpO2: 94% (09 Aug 2022 10:00) (94% - 98%)    Parameters below as of 09 Aug 2022 10:00  Patient On (Oxygen Delivery Method): room air        PAIN SCORE:         SCALE USED: (1-10 VNRS)             PHYSICAL EXAM:    SKIN: Rashes noted from scalp to the right side of face.      LABS:                          10.5   6.06  )-----------( 348      ( 09 Aug 2022 06:25 )             32.9     08-09    138  |  102  |  7   ----------------------------<  196<H>  3.9   |  26  |  0.54    Ca    8.6      09 Aug 2022 06:25  Phos  2.7     08-09  Mg     1.70     08-09        Patient seen at bedside, sleeping at the time. 24-hour aide at bedside states patient appears more comfortable today after receiving the IV Morphine dose. Patient had severe pain yesterday all day and night and the IV Dilaudid did not help her with her pain as per 24 hours aide. Patient states she has severe pain on the right side of her face feels like" needles". Patient had few episodes of severe pain on her face and had multiple attempts to scratch the rash. Patient’s rash on scalp got worse since she came in as per the aide. Discussed patient with patient’s daughter at bedside (Dr. Delgado) who stated that her mother has been in severe pain and the Morphine worked better but she feels like patient needs something long acting for her pain, along with adjustments in her nerve pain medications. Patient’s daughter states patient had episodes of agitation all night but was not given any Seroquel. Daughter reports that her mother suffers from migraine and takes Fioricet for migraines at home very frequent.  Discussed the need for chronic pain management follow up outpatient with daughter, patient’s daughter states she would like to keep her within the [facility] system and would like to follow up with Dr. Hallman. Explained to daughter that contact information to Dr. Hallman’s office will be given to primary team.   Patient alert and oriented. Answered questions appropriately. Not in any apparent distress.  Discussed patient with chronic pain management doctor, Dr. Hallman and his recommendations are as follows. Dr. Hallman to call patient’s daughter Dr. Delgado to discuss the plan.  RECOMMENDATIONS:  Recommend discontinuing current orders for Acetaminophen, instead order:  PO Acetaminophen 650mg Q6H standing X 2 days then PRN for pain. Not to be given within 6 hours of last dose of Acetaminophen.  2)	Recommend changing PO Morphine orders to:  PO Morphine 15mg Q3H PRN for moderate and severe pain. Hold for over sedation. Not to be given within one hour of any immediate acting opioid.  3)	Recommend PO Methadone 5mg BID. Hold for sedation. Obtain baseline EKG prior to starting patient on Methadone and then routine EKGs while patient on Methadone to monitor for QT prolongation.  4)	Recommend changing PO Gabapentin orders to:  PO Gabapentin 300mg Q8H. Hold for sedation. Discontinue after titrating Lyrica dose to 50mg Q8H in a week.  5)	Recommend PO Lyrica 25mg q8h. Hold for sedation. Recommend titrating Lyrica dose to 50mg Q8H in a week. Once Lyrica dose is titrated up to 50mg Q8H, discontinue Gabapentin.  6)	Recommend follow up with chronic pain management outpatient upon discharge. Recommend that an appointment be made with Dr. Hallman prior to discharge from hospital. Dr. Hallman’s office contact# [phone]. Patient will need outpatient pain management to manage Methadone outpatient and safe weaning of opioids.  Confirm with health insurance carrier if the visits will be covered.  7)	Recommend continuous pulse oximetry while patient on opioids and sedating medications.  I STOP reviewed and no medications found.  Will follow up tomorrow.

## 2022-08-09 NOTE — PROGRESS NOTE ADULT - ASSESSMENT
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  Patient's pain is still not well controlled given that she is screaming in pain upon examination and unable to follow basic commands.  Her pain is likely secondary to herpetic neuralgia/neuropathic pain which has thus far been refractory to gabapentin.  Given that pregabalin is in the same family as gabapentin, there is a possibility that this may not improve the patient's pain significantly.  An additional agent should be added (oxcarbazepine) which does have neuropathic pain alleviation properties.  Biggest side effect associated with oxcarbazepine is hyponatremia so caution is advised in the setting of polypharmacy.      Impression: Change in mentation in the setting of a Right V1-V2 dermatomal vesicular rash, R eye purulence, prodromal migraine, treated empirically for VZV encephalitis and now with neuropathic pain associated with herpetic neuralgia.    Recommendations  -Decrease mirtazapine to 7.5 mg qHs  -Start Oxcarbazepine 300 mg bid  -Discontinue Seroquel  -Rest of care as per primary team    Patient seen and examined with attending neurologist Dr. Alberto

## 2022-08-09 NOTE — BH CONSULTATION LIAISON ASSESSMENT NOTE - NSBHATTESTBILLINGAW_PSY_A_CORE
99221-Initial hospital care - low complexity [id number] diagnostic evaluation with medical services

## 2022-08-09 NOTE — PROGRESS NOTE ADULT - PROBLEM SELECTOR PLAN 2
seroquel 12.5 q6 PRN    -C/o Rt upper arm swelling/discomfort, doppler with age indeterminant DVT, start full dose lovenox  will remove PICC once complete abx and better pain control achieved

## 2022-08-09 NOTE — CONSULT NOTE ADULT - PROBLEM SELECTOR RECOMMENDATION 3
- CT - Extensive right periorbital preseptal soft tissue swelling is again noted concordant with the history of shingles over the right eye. A superimposed cellulitis can't be excluded. Asymmetric enlargement of the right-sided extraocular muscles appears similar compared to the 07/27/2022 orbital MRI study. There is no evidence for orbital abscess. The superior ophthalmic veins remain patent. The cavernous sinuses appear symmetric.  Diffuse nonspecific skin and subcutaneous soft tissue swelling-infiltration involves the scalp, right lateral neck soft   tissues, and anterior neck soft tissues, extending to the upper right thoracic region, most likely reflecting a cellulitis given the clinical history, without evidence for abscess formation at this time.  - Dermatology, Plastic Surgery, Opthalmology notes, Infectious Disease, Neurology recommendations appreciated   - management as per primary and consultant teams

## 2022-08-09 NOTE — BH CONSULTATION LIAISON ASSESSMENT NOTE - HPI (INCLUDE ILLNESS QUALITY, SEVERITY, DURATION, TIMING, CONTEXT, MODIFYING FACTORS, ASSOCIATED SIGNS AND SYMPTOMS)
AT throughout day. 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    Patient seen lying down intermittently moaning holding a cloth to her head c/o severe pain to R  eye/head area where Zoster is located. She AOx2-3 currently cooperative, restless, uncomfortable, and endorsing some passive SI in setting of severe pain. Patient has no psychiatric history, however, her neuropathic pain and polypharmacy has made her intermittently delirious and irritated/restless. She states she does not want to be in this world anymore strictly because of pain, however, denies any plan/intent. Patient wants to see her  and return home. She also reports some possible AH yesterday stating that the voices in her head were telling her to get out of the hospital, however, no violent/self-harm tone to such voices. Denies HI/VH. Overall, patient states her mood is "good," and that she is uncomfortable.

## 2022-08-09 NOTE — BH CONSULTATION LIAISON ASSESSMENT NOTE - SUMMARY
Spoke to neurology team regarding medication changes who were in communication w/ outpatient providers. Plan is to start Oxcarbazepine and reduce Remeron and watch for hyponatremia. Neurology suggested to discontinue Seroquel - Called to verify if this was 2/2 concern for lowering seizure threshold in setting of encephalitis, however, reasoning was to limit sedating medications. Psychiatry called for agitation recommendations - Will do Seroquel and Haldol only as PRN and avoid use of BZD in setting of acute delirious state, intermittently sedated, and advanced age.    PLAN:  - Observational status deferred to primary team  - Can continue Trazodone 100mg PO qHS and decreased if there is concern for oversedation; Neurology requested to decrease Remeron to 7.5mg PO qHS   - OBTAIN EKG to monitor QTc  - Seroquel 25mg PO q 6 hours for agitation and Haldol 1mg IV/IM q 6 hours PRN for severe agitation  - May provide patient w/ the following outpatient referrals upon DC:  [facility] [phone] (Walk-in/appt M-F 9am-7pm); [address] [facility] [phone] Spoke to neurology team regarding medication changes who were in communication w/ outpatient providers. Plan is to start Oxcarbazepine and reduce Remeron and watch for hyponatremia. Neurology suggested to discontinue Seroquel - Called to verify if this was 2/2 concern for lowering seizure threshold in setting of encephalitis, however, reasoning was to limit sedating medications. Psychiatry called for agitation recommendations - Will do Seroquel and Haldol only as PRN and avoid use of BZD in setting of acute delirious state, intermittently sedated, and advanced age.    Patient seen lying down intermittently moaning holding a cloth to her head c/o severe pain to R  eye/head area where Zoster is located. She AOx2-3 currently cooperative, restless, uncomfortable, and endorsing some passive SI in setting of severe pain. Patient has no psychiatric history, however, her neuropathic pain and polypharmacy has made her intermittently delirious and irritated/restless. She states she does not want to be in this world anymore strictly because of pain, however, denies any plan/intent. Patient wants to see her  and return home. She also reports some possible AH yesterday stating that the voices in her head were telling her to get out of the hospital, however, no violent/self-harm tone to such voices. Denies HI/VH. Overall, patient states her mood is "good," and that she is uncomfortable.  QTc 453    PLAN:  - Observational status deferred to primary team  - Can continue Trazodone 100mg PO qHS and decreased if there is concern for oversedation; Neurology requested to decrease Remeron to 7.5mg PO qHS   - Per neurology discontinue any standing Seroquel to lessen moments of sedation, however, there is no objection to using Seroquel 25mg PO q 6 hours for agitation and Haldol 1mg IV/IM q 6 hours PRN for severe agitation  - Ultimately patient's restless/irritated behavior is from pain and would recommend pain management to the best of primary team's ability  - If patient endorsing anything more than passive SI and has a plan/intent, then would place on a 1:1 and call psychiatry   - May provide patient w/ the following outpatient referrals upon DC:  [facility] [phone] (Walk-in/appt M-F 9am-7pm); [address] [facility] outpatient Geriatric clinic [phone] 86F PMHx CAD w/ stents and MI (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  Spoke to neurology team regarding medication changes who were in communication w/ outpatient providers. Plan is to start Oxcarbazepine and reduce Remeron and watch for hyponatremia. Neurology suggested to discontinue Seroquel - Called to verify if this was 2/2 concern for lowering seizure threshold in setting of encephalitis, however, reasoning was to limit sedating medications. Psychiatry called for agitation recommendations - Will do Seroquel and Haldol only as PRN and avoid use of BZD in setting of acute delirious state, intermittently sedated, and advanced age.    Patient seen lying down intermittently moaning holding a cloth to her head c/o severe pain to R  eye/head area where Zoster is located. She AOx2-3 currently cooperative, restless, uncomfortable, and endorsing some passive SI in setting of severe pain. Patient has no psychiatric history, however, her neuropathic pain and polypharmacy has made her intermittently delirious and irritated/restless. She states she does not want to be in this world anymore strictly because of pain, however, denies any plan/intent. Patient wants to see her  and return home. She also reports some possible AH yesterday stating that the voices in her head were telling her to get out of the hospital, however, no violent/self-harm tone to such voices. Denies HI/VH. Overall, patient states her mood is "good," and that she is uncomfortable.  QTc 453    PLAN:  - Observational status deferred to primary team  - Can continue Trazodone 100mg PO qHS and decreased if there is concern for oversedation; Neurology requested to decrease Remeron to 7.5mg PO qHS   - Per neurology discontinue any standing Seroquel to lessen moments of sedation, however, there is no objection to using Seroquel 25mg PO q 6 hours for agitation and Zyprexa 1.25 mg IM q6hrs PRN for severe agitation (Note: daughter reports hx of acute dystonic rxn with Haldol)  - Ultimately patient's restless/irritated behavior is from pain and would recommend pain management to the best of primary team's ability  - If patient endorsing anything more than passive SI and has a plan/intent, then would place on a 1:1 and call psychiatry #[id number]  - May provide patient w/ the following outpatient referrals upon DC:  [facility] [phone] (Walk-in/appt M-F 9am-7pm); [address] [facility] outpatient [facility] clinic [phone]

## 2022-08-09 NOTE — CONSULT NOTE ADULT - PROBLEM SELECTOR RECOMMENDATION 9
- Patient with pain at right forehead at site of shingles along V1 dermatome  - Daughter inquiring about PCA pump   > Would NOT initiate PCA pump at this time due to intermittent episodes of confusion as per discussion with primary team  - Start IV Tylenol 1000mg q6 ATC; Do NOT exceed >4g of acetaminophen in 24 hours  - Agree with Pain Management team recommendations of transitioning from Gabapentin to Lyrica.    - Recommend considering IV Morphine 2mg q6 ATC (hold for hypotension, oversedation, respiratory depression); patient may refuse doses   - Agree with Pain Management team recommendations for : PO Morphine 15mg q3 PRN moderate-severe pain (hold for hypotension, oversedation, respiratory depression)  - Discussed recommendations with daughter Dr. Delgado at bedside.   - Bowel regimen while on opiates    - Patient will need outpatient follow up with Chronic Pain Management upon discharge. - Patient with pain at right forehead at site of shingles along V1 dermatome  - In past 24 hours, received IV Morphine 2mg x1, PO Tylenol 650mg x1, IV Dilaudid 0.5mg x6  - Daughter inquiring about PCA pump   > Would NOT initiate PCA pump at this time due to intermittent episodes of confusion as per discussion with primary team  - Start IV Tylenol 1000mg q6 ATC; Do NOT exceed >4g of acetaminophen in 24 hours  - Agree with Pain Management team recommendations of transitioning from Gabapentin to Lyrica.    - Recommend considering IV Morphine 2mg q6 ATC (hold for hypotension, oversedation, respiratory depression); patient may refuse doses   - Agree with Pain Management team recommendations for : PO Morphine 15mg q3 PRN moderate-severe pain (hold for hypotension, oversedation, respiratory depression)  - Discussed recommendations with daughter Dr. Delgado at bedside.   - Bowel regimen while on opiates    - Patient will need outpatient follow up with Chronic Pain Management upon discharge.

## 2022-08-09 NOTE — BH CONSULTATION LIAISON ASSESSMENT NOTE - CURRENT MEDICATION
MEDICATIONS  (STANDING):  acetaminophen     Tablet .. 650 milliGRAM(s) Oral every 6 hours  acyclovir IVPB 650 milliGRAM(s) IV Intermittent every 8 hours  ALBUTerol    0.083% 2.5 milliGRAM(s) Nebulizer every 6 hours  artificial tears (preservative free) Ophthalmic Solution 1 Drop(s) Both EYES every 2 hours  buDESOnide    Inhalation Suspension 0.5 milliGRAM(s) Inhalation two times a day  dextrose 5%. 1000 milliLiter(s) (100 mL/Hr) IV Continuous <Continuous>  dextrose 5%. 1000 milliLiter(s) (50 mL/Hr) IV Continuous <Continuous>  dextrose 50% Injectable 25 Gram(s) IV Push once  dextrose 50% Injectable 12.5 Gram(s) IV Push once  dextrose 50% Injectable 25 Gram(s) IV Push once  enoxaparin Injectable 80 milliGRAM(s) SubCutaneous every 12 hours  erythromycin   Ointment 1 Application(s) Right EYE four times a day  gabapentin 300 milliGRAM(s) Oral every 8 hours  glucagon  Injectable 1 milliGRAM(s) IntraMuscular once  hydrocortisone 2.5% Cream 1 Application(s) Topical two times a day  hydrOXYzine hydrochloride 20 milliGRAM(s) Oral every 6 hours  insulin glargine Injectable (LANTUS) 10 Unit(s) SubCutaneous at bedtime  insulin lispro (ADMELOG) corrective regimen sliding scale   SubCutaneous Before meals and at bedtime  lactated ringers. 1000 milliLiter(s) (75 mL/Hr) IV Continuous <Continuous>  lactobacillus acidophilus 1 Tablet(s) Oral daily  lidocaine 5% Ointment 1 Application(s) Topical two times a day  losartan 50 milliGRAM(s) Oral daily  metoprolol succinate ER 12.5 milliGRAM(s) Oral daily  mirtazapine 15 milliGRAM(s) Oral at bedtime  mupirocin 2% Ointment 1 Application(s) Topical three times a day  nystatin Powder 1 Application(s) Topical two times a day  potassium chloride    Tablet ER 40 milliEquivalent(s) Oral once  prednisoLONE acetate 1% Suspension 1 Drop(s) Right EYE three times a day  pregabalin 25 milliGRAM(s) Oral every 8 hours  traZODone 100 milliGRAM(s) Oral at bedtime  triamcinolone 0.1% Ointment 1 Application(s) Topical two times a day  ursodiol Tablet 500 milliGRAM(s) Oral <User Schedule>    MEDICATIONS  (PRN):  AQUAPHOR (petrolatum Ointment) 1 Application(s) Topical three times a day PRN inguinal fold rash  dextrose Oral Gel 15 Gram(s) Oral once PRN Blood Glucose LESS THAN 70 milliGRAM(s)/deciliter  morphine  IR 15 milliGRAM(s) Oral every 3 hours PRN mod and severe pain

## 2022-08-09 NOTE — BH CONSULTATION LIAISON ASSESSMENT NOTE - NSBHCHARTREVIEWVS_PSY_A_CORE FT
Vital Signs Last 24 Hrs  T(C): 36.5 (09 Aug 2022 10:00), Max: 37.2 (08 Aug 2022 21:45)  T(F): 97.7 (09 Aug 2022 10:00), Max: 99 (08 Aug 2022 21:45)  HR: 78 (09 Aug 2022 15:33) (78 - 97)  BP: 158/61 (09 Aug 2022 10:00) (136/71 - 161/56)  BP(mean): --  RR: 18 (09 Aug 2022 10:00) (18 - 18)  SpO2: 98% (09 Aug 2022 15:33) (94% - 98%)    Parameters below as of 09 Aug 2022 15:33  Patient On (Oxygen Delivery Method): room air

## 2022-08-09 NOTE — PROGRESS NOTE ADULT - SUBJECTIVE AND OBJECTIVE BOX
[facility] Division of Hospital Medicine  Kaitlyn Castelan MD  Pager [phone]    Patient is a 86y old  Female who presents with a chief complaint of Suspicion for Herpes Zoster opthalmicus/encephalitis      SUBJECTIVE / OVERNIGHT EVENTS: contacted by daughter early this AM that pt is very agitated and in pain; asked more meds to be changed to morphine, also asking for more sedation from PA in addition to speaking with me regarding pains meds; met with daughter about making her requests to multiple people as this could create a poor outcome from mom with multiple providers putting in orders for concomitant narcotics and sedatives; d/w daughter will call neuro, pain and psych to assist; she is in agreement       MEDICATIONS  (STANDING):  acyclovir IVPB 650 milliGRAM(s) IV Intermittent every 8 hours  ALBUTerol    0.083% 2.5 milliGRAM(s) Nebulizer every 6 hours  artificial tears (preservative free) Ophthalmic Solution 1 Drop(s) Both EYES every 2 hours  buDESOnide    Inhalation Suspension 0.5 milliGRAM(s) Inhalation two times a day  dextrose 5%. 1000 milliLiter(s) (100 mL/Hr) IV Continuous <Continuous>  dextrose 5%. 1000 milliLiter(s) (50 mL/Hr) IV Continuous <Continuous>  dextrose 50% Injectable 25 Gram(s) IV Push once  dextrose 50% Injectable 12.5 Gram(s) IV Push once  dextrose 50% Injectable 25 Gram(s) IV Push once  enoxaparin Injectable 80 milliGRAM(s) SubCutaneous every 12 hours  erythromycin   Ointment 1 Application(s) Right EYE four times a day  gabapentin 600 milliGRAM(s) Oral three times a day  glucagon  Injectable 1 milliGRAM(s) IntraMuscular once  hydrocortisone 2.5% Cream 1 Application(s) Topical two times a day  hydrOXYzine hydrochloride 20 milliGRAM(s) Oral every 6 hours  insulin glargine Injectable (LANTUS) 10 Unit(s) SubCutaneous at bedtime  insulin lispro (ADMELOG) corrective regimen sliding scale   SubCutaneous Before meals and at bedtime  lactated ringers. 1000 milliLiter(s) (75 mL/Hr) IV Continuous <Continuous>  lactobacillus acidophilus 1 Tablet(s) Oral daily  lidocaine 5% Ointment 1 Application(s) Topical two times a day  losartan 50 milliGRAM(s) Oral daily  metoprolol succinate ER 12.5 milliGRAM(s) Oral daily  mirtazapine 15 milliGRAM(s) Oral at bedtime  mupirocin 2% Ointment 1 Application(s) Topical three times a day  nystatin Powder 1 Application(s) Topical two times a day  potassium chloride    Tablet ER 40 milliEquivalent(s) Oral once  prednisoLONE acetate 1% Suspension 1 Drop(s) Right EYE three times a day  traZODone 100 milliGRAM(s) Oral at bedtime  triamcinolone 0.1% Ointment 1 Application(s) Topical two times a day  ursodiol Tablet 500 milliGRAM(s) Oral <User Schedule>    MEDICATIONS  (PRN):  acetaminophen     Tablet .. 650 milliGRAM(s) Oral every 6 hours PRN Temp greater or equal to 38C (100.4F), Mild Pain (1 - 3), Moderate Pain (4 - 6)  AQUAPHOR (petrolatum Ointment) 1 Application(s) Topical three times a day PRN inguinal fold rash  dextrose Oral Gel 15 Gram(s) Oral once PRN Blood Glucose LESS THAN 70 milliGRAM(s)/deciliter  morphine  IR 15 milliGRAM(s) Oral every 4 hours PRN mod and severe pain  QUEtiapine 12.5 milliGRAM(s) Oral every 6 hours PRN agitation      CAPILLARY BLOOD GLUCOSE  POCT Blood Glucose.: 240 mg/dL (09 Aug 2022 11:42)  POCT Blood Glucose.: 166 mg/dL (09 Aug 2022 07:40)  POCT Blood Glucose.: 219 mg/dL (08 Aug 2022 21:17)  POCT Blood Glucose.: 210 mg/dL (08 Aug 2022 18:43)  POCT Blood Glucose.: 212 mg/dL (08 Aug 2022 16:22)    I&O's Summary      PHYSICAL EXAM:  Vital Signs Last 24 Hrs  T(F): 97.7 (09 Aug 2022 10:00), Max: 99 (08 Aug 2022 21:45)  HR: 78 (09 Aug 2022 10:00) (78 - 97)  BP: 158/61 (09 Aug 2022 10:00) (136/71 - 161/56)  RR: 18 (09 Aug 2022 10:00) (18 - 18)  SpO2: 94% (09 Aug 2022 10:00) (94% - 98%)    Parameters below as of 09 Aug 2022 10:00  Patient On (Oxygen Delivery Method): room air        CONSTITUTIONAL: distress due to pain and itching  EYES: PERRLA; conjunctiva and sclera clear  ENMT: Moist oral mucosa; normal dentition  RESPIRATORY: Normal respiratory effort; grossly b/l AE  CARDIOVASCULAR: Regular rate and rhythm; No lower extremity edema;  ABDOMEN: Nontender to palpation, normoactive bowel sounds  MUSCULOSKELETAL:   no clubbing or cyanosis of digits; no joint swelling or tenderness to palpation  PSYCH: distracted by pain  NEUROLOGY: CN 2-12 are intact and symmetric; no gross sensory deficits   R facial lesions seem improved from yesterday    LABS:                        10.5   6.06  )-----------( 348      ( 09 Aug 2022 06:25 )             32.9     08-09    138  |  102  |  7   ----------------------------<  196<H>  3.9   |  26  |  0.54    Ca    8.6      09 Aug 2022 06:25  Phos  2.7     08-09  Mg     1.70     08-09

## 2022-08-09 NOTE — PROGRESS NOTE ADULT - PROBLEM SELECTOR PLAN 7
NPH diagnosed 2011, pt has programmable  shunt installed by [facility] neurosurg, **must be programmed after MRI (page neurosurg [id number])**. 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      -Daughter agreeable for neurology consult but declines LP  -blood culture neg to date: no indication for LP at this time

## 2022-08-09 NOTE — BH CONSULTATION LIAISON ASSESSMENT NOTE - NSBHCHARTREVIEWLAB_PSY_A_CORE FT
10.5   6.06  )-----------( 348      ( 09 Aug 2022 06:25 )             32.9   08-09    138  |  102  |  7   ----------------------------<  196<H>  3.9   |  26  |  0.54    Ca    8.6      09 Aug 2022 06:25  Phos  2.7     08-09  Mg     1.70     08-09

## 2022-08-10 NOTE — PROGRESS NOTE ADULT - SUBJECTIVE AND OBJECTIVE BOX
[facility] DEPARTMENT OF OPHTHALMOLOGY  ------------------------------------------------------------------------------    Interval History: Following for R sided HZO. Pt remains in significant pain and intermittently cooperative with exam.     MEDICATIONS  (STANDING):  acetaminophen   IVPB .. 1000 milliGRAM(s) IV Intermittent every 6 hours  acyclovir IVPB 650 milliGRAM(s) IV Intermittent every 8 hours  ALBUTerol    0.083% 2.5 milliGRAM(s) Nebulizer every 6 hours  artificial tears (preservative free) Ophthalmic Solution 1 Drop(s) Both EYES every 2 hours  buDESOnide    Inhalation Suspension 0.5 milliGRAM(s) Inhalation two times a day  dextrose 5%. 1000 milliLiter(s) (100 mL/Hr) IV Continuous <Continuous>  dextrose 5%. 1000 milliLiter(s) (50 mL/Hr) IV Continuous <Continuous>  dextrose 50% Injectable 25 Gram(s) IV Push once  dextrose 50% Injectable 12.5 Gram(s) IV Push once  dextrose 50% Injectable 25 Gram(s) IV Push once  enoxaparin Injectable 80 milliGRAM(s) SubCutaneous every 12 hours  erythromycin   Ointment 1 Application(s) Right EYE four times a day  gabapentin 300 milliGRAM(s) Oral every 8 hours  glucagon  Injectable 1 milliGRAM(s) IntraMuscular once  hydrOXYzine hydrochloride 20 milliGRAM(s) Oral every 6 hours  insulin glargine Injectable (LANTUS) 10 Unit(s) SubCutaneous at bedtime  insulin lispro (ADMELOG) corrective regimen sliding scale   SubCutaneous Before meals and at bedtime  ketorolac   Injectable 15 milliGRAM(s) IV Push every 6 hours  lactated ringers. 1000 milliLiter(s) (75 mL/Hr) IV Continuous <Continuous>  lactobacillus acidophilus 1 Tablet(s) Oral daily  lidocaine 5% Ointment 1 Application(s) Topical two times a day  losartan 50 milliGRAM(s) Oral daily  metoprolol succinate ER 12.5 milliGRAM(s) Oral daily  mirtazapine 7.5 milliGRAM(s) Oral at bedtime  morphine  - Injectable 2 milliGRAM(s) IV Push every 6 hours  mupirocin 2% Ointment 1 Application(s) Topical three times a day  nystatin Powder 1 Application(s) Topical two times a day  OXcarbazepine 300 milliGRAM(s) Oral two times a day  potassium chloride    Tablet ER 40 milliEquivalent(s) Oral once  prednisoLONE acetate 1% Suspension 1 Drop(s) Right EYE three times a day  pregabalin 25 milliGRAM(s) Oral every 8 hours  traZODone 100 milliGRAM(s) Oral at bedtime  triamcinolone 0.1% Ointment 1 Application(s) Topical two times a day  ursodiol Tablet 500 milliGRAM(s) Oral <User Schedule>    MEDICATIONS  (PRN):  AQUAPHOR (petrolatum Ointment) 1 Application(s) Topical three times a day PRN inguinal fold rash  dextrose Oral Gel 15 Gram(s) Oral once PRN Blood Glucose LESS THAN 70 milliGRAM(s)/deciliter  morphine  IR 15 milliGRAM(s) Oral every 3 hours PRN mod and severe pain  OLANZapine Injectable 1.25 milliGRAM(s) IntraMuscular every 6 hours PRN Severe Agitation  QUEtiapine 25 milliGRAM(s) Oral every 6 hours PRN agitation      VITALS: T(C): 36.7 (08-10-22 @ 12:20)  T(F): 98.1 (08-10-22 @ 12:20), Max: 98.1 (08-10-22 @ 12:20)  HR: 78 (08-10-22 @ 15:22) (69 - 85)  BP: 177/96 (08-10-22 @ 14:24) (132/60 - 177/96)  RR:  (17 - 18)  SpO2:  (95% - 100%)  Wt(kg): --  General: AAO x 1-2, distracted by pain    Ophthalmology Exam:  Visual acuity (sc): 20/70 OD, 20/30 OS  Pupils: 6mm OD, nonreactive to light, 2mm OS, reactive to light  Ttono: 13 OD, 14 OS  Extraocular movements (EOMs): MYRON 2/2 mental status  Confrontational Visual Field (CVF): MYRON 2/2 mental status    Portable Slit Lamp Exam   External: R sided forehead edema and erythema (improved), with receding dark brown crusting, wnl on L side  Lids/Lashes/Lacrimal Ducts: Trace periorbital edema RUL and RLL, flat OS   Sclera/Conjunctiva: W+Q OU  Cornea: Tr D-folds, no bullae, no pseudodendrites OD, Cl OS  Anterior Chamber: D+F OU    Iris: Flat OU  Lens: PCIOL OD, 3+ NS OS   [facility] DEPARTMENT OF OPHTHALMOLOGY  ------------------------------------------------------------------------------    Interval History: Following for R sided HZO. Pt remains in significant pain and intermittently cooperative with exam.     MEDICATIONS  (STANDING):  acetaminophen   IVPB .. 1000 milliGRAM(s) IV Intermittent every 6 hours  acyclovir IVPB 650 milliGRAM(s) IV Intermittent every 8 hours  ALBUTerol    0.083% 2.5 milliGRAM(s) Nebulizer every 6 hours  artificial tears (preservative free) Ophthalmic Solution 1 Drop(s) Both EYES every 2 hours  buDESOnide    Inhalation Suspension 0.5 milliGRAM(s) Inhalation two times a day  dextrose 5%. 1000 milliLiter(s) (100 mL/Hr) IV Continuous <Continuous>  dextrose 5%. 1000 milliLiter(s) (50 mL/Hr) IV Continuous <Continuous>  dextrose 50% Injectable 25 Gram(s) IV Push once  dextrose 50% Injectable 12.5 Gram(s) IV Push once  dextrose 50% Injectable 25 Gram(s) IV Push once  enoxaparin Injectable 80 milliGRAM(s) SubCutaneous every 12 hours  erythromycin   Ointment 1 Application(s) Right EYE four times a day  gabapentin 300 milliGRAM(s) Oral every 8 hours  glucagon  Injectable 1 milliGRAM(s) IntraMuscular once  hydrOXYzine hydrochloride 20 milliGRAM(s) Oral every 6 hours  insulin glargine Injectable (LANTUS) 10 Unit(s) SubCutaneous at bedtime  insulin lispro (ADMELOG) corrective regimen sliding scale   SubCutaneous Before meals and at bedtime  ketorolac   Injectable 15 milliGRAM(s) IV Push every 6 hours  lactated ringers. 1000 milliLiter(s) (75 mL/Hr) IV Continuous <Continuous>  lactobacillus acidophilus 1 Tablet(s) Oral daily  lidocaine 5% Ointment 1 Application(s) Topical two times a day  losartan 50 milliGRAM(s) Oral daily  metoprolol succinate ER 12.5 milliGRAM(s) Oral daily  mirtazapine 7.5 milliGRAM(s) Oral at bedtime  morphine  - Injectable 2 milliGRAM(s) IV Push every 6 hours  mupirocin 2% Ointment 1 Application(s) Topical three times a day  nystatin Powder 1 Application(s) Topical two times a day  OXcarbazepine 300 milliGRAM(s) Oral two times a day  potassium chloride    Tablet ER 40 milliEquivalent(s) Oral once  prednisoLONE acetate 1% Suspension 1 Drop(s) Right EYE three times a day  pregabalin 25 milliGRAM(s) Oral every 8 hours  traZODone 100 milliGRAM(s) Oral at bedtime  triamcinolone 0.1% Ointment 1 Application(s) Topical two times a day  ursodiol Tablet 500 milliGRAM(s) Oral <User Schedule>    MEDICATIONS  (PRN):  AQUAPHOR (petrolatum Ointment) 1 Application(s) Topical three times a day PRN inguinal fold rash  dextrose Oral Gel 15 Gram(s) Oral once PRN Blood Glucose LESS THAN 70 milliGRAM(s)/deciliter  morphine  IR 15 milliGRAM(s) Oral every 3 hours PRN mod and severe pain  OLANZapine Injectable 1.25 milliGRAM(s) IntraMuscular every 6 hours PRN Severe Agitation  QUEtiapine 25 milliGRAM(s) Oral every 6 hours PRN agitation      VITALS: T(C): 36.7 (08-10-22 @ 12:20)  T(F): 98.1 (08-10-22 @ 12:20), Max: 98.1 (08-10-22 @ 12:20)  HR: 78 (08-10-22 @ 15:22) (69 - 85)  BP: 177/96 (08-10-22 @ 14:24) (132/60 - 177/96)  RR:  (17 - 18)  SpO2:  (95% - 100%)  Wt(kg): --  General: AAO x 1-2, distracted by pain    Ophthalmology Exam:  Visual acuity (sc): 20/70 OD, 20/30 OS  Pupils: 6mm OD, nonreactive to light, 2mm OS, reactive to light, no APD OU  Ttono: 13 OD, 14 OS  Extraocular movements (EOMs): MYRON 2/2 mental status  Confrontational Visual Field (CVF): MYRON 2/2 mental status    Portable Slit Lamp Exam   External: R sided forehead edema and erythema (improved), with receding dark brown crusting, wnl on L side  Lids/Lashes/Lacrimal Ducts: Trace periorbital edema RUL and RLL, flat OS   Sclera/Conjunctiva: W+Q OU  Cornea: Tr D-folds, no bullae, no pseudodendrites OD, Cl OS  Anterior Chamber: D+F OU    Iris: Flat OU  Lens: PCIOL OD, 3+ NS OS

## 2022-08-10 NOTE — BH CONSULTATION LIAISON PROGRESS NOTE - NSBHASSESSMENTFT_PSY_ALL_CORE
Spoke to neurology team regarding medication changes who were in communication w/ outpatient providers. Plan is to start Oxcarbazepine and reduce Remeron and watch for hyponatremia. Neurology suggested to discontinue Seroquel - Called to verify if this was 2/2 concern for lowering seizure threshold in setting of encephalitis, however, reasoning was to limit sedating medications. Psychiatry called for agitation recommendations - Will do Seroquel and Haldol only as PRN and avoid use of BZD in setting of acute delirious state, intermittently sedated, and advanced age.    Patient seen lying down intermittently moaning holding a cloth to her head c/o severe pain to R  eye/head area where Zoster is located. She AOx2-3 currently cooperative, restless, uncomfortable, and endorsing some passive SI in setting of severe pain. Patient has no psychiatric history, however, her neuropathic pain and polypharmacy has made her intermittently delirious and irritated/restless. She states she does not want to be in this world anymore strictly because of pain, however, denies any plan/intent. Patient wants to see her  and return home. She also reports some possible AH yesterday stating that the voices in her head were telling her to get out of the hospital, however, no violent/self-harm tone to such voices. Denies HI/VH. Overall, patient states her mood is "good," and that she is uncomfortable.  QTc 453    8/10 Patient significantly lethargic, possibly disoriented (provided incorrect location). Improved sleep and pain control relative to day prior.     PLAN:  - Observational status deferred to primary team  - Can continue Trazodone 100mg PO qHS and decreased if there is concern for oversedation; Neurology requested to decrease Remeron to 7.5mg PO qHS   - Per neurology discontinue any standing Seroquel to lessen moments of sedation, however, there is no objection to using Seroquel 25mg PO q 6 hours for agitation and Haldol 1mg IV/IM q 6 hours PRN for severe agitation  - Ultimately patient's restless/irritated behavior is from pain and would recommend pain management to the best of primary team's ability  - If patient endorsing anything more than passive SI and has a plan/intent, then would place on a 1:1 and call psychiatry   - May provide patient w/ the following outpatient referrals upon DC:  [facility] [phone] (Walk-in/appt M-F 9am-7pm); [address] [place] [address] outpatient Geriatric clinic [phone] Spoke to neurology team regarding medication changes who were in communication w/ outpatient providers. Plan is to start Oxcarbazepine and reduce Remeron and watch for hyponatremia. Neurology suggested to discontinue Seroquel - Called to verify if this was 2/2 concern for lowering seizure threshold in setting of encephalitis, however, reasoning was to limit sedating medications. Psychiatry called for agitation recommendations - Will do Seroquel and Haldol only as PRN and avoid use of BZD in setting of acute delirious state, intermittently sedated, and advanced age.    Patient seen lying down intermittently moaning holding a cloth to her head c/o severe pain to R  eye/head area where Zoster is located. She AOx2-3 currently cooperative, restless, uncomfortable, and endorsing some passive SI in setting of severe pain. Patient has no psychiatric history, however, her neuropathic pain and polypharmacy has made her intermittently delirious and irritated/restless. She states she does not want to be in this world anymore strictly because of pain, however, denies any plan/intent. Patient wants to see her  and return home. She also reports some possible AH yesterday stating that the voices in her head were telling her to get out of the hospital, however, no violent/self-harm tone to such voices. Denies HI/VH. Overall, patient states her mood is "good," and that she is uncomfortable.  QTc 453    8/10 Patient significantly lethargic, possibly disoriented (provided incorrect location). Improved sleep and pain control relative to day prior.     PLAN:  - Observational status deferred to primary team  - Can continue Trazodone 100mg PO qHS and decreased if there is concern for oversedation; Neurology requested to decrease Remeron to 7.5mg PO qHS   - Per neurology discontinue any standing Seroquel to lessen moments of sedation, however, there is no objection to using Seroquel 25mg PO q 6 hours for agitation and Zyprexa 2.5mg IM q6hrs PRN for severe agitation (Note: daughter reports hx of acute dystonic rxn with Haldol)  - Ultimately patient's restless/irritated behavior is from pain and would recommend pain management to the best of primary team's ability  - If patient endorsing anything more than passive SI and has a plan/intent, then would place on a 1:1 and call psychiatry   - May provide patient w/ the following outpatient referrals upon DC:  [facility] [phone] (Walk-in/appt M-F 9am-7pm); [address] [facility] outpatient Geriatric clinic [phone] 86F PMHx CAD w/ stents and MI (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  Spoke to neurology team regarding medication changes who were in communication w/ outpatient providers. Plan is to start Oxcarbazepine and reduce Remeron and watch for hyponatremia. Neurology suggested to discontinue Seroquel - Called to verify if this was 2/2 concern for lowering seizure threshold in setting of encephalitis, however, reasoning was to limit sedating medications. Psychiatry called for agitation recommendations - Will do Seroquel and Haldol only as PRN and avoid use of BZD in setting of acute delirious state, intermittently sedated, and advanced age.    Patient seen lying down intermittently moaning holding a cloth to her head c/o severe pain to R  eye/head area where Zoster is located. She AOx2-3 currently cooperative, restless, uncomfortable, and endorsing some passive SI in setting of severe pain. Patient has no psychiatric history, however, her neuropathic pain and polypharmacy has made her intermittently delirious and irritated/restless. She states she does not want to be in this world anymore strictly because of pain, however, denies any plan/intent. Patient wants to see her  and return home. She also reports some possible AH yesterday stating that the voices in her head were telling her to get out of the hospital, however, no violent/self-harm tone to such voices. Denies HI/VH. Overall, patient states her mood is "good," and that she is uncomfortable.  QTc 453    8/10 Patient significantly lethargic, possibly disoriented (provided incorrect location). Improved sleep and pain control relative to day prior.     PLAN:  - Observational status deferred to primary team  - Can continue Trazodone 100mg PO qHS and decreased if there is concern for oversedation; Neurology requested to decrease Remeron to 7.5mg PO qHS   - Per neurology discontinue any standing Seroquel to lessen moments of sedation, however, there is no objection to using Seroquel 25mg PO q 6 hours PRN for agitation and Zyprexa 1.25 mg IM q6hrs PRN for severe agitation (Note: daughter reports hx of acute dystonic rxn with Haldol)  - Ultimately patient's restless/irritated behavior is from pain and would recommend pain management to the best of primary team's ability  - If patient endorsing anything more than passive SI and has a plan/intent, then would place on a 1:1 and call psychiatry #[id number]  - May provide patient w/ the following outpatient referrals upon DC:  [facility] [phone] (Walk-in/appt M-F 9am-7pm); [address] [facility] outpatient Geriatric clinic [phone]

## 2022-08-10 NOTE — PROGRESS NOTE ADULT - ATTENDING COMMENTS
Ms. 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  Can be increased by intervals of 150 mg BID if pain is not well controlled.  Watch for hyponatremia.     Thank you .

## 2022-08-10 NOTE — PROGRESS NOTE ADULT - ASSESSMENT
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  Patient's pain is still not well controlled given that she is screaming in pain upon examination and unable to follow basic commands.  Her pain is likely secondary to herpetic neuralgia/neuropathic pain which has thus far been refractory to gabapentin.  Given that pregabalin is in the same family as gabapentin, there is a possibility that this may not improve the patient's pain significantly.  An additional agent should be added (oxcarbazepine) which does have neuropathic pain alleviation properties.  Biggest side effect associated with oxcarbazepine is hyponatremia so caution is advised in the setting of polypharmacy.  Improved sleep overnight with minimal pain as per care team.    Impression: Change in mentation in the setting of a Right V1-V2 dermatomal vesicular rash, R eye purulence, prodromal migraine, treated empirically for VZV encephalitis and now with neuropathic pain associated with herpetic neuralgia. Improved clinical status overall today in comparison to day prior    Recommendations  -Mirtazapine 7.5 mg qHs  -Maintain Oxcarbazepine 300 mg bid  -Rest of care as per primary team    Patient seen and examined with attending neurologist Dr. Alberto

## 2022-08-10 NOTE — BH CONSULTATION LIAISON PROGRESS NOTE - NSBHCONSULTMEDSEVERE_PSY_A_CORE FT
Haldol 1mg IV/IM q6 hours PRN Zyprexa 2.5mg IM q6 hrs. If no response with initial 2.5mg after 30min, can try additional 2.5mg dose.  As above

## 2022-08-10 NOTE — PROGRESS NOTE ADULT - PROBLEM SELECTOR PLAN 1
finishing acyclovir today  sig med adjustements made after consultation of pain mgmt/neuro/psych/kunal-pall  goal to transition from IV to PO morphine once pain better controlled  current pain much better controlled  - Ophthalmology following, apprec recs: erythromycin ointment QID to eye &periocular lesions, artificial tears Q4H  - Appreciate derm recs:    - lidocaine ointment mixed with vaseline BID, triamcinolone ointment to red areas only BID    - apply vaseline to crusted areas Q2H, cont vanco and meropenem  daughter Dr Delgado, up to date with current plan

## 2022-08-10 NOTE — PROGRESS NOTE ADULT - PROBLEM SELECTOR PLAN 7
NPH diagnosed 2011, pt has programmable  shunt installed by [facility] neuro[facility]urg, **must be programmed after MRI (page neurosurg [id number])**. 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      -Daughter agreeable for neurology consult but declines LP  -blood culture neg to date: no indication for LP at this time

## 2022-08-10 NOTE — PROGRESS NOTE ADULT - SUBJECTIVE AND OBJECTIVE BOX
SUBJECTIVE AND OBJECTIVE:  Patient seen and examined with HHA at bedside. Patient states she has intermittent headaches, but overall had a better night yesterday with improvement of her pain. Patient with intermittent pruritus.     INTERVAL HPI/OVERNIGHT EVENTS:  In past 24 hours, received 2 doses of PO Morphine 15mg and 1 prn dose of IV Morphine 2mg and 2 prn doses of IV Dilaudid 0.5mg.     Allergies    diltiazem (Other; Rash)    Intolerances    Haldol (Dystonic RXN)  MEDICATIONS  (STANDING):  acetaminophen   IVPB .. 1000 milliGRAM(s) IV Intermittent every 6 hours  acyclovir IVPB 650 milliGRAM(s) IV Intermittent every 8 hours  ALBUTerol    0.083% 2.5 milliGRAM(s) Nebulizer every 6 hours  artificial tears (preservative free) Ophthalmic Solution 1 Drop(s) Both EYES every 2 hours  buDESOnide    Inhalation Suspension 0.5 milliGRAM(s) Inhalation two times a day  dextrose 5%. 1000 milliLiter(s) (100 mL/Hr) IV Continuous <Continuous>  dextrose 5%. 1000 milliLiter(s) (50 mL/Hr) IV Continuous <Continuous>  dextrose 50% Injectable 25 Gram(s) IV Push once  dextrose 50% Injectable 12.5 Gram(s) IV Push once  dextrose 50% Injectable 25 Gram(s) IV Push once  enoxaparin Injectable 80 milliGRAM(s) SubCutaneous every 12 hours  erythromycin   Ointment 1 Application(s) Right EYE four times a day  gabapentin 300 milliGRAM(s) Oral every 8 hours  glucagon  Injectable 1 milliGRAM(s) IntraMuscular once  hydrocortisone 2.5% Cream 1 Application(s) Topical two times a day  hydrOXYzine hydrochloride 20 milliGRAM(s) Oral every 6 hours  insulin glargine Injectable (LANTUS) 10 Unit(s) SubCutaneous at bedtime  insulin lispro (ADMELOG) corrective regimen sliding scale   SubCutaneous Before meals and at bedtime  ketorolac   Injectable 15 milliGRAM(s) IV Push every 6 hours  lactated ringers. 1000 milliLiter(s) (75 mL/Hr) IV Continuous <Continuous>  lactobacillus acidophilus 1 Tablet(s) Oral daily  lidocaine 5% Ointment 1 Application(s) Topical two times a day  losartan 50 milliGRAM(s) Oral daily  metoprolol succinate ER 12.5 milliGRAM(s) Oral daily  mirtazapine 7.5 milliGRAM(s) Oral at bedtime  morphine  - Injectable 2 milliGRAM(s) IV Push every 6 hours  mupirocin 2% Ointment 1 Application(s) Topical three times a day  nystatin Powder 1 Application(s) Topical two times a day  OXcarbazepine 300 milliGRAM(s) Oral two times a day  potassium chloride    Tablet ER 40 milliEquivalent(s) Oral once  prednisoLONE acetate 1% Suspension 1 Drop(s) Right EYE three times a day  pregabalin 25 milliGRAM(s) Oral every 8 hours  QUEtiapine 25 milliGRAM(s) Oral every 6 hours  traZODone 100 milliGRAM(s) Oral at bedtime  triamcinolone 0.1% Ointment 1 Application(s) Topical two times a day  ursodiol Tablet 500 milliGRAM(s) Oral <User Schedule>    MEDICATIONS  (PRN):  AQUAPHOR (petrolatum Ointment) 1 Application(s) Topical three times a day PRN inguinal fold rash  dextrose Oral Gel 15 Gram(s) Oral once PRN Blood Glucose LESS THAN 70 milliGRAM(s)/deciliter  morphine  IR 15 milliGRAM(s) Oral every 3 hours PRN mod and severe pain  OLANZapine Injectable 2.5 milliGRAM(s) IntraMuscular every 6 hours PRN Severe Agitation      ITEMS UNCHECKED ARE NOT PRESENT    PRESENT SYMPTOMS: [ ]Unable to self-report due to altered mental status- see [ ] CPOT [ ] PAINADS [ ] RDOS  Source if other than patient:  [ ]Family   [ ]Team     Pain: [x ]yes [ ]no  QOL impact - severe  Location -  right side of forehead at shingles site               Aggravating factors - palpation  Quality - sharp , "pins and needles"   Radiation - none   Timing- constant with intermittent spikes  Severity (0-10 scale): 10  Minimal acceptable level (0-10 scale):     Dyspnea:                           [ ]Mild [ ]Moderate [ ]Severe  RDOS:  0 to 2  minimal or no respiratory distress   3  mild distress  4 to 6 moderate distress  >7 severe distress  [email or web address]/[email or web address]_Distress_Observation_Scale.pdf (Ctrl +  left click to view)     Anxiety:                             [ ]Mild [ ]Moderate [ ]Severe  Agitation:                          [ ]Mild [ ]Moderate [ ]Severe  Fatigue:                             [ ]Mild [ ]Moderate [ ]Severe  Nausea:                             [ ]Mild [ ]Moderate [ ]Severe  Loss of appetite:              [ ]Mild [ ]Moderate [ ]Severe  Constipation:                   [ ]Mild [ ]Moderate [ ]Severe  Diarrhea:                          [ ]Mild [ ]Moderate [ ]Severe      CPOT:    [email or web address]://[email or web address]/getattachment/[email or web address]/Critical-Care-Pain-Observation-Tool-(CPOT)    PAIN AD Score:	  [email or web address].pdf (Ctrl + left click to view)    PCSSQ[Palliative Care Spiritual Screening Question]   Severity (0-10):  Score of 4 or > indicate consideration of Chaplaincy referral.  Chaplaincy Referral: [ ] yes [ ] refused [ ] following    Other Symptoms:  [x ]All other review of systems negative     Palliative Performance Status Version 2:     60    %      http://[email or web address].org/files/news/palliative_performance_scale_[email or web address].pdf    PHYSICAL EXAM:  Vital Signs Last 24 Hrs  T(C): 36.7 (10 Aug 2022 12:20), Max: 37.3 (09 Aug 2022 17:45)  T(F): 98.1 (10 Aug 2022 12:20), Max: 99.1 (09 Aug 2022 17:45)  HR: 81 (10 Aug 2022 14:24) (69 - 85)  BP: 177/96 (10 Aug 2022 14:24) (132/60 - 177/96)  BP(mean): --  RR: 17 (10 Aug 2022 12:20) (17 - 18)  SpO2: 95% (10 Aug 2022 12:20) (95% - 100%)    Parameters below as of 10 Aug 2022 12:20  Patient On (Oxygen Delivery Method): room air    I&O's Summary       GENERAL:  [x ]Awakes to voice and alert during encounter [ ]Oriented   [ ]Lethargic  [ ]Cachexia  [ ]Unarousable  [ x]Verbal  [ ]Non-Verbal    Behavioral:   [ ] Anxiety  [ ] Delirium [ ] Agitation [x ] Calm  [ ] Other -   HEENT: Right eye closed, Crusted lesions at right forehead along V1 dermatome  [ ]Normal  [ ] Temporal Wasting  [ ]Dry mouth   [ ]ET Tube/Trach  [ ]Oral lesions  [ ] Mucositis  PULMONARY:   [x ]Clear [ ]Tachypnea  [ ]Audible excessive secretions   [ ]Rhonchi        [ ]Right [ ]Left [ ]Bilateral  [ ]Crackles        [ ]Right [ ]Left [ ]Bilateral  [ ]Wheezing     [ ]Right [ ]Left [ ]Bilateral  [ ]Diminished breath sounds [ ]right [ ]left [ ]bilateral  CARDIOVASCULAR:    [ x]Regular [ ]Irregular [ ]Tachy  [ ]Shawn [ ]Murmur [ ]Other  GASTROINTESTINAL:  [ x]Soft  [ ]Distended   [ ]+BS  [ x]Non tender [ ]Tender  [ ]PEG [ ]OGT/ NGT  Last BM: 8/9  GENITOURINARY:  [ ]Normal [ x] Incontinent   [ ]Oliguria/Anuria   [ ]Grace  MUSCULOSKELETAL:   [x ]Normal   [ ]Weakness  [ ]Bed/Wheelchair bound [ ]Edema  [  ] amputation  [  ] contraction  NEUROLOGIC: spontaneous movement of all extremities   [ ]No focal deficits  [ ]Cognitive impairment  [ ]Dysphagia [ ]Dysarthria [ ]Paresis [ ]Other   SKIN: Crusted lesions along right forehead along V1 dermatome. See Nursing Skin Assessment for further details  [ ]Normal    [ ]Rash  [ ]Pressure ulcer(s)       Present on admission [ ]y [ ]n   [  ]  Wound    [  ] hyperpigmentation      CRITICAL CARE:  [ ]Shock Present  [ ]Septic [ ]Cardiogenic [ ]Neurologic [ ]Hypovolemic  [ ]Vasopressors [ ]Inotropes  [ ]Respiratory failure present [ ]Mechanical Ventilation [ ]Non-invasive ventilatory support [ ]High-Flow   [ ]Acute  [ ]Chronic [ ]Hypoxic  [ ]Hypercarbic [ ]Other  [ ]Other organ failure     LABS:                        10.2   5.86  )-----------( 346      ( 10 Aug 2022 05:45 )             32.5   08-10    138  |  101  |  8   ----------------------------<  176<H>  4.0   |  27  |  0.52    Ca    8.7      10 Aug 2022 05:45  Phos  2.9     08-10  Mg     1.80     08-10        CAPILLARY BLOOD GLUCOSE      POCT Blood Glucose.: 193 mg/dL (10 Aug 2022 11:40)  POCT Blood Glucose.: 165 mg/dL (10 Aug 2022 07:22)  POCT Blood Glucose.: 145 mg/dL (09 Aug 2022 20:47)  POCT Blood Glucose.: 151 mg/dL (09 Aug 2022 18:13)    RADIOLOGY & ADDITIONAL STUDIES: reviewed     Protein Calorie Malnutrition Present: [ ]mild [ ]moderate [ ]severe [ ]underweight [ ]morbid obesity  https://www.andeal.org/vault/2440/web/files/ONC/Table_Clinical%20Characteristics%20to%20Document%20Malnutrition-White%20JV%20et%20al%202012.pdf    Height (cm): 160 (07-26-22 @ 09:10)  Weight (kg): 77.111 (07-26-22 @ 13:30)  BMI (kg/m2): 30.1 (07-26-22 @ 13:30)    [ ]PPSV2 < or = 30%  [ ]significant weight loss [ ]poor nutritional intake [ ]anasarca   [ ]Artificial Nutrition    REFERRALS:   [ ]Chaplaincy  [ ]Hospice  [ ]Child Life  [ ]Social Work  [ x]Case management [ ]Holistic Therapy

## 2022-08-10 NOTE — PROGRESS NOTE ADULT - SUBJECTIVE AND OBJECTIVE BOX
[facility] Division of Hospital Medicine  Kaitlyn Castelan MD  Pager [phone]    Patient is a 86y old  Female who presents with a chief complaint of Suspicion for Herpes Zoster opthalmicus/encephalitis 12:37)      SUBJECTIVE / OVERNIGHT EVENTS: spoke with pt's daughter and son via phone with mynor yesterday for discussion of pain control; daughter expressed her frustrations of poor pain control and agitation and even suggested to have her mom placed in ICU for ketamine infusion; consults with neuro/pain/psych/aristides called to assist to address pt's pain control;    daughter focused on methadone, while this was rec by the pain service in addition to other adjustements in current meds, I did not feel comfortable with this due to pt's age and other meds pt is currently taking; kunal/pall agreed as well given that action of onset would not be acute and would not be helpful to pt in this moment and we should try to focus on short acting meds for acute pain which will hopefully resolve soon without the need to then wean off methadone. after more than 30 min on the phone a plan was agreed upon with standing IV morphine with PO breakthru as daughter did not feel that PRNs were being given as they were needed, IV tylenol, IV toradol, titration of gabapentin to lyrica and addition of trileptal      MEDICATIONS  (STANDING):  acetaminophen   IVPB .. 1000 milliGRAM(s) IV Intermittent every 6 hours  acyclovir IVPB 650 milliGRAM(s) IV Intermittent every 8 hours  ALBUTerol    0.083% 2.5 milliGRAM(s) Nebulizer every 6 hours  artificial tears (preservative free) Ophthalmic Solution 1 Drop(s) Both EYES every 2 hours  buDESOnide    Inhalation Suspension 0.5 milliGRAM(s) Inhalation two times a day  dextrose 5%. 1000 milliLiter(s) (100 mL/Hr) IV Continuous <Continuous>  dextrose 5%. 1000 milliLiter(s) (50 mL/Hr) IV Continuous <Continuous>  dextrose 50% Injectable 25 Gram(s) IV Push once  dextrose 50% Injectable 12.5 Gram(s) IV Push once  dextrose 50% Injectable 25 Gram(s) IV Push once  enoxaparin Injectable 80 milliGRAM(s) SubCutaneous every 12 hours  erythromycin   Ointment 1 Application(s) Right EYE four times a day  gabapentin 300 milliGRAM(s) Oral every 8 hours  glucagon  Injectable 1 milliGRAM(s) IntraMuscular once  hydrocortisone 2.5% Cream 1 Application(s) Topical two times a day  hydrOXYzine hydrochloride 20 milliGRAM(s) Oral every 6 hours  insulin glargine Injectable (LANTUS) 10 Unit(s) SubCutaneous at bedtime  insulin lispro (ADMELOG) corrective regimen sliding scale   SubCutaneous Before meals and at bedtime  ketorolac   Injectable 15 milliGRAM(s) IV Push every 6 hours  lactated ringers. 1000 milliLiter(s) (75 mL/Hr) IV Continuous <Continuous>  lactobacillus acidophilus 1 Tablet(s) Oral daily  lidocaine 5% Ointment 1 Application(s) Topical two times a day  losartan 50 milliGRAM(s) Oral daily  metoprolol succinate ER 12.5 milliGRAM(s) Oral daily  mirtazapine 7.5 milliGRAM(s) Oral at bedtime  morphine  - Injectable 2 milliGRAM(s) IV Push every 6 hours  mupirocin 2% Ointment 1 Application(s) Topical three times a day  nystatin Powder 1 Application(s) Topical two times a day  OXcarbazepine 300 milliGRAM(s) Oral two times a day  potassium chloride    Tablet ER 40 milliEquivalent(s) Oral once  prednisoLONE acetate 1% Suspension 1 Drop(s) Right EYE three times a day  pregabalin 25 milliGRAM(s) Oral every 8 hours  traZODone 100 milliGRAM(s) Oral at bedtime  triamcinolone 0.1% Ointment 1 Application(s) Topical two times a day  ursodiol Tablet 500 milliGRAM(s) Oral <User Schedule>    MEDICATIONS  (PRN):  AQUAPHOR (petrolatum Ointment) 1 Application(s) Topical three times a day PRN inguinal fold rash  dextrose Oral Gel 15 Gram(s) Oral once PRN Blood Glucose LESS THAN 70 milliGRAM(s)/deciliter  morphine  IR 15 milliGRAM(s) Oral every 3 hours PRN mod and severe pain  OLANZapine Injectable 2.5 milliGRAM(s) IntraMuscular every 6 hours PRN Severe Agitation  QUEtiapine 25 milliGRAM(s) Oral every 6 hours PRN agitation      CAPILLARY BLOOD GLUCOSE  POCT Blood Glucose.: 193 mg/dL (10 Aug 2022 11:40)  POCT Blood Glucose.: 165 mg/dL (10 Aug 2022 07:22)  POCT Blood Glucose.: 145 mg/dL (09 Aug 2022 20:47)  POCT Blood Glucose.: 151 mg/dL (09 Aug 2022 18:13)        PHYSICAL EXAM:  Vital Signs Last 24 Hrs  T(F): 98.1 (10 Aug 2022 12:20), Max: 99.1 (09 Aug 2022 17:45)  HR: 78 (10 Aug 2022 15:22) (69 - 85)  BP: 177/96 (10 Aug 2022 14:24) (132/60 - 177/96)  RR: 17 (10 Aug 2022 12:20) (17 - 18)  SpO2: 98% (10 Aug 2022 15:22) (95% - 100%)    Parameters below as of 10 Aug 2022 15:22  Patient On (Oxygen Delivery Method): room air        CONSTITUTIONAL: NAD, sleepy this AM, appears much more comfortable  EYES: PERRLA; conjunctiva and sclera clear  ENMT: Moist oral mucosa; normal dentition  RESPIRATORY: Normal respiratory effort; lungs are clear to auscultation bilaterally  CARDIOVASCULAR: Regular rate and rhythm; No lower extremity edema;   ABDOMEN: Nontender to palpation, normoactive bowel sounds  MUSCULOSKELETAL:  no clubbing or cyanosis of digits; no joint swelling or tenderness to palpation  PSYCH: sleepy  NEUROLOGY: CN 2-12 are intact and symmetric; no gross sensory deficits   SKIN: R facial rash with sloughing skin, improving    LABS:                        10.2   5.86  )-----------( 346      ( 10 Aug 2022 05:45 )             32.5     08-10    138  |  101  |  8   ----------------------------<  176<H>  4.0   |  27  |  0.52    Ca    8.7      10 Aug 2022 05:45  Phos  2.9     08-10  Mg     1.80     08-10

## 2022-08-10 NOTE — PROGRESS NOTE ADULT - PROBLEM SELECTOR PLAN 2
seroquel 25 q6 PRN    -C/o Rt upper arm swelling/discomfort, doppler with age indeterminant DVT, start full dose lovenox  will remove PICC once complete abx and better pain control achieved

## 2022-08-10 NOTE — PROGRESS NOTE ADULT - PROBLEM SELECTOR PLAN 2
Patient calm during encounter   Behavioral Health Recommendations appreciated   management as per Behavioral Health and primary team

## 2022-08-10 NOTE — PROGRESS NOTE ADULT - ASSESSMENT
86F w/ hx CAD s/p stents (1986, 1996), COPD, NPH s/p  shunt, DM, migraines, and HTN presenting with herpes zoster ophthalmicus/encephalitis affecting the right V1 dermatome

## 2022-08-10 NOTE — PROGRESS NOTE ADULT - NSPROGADDITIONALINFOA_GEN_ALL_CORE
Case reviewed with primary team.     Thank you for allowing us to participate in your patient's care. Please page [phone] for any questions/concerns.

## 2022-08-10 NOTE — CHART NOTE - NSCHARTNOTEFT_GEN_A_CORE
Patient seen yesterday and recommendations given to team via phone and note. Discussed patient with primary team this morning to see why recommendations were not implemented (Methadone not ordered).  Morphine 2mg IV Q6H standing was ordered by primary team based on Geriatrics team's recommendations. Informed primary team that pain service will sign off. May call pain service if further needed

## 2022-08-10 NOTE — PROGRESS NOTE ADULT - SUBJECTIVE AND OBJECTIVE BOX
Neurology Progress Note    SUBJECTIVE/OBJECTIVE/INTERVAL EVENTS: Patient seen and examined at bedside w/ neuro attending and team.     INTERVAL HISTORY: patient slept well overnight with minimal pain and agitation as per the PA and patient aide at bedside.      REVIEW OF SYSTEMS: Otherwise denies fever, chills, headaches, vision changes, blurry vision, double vision, nausea, vomiting, hearing change, focal weakness, focal numbness, parasthesias, bowel/ bladder incontinence.  Few questions of a 10-system ROS was performed and is negative except for those items noted above and/or in the HPI.    VITALS & EXAMINATION:  Vital Signs Last 24 Hrs  T(C): 36.5 (10 Aug 2022 05:30), Max: 37.3 (09 Aug 2022 17:45)  T(F): 97.7 (10 Aug 2022 05:30), Max: 99.1 (09 Aug 2022 17:45)  HR: 78 (10 Aug 2022 09:22) (69 - 82)  BP: 139/68 (10 Aug 2022 05:30) (132/60 - 158/61)  BP(mean): --  RR: 18 (10 Aug 2022 05:30) (18 - 18)  SpO2: 98% (10 Aug 2022 09:22) (94% - 100%)    Parameters below as of 10 Aug 2022 09:22  Patient On (Oxygen Delivery Method): room air        General:  Constitutional: Female, appears stated age   Head: Normocephalic; right side of face with multiple scabs in the V1 dermatome and excoriated scalp  Extremities: No cyanosis; Skin: No lauri edema of LE  Resp: breathing comfortably     Neurological: deferred due to patient sleeping comfortably and had not obtained sleep the prior two nights.  MS: sleeping comfortably      LABORATORY:  CBC                       10.2   5.86  )-----------( 346      ( 10 Aug 2022 05:45 )             32.5     Chem 08-10    138  |  101  |  8   ----------------------------<  176<H>  4.0   |  27  |  0.52    Ca    8.7      10 Aug 2022 05:45  Phos  2.9     08-10  Mg     1.80     08-10 Neurology Progress Note    SUBJECTIVE/OBJECTIVE/INTERVAL EVENTS: Patient seen and examined at bedside w/ neuro attending and team.     INTERVAL HISTORY: patient slept well overnight with minimal pain and agitation as per the PA and patient aide at bedside.      VITALS & EXAMINATION:  Vital Signs Last 24 Hrs  T(C): 36.5 (10 Aug 2022 05:30), Max: 37.3 (09 Aug 2022 17:45)  T(F): 97.7 (10 Aug 2022 05:30), Max: 99.1 (09 Aug 2022 17:45)  HR: 78 (10 Aug 2022 09:22) (69 - 82)  BP: 139/68 (10 Aug 2022 05:30) (132/60 - 158/61)  BP(mean): --  RR: 18 (10 Aug 2022 05:30) (18 - 18)  SpO2: 98% (10 Aug 2022 09:22) (94% - 100%)    Parameters below as of 10 Aug 2022 09:22  Patient On (Oxygen Delivery Method): room air        General:  Constitutional: Female, appears stated age   Head: right side of face with multiple scabs in the V1 dermatome and excoriated scalp  Extremities: No cyanosis; Skin: No lauri edema of LE  Resp: breathing comfortably     Neurological: deferred due to patient sleeping comfortably and had not obtained sleep the prior two nights.  MS: sleeping comfortably      LABORATORY:  CBC                       10.2   5.86  )-----------( 346      ( 10 Aug 2022 05:45 )             32.5     Chem 08-10    138  |  101  |  8   ----------------------------<  176<H>  4.0   |  27  |  0.52    Ca    8.7      10 Aug 2022 05:45  Phos  2.9     08-10  Mg     1.80     08-10

## 2022-08-10 NOTE — BH CONSULTATION LIAISON PROGRESS NOTE - NSBHCHARTREVIEWVS_PSY_A_CORE FT
Vital Signs Last 24 Hrs  T(C): 36.5 (10 Aug 2022 05:30), Max: 37.3 (09 Aug 2022 17:45)  T(F): 97.7 (10 Aug 2022 05:30), Max: 99.1 (09 Aug 2022 17:45)  HR: 78 (10 Aug 2022 09:22) (69 - 82)  BP: 139/68 (10 Aug 2022 05:30) (132/60 - 158/61)  BP(mean): --  RR: 18 (10 Aug 2022 05:30) (18 - 18)  SpO2: 98% (10 Aug 2022 09:22) (94% - 100%)    Parameters below as of 10 Aug 2022 09:22  Patient On (Oxygen Delivery Method): room air

## 2022-08-10 NOTE — BH CONSULTATION LIAISON PROGRESS NOTE - NSBHFUPINTERVALHXFT_PSY_A_CORE
No acute events overnight. Pt did not receive interval PRNs. Per staff, patient slept well last night. On exam this morning, patient lethargic, difficult to rouse, falls asleep repeatedly and requests to terminate interview. Noted to be scratching lesions over right palpebra repeatedly.  No acute events overnight. Pt did not receive interval PRNs. Per staff, patient slept well last night. On exam this morning, patient lethargic, difficult to rouse, falls asleep repeatedly and requests to terminate interview. Noted to be scratching lesions over right palpebra repeatedly.     Spoke with daughter Luna who reports patient with improved pain control, agitation when she saw patient at 8am this morning. 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 Intolerance documented in EMR and PRN recs updated.

## 2022-08-10 NOTE — PROGRESS NOTE ADULT - PROBLEM SELECTOR PLAN 1
- No unexpected adverse effects of opiates noted: no sedation/dizziness/nausea.  - IV Tylenol 1000mg q6 ATC x3 days total then would consider switching to PRN; Do NOT exceed >4g of acetaminophen in 24 hours  - Agree with Pain Management team recommendations of transitioning from Gabapentin to Lyrica.    - IV Morphine 2mg q6 ATC (hold for hypotension, oversedation, respiratory depression); patient may refuse doses   If patient's pain remains well controlled, would consider weaning down to q8 ATC tomorrow  - Patient started on Trileptal 300mg BID yesterday as per neuro recommendations   - PO Morphine 15mg q3 PRN moderate-severe pain (hold for hypotension, oversedation, respiratory depression)  - Bowel regimen while on opiates    - Patient may need outpatient follow up with Chronic Pain Management upon discharge.

## 2022-08-10 NOTE — PROGRESS NOTE ADULT - PROBLEM SELECTOR PLAN 3
- on Acyclovir   - Dermatology, Plastic Surgery, Opthalmology notes, Infectious Disease, Neurology recommendations appreciated   - management as per primary and consultant teams.

## 2022-08-10 NOTE — PROGRESS NOTE ADULT - ASSESSMENT
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 Rash improving though mental status fluctuating 2/2 waxing and waning pain from zoster.    1. HZO OD with of R sided facial rash - improving  - Likely bacterial superinfection with crusting over area of edema and erythema. Current crusting continuing to improve    - IOP wnl, VA 20/70 OD. Mental status somewhat poor today due to pain.  - Pt has dilated and minimally reactive R pupil. 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 Stable on CT.   - Appreciate derm and plastic surgery consults.    - Abx and antivirals per ID  - Pt unable to tolerate sitting up at slit lamp - cannot assess anterior chamber for inflammatory reaction  - Cornea with Tr D-folds OD, no longer has bullae. Concern for possible herpetic endotheliitis. Continuing to improve.   - C/w pred forte TID OD.   - C/w erythromycin ointment QID to R eye and periocular lesions  - C/w preservative-free artificial tears Q2H OU   - B-scan 8/3 OD: no vitritis, RD, mass.     2. Intermittent AMS related to waxing and waning pain.   - Appreciate neurology consult  - MRI brain showing no acute pathology, MRI orbits as above  - LP deferred per neurology and ID - reconsider as needed   - Pain control per primary team.   - findings and plan discussed with patient and primary team    SDW Dr. Kuhn, attending.     Outpatient follow-up: Patient should follow-up with his/her ophthalmologist or with [facility] Department of Ophthalmology at the address below     [address] [place] [address]. [address]  [place] [address]  [phone] 86y female w/ pmhx/ochx of CAD, DM, COPD, NPH w/ programmable shunt comes to ED for AMS and shingles, found to have HZO of R side with corneal involvement and elevated IOP, with R sided forehead/facial edema and erythema, with dark brown crusting over R side of face. Rash improving though mental status fluctuating 2/2 waxing and waning pain from zoster.    1. HZO OD with of R sided facial rash - improving  - Likely bacterial superinfection with crusting over area of edema and erythema. Current crusting continuing to improve    - IOP wnl, VA 20/70 OD. Mental status somewhat poor today due to pain.  - Pt has dilated and minimally reactive R pupil. 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 Stable on CT.   - Appreciate derm and plastic surgery consults.    - Abx and antivirals per ID  - Pt unable to tolerate sitting up at slit lamp - cannot assess anterior chamber for inflammatory reaction  - Cornea with Tr D-folds OD, no longer has bullae. Concern for possible herpetic endotheliitis. Continuing to improve.   - C/w pred forte TID OD.   - C/w erythromycin ointment QID to R eye and periocular lesions  - C/w preservative-free artificial tears Q2H OU   - B-scan 8/3 OD: no vitritis, RD, mass.     2. Intermittent AMS related to waxing and waning pain.   - Appreciate neurology consult  - MRI brain showing no acute pathology, MRI orbits as above  - LP deferred per neurology and ID - reconsider as needed   - Pain control per primary team.   - findings and plan discussed with patient and primary team    SDW Dr. Kuhn, attending.     Outpatient follow-up: Patient should follow-up with his/her ophthalmologist or with [facility] Department of Ophthalmology at the address below within 1-2 days of discharge, sooner if symptoms worsen or change    [address]  [place] [address]  [phone]

## 2022-08-10 NOTE — PROGRESS NOTE ADULT - ATTENDING COMMENTS
I have interviewed and examined the patient and reviewed the residents note including the history, exam, assessment, and plan.  I agree with the residents assessment and plan.    Zoster ophthalmicus OD- improving.  Vision improving, IOP good, cornea edema/bullae and descemets folds improving, anisocoria unchanged.  Continue current management  Pain management per primary team/neurology/pain  Will continue to follow  Findings and plan discussed with primary team    Toyin Kuhn MD

## 2022-08-10 NOTE — BH CONSULTATION LIAISON PROGRESS NOTE - NSBHATTESTCOMMENTATTENDFT_PSY_A_CORE
Chart reviewed, pt. seen/evaluated with trainee, I agree with above assessment/plan. Patient lethargic/drowsy on exam and unable to engage in interview. Care coordinated with patient's daughter and Dr. Castelan. Plan as above.

## 2022-08-11 NOTE — PROGRESS NOTE ADULT - ASSESSMENT
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  Patient's pain is still not well controlled given that she is screaming in pain upon examination and unable to follow basic commands.  Her pain is likely secondary to herpetic neuralgia/neuropathic pain which has thus far been refractory to gabapentin.  Given that pregabalin is in the same family as gabapentin, there is a possibility that this may not improve the patient's pain significantly.  An additional agent should be added (oxcarbazepine) which does have neuropathic pain alleviation properties.  Biggest side effect associated with oxcarbazepine is hyponatremia so caution is advised in the setting of polypharmacy.  Improved sleep overnight with minimal pain as per care team however still has intermittent excruciating pain.    Impression: Post herpetic pain on Right V1-V2 dermatomal vesicular rash, R eye purulence. S/p empiric tx for VZV encephalitis    Recommendations  -Mirtazapine 7.5 mg qHs  -Can increase Oxcarbazepine to 450 mg bid  -No objection to increasing lyrica per pain management and primary team  -Rest of care as per primary team    Patient seen and examined with attending neurologist Dr. Alberto

## 2022-08-11 NOTE — PROGRESS NOTE ADULT - NSPROGADDITIONALINFOA_GEN_ALL_CORE
goal for dc planning early next week once on adequate PO pain regimen goal for dc planning early next week once on adequate PO pain regimen  d/w Dr Alberto about his rec to increase the trileptal due ti mild hyponatremia; agreed to keep trileptal at current dose and then decrease gabapentin and inc lyrica instead; will monitor sodium closely; daughter informed of change in plans

## 2022-08-11 NOTE — PROGRESS NOTE ADULT - PROBLEM SELECTOR PLAN 1
completed acyclovir  sig med adjustments made after consultation of pain mgmt/neuro/psych/kunal-pall  transitioning from IV to PO morphine today; daughter aware  current pain much better controlled, sedated at times but arousable  - Ophthalmology following, apprec recs: erythromycin ointment QID to eye &periocular lesions, artificial tears Q4H  - Appreciate derm recs:    - lidocaine ointment mixed with vaseline BID, triamcinolone ointment to red areas only BID    - apply vaseline to crusted areas Q2H

## 2022-08-11 NOTE — PROGRESS NOTE ADULT - SUBJECTIVE AND OBJECTIVE BOX
SUBJECTIVE AND OBJECTIVE:  Patient seen and examined with HHA at bedside. Patient awakes to voice. States she has no pain at this time. States she wants to sleep.     INTERVAL HPI/OVERNIGHT EVENTS:  In past 24 hours, received 4 prn doses of PO Morphine IR 15mg.     Allergies    diltiazem (Other; Rash)    Intolerances    Haldol (Dystonic RXN)      MEDICATIONS  (STANDING):  ALBUTerol    0.083% 2.5 milliGRAM(s) Nebulizer every 6 hours  artificial tears (preservative free) Ophthalmic Solution 1 Drop(s) Both EYES every 2 hours  buDESOnide    Inhalation Suspension 0.5 milliGRAM(s) Inhalation two times a day  dextrose 5%. 1000 milliLiter(s) (100 mL/Hr) IV Continuous <Continuous>  dextrose 5%. 1000 milliLiter(s) (50 mL/Hr) IV Continuous <Continuous>  dextrose 50% Injectable 25 Gram(s) IV Push once  dextrose 50% Injectable 12.5 Gram(s) IV Push once  dextrose 50% Injectable 25 Gram(s) IV Push once  enoxaparin Injectable 80 milliGRAM(s) SubCutaneous every 12 hours  erythromycin   Ointment 1 Application(s) Right EYE four times a day  gabapentin 300 milliGRAM(s) Oral every 8 hours  glucagon  Injectable 1 milliGRAM(s) IntraMuscular once  hydrOXYzine hydrochloride 20 milliGRAM(s) Oral every 6 hours  insulin glargine Injectable (LANTUS) 10 Unit(s) SubCutaneous at bedtime  insulin lispro (ADMELOG) corrective regimen sliding scale   SubCutaneous Before meals and at bedtime  ketorolac   Injectable 15 milliGRAM(s) IV Push every 6 hours  lactated ringers. 1000 milliLiter(s) (75 mL/Hr) IV Continuous <Continuous>  lactobacillus acidophilus 1 Tablet(s) Oral daily  lidocaine 5% Ointment 1 Application(s) Topical two times a day  losartan 50 milliGRAM(s) Oral daily  metoprolol succinate ER 12.5 milliGRAM(s) Oral daily  mirtazapine 7.5 milliGRAM(s) Oral at bedtime  morphine  IR 7.5 milliGRAM(s) Oral every 6 hours  mupirocin 2% Ointment 1 Application(s) Topical three times a day  nystatin Powder 1 Application(s) Topical two times a day  OXcarbazepine 300 milliGRAM(s) Oral two times a day  prednisoLONE acetate 1% Suspension 1 Drop(s) Right EYE three times a day  pregabalin 25 milliGRAM(s) Oral every 8 hours  traZODone 100 milliGRAM(s) Oral at bedtime  triamcinolone 0.1% Ointment 1 Application(s) Topical two times a day  ursodiol Tablet 500 milliGRAM(s) Oral <User Schedule>    MEDICATIONS  (PRN):  AQUAPHOR (petrolatum Ointment) 1 Application(s) Topical three times a day PRN inguinal fold rash  dextrose Oral Gel 15 Gram(s) Oral once PRN Blood Glucose LESS THAN 70 milliGRAM(s)/deciliter  morphine  IR 7.5 milliGRAM(s) Oral every 3 hours PRN mod and severe pain  OLANZapine Injectable 1.25 milliGRAM(s) IntraMuscular every 6 hours PRN Severe Agitation  QUEtiapine 25 milliGRAM(s) Oral every 6 hours PRN agitation    ITEMS UNCHECKED ARE NOT PRESENT    PRESENT SYMPTOMS: [ ]Unable to self-report due to altered mental status- see [ ] CPOT [ ] PAINADS [ ] RDOS  Source if other than patient:  [ ]Family   [ ]Team     Pain: [x ]yes [ ]no  QOL impact - severe  Location -  right side of forehead at shingles site               Aggravating factors - palpation  Quality - sharp , "pins and needles"   Radiation - none   Timing- constant with intermittent spikes  Severity (0-10 scale): 10  Minimal acceptable level (0-10 scale):     Dyspnea:                           [ ]Mild [ ]Moderate [ ]Severe  RDOS:  0 to 2  minimal or no respiratory distress   3  mild distress  4 to 6 moderate distress  >7 severe distress  https://homecareinformation.net/handouts/hen/Respiratory_Distress_Observation_Scale.pdf (Ctrl +  left click to view)     Anxiety:                             [ ]Mild [ ]Moderate [ ]Severe  Agitation:                          [ ]Mild [ ]Moderate [ ]Severe  Fatigue:                             [ ]Mild [ ]Moderate [ ]Severe  Nausea:                             [ ]Mild [ ]Moderate [ ]Severe  Loss of appetite:              [ ]Mild [ ]Moderate [ ]Severe  Constipation:                   [ ]Mild [ ]Moderate [ ]Severe  Diarrhea:                          [ ]Mild [ ]Moderate [ ]Severe      CPOT:    https://www.Psychiatric.org/getattachment/yjl47q91-7n5c-4m0l-3r4x-8149y0628j3y/Critical-Care-Pain-Observation-Tool-(CPOT)    PAIN AD Score:	  http://geriatrictoolkit.[place].[facility]/cog/painad.pdf (Ctrl + left click to view)    PCSSQ[Palliative Care Spiritual Screening Question]   Severity (0-10):  Score of 4 or > indicate consideration of Chaplaincy referral.  Chaplaincy Referral: [ ] yes [ ] refused [ ] following    Other Symptoms:  [x ]All other review of systems negative     Palliative Performance Status Version 2:     50    %      http://npcrc.org/files/news/palliative_performance_scale_ppsv2.pdf    PHYSICAL EXAM:  Vital Signs Last 24 Hrs  T(C): 37.1 (11 Aug 2022 09:50), Max: 37.1 (11 Aug 2022 09:50)  T(F): 98.7 (11 Aug 2022 09:50), Max: 98.7 (11 Aug 2022 09:50)  HR: 97 (11 Aug 2022 09:50) (78 - 97)  BP: 142/66 (11 Aug 2022 09:50) (142/66 - 181/78)  BP(mean): --  RR: 16 (11 Aug 2022 09:50) (16 - 18)  SpO2: 98% (11 Aug 2022 09:50) (94% - 98%)    Parameters below as of 11 Aug 2022 09:50  Patient On (Oxygen Delivery Method): room air    Summary       GENERAL:  [x ]Awakes to voice  [ ]Oriented   [ ]Lethargic  [ ]Cachexia  [ ]Unarousable  [ x]Verbal  [ ]Non-Verbal    Behavioral:   [ ] Anxiety  [ ] Delirium [ ] Agitation [x ] Calm  [ ] Other -   HEENT: Right eye closed, Crusted lesions at right forehead along V1 dermatome  [ ]Normal  [ ] Temporal Wasting  [ ]Dry mouth   [ ]ET Tube/Trach  [ ]Oral lesions  [ ] Mucositis  PULMONARY:   [x ]Clear [ ]Tachypnea  [ ]Audible excessive secretions   [ ]Rhonchi        [ ]Right [ ]Left [ ]Bilateral  [ ]Crackles        [ ]Right [ ]Left [ ]Bilateral  [ ]Wheezing     [ ]Right [ ]Left [ ]Bilateral  [ ]Diminished breath sounds [ ]right [ ]left [ ]bilateral  CARDIOVASCULAR:    [ x]Regular [ ]Irregular [ ]Tachy  [ ]Shawn [ ]Murmur [ ]Other  GASTROINTESTINAL:  [ x]Soft  [ ]Distended   [ ]+BS  [ x]Non tender [ ]Tender  [ ]PEG [ ]OGT/ NGT  Last BM: fecal incontinence  GENITOURINARY:  [ ]Normal [ x] Incontinent   [ ]Oliguria/Anuria   [ ]Grace  MUSCULOSKELETAL:   [x ]Normal   [ ]Weakness  [ ]Bed/Wheelchair bound [ ]Edema  [  ] amputation  [  ] contraction  NEUROLOGIC: spontaneous movement of all extremities   [ ]No focal deficits  [ ]Cognitive impairment  [ ]Dysphagia [ ]Dysarthria [ ]Paresis [ ]Other   SKIN: Crusted lesions along right forehead along V1 dermatome. See Nursing Skin Assessment for further details  [ ]Normal    [ ]Rash  [ ]Pressure ulcer(s)       Present on admission [ ]y [ ]n   [  ]  Wound    [  ] hyperpigmentation      CRITICAL CARE:  [ ]Shock Present  [ ]Septic [ ]Cardiogenic [ ]Neurologic [ ]Hypovolemic  [ ]Vasopressors [ ]Inotropes  [ ]Respiratory failure present [ ]Mechanical Ventilation [ ]Non-invasive ventilatory support [ ]High-Flow   [ ]Acute  [ ]Chronic [ ]Hypoxic  [ ]Hypercarbic [ ]Other  [ ]Other organ failure     LABS:                                        11.4   8.05  )-----------( 379      ( 11 Aug 2022 05:50 )             36.4       08-11    132<L>  |  98  |  9   ----------------------------<  186<H>  4.5   |  19<L>  |  0.45<L>    Ca    8.8      11 Aug 2022 05:50  Phos  3.3     08-11  Mg     1.80     08-11    TPro  6.6  /  Alb  2.8<L>  /  TBili  0.2  /  DBili  x   /  AST  23  /  ALT  7   /  AlkPhos  110  08-11        RADIOLOGY & ADDITIONAL STUDIES: reviewed     Protein Calorie Malnutrition Present: [ ]mild [ ]moderate [ ]severe [ ]underweight [ ]morbid obesity  https://www.andeal.org/vault/2280/web/files/ONC/Table_Clinical%20Characteristics%20to%20Document%20Malnutrition-White%20JV%20et%20al%202012.pdf    Height (cm): 160 (07-26-22 @ 09:10)  Weight (kg): 77.111 (07-26-22 @ 13:30)  BMI (kg/m2): 30.1 (07-26-22 @ 13:30)    [ ]PPSV2 < or = 30%  [ ]significant weight loss [ ]poor nutritional intake [ ]anasarca   [ ]Artificial Nutrition    REFERRALS:   [ ]Chaplaincy  [ ]Hospice  [ ]Child Life  [ ]Social Work  [ x]Case management [ ]Holistic Therapy

## 2022-08-11 NOTE — PROGRESS NOTE ADULT - PROBLEM SELECTOR PLAN 7
NPH diagnosed 2011, pt has programmable  shunt installed by [facility] neuro[facility]urg, **must be programmed after MRI (page neurosurg [id number])**. 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      -Daughter agreeable for neurology consult but declines LP  -blood culture neg to date: no indication for LP at this time

## 2022-08-11 NOTE — PROGRESS NOTE ADULT - SUBJECTIVE AND OBJECTIVE BOX
SUBJECTIVE/INTERVAL HISTORY:  - Improved pain since 2 days ago, however still has intermittent excruciating pain on the R face    PAST MEDICAL & SURGICAL HISTORY:  Myocardial Infarction      Diabetes Mellitus Type II      Migraines      COPD (Chronic Obstructive Pulmonary Disease)      NPH (normal pressure hydrocephalus)      COPD, mild      CAD (coronary artery disease)      Type 2 diabetes mellitus      Migraines      Stented coronary artery        FAMILY HISTORY:  FH: myocardial infarction (Father)    FH: hypertension (Child)    MEDICATIONS (HOME):  Home Medications:  amLODIPine 2.5 mg oral tablet:  (27 Jul 2022 14:17)  calcitonin: 1 spray in alternating nostrils once a day (27 Jul 2022 14:17)  Cosamin DS: 1500 milligram(s)/1200 milligram(s)/ orally once a day (27 Jul 2022 14:17)  Ecotrin 325 mg oral delayed release tablet: orally once a day (at bedtime) (27 Jul 2022 14:17)  Fioricet: -40 formulation as needed (27 Jul 2022 14:17)  folic acid 1 mg oral tablet: 1 tab(s) orally once a day (27 Jul 2022 14:17)  furosemide 20 mg oral tablet: orally once a day (at bedtime) (27 Jul 2022 14:17)  glipiZIDE 5 mg oral tablet, extended release: orally 2 times a day (27 Jul 2022 14:17)  Imodium: liquid as needed (27 Jul 2022 14:17)  Lantus Solostar Pen: 34 unit(s) subcutaneous once a day (at bedtime) (27 Jul 2022 14:17)  losartan 50 mg oral tablet: 1 tab(s) orally once a day (27 Jul 2022 14:17)  Metoprolol Succinate ER 25 mg oral tablet, extended release: 0.5 tablets orally once a day (at bedtime) (27 Jul 2022 14:17)  mirtazapine 15 mg oral tablet: 1 tab(s) orally once a day (at bedtime) (27 Jul 2022 14:17)  multivitamin: 2 softgels orally 2 times a day (27 Jul 2022 14:17)  Restasis: to each affected eye 2 times a day (27 Jul 2022 14:17)  spironolactone 50 mg oral tablet: 2 tablets orally once a day (at bedtime) (27 Jul 2022 14:17)  traZODone 50 mg oral tablet: 2 tablets orally once a day (at bedtime) (27 Jul 2022 14:17)  Trelegy Ellipta 100 mcg-62.5 mcg-25 mcg/inh inhalation powder: 1 puff(s) inhaled once a day (27 Jul 2022 14:17)  Trulicity Pen 1.5 mg/0.5 mL subcutaneous solution: subcutaneous once a week (27 Jul 2022 14:17)  ursodiol 500 mg oral tablet: orally once a day (27 Jul 2022 14:17)  Valtrex 1 g oral tablet: orally 3 times a day (27 Jul 2022 14:17)  Vitamin B-12 1000 mcg oral tablet: 1 tab(s) orally once a day (27 Jul 2022 14:17)  Vitamin D3 1250 mcg (50,000 intl units) oral capsule: 1 cap(s) orally once a week (27 Jul 2022 14:17)    MEDICATIONS  (STANDING):  ALBUTerol    0.083% 2.5 milliGRAM(s) Nebulizer every 6 hours  artificial tears (preservative free) Ophthalmic Solution 1 Drop(s) Both EYES every 2 hours  buDESOnide    Inhalation Suspension 0.5 milliGRAM(s) Inhalation two times a day  dextrose 5%. 1000 milliLiter(s) (100 mL/Hr) IV Continuous <Continuous>  dextrose 5%. 1000 milliLiter(s) (50 mL/Hr) IV Continuous <Continuous>  dextrose 50% Injectable 25 Gram(s) IV Push once  dextrose 50% Injectable 12.5 Gram(s) IV Push once  dextrose 50% Injectable 25 Gram(s) IV Push once  enoxaparin Injectable 80 milliGRAM(s) SubCutaneous every 12 hours  erythromycin   Ointment 1 Application(s) Right EYE four times a day  gabapentin 300 milliGRAM(s) Oral every 8 hours  glucagon  Injectable 1 milliGRAM(s) IntraMuscular once  hydrOXYzine hydrochloride 20 milliGRAM(s) Oral every 6 hours  insulin glargine Injectable (LANTUS) 10 Unit(s) SubCutaneous at bedtime  insulin lispro (ADMELOG) corrective regimen sliding scale   SubCutaneous Before meals and at bedtime  ketorolac   Injectable 15 milliGRAM(s) IV Push every 6 hours  lactated ringers. 1000 milliLiter(s) (75 mL/Hr) IV Continuous <Continuous>  lactobacillus acidophilus 1 Tablet(s) Oral daily  lidocaine 5% Ointment 1 Application(s) Topical two times a day  losartan 50 milliGRAM(s) Oral daily  metoprolol succinate ER 12.5 milliGRAM(s) Oral daily  mirtazapine 7.5 milliGRAM(s) Oral at bedtime  morphine  - Injectable 2 milliGRAM(s) IV Push every 6 hours  mupirocin 2% Ointment 1 Application(s) Topical three times a day  nystatin Powder 1 Application(s) Topical two times a day  OXcarbazepine 300 milliGRAM(s) Oral two times a day  prednisoLONE acetate 1% Suspension 1 Drop(s) Right EYE three times a day  pregabalin 25 milliGRAM(s) Oral every 8 hours  traZODone 100 milliGRAM(s) Oral at bedtime  triamcinolone 0.1% Ointment 1 Application(s) Topical two times a day  ursodiol Tablet 500 milliGRAM(s) Oral <User Schedule>    MEDICATIONS  (PRN):  AQUAPHOR (petrolatum Ointment) 1 Application(s) Topical three times a day PRN inguinal fold rash  dextrose Oral Gel 15 Gram(s) Oral once PRN Blood Glucose LESS THAN 70 milliGRAM(s)/deciliter  morphine  IR 15 milliGRAM(s) Oral every 3 hours PRN mod and severe pain  OLANZapine Injectable 1.25 milliGRAM(s) IntraMuscular every 6 hours PRN Severe Agitation  QUEtiapine 25 milliGRAM(s) Oral every 6 hours PRN agitation    ALLERGIES/INTOLERANCES:  Allergies  diltiazem (Other; Rash)    Intolerances  Haldol (Dystonic RXN)    VITALS & EXAMINATION:  Vital Signs Last 24 Hrs  T(C): 37.1 (11 Aug 2022 09:50), Max: 37.1 (11 Aug 2022 09:50)  T(F): 98.7 (11 Aug 2022 09:50), Max: 98.7 (11 Aug 2022 09:50)  HR: 97 (11 Aug 2022 09:50) (78 - 97)  BP: 142/66 (11 Aug 2022 09:50) (142/66 - 181/78)  BP(mean): --  RR: 16 (11 Aug 2022 09:50) (16 - 18)  SpO2: 98% (11 Aug 2022 09:50) (94% - 98%)    Parameters below as of 11 Aug 2022 09:50  Patient On (Oxygen Delivery Method): room air      General:  Constitutional: Female, appears stated age   Head: right side of face with multiple scabs in the V1 dermatome and excoriated scalp  Extremities: No cyanosis; Skin: No lauri edema of LE  Resp: breathing comfortably     Neurological: deferred due to patient sleeping comfortably and had not obtained sleep the prior two nights.  MS: sleeping comfortably    LABORATORY:  CBC                       11.4   8.05  )-----------( 379      ( 11 Aug 2022 05:50 )             36.4     Chem 08-11    132<L>  |  98  |  9   ----------------------------<  186<H>  4.5   |  19<L>  |  0.45<L>    Ca    8.8      11 Aug 2022 05:50  Phos  3.3     08-11  Mg     1.80     08-11    TPro  6.6  /  Alb  2.8<L>  /  TBili  0.2  /  DBili  x   /  AST  23  /  ALT  7   /  AlkPhos  110  08-11    LFTs LIVER FUNCTIONS - ( 11 Aug 2022 05:50 )  Alb: 2.8 g/dL / Pro: 6.6 g/dL / ALK PHOS: 110 U/L / ALT: 7 U/L / AST: 23 U/L / GGT: x           Coagulopathy   Lipid Panel   A1c   Cardiac enzymes     U/A   CSF  Immunological  Other    STUDIES & IMAGING:  Studies (EKG, EEG, EMG, etc):     Radiology (XR, CT, MR, U/S, TTE/MARIAH):   SUBJECTIVE/INTERVAL HISTORY:  - Improved pain since 2 days ago, however still has intermittent excruciating pain on the R face.  Paroxysms of pain are much less frequent, less severe and shorter lasting.  She is sleeping but awakens to eat meals.     PAST MEDICAL & SURGICAL HISTORY:  Myocardial Infarction      Diabetes Mellitus Type II      Migraines      COPD (Chronic Obstructive Pulmonary Disease)      NPH (normal pressure hydrocephalus)      COPD, mild      CAD (coronary artery disease)      Type 2 diabetes mellitus      Migraines      Stented coronary artery        FAMILY HISTORY:  FH: myocardial infarction (Father)    FH: hypertension (Child)    MEDICATIONS (HOME):  Home Medications:  amLODIPine 2.5 mg oral tablet:  (27 Jul 2022 14:17)  calcitonin: 1 spray in alternating nostrils once a day (27 Jul 2022 14:17)  Cosamin DS: 1500 milligram(s)/1200 milligram(s)/ orally once a day (27 Jul 2022 14:17)  Ecotrin 325 mg oral delayed release tablet: orally once a day (at bedtime) (27 Jul 2022 14:17)  Fioricet: -40 formulation as needed (27 Jul 2022 14:17)  folic acid 1 mg oral tablet: 1 tab(s) orally once a day (27 Jul 2022 14:17)  furosemide 20 mg oral tablet: orally once a day (at bedtime) (27 Jul 2022 14:17)  glipiZIDE 5 mg oral tablet, extended release: orally 2 times a day (27 Jul 2022 14:17)  Imodium: liquid as needed (27 Jul 2022 14:17)  Lantus Solostar Pen: 34 unit(s) subcutaneous once a day (at bedtime) (27 Jul 2022 14:17)  losartan 50 mg oral tablet: 1 tab(s) orally once a day (27 Jul 2022 14:17)  Metoprolol Succinate ER 25 mg oral tablet, extended release: 0.5 tablets orally once a day (at bedtime) (27 Jul 2022 14:17)  mirtazapine 15 mg oral tablet: 1 tab(s) orally once a day (at bedtime) (27 Jul 2022 14:17)  multivitamin: 2 softgels orally 2 times a day (27 Jul 2022 14:17)  Restasis: to each affected eye 2 times a day (27 Jul 2022 14:17)  spironolactone 50 mg oral tablet: 2 tablets orally once a day (at bedtime) (27 Jul 2022 14:17)  traZODone 50 mg oral tablet: 2 tablets orally once a day (at bedtime) (27 Jul 2022 14:17)  Trelegy Ellipta 100 mcg-62.5 mcg-25 mcg/inh inhalation powder: 1 puff(s) inhaled once a day (27 Jul 2022 14:17)  Trulicity Pen 1.5 mg/0.5 mL subcutaneous solution: subcutaneous once a week (27 Jul 2022 14:17)  ursodiol 500 mg oral tablet: orally once a day (27 Jul 2022 14:17)  Valtrex 1 g oral tablet: orally 3 times a day (27 Jul 2022 14:17)  Vitamin B-12 1000 mcg oral tablet: 1 tab(s) orally once a day (27 Jul 2022 14:17)  Vitamin D3 1250 mcg (50,000 intl units) oral capsule: 1 cap(s) orally once a week (27 Jul 2022 14:17)    MEDICATIONS  (STANDING):  ALBUTerol    0.083% 2.5 milliGRAM(s) Nebulizer every 6 hours  artificial tears (preservative free) Ophthalmic Solution 1 Drop(s) Both EYES every 2 hours  buDESOnide    Inhalation Suspension 0.5 milliGRAM(s) Inhalation two times a day  dextrose 5%. 1000 milliLiter(s) (100 mL/Hr) IV Continuous <Continuous>  dextrose 5%. 1000 milliLiter(s) (50 mL/Hr) IV Continuous <Continuous>  dextrose 50% Injectable 25 Gram(s) IV Push once  dextrose 50% Injectable 12.5 Gram(s) IV Push once  dextrose 50% Injectable 25 Gram(s) IV Push once  enoxaparin Injectable 80 milliGRAM(s) SubCutaneous every 12 hours  erythromycin   Ointment 1 Application(s) Right EYE four times a day  gabapentin 300 milliGRAM(s) Oral every 8 hours  glucagon  Injectable 1 milliGRAM(s) IntraMuscular once  hydrOXYzine hydrochloride 20 milliGRAM(s) Oral every 6 hours  insulin glargine Injectable (LANTUS) 10 Unit(s) SubCutaneous at bedtime  insulin lispro (ADMELOG) corrective regimen sliding scale   SubCutaneous Before meals and at bedtime  ketorolac   Injectable 15 milliGRAM(s) IV Push every 6 hours  lactated ringers. 1000 milliLiter(s) (75 mL/Hr) IV Continuous <Continuous>  lactobacillus acidophilus 1 Tablet(s) Oral daily  lidocaine 5% Ointment 1 Application(s) Topical two times a day  losartan 50 milliGRAM(s) Oral daily  metoprolol succinate ER 12.5 milliGRAM(s) Oral daily  mirtazapine 7.5 milliGRAM(s) Oral at bedtime  morphine  - Injectable 2 milliGRAM(s) IV Push every 6 hours  mupirocin 2% Ointment 1 Application(s) Topical three times a day  nystatin Powder 1 Application(s) Topical two times a day  OXcarbazepine 300 milliGRAM(s) Oral two times a day  prednisoLONE acetate 1% Suspension 1 Drop(s) Right EYE three times a day  pregabalin 25 milliGRAM(s) Oral every 8 hours  traZODone 100 milliGRAM(s) Oral at bedtime  triamcinolone 0.1% Ointment 1 Application(s) Topical two times a day  ursodiol Tablet 500 milliGRAM(s) Oral <User Schedule>    MEDICATIONS  (PRN):  AQUAPHOR (petrolatum Ointment) 1 Application(s) Topical three times a day PRN inguinal fold rash  dextrose Oral Gel 15 Gram(s) Oral once PRN Blood Glucose LESS THAN 70 milliGRAM(s)/deciliter  morphine  IR 15 milliGRAM(s) Oral every 3 hours PRN mod and severe pain  OLANZapine Injectable 1.25 milliGRAM(s) IntraMuscular every 6 hours PRN Severe Agitation  QUEtiapine 25 milliGRAM(s) Oral every 6 hours PRN agitation    ALLERGIES/INTOLERANCES:  Allergies  diltiazem (Other; Rash)    Intolerances  Haldol (Dystonic RXN)    VITALS & EXAMINATION:  Vital Signs Last 24 Hrs  T(C): 37.1 (11 Aug 2022 09:50), Max: 37.1 (11 Aug 2022 09:50)  T(F): 98.7 (11 Aug 2022 09:50), Max: 98.7 (11 Aug 2022 09:50)  HR: 97 (11 Aug 2022 09:50) (78 - 97)  BP: 142/66 (11 Aug 2022 09:50) (142/66 - 181/78)  BP(mean): --  RR: 16 (11 Aug 2022 09:50) (16 - 18)  SpO2: 98% (11 Aug 2022 09:50) (94% - 98%)    Parameters below as of 11 Aug 2022 09:50  Patient On (Oxygen Delivery Method): room air      General:  Constitutional: Female, appears stated age   Head: right side of face with multiple scabs in the V1 dermatome and excoriated scalp  Extremities: No cyanosis; Skin: No lauri edema of LE  Resp: breathing comfortably     Neurological: Arousable to voice.  Answers questions appropriately.        LABORATORY:  CBC                       11.4   8.05  )-----------( 379      ( 11 Aug 2022 05:50 )             36.4     Chem 08-11    132<L>  |  98  |  9   ----------------------------<  186<H>  4.5   |  19<L>  |  0.45<L>    Ca    8.8      11 Aug 2022 05:50  Phos  3.3     08-11  Mg     1.80     08-11    TPro  6.6  /  Alb  2.8<L>  /  TBili  0.2  /  DBili  x   /  AST  23  /  ALT  7   /  AlkPhos  110  08-11    LFTs LIVER FUNCTIONS - ( 11 Aug 2022 05:50 )  Alb: 2.8 g/dL / Pro: 6.6 g/dL / ALK PHOS: 110 U/L / ALT: 7 U/L / AST: 23 U/L / GGT: x           Coagulopathy   Lipid Panel   A1c   Cardiac enzymes     U/A   CSF  Immunological  Other    STUDIES & IMAGING:  Studies (EKG, EEG, EMG, etc):     Radiology (XR, CT, MR, U/S, TTE/MARIAH):

## 2022-08-11 NOTE — PROGRESS NOTE ADULT - PROBLEM SELECTOR PLAN 4
Hx of severe HTN at home.  - continue home losartan, metoprolol  - hold furosemide and spironolactone for now, avoiding acute BP drops  BP control has been adequate, cont to monitor  elevated at times of pain/agitation monitor closely

## 2022-08-11 NOTE — PROGRESS NOTE ADULT - PROBLEM SELECTOR PLAN 1
- IV Tylenol 1000mg q6 ATC x3 days total then would consider switching to PRN; Do NOT exceed >4g of acetaminophen in 24 hours  - Patient on Trileptal 300mg BID yesterday as per neuro recommendations   - Agree with Pain Management team recommendations of transitioning from Gabapentin to Lyrica.    - D/C IV Morphine 2mg q6 ATC   - Switch to PO Morphine IR 7.5mg q 6 ATC (hold for hypotension, oversedation, respiratory depression); patient may refuse doses   - Decrease to PO Morphine 7.5mg q3 PRN moderate-severe pain (hold for hypotension, oversedation, respiratory depression)  - Bowel regimen while on opiates    - Patient likely will need outpatient follow up with Chronic Pain Management upon discharge.

## 2022-08-11 NOTE — PROGRESS NOTE ADULT - SUBJECTIVE AND OBJECTIVE BOX
[facility] Division of Hospital Medicine  Kaitlyn Castelan MD  Pager [phone]    Patient is a 86y old  Female who presents with a chief complaint of Suspicion for Herpes Zoster opthalmicus/encephalitis  13:03)      SUBJECTIVE / OVERNIGHT EVENTS: pt seen earlier this AM; reports pain overall better but still itchy      MEDICATIONS  (STANDING):  ALBUTerol    0.083% 2.5 milliGRAM(s) Nebulizer every 6 hours  artificial tears (preservative free) Ophthalmic Solution 1 Drop(s) Both EYES every 2 hours  buDESOnide    Inhalation Suspension 0.5 milliGRAM(s) Inhalation two times a day  dextrose 5%. 1000 milliLiter(s) (100 mL/Hr) IV Continuous <Continuous>  dextrose 5%. 1000 milliLiter(s) (50 mL/Hr) IV Continuous <Continuous>  dextrose 50% Injectable 25 Gram(s) IV Push once  dextrose 50% Injectable 12.5 Gram(s) IV Push once  dextrose 50% Injectable 25 Gram(s) IV Push once  enoxaparin Injectable 80 milliGRAM(s) SubCutaneous every 12 hours  erythromycin   Ointment 1 Application(s) Right EYE four times a day  gabapentin 300 milliGRAM(s) Oral every 8 hours  glucagon  Injectable 1 milliGRAM(s) IntraMuscular once  hydrOXYzine hydrochloride 20 milliGRAM(s) Oral every 6 hours  insulin glargine Injectable (LANTUS) 10 Unit(s) SubCutaneous at bedtime  insulin lispro (ADMELOG) corrective regimen sliding scale   SubCutaneous Before meals and at bedtime  ketorolac   Injectable 15 milliGRAM(s) IV Push every 6 hours  lactated ringers. 1000 milliLiter(s) (75 mL/Hr) IV Continuous <Continuous>  lactobacillus acidophilus 1 Tablet(s) Oral daily  lidocaine 5% Ointment 1 Application(s) Topical two times a day  losartan 50 milliGRAM(s) Oral daily  metoprolol succinate ER 12.5 milliGRAM(s) Oral daily  mirtazapine 7.5 milliGRAM(s) Oral at bedtime  morphine  IR 7.5 milliGRAM(s) Oral every 6 hours  mupirocin 2% Ointment 1 Application(s) Topical three times a day  nystatin Powder 1 Application(s) Topical two times a day  OXcarbazepine 300 milliGRAM(s) Oral two times a day  prednisoLONE acetate 1% Suspension 1 Drop(s) Right EYE three times a day  pregabalin 25 milliGRAM(s) Oral every 8 hours  traZODone 100 milliGRAM(s) Oral at bedtime  triamcinolone 0.1% Ointment 1 Application(s) Topical two times a day  ursodiol Tablet 500 milliGRAM(s) Oral <User Schedule>    MEDICATIONS  (PRN):  AQUAPHOR (petrolatum Ointment) 1 Application(s) Topical three times a day PRN inguinal fold rash  dextrose Oral Gel 15 Gram(s) Oral once PRN Blood Glucose LESS THAN 70 milliGRAM(s)/deciliter  morphine  IR 7.5 milliGRAM(s) Oral every 3 hours PRN mod and severe pain  OLANZapine Injectable 1.25 milliGRAM(s) IntraMuscular every 6 hours PRN Severe Agitation  QUEtiapine 25 milliGRAM(s) Oral every 6 hours PRN agitation      CAPILLARY BLOOD GLUCOSE  POCT Blood Glucose.: 122 mg/dL (11 Aug 2022 10:58)  POCT Blood Glucose.: 154 mg/dL (11 Aug 2022 07:35)  POCT Blood Glucose.: 250 mg/dL (10 Aug 2022 21:41)  POCT Blood Glucose.: 187 mg/dL (10 Aug 2022 16:32)    PHYSICAL EXAM:  Vital Signs Last 24 Hrs  T(F): 98.7 (11 Aug 2022 09:50), Max: 98.7 (11 Aug 2022 09:50)  HR: 97 (11 Aug 2022 09:50) (78 - 97)  BP: 142/66 (11 Aug 2022 09:50) (142/66 - 181/78)  RR: 16 (11 Aug 2022 09:50) (16 - 18)  SpO2: 98% (11 Aug 2022 09:50) (94% - 98%)    Parameters below as of 11 Aug 2022 09:50  Patient On (Oxygen Delivery Method): room air        CONSTITUTIONAL: NAD, well-developed, well-groomed  EYES: R eye mostly swollen shut; L eye no drainage, EOMI  ENMT: Moist oral mucosa; normal dentition  NECK: Supple, no palpable masses; no thyromegaly  RESPIRATORY: Normal respiratory effort; grossly b/l AE  CARDIOVASCULAR: Regular rate and rhythm; No lower extremity edema;   ABDOMEN: Nontender to palpation, normoactive bowel sounds  MUSCULOSKELETAL: no clubbing or cyanosis of digits; no joint swelling or tenderness to palpation  PSYCH: alert, conversant, coop, yells at times that she's itchy  NEUROLOGY: CN 2-12 are intact and symmetric; no gross sensory deficits   SKIN: R upper facial erythema, sloughed skin now off, no vesicles    LABS:                        11.4   8.05  )-----------( 379      ( 11 Aug 2022 05:50 )             36.4     08-11    132<L>  |  98  |  9   ----------------------------<  186<H>  4.5   |  19<L>  |  0.45<L>    Ca    8.8      11 Aug 2022 05:50  Phos  3.3     08-11  Mg     1.80     08-11    TPro  6.6  /  Alb  2.8<L>  /  TBili  0.2  /  DBili  x   /  AST  23  /  ALT  7   /  AlkPhos  110  08-11

## 2022-08-11 NOTE — PROGRESS NOTE ADULT - ATTENDING COMMENTS
Ms. 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  Can continue to increase by intervals of 150 mg BID if pain is not well controlled and she has no side effects.  Watch for hyponatremia.   D/W daughter.     Thank you .

## 2022-08-11 NOTE — PROGRESS NOTE ADULT - NSPROGADDITIONALINFOA_GEN_ALL_CORE
Case reviewed with primary team.     Thank you for allowing us to participate in your patient's care. Please page [phone] for any questions/concerns.

## 2022-08-11 NOTE — CHART NOTE - NSCHARTNOTEFT_GEN_A_CORE
NUTRITION FOLLOW-UP:    86F w/ hx CAD s/p stents (1986, 1996), COPD, NPH s/p  shunt, DM, migraines, and HTN presenting with herpes zoster ophthalmicus/encephalitis affecting the right V1 dermatome, now with acute worsening of symptoms.    Pt f/u per protocol, Pt seen with aide at bedside who reported pt with 25% intake of meals. As per SLP recommendation (8/5/22) SOft and Bite sized consistency. Pt may benefit from supplement with Glucerna  3x daily (660kcals, 30g protein). Current wt-195.3# (8/11/22) via bed scale, Previous wt- 169.6# (7/26/22). but accuracy questionable. Suggest zero the bed and re weigh for accurate wt.  Pt noted with Rt inner buttock wound, +1 generalized edema as per RN flow sheet    Weight: 195.3# (8/11/22) via bed scale, Previous wt- 169.6# (7/26/22).     Labs: 08-11 Na 132 mmol/L<L> Glu 186 mg/dL<H> K+ 4.5 mmol/L Cr 0.45 mg/dL<L> BUN 9 mg/dL Phos 3.3 mg/dL  08-11 @ 10:58 POCT 122 mg/dL  08-11 @ 07:35 POCT 154 mg/dL  08-10 @ 21:41 POCT 250 mg/dL  08-10 @ 16:32 POCT 187 mg/dL     Current Diet: Soft and Bite sized, CHO consistent Kosher    Skin-Rt inner buttock wound, +1 generalized edema as per RN flow sheet    MEDICATIONS  (STANDING):  ALBUTerol    0.083% 2.5 milliGRAM(s) Nebulizer every 6 hours  artificial tears (preservative free) Ophthalmic Solution 1 Drop(s) Both EYES every 2 hours  buDESOnide    Inhalation Suspension 0.5 milliGRAM(s) Inhalation two times a day  dextrose 5%. 1000 milliLiter(s) (100 mL/Hr) IV Continuous <Continuous>  dextrose 5%. 1000 milliLiter(s) (50 mL/Hr) IV Continuous <Continuous>  dextrose 50% Injectable 25 Gram(s) IV Push once  dextrose 50% Injectable 12.5 Gram(s) IV Push once  dextrose 50% Injectable 25 Gram(s) IV Push once  enoxaparin Injectable 80 milliGRAM(s) SubCutaneous every 12 hours  erythromycin   Ointment 1 Application(s) Right EYE four times a day  gabapentin 100 milliGRAM(s) Oral every 8 hours  glucagon  Injectable 1 milliGRAM(s) IntraMuscular once  hydrOXYzine hydrochloride 20 milliGRAM(s) Oral every 6 hours  insulin glargine Injectable (LANTUS) 10 Unit(s) SubCutaneous at bedtime  insulin lispro (ADMELOG) corrective regimen sliding scale   SubCutaneous Before meals and at bedtime  ketorolac   Injectable 15 milliGRAM(s) IV Push every 6 hours  lactated ringers. 1000 milliLiter(s) (75 mL/Hr) IV Continuous <Continuous>  lactobacillus acidophilus 1 Tablet(s) Oral daily  lidocaine 5% Ointment 1 Application(s) Topical two times a day  losartan 50 milliGRAM(s) Oral daily  metoprolol succinate ER 12.5 milliGRAM(s) Oral daily  mirtazapine 7.5 milliGRAM(s) Oral at bedtime  morphine  IR 7.5 milliGRAM(s) Oral every 6 hours  mupirocin 2% Ointment 1 Application(s) Topical three times a day  nystatin Powder 1 Application(s) Topical two times a day  OXcarbazepine 300 milliGRAM(s) Oral two times a day  prednisoLONE acetate 1% Suspension 1 Drop(s) Right EYE three times a day  pregabalin 50 milliGRAM(s) Oral every 8 hours  traZODone 100 milliGRAM(s) Oral at bedtime  triamcinolone 0.1% Ointment 1 Application(s) Topical two times a day  ursodiol Tablet 500 milliGRAM(s) Oral <User Schedule>    Estimated needs:  No changes since previous assessment.    Nutrition Diagnosis:  Ongoing    RD to Remain Available:    Additional Recommendations:   Rec supplement Glucerna 3x daily (660kcals, 30g protein)  Re-weigh pt after zeroing the bed scale.  Continue with current diet consistency as ordered.   Monitor PO intake, weight trends, nutrition related lab values, BMs/GI distress, hydration status, skin integrity.     Dahlia Armstrong RDN #[id number]

## 2022-08-12 NOTE — PROGRESS NOTE ADULT - PROBLEM SELECTOR PLAN 7
NPH diagnosed 2011, pt has programmable  shunt installed by [facility] neurosurg, **must be programmed after MRI (page neurosurg [id number])**. 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

## 2022-08-12 NOTE — PROVIDER CONTACT NOTE (OTHER) - ACTION/TREATMENT ORDERED:
ACP Rafiq notified & aware. OK to give pain medications early, and OK to give medications with O2 saturation. Continuous SAO2 maintained. Safety maintained. ACP Rafiq notified & aware. OK to give pain medications early, and OK to give medications with O2 saturation. Pt educated on need for O2. Continuous SAO2 maintained. Safety maintained.

## 2022-08-12 NOTE — PROGRESS NOTE ADULT - ASSESSMENT
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  Patient's pain is still not well controlled given that she is screaming in pain upon examination and unable to follow basic commands.  Her pain is likely secondary to herpetic neuralgia/neuropathic pain which has thus far been refractory to gabapentin.  Given that pregabalin is in the same family as gabapentin, there is a possibility that this may not improve the patient's pain significantly.  An additional agent should be added (oxcarbazepine) which does have neuropathic pain alleviation properties.  Biggest side effect associated with oxcarbazepine is hyponatremia so caution is advised in the setting of polypharmacy. Interval exam stable but pain is coming back    Impression: Post herpetic pain on Right V1-V2 dermatomal vesicular rash, R eye purulence. S/p empiric tx for VZV encephalitis    Recommendations  -Mirtazapine 7.5 mg qHs  -Can maintain oxcarb 300 mg BID since sodium fluctuating  -Can increase lyrica to 75 mg TID  -Rest of care as per primary team    Patient seen and examined with attending neurologist Dr. 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  Patient's pain is still not well controlled given that she is screaming in pain upon examination and unable to follow basic commands.  Her pain is likely secondary to herpetic neuralgia/neuropathic pain which has thus far been refractory to gabapentin.  Given that pregabalin is in the same family as gabapentin, there is a possibility that this may not improve the patient's pain significantly.  An additional agent should be added (oxcarbazepine) which does have neuropathic pain alleviation properties.  Biggest side effect associated with oxcarbazepine is hyponatremia so caution is advised in the setting of polypharmacy. Interval exam stable but pain is coming back    Impression: Pain due to herpes zoster - Right V1 dermatomal vesicular rash, R eye purulence. S/p empiric tx for VZV encephalitis    Recommendations  -Can maintain oxcarb 300 mg BID since sodium fluctuating  -Can increase lyrica to 75 mg TID  -Rest of care as per primary team    Patient seen and examined with attending neurologist Dr. Alberto

## 2022-08-12 NOTE — PROGRESS NOTE ADULT - PROBLEM SELECTOR PLAN 2
seroquel 25 q6 PRN    RUE DVT. cont full dose lovenox  d/w Dr Hoskins to consider adding an additional dose of trazodone over night to assist with agitation, can consider moving pt to 7s

## 2022-08-12 NOTE — PROGRESS NOTE ADULT - PROBLEM SELECTOR PLAN 8
120py smoking hx, quit in 2011. Uses Trelegy at home.   Does not appear to have ongoing respiratory symptoms, but inc secretions  added claritin as well as chest PT, incentive spirometry  - continue pulmicort and duo nebs

## 2022-08-12 NOTE — PROGRESS NOTE ADULT - SUBJECTIVE AND OBJECTIVE BOX
[facility] Division of Hospital Medicine  Kaitlyn Castelan MD  Pager [phone]    Patient is a 86y old  Female who presents with a chief complaint of Suspicion for Herpes Zoster opthalmicus/encephalitis        SUBJECTIVE / OVERNIGHT EVENTS: met with daughter at bedside several times over the course of the day to discuss plan of care as there seems to be a lot of miscommunication around medication from RN staff; brought RN NM to bedside to d/w daughter and listen to her concerns and rectify communication issues with staff; advised daughter and private aides to advocate for pt with primary RN and ask for ACP to come to bedside if there is any need to clarification with respect to medication admin; also had Dr Streeter at bedside to discuss plan of care for pain management; daughter will be hopeful that things are more clear with staff about medication plans; total time spent over the course of the day 65 min      MEDICATIONS  (STANDING):  ALBUTerol    0.083% 2.5 milliGRAM(s) Nebulizer every 6 hours  artificial tears (preservative free) Ophthalmic Solution 1 Drop(s) Both EYES every 2 hours  buDESOnide    Inhalation Suspension 0.5 milliGRAM(s) Inhalation two times a day  dextrose 5%. 1000 milliLiter(s) (100 mL/Hr) IV Continuous <Continuous>  dextrose 5%. 1000 milliLiter(s) (50 mL/Hr) IV Continuous <Continuous>  dextrose 50% Injectable 25 Gram(s) IV Push once  dextrose 50% Injectable 12.5 Gram(s) IV Push once  dextrose 50% Injectable 25 Gram(s) IV Push once  enoxaparin Injectable 80 milliGRAM(s) SubCutaneous every 12 hours  erythromycin   Ointment 1 Application(s) Right EYE four times a day  gabapentin 100 milliGRAM(s) Oral every 8 hours  glucagon  Injectable 1 milliGRAM(s) IntraMuscular once  hydrOXYzine hydrochloride 20 milliGRAM(s) Oral every 6 hours  insulin glargine Injectable (LANTUS) 10 Unit(s) SubCutaneous at bedtime  insulin lispro (ADMELOG) corrective regimen sliding scale   SubCutaneous Before meals and at bedtime  ketorolac   Injectable 15 milliGRAM(s) IV Push every 6 hours  lactated ringers. 1000 milliLiter(s) (50 mL/Hr) IV Continuous <Continuous>  lactobacillus acidophilus 1 Tablet(s) Oral daily  lidocaine 5% Ointment 1 Application(s) Topical two times a day  loratadine 10 milliGRAM(s) Oral daily  losartan 50 milliGRAM(s) Oral daily  metoprolol succinate ER 12.5 milliGRAM(s) Oral daily  mirtazapine 7.5 milliGRAM(s) Oral at bedtime  morphine  IR 7.5 milliGRAM(s) Oral every 4 hours  mupirocin 2% Ointment 1 Application(s) Topical three times a day  nystatin Powder 1 Application(s) Topical two times a day  OXcarbazepine 450 milliGRAM(s) Oral two times a day  prednisoLONE acetate 1% Suspension 1 Drop(s) Right EYE three times a day  pregabalin 75 milliGRAM(s) Oral every 8 hours  traZODone 100 milliGRAM(s) Oral at bedtime  triamcinolone 0.1% Ointment 1 Application(s) Topical two times a day  ursodiol Tablet 500 milliGRAM(s) Oral <User Schedule>    MEDICATIONS  (PRN):  AQUAPHOR (petrolatum Ointment) 1 Application(s) Topical three times a day PRN inguinal fold rash  dextrose Oral Gel 15 Gram(s) Oral once PRN Blood Glucose LESS THAN 70 milliGRAM(s)/deciliter  OLANZapine Injectable 1.25 milliGRAM(s) IntraMuscular every 6 hours PRN Severe Agitation  QUEtiapine 25 milliGRAM(s) Oral every 6 hours PRN agitation      CAPILLARY BLOOD GLUCOSE  POCT Blood Glucose.: 139 mg/dL (12 Aug 2022 11:25)  POCT Blood Glucose.: 112 mg/dL (12 Aug 2022 07:26)  POCT Blood Glucose.: 103 mg/dL (11 Aug 2022 21:10)  POCT Blood Glucose.: 156 mg/dL (11 Aug 2022 16:25)      PHYSICAL EXAM:  Vital Signs Last 24 Hrs  T(F): 98.8 (12 Aug 2022 13:10), Max: 98.8 (12 Aug 2022 13:10)  HR: 88 (12 Aug 2022 13:10) (81 - 98)  BP: 153/65 (12 Aug 2022 13:10) (138/76 - 155/70)  RR: 19 (12 Aug 2022 13:10) (18 - 19)  SpO2: 89% (12 Aug 2022 13:10) (89% - 98%)    Parameters below as of 12 Aug 2022 13:10  Patient On (Oxygen Delivery Method): room air        CONSTITUTIONAL: NAD, intermittently agitated at times, scratching face  EYES: R eye swollen shut, L eye EOMI  ENMT: Moist oral mucosa; normal dentition  RESPIRATORY: Normal respiratory effort; upper airway audible secretions; b/l AE, no wheeze  CARDIOVASCULAR: Regular rate and rhythm; No lower extremity edema;  ABDOMEN: Nontender to palpation, normoactive bowel sounds, no rebound/guarding; No hepatosplenomegaly  MUSCULOSKELETAL:  no clubbing or cyanosis of digits; no joint swelling or tenderness to palpation  PSYCH: agitates at times, difficult to re-direct when scratching face,   NEUROLOGY: CN 2-12 are intact and symmetric; no gross sensory deficits   SKIN: No rashes; no palpable lesions    LABS:                        11.6   8.50  )-----------( 421      ( 12 Aug 2022 06:48 )             36.6     08-12    136  |  98  |  10  ----------------------------<  106<H>  5.3   |  21<L>  |  0.56    Ca    8.8      12 Aug 2022 06:48  Phos  3.3     08-12  Mg     1.80     08-12    TPro  6.7  /  Alb  2.7<L>  /  TBili  0.3  /  DBili  x   /  AST  33<H>  /  ALT  12  /  AlkPhos  116  08-12

## 2022-08-12 NOTE — PROGRESS NOTE ADULT - PROBLEM SELECTOR PLAN 1
completed acyclovir  MSO4 now q4 standing without PRNs; trileptal inc'd, gabapentin in progress titrating off to inc lyrica  current pain much better controlled, sedated at times but arousable with intermittent bursts of yelling and agitation with scratching face  - Ophthalmology following, apprec recs: erythromycin ointment QID to eye &periocular lesions, artificial tears Q4H  - Appreciate derm recs:    - lidocaine ointment mixed with vaseline BID, triamcinolone ointment to red areas only BID    - apply vaseline to crusted areas Q2H

## 2022-08-12 NOTE — CHART NOTE - NSCHARTNOTEFT_GEN_A_CORE
Phone call held between Dr. Hoskins and Dr. Castelan to discuss medication regimen for agitation. Reviewed below recommendations.   - Continue Trazodone 100mg PO qHS and Remeron 7.5mg PO qHS   - If patient reawakens overnight with restlessness, consider additional Trazodone 50mg PRN.   - For agitation, have low threshold for use of PRNs:                           Seroquel 25mg PO q 6 hours for agitation                          Zyprexa 1.25 mg IM q6hrs PRN for severe agitation   - Avoid Haldol PRNs as daughter reports hx of acute dystonic rxn with Haldol  - If support staff have any concerns about PRN use, please contact covering ACP or attending  - Consider transfer to 7S Psychiatry chart note:   Phone call held between Dr. Hoskins and Dr. Castelan to discuss medication regimen for agitation/poor sleep.  Reviewed below recommendations.   - Continue Trazodone 100mg PO qHS and Remeron 7.5mg PO qHS (HOLD for oversedation)  - If patient reawakens overnight with restlessness, consider additional Trazodone 50mg PRN.   - For agitation, have low threshold for use of PRNs:                           Seroquel 25mg PO q 6 hours for agitation                          Zyprexa 1.25 mg IM q6hrs PRN for severe agitation   - Avoid Haldol PRNs as daughter reports hx of acute dystonic rxn with Haldol  -HOLD antipsychotics if qtc >500  - If support staff have any concerns about PRN use, please contact covering ACP or attending.  - Consider transfer to 7S      Attending addendum:  Chart reviewed, case d/w Dr. Castelan, I agree with above assessment/plan.  Please don't hesitate to call [facility] psychiatry with any further questions/concerns.

## 2022-08-12 NOTE — PROGRESS NOTE ADULT - ATTENDING COMMENTS
Ms. Wong is an 87 yo woman with herpes zoster right V1 with clinical presentation c/w VZV encephalitis with severe pain but now her neuropathic pain is improving but slightly increased overnight.   Continue oxcarbazepine 300 mg BID.  Increase pregabalin to 75 mg Q8H   D/W daughter.     Thank you .

## 2022-08-12 NOTE — CHART NOTE - NSCHARTNOTEFT_GEN_A_CORE
************************************************************************  Geriatric MEDICINE COORDINATION OF CARE NOTE FOR ADAL LIZARRAGA  [x] Inpatient Consult  [ ] Other:  ************************************************************************    > Chart reviewed. In past 24 hours (8AM -8AM), received  3 prn doses of PO Morphine IR 7.5mg.   > Patient seen with private HHA at bedside this morning. Patient awakes to voice; stated she felt okay and wanted to sleep.   Reached out to Daughter Dr. Judith Delgado after encounter who was at bedside and stated patient was having 8/10 pain at that time.   Returned to patient's bedside with primary team Dr. Castelan to reassess patient. Patient on reassessment stated she felt "lousy" with 7.5/10 pain. Bedside RN to give dose of pain medication.  Nursing education provided to bedside RN for medication administration.   > Case reviewed with primary team. Primary team reaching out to Psychiatry for recommendations for agitation/poor sleep as well as reaching out to Neurology for recommendations for her neuropathic pain and trileptal adjustment while monitoring Na levels.   Per discussion with primary team, primary team will plan to transition from gabapentin to lyrica and planning to adjust morphine orders from prn to standing which patient may refuse due to concern that patient may not be given prn doses overnight when having pain last night.   > Appreciate excellent management from primary team.   > Care as per primary team  > Patient likely will need outpatient follow up with Chronic Pain Management upon discharge.    Thank you for allowing us to participate in your patient's care.    ************************************************************************  CHART REVIEW:    HPI reviewed     ************************************************************************  MEDICATION REVIEW:  --- Pls refer to current medicatons in the body of this note  MEDICATIONS  (STANDING):  albuterol/ipratropium for Nebulization 3 milliLiter(s) Nebulizer every 6 hours  artificial tears (preservative free) Ophthalmic Solution 1 Drop(s) Both EYES every 2 hours  buDESOnide    Inhalation Suspension 0.5 milliGRAM(s) Inhalation two times a day  dextrose 5%. 1000 milliLiter(s) (100 mL/Hr) IV Continuous <Continuous>  dextrose 5%. 1000 milliLiter(s) (50 mL/Hr) IV Continuous <Continuous>  dextrose 50% Injectable 25 Gram(s) IV Push once  dextrose 50% Injectable 12.5 Gram(s) IV Push once  dextrose 50% Injectable 25 Gram(s) IV Push once  enoxaparin Injectable 80 milliGRAM(s) SubCutaneous every 12 hours  erythromycin   Ointment 1 Application(s) Right EYE four times a day  gabapentin 100 milliGRAM(s) Oral every 8 hours  glucagon  Injectable 1 milliGRAM(s) IntraMuscular once  hydrOXYzine hydrochloride 20 milliGRAM(s) Oral every 6 hours  insulin glargine Injectable (LANTUS) 10 Unit(s) SubCutaneous at bedtime  insulin lispro (ADMELOG) corrective regimen sliding scale   SubCutaneous Before meals and at bedtime  ketorolac   Injectable 15 milliGRAM(s) IV Push every 6 hours  lactated ringers. 1000 milliLiter(s) (50 mL/Hr) IV Continuous <Continuous>  lactobacillus acidophilus 1 Tablet(s) Oral daily  lidocaine 5% Ointment 1 Application(s) Topical two times a day  loratadine 10 milliGRAM(s) Oral daily  losartan 50 milliGRAM(s) Oral daily  metoprolol succinate ER 12.5 milliGRAM(s) Oral daily  mirtazapine 7.5 milliGRAM(s) Oral at bedtime  morphine  IR 7.5 milliGRAM(s) Oral every 4 hours  mupirocin 2% Ointment 1 Application(s) Topical three times a day  nystatin Powder 1 Application(s) Topical two times a day  OXcarbazepine 450 milliGRAM(s) Oral two times a day  prednisoLONE acetate 1% Suspension 1 Drop(s) Right EYE three times a day  pregabalin 75 milliGRAM(s) Oral every 8 hours  traZODone 100 milliGRAM(s) Oral at bedtime  triamcinolone 0.1% Ointment 1 Application(s) Topical two times a day  ursodiol Tablet 500 milliGRAM(s) Oral <User Schedule>    MEDICATIONS  (PRN):  AQUAPHOR (petrolatum Ointment) 1 Application(s) Topical three times a day PRN inguinal fold rash  dextrose Oral Gel 15 Gram(s) Oral once PRN Blood Glucose LESS THAN 70 milliGRAM(s)/deciliter  OLANZapine Injectable 1.25 milliGRAM(s) IntraMuscular every 6 hours PRN Severe Agitation  QUEtiapine 25 milliGRAM(s) Oral every 6 hours PRN agitation  --- PRN usage: see above  ------------------------------------------------------------------------  COORDINATION OF CARE:  --- Patient assessed: 8/12  CARE PROVIDER DOCUMENTATION:  --- AYANA/CARLOS MANUEL notes reviewed   --- Medicine notes reviewed  --- Neurology notes reviewed   --- Psychiatry notes reviewed   PLAN OF CARE  --- Current dispo plan: TO BE DETERMINED  ------------------------------------------------------------------------  --- Chart reviewed: 30 Minutes [including time used to gather, review and transfer data to this note]    Prolonged services rendered, as part of this patient's care provided by Palliative Medicine, include: i.chart review for provider and ancillary service documentation, ii.pertinent diagnostics including laboratory and imaging studies,iii. medication review including PRN use, iv. admission history including previous palliative care encounters and GOC notes, v.advance care planning documents including HCP and MOLST forms in [place]. Part of Palliative Medicine extended evaluation and management also involves coordination of care with our IDT, the primary and consulting gregg, and unit CM/SW and Hospice if eligible. Recommendations based on the information gathered and discussed are outline in the AP of our notes.    ************************************************************************  Care coordination of ADAL LIZARRAGA was communicated with the interdisciplinary team during IDT rounds and with the primary team.

## 2022-08-12 NOTE — PROGRESS NOTE ADULT - SUBJECTIVE AND OBJECTIVE BOX
SUBJECTIVE/INTERVAL HISTORY:  - Interval worsening of pain  - Sodium initially dropped to 132, recovering to 136 this AM    PAST MEDICAL & SURGICAL HISTORY:  Myocardial Infarction      Diabetes Mellitus Type II      Migraines      COPD (Chronic Obstructive Pulmonary Disease)      NPH (normal pressure hydrocephalus)      COPD, mild      CAD (coronary artery disease)      Type 2 diabetes mellitus      Migraines      Stented coronary artery        FAMILY HISTORY:  FH: myocardial infarction (Father)    FH: hypertension (Child)      MEDICATIONS (HOME):  Home Medications:  amLODIPine 2.5 mg oral tablet:  (27 Jul 2022 14:17)  calcitonin: 1 spray in alternating nostrils once a day (27 Jul 2022 14:17)  Cosamin DS: 1500 milligram(s)/1200 milligram(s)/ orally once a day (27 Jul 2022 14:17)  Ecotrin 325 mg oral delayed release tablet: orally once a day (at bedtime) (27 Jul 2022 14:17)  Fioricet: -40 formulation as needed (27 Jul 2022 14:17)  folic acid 1 mg oral tablet: 1 tab(s) orally once a day (27 Jul 2022 14:17)  furosemide 20 mg oral tablet: orally once a day (at bedtime) (27 Jul 2022 14:17)  glipiZIDE 5 mg oral tablet, extended release: orally 2 times a day (27 Jul 2022 14:17)  Imodium: liquid as needed (27 Jul 2022 14:17)  Lantus Solostar Pen: 34 unit(s) subcutaneous once a day (at bedtime) (27 Jul 2022 14:17)  losartan 50 mg oral tablet: 1 tab(s) orally once a day (27 Jul 2022 14:17)  Metoprolol Succinate ER 25 mg oral tablet, extended release: 0.5 tablets orally once a day (at bedtime) (27 Jul 2022 14:17)  mirtazapine 15 mg oral tablet: 1 tab(s) orally once a day (at bedtime) (27 Jul 2022 14:17)  multivitamin: 2 softgels orally 2 times a day (27 Jul 2022 14:17)  Restasis: to each affected eye 2 times a day (27 Jul 2022 14:17)  spironolactone 50 mg oral tablet: 2 tablets orally once a day (at bedtime) (27 Jul 2022 14:17)  traZODone 50 mg oral tablet: 2 tablets orally once a day (at bedtime) (27 Jul 2022 14:17)  Trelegy Ellipta 100 mcg-62.5 mcg-25 mcg/inh inhalation powder: 1 puff(s) inhaled once a day (27 Jul 2022 14:17)  Trulicity Pen 1.5 mg/0.5 mL subcutaneous solution: subcutaneous once a week (27 Jul 2022 14:17)  ursodiol 500 mg oral tablet: orally once a day (27 Jul 2022 14:17)  Valtrex 1 g oral tablet: orally 3 times a day (27 Jul 2022 14:17)  Vitamin B-12 1000 mcg oral tablet: 1 tab(s) orally once a day (27 Jul 2022 14:17)  Vitamin D3 1250 mcg (50,000 intl units) oral capsule: 1 cap(s) orally once a week (27 Jul 2022 14:17)    MEDICATIONS  (STANDING):  ALBUTerol    0.083% 2.5 milliGRAM(s) Nebulizer every 6 hours  artificial tears (preservative free) Ophthalmic Solution 1 Drop(s) Both EYES every 2 hours  buDESOnide    Inhalation Suspension 0.5 milliGRAM(s) Inhalation two times a day  dextrose 5%. 1000 milliLiter(s) (100 mL/Hr) IV Continuous <Continuous>  dextrose 5%. 1000 milliLiter(s) (50 mL/Hr) IV Continuous <Continuous>  dextrose 50% Injectable 25 Gram(s) IV Push once  dextrose 50% Injectable 12.5 Gram(s) IV Push once  dextrose 50% Injectable 25 Gram(s) IV Push once  enoxaparin Injectable 80 milliGRAM(s) SubCutaneous every 12 hours  erythromycin   Ointment 1 Application(s) Right EYE four times a day  gabapentin 100 milliGRAM(s) Oral every 8 hours  glucagon  Injectable 1 milliGRAM(s) IntraMuscular once  hydrOXYzine hydrochloride 20 milliGRAM(s) Oral every 6 hours  insulin glargine Injectable (LANTUS) 10 Unit(s) SubCutaneous at bedtime  insulin lispro (ADMELOG) corrective regimen sliding scale   SubCutaneous Before meals and at bedtime  ketorolac   Injectable 15 milliGRAM(s) IV Push every 6 hours  lactated ringers. 1000 milliLiter(s) (75 mL/Hr) IV Continuous <Continuous>  lactobacillus acidophilus 1 Tablet(s) Oral daily  lidocaine 5% Ointment 1 Application(s) Topical two times a day  loratadine 10 milliGRAM(s) Oral daily  losartan 50 milliGRAM(s) Oral daily  metoprolol succinate ER 12.5 milliGRAM(s) Oral daily  mirtazapine 7.5 milliGRAM(s) Oral at bedtime  morphine  IR 7.5 milliGRAM(s) Oral every 4 hours  mupirocin 2% Ointment 1 Application(s) Topical three times a day  nystatin Powder 1 Application(s) Topical two times a day  OXcarbazepine 300 milliGRAM(s) Oral two times a day  prednisoLONE acetate 1% Suspension 1 Drop(s) Right EYE three times a day  pregabalin 50 milliGRAM(s) Oral every 8 hours  traZODone 100 milliGRAM(s) Oral at bedtime  triamcinolone 0.1% Ointment 1 Application(s) Topical two times a day  ursodiol Tablet 500 milliGRAM(s) Oral <User Schedule>    MEDICATIONS  (PRN):  AQUAPHOR (petrolatum Ointment) 1 Application(s) Topical three times a day PRN inguinal fold rash  dextrose Oral Gel 15 Gram(s) Oral once PRN Blood Glucose LESS THAN 70 milliGRAM(s)/deciliter  OLANZapine Injectable 1.25 milliGRAM(s) IntraMuscular every 6 hours PRN Severe Agitation  QUEtiapine 25 milliGRAM(s) Oral every 6 hours PRN agitation    ALLERGIES/INTOLERANCES:  Allergies  diltiazem (Other; Rash)    Intolerances  Haldol (Dystonic RXN)    VITALS & EXAMINATION:  Vital Signs Last 24 Hrs  T(C): 37 (11 Aug 2022 20:59), Max: 37 (11 Aug 2022 20:59)  T(F): 98.6 (11 Aug 2022 20:59), Max: 98.6 (11 Aug 2022 20:59)  HR: 90 (12 Aug 2022 09:15) (81 - 98)  BP: 155/70 (12 Aug 2022 05:20) (138/76 - 155/70)  BP(mean): --  RR: 18 (12 Aug 2022 05:20) (18 - 18)  SpO2: 94% (12 Aug 2022 09:15) (92% - 98%)    Parameters below as of 12 Aug 2022 09:15  Patient On (Oxygen Delivery Method): room air        General:  Constitutional: Obese Female, appears stated age, in no apparent distress including pain  Head: Normocephalic & atraumatic.  ENT: Patent ear canals, intact TM, mucus membranes moist & pink, neck supple, no lymphadenopathy.   Respiratory: Patent airway. All lung fields are clear to auscultation bilaterally.  Extremities: No cyanosis, clubbing, or edema.  Skin: No rashes, bruising, or discoloration.    Cardiovascular (>2): RRR no murmurs. Carotid pulsations symmetric, no bruits. Normal capillary beds refill, 1-2 seconds or less.     Neurological (>12):  MS: Awake, alert, oriented to person, place, situation, time. Normal affect. Follows all commands.    Language: Speech is clear, fluent with good repetition & comprehension (able to name objects___)    CNs: PERRLA (R = 3mm, L = 3mm). VFF. EOMI no nystagmus, no diplopia. V1-3 intact to LT/pinprick, well developed masseter muscles b/l. No facial asymmetry b/l, full eye closure strength b/l. Hearing grossly normal (rubbing fingers) b/l. Symmetric palate elevation in midline. Gag reflex deferred. Head turning & shoulder shrug intact b/l. Tongue midline, normal movements, no atrophy.    Fundoscopic: pale w/ sharp discs margins No vascular changes.      Motor: Normal muscle bulk & tone. No noticeable tremor or seizure. No pronator drift.              Deltoid	Biceps	Triceps	Wrist	Finger ABd	   R	5	5	5	5	5		5 	  L	5	5	5	5	5		5    	H-Flex	H-Ext	H-ABd	H-ADd	K-Flex	K-Ext	D-Flex	P-Flex  R	5	5	5	5	5	5	5	5 	   L	5	5	5	5	5	5	5	5	     Sensation: Intact to LT/PP/Temp/Vibration/Position b/l throughout.     Cortical: Extinction on DSS (neglect): none    Reflexes:              Biceps(C5)       BR(C6)     Triceps(C7)               Patellar(L4)    Achilles(S1)    Plantar Resp  R	2	          2	             2		        2		    2		Down   L	2	          2	             2		        2		    2		Down     Coordination: intact rapid-alt movements. No dysmetria to FTN/HTS    Gait: Normal Romberg. No postural instability. Normal stance and tandem gait.     LABORATORY:  CBC                       11.6   8.50  )-----------( 421      ( 12 Aug 2022 06:48 )             36.6     Chem 08-12    136  |  98  |  10  ----------------------------<  106<H>  5.3   |  21<L>  |  0.56    Ca    8.8      12 Aug 2022 06:48  Phos  3.3     08-12  Mg     1.80     08-12    TPro  6.7  /  Alb  2.7<L>  /  TBili  0.3  /  DBili  x   /  AST  33<H>  /  ALT  12  /  AlkPhos  116  08-12    LFTs LIVER FUNCTIONS - ( 12 Aug 2022 06:48 )  Alb: 2.7 g/dL / Pro: 6.7 g/dL / ALK PHOS: 116 U/L / ALT: 12 U/L / AST: 33 U/L / GGT: x           Coagulopathy   Lipid Panel   A1c   Cardiac enzymes     U/A   CSF  Immunological  Other    STUDIES & IMAGING:  Studies (EKG, EEG, EMG, etc):     Radiology (XR, CT, MR, U/S, TTE/MARIAH): SUBJECTIVE/INTERVAL HISTORY:  - Interval worsening of pain  - Sodium initially dropped to 132, recovering to 136 this AM  Pain much better than 8/9 but slightly worse than yesterday.     PAST MEDICAL & SURGICAL HISTORY:  Myocardial Infarction      Diabetes Mellitus Type II      Migraines      COPD (Chronic Obstructive Pulmonary Disease)      NPH (normal pressure hydrocephalus)      COPD, mild      CAD (coronary artery disease)      Type 2 diabetes mellitus      Migraines      Stented coronary artery        FAMILY HISTORY:  FH: myocardial infarction (Father)    FH: hypertension (Child)      MEDICATIONS (HOME):  Home Medications:  amLODIPine 2.5 mg oral tablet:  (27 Jul 2022 14:17)  calcitonin: 1 spray in alternating nostrils once a day (27 Jul 2022 14:17)  Cosamin DS: 1500 milligram(s)/1200 milligram(s)/ orally once a day (27 Jul 2022 14:17)  Ecotrin 325 mg oral delayed release tablet: orally once a day (at bedtime) (27 Jul 2022 14:17)  Fioricet: -40 formulation as needed (27 Jul 2022 14:17)  folic acid 1 mg oral tablet: 1 tab(s) orally once a day (27 Jul 2022 14:17)  furosemide 20 mg oral tablet: orally once a day (at bedtime) (27 Jul 2022 14:17)  glipiZIDE 5 mg oral tablet, extended release: orally 2 times a day (27 Jul 2022 14:17)  Imodium: liquid as needed (27 Jul 2022 14:17)  Lantus Solostar Pen: 34 unit(s) subcutaneous once a day (at bedtime) (27 Jul 2022 14:17)  losartan 50 mg oral tablet: 1 tab(s) orally once a day (27 Jul 2022 14:17)  Metoprolol Succinate ER 25 mg oral tablet, extended release: 0.5 tablets orally once a day (at bedtime) (27 Jul 2022 14:17)  mirtazapine 15 mg oral tablet: 1 tab(s) orally once a day (at bedtime) (27 Jul 2022 14:17)  multivitamin: 2 softgels orally 2 times a day (27 Jul 2022 14:17)  Restasis: to each affected eye 2 times a day (27 Jul 2022 14:17)  spironolactone 50 mg oral tablet: 2 tablets orally once a day (at bedtime) (27 Jul 2022 14:17)  traZODone 50 mg oral tablet: 2 tablets orally once a day (at bedtime) (27 Jul 2022 14:17)  Trelegy Ellipta 100 mcg-62.5 mcg-25 mcg/inh inhalation powder: 1 puff(s) inhaled once a day (27 Jul 2022 14:17)  Trulicity Pen 1.5 mg/0.5 mL subcutaneous solution: subcutaneous once a week (27 Jul 2022 14:17)  ursodiol 500 mg oral tablet: orally once a day (27 Jul 2022 14:17)  Valtrex 1 g oral tablet: orally 3 times a day (27 Jul 2022 14:17)  Vitamin B-12 1000 mcg oral tablet: 1 tab(s) orally once a day (27 Jul 2022 14:17)  Vitamin D3 1250 mcg (50,000 intl units) oral capsule: 1 cap(s) orally once a week (27 Jul 2022 14:17)    MEDICATIONS  (STANDING):  ALBUTerol    0.083% 2.5 milliGRAM(s) Nebulizer every 6 hours  artificial tears (preservative free) Ophthalmic Solution 1 Drop(s) Both EYES every 2 hours  buDESOnide    Inhalation Suspension 0.5 milliGRAM(s) Inhalation two times a day  dextrose 5%. 1000 milliLiter(s) (100 mL/Hr) IV Continuous <Continuous>  dextrose 5%. 1000 milliLiter(s) (50 mL/Hr) IV Continuous <Continuous>  dextrose 50% Injectable 25 Gram(s) IV Push once  dextrose 50% Injectable 12.5 Gram(s) IV Push once  dextrose 50% Injectable 25 Gram(s) IV Push once  enoxaparin Injectable 80 milliGRAM(s) SubCutaneous every 12 hours  erythromycin   Ointment 1 Application(s) Right EYE four times a day  gabapentin 100 milliGRAM(s) Oral every 8 hours  glucagon  Injectable 1 milliGRAM(s) IntraMuscular once  hydrOXYzine hydrochloride 20 milliGRAM(s) Oral every 6 hours  insulin glargine Injectable (LANTUS) 10 Unit(s) SubCutaneous at bedtime  insulin lispro (ADMELOG) corrective regimen sliding scale   SubCutaneous Before meals and at bedtime  ketorolac   Injectable 15 milliGRAM(s) IV Push every 6 hours  lactated ringers. 1000 milliLiter(s) (75 mL/Hr) IV Continuous <Continuous>  lactobacillus acidophilus 1 Tablet(s) Oral daily  lidocaine 5% Ointment 1 Application(s) Topical two times a day  loratadine 10 milliGRAM(s) Oral daily  losartan 50 milliGRAM(s) Oral daily  metoprolol succinate ER 12.5 milliGRAM(s) Oral daily  mirtazapine 7.5 milliGRAM(s) Oral at bedtime  morphine  IR 7.5 milliGRAM(s) Oral every 4 hours  mupirocin 2% Ointment 1 Application(s) Topical three times a day  nystatin Powder 1 Application(s) Topical two times a day  OXcarbazepine 300 milliGRAM(s) Oral two times a day  prednisoLONE acetate 1% Suspension 1 Drop(s) Right EYE three times a day  pregabalin 50 milliGRAM(s) Oral every 8 hours  traZODone 100 milliGRAM(s) Oral at bedtime  triamcinolone 0.1% Ointment 1 Application(s) Topical two times a day  ursodiol Tablet 500 milliGRAM(s) Oral <User Schedule>    MEDICATIONS  (PRN):  AQUAPHOR (petrolatum Ointment) 1 Application(s) Topical three times a day PRN inguinal fold rash  dextrose Oral Gel 15 Gram(s) Oral once PRN Blood Glucose LESS THAN 70 milliGRAM(s)/deciliter  OLANZapine Injectable 1.25 milliGRAM(s) IntraMuscular every 6 hours PRN Severe Agitation  QUEtiapine 25 milliGRAM(s) Oral every 6 hours PRN agitation    ALLERGIES/INTOLERANCES:  Allergies  diltiazem (Other; Rash)    Intolerances  Haldol (Dystonic RXN)    VITALS & EXAMINATION:  Vital Signs Last 24 Hrs  T(C): 37 (11 Aug 2022 20:59), Max: 37 (11 Aug 2022 20:59)  T(F): 98.6 (11 Aug 2022 20:59), Max: 98.6 (11 Aug 2022 20:59)  HR: 90 (12 Aug 2022 09:15) (81 - 98)  BP: 155/70 (12 Aug 2022 05:20) (138/76 - 155/70)  BP(mean): --  RR: 18 (12 Aug 2022 05:20) (18 - 18)  SpO2: 94% (12 Aug 2022 09:15) (92% - 98%)    Parameters below as of 12 Aug 2022 09:15  Patient On (Oxygen Delivery Method): room air        General:  Constitutional: Obese Female, appears stated age, in no apparent distress including pain  Head: Normocephalic & atraumatic.      Neurological (>12):  MS: Sleeping, arousable to voice.      CNs: PERRLA (R = 3mm, L = 3mm). VFF. EOMI no nystagmus, no diplopia. V1-3 intact to LT/pinprick, well developed masseter muscles b/l. No facial asymmetry b/l, full eye closure strength b/l. Hearing grossly normal (rubbing fingers) b/l. Symmetric palate elevation in midline. Gag reflex deferred. Head turning & shoulder shrug intact b/l. Tongue midline, normal movements, no atrophy.    Fundoscopic: pale w/ sharp discs margins No vascular changes.      Motor: Normal muscle bulk & tone. No noticeable tremor or seizure. No pronator drift.              Deltoid	Biceps	Triceps	Wrist	Finger ABd	   R	5	5	5	5	5		5 	  L	5	5	5	5	5		5    	H-Flex	H-Ext	H-ABd	H-ADd	K-Flex	K-Ext	D-Flex	P-Flex  R	5	5	5	5	5	5	5	5 	   L	5	5	5	5	5	5	5	5	     Sensation: Intact to LT/PP/Temp/Vibration/Position b/l throughout.     Cortical: Extinction on DSS (neglect): none    Reflexes:              Biceps(C5)       BR(C6)     Triceps(C7)               Patellar(L4)    Achilles(S1)    Plantar Resp  R	2	          2	             2		        2		    2		Down   L	2	          2	             2		        2		    2		Down     Coordination: intact rapid-alt movements. No dysmetria to FTN/HTS    Gait: Normal Romberg. No postural instability. Normal stance and tandem gait.     LABORATORY:  CBC                       11.6   8.50  )-----------( 421      ( 12 Aug 2022 06:48 )             36.6     Chem 08-12    136  |  98  |  10  ----------------------------<  106<H>  5.3   |  21<L>  |  0.56    Ca    8.8      12 Aug 2022 06:48  Phos  3.3     08-12  Mg     1.80     08-12    TPro  6.7  /  Alb  2.7<L>  /  TBili  0.3  /  DBili  x   /  AST  33<H>  /  ALT  12  /  AlkPhos  116  08-12    LFTs LIVER FUNCTIONS - ( 12 Aug 2022 06:48 )  Alb: 2.7 g/dL / Pro: 6.7 g/dL / ALK PHOS: 116 U/L / ALT: 12 U/L / AST: 33 U/L / GGT: x           Coagulopathy   Lipid Panel   A1c   Cardiac enzymes     U/A   CSF  Immunological  Other    STUDIES & IMAGING:  Studies (EKG, EEG, EMG, etc):     Radiology (XR, CT, MR, U/S, TTE/MARIAH): SUBJECTIVE/INTERVAL HISTORY:  - Interval worsening of pain  - Sodium initially dropped to 132, recovering to 136 this AM  Pain much better than 8/9 but slightly worse than yesterday.     PAST MEDICAL & SURGICAL HISTORY:  Myocardial Infarction      Diabetes Mellitus Type II      Migraines      COPD (Chronic Obstructive Pulmonary Disease)      NPH (normal pressure hydrocephalus)      COPD, mild      CAD (coronary artery disease)      Type 2 diabetes mellitus      Migraines      Stented coronary artery        FAMILY HISTORY:  FH: myocardial infarction (Father)    FH: hypertension (Child)      MEDICATIONS (HOME):  Home Medications:  amLODIPine 2.5 mg oral tablet:  (27 Jul 2022 14:17)  calcitonin: 1 spray in alternating nostrils once a day (27 Jul 2022 14:17)  Cosamin DS: 1500 milligram(s)/1200 milligram(s)/ orally once a day (27 Jul 2022 14:17)  Ecotrin 325 mg oral delayed release tablet: orally once a day (at bedtime) (27 Jul 2022 14:17)  Fioricet: -40 formulation as needed (27 Jul 2022 14:17)  folic acid 1 mg oral tablet: 1 tab(s) orally once a day (27 Jul 2022 14:17)  furosemide 20 mg oral tablet: orally once a day (at bedtime) (27 Jul 2022 14:17)  glipiZIDE 5 mg oral tablet, extended release: orally 2 times a day (27 Jul 2022 14:17)  Imodium: liquid as needed (27 Jul 2022 14:17)  Lantus Solostar Pen: 34 unit(s) subcutaneous once a day (at bedtime) (27 Jul 2022 14:17)  losartan 50 mg oral tablet: 1 tab(s) orally once a day (27 Jul 2022 14:17)  Metoprolol Succinate ER 25 mg oral tablet, extended release: 0.5 tablets orally once a day (at bedtime) (27 Jul 2022 14:17)  mirtazapine 15 mg oral tablet: 1 tab(s) orally once a day (at bedtime) (27 Jul 2022 14:17)  multivitamin: 2 softgels orally 2 times a day (27 Jul 2022 14:17)  Restasis: to each affected eye 2 times a day (27 Jul 2022 14:17)  spironolactone 50 mg oral tablet: 2 tablets orally once a day (at bedtime) (27 Jul 2022 14:17)  traZODone 50 mg oral tablet: 2 tablets orally once a day (at bedtime) (27 Jul 2022 14:17)  Trelegy Ellipta 100 mcg-62.5 mcg-25 mcg/inh inhalation powder: 1 puff(s) inhaled once a day (27 Jul 2022 14:17)  Trulicity Pen 1.5 mg/0.5 mL subcutaneous solution: subcutaneous once a week (27 Jul 2022 14:17)  ursodiol 500 mg oral tablet: orally once a day (27 Jul 2022 14:17)  Valtrex 1 g oral tablet: orally 3 times a day (27 Jul 2022 14:17)  Vitamin B-12 1000 mcg oral tablet: 1 tab(s) orally once a day (27 Jul 2022 14:17)  Vitamin D3 1250 mcg (50,000 intl units) oral capsule: 1 cap(s) orally once a week (27 Jul 2022 14:17)    MEDICATIONS  (STANDING):  ALBUTerol    0.083% 2.5 milliGRAM(s) Nebulizer every 6 hours  artificial tears (preservative free) Ophthalmic Solution 1 Drop(s) Both EYES every 2 hours  buDESOnide    Inhalation Suspension 0.5 milliGRAM(s) Inhalation two times a day  dextrose 5%. 1000 milliLiter(s) (100 mL/Hr) IV Continuous <Continuous>  dextrose 5%. 1000 milliLiter(s) (50 mL/Hr) IV Continuous <Continuous>  dextrose 50% Injectable 25 Gram(s) IV Push once  dextrose 50% Injectable 12.5 Gram(s) IV Push once  dextrose 50% Injectable 25 Gram(s) IV Push once  enoxaparin Injectable 80 milliGRAM(s) SubCutaneous every 12 hours  erythromycin   Ointment 1 Application(s) Right EYE four times a day  gabapentin 100 milliGRAM(s) Oral every 8 hours  glucagon  Injectable 1 milliGRAM(s) IntraMuscular once  hydrOXYzine hydrochloride 20 milliGRAM(s) Oral every 6 hours  insulin glargine Injectable (LANTUS) 10 Unit(s) SubCutaneous at bedtime  insulin lispro (ADMELOG) corrective regimen sliding scale   SubCutaneous Before meals and at bedtime  ketorolac   Injectable 15 milliGRAM(s) IV Push every 6 hours  lactated ringers. 1000 milliLiter(s) (75 mL/Hr) IV Continuous <Continuous>  lactobacillus acidophilus 1 Tablet(s) Oral daily  lidocaine 5% Ointment 1 Application(s) Topical two times a day  loratadine 10 milliGRAM(s) Oral daily  losartan 50 milliGRAM(s) Oral daily  metoprolol succinate ER 12.5 milliGRAM(s) Oral daily  mirtazapine 7.5 milliGRAM(s) Oral at bedtime  morphine  IR 7.5 milliGRAM(s) Oral every 4 hours  mupirocin 2% Ointment 1 Application(s) Topical three times a day  nystatin Powder 1 Application(s) Topical two times a day  OXcarbazepine 300 milliGRAM(s) Oral two times a day  prednisoLONE acetate 1% Suspension 1 Drop(s) Right EYE three times a day  pregabalin 50 milliGRAM(s) Oral every 8 hours  traZODone 100 milliGRAM(s) Oral at bedtime  triamcinolone 0.1% Ointment 1 Application(s) Topical two times a day  ursodiol Tablet 500 milliGRAM(s) Oral <User Schedule>    MEDICATIONS  (PRN):  AQUAPHOR (petrolatum Ointment) 1 Application(s) Topical three times a day PRN inguinal fold rash  dextrose Oral Gel 15 Gram(s) Oral once PRN Blood Glucose LESS THAN 70 milliGRAM(s)/deciliter  OLANZapine Injectable 1.25 milliGRAM(s) IntraMuscular every 6 hours PRN Severe Agitation  QUEtiapine 25 milliGRAM(s) Oral every 6 hours PRN agitation    ALLERGIES/INTOLERANCES:  Allergies  diltiazem (Other; Rash)    Intolerances  Haldol (Dystonic RXN)    VITALS & EXAMINATION:  Vital Signs Last 24 Hrs  T(C): 37 (11 Aug 2022 20:59), Max: 37 (11 Aug 2022 20:59)  T(F): 98.6 (11 Aug 2022 20:59), Max: 98.6 (11 Aug 2022 20:59)  HR: 90 (12 Aug 2022 09:15) (81 - 98)  BP: 155/70 (12 Aug 2022 05:20) (138/76 - 155/70)  BP(mean): --  RR: 18 (12 Aug 2022 05:20) (18 - 18)  SpO2: 94% (12 Aug 2022 09:15) (92% - 98%)    Parameters below as of 12 Aug 2022 09:15  Patient On (Oxygen Delivery Method): room air        General:  Constitutional: Obese Female, appears stated age, very mild distress once during visit with pain - much less prominent than previously.   Head: Normocephalic & atraumatic.      Neurological:  MS: Sleeping, easily arousable to voice.        LABORATORY:  CBC                       11.6   8.50  )-----------( 421      ( 12 Aug 2022 06:48 )             36.6     Chem 08-12    136  |  98  |  10  ----------------------------<  106<H>  5.3   |  21<L>  |  0.56    Ca    8.8      12 Aug 2022 06:48  Phos  3.3     08-12  Mg     1.80     08-12    TPro  6.7  /  Alb  2.7<L>  /  TBili  0.3  /  DBili  x   /  AST  33<H>  /  ALT  12  /  AlkPhos  116  08-12    LFTs LIVER FUNCTIONS - ( 12 Aug 2022 06:48 )  Alb: 2.7 g/dL / Pro: 6.7 g/dL / ALK PHOS: 116 U/L / ALT: 12 U/L / AST: 33 U/L / GGT: x           Coagulopathy   Lipid Panel   A1c   Cardiac enzymes     U/A   CSF  Immunological  Other    STUDIES & IMAGING:  Studies (EKG, EEG, EMG, etc):     Radiology (XR, CT, MR, U/S, TTE/MARIAH):

## 2022-08-12 NOTE — PROVIDER CONTACT NOTE (OTHER) - SITUATION
Pt O2 saturation is 89% & Pt is due for pain medications. Pt O2 saturation is 89% & Pt is due for pain medications. Pt keeps taking off non rebreather.

## 2022-08-12 NOTE — PROGRESS NOTE ADULT - PROBLEM SELECTOR PLAN 10
DVT ppx: subQ lovenox  Diet: soft and bite sized (S&S recd minced and moist however daughter accepts risks)     Dispo:  - pt has difficult vessels, picc in placeif blood draws needed  - PT/OT recommending rehab  -PT to revaluate  -Rpt swallow eval done cont current diet

## 2022-08-13 NOTE — PROGRESS NOTE ADULT - PROBLEM SELECTOR PLAN 8
120py smoking hx, quit in 2011. Uses Trelegy at home.   Does not appear to have ongoing respiratory symptoms, but inc secretions  added claritin as well as chest PT, incentive spirometry  - continue pulmicort and duo nebs    pt with mild O2 needs, CXR with some inc intersitial markings c/w volume overload; IVF stopped, lasix IV 40mg x 1, monitor for resolution of sx

## 2022-08-13 NOTE — PROGRESS NOTE ADULT - SUBJECTIVE AND OBJECTIVE BOX
Sleeping.  Still having paroxysms of pain but still much improved compared to 8/9.      Sleeping comfortably.

## 2022-08-13 NOTE — PROGRESS NOTE ADULT - PROBLEM SELECTOR PLAN 1
completed acyclovir  MSO4 now q4 standing without PRNs;   cont trileptal 450 BID, sodium stable, monitor closely  gabapentin in progress titrating off to inc lyrica  current pain much better controlled, sedated at times but arousable with intermittent bursts of yelling and agitation with scratching face  - Ophthalmology following, apprec recs: erythromycin ointment QID to eye &periocular lesions, artificial tears Q4H  - Appreciate derm recs:    - lidocaine ointment mixed with vaseline BID, triamcinolone ointment to red areas only BID    - apply vaseline to crusted areas Q2H

## 2022-08-13 NOTE — CHART NOTE - NSCHARTNOTEFT_GEN_A_CORE
Attempted to examine Ms. Wong twice today however she was in too much pain and agitated to cooperate and later was too lethargic from pain medications.   Will see again tomorrow.     KANNAN Barker

## 2022-08-13 NOTE — PROGRESS NOTE ADULT - PROBLEM SELECTOR PLAN 7
NPH diagnosed 2011, pt has programmable  shunt installed by [facility] neurosurg, **must be programmed after MRI (page neurosurg [id number])**. 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

## 2022-08-13 NOTE — PROGRESS NOTE ADULT - SUBJECTIVE AND OBJECTIVE BOX
[facility] Division of Hospital Medicine  Kaitlyn Castelan MD  Pager [phone]    Patient is a 86y old  Female who presents with a chief complaint of Suspicion for Herpes Zoster opthalmicus/encephalitis        SUBJECTIVE / OVERNIGHT EVENTS: spoke with RN from overnight, pt restless at times when being touched for care; otherwise ok; pt has NRB in front of her bc she would not keep on the NC      MEDICATIONS  (STANDING):  albuterol/ipratropium for Nebulization 3 milliLiter(s) Nebulizer every 6 hours  artificial tears (preservative free) Ophthalmic Solution 1 Drop(s) Both EYES every 2 hours  buDESOnide    Inhalation Suspension 0.5 milliGRAM(s) Inhalation two times a day  dextrose 5%. 1000 milliLiter(s) (50 mL/Hr) IV Continuous <Continuous>  dextrose 5%. 1000 milliLiter(s) (100 mL/Hr) IV Continuous <Continuous>  dextrose 50% Injectable 25 Gram(s) IV Push once  dextrose 50% Injectable 25 Gram(s) IV Push once  dextrose 50% Injectable 12.5 Gram(s) IV Push once  enoxaparin Injectable 80 milliGRAM(s) SubCutaneous every 12 hours  erythromycin   Ointment 1 Application(s) Right EYE four times a day  furosemide   Injectable 40 milliGRAM(s) IV Push once  gabapentin 100 milliGRAM(s) Oral every 8 hours  glucagon  Injectable 1 milliGRAM(s) IntraMuscular once  hydrOXYzine hydrochloride 20 milliGRAM(s) Oral every 6 hours  insulin glargine Injectable (LANTUS) 10 Unit(s) SubCutaneous at bedtime  insulin lispro (ADMELOG) corrective regimen sliding scale   SubCutaneous Before meals and at bedtime  ketorolac   Injectable 15 milliGRAM(s) IV Push every 6 hours  lactobacillus acidophilus 1 Tablet(s) Oral daily  lidocaine 5% Ointment 1 Application(s) Topical two times a day  loratadine 10 milliGRAM(s) Oral daily  losartan 50 milliGRAM(s) Oral daily  metoprolol succinate ER 12.5 milliGRAM(s) Oral daily  mirtazapine 7.5 milliGRAM(s) Oral at bedtime  morphine  IR 7.5 milliGRAM(s) Oral every 4 hours  mupirocin 2% Ointment 1 Application(s) Topical three times a day  nystatin Powder 1 Application(s) Topical two times a day  OXcarbazepine 450 milliGRAM(s) Oral two times a day  prednisoLONE acetate 1% Suspension 1 Drop(s) Right EYE three times a day  pregabalin 75 milliGRAM(s) Oral every 8 hours  traZODone 100 milliGRAM(s) Oral at bedtime  triamcinolone 0.1% Ointment 1 Application(s) Topical two times a day  ursodiol Tablet 500 milliGRAM(s) Oral <User Schedule>    MEDICATIONS  (PRN):  AQUAPHOR (petrolatum Ointment) 1 Application(s) Topical three times a day PRN inguinal fold rash  dextrose Oral Gel 15 Gram(s) Oral once PRN Blood Glucose LESS THAN 70 milliGRAM(s)/deciliter  OLANZapine Injectable 1.25 milliGRAM(s) IntraMuscular every 6 hours PRN Severe Agitation  QUEtiapine 25 milliGRAM(s) Oral every 6 hours PRN agitation  traZODone 50 milliGRAM(s) Oral <User Schedule> PRN agitation/restlessness/insomnia      CAPILLARY BLOOD GLUCOSE  POCT Blood Glucose.: 119 mg/dL (13 Aug 2022 07:51)  POCT Blood Glucose.: 143 mg/dL (12 Aug 2022 22:52)  POCT Blood Glucose.: 131 mg/dL (12 Aug 2022 17:14)  POCT Blood Glucose.: 139 mg/dL (12 Aug 2022 11:25)      PHYSICAL EXAM:  Vital Signs Last 24 Hrs  T(F): 98.5 (13 Aug 2022 10:00), Max: 98.8 (12 Aug 2022 13:10)  HR: 86 (13 Aug 2022 10:00) (84 - 92)  BP: 150/51 (13 Aug 2022 10:00) (142/57 - 158/56)  RR: 18 (13 Aug 2022 10:00) (18 - 20)  SpO2: 94% (13 Aug 2022 10:00) (89% - 100%)    Parameters below as of 13 Aug 2022 10:00  Patient On (Oxygen Delivery Method): room air        CONSTITUTIONAL: NAD, appears comfortable  EYES: R eye swollen shut, L eye EOMI  ENMT: Moist oral mucosa; normal dentition  RESPIRATORY: Normal respiratory effort; grossly b/l AE  CARDIOVASCULAR: Regular rate and rhythm; No lower extremity edema;   ABDOMEN: Nontender to palpation, normoactive bowel sounds  MUSCULOSKELETAL:  no clubbing or cyanosis of digits; no joint swelling or tenderness to palpation  PSYCH: agitated at times, difficult to re-direct at times  NEUROLOGY: CN 2-12 are intact and symmetric; no gross sensory deficits   SKIN: R upper facial cellulitis improving    LABS:                        11.2   7.37  )-----------( 427      ( 13 Aug 2022 07:25 )             36.6     08-13    136  |  100  |  10  ----------------------------<  96  4.6   |  21<L>  |  0.55    Ca    8.8      13 Aug 2022 07:25  Phos  3.0     08-13  Mg     1.80     08-13

## 2022-08-13 NOTE — PROGRESS NOTE ADULT - PROBLEM SELECTOR PLAN 2
seroquel 25 q6 PRN    RUE DVT. cont full dose lovenox  extra dose of trazodone available overnight should pt become agitated/restless  consider OOB to recliner during the day to assist with day/night orientation

## 2022-08-13 NOTE — PROGRESS NOTE ADULT - PROBLEM SELECTOR PLAN 11
unclear etiology, poss immobility, poss infection, poss due to narcotics  indwelling urinary catheter placed  f/u UA, Ucx

## 2022-08-13 NOTE — PROGRESS NOTE ADULT - ASSESSMENT
Ms. 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    D/W hospitalist yesterday to increase oxcarbazepine 450 mg BID.  Continue pregabalin 75 mg Q8H   Pain management.    Thank you

## 2022-08-13 NOTE — PROVIDER CONTACT NOTE (OTHER) - RECOMMENDATIONS
Contact ACP about bladder scan results and ask if it is okay to give morphine earlier and with seroquel due to PT's pain + agitation.

## 2022-08-14 NOTE — PROGRESS NOTE ADULT - ASSESSMENT
Ms. 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    Increase oxcarbazepine to 600 mg BID.  Increase pregabalin to 100 mg Q8H   D/W hospitalist and daughter.  Pain management.    Thank you

## 2022-08-14 NOTE — PROGRESS NOTE ADULT - ASSESSMENT
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 s/p rx, remains with pain, itching and behavioral issues c/b acute urinary retention

## 2022-08-14 NOTE — PROGRESS NOTE ADULT - SUBJECTIVE AND OBJECTIVE BOX
[facility] DEPARTMENT OF OPHTHALMOLOGY  ------------------------------------------------------------------------------  Gurpreet Keller MD, PGY2  Also available on [employer] Teams  ------------------------------------------------------------------------------    Interval History: No acute events overnight. Patient lethargic, struggles to answer questions this morning, but does attempt to open eyes. In significant discomfort, AAOx0-1.    MEDICATIONS  (STANDING):  albuterol/ipratropium for Nebulization 3 milliLiter(s) Nebulizer every 6 hours  artificial tears (preservative free) Ophthalmic Solution 1 Drop(s) Both EYES every 2 hours  buDESOnide    Inhalation Suspension 0.5 milliGRAM(s) Inhalation two times a day  dextrose 5%. 1000 milliLiter(s) (100 mL/Hr) IV Continuous <Continuous>  dextrose 5%. 1000 milliLiter(s) (50 mL/Hr) IV Continuous <Continuous>  dextrose 50% Injectable 25 Gram(s) IV Push once  dextrose 50% Injectable 12.5 Gram(s) IV Push once  dextrose 50% Injectable 25 Gram(s) IV Push once  enoxaparin Injectable 80 milliGRAM(s) SubCutaneous every 12 hours  erythromycin   Ointment 1 Application(s) Right EYE four times a day  gabapentin 100 milliGRAM(s) Oral every 8 hours  glucagon  Injectable 1 milliGRAM(s) IntraMuscular once  hydrOXYzine hydrochloride 20 milliGRAM(s) Oral every 6 hours  insulin glargine Injectable (LANTUS) 10 Unit(s) SubCutaneous at bedtime  insulin lispro (ADMELOG) corrective regimen sliding scale   SubCutaneous Before meals and at bedtime  ketorolac   Injectable 15 milliGRAM(s) IV Push every 6 hours  lactobacillus acidophilus 1 Tablet(s) Oral daily  lidocaine 5% Ointment 1 Application(s) Topical two times a day  loratadine 10 milliGRAM(s) Oral daily  losartan 50 milliGRAM(s) Oral daily  metoprolol succinate ER 12.5 milliGRAM(s) Oral daily  mirtazapine 7.5 milliGRAM(s) Oral at bedtime  morphine  IR 7.5 milliGRAM(s) Oral every 4 hours  mupirocin 2% Ointment 1 Application(s) Topical three times a day  nystatin Powder 1 Application(s) Topical two times a day  OXcarbazepine 450 milliGRAM(s) Oral two times a day  prednisoLONE acetate 1% Suspension 1 Drop(s) Right EYE three times a day  pregabalin 75 milliGRAM(s) Oral every 8 hours  traZODone 100 milliGRAM(s) Oral at bedtime  triamcinolone 0.1% Ointment 1 Application(s) Topical two times a day  ursodiol Tablet 500 milliGRAM(s) Oral <User Schedule>    MEDICATIONS  (PRN):  AQUAPHOR (petrolatum Ointment) 1 Application(s) Topical three times a day PRN inguinal fold rash  dextrose Oral Gel 15 Gram(s) Oral once PRN Blood Glucose LESS THAN 70 milliGRAM(s)/deciliter  OLANZapine Injectable 1.25 milliGRAM(s) IntraMuscular every 6 hours PRN Severe Agitation  QUEtiapine 25 milliGRAM(s) Oral every 6 hours PRN agitation  traZODone 50 milliGRAM(s) Oral <User Schedule> PRN agitation/restlessness/insomnia      VITALS: T(C): 37.1 (08-14-22 @ 06:30)  T(F): 98.7 (08-14-22 @ 06:30), Max: 99.4 (08-13-22 @ 19:09)  HR: 91 (08-14-22 @ 06:30) (74 - 91)  BP: 170/75 (08-14-22 @ 06:30) (150/51 - 170/75)  RR:  (17 - 18)  SpO2:  (94% - 100%)  Wt(kg): --  AAOx0-1    Ophthalmology Exam:  Visual acuity (sc): MYRON 2/2 AMS will attempt again in PM  Pupils: MYRON 2/2 pain and compliance, will attempt again in PM  Intraocular Pressure: Ttono 10, 10  Extraocular movements (EOMs): MYRON 2/2 AMS  Confrontational Visual Field (CVF): MYRON 2/2 AMS    Pen Light Exam (PLE)  External: R sided forehead edema, significant brown crusting, receding, involvement of upper eyelid, no Metcalf sign, bandaged up  Lids/Lashes/Lacrimal Ducts: trace periorbital edema RUL, flat RLL, flat OS   Sclera/Conjunctiva: White and quiet OU.  Cornea: DTBUT OD, no pseudodendrites appreciated on stain  Anterior Chamber: Deep and formed OU.    Iris: Flat OU.  Lens: PCIOL OD, 3+ NS OS   [facility] DEPARTMENT OF OPHTHALMOLOGY  ------------------------------------------------------------------------------  Gurpreet Keller MD, PGY2  Also available on [employer] Teams  ------------------------------------------------------------------------------    Interval History: No acute events overnight. Patient lethargic, struggles to answer questions this morning, but does attempt to open eyes. In significant discomfort, AAOx0-1.    IN PM: AAOx2 (self, year) but waxing and waning. responds to some questions appropriately, and then falls asleep. Now in mittens.    MEDICATIONS  (STANDING):  albuterol/ipratropium for Nebulization 3 milliLiter(s) Nebulizer every 6 hours  artificial tears (preservative free) Ophthalmic Solution 1 Drop(s) Both EYES every 2 hours  buDESOnide    Inhalation Suspension 0.5 milliGRAM(s) Inhalation two times a day  dextrose 5%. 1000 milliLiter(s) (100 mL/Hr) IV Continuous <Continuous>  dextrose 5%. 1000 milliLiter(s) (50 mL/Hr) IV Continuous <Continuous>  dextrose 50% Injectable 25 Gram(s) IV Push once  dextrose 50% Injectable 12.5 Gram(s) IV Push once  dextrose 50% Injectable 25 Gram(s) IV Push once  enoxaparin Injectable 80 milliGRAM(s) SubCutaneous every 12 hours  erythromycin   Ointment 1 Application(s) Right EYE four times a day  gabapentin 100 milliGRAM(s) Oral every 8 hours  glucagon  Injectable 1 milliGRAM(s) IntraMuscular once  hydrOXYzine hydrochloride 20 milliGRAM(s) Oral every 6 hours  insulin glargine Injectable (LANTUS) 10 Unit(s) SubCutaneous at bedtime  insulin lispro (ADMELOG) corrective regimen sliding scale   SubCutaneous Before meals and at bedtime  ketorolac   Injectable 15 milliGRAM(s) IV Push every 6 hours  lactobacillus acidophilus 1 Tablet(s) Oral daily  lidocaine 5% Ointment 1 Application(s) Topical two times a day  loratadine 10 milliGRAM(s) Oral daily  losartan 50 milliGRAM(s) Oral daily  metoprolol succinate ER 12.5 milliGRAM(s) Oral daily  mirtazapine 7.5 milliGRAM(s) Oral at bedtime  morphine  IR 7.5 milliGRAM(s) Oral every 4 hours  mupirocin 2% Ointment 1 Application(s) Topical three times a day  nystatin Powder 1 Application(s) Topical two times a day  OXcarbazepine 450 milliGRAM(s) Oral two times a day  prednisoLONE acetate 1% Suspension 1 Drop(s) Right EYE three times a day  pregabalin 75 milliGRAM(s) Oral every 8 hours  traZODone 100 milliGRAM(s) Oral at bedtime  triamcinolone 0.1% Ointment 1 Application(s) Topical two times a day  ursodiol Tablet 500 milliGRAM(s) Oral <User Schedule>    MEDICATIONS  (PRN):  AQUAPHOR (petrolatum Ointment) 1 Application(s) Topical three times a day PRN inguinal fold rash  dextrose Oral Gel 15 Gram(s) Oral once PRN Blood Glucose LESS THAN 70 milliGRAM(s)/deciliter  OLANZapine Injectable 1.25 milliGRAM(s) IntraMuscular every 6 hours PRN Severe Agitation  QUEtiapine 25 milliGRAM(s) Oral every 6 hours PRN agitation  traZODone 50 milliGRAM(s) Oral <User Schedule> PRN agitation/restlessness/insomnia      VITALS: T(C): 37.1 (08-14-22 @ 06:30)  T(F): 98.7 (08-14-22 @ 06:30), Max: 99.4 (08-13-22 @ 19:09)  HR: 91 (08-14-22 @ 06:30) (74 - 91)  BP: 170/75 (08-14-22 @ 06:30) (150/51 - 170/75)  RR:  (17 - 18)  SpO2:  (94% - 100%)  Wt(kg): --  AAOx0-1    Ophthalmology Exam:  Visual acuity (sc): MYRON 2/2 AMS  Pupils: 6mm OD, minimally reactive, similar to prior exam; 1mm OS pinpoint  Intraocular Pressure: Ttono 10, 10  Extraocular movements (EOMs): MYRON 2/2 AMS  Confrontational Visual Field (CVF): MYRON 2/2 AMS    Pen Light Exam (PLE)  External: R sided forehead edema, significant brown crusting, receding, involvement of upper eyelid, no Metcalf sign, bandaged up  Lids/Lashes/Lacrimal Ducts: trace periorbital edema RUL, flat RLL, flat OS   Sclera/Conjunctiva: White and quiet OU.  Cornea: DTBUT OD, no pseudodendrites appreciated on stain  Anterior Chamber: Deep and formed OU.    Iris: Flat OU.  Lens: PCIOL OD, 3+ NS OS

## 2022-08-14 NOTE — PROGRESS NOTE ADULT - SUBJECTIVE AND OBJECTIVE BOX
Still c/o pain.  Has constant pain with paroxysms of severe pain - overall better than 5 days ago.  She has normal MS at baseline.     MS: Eyes closed. Answers questions appropriately.

## 2022-08-14 NOTE — PROGRESS NOTE ADULT - PROBLEM SELECTOR PLAN 8
120py smoking hx, quit in 2011. Uses Trelegy at home.   Does not appear to have ongoing respiratory symptoms, but inc secretions  added claritin as well as chest PT, incentive spirometry  - continue pulmicort and duo nebs    pt with mild O2 needs, CXR with some inc intersitial markings c/w volume overload; IVF stopped, lasix IV 40mg x 1, monitor for resolution of sx  improved resp status, back to RA

## 2022-08-14 NOTE — PROGRESS NOTE ADULT - PROBLEM SELECTOR PLAN 7
NPH diagnosed 2011, pt has programmable  shunt installed by [facility] neurosurg, **must be programmed after MRI (page neurosurg [id number])**. 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

## 2022-08-14 NOTE — PROGRESS NOTE ADULT - PROBLEM SELECTOR PLAN 10
DVT ppx: subQ lovenox  Diet: soft and bite sized (S&S recd minced and moist however daughter accepts risks)     Dispo:  - pt has difficult vessels, picc in place if blood draws needed  - PT/OT recommending rehab  -Rpt swallow eval done cont current diet  will d/w daughter to consider home with her 24/7 care and home PT as may be difficult to manage pt at rehab with her outbursts

## 2022-08-14 NOTE — PROGRESS NOTE ADULT - PROBLEM SELECTOR PLAN 11
unclear etiology, poss immobility, poss infection, poss due to narcotics  indwelling urinary catheter placed 8/13  UA+  Ucx P

## 2022-08-14 NOTE — PROGRESS NOTE ADULT - SUBJECTIVE AND OBJECTIVE BOX
[facility] Division of Hospital Medicine  Kaitlyn Castelan MD  Pager [phone]    Patient is a 86y old  Female who presents with a chief complaint of Suspicion for Herpes Zoster opthalmicus/encephalitis       SUBJECTIVE / OVERNIGHT EVENTS: spoke with night RN,  had a better night than the previous night (same RN). pt c/o ear pain this AM, first stated L ear, then says she's not sure, then states R ear; seems more agitated when touched; once care was completed; pt was resting comfortably as observed by my over several visits this AM; will d/w daughter plan of care moving forward once she comes in for her visit; now on RA, indwelling urinary catheter draining yellow urine with sedement      MEDICATIONS  (STANDING):  albuterol/ipratropium for Nebulization 3 milliLiter(s) Nebulizer every 6 hours  artificial tears (preservative free) Ophthalmic Solution 1 Drop(s) Both EYES every 2 hours  buDESOnide    Inhalation Suspension 0.5 milliGRAM(s) Inhalation two times a day  dextrose 5%. 1000 milliLiter(s) (100 mL/Hr) IV Continuous <Continuous>  dextrose 5%. 1000 milliLiter(s) (50 mL/Hr) IV Continuous <Continuous>  dextrose 50% Injectable 25 Gram(s) IV Push once  dextrose 50% Injectable 12.5 Gram(s) IV Push once  dextrose 50% Injectable 25 Gram(s) IV Push once  enoxaparin Injectable 80 milliGRAM(s) SubCutaneous every 12 hours  erythromycin   Ointment 1 Application(s) Right EYE four times a day  gabapentin 100 milliGRAM(s) Oral every 8 hours  glucagon  Injectable 1 milliGRAM(s) IntraMuscular once  hydrOXYzine hydrochloride 20 milliGRAM(s) Oral every 6 hours  insulin glargine Injectable (LANTUS) 10 Unit(s) SubCutaneous at bedtime  insulin lispro (ADMELOG) corrective regimen sliding scale   SubCutaneous Before meals and at bedtime  ketorolac   Injectable 15 milliGRAM(s) IV Push every 6 hours  lactobacillus acidophilus 1 Tablet(s) Oral daily  lidocaine 5% Ointment 1 Application(s) Topical two times a day  loratadine 10 milliGRAM(s) Oral daily  losartan 50 milliGRAM(s) Oral daily  metoprolol succinate ER 12.5 milliGRAM(s) Oral daily  mirtazapine 7.5 milliGRAM(s) Oral at bedtime  morphine  IR 7.5 milliGRAM(s) Oral every 4 hours  mupirocin 2% Ointment 1 Application(s) Topical three times a day  nystatin Powder 1 Application(s) Topical two times a day  OXcarbazepine 450 milliGRAM(s) Oral two times a day  prednisoLONE acetate 1% Suspension 1 Drop(s) Right EYE three times a day  pregabalin 75 milliGRAM(s) Oral every 8 hours  traZODone 100 milliGRAM(s) Oral at bedtime  triamcinolone 0.1% Ointment 1 Application(s) Topical two times a day  ursodiol Tablet 500 milliGRAM(s) Oral <User Schedule>    MEDICATIONS  (PRN):  AQUAPHOR (petrolatum Ointment) 1 Application(s) Topical three times a day PRN inguinal fold rash  dextrose Oral Gel 15 Gram(s) Oral once PRN Blood Glucose LESS THAN 70 milliGRAM(s)/deciliter  OLANZapine Injectable 1.25 milliGRAM(s) IntraMuscular every 6 hours PRN Severe Agitation  QUEtiapine 25 milliGRAM(s) Oral every 6 hours PRN agitation  traZODone 50 milliGRAM(s) Oral <User Schedule> PRN agitation/restlessness/insomnia      CAPILLARY BLOOD GLUCOSE  POCT Blood Glucose.: 132 mg/dL (14 Aug 2022 07:20)  POCT Blood Glucose.: 138 mg/dL (13 Aug 2022 21:30)  POCT Blood Glucose.: 112 mg/dL (13 Aug 2022 16:26)  POCT Blood Glucose.: 108 mg/dL (13 Aug 2022 11:24)      PHYSICAL EXAM:  Vital Signs Last 24 Hrs  T(F): 98.6 (14 Aug 2022 10:00), Max: 99.4 (13 Aug 2022 19:09)  HR: 93 (14 Aug 2022 10:00) (74 - 93)  BP: 172/88 (14 Aug 2022 10:00) (164/71 - 172/88)  RR: 17 (14 Aug 2022 10:00) (17 - 18)  SpO2: 94% (14 Aug 2022 10:00) (94% - 100%)    Parameters below as of 14 Aug 2022 10:00  Patient On (Oxygen Delivery Method): room air        CONSTITUTIONAL: NAD, mildly agitated  EYES: R eye swollen shut, L eye open at times, EOMI  ENMT: moist MM, normal dentition  RESPIRATORY: Normal respiratory effort; grossly b/l AE  CARDIOVASCULAR: Regular rate and rhythm; No lower extremity edema;   ABDOMEN: Nontender to palpation, normoactive bowel sounds  MUSCULOSKELETAL:no clubbing or cyanosis of digits; no joint swelling or tenderness to palpation  PSYCH: irritable, agitated at times, restless at times  NEUROLOGY: CN 2-12 are intact and symmetric; no gross sensory deficits   SKIN: R V1 dermatome no vesicles, raw skin, no evidence of infection    LABS:                        10.7   8.98  )-----------( 400      ( 14 Aug 2022 05:30 )             34.0     08-14    138  |  98  |  9   ----------------------------<  134<H>  4.1   |  28  |  0.59    Ca    8.8      14 Aug 2022 05:30  Phos  3.1     08-14  Mg     1.60     08-14            Urinalysis Basic - ( 13 Aug 2022 18:18 )    Color: Colorless / Appearance: Slightly Turbid / S.009 / pH: x  Gluc: x / Ketone: Negative  / Bili: Negative / Urobili: <2 mg/dL   Blood: x / Protein: Negative / Nitrite: Negative   Leuk Esterase: Large / RBC: 13 /HPF /  /HPF   Sq Epi: x / Non Sq Epi: 0 /HPF / Bacteria: Negative

## 2022-08-14 NOTE — PROGRESS NOTE ADULT - ASSESSMENT
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 Rash improving though mental status fluctuating 2/2 waxing and waning pain from zoster.    1. HZO OD with of R sided facial rash - improving  - Likely bacterial superinfection with crusting over area of edema and erythema. Current crusting continuing to improve    - IOP wnl today 10, 10  - unable to assess VA 2/2 mental status, will attempt again in PM  - Pt has dilated and minimally reactive R pupil. 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 - will reassess this PM  - Pt has had abduction deficit OD since admission, 2/2 myositis seen on MRI orbits. Stable on CT.   - Appreciate derm and plastic surgery consults.    - Abx and antivirals per ID  - Pt unable to tolerate sitting up at slit lamp - cannot assess anterior chamber for inflammatory reaction  - Cornea with Tr D-folds OD, no longer has bullae. Concern for possible herpetic endotheliitis. Continuing to improve.   - C/w pred forte TID OD.   - C/w erythromycin ointment QID to R eye and periocular lesions  - C/w preservative-free artificial tears Q2H OU   - B-scan 8/3 OD: no vitritis, RD, mass.     2. Intermittent AMS related to waxing and waning pain.   - Appreciate neurology consult  - MRI brain showing no acute pathology, MRI orbits as above  - LP deferred per neurology and ID - reconsider as needed   - Pain control per primary team.   - findings and plan discussed with patient and primary team 86y female w/ pmhx/ochx of CAD, DM, COPD, NPH w/ programmable shunt comes to ED for AMS and shingles, found to have HZO of R side with corneal involvement and elevated IOP, with R sided forehead/facial edema and erythema, with dark brown crusting over R side of face. Rash improving though mental status fluctuating 2/2 waxing and waning pain from zoster.    1. HZO OD with of R sided facial rash - improving  - Likely bacterial superinfection with crusting over area of edema and erythema. Current crusting continuing to improve though bandaged and patient in mittens as continues to scratch lesion area  - IOP wnl today 10, 10  - unable to assess VA 2/2 mental status  - Pt has dilated and minimally reactive R pupil. 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     - pupil still dilated today as well right eye; left eye pupil pinpoint (likely 2/2 morphine)  - Pt has had abduction deficit OD since admission, 2/2 myositis seen on MRI orbits. Stable on CT.   - Appreciate derm and plastic surgery consults.    - Abx and antivirals per ID  - Pt unable to tolerate sitting up at slit lamp - cannot assess anterior chamber for inflammatory reaction  - Cornea with Tr D-folds OD, no longer has bullae. Concern for possible herpetic endotheliitis. Continuing to improve.   - C/w pred forte TID OD.   - C/w erythromycin ointment QID to R eye and periocular lesions  - C/w preservative-free artificial tears Q2H OU   - B-scan 8/3 OD: no vitritis, RD, mass.     2. Intermittent AMS related to waxing and waning pain.   - Appreciate neurology consult  - MRI brain showing no acute pathology, MRI orbits as above  - LP deferred per neurology and ID - reconsider as needed   - Pain control per primary team.   - findings and plan discussed with patient and primary team

## 2022-08-15 NOTE — CONSULT NOTE ADULT - ASSESSMENT
86 yr old female admitted to medicine for management of Herpes Zooster/encephalitis. After assessment of the patient in coordination with Oral and maxillofacial pathology team, given the extent of the patient's lesions, a local supraorbital nerve block would not be advised as it would not address the patient's root pain. No acute oral and maxillofacial intervention is noted at this time.    Plan:  - no OMFS intervention   -recommend Methadone for management of patient's acute pain     Valdez Garcia DDS  OMFS Resident   [id number], available on teams

## 2022-08-15 NOTE — CONSULT NOTE ADULT - SUBJECTIVE AND OBJECTIVE BOX
Patient is a 86y old  Female who presents with a chief complaint of Suspicion for Herpes Zoster opthalmicus/encephalitis (15 Aug 2022 16:41)    Patient is an 87yo female w/PMH Cardiac stents post MI (, ), COPD, NPH, DM, Migraines, and suspected HTN presenting with 3 day history of herpes zoster rash over the right V1 dermatome w/ eye involvement, now presenting with 1 day history of altered mental status. On , patient had right sided headache attributed to her recurrent migraines that she normally has on a daily basis, in which she took furiocet for. On  night and  morning, Patient vomited and began taking naproxen and tylenol for her symptoms. On , patient later saw her outpatient internist, who requested a CT Head for the patient, which came with findings at baseline. On  night, the patient began to scream, and checked her BP, which was at 200/80. On  morning, the patient's repeat BP was 160/100. Her internist subsequently prescribed amlodipine for the patient, and at the time her mental status was at baseline (A&Ox4). Also on , the patient began to complain of blurry vision. On  morning, the patient had the first occurrence of her rash around the right side of her face, V1 dermatome distribution, and the rash has progressively worsened since. On , patient started valtrex and artificial tears and began to be more sleepy. On  Patient aide said that the patient was more confused and her mentation was getting worse up.     Patient presented to [facility] ED on  with the suspicion of Herpes Zoster opthalmicus/encephalitis.   Patient has been under the care of medicine with multiple teams following. Oral surgery consulted today for the possibility of a right supraorbital nerve block to alleviate pain.   Patient is reporting pain 10/10 along her right V1 dermatome 2/2 Herpes Zooster and post herpetic neuralgia.     PAST MEDICAL & SURGICAL HISTORY:  Myocardial Infarction  Diabetes Mellitus Type II  Migraines  COPD (Chronic Obstructive Pulmonary Disease)  NPH (normal pressure hydrocephalus)  COPD, mild  CAD (coronary artery disease)  Type 2 diabetes mellitus  Migraines  Stented coronary artery    Allergies  diltiazem (Other; Rash)    Intolerances  Haldol (Dystonic RXN)    FAMILY HISTORY:  FH: myocardial infarction (Father)    FH: hypertension (Child)      *SOCIAL HISTORY: Denies ETOH use, Tobacco, drugs    MEDICATIONS  (STANDING):  albuterol/ipratropium for Nebulization 3 milliLiter(s) Nebulizer every 6 hours  amitriptyline 10 milliGRAM(s) Oral at bedtime  artificial tears (preservative free) Ophthalmic Solution 1 Drop(s) Both EYES every 2 hours  buDESOnide    Inhalation Suspension 0.5 milliGRAM(s) Inhalation two times a day  dextrose 5%. 1000 milliLiter(s) (100 mL/Hr) IV Continuous <Continuous>  dextrose 5%. 1000 milliLiter(s) (50 mL/Hr) IV Continuous <Continuous>  dextrose 50% Injectable 25 Gram(s) IV Push once  dextrose 50% Injectable 12.5 Gram(s) IV Push once  dextrose 50% Injectable 25 Gram(s) IV Push once  enoxaparin Injectable 80 milliGRAM(s) SubCutaneous every 12 hours  erythromycin   Ointment 1 Application(s) Right EYE four times a day  furosemide    Tablet 20 milliGRAM(s) Oral daily  glucagon  Injectable 1 milliGRAM(s) IntraMuscular once  hydrOXYzine hydrochloride 20 milliGRAM(s) Oral every 6 hours  insulin glargine Injectable (LANTUS) 10 Unit(s) SubCutaneous at bedtime  insulin lispro (ADMELOG) corrective regimen sliding scale   SubCutaneous Before meals and at bedtime  lactobacillus acidophilus 1 Tablet(s) Oral daily  lidocaine 5% Ointment 1 Application(s) Topical two times a day  loratadine 10 milliGRAM(s) Oral daily  losartan 50 milliGRAM(s) Oral daily  metoprolol succinate ER 12.5 milliGRAM(s) Oral daily  mirtazapine 7.5 milliGRAM(s) Oral at bedtime  morphine  IR 7.5 milliGRAM(s) Oral every 4 hours  mupirocin 2% Ointment 1 Application(s) Topical three times a day  nystatin Powder 1 Application(s) Topical two times a day  OXcarbazepine 600 milliGRAM(s) Oral two times a day  prednisoLONE acetate 1% Suspension 1 Drop(s) Right EYE three times a day  pregabalin 150 milliGRAM(s) Oral every 8 hours  traZODone 100 milliGRAM(s) Oral at bedtime  triamcinolone 0.1% Ointment 1 Application(s) Topical two times a day  ursodiol Tablet 500 milliGRAM(s) Oral <User Schedule>    MEDICATIONS  (PRN):  AQUAPHOR (petrolatum Ointment) 1 Application(s) Topical three times a day PRN inguinal fold rash  dextrose Oral Gel 15 Gram(s) Oral once PRN Blood Glucose LESS THAN 70 milliGRAM(s)/deciliter  OLANZapine Injectable 1.25 milliGRAM(s) IntraMuscular every 6 hours PRN Severe Agitation  QUEtiapine 25 milliGRAM(s) Oral every 6 hours PRN agitation  traZODone 50 milliGRAM(s) Oral <User Schedule> PRN agitation/restlessness/insomnia      * Review of Systems: (-) fever, chills,  (-) LOC,  (-) Nausea/vomiting, (+)headache.   (-), SOB, cough.  (-) palpitations. (+) Right blurred vision/double vision.  (-) dysphagia, dyspnea.  (-) paresthesia, (+) hyper algesia along right V1 dermatome    Vital Signs Last 24 Hrs  T(C): 36.9 (15 Aug 2022 09:39), Max: 37 (14 Aug 2022 18:02)  T(F): 98.5 (15 Aug 2022 09:39), Max: 98.6 (14 Aug 2022 18:02)  HR: 80 (15 Aug 2022 16:18) (78 - 88)  BP: 138/67 (15 Aug 2022 09:39) (107/46 - 157/58)  BP(mean): --  RR: 18 (15 Aug 2022 09:39) (18 - 18)  SpO2: 96% (15 Aug 2022 16:18) (92% - 98%)    Parameters below as of 15 Aug 2022 16:18  Patient On (Oxygen Delivery Method): room air    Physical Exam:  Gen: AAox1, patient in severe pain sitting in chair with anti-scratching mittens on, patient screaming at times in distress of pain.       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 area is currently covered with bandage and medicated ointment. patient's scalp lesions follow V1 dermatome and extend from right eye to posterior auricular area on patients temporal scalp. At this point in time the lesions have scabbed over and are now in the period of healing.     Eyes: Right eye closed due to swelling. Decreased right sided vision, ( + ) diplopia on right side ,  supra/infra orbital rims intact, no subconjunctival heme, no telecanthus, no exophthalmos  Ears: Gross hearing intact, No heme appreciated, Condylar head palpated  Nose: no crepitis/septal hematoma/asymmetry.  no Lacerations/abrasions/hematomas.  Malar: no malar depression, no CN V-2 paresthesia  Throat: no LAD, supple, FROM, no lesions  Extraoral/Intraoral Exam: DREW: 35mm, Dentition grossly intact, no lacerations/abrasions/hematomas, no mandibular step deformity, no CN V-3 paresthesia, no signs of infection, no edema/erythema/tenderness to palpation. FOM soft, NT. no mobility of maxilla/crepitis. TMJ: no clicking/popping  CN II-XII intact    LABS:                        11.4   6.69  )-----------( 436      ( 15 Aug 2022 06:00 )             35.9     08-15    138  |  98  |  10  ----------------------------<  137<H>  3.9   |  30  |  0.63    Ca    9.1      15 Aug 2022 06:00  Phos  3.8     08-15  Mg     1.70     08-15      Urinalysis Basic - ( 13 Aug 2022 18:18 )    Color: Colorless / Appearance: Slightly Turbid / S.009 / pH: x  Gluc: x / Ketone: Negative  / Bili: Negative / Urobili: <2 mg/dL   Blood: x / Protein: Negative / Nitrite: Negative   Leuk Esterase: Large / RBC: 13 /HPF /  /HPF   Sq Epi: x / Non Sq Epi: 0 /HPF / Bacteria: Negative      Culture Results:   Culture in progress ( @ 20:45)      CT Maxillofacial:     Extensive right periorbital preseptal soft tissue swelling is again noted concordant with the history of shingles over the right eye. A superimposed cellulitis can't be excluded. Asymmetric enlargement of the right-sided extraocular muscles appears similar compared to the 2022 orbital MRI study. There is no evidence for orbital abscess.  The superior ophthalmic veins remain patent. The cavernous sinuses appear symmetric.    Generalized diffuse anterior frontal scalp soft tissue swelling, and right frontal parietal temporal occipital scalp soft tissue swelling is noted which may be related to the shingles infection, a superimposed cellulitis, or a combination of both. The adjacent calvarium appears intact without periosteal reaction or bony destruction.    Asymmetric infiltration of the fat planes is noted involving the right lateral neck with thickening of the right platysma muscle, anterior neck soft tissues, skin overlying the right-sided shunt catheter, with asymmetric thickening and stranding noted in the right upper chest soft tissues and soft tissue swelling noted involving the right pectoralis major muscle. These changes may reflect a diffuse cellulitis, without evidence for soft tissue abscess formation at this time. Both internal jugular veins remain patent without evidence for thrombosis or septic thrombophlebitis.    A shunt catheter is partially visualized. Although the ventricles are not fully covered on this neck CT, the ventricular system is grossly stable in size compared to 2022 brain MRI. Dural thickening is again noted, better demonstrated on the prior contrast enhanced brain MRI study. There is no evidence for intracranial abscess within the field-of-view. The known chronic left frontal parietal infarct is not covered on this study. Chronic white matter changes and cerebral volume loss are again noted.    Nonspecific small subcentimeter lymph nodes are present in the submental, submandibular, jugular chain, and spinal accessory chain regions. No enlarged or necrotic lymph nodes are visualized.    No focal lesion is noted within the thyroid gland.    The larynx appears unremarkable.    No focal lesion is visualized within the salivary glands.    Minor mucosal thickening involves the paranasal sinuses without associated air-fluid levels.    The mastoid air cells and middle ear cavities are well-aerated.    Atherosclerotic calcifications involve both carotid bifurcations. The associated degree of stenosis is not well visualized with neck CT technique. Consider eventual correlation with carotid duplex if clinically warranted.    Small areas of nonspecific hazy and patchy density involves the posterior lungs, partially visualized.    No lytic or blastic bony lesions are present.    IMPRESSION:    Extensive right periorbital preseptal soft tissue swelling is again noted concordant with the history of shingles over the right eye. A superimposed cellulitis can't be excluded. Asymmetric enlargement of the right-sided extraocular muscles appears similar compared to the 2022 orbital MRI study. There is no evidence for orbital abscess.  The superior ophthalmic veins remain patent. The cavernous sinuses appear symmetric.    Diffuse nonspecific skin and subcutaneous soft tissue swelling-infiltration involves the scalp, right lateral neck soft tissues, and anterior neck soft tissues, extending to the upper right thoracic region, most likely reflecting a cellulitis given the clinical history, without evidence for abscess formation at this time.

## 2022-08-15 NOTE — PROGRESS NOTE ADULT - ASSESSMENT
86F w/ hx CAD s/p stents (1986, 1996), COPD, NPH s/p  shunt, DM, migraines, and HTN presenting with herpes zoster ophthalmicus/encephalitis affecting the right V1 dermatome, now with acute worsening of post herpetic neuralgia, and mrstran bacteremia (treated). She is s/p IV acyclovir, remains with pain, itching and behavioral disturbances c/b acute urinary retention, dior in place.

## 2022-08-15 NOTE — CONSULT NOTE ADULT - ASSESSMENT
Patient seen and examined at bedside.  86F with severe facial pain/ulceration secondary to HZV. Consulted for consideration of pain related procedures.  -Patient in acute recover period from HZV and currently not candidate for ablative or intracranial management of Postherpetic neuralgia  -OMFS contacted and they will perform supraorbital block. This was conveyed to the patients daughter Dr. Judith Delgado

## 2022-08-15 NOTE — CHART NOTE - NSCHARTNOTEFT_GEN_A_CORE
Given pt's continued HZO involvement of postganglionic fibers of CN3 (persistently dilated R pupil) and viral myositis OD, would continue antiviral therapy. Will defer to ID regarding IV vs. PO.     Arely KRUGER Rai, MD  PGY-3, Ophthalmology    DW Dr. Kuhn, attending Given pt's continued HZO involvement of postganglionic fibers of CN3 (persistently dilated R pupil), endothelitis, and viral myositis OD, would continue antiviral therapy. Will defer to ID regarding IV vs. PO.     Arely KRUGER Rai, MD  PGY-3, Ophthalmology    DW Dr. Kuhn, attending    Agree with above.  Also, discussed with ID  MH

## 2022-08-15 NOTE — CONSULT NOTE ADULT - SUBJECTIVE AND OBJECTIVE BOX
Patient is a 86y old  Female who presents with a chief complaint of Suspicion for Herpes Zoster opthalmicus/encephalitis (15 Aug 2022 08:53)      HPI:  Patient is an 85yo female w/PMH Cardiac stents post MI (, ), COPD, NPH, DM, Migraines, and suspected HTN presenting with 3 day history of herpes zoster rash over the right V1 dermatome w/ eye involvement, now presenting with 1 day history of altered mental status. On , patient had right sided headache attributed to her recurrent migraines that she normally has on a daily basis, in which she took furiocet for. On  night and  morning, Patient vomited and began taking naproxen and tylenol for her symptoms. On , patient later saw her outpatient internist, who requested a CT Head for the patient, which came with findings at baseline. On  night, the patient began to scream, and checked her BP, which was at 200/80. On  morning, the patient's repeat BP was 160/100. Her internist subsequently prescribed amlodipine for the patient, and at the time her mental status was at baseline (A&Ox4). Also on , the patient began to complain of blurry vision. On  morning, the patient had the first occurrence of her rash around the right side of her face, V1 dermatome distribution, and the rash has progressively worsened since. On , patient started valtrex and artificial tears and began to be more sleepy. On  Patient aide said that the patient was more confused and her mentation was getting worse up until now.    In the ED, patient was started on 1x Zosyn, 1x 500mg acyclovir. (2022 18:18)    patient's daughter at bedside, feels pain not well controlled, patient sleeps with morphine po however feels higher dose provided more pain relief.  Patient reports "stabbing, needles" pain R side of head.    REVIEW OF SYSTEMS  +pain  +rash  +weakness  +constipation  + hard of hearing    PAST MEDICAL & SURGICAL HISTORY  Myocardial Infarction    Diabetes Mellitus Type II    Migraines    COPD (Chronic Obstructive Pulmonary Disease)    NPH (normal pressure hydrocephalus)    COPD, mild    CAD (coronary artery disease)    Type 2 diabetes mellitus    Migraines    HTN (hypertension)    Stented coronary artery       FUNCTIONAL HISTORY  Lives at home with  in house with stairs to enter, 1 flight inside (stayed on main level)  ambulated with Rollator, had assistance for adls including dressing, used commode at times.      CURRENT FUNCTIONAL STATUS     Therapeutic Exercise  Therapeutic Exercise Rehab Effort: good  Therapeutic Exercise Detail: Ther ex of LE included ankle pumps, knee extension, hip flexion. UE ther ex included shoulder flexion and elbow flexion. Pt instructed to perform as able throughout day. Pt verbalized understanding of therapeutic exercises.         FAMILY HISTORY    FH: myocardial infarction (Father)  FH: hypertension (Child)        RECENT LABS/IMAGING  CBC Full  -  ( 15 Aug 2022 06:00 )  WBC Count : 6.69 K/uL  RBC Count : 4.35 M/uL  Hemoglobin : 11.4 g/dL  Hematocrit : 35.9 %  Platelet Count - Automated : 436 K/uL  Mean Cell Volume : 82.5 fL  Mean Cell Hemoglobin : 26.2 pg  Mean Cell Hemoglobin Concentration : 31.8 gm/dL  Auto Neutrophil # : 3.87 K/uL  Auto Lymphocyte # : 1.83 K/uL  Auto Monocyte # : 0.68 K/uL  Auto Eosinophil # : 0.26 K/uL  Auto Basophil # : 0.03 K/uL  Auto Neutrophil % : 57.8 %  Auto Lymphocyte % : 27.4 %  Auto Monocyte % : 10.2 %  Auto Eosinophil % : 3.9 %  Auto Basophil % : 0.4 %    08-15    138  |  98  |  10  ----------------------------<  137<H>  3.9   |  30  |  0.63    Ca    9.1      15 Aug 2022 06:00  Phos  3.8     08-15  Mg     1.70     08-15      Urinalysis Basic - ( 13 Aug 2022 18:18 )    Color: Colorless / Appearance: Slightly Turbid / S.009 / pH: x  Gluc: x / Ketone: Negative  / Bili: Negative / Urobili: <2 mg/dL   Blood: x / Protein: Negative / Nitrite: Negative   Leuk Esterase: Large / RBC: 13 /HPF /  /HPF   Sq Epi: x / Non Sq Epi: 0 /HPF / Bacteria: Negative        VITALS  T(C): 36.9 (08-15-22 @ 06:15), Max: 37 (22 @ 18:02)  HR: 81 (08-15-22 @ 06:15) (78 - 92)  BP: 146/82 (08-15-22 @ 06:15) (107/46 - 160/67)  RR: 18 (08-15-22 @ 06:15) (17 - 18)  SpO2: 92% (08-15-22 @ 06:15) (92% - 98%)  Wt(kg): --    ALLERGIES  diltiazem (Other; Rash)  Haldol (Dystonic RXN)      MEDICATIONS   acetaminophen   IVPB .. 1000 milliGRAM(s) IV Intermittent every 6 hours  albuterol/ipratropium for Nebulization 3 milliLiter(s) Nebulizer every 6 hours  AQUAPHOR (petrolatum Ointment) 1 Application(s) Topical three times a day PRN  artificial tears (preservative free) Ophthalmic Solution 1 Drop(s) Both EYES every 2 hours  buDESOnide    Inhalation Suspension 0.5 milliGRAM(s) Inhalation two times a day  dextrose 5%. 1000 milliLiter(s) IV Continuous <Continuous>  dextrose 5%. 1000 milliLiter(s) IV Continuous <Continuous>  dextrose 50% Injectable 25 Gram(s) IV Push once  dextrose 50% Injectable 12.5 Gram(s) IV Push once  dextrose 50% Injectable 25 Gram(s) IV Push once  dextrose Oral Gel 15 Gram(s) Oral once PRN  enoxaparin Injectable 80 milliGRAM(s) SubCutaneous every 12 hours  erythromycin   Ointment 1 Application(s) Right EYE four times a day  gabapentin 100 milliGRAM(s) Oral every 12 hours  glucagon  Injectable 1 milliGRAM(s) IntraMuscular once  hydrOXYzine hydrochloride 20 milliGRAM(s) Oral every 6 hours  insulin glargine Injectable (LANTUS) 10 Unit(s) SubCutaneous at bedtime  insulin lispro (ADMELOG) corrective regimen sliding scale   SubCutaneous Before meals and at bedtime  lactobacillus acidophilus 1 Tablet(s) Oral daily  lidocaine 5% Ointment 1 Application(s) Topical two times a day  loratadine 10 milliGRAM(s) Oral daily  losartan 50 milliGRAM(s) Oral daily  metoprolol succinate ER 12.5 milliGRAM(s) Oral daily  mirtazapine 7.5 milliGRAM(s) Oral at bedtime  morphine  IR 7.5 milliGRAM(s) Oral every 4 hours  mupirocin 2% Ointment 1 Application(s) Topical three times a day  nystatin Powder 1 Application(s) Topical two times a day  OLANZapine Injectable 1.25 milliGRAM(s) IntraMuscular every 6 hours PRN  OXcarbazepine 600 milliGRAM(s) Oral two times a day  prednisoLONE acetate 1% Suspension 1 Drop(s) Right EYE three times a day  pregabalin 100 milliGRAM(s) Oral every 8 hours  QUEtiapine 25 milliGRAM(s) Oral every 6 hours PRN  traZODone 100 milliGRAM(s) Oral at bedtime  traZODone 50 milliGRAM(s) Oral <User Schedule> PRN  triamcinolone 0.1% Ointment 1 Application(s) Topical two times a day  ursodiol Tablet 500 milliGRAM(s) Oral <User Schedule>      ----------------------------------------------------------------------------------------  PHYSICAL EXAM  Constitutional -NAD at times, with episodes of distress due to pain  HEENT - + rash and dressing R scalp, R eye closed (able to partially open)  Chest - no respiratory distress  Cardiovascular - RRR, S1S2   Abdomen -  Soft, NTND  Extremities - trace edema RLE, No calf tenderness   Neurologic Exam -                    Cognitive - Awake, Alert, AAO to self, place(hospital), month and year, situation     Communication - Fluent, No dysarthria     Cranial Nerves - CN 2-12 intact     Motor -  moves all extremities on command, wearing mittens b/l to prevent scratching                    LEFT    UE - 4/5                    RIGHT UE - 4/5                    LEFT    LE - HF 2                    RIGHT LE - HF 2     Sensory - Intact to LT      Balance - WNL Static  Psychiatric - Affect WNL  ----------------------------------------------------------------------------------------  ASSESSMENT/PLAN   85 yo f presented with herpes zoster with vzv encephalitis, with neuropathic pain  per neurology increase pregabalin, pain management consult ( on standing morphine 7.5mg q4 however may benefit from long acting with breakthrough dose, eval with ENT for trigeminal nerve block. agree with starting amitriptyline  consider restarting gabapentin if Amitriptyline does not provide improvement.   continue bedside PT and OT for bed mobility, transfers, ambulation as tolerated, adls  recommend wound care evaluation  recommend holistic nurse   DVT PPX - on treatment lovenox for dvt  Rehab -     Recommend [facility], patient DOES NOT meet acute inpatient rehabilitation criteria  Patient is a 86y old  Female who presents with a chief complaint of Suspicion for Herpes Zoster opthalmicus/encephalitis (15 Aug 2022 08:53)      HPI:  Patient is an 87yo female w/PMH Cardiac stents post MI (, ), COPD, NPH, DM, Migraines, and suspected HTN presenting with 3 day history of herpes zoster rash over the right V1 dermatome w/ eye involvement, now presenting with 1 day history of altered mental status. On , patient had right sided headache attributed to her recurrent migraines that she normally has on a daily basis, in which she took furiocet for. On  night and  morning, Patient vomited and began taking naproxen and tylenol for her symptoms. On , patient later saw her outpatient internist, who requested a CT Head for the patient, which came with findings at baseline. On  night, the patient began to scream, and checked her BP, which was at 200/80. On  morning, the patient's repeat BP was 160/100. Her internist subsequently prescribed amlodipine for the patient, and at the time her mental status was at baseline (A&Ox4). Also on , the patient began to complain of blurry vision. On  morning, the patient had the first occurrence of her rash around the right side of her face, V1 dermatome distribution, and the rash has progressively worsened since. On , patient started valtrex and artificial tears and began to be more sleepy. On  Patient aide said that the patient was more confused and her mentation was getting worse up until now.    In the ED, patient was started on 1x Zosyn, 1x 500mg acyclovir. (2022 18:18)    patient's daughter at bedside, feels pain not well controlled, patient sleeps with morphine po however feels higher dose provided more pain relief.  Patient reports "stabbing, needles" pain R side of head.    REVIEW OF SYSTEMS  +pain  +rash  +weakness  +constipation  + hard of hearing    PAST MEDICAL & SURGICAL HISTORY  Myocardial Infarction    Diabetes Mellitus Type II    Migraines    COPD (Chronic Obstructive Pulmonary Disease)    NPH (normal pressure hydrocephalus)    COPD, mild    CAD (coronary artery disease)    Type 2 diabetes mellitus    Migraines    HTN (hypertension)    Stented coronary artery       FUNCTIONAL HISTORY  Lives at home with  in house with stairs to enter, 1 flight inside (stayed on main level)  ambulated with Rollator, had assistance for adls including dressing, used commode at times.      CURRENT FUNCTIONAL STATUS     Therapeutic Exercise  Therapeutic Exercise Rehab Effort: good  Therapeutic Exercise Detail: Ther ex of LE included ankle pumps, knee extension, hip flexion. UE ther ex included shoulder flexion and elbow flexion. Pt instructed to perform as able throughout day. Pt verbalized understanding of therapeutic exercises.         FAMILY HISTORY    FH: myocardial infarction (Father)  FH: hypertension (Child)        RECENT LABS/IMAGING  CBC Full  -  ( 15 Aug 2022 06:00 )  WBC Count : 6.69 K/uL  RBC Count : 4.35 M/uL  Hemoglobin : 11.4 g/dL  Hematocrit : 35.9 %  Platelet Count - Automated : 436 K/uL  Mean Cell Volume : 82.5 fL  Mean Cell Hemoglobin : 26.2 pg  Mean Cell Hemoglobin Concentration : 31.8 gm/dL  Auto Neutrophil # : 3.87 K/uL  Auto Lymphocyte # : 1.83 K/uL  Auto Monocyte # : 0.68 K/uL  Auto Eosinophil # : 0.26 K/uL  Auto Basophil # : 0.03 K/uL  Auto Neutrophil % : 57.8 %  Auto Lymphocyte % : 27.4 %  Auto Monocyte % : 10.2 %  Auto Eosinophil % : 3.9 %  Auto Basophil % : 0.4 %    08-15    138  |  98  |  10  ----------------------------<  137<H>  3.9   |  30  |  0.63    Ca    9.1      15 Aug 2022 06:00  Phos  3.8     08-15  Mg     1.70     08-15      Urinalysis Basic - ( 13 Aug 2022 18:18 )    Color: Colorless / Appearance: Slightly Turbid / S.009 / pH: x  Gluc: x / Ketone: Negative  / Bili: Negative / Urobili: <2 mg/dL   Blood: x / Protein: Negative / Nitrite: Negative   Leuk Esterase: Large / RBC: 13 /HPF /  /HPF   Sq Epi: x / Non Sq Epi: 0 /HPF / Bacteria: Negative        VITALS  T(C): 36.9 (08-15-22 @ 06:15), Max: 37 (22 @ 18:02)  HR: 81 (08-15-22 @ 06:15) (78 - 92)  BP: 146/82 (08-15-22 @ 06:15) (107/46 - 160/67)  RR: 18 (08-15-22 @ 06:15) (17 - 18)  SpO2: 92% (08-15-22 @ 06:15) (92% - 98%)  Wt(kg): --    ALLERGIES  diltiazem (Other; Rash)  Haldol (Dystonic RXN)      MEDICATIONS   acetaminophen   IVPB .. 1000 milliGRAM(s) IV Intermittent every 6 hours  albuterol/ipratropium for Nebulization 3 milliLiter(s) Nebulizer every 6 hours  AQUAPHOR (petrolatum Ointment) 1 Application(s) Topical three times a day PRN  artificial tears (preservative free) Ophthalmic Solution 1 Drop(s) Both EYES every 2 hours  buDESOnide    Inhalation Suspension 0.5 milliGRAM(s) Inhalation two times a day  dextrose 5%. 1000 milliLiter(s) IV Continuous <Continuous>  dextrose 5%. 1000 milliLiter(s) IV Continuous <Continuous>  dextrose 50% Injectable 25 Gram(s) IV Push once  dextrose 50% Injectable 12.5 Gram(s) IV Push once  dextrose 50% Injectable 25 Gram(s) IV Push once  dextrose Oral Gel 15 Gram(s) Oral once PRN  enoxaparin Injectable 80 milliGRAM(s) SubCutaneous every 12 hours  erythromycin   Ointment 1 Application(s) Right EYE four times a day  gabapentin 100 milliGRAM(s) Oral every 12 hours  glucagon  Injectable 1 milliGRAM(s) IntraMuscular once  hydrOXYzine hydrochloride 20 milliGRAM(s) Oral every 6 hours  insulin glargine Injectable (LANTUS) 10 Unit(s) SubCutaneous at bedtime  insulin lispro (ADMELOG) corrective regimen sliding scale   SubCutaneous Before meals and at bedtime  lactobacillus acidophilus 1 Tablet(s) Oral daily  lidocaine 5% Ointment 1 Application(s) Topical two times a day  loratadine 10 milliGRAM(s) Oral daily  losartan 50 milliGRAM(s) Oral daily  metoprolol succinate ER 12.5 milliGRAM(s) Oral daily  mirtazapine 7.5 milliGRAM(s) Oral at bedtime  morphine  IR 7.5 milliGRAM(s) Oral every 4 hours  mupirocin 2% Ointment 1 Application(s) Topical three times a day  nystatin Powder 1 Application(s) Topical two times a day  OLANZapine Injectable 1.25 milliGRAM(s) IntraMuscular every 6 hours PRN  OXcarbazepine 600 milliGRAM(s) Oral two times a day  prednisoLONE acetate 1% Suspension 1 Drop(s) Right EYE three times a day  pregabalin 100 milliGRAM(s) Oral every 8 hours  QUEtiapine 25 milliGRAM(s) Oral every 6 hours PRN  traZODone 100 milliGRAM(s) Oral at bedtime  traZODone 50 milliGRAM(s) Oral <User Schedule> PRN  triamcinolone 0.1% Ointment 1 Application(s) Topical two times a day  ursodiol Tablet 500 milliGRAM(s) Oral <User Schedule>      ----------------------------------------------------------------------------------------  PHYSICAL EXAM  Constitutional -NAD at times, with episodes of distress due to pain  HEENT - + rash and dressing R scalp, R eye closed (able to partially open)  Chest - no respiratory distress  Cardiovascular - RRR, S1S2   Abdomen -  Soft, NTND  Extremities - trace edema RLE, No calf tenderness   Neurologic Exam -                    Cognitive - Awake, Alert, AAO to self, place(hospital), month and year, situation     Communication - Fluent, No dysarthria      Motor -  moves all extremities on command, wearing mittens b/l to prevent scratching                    LEFT    UE - 4/5                    RIGHT UE - 4/5                    LEFT    LE - HF 2                    RIGHT LE - HF 2     Sensory - Intact to LT      Balance - WNL Static  Psychiatric - Affect WNL  ----------------------------------------------------------------------------------------  ASSESSMENT/PLAN   87 yo f presented with herpes zoster with vzv encephalitis, with neuropathic pain  per neurology increase pregabalin, pain management consult on standing morphine 7.5mg q4 however may benefit from long acting with breakthrough dose, eval with ENT for trigeminal nerve block. agree with starting amitriptyline  consider restarting gabapentin if Amitriptyline does not provide improvement.   continue bedside PT and OT for bed mobility, transfers, ambulation as tolerated, adls, OOB to chair as tolerates  patient also reports chronic neck pain, recommend heat pack prn  recommend wound care evaluation  recommend holistic nurse   DVT PPX - on treatment lovenox for dvt  consider dopplers RLE, daughter reports RLE swelling is new since Friday  Rehab -     Recommend [facility], patient DOES NOT meet acute inpatient rehabilitation criteria

## 2022-08-15 NOTE — PROGRESS NOTE ADULT - ASSESSMENT
Ms. 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      - c/w oxcarbazepine to 600 mg BID.  - Increase pregabalin to 150 mg Q8H   - DC gabapentin  - encourage use of ointment applied to R V1 region, wrapped w/ gauze  - start amitryptyline 10mg at bedtime  - recommend re-consulting pain management  - recommend consulting ENT to see if possible to get trigeminal nerve block (to enhance pain relief)    Case discussed w/ attending Dr. Lyon Ms. 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      - c/w oxcarbazepine to 600 mg BID.  - Increase pregabalin to 150 mg Q8H   - DC gabapentin  - encourage use of ointment applied to R V1 region, wrapped and sealed   - start amitryptyline 10mg at bedtime  - recommend re-consulting pain management  - recommend consulting ENT to see if possible to get trigeminal nerve block (to enhance pain relief)  - spoke with daughter, spoke with RN, nurse manager a long with general neurology team   - daughter consider transfer to [facility]   Case discussed w/ attending Dr. Lyon

## 2022-08-15 NOTE — PROGRESS NOTE ADULT - PROBLEM SELECTOR PLAN 8
120py smoking hx, quit in 2011. Uses Trelegy at home.   Does not appear to have ongoing respiratory symptoms, but inc secretions  added claritin as well as chest PT, incentive spirometry  - continue pulmicort and duo nebs    pt with mild O2 needs, CXR with some inc intersitial markings c/w volume overload; IVF stopped, lasix IV 40mg x 1, monitor for resolution of sx--> now on lasix 20 mg daily PO  improved resp status, back to RA

## 2022-08-15 NOTE — PROGRESS NOTE ADULT - PROBLEM SELECTOR PLAN 1
completed acyclovir--> Optho suggests re-initiation of acyclovir 8/15, will d/w ID if oral vs IV  Morphine 7.5 mg every 4 hours standing. without PRNs;   cont trileptal 450 BID, sodium stable, monitor closely--> now on 600 mg BID  gabapentin transitioned to lyrica 150 mg every 8 hours--> ctm  NSG consulted for nerve block--> rec's OMFS consult 8/15  current pain  better controlled, sedated at times but arousable with intermittent bursts of yelling and agitation with scratching face  - Ophthalmology following, apprec recs: erythromycin ointment QID to eye &periocular lesions, artificial tears Q4H  - Appreciate derm recs:    - lidocaine ointment mixed with vaseline BID, triamcinolone ointment to red areas only BID    - apply vaseline to crusted areas Q2H    *Alternative pain mgmt therapies not yet tried: higher doses of gabapentin (pt received 600 mg TID earlier this month, but did not get higher doses than this), methadone also recommended by Pain Mgmt--> trying to avoid additional narcotics for her, but can consider these options if the nerve block is inadequate.*

## 2022-08-15 NOTE — PROGRESS NOTE ADULT - PROBLEM SELECTOR PLAN 4
Hx of severe HTN at home.  - continue home losartan, metoprolol  - Held lasix on admission --> resume lasix 20 mg 8/15 given pitting edema of lower extremities  - Holding spirololactone   BP control has been adequate, cont to monitor  elevated at times of pain/agitation monitor closely

## 2022-08-15 NOTE — PROGRESS NOTE ADULT - PROBLEM SELECTOR PLAN 10
DVT ppx: subQ lovenox  Diet: soft and bite sized (S&S recd minced and moist however daughter accepts risks)     Dispo:  - pt has difficult vessels, picc in place if blood draws needed  - PT/OT recommending rehab  -Rpt swallow eval done cont current diet  will d/w daughter to consider home with her 24/7 care and home PT as may be difficult to manage pt at rehab with her outbursts  -nutrition consult

## 2022-08-15 NOTE — PROGRESS NOTE ADULT - ATTENDING COMMENTS
agree with above  spoke with daughter along side general neurology team with Dr. Tovar, RN, nurse manager as well.   agree with increasing lyrica, stopping gabapentin and starting amitryptaline (has been on this in past and well tolerated).  please recall pain management.   Leandro Lyon MD  Vascular Neurology  Office: [phone]

## 2022-08-15 NOTE — PROGRESS NOTE ADULT - PROBLEM SELECTOR PLAN 7
NPH diagnosed 2011, pt has programmable  shunt installed by [facility] neurosurg, **must be programmed after MRI (page neurosurg [id number])**. 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

## 2022-08-15 NOTE — PROGRESS NOTE ADULT - SUBJECTIVE AND OBJECTIVE BOX
Neurology Progress Note    SUBJECTIVE/OBJECTIVE/INTERVAL EVENTS: Patient seen and examined at bedside w/ neuro attending and team. Patient wearing mittens and crying out in pain during conversation. States the pain is like "a needle stabbing me." Daughter at bedside tearful.     REVIEW OF SYSTEMS: Otherwise denies fever, chills, headaches, vision changes, blurry vision, double vision, nausea, vomiting, hearing change, focal weakness, focal numbness, parasthesias, bowel/ bladder incontinence.  Few questions of a 10-system ROS was performed and is negative except for those items noted above and/or in the HPI.    VITALS & EXAMINATION:  Vital Signs Last 24 Hrs  T(C): 36.9 (15 Aug 2022 06:15), Max: 37 (14 Aug 2022 10:00)  T(F): 98.4 (15 Aug 2022 06:15), Max: 98.6 (14 Aug 2022 10:00)  HR: 81 (15 Aug 2022 06:15) (78 - 93)  BP: 146/82 (15 Aug 2022 06:15) (107/46 - 172/88)  BP(mean): --  RR: 18 (15 Aug 2022 06:15) (17 - 18)  SpO2: 92% (15 Aug 2022 06:15) (92% - 98%)    Parameters below as of 15 Aug 2022 06:15  Patient On (Oxygen Delivery Method): room air        General:  Constitutional: elderly female female, appears stated age, crying out during paroxysms of pain  Head: Normocephalic, prefers to keep eyes closed for comfort  Extremities: No cyanosis; Skin: No lauri edema of LE  Resp: breathing comfortably   Skin: crusted erythematous rash in R V1 distribution    Neurological (>12):  MS: Awake, alert.  Follows commands. Attends to examiner  Language: Speech is clear, fluent, normal volume, good repetition,  comprehension, registration of words.  CNs: PERRL (R 3mm, L 3mm). VFF. EOMI. V1-3 intact LT, No facial asymmetry b/l, full. Hearing grossly normal (rubbing fingers) b/l. Tongue midline.     Motor - Normal bulk throughout. All limbs at least anti-gravity.  Sensation: Intact to LT b/l. Cortical: Extinction on DSS (neglect): none  Reflexes L/R:  Biceps(C5) 2/2  BR(C6) 2/2   Triceps(C7)  2/2 Patellar(L4)   2/2   Toes: mute  Coordination: intact in upper extremities  Gait: deferred due to bedbound status    LABORATORY:  CBC                       11.4   6.69  )-----------( 436      ( 15 Aug 2022 06:00 )             35.9     Chem 08-15    138  |  98  |  10  ----------------------------<  137<H>  3.9   |  30  |  0.63    Ca    9.1      15 Aug 2022 06:00  Phos  3.8     08-15  Mg     1.70     08-15    U/A Urinalysis Basic - ( 13 Aug 2022 18:18 )    Color: Colorless / Appearance: Slightly Turbid / S.009 / pH: x  Gluc: x / Ketone: Negative  / Bili: Negative / Urobili: <2 mg/dL   Blood: x / Protein: Negative / Nitrite: Negative   Leuk Esterase: Large / RBC: 13 /HPF /  /HPF   Sq Epi: x / Non Sq Epi: 0 /HPF / Bacteria: Negative

## 2022-08-15 NOTE — PROGRESS NOTE ADULT - SUBJECTIVE AND OBJECTIVE BOX
[facility] Division of Hospital Medicine  Aline Gaviria MD  Pager (JOHN-ANI, 8A-5P): [phone]  Other Times:  [id number]      SUBJECTIVE / OVERNIGHT EVENTS: Pt in mittens this am, still with severe pain around the eye, tearful. Morphine works, but wears off after about 90 minutes as per daughter. Still with dior in place, draining yellow urine. Aid and daughter at bedside. Pt in the chair, daughter reports she was able to open her R eye for the first time today.    MEDICATIONS  (STANDING):  albuterol/ipratropium for Nebulization 3 milliLiter(s) Nebulizer every 6 hours  amitriptyline 10 milliGRAM(s) Oral at bedtime  artificial tears (preservative free) Ophthalmic Solution 1 Drop(s) Both EYES every 2 hours  buDESOnide    Inhalation Suspension 0.5 milliGRAM(s) Inhalation two times a day  dextrose 5%. 1000 milliLiter(s) (100 mL/Hr) IV Continuous <Continuous>  dextrose 5%. 1000 milliLiter(s) (50 mL/Hr) IV Continuous <Continuous>  dextrose 50% Injectable 25 Gram(s) IV Push once  dextrose 50% Injectable 12.5 Gram(s) IV Push once  dextrose 50% Injectable 25 Gram(s) IV Push once  enoxaparin Injectable 80 milliGRAM(s) SubCutaneous every 12 hours  erythromycin   Ointment 1 Application(s) Right EYE four times a day  furosemide    Tablet 20 milliGRAM(s) Oral daily  glucagon  Injectable 1 milliGRAM(s) IntraMuscular once  hydrOXYzine hydrochloride 20 milliGRAM(s) Oral every 6 hours  insulin glargine Injectable (LANTUS) 10 Unit(s) SubCutaneous at bedtime  insulin lispro (ADMELOG) corrective regimen sliding scale   SubCutaneous Before meals and at bedtime  lactobacillus acidophilus 1 Tablet(s) Oral daily  lidocaine 5% Ointment 1 Application(s) Topical two times a day  loratadine 10 milliGRAM(s) Oral daily  losartan 50 milliGRAM(s) Oral daily  metoprolol succinate ER 12.5 milliGRAM(s) Oral daily  mirtazapine 7.5 milliGRAM(s) Oral at bedtime  morphine  IR 7.5 milliGRAM(s) Oral every 4 hours  mupirocin 2% Ointment 1 Application(s) Topical three times a day  nystatin Powder 1 Application(s) Topical two times a day  OXcarbazepine 600 milliGRAM(s) Oral two times a day  prednisoLONE acetate 1% Suspension 1 Drop(s) Right EYE three times a day  pregabalin 150 milliGRAM(s) Oral every 8 hours  traZODone 100 milliGRAM(s) Oral at bedtime  triamcinolone 0.1% Ointment 1 Application(s) Topical two times a day  ursodiol Tablet 500 milliGRAM(s) Oral <User Schedule>    MEDICATIONS  (PRN):  AQUAPHOR (petrolatum Ointment) 1 Application(s) Topical three times a day PRN inguinal fold rash  dextrose Oral Gel 15 Gram(s) Oral once PRN Blood Glucose LESS THAN 70 milliGRAM(s)/deciliter  OLANZapine Injectable 1.25 milliGRAM(s) IntraMuscular every 6 hours PRN Severe Agitation  QUEtiapine 25 milliGRAM(s) Oral every 6 hours PRN agitation  traZODone 50 milliGRAM(s) Oral <User Schedule> PRN agitation/restlessness/insomnia      I&O's Summary      PHYSICAL EXAM:  Vital Signs Last 24 Hrs  T(C): 36.9 (15 Aug 2022 09:39), Max: 37 (14 Aug 2022 18:02)  T(F): 98.5 (15 Aug 2022 09:39), Max: 98.6 (14 Aug 2022 18:02)  HR: 78 (15 Aug 2022 10:44) (78 - 88)  BP: 138/67 (15 Aug 2022 09:39) (107/46 - 157/58)  BP(mean): --  RR: 18 (15 Aug 2022 09:39) (18 - 18)  SpO2: 98% (15 Aug 2022 10:44) (92% - 98%)    Parameters below as of 15 Aug 2022 10:44  Patient On (Oxygen Delivery Method): room air        CONSTITUTIONAL: NAD, mildly agitated  EYES: R eye swollen shut, L eye open at times, EOMI  ENMT: moist MM, normal dentition  RESPIRATORY: Normal respiratory effort; grossly b/l AE  CARDIOVASCULAR: Regular rate and rhythm; No lower extremity edema;   ABDOMEN: Nontender to palpation, normoactive bowel sounds  MUSCULOSKELETAL:no clubbing or cyanosis of digits; no joint swelling or tenderness to palpation, LE edema, pitting to mid tibia  PSYCH: irritable, agitated at times, restless at times  NEUROLOGY: CN 2-12 are intact and symmetric; no gross sensory deficits   SKIN: R V1 dermatome no vesicles,extensive raw skin, lesions w beige exudate, no crusting at this time, no evidence of supra-infection    LABS:                        11.4   6.69  )-----------( 436      ( 15 Aug 2022 06:00 )             35.9     08-15    138  |  98  |  10  ----------------------------<  137<H>  3.9   |  30  |  0.63    Ca    9.1      15 Aug 2022 06:00  Phos  3.8     08-15  Mg     1.70     08-15            Urinalysis Basic - ( 13 Aug 2022 18:18 )    Color: Colorless / Appearance: Slightly Turbid / S.009 / pH: x  Gluc: x / Ketone: Negative  / Bili: Negative / Urobili: <2 mg/dL   Blood: x / Protein: Negative / Nitrite: Negative   Leuk Esterase: Large / RBC: 13 /HPF /  /HPF   Sq Epi: x / Non Sq Epi: 0 /HPF / Bacteria: Negative        Culture - Urine (collected 13 Aug 2022 20:45)  Source: Catheterized Catheterized  Preliminary Report (15 Aug 2022 14:40):    Culture in progress        RADIOLOGY & ADDITIONAL TESTS:  Results Reviewed:   Imaging Personally Reviewed:  Electrocardiogram Personally Reviewed:    COORDINATION OF CARE:  Care Discussed with Consultants/Other Providers [Y/N]: Neuro (Dr. Cesar), NSG, OMFS, Ophtho, ID  Prior or Outpatient Records Reviewed [Y/N]:

## 2022-08-15 NOTE — PROGRESS NOTE ADULT - SUBJECTIVE AND OBJECTIVE BOX
Chief Complaint: Right facial pain. Reconsult for possible nerve block.    HPI:  Patient is an 87yo female w/PMH Cardiac stents post MI (, ), COPD, NPH, DM, Migraines, and suspected HTN presenting with 3 day history of herpes zoster rash over the right V1 dermatome w/ eye involvement, now presenting with 1 day history of altered mental status. On , patient had right sided headache attributed to her recurrent migraines that she normally has on a daily basis, in which she took furiocet for. On  night and  morning, Patient vomited and began taking naproxen and tylenol for her symptoms. On , patient later saw her outpatient internist, who requested a CT Head for the patient, which came with findings at baseline. On  night, the patient began to scream, and checked her BP, which was at 200/80. On  morning, the patient's repeat BP was 160/100. Her internist subsequently prescribed amlodipine for the patient, and at the time her mental status was at baseline (A&Ox4). Also on , the patient began to complain of blurry vision. On  morning, the patient had the first occurrence of her rash around the right side of her face, V1 dermatome distribution, and the rash has progressively worsened since. On , patient started valtrex and artificial tears and began to be more sleepy. On  Patient aide said that the patient was more confused and her mentation was getting worse up until now.    In the ED, patient was started on 1x Zosyn, 1x 500mg acyclovir. (2022 18:18)      PAST MEDICAL & SURGICAL HISTORY:  Myocardial Infarction      Diabetes Mellitus Type II      Migraines      COPD (Chronic Obstructive Pulmonary Disease)      NPH (normal pressure hydrocephalus)      COPD, mild      CAD (coronary artery disease)      Type 2 diabetes mellitus      Migraines      Stented coronary artery          FAMILY HISTORY:  FH: myocardial infarction (Father)    FH: hypertension (Child)        SOCIAL HISTORY:  [ ] Denies Smoking, Alcohol, or Drug Use    Allergies    diltiazem (Other; Rash)    Intolerances    Haldol (Dystonic RXN)      PAIN MEDICATIONS:  amitriptyline 10 milliGRAM(s) Oral at bedtime  hydrOXYzine hydrochloride 20 milliGRAM(s) Oral every 6 hours  mirtazapine 7.5 milliGRAM(s) Oral at bedtime  morphine  IR 7.5 milliGRAM(s) Oral every 4 hours  OLANZapine Injectable 1.25 milliGRAM(s) IntraMuscular every 6 hours PRN  OXcarbazepine 600 milliGRAM(s) Oral two times a day  pregabalin 150 milliGRAM(s) Oral every 8 hours  QUEtiapine 25 milliGRAM(s) Oral every 6 hours PRN  traZODone 100 milliGRAM(s) Oral at bedtime  traZODone 50 milliGRAM(s) Oral <User Schedule> PRN      Heme:  enoxaparin Injectable 80 milliGRAM(s) SubCutaneous every 12 hours    Antibiotics:    Cardiovascular:  furosemide    Tablet 20 milliGRAM(s) Oral daily  losartan 50 milliGRAM(s) Oral daily  metoprolol succinate ER 12.5 milliGRAM(s) Oral daily    GI:  ursodiol Tablet 500 milliGRAM(s) Oral <User Schedule>    Endocrine:  dextrose 50% Injectable 25 Gram(s) IV Push once  dextrose 50% Injectable 12.5 Gram(s) IV Push once  dextrose 50% Injectable 25 Gram(s) IV Push once  dextrose Oral Gel 15 Gram(s) Oral once PRN  glucagon  Injectable 1 milliGRAM(s) IntraMuscular once  insulin glargine Injectable (LANTUS) 10 Unit(s) SubCutaneous at bedtime  insulin lispro (ADMELOG) corrective regimen sliding scale   SubCutaneous Before meals and at bedtime    All Other Medications:  AQUAPHOR (petrolatum Ointment) 1 Application(s) Topical three times a day PRN  artificial tears (preservative free) Ophthalmic Solution 1 Drop(s) Both EYES every 2 hours  dextrose 5%. 1000 milliLiter(s) IV Continuous <Continuous>  dextrose 5%. 1000 milliLiter(s) IV Continuous <Continuous>  erythromycin   Ointment 1 Application(s) Right EYE four times a day  lactobacillus acidophilus 1 Tablet(s) Oral daily  lidocaine 5% Ointment 1 Application(s) Topical two times a day  mupirocin 2% Ointment 1 Application(s) Topical three times a day  nystatin Powder 1 Application(s) Topical two times a day  prednisoLONE acetate 1% Suspension 1 Drop(s) Right EYE three times a day  triamcinolone 0.1% Ointment 1 Application(s) Topical two times a day        Vital Signs Last 24 Hrs  T(C): 36.9 (15 Aug 2022 09:39), Max: 37 (14 Aug 2022 18:02)  T(F): 98.5 (15 Aug 2022 09:39), Max: 98.6 (14 Aug 2022 18:02)  HR: 80 (15 Aug 2022 16:18) (78 - 88)  BP: 138/67 (15 Aug 2022 09:39) (107/46 - 157/58)  BP(mean): --  RR: 18 (15 Aug 2022 09:39) (18 - 18)  SpO2: 96% (15 Aug 2022 16:18) (92% - 98%)    Parameters below as of 15 Aug 2022 16:18  Patient On (Oxygen Delivery Method): room air        PAIN SCORE:         SCALE USED: (1-10 VNRS)               LABS:                          11.4   6.69  )-----------( 436      ( 15 Aug 2022 06:00 )             35.9     08-15    138  |  98  |  10  ----------------------------<  137<H>  3.9   |  30  |  0.63    Ca    9.1      15 Aug 2022 06:00  Phos  3.8     08-15  Mg     1.70     08-15        Urinalysis Basic - ( 13 Aug 2022 18:18 )    Color: Colorless / Appearance: Slightly Turbid / S.009 / pH: x  Gluc: x / Ketone: Negative  / Bili: Negative / Urobili: <2 mg/dL   Blood: x / Protein: Negative / Nitrite: Negative   Leuk Esterase: Large / RBC: 13 /HPF /  /HPF   Sq Epi: x / Non Sq Epi: 0 /HPF / Bacteria: Negative        Patient seen at bedside, aide at bedside. Patient states she has a lot of pain, requesting to remove her restraints. Patient trying to reach her face stating she is in pain. Primary team called for possible nerve blocks. Discussed with Chronic pain attending Dr. Ricks who recommended a Gasserian ganglion nerve block outpatient with a provider who performs it since Dr. Ricks does not perform Gasserian nerve block. Discussed the possibilities of nerve block with Acute pain attending Dr. Earl who also does not perform any nerve blocks for facial pain. Discussed with daughter Dr. Delgado, patient was seen by OMFS who is now planning for a supraorbital block as per daughter.  Discussed other pain regimen plan with Dr. Ricks and the recommendations are as follows:  1) Recommend continuing PO Lyrica as ordered. Hold for sedation.  2) Recommend PO Methadone 5mg BID. Hold for sedation. Obtain baseline EKG prior to starting patient on Methadone and then routine EKGs while patient on Methadone to monitor for QT prolongation.  Patient will need an appointment with Chronic pain management upon discharge from the hospital for the continuing of care, opioid management, and safe weaning of Methadone. Discussed with daughter.  3) Recommend continuous pulse oximetry while patient on opioids.  Pain service to sign off. Please call pain service if further assistance needed with pain management.

## 2022-08-15 NOTE — PROGRESS NOTE ADULT - PROBLEM SELECTOR PLAN 2
seroquel 25 q6 PRN for agitation    # RUE DVT. cont full dose lovenox  extra dose of trazodone available overnight should pt become agitated/restless  consider OOB to recliner during the day to assist with day/night orientation--> cont to encourage activity/routines

## 2022-08-15 NOTE — CONSULT NOTE ADULT - SUBJECTIVE AND OBJECTIVE BOX
p ([id number])     HPI:  Patient is an 85yo female w/PMH Cardiac stents post MI (1986, 1996), COPD, NPH, DM, Migraines, and suspected HTN presenting with 3 day history of herpes zoster rash over the right V1 dermatome w/ eye involvement, now presenting with 1 day history of altered mental status. On 7/17, patient had right sided headache attributed to her recurrent migraines that she normally has on a daily basis, in which she took furiocet for. On 7/20 night and 7/21 morning, Patient vomited and began taking naproxen and tylenol for her symptoms. On 7/21, patient later saw her outpatient internist, who requested a CT Head for the patient, which came with findings at baseline. On 7/21 night, the patient began to scream, and checked her BP, which was at 200/80. On 7/22 morning, the patient's repeat BP was 160/100. Her internist subsequently prescribed amlodipine for the patient, and at the time her mental status was at baseline (A&Ox4). Also on 7/22, the patient began to complain of blurry vision. On 7/23 morning, the patient had the first occurrence of her rash around the right side of her face, V1 dermatome distribution, and the rash has progressively worsened since. On 7/24, patient started valtrex and artificial tears and began to be more sleepy. On 7/25 Patient aide said that the patient was more confused and her mentation was getting worse up until now.    In the ED, patient was started on 1x Zosyn, 1x 500mg acyclovir. (26 Jul 2022 18:18)      Imaging:    Exam: Awake, confused, in severe pain, severe ulceration of the R supraorbital nerve distribution secondary to prior HZV infection    --Anticoagulation:  enoxaparin Injectable 80 milliGRAM(s) SubCutaneous every 12 hours    =====================  PAST MEDICAL HISTORY   Myocardial Infarction    Diabetes Mellitus Type II    Migraines    COPD (Chronic Obstructive Pulmonary Disease)    NPH (normal pressure hydrocephalus)    COPD, mild    CAD (coronary artery disease)    Type 2 diabetes mellitus    Migraines      PAST SURGICAL HISTORY   Stented coronary artery      diltiazem (Other; Rash)  Haldol (Dystonic RXN)      MEDICATIONS:  Antibiotics:    Neuro:  acetaminophen   IVPB .. 1000 milliGRAM(s) IV Intermittent every 6 hours  amitriptyline 10 milliGRAM(s) Oral at bedtime  hydrOXYzine hydrochloride 20 milliGRAM(s) Oral every 6 hours  mirtazapine 7.5 milliGRAM(s) Oral at bedtime  morphine  IR 7.5 milliGRAM(s) Oral every 4 hours  OLANZapine Injectable 1.25 milliGRAM(s) IntraMuscular every 6 hours PRN  OXcarbazepine 600 milliGRAM(s) Oral two times a day  pregabalin 150 milliGRAM(s) Oral every 8 hours  QUEtiapine 25 milliGRAM(s) Oral every 6 hours PRN  traZODone 100 milliGRAM(s) Oral at bedtime  traZODone 50 milliGRAM(s) Oral <User Schedule> PRN    Other:  albuterol/ipratropium for Nebulization 3 milliLiter(s) Nebulizer every 6 hours  buDESOnide    Inhalation Suspension 0.5 milliGRAM(s) Inhalation two times a day  dextrose 5%. 1000 milliLiter(s) IV Continuous <Continuous>  dextrose 5%. 1000 milliLiter(s) IV Continuous <Continuous>  dextrose 50% Injectable 25 Gram(s) IV Push once  dextrose 50% Injectable 12.5 Gram(s) IV Push once  dextrose 50% Injectable 25 Gram(s) IV Push once  dextrose Oral Gel 15 Gram(s) Oral once PRN  glucagon  Injectable 1 milliGRAM(s) IntraMuscular once  insulin glargine Injectable (LANTUS) 10 Unit(s) SubCutaneous at bedtime  insulin lispro (ADMELOG) corrective regimen sliding scale   SubCutaneous Before meals and at bedtime  loratadine 10 milliGRAM(s) Oral daily  losartan 50 milliGRAM(s) Oral daily  metoprolol succinate ER 12.5 milliGRAM(s) Oral daily  ursodiol Tablet 500 milliGRAM(s) Oral <User Schedule>      SOCIAL HISTORY:   Occupation:   Marital Status:     FAMILY HISTORY:  No pertinent family history in first degree relatives    FH: myocardial infarction (Father)    FH: hypertension (Child)        ROS: Negative except per HPI    LABS:                          11.4   6.69  )-----------( 436      ( 15 Aug 2022 06:00 )             35.9     08-15    138  |  98  |  10  ----------------------------<  137<H>  3.9   |  30  |  0.63    Ca    9.1      15 Aug 2022 06:00  Phos  3.8     08-15  Mg     1.70     08-15

## 2022-08-16 NOTE — CHART NOTE - NSCHARTNOTEFT_GEN_A_CORE
Right supraorbital region infiltrated with 2cc experal and 1cc 0.5% bupivicaine after area cleaned with isopropyl alcohol and chlorhexadine. No complications. Daughter at bedside.

## 2022-08-16 NOTE — PROGRESS NOTE ADULT - ASSESSMENT
86F w/ hx CAD s/p stents (1986, 1996), COPD, NPH s/p  shunt, DM, migraines, and HTN presenting with herpes zoster ophthalmicus/encephalitis affecting the right V1 dermatome, now with acute worsening of post herpetic neuralgia, and mrstran bacteremia (treated). She is s/p IV acyclovir, remains with pain, itching and behavioral disturbances c/b acute urinary retention, dior in place. Pain finally improved after NSG did R supra orbital marcaine nerve block 8/16. Will try to wean morphine in the coming days, consideration for methadone if having difficulty weaning, as suggested by Pain Mgmt.    # Coffee ground emesis vs hemoptysis, single episode, nPOA  Self limiting, Hgb downtrending in the past 24 hours, though does vacillate around 11. Pt has no hx of gastric ulcers. High risk for a bleed as she is on therapeutic Lovenox for DVT. Pt HDS and in NAD.  - Trend CBC, repeat at 2 pm [ ]  - IV PPI BID (8/16- )  - Home aspirin currently held  - Continuing therapeutic lovenox for now  - No NSAIDs for now, toradol d/c'd    # Candida tropicalis in urine, 8/13  # Acute urinary retention s/p dior placement  Unclear significance of candida in urine on 8/13, less than 99K. UA w WBCs in the 400s, +LE. Initially urinary retention was thought 2/2 medication use (antipsychotics/narcotics), now w unclear significance of this candida. Note she was recently on carbapenems for cellulitis (supra bacterial infection of zoster).   - D/w ID (already consulted for VZV) if this is significant  - Plan for TOV 8/17    # Constipation, worsening  Last BM was 6 days ago as per daughter. Likely narcotic induced vs immobility.   - Miralax/senna daily  - Encourage OOB to chair    # Polypharmacy, nPOA, worsening  Discussed with daughter, will be tyring to pare back on high risk medications use now that patient's pain is better controlled post nerve block thanks to NSG procedure 8/16.   - D/c seroquel prns, pt not utilizing them  - D/c toradol   - Cont lyrica, trileptal, morphine, trazodone, amyitriptyline, mirtazapine  - Cont prn zyprexa for agitation  - Will plan to d/c her prn atarax in the coming days due to sedating effects and not much benefit re pruritis it seems.   - Will plan morphine taper in the coming days

## 2022-08-16 NOTE — PROGRESS NOTE ADULT - SUBJECTIVE AND OBJECTIVE BOX
[facility] Medicine  Aline Gaviria MD  Pager (M-F, 8A-5P): [phone]  Other Times:  [phone]      SUBJECTIVE / OVERNIGHT EVENTS:    MEDICATIONS  (STANDING):  acetaminophen     Tablet .. 650 milliGRAM(s) Oral every 8 hours  albuterol/ipratropium for Nebulization 3 milliLiter(s) Nebulizer every 6 hours  amitriptyline 10 milliGRAM(s) Oral at bedtime  artificial tears (preservative free) Ophthalmic Solution 1 Drop(s) Both EYES every 2 hours  buDESOnide    Inhalation Suspension 0.5 milliGRAM(s) Inhalation two times a day  BUpivacaine 0.5% (Preservative-Free) Injectable 5 milliLiter(s) Local Injection once  dextrose 5%. 1000 milliLiter(s) (100 mL/Hr) IV Continuous <Continuous>  dextrose 5%. 1000 milliLiter(s) (50 mL/Hr) IV Continuous <Continuous>  dextrose 50% Injectable 25 Gram(s) IV Push once  dextrose 50% Injectable 12.5 Gram(s) IV Push once  dextrose 50% Injectable 25 Gram(s) IV Push once  enoxaparin Injectable 80 milliGRAM(s) SubCutaneous every 12 hours  erythromycin   Ointment 1 Application(s) Right EYE four times a day  furosemide    Tablet 20 milliGRAM(s) Oral daily  glucagon  Injectable 1 milliGRAM(s) IntraMuscular once  hydrOXYzine hydrochloride 20 milliGRAM(s) Oral every 6 hours  insulin glargine Injectable (LANTUS) 10 Unit(s) SubCutaneous at bedtime  insulin lispro (ADMELOG) corrective regimen sliding scale   SubCutaneous Before meals and at bedtime  lactobacillus acidophilus 1 Tablet(s) Oral daily  lidocaine 5% Ointment 1 Application(s) Topical two times a day  loratadine 10 milliGRAM(s) Oral daily  losartan 50 milliGRAM(s) Oral daily  metoprolol succinate ER 12.5 milliGRAM(s) Oral daily  mirtazapine 7.5 milliGRAM(s) Oral at bedtime  morphine  IR 7.5 milliGRAM(s) Oral every 4 hours  mupirocin 2% Ointment 1 Application(s) Topical three times a day  nystatin Powder 1 Application(s) Topical two times a day  OXcarbazepine 600 milliGRAM(s) Oral two times a day  pantoprazole  Injectable 40 milliGRAM(s) IV Push two times a day  polyethylene glycol 3350 17 Gram(s) Oral daily  prednisoLONE acetate 1% Suspension 1 Drop(s) Right EYE three times a day  pregabalin 150 milliGRAM(s) Oral every 8 hours  senna 1 Tablet(s) Oral at bedtime  traZODone 100 milliGRAM(s) Oral at bedtime  triamcinolone 0.1% Ointment 1 Application(s) Topical two times a day  ursodiol Tablet 500 milliGRAM(s) Oral <User Schedule>  valACYclovir 1000 milliGRAM(s) Oral every 8 hours    MEDICATIONS  (PRN):  AQUAPHOR (petrolatum Ointment) 1 Application(s) Topical three times a day PRN inguinal fold rash  dextrose Oral Gel 15 Gram(s) Oral once PRN Blood Glucose LESS THAN 70 milliGRAM(s)/deciliter  OLANZapine Injectable 1.25 milliGRAM(s) IntraMuscular every 6 hours PRN Severe Agitation  QUEtiapine 25 milliGRAM(s) Oral every 6 hours PRN agitation  traZODone 50 milliGRAM(s) Oral <User Schedule> PRN agitation/restlessness/insomnia      I&O's Summary    15 Aug 2022 07:01  -  16 Aug 2022 07:00  --------------------------------------------------------  IN: 0 mL / OUT: 1550 mL / NET: -1550 mL        PHYSICAL EXAM:  Vital Signs Last 24 Hrs  T(C): 36.6 (16 Aug 2022 06:00), Max: 36.8 (15 Aug 2022 22:00)  T(F): 97.8 (16 Aug 2022 06:00), Max: 98.2 (15 Aug 2022 22:00)  HR: 86 (16 Aug 2022 10:03) (79 - 92)  BP: 140/80 (16 Aug 2022 06:00) (128/67 - 142/82)  BP(mean): --  RR: 16 (16 Aug 2022 06:00) (16 - 20)  SpO2: 97% (16 Aug 2022 06:00) (92% - 97%)    Parameters below as of 16 Aug 2022 10:03  Patient On (Oxygen Delivery Method): nasal cannula      CONSTITUTIONAL: NAD, well-developed, well-groomed  EYES: PERRLA; conjunctiva and sclera clear  ENMT: Moist oral mucosa, no pharyngeal injection or exudates; normal dentition  RESPIRATORY: Normal respiratory effort; lungs are clear to auscultation bilaterally  CARDIOVASCULAR: Regular rate and rhythm, normal S1 and S2, no murmur/rub/gallop; No lower extremity edema; Peripheral pulses are 2+ bilaterally  ABDOMEN: Nontender to palpation, normoactive bowel sounds, no rebound/guarding; No hepatosplenomegaly  PSYCH: A+O to person, place, and time; affect appropriate  SKIN: No rashes; no palpable lesions    LABS:                        10.8   7.51  )-----------( 471      ( 16 Aug 2022 07:25 )             33.6     08-16    136  |  96<L>  |  8   ----------------------------<  118<H>  4.1   |  27  |  0.62    Ca    8.7      16 Aug 2022 07:25  Phos  3.9     08-16  Mg     1.70     08-16    TPro  6.5  /  Alb  2.8<L>  /  TBili  0.3  /  DBili  <0.2  /  AST  22  /  ALT  10  /  AlkPhos  103  08-16              Culture - Urine (collected 13 Aug 2022 20:45)  Source: Catheterized Catheterized  Final Report (15 Aug 2022 23:11):    50,000 - 99,000 CFU/mL Candida tropicalis "Susceptibilities not performed"        RADIOLOGY & ADDITIONAL TESTS:  Results Reviewed:   Imaging Personally Reviewed:  Electrocardiogram Personally Reviewed:    COORDINATION OF CARE:  Care Discussed with Consultants/Other Providers [Y/N]:  Prior or Outpatient Records Reviewed [Y/N]:   [facility] Division of Hospital Medicine  Aline Gaviria MD  Pager (JOHN-ANI, 8A-5P): [phone]  Other Times:  [phone]      SUBJECTIVE / OVERNIGHT EVENTS: Pt received R supraorbital nerve block w marcaine this am, daughter reports immediate improvement in pain. She had an episode of coughing up black/red tinged sputum and blackening of tongue, self limiting. Still requiring q 4 hr morphine prns.  No incentive spirometer in the room.    MEDICATIONS  (STANDING):  acetaminophen     Tablet .. 650 milliGRAM(s) Oral every 8 hours  albuterol/ipratropium for Nebulization 3 milliLiter(s) Nebulizer every 6 hours  amitriptyline 10 milliGRAM(s) Oral at bedtime  artificial tears (preservative free) Ophthalmic Solution 1 Drop(s) Both EYES every 2 hours  buDESOnide    Inhalation Suspension 0.5 milliGRAM(s) Inhalation two times a day  BUpivacaine 0.5% (Preservative-Free) Injectable 5 milliLiter(s) Local Injection once  dextrose 5%. 1000 milliLiter(s) (100 mL/Hr) IV Continuous <Continuous>  dextrose 5%. 1000 milliLiter(s) (50 mL/Hr) IV Continuous <Continuous>  dextrose 50% Injectable 25 Gram(s) IV Push once  dextrose 50% Injectable 12.5 Gram(s) IV Push once  dextrose 50% Injectable 25 Gram(s) IV Push once  enoxaparin Injectable 80 milliGRAM(s) SubCutaneous every 12 hours  erythromycin   Ointment 1 Application(s) Right EYE four times a day  furosemide    Tablet 20 milliGRAM(s) Oral daily  glucagon  Injectable 1 milliGRAM(s) IntraMuscular once  hydrOXYzine hydrochloride 20 milliGRAM(s) Oral every 6 hours  insulin glargine Injectable (LANTUS) 10 Unit(s) SubCutaneous at bedtime  insulin lispro (ADMELOG) corrective regimen sliding scale   SubCutaneous Before meals and at bedtime  lactobacillus acidophilus 1 Tablet(s) Oral daily  lidocaine 5% Ointment 1 Application(s) Topical two times a day  loratadine 10 milliGRAM(s) Oral daily  losartan 50 milliGRAM(s) Oral daily  metoprolol succinate ER 12.5 milliGRAM(s) Oral daily  mirtazapine 7.5 milliGRAM(s) Oral at bedtime  morphine  IR 7.5 milliGRAM(s) Oral every 4 hours  mupirocin 2% Ointment 1 Application(s) Topical three times a day  nystatin Powder 1 Application(s) Topical two times a day  OXcarbazepine 600 milliGRAM(s) Oral two times a day  pantoprazole  Injectable 40 milliGRAM(s) IV Push two times a day  polyethylene glycol 3350 17 Gram(s) Oral daily  prednisoLONE acetate 1% Suspension 1 Drop(s) Right EYE three times a day  pregabalin 150 milliGRAM(s) Oral every 8 hours  senna 1 Tablet(s) Oral at bedtime  traZODone 100 milliGRAM(s) Oral at bedtime  triamcinolone 0.1% Ointment 1 Application(s) Topical two times a day  ursodiol Tablet 500 milliGRAM(s) Oral <User Schedule>  valACYclovir 1000 milliGRAM(s) Oral every 8 hours    MEDICATIONS  (PRN):  AQUAPHOR (petrolatum Ointment) 1 Application(s) Topical three times a day PRN inguinal fold rash  dextrose Oral Gel 15 Gram(s) Oral once PRN Blood Glucose LESS THAN 70 milliGRAM(s)/deciliter  OLANZapine Injectable 1.25 milliGRAM(s) IntraMuscular every 6 hours PRN Severe Agitation  QUEtiapine 25 milliGRAM(s) Oral every 6 hours PRN agitation  traZODone 50 milliGRAM(s) Oral <User Schedule> PRN agitation/restlessness/insomnia      I&O's Summary    15 Aug 2022 07:01  -  16 Aug 2022 07:00  --------------------------------------------------------  IN: 0 mL / OUT: 1550 mL / NET: -1550 mL        PHYSICAL EXAM:  Vital Signs Last 24 Hrs  T(C): 36.6 (16 Aug 2022 06:00), Max: 36.8 (15 Aug 2022 22:00)  T(F): 97.8 (16 Aug 2022 06:00), Max: 98.2 (15 Aug 2022 22:00)  HR: 86 (16 Aug 2022 10:03) (79 - 92)  BP: 140/80 (16 Aug 2022 06:00) (128/67 - 142/82)  BP(mean): --  RR: 16 (16 Aug 2022 06:00) (16 - 20)  SpO2: 97% (16 Aug 2022 06:00) (92% - 97%)    Parameters below as of 16 Aug 2022 10:03  Patient On (Oxygen Delivery Method): nasal cannula    CONSTITUTIONAL: NAD, mildly agitated  EYES: R eye swollen shut,, EOMI of L  ENMT: moist MM, normal dentition  RESPIRATORY: Normal respiratory effort; grossly b/l AE  CARDIOVASCULAR: Regular rate and rhythm; No lower extremity edema;   ABDOMEN: Nontender to palpation, normoactive bowel sounds  MUSCULOSKELETAL:no clubbing or cyanosis of digits; no joint swelling or tenderness to palpation, LE edema improved  NEUROLOGY: CN 2-12 are intact and symmetric; no gross sensory deficits   SKIN: R V1 dermatome no vesicles,extensive raw skin, lesions w beige exudate, no crusting at this time, no evidence of supra-infection    LABS:                        10.8   7.51  )-----------( 471      ( 16 Aug 2022 07:25 )             33.6     08-16    136  |  96<L>  |  8   ----------------------------<  118<H>  4.1   |  27  |  0.62    Ca    8.7      16 Aug 2022 07:25  Phos  3.9     08-16  Mg     1.70     08-16    TPro  6.5  /  Alb  2.8<L>  /  TBili  0.3  /  DBili  <0.2  /  AST  22  /  ALT  10  /  AlkPhos  103  08-16              Culture - Urine (collected 13 Aug 2022 20:45)  Source: Catheterized Catheterized  Final Report (15 Aug 2022 23:11):    50,000 - 99,000 CFU/mL Candida tropicalis "Susceptibilities not performed"        RADIOLOGY & ADDITIONAL TESTS:  Results Reviewed:   Imaging Personally Reviewed:  Electrocardiogram Personally Reviewed:    COORDINATION OF CARE:  Care Discussed with Consultants/Other Providers [Y/N]: NSG, Neuro, pain mgmt  Prior or Outpatient Records Reviewed [Y/N]:

## 2022-08-16 NOTE — PROGRESS NOTE ADULT - SUBJECTIVE AND OBJECTIVE BOX
Neurology Progress Note    S: Patient seen and examined.  daughter at bedside. nsx doing block     Medication:  acetaminophen     Tablet .. 650 milliGRAM(s) Oral every 8 hours  albuterol/ipratropium for Nebulization 3 milliLiter(s) Nebulizer every 6 hours  amitriptyline 10 milliGRAM(s) Oral at bedtime  AQUAPHOR (petrolatum Ointment) 1 Application(s) Topical three times a day PRN  artificial tears (preservative free) Ophthalmic Solution 1 Drop(s) Both EYES every 2 hours  buDESOnide    Inhalation Suspension 0.5 milliGRAM(s) Inhalation two times a day  BUpivacaine 0.5% (Preservative-Free) Injectable 5 milliLiter(s) Local Injection once  dextrose 5%. 1000 milliLiter(s) IV Continuous <Continuous>  dextrose 5%. 1000 milliLiter(s) IV Continuous <Continuous>  dextrose 50% Injectable 25 Gram(s) IV Push once  dextrose 50% Injectable 12.5 Gram(s) IV Push once  dextrose 50% Injectable 25 Gram(s) IV Push once  dextrose Oral Gel 15 Gram(s) Oral once PRN  enoxaparin Injectable 80 milliGRAM(s) SubCutaneous every 12 hours  erythromycin   Ointment 1 Application(s) Right EYE four times a day  furosemide    Tablet 20 milliGRAM(s) Oral daily  glucagon  Injectable 1 milliGRAM(s) IntraMuscular once  hydrOXYzine hydrochloride 20 milliGRAM(s) Oral every 6 hours  insulin glargine Injectable (LANTUS) 10 Unit(s) SubCutaneous at bedtime  insulin lispro (ADMELOG) corrective regimen sliding scale   SubCutaneous Before meals and at bedtime  lactobacillus acidophilus 1 Tablet(s) Oral daily  lidocaine 5% Ointment 1 Application(s) Topical two times a day  loratadine 10 milliGRAM(s) Oral daily  losartan 50 milliGRAM(s) Oral daily  metoprolol succinate ER 12.5 milliGRAM(s) Oral daily  mirtazapine 7.5 milliGRAM(s) Oral at bedtime  morphine  IR 7.5 milliGRAM(s) Oral every 4 hours  mupirocin 2% Ointment 1 Application(s) Topical three times a day  nystatin Powder 1 Application(s) Topical two times a day  OLANZapine Injectable 1.25 milliGRAM(s) IntraMuscular every 6 hours PRN  OXcarbazepine 600 milliGRAM(s) Oral two times a day  prednisoLONE acetate 1% Suspension 1 Drop(s) Right EYE three times a day  pregabalin 150 milliGRAM(s) Oral every 8 hours  QUEtiapine 25 milliGRAM(s) Oral every 6 hours PRN  traZODone 100 milliGRAM(s) Oral at bedtime  traZODone 50 milliGRAM(s) Oral <User Schedule> PRN  triamcinolone 0.1% Ointment 1 Application(s) Topical two times a day  ursodiol Tablet 500 milliGRAM(s) Oral <User Schedule>  valACYclovir 1000 milliGRAM(s) Oral every 8 hours      Vitals:  Vital Signs Last 24 Hrs  T(C): 36.7 (15 Aug 2022 17:53), Max: 36.9 (15 Aug 2022 09:39)  T(F): 98 (15 Aug 2022 17:53), Max: 98.5 (15 Aug 2022 09:39)  HR: 82 (16 Aug 2022 04:46) (78 - 84)  BP: 142/82 (15 Aug 2022 17:53) (138/67 - 142/82)  BP(mean): --  RR: 18 (15 Aug 2022 17:53) (18 - 18)  SpO2: 96% (16 Aug 2022 04:46) (92% - 98%)    Parameters below as of 16 Aug 2022 04:46  Patient On (Oxygen Delivery Method): nasal cannula        General:  Constitutional: elderly female female, appears stated age, crying out during paroxysms of pain  Head: Normocephalic, prefers to keep eyes closed for comfort  Extremities: No cyanosis; Skin: No lauri edema of LE  Resp: breathing comfortably   Skin: crusted erythematous rash in R V1 distribution    Neurological (>12):  MS: Awake, alert.  Follows commands. Attends to examiner  Language: Speech is clear, fluent, normal volume, good repetition,  comprehension, registration of words.  CNs: PERRL (R 3mm, L 3mm). VFF. EOMI. V1-3 intact LT, No facial asymmetry b/l, full. Hearing grossly normal (rubbing fingers) b/l. Tongue midline.     Motor - Normal bulk throughout. All limbs at least anti-gravity.  Sensation: Intact to LT b/l. Cortical: Extinction on DSS (neglect): none  Reflexes L/R:  Biceps(C5) 2/2  BR(C6) 2/2   Triceps(C7)  2/2 Patellar(L4)   2/2   Toes: mute  Coordination: intact in upper extremities  Gait: deferred due to bedbound status      I personally reviewed the below data/images/labs:      CBC Full  -  ( 16 Aug 2022 07:25 )  WBC Count : 7.51 K/uL  RBC Count : 4.07 M/uL  Hemoglobin : 10.8 g/dL  Hematocrit : 33.6 %  Platelet Count - Automated : 471 K/uL  Mean Cell Volume : 82.6 fL  Mean Cell Hemoglobin : 26.5 pg  Mean Cell Hemoglobin Concentration : 32.1 gm/dL  Auto Neutrophil # : 4.42 K/uL  Auto Lymphocyte # : 1.91 K/uL  Auto Monocyte # : 0.73 K/uL  Auto Eosinophil # : 0.37 K/uL  Auto Basophil # : 0.04 K/uL  Auto Neutrophil % : 59.0 %  Auto Lymphocyte % : 25.4 %  Auto Monocyte % : 9.7 %  Auto Eosinophil % : 4.9 %  Auto Basophil % : 0.5 %    08-16    136  |  96<L>  |  8   ----------------------------<  118<H>  4.1   |  27  |  0.62    Ca    8.7      16 Aug 2022 07:25  Phos  3.9     08-16  Mg     1.70     08-16

## 2022-08-16 NOTE — CHART NOTE - NSCHARTNOTEFT_GEN_A_CORE
Contacted by team that Oz recommending ongoing viral treatment (VZV). I discussed case with them. Based on ongoing suspect nerve involvement, they recommend continuing on with antiviral for now.    From ID perspective, this recommendation is reasonable considering ongoing symptoms as described by Optho team. Although, duration unclear.    Based on risks/benefits, favor Valtrex > Acyclovir at this point (patient has already had prolonged course Acyclovir, and we would prefer a lower risk of CHARLINE)    Start Valtrex 1g q 8, 2 week duration planned--would reassess at that end point if further treatment warranted    Monitor Cr, LFTs    Jack Henderson MD  Contact on TEAMS messaging from 9am - 5pm  From 5pm-9am, on weekends, or if no response call [phone]

## 2022-08-16 NOTE — PROGRESS NOTE ADULT - ATTENDING COMMENTS
I have interviewed and examined the patient and reviewed the residents note including the history, exam, assessment, and plan.  I agree with the residents assessment and plan.    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 Rash improving though mental status fluctuating 2/2 waxing and waning pain from zoster.    1. HZO OD with of R sided facial rash  - Likely had bacterial superinfection with crusting over area of edema and erythema. Current crusting continuing to improve though bandaged and patient in mittens as continues to scratch lesion area  - IOP 8 OU today, VA 20/100 OD and 20/60 OS, though pt only intermittently cooperative due to pain.  - Pt has dilated and minimally reactive R pupil. 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 Stable on CT.   - Appreciate derm and plastic surgery consults.    - Abx and antivirals per ID - would continue antivirals until EOM restriction and dilated pupil resolve  - Pt unable to tolerate sitting up at slit lamp - cannot assess anterior chamber for inflammatory reaction  - Cornea with Tr D-folds OD, no longer has bullae. 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   - C/w pred forte TID OD.   - C/w erythromycin ointment QID to R eye and periocular lesions  - C/w preservative-free artificial tears Q2H OU   - On DFE OD 8/16, exam grossly wnl (peripheral exam limited due to pt cooperation)    2. Intermittent AMS related to waxing and waning pain.   - Appreciate neurology consult  - MRI brain showing no acute pathology, MRI orbits as above  - LP deferred per neurology and ID - reconsider as needed   - Pain control per primary team/pain management.   - findings and plan discussed with patient and primary team    Toyin Kuhn MD

## 2022-08-16 NOTE — PROGRESS NOTE ADULT - PROBLEM SELECTOR PLAN 1
completed acyclovir--> Optho suggests re-initiation of acyclovir 8/15, will d/w ID if oral vs IV-- ID recs oral 1 g TID for the next 2 weeks minimum then reassess  Morphine 7.5 mg every 4 hours standing. without PRNs;   cont trileptal 450 BID, sodium stable, monitor closely--> now on 600 mg BID  gabapentin transitioned to lyrica 150 mg every 8 hours--> ctm  NSG consulted for nerve block--> rec's OMFS consult 8/15  current pain  better controlled, sedated at times but arousable with intermittent bursts of yelling and agitation with scratching face  - Ophthalmology following, apprec recs: erythromycin ointment QID to eye &periocular lesions, artificial tears Q4H  - Appreciate derm recs:    - lidocaine ointment mixed with vaseline BID, triamcinolone ointment to red areas only BID    - apply vaseline to crusted areas Q2H    *Alternative pain mgmt therapies not yet tried: higher doses of gabapentin (pt received 600 mg TID earlier this month, but did not get higher doses than this), methadone also recommended by Pain Mgmt--> trying to avoid additional narcotics for her, but can consider these options if the nerve block is inadequate.*

## 2022-08-16 NOTE — PROGRESS NOTE ADULT - PROBLEM SELECTOR PLAN 2
seroquel 25 q6 PRN for agitation    # RUE DVT. cont full dose lovenox  extra dose of trazodone available overnight should pt become agitated/restless  consider OOB to recliner during the day to assist with day/night orientation--> cont to encourage activity/routines  LE doppler if worsening lower extremity swelling, pt already on prophylaxis

## 2022-08-16 NOTE — PROGRESS NOTE ADULT - ASSESSMENT
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    8/16 s/p block by nsx helping, lyrica increased also helped. spoke with daughter at bedside again today.   - check for GIB. coffee ground emesis this AM.   - c/w oxcarbazepine to 600 mg BID.  - Increased pregabalin to 150 mg Q8H - per daughter seems to have helped   - encourage use of ointment applied to R V1 region, wrapped and sealed   - start amitryptyline 10mg at bedtime, she has been on this before   - recommend re-consulting pain management, recs appreciatecx   - recommend consulting ENT to see if possible to get trigeminal nerve block (to enhance pain relief)--> Nsx doing orbital block and is effective this AM.  will consider repeat later today or consider longer acting    - GI/DVT ppx  - Counseling on diet, exercise, and medication adherence was done  - Counseling on smoking cessation and alcohol consumption offered when appropriate.  - Pain assessed and judicious use of narcotics when appropriate was discussed.    - Stroke education given when appropriate.  - Importance of fall prevention discussed.   - Differential diagnosis and plan of care discussed with patient and/or family and primary team  - Thank you for allowing me to participate in the care of this patient. Call with questions.     Leandro Lyon MD  Vascular Neurology

## 2022-08-16 NOTE — PROGRESS NOTE ADULT - SUBJECTIVE AND OBJECTIVE BOX
[facility] DEPARTMENT OF OPHTHALMOLOGY  ------------------------------------------------------------------------------    Interval History: Following for R HZO. Pt still in significant pain, intermittently cooperative with exam due to severe bouts of pain.      MEDICATIONS  (STANDING):  acetaminophen     Tablet .. 650 milliGRAM(s) Oral every 8 hours  albuterol/ipratropium for Nebulization 3 milliLiter(s) Nebulizer every 6 hours  amitriptyline 10 milliGRAM(s) Oral at bedtime  artificial tears (preservative free) Ophthalmic Solution 1 Drop(s) Both EYES every 2 hours  buDESOnide    Inhalation Suspension 0.5 milliGRAM(s) Inhalation two times a day  BUpivacaine 0.5% (Preservative-Free) Injectable 5 milliLiter(s) Local Injection once  BUpivacaine liposome 1.3% Injectable 5 milliLiter(s) Local Injection once  dextrose 5%. 1000 milliLiter(s) (100 mL/Hr) IV Continuous <Continuous>  dextrose 5%. 1000 milliLiter(s) (50 mL/Hr) IV Continuous <Continuous>  dextrose 50% Injectable 25 Gram(s) IV Push once  dextrose 50% Injectable 12.5 Gram(s) IV Push once  dextrose 50% Injectable 25 Gram(s) IV Push once  enoxaparin Injectable 80 milliGRAM(s) SubCutaneous every 12 hours  erythromycin   Ointment 1 Application(s) Right EYE four times a day  furosemide    Tablet 20 milliGRAM(s) Oral daily  glucagon  Injectable 1 milliGRAM(s) IntraMuscular once  hydrOXYzine hydrochloride 20 milliGRAM(s) Oral every 6 hours  insulin glargine Injectable (LANTUS) 10 Unit(s) SubCutaneous at bedtime  insulin lispro (ADMELOG) corrective regimen sliding scale   SubCutaneous Before meals and at bedtime  lactobacillus acidophilus 1 Tablet(s) Oral daily  lidocaine 5% Ointment 1 Application(s) Topical two times a day  loratadine 10 milliGRAM(s) Oral daily  losartan 50 milliGRAM(s) Oral daily  metoprolol succinate ER 12.5 milliGRAM(s) Oral daily  mirtazapine 7.5 milliGRAM(s) Oral at bedtime  morphine  IR 7.5 milliGRAM(s) Oral every 4 hours  mupirocin 2% Ointment 1 Application(s) Topical three times a day  nystatin Powder 1 Application(s) Topical two times a day  OXcarbazepine 600 milliGRAM(s) Oral two times a day  pantoprazole  Injectable 40 milliGRAM(s) IV Push two times a day  polyethylene glycol 3350 17 Gram(s) Oral daily  prednisoLONE acetate 1% Suspension 1 Drop(s) Right EYE three times a day  pregabalin 150 milliGRAM(s) Oral every 8 hours  senna 1 Tablet(s) Oral at bedtime  traZODone 100 milliGRAM(s) Oral at bedtime  triamcinolone 0.1% Ointment 1 Application(s) Topical two times a day  ursodiol Tablet 500 milliGRAM(s) Oral <User Schedule>  valACYclovir 1000 milliGRAM(s) Oral every 8 hours    MEDICATIONS  (PRN):  AQUAPHOR (petrolatum Ointment) 1 Application(s) Topical three times a day PRN inguinal fold rash  dextrose Oral Gel 15 Gram(s) Oral once PRN Blood Glucose LESS THAN 70 milliGRAM(s)/deciliter  OLANZapine Injectable 1.25 milliGRAM(s) IntraMuscular every 6 hours PRN Severe Agitation  traZODone 50 milliGRAM(s) Oral <User Schedule> PRN agitation/restlessness/insomnia      VITALS: T(C): 36.6 (08-16-22 @ 06:00)  T(F): 97.8 (08-16-22 @ 06:00), Max: 98.2 (08-15-22 @ 22:00)  HR: 86 (08-16-22 @ 10:03) (79 - 92)  BP: 140/80 (08-16-22 @ 06:00) (128/67 - 142/82)  RR:  (16 - 20)  SpO2:  (92% - 97%)  Wt(kg): --  General: AAO x 1-2, distracted by pain    Ophthalmology Exam:  Visual acuity (sc): 20/100 OD, 20/60 OS (intermittently cooperative)  Pupils: 6mm OD, nonreactive to light, 2mm OS, reactive to light, no APD OU  Ttono: 8 OU  Extraocular movements (EOMs): 100% abduction deficit OD, otherwise full OU  Confrontational Visual Field (CVF): MYRON 2/2 mental status    Pen Light Exam (PLE)  External: R sided forehead edema, resolution of brown crusting with underlying fresh skin, involvement of upper eyelid, no Metcalf sign, bandaged up  Lids/Lashes/Lacrimal Ducts: trace periorbital edema RUL, flat RLL, flat OS   Sclera/Conjunctiva: Tr injection OD, white and quiet OS.  Cornea: 2+ SPK OD, no pseudodendrites, tr D-folds OD, Cl OS  Anterior Chamber: Deep and formed OU.    Iris: Flat OU.  Lens: PCIOL OD, 3+ NS OS    Fundus Exam: dilated with 1% tropicamide and 2.5% phenylephrine  Approval obtained from primary team for dilation  Patient aware that pupils can remained dilated for at least 4-6 hours  Exam performed with 20D lens    Vitreous: wnl OD  Disc, cup/disc: sharp and pink, 0.4 OD  Macula: wnl OD  Vessels: wnl OD  Periphery: grossly wnl OD, exam limited by pt cooperation   [facility] DEPARTMENT OF OPHTHALMOLOGY  ------------------------------------------------------------------------------    Interval History: Following for R HZO. Pt still in significant pain, intermittently cooperative with exam due to severe bouts of pain.  Very difficult to exam.    MEDICATIONS  (STANDING):  acetaminophen     Tablet .. 650 milliGRAM(s) Oral every 8 hours  albuterol/ipratropium for Nebulization 3 milliLiter(s) Nebulizer every 6 hours  amitriptyline 10 milliGRAM(s) Oral at bedtime  artificial tears (preservative free) Ophthalmic Solution 1 Drop(s) Both EYES every 2 hours  buDESOnide    Inhalation Suspension 0.5 milliGRAM(s) Inhalation two times a day  BUpivacaine 0.5% (Preservative-Free) Injectable 5 milliLiter(s) Local Injection once  BUpivacaine liposome 1.3% Injectable 5 milliLiter(s) Local Injection once  dextrose 5%. 1000 milliLiter(s) (100 mL/Hr) IV Continuous <Continuous>  dextrose 5%. 1000 milliLiter(s) (50 mL/Hr) IV Continuous <Continuous>  dextrose 50% Injectable 25 Gram(s) IV Push once  dextrose 50% Injectable 12.5 Gram(s) IV Push once  dextrose 50% Injectable 25 Gram(s) IV Push once  enoxaparin Injectable 80 milliGRAM(s) SubCutaneous every 12 hours  erythromycin   Ointment 1 Application(s) Right EYE four times a day  furosemide    Tablet 20 milliGRAM(s) Oral daily  glucagon  Injectable 1 milliGRAM(s) IntraMuscular once  hydrOXYzine hydrochloride 20 milliGRAM(s) Oral every 6 hours  insulin glargine Injectable (LANTUS) 10 Unit(s) SubCutaneous at bedtime  insulin lispro (ADMELOG) corrective regimen sliding scale   SubCutaneous Before meals and at bedtime  lactobacillus acidophilus 1 Tablet(s) Oral daily  lidocaine 5% Ointment 1 Application(s) Topical two times a day  loratadine 10 milliGRAM(s) Oral daily  losartan 50 milliGRAM(s) Oral daily  metoprolol succinate ER 12.5 milliGRAM(s) Oral daily  mirtazapine 7.5 milliGRAM(s) Oral at bedtime  morphine  IR 7.5 milliGRAM(s) Oral every 4 hours  mupirocin 2% Ointment 1 Application(s) Topical three times a day  nystatin Powder 1 Application(s) Topical two times a day  OXcarbazepine 600 milliGRAM(s) Oral two times a day  pantoprazole  Injectable 40 milliGRAM(s) IV Push two times a day  polyethylene glycol 3350 17 Gram(s) Oral daily  prednisoLONE acetate 1% Suspension 1 Drop(s) Right EYE three times a day  pregabalin 150 milliGRAM(s) Oral every 8 hours  senna 1 Tablet(s) Oral at bedtime  traZODone 100 milliGRAM(s) Oral at bedtime  triamcinolone 0.1% Ointment 1 Application(s) Topical two times a day  ursodiol Tablet 500 milliGRAM(s) Oral <User Schedule>  valACYclovir 1000 milliGRAM(s) Oral every 8 hours    MEDICATIONS  (PRN):  AQUAPHOR (petrolatum Ointment) 1 Application(s) Topical three times a day PRN inguinal fold rash  dextrose Oral Gel 15 Gram(s) Oral once PRN Blood Glucose LESS THAN 70 milliGRAM(s)/deciliter  OLANZapine Injectable 1.25 milliGRAM(s) IntraMuscular every 6 hours PRN Severe Agitation  traZODone 50 milliGRAM(s) Oral <User Schedule> PRN agitation/restlessness/insomnia      VITALS: T(C): 36.6 (08-16-22 @ 06:00)  T(F): 97.8 (08-16-22 @ 06:00), Max: 98.2 (08-15-22 @ 22:00)  HR: 86 (08-16-22 @ 10:03) (79 - 92)  BP: 140/80 (08-16-22 @ 06:00) (128/67 - 142/82)  RR:  (16 - 20)  SpO2:  (92% - 97%)  Wt(kg): --  General: AAO x 1-2, distracted by pain    Ophthalmology Exam:  Visual acuity (sc): 20/100 OD, 20/60 OS (intermittently cooperative)  Pupils: 6mm OD, nonreactive to light, 2mm OS, reactive to light, no APD OU  Ttono: 8 OU  Extraocular movements (EOMs): 100% abduction deficit OD, otherwise full OU  Confrontational Visual Field (CVF): MYRON 2/2 mental status    Pen Light Exam (PLE)  External: R sided forehead edema, resolution of brown crusting with underlying fresh skin, involvement of upper eyelid, no Metcalf sign, bandaged up  Lids/Lashes/Lacrimal Ducts: trace periorbital edema RUL, flat RLL, flat OS   Sclera/Conjunctiva: Tr injection OD, white and quiet OS.  Cornea: 2+ SPK OD, no pseudodendrites, tr D-folds OD, Cl OS  Anterior Chamber: Deep and formed OU.    Iris: Flat OU.  Lens: PCIOL OD, 3+ NS OS    Fundus Exam: dilated with 1% tropicamide and 2.5% phenylephrine  Approval obtained from primary team for dilation  Patient aware that pupils can remained dilated for at least 4-6 hours  Exam performed with 20D lens    Vitreous: wnl OD  Disc, cup/disc: sharp and pink, 0.4 OD  Macula: wnl OD  Vessels: wnl OD  Periphery: grossly wnl OD, exam limited by pt cooperation

## 2022-08-16 NOTE — CHART NOTE - NSCHARTNOTEFT_GEN_A_CORE
Discussed case with Dr. Pereyra  -Gasserian blocks not indicated for this issue. More commonly used for trigeminal neuralgia. Not indicated in this condition.  -Will discuss with Dr. Pereyra further, however will consider performing supraorbital block. Discussed case with Dr. Roddy Schaeffer blocks not indicated for this issue. More commonly used for trigeminal neuralgia. Not indicated in this condition.  -Supraorbital block given at bedside using 0.5% bupivicaine. 3cc given. Prepped with isopropyl alcohol. Daughter at bedside. Patient experienced pain relief from block  -Please consult wound care. Discussed case with Dr. Roddy Schaeffer blocks not indicated for this issue. More commonly used for trigeminal neuralgia. Not indicated in this condition.  -Supraorbital block given at bedside using 0.5% bupivicaine. 3cc given. Prepped with isopropyl alcohol. Daughter at bedside. Patient experienced pain relief from block  -Please consult wound care.    Addendum: Dr. Bello performed a right supraorbital nerve block with about 3cc of marcaine. Nearly immediately, the patient noted pain relief. I saw her about 10 AM and she still stated that her pain was relieved.    We will attempt to repeat the injection with Exparil as needed, as this liposomal preparation is designed to be long-acting. This plan was discussed with the the patient's daughter, Dr. SALINAS Delgado.

## 2022-08-16 NOTE — PROGRESS NOTE ADULT - PROBLEM SELECTOR PLAN 7
NPH diagnosed 2011, pt has programmable  shunt installed by [facility] neurosurg, **must be programmed after MRI (page neurosurg [id number])**. 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

## 2022-08-17 NOTE — PROGRESS NOTE ADULT - PROBLEM SELECTOR PLAN 7
NPH diagnosed 2011, pt has programmable  shunt installed by [facility] neurosurg, **must be programmed after MRI (page neurosurg [id number])**. 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

## 2022-08-17 NOTE — PROGRESS NOTE ADULT - PROBLEM SELECTOR PLAN 4
Hx of severe HTN at home.  - continue home losartan--> decr 50 to 25 mg daily on 8/17, cont metoprolol  - Held lasix on admission --> resume lasix 20 mg 8/15 given pitting edema of lower extremities  - Holding spirololactone   BP control has been adequate, cont to monitor  elevated at times of pain/agitation monitor closely

## 2022-08-17 NOTE — PROGRESS NOTE ADULT - SUBJECTIVE AND OBJECTIVE BOX
Patient is a 86y old  Female who presents with a chief complaint of Suspicion for Herpes Zoster opthalmicus/encephalitis (17 Aug 2022 07:57)      HPI:  Patient is an 87yo female w/PMH Cardiac stents post MI (1986, 1996), COPD, NPH, DM, Migraines, and suspected HTN presenting with 3 day history of herpes zoster rash over the right V1 dermatome w/ eye involvement, now presenting with 1 day history of altered mental status. On 7/17, patient had right sided headache attributed to her recurrent migraines that she normally has on a daily basis, in which she took furiocet for. On 7/20 night and 7/21 morning, Patient vomited and began taking naproxen and tylenol for her symptoms. On 7/21, patient later saw her outpatient internist, who requested a CT Head for the patient, which came with findings at baseline. On 7/21 night, the patient began to scream, and checked her BP, which was at 200/80. On 7/22 morning, the patient's repeat BP was 160/100. Her internist subsequently prescribed amlodipine for the patient, and at the time her mental status was at baseline (A&Ox4). Also on 7/22, the patient began to complain of blurry vision. On 7/23 morning, the patient had the first occurrence of her rash around the right side of her face, V1 dermatome distribution, and the rash has progressively worsened since. On 7/24, patient started valtrex and artificial tears and began to be more sleepy. On 7/25 Patient aide said that the patient was more confused and her mentation was getting worse up until now.    In the ED, patient was started on 1x Zosyn, 1x 500mg acyclovir. (26 Jul 2022 18:18)    patient and daughter at bedside report significant improvement after supraorbital nerve injection.  patient sleepy, 4-5 hours after medications.      REVIEW OF SYSTEMS  +rash  +weakness  +constipation  +hard of hearing  +intermittent pain (improving)    PAST MEDICAL & SURGICAL HISTORY  Myocardial Infarction    Diabetes Mellitus Type II    Migraines    COPD (Chronic Obstructive Pulmonary Disease)    NPH (normal pressure hydrocephalus)    COPD, mild    CAD (coronary artery disease)    Type 2 diabetes mellitus    Migraines    HTN (hypertension)    Stented coronary artery        CURRENT FUNCTIONAL STATUS  8/16  Bed Mobility  Bed Mobility Training Rehab Potential: good, to achieve stated therapy goals  Bed Mobility Training Symptoms Noted During/After Treatment: none  Bed Mobility Training Rolling/Turning: moderate assist (50% patient effort);  2 person assist  Bed Mobility Training Scooting: moderate assist (50% patient effort);  2 person assist  Bed Mobility Training Sit-to-Supine: moderate assist (50% patient effort);  2 person assist  Bed Mobility Training Supine-to-Sit: moderate assist (50% patient effort);  2 person assist  Bed Mobility Training Limitations: decreased flexibility;  decreased ROM;  decreased strength    Sit-Stand Transfer Training  Sit-to-Stand Transfer Training Rehab Potential: fair, will monitor progress closely  Sit-to-Stand Transfer Training Symptoms Noted During/After Treatment: none  Transfer Training Sit-to-Stand Transfer: maximum assist (25% patient effort);  2 person assist;  weight-bearing as tolerated  Transfer Training Stand-to-Sit Transfer: maximum assist (25% patient effort);  2 person assist;  weight-bearing as tolerated  Sit-to-Stand Transfer Training Transfer Safety Analysis: decreased step length;  decreased flexibility;  decreased ROM;  decreased strength;  hand held assistance x 2    Gait Training  Gait Training Rehab Potential: fair, will monitor progress closely  Gait Training Symptoms Noted During/After Treatment: fatigue  Gait Training: maximum assist (25% patient effort);  2 person assist;  weight-bearing as tolerated   hand held assistance x 2;  1 lateral steps  Gait Analysis: 3-point gait   decreased step length;  decreased stride length;  shuffling;  decreased flexibility;  decreased ROM;  decreased strength;  impaired coordination;  1 lateral step;  hand held assistance x 2.    Therapeutic Exercise  Therapeutic Exercise Rehab Effort: good  Therapeutic Exercise Symptoms Noted During/After Treatment: none  Therapeutic Exercise Detail: Active range of motion exercises for upper and lower extremities while sitting at edge of bed.         FAMILY HISTORY    FH: myocardial infarction (Father)  FH: hypertension (Child)        RECENT LABS/IMAGING  CBC Full  -  ( 17 Aug 2022 07:14 )  WBC Count : 4.66 K/uL  RBC Count : 3.75 M/uL  Hemoglobin : 9.9 g/dL  Hematocrit : 30.9 %  Platelet Count - Automated : 349 K/uL  Mean Cell Volume : 82.4 fL  Mean Cell Hemoglobin : 26.4 pg  Mean Cell Hemoglobin Concentration : 32.0 gm/dL  Auto Neutrophil # : x  Auto Lymphocyte # : x  Auto Monocyte # : x  Auto Eosinophil # : x  Auto Basophil # : x  Auto Neutrophil % : x  Auto Lymphocyte % : x  Auto Monocyte % : x  Auto Eosinophil % : x  Auto Basophil % : x    08-16    136  |  96<L>  |  8   ----------------------------<  118<H>  4.1   |  27  |  0.62    Ca    8.7      16 Aug 2022 07:25  Phos  3.9     08-16  Mg     1.70     08-16    TPro  6.5  /  Alb  2.8<L>  /  TBili  0.3  /  DBili  <0.2  /  AST  22  /  ALT  10  /  AlkPhos  103  08-16        VITALS  T(C): 36.8 (08-17-22 @ 06:52), Max: 37.1 (08-16-22 @ 18:00)  HR: 79 (08-17-22 @ 06:52) (73 - 91)  BP: 135/67 (08-17-22 @ 06:52) (123/71 - 135/67)  RR: 17 (08-17-22 @ 06:52) (17 - 19)  SpO2: 97% (08-17-22 @ 06:52) (94% - 98%)  Wt(kg): --    ALLERGIES  diltiazem (Other; Rash)  Haldol (Dystonic RXN)      MEDICATIONS   acetaminophen     Tablet .. 650 milliGRAM(s) Oral every 8 hours  albuterol/ipratropium for Nebulization 3 milliLiter(s) Nebulizer every 6 hours  amitriptyline 10 milliGRAM(s) Oral at bedtime  AQUAPHOR (petrolatum Ointment) 1 Application(s) Topical three times a day PRN  artificial tears (preservative free) Ophthalmic Solution 1 Drop(s) Both EYES every 2 hours  buDESOnide    Inhalation Suspension 0.5 milliGRAM(s) Inhalation two times a day  BUpivacaine 0.5% (Preservative-Free) Injectable 5 milliLiter(s) Local Injection once  BUpivacaine liposome 1.3% Injectable 5 milliLiter(s) Local Injection once  dextrose 5%. 1000 milliLiter(s) IV Continuous <Continuous>  dextrose 5%. 1000 milliLiter(s) IV Continuous <Continuous>  dextrose 50% Injectable 25 Gram(s) IV Push once  dextrose 50% Injectable 12.5 Gram(s) IV Push once  dextrose 50% Injectable 25 Gram(s) IV Push once  dextrose Oral Gel 15 Gram(s) Oral once PRN  enoxaparin Injectable 80 milliGRAM(s) SubCutaneous every 12 hours  erythromycin   Ointment 1 Application(s) Right EYE four times a day  furosemide    Tablet 20 milliGRAM(s) Oral daily  glucagon  Injectable 1 milliGRAM(s) IntraMuscular once  hydrOXYzine hydrochloride 20 milliGRAM(s) Oral every 6 hours  insulin glargine Injectable (LANTUS) 10 Unit(s) SubCutaneous at bedtime  insulin lispro (ADMELOG) corrective regimen sliding scale   SubCutaneous Before meals and at bedtime  lactobacillus acidophilus 1 Tablet(s) Oral daily  lidocaine 5% Ointment 1 Application(s) Topical two times a day  loratadine 10 milliGRAM(s) Oral daily  losartan 50 milliGRAM(s) Oral daily  metoprolol succinate ER 12.5 milliGRAM(s) Oral daily  mirtazapine 7.5 milliGRAM(s) Oral at bedtime  morphine  IR 7.5 milliGRAM(s) Oral every 4 hours  mupirocin 2% Ointment 1 Application(s) Topical three times a day  nystatin Powder 1 Application(s) Topical two times a day  OLANZapine Injectable 1.25 milliGRAM(s) IntraMuscular every 6 hours PRN  OXcarbazepine 600 milliGRAM(s) Oral two times a day  pantoprazole  Injectable 40 milliGRAM(s) IV Push two times a day  polyethylene glycol 3350 17 Gram(s) Oral daily  prednisoLONE acetate 1% Suspension 1 Drop(s) Right EYE three times a day  pregabalin 150 milliGRAM(s) Oral every 8 hours  senna 1 Tablet(s) Oral at bedtime  traZODone 100 milliGRAM(s) Oral at bedtime  traZODone 50 milliGRAM(s) Oral <User Schedule> PRN  triamcinolone 0.1% Ointment 1 Application(s) Topical two times a day  ursodiol Tablet 500 milliGRAM(s) Oral <User Schedule>  valACYclovir 1000 milliGRAM(s) Oral every 8 hours      ----------------------------------------------------------------------------------------   PHYSICAL EXAM  Constitutional -NAD, sleepy  HEENT - + rash and dressing R scalp, R eye closed (able to partially open)  Chest - no respiratory distress  Cardiovascular - RRR, S1S2   Abdomen -  Soft, NTND  Extremities -no edema, No calf tenderness   Neurologic Exam -                    Cognitive - sleepy but awakens to voice     Communication - Fluent, No dysarthria      Motor -                       LEFT    UE - 4/5                    RIGHT UE - 4/5                    LEFT    LE - HF 2                    RIGHT LE - HF 2     Sensory - Intact to LT      Balance - WNL Static  Psychiatric - Affect WNL  ----------------------------------------------------------------------------------------  ASSESSMENT/PLAN   85 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  consider making atarax prn as symptoms improved, may be sedating.   DVT PPX - lovenox for dvt   Rehab -     Recommend [facility], patient DOES NOT meet acute inpatient rehabilitation criteria

## 2022-08-17 NOTE — PROGRESS NOTE ADULT - SUBJECTIVE AND OBJECTIVE BOX
CC: Eliz    Saw/spoke to patient. Unchanged. No new complaints.    Allergies  diltiazem (Other; Rash)    ANTIMICROBIALS:  valACYclovir 1000 every 8 hours    PE:    Vital Signs Last 24 Hrs  T(C): 36.6 (17 Aug 2022 11:58), Max: 37.1 (16 Aug 2022 18:00)  T(F): 97.8 (17 Aug 2022 11:58), Max: 98.8 (16 Aug 2022 18:00)  HR: 75 (17 Aug 2022 11:58) (73 - 91)  BP: 141/60 (17 Aug 2022 11:58) (123/71 - 141/60)  RR: 17 (17 Aug 2022 11:58) (17 - 19)  SpO2: 98% (17 Aug 2022 11:58) (94% - 98%)    Gen: AOx2-3, NAD, non-toxic, still with wound around R eye  CV: Nontachycardic  Resp: Breathing comfortably, RA  Abd: Soft, nontender  IV/Skin: No thrombophlebitis    LABS:                        9.9    4.66  )-----------( 349      ( 17 Aug 2022 07:14 )             30.9     08-16    136  |  96<L>  |  8   ----------------------------<  118<H>  4.1   |  27  |  0.62    Ca    8.7      16 Aug 2022 07:25  Phos  3.9     08-16  Mg     1.70     08-16    TPro  6.5  /  Alb  2.8<L>  /  TBili  0.3  /  DBili  <0.2  /  AST  22  /  ALT  10  /  AlkPhos  103  08-16    MICROBIOLOGY:    Catheterized Catheterized  08-13-22   50,000 - 99,000 CFU/mL Candida tropicalis "Susceptibilities not performed"  --  --    .Blood Blood-Venous  07-27-22   No Growth Final  --  --    .Blood Blood  07-26-22   Growth in anaerobic bottle: Staphylococcus epidermidis  Growth in aerobic and anaerobic bottles: Staphylococcus hominis  Coag Negative Staphylococcus  Single set isolate, possible contaminant. Contact  Microbiology if susceptibility testing clinically  indicated.  --    Growth in anaerobic bottle: Gram Positive Cocci in Clusters  Growth in aerobic bottle: Gram Positive Cocci in Clusters    .Blood Blood  07-26-22   Growth in aerobic bottle: Staphylococcus hominis  Growth in aerobic and anaerobic bottles: Staphylococcus epidermidis  ***Blood Panel PCR results on this specimen are available  approximately 3 hours after the Gram stain result.***  Gram stain, PCR, and/or culture results may not always  correspond due to difference in methodologies.  ************************************************************  This PCR assay was performed by multiplex PCR. This  Assay tests for 66 bacterial and resistance gene targets.  Please refer to the [facility] test directory  at [email or web address] for details.  --  Blood Culture PCR  Staphylococcus hominis  Staphylococcus epidermidis    (otherwise reviewed)    RADIOLOGY:    8/12 XR:    FINDINGS:  Prior study dated 8/3/2022 was available for review.    The lungs are clear.  No pleural abnormality is seen.    The heart and mediastinum appear intact.  A right-sided  shunt catheter   is noted.      IMPRESSION: No gross consolidation is seen.

## 2022-08-17 NOTE — PROGRESS NOTE ADULT - SUBJECTIVE AND OBJECTIVE BOX
[facility] Division of Hospital Medicine  Aline Gaviria MD  Pager (M-F, 8A-5P): [phone]  Other Times:  [phone]      SUBJECTIVE / OVERNIGHT EVENTS: Pt more sleepy this am, pain more controlled. No further episodes of coffee ground emesis (noted on 8/16, scant volume). Still no BM in over 5 days. Urine appears cloudy, yellow in color in dior bag. Daughter applying HF oxygen to supraorbital wound at time of exam.     MEDICATIONS  (STANDING):  acetaminophen     Tablet .. 650 milliGRAM(s) Oral every 8 hours  albuterol/ipratropium for Nebulization 3 milliLiter(s) Nebulizer every 6 hours  amitriptyline 10 milliGRAM(s) Oral at bedtime  artificial tears (preservative free) Ophthalmic Solution 1 Drop(s) Both EYES every 2 hours  buDESOnide    Inhalation Suspension 0.5 milliGRAM(s) Inhalation two times a day  BUpivacaine 0.5% (Preservative-Free) Injectable 5 milliLiter(s) Local Injection once  BUpivacaine liposome 1.3% Injectable 5 milliLiter(s) Local Injection once  dextrose 5%. 1000 milliLiter(s) (100 mL/Hr) IV Continuous <Continuous>  dextrose 5%. 1000 milliLiter(s) (50 mL/Hr) IV Continuous <Continuous>  dextrose 50% Injectable 25 Gram(s) IV Push once  dextrose 50% Injectable 12.5 Gram(s) IV Push once  dextrose 50% Injectable 25 Gram(s) IV Push once  enoxaparin Injectable 80 milliGRAM(s) SubCutaneous every 12 hours  erythromycin   Ointment 1 Application(s) Right EYE four times a day  furosemide    Tablet 20 milliGRAM(s) Oral daily  glucagon  Injectable 1 milliGRAM(s) IntraMuscular once  insulin glargine Injectable (LANTUS) 10 Unit(s) SubCutaneous at bedtime  insulin lispro (ADMELOG) corrective regimen sliding scale   SubCutaneous Before meals and at bedtime  lactobacillus acidophilus 1 Tablet(s) Oral daily  lidocaine 5% Ointment 1 Application(s) Topical two times a day  loratadine 10 milliGRAM(s) Oral daily  losartan 50 milliGRAM(s) Oral daily  metoprolol succinate ER 12.5 milliGRAM(s) Oral daily  mirtazapine 7.5 milliGRAM(s) Oral at bedtime  morphine   Solution 5 milliGRAM(s) Oral every 4 hours  mupirocin 2% Ointment 1 Application(s) Topical three times a day  nystatin Powder 1 Application(s) Topical two times a day  OXcarbazepine 600 milliGRAM(s) Oral two times a day  pantoprazole  Injectable 40 milliGRAM(s) IV Push two times a day  polyethylene glycol 3350 17 Gram(s) Oral daily  prednisoLONE acetate 1% Suspension 1 Drop(s) Right EYE three times a day  pregabalin 150 milliGRAM(s) Oral every 8 hours  senna 1 Tablet(s) Oral at bedtime  traZODone 100 milliGRAM(s) Oral at bedtime  triamcinolone 0.1% Ointment 1 Application(s) Topical two times a day  ursodiol Tablet 500 milliGRAM(s) Oral <User Schedule>  valACYclovir 1000 milliGRAM(s) Oral every 8 hours    MEDICATIONS  (PRN):  AQUAPHOR (petrolatum Ointment) 1 Application(s) Topical three times a day PRN inguinal fold rash  dextrose Oral Gel 15 Gram(s) Oral once PRN Blood Glucose LESS THAN 70 milliGRAM(s)/deciliter  OLANZapine Injectable 1.25 milliGRAM(s) IntraMuscular every 6 hours PRN Severe Agitation  traZODone 50 milliGRAM(s) Oral <User Schedule> PRN agitation/restlessness/insomnia      I&O's Summary      PHYSICAL EXAM:  Vital Signs Last 24 Hrs  T(C): 36.8 (17 Aug 2022 06:52), Max: 37.1 (16 Aug 2022 18:00)  T(F): 98.3 (17 Aug 2022 06:52), Max: 98.8 (16 Aug 2022 18:00)  HR: 84 (17 Aug 2022 10:55) (73 - 91)  BP: 135/67 (17 Aug 2022 06:52) (123/71 - 135/67)  BP(mean): --  RR: 17 (17 Aug 2022 06:52) (17 - 19)  SpO2: 98% (17 Aug 2022 10:55) (94% - 98%)    Parameters below as of 17 Aug 2022 06:52  Patient On (Oxygen Delivery Method): room air      CONSTITUTIONAL: NAD, lethargic  EYES: R eye swollen shut  ENMT: moist MM, normal dentition  RESPIRATORY: Normal respiratory effort; grossly b/l AE  CARDIOVASCULAR: Regular rate and rhythm; No lower extremity edema;   ABDOMEN: Nontender to palpation, normoactive bowel sounds  MUSCULOSKELETAL:no clubbing or cyanosis of digits; no joint swelling or tenderness to palpation, LE edema improved, no pitting, symmetric LEs  NEUROLOGY: CN 2-12 are intact and symmetric; no gross sensory deficits   SKIN: R V1 dermatome no vesicles,extensive raw skin, lesions w beige exudate, no crusting at this time, no evidence of supra-infection, look to be improving on exam 8/17; inguinal folds exam on 8/17 w mild erythema no skin breakdown, pt rolled and back examined, no evidence of hematoma, wanda-rectal area with minimal erythema worse on the R with no skin breakdown or ulceration    LABS:                        9.9    4.66  )-----------( 349      ( 17 Aug 2022 07:14 )             30.9     08-16    136  |  96<L>  |  8   ----------------------------<  118<H>  4.1   |  27  |  0.62    Ca    8.7      16 Aug 2022 07:25  Phos  3.9     08-16  Mg     1.70     08-16    TPro  6.5  /  Alb  2.8<L>  /  TBili  0.3  /  DBili  <0.2  /  AST  22  /  ALT  10  /  AlkPhos  103  08-16                RADIOLOGY & ADDITIONAL TESTS:  Results Reviewed:   Imaging Personally Reviewed:  Electrocardiogram Personally Reviewed:    COORDINATION OF CARE:  Care Discussed with Consultants/Other Providers [Y/N]: ID, NSG, Neuro, PMR  Prior or Outpatient Records Reviewed [Y/N]:

## 2022-08-17 NOTE — PROGRESS NOTE ADULT - SUBJECTIVE AND OBJECTIVE BOX
Patient seen and examined at bedside.    --Anticoagulation--    T(C): 36.8 (08-17-22 @ 06:52), Max: 37.1 (08-16-22 @ 18:00)  HR: 79 (08-17-22 @ 06:52) (73 - 91)  BP: 135/67 (08-17-22 @ 06:52) (123/71 - 135/67)  RR: 17 (08-17-22 @ 06:52) (17 - 19)  SpO2: 97% (08-17-22 @ 06:52) (94% - 98%)  Wt(kg): --    Exam:  Awake, reports significantly reduced pain, no hematoma/erythema at injection site, ulceration covered by telfa

## 2022-08-17 NOTE — PROGRESS NOTE ADULT - ASSESSMENT
85 yo female w/PMH Cardiac stents post MI (1986, 1996), COPD, NPH, DM, Migraines, and suspected HTN presenting with 3 day history of herpes zoster rash  No fever, leukocytosis  R sided zoster rash, then increasing confusion  On exam with vesicles, redness at site  Optho with concern for R sided ocular involvement  2/4 BCX CoNS--suspect contam  MR shows areas of myositis, but suspect is due to viral process vs concomitant orbital cellulitis (patient is clinically improving)  Generally well, but Optho concerned for ongoing CN symptoms  Overall,  1) VZV Ophthalmicus  - Concern for VZV with ocular involvement  - Valtrex 1g q 8 (restarted for concern for possible ongoing CN viral activity)--14 days then discontinue (or reassess at that time)  - Monitor lesions  - F/U Optho  2) Positive culture finding  - Low CFU Candida in setting of dior; no fever, no leukocytosis, cloudy urine finding is not specific for UTI  - Low suspicion for active fungal UTI at this point  - Would monitor off antifungal for any signs active UTI/sepsis  3) Coffee ground emesis  - Family reporting coffee ground emesis at bedside; also with drop in hemoglobin  - Further care per team    ID will follow PRN, please call with questions or further concerns    Jack Henderson MD  Contact on TEAMS messaging from 9am - 5pm  From 5pm-9am, on weekends, or if no response call [phone]

## 2022-08-17 NOTE — PROGRESS NOTE ADULT - ASSESSMENT
Patient seen and examined at bedside.  -Patient with continued relief from liposomal bupivicaine block.  -Can consider repeat block when pain returns.

## 2022-08-17 NOTE — PROGRESS NOTE ADULT - ASSESSMENT
86F w/ hx CAD s/p stents (1986, 1996), COPD, NPH s/p  shunt, DM, migraines, and HTN presenting with herpes zoster ophthalmicus/encephalitis affecting the right V1 dermatome, now with acute worsening of post herpetic neuralgia, and mrstran bacteremia (treated). She is s/p IV acyclovir, remains with pain, itching and behavioral disturbances c/b acute urinary retention, dior in place. Pain finally improved after NSG did R supra orbital marcaine nerve block 8/16. Will try to wean morphine in the coming days, consideration for methadone if having difficulty weaning, as suggested by Pain Mgmt.    # Coffee ground emesis vs hemoptysis, single episode on 8/16, nPOA  # Anemia, nPOA, worsening  Self limiting episode of emesis (?), though Hgb downtrending in the past 48 hours, though does vacillate around 11. Pt has no hx of gastric ulcers. High risk for a bleed as she is on therapeutic Lovenox for DVT. Pt HDS and in NAD. No hematomas visualized on backside 8/17. Suspect blood loss anemia, source yet to be determined.  - Trend CBC, repeat at 2 pm [ ], if continues downtrending, will HOLD lovenox and get stat CTA abdomen/pelvis to w/u for active hemorrhage  - IV PPI BID (8/16- )  - Home aspirin currently held  - Iron profile  - Active type and screen from 8/17, maintain active  - Continuing therapeutic lovenox for now  - Cont stool count, monitor for black stools  - No NSAIDs for now, toradol d/c'd    # Candida tropicalis in urine, 8/13  # Acute urinary retention s/p dior placement  Unclear significance of candida in urine on 8/13, less than 99K. UA w WBCs in the 400s, +LE. Initially urinary retention was thought 2/2 medication use (antipsychotics/narcotics), now w unclear significance of this candida. Note she was recently on carbapenems for cellulitis (supra bacterial infection of zoster).   - D/w ID (already consulted for VZV) if this is significant  - TOV 8/17    # Constipation, worsening  Last BM was 6 days ago as per daughter. Likely narcotic induced vs immobility.   - Miralax/senna daily, prune juice  - Dulcolax rectal prn  - Encourage OOB to chair    # Polypharmacy, nPOA, worsening  Discussed with daughter, will be tyring to pare back on high risk medications use now that patient's pain is better controlled post nerve block thanks to NSG procedure 8/16.   - D/c seroquel prns on 8/16, pt not utilizing them  - D/c toradol   - Cont lyrica, trileptal, morphine, trazodone, amyitriptyline, mirtazapine  - Cont prn zyprexa for agitation, pt has not utilized in over 24 hours  - D/c atarax on 8/17  - Morphine taper: 7.5 mg q 4 hours scheduled--> 8/17 decr to 5 mg q 4 hours scheduled of morphine solution (high pill burden, trying to minimize pills where we can)  - PRN narcan kit ordered for when she is d/c'd    # Provoked RUE subclavian DVT, nPOA  Likely provoked by multiple sticks, PICC line.  Diagnosed on 8/8  - cont full dose lovenox  consider OOB to recliner during the day to assist with day/night orientation--> cont to encourage activity/routines  lower extremity doppler discontinued--> no asymmetric swelling, no edema       86F w/ hx CAD s/p stents (1986, 1996), COPD, NPH s/p  shunt, DM, migraines, and HTN presenting with herpes zoster ophthalmicus/encephalitis affecting the right V1 dermatome, now with acute worsening of post herpetic neuralgia, and mrstran bacteremia (treated). She is s/p IV acyclovir, remains with pain, itching and behavioral disturbances c/b acute urinary retention, dior in place. Pain finally improved after NSG did R supra orbital marcaine nerve block 8/16. Will try to wean morphine in the coming days, consideration for methadone if having difficulty weaning, as suggested by Pain Mgmt.    # Coffee ground emesis vs hemoptysis, single episode on 8/16, nPOA  # Anemia, nPOA, worsening  Self limiting episode of emesis (?), though Hgb downtrending in the past 48 hours, though does vacillate around 11. Pt has no hx of gastric ulcers. High risk for a bleed as she is on therapeutic Lovenox for DVT. Pt HDS and in NAD. No hematomas visualized on backside 8/17. Suspect blood loss anemia, source yet to be determined.  - Trend CBC, repeat at 2 pm [ ], if continues downtrending, will HOLD lovenox and get stat CTA abdomen/pelvis to w/u for active hemorrhage  - IV PPI BID (8/16- )  - Home aspirin currently held  - Iron profile  - Active type and screen from 8/17, maintain active  - Continuing therapeutic lovenox for now  - Cont stool count, monitor for black stools  - No NSAIDs for now, toradol d/c'd    # Candida tropicalis in urine, 8/13  # Acute urinary retention s/p dior placement  Unclear significance of candida in urine on 8/13, less than 99K. UA w WBCs in the 400s, +LE. Initially urinary retention was thought 2/2 medication use (antipsychotics/narcotics), now w unclear significance of this candida. Note she was recently on carbapenems for cellulitis (supra bacterial infection of zoster).   - D/w ID (already consulted for VZV) if this is significant  - TOV 8/17    # Constipation, worsening  Last BM was 6 days ago as per daughter. Likely narcotic induced vs immobility.   - Miralax/senna daily, prune juice  - Dulcolax rectal prn  - Encourage OOB to chair    # Polypharmacy, nPOA  Discussed with daughter, will be tyring to pare back on high risk medications use now that patient's pain is better controlled post nerve block thanks to NSG procedure 8/16.   - D/c seroquel prns on 8/16, pt not utilizing them  - D/c toradol   - Cont lyrica, trileptal, morphine, trazodone, amyitriptyline, mirtazapine  - Cont prn zyprexa for agitation, pt has not utilized in over 24 hours  - D/c atarax on 8/17  - Morphine taper: 7.5 mg q 4 hours scheduled--> 8/17 decr to 5 mg q 4 hours scheduled of morphine solution (high pill burden, trying to minimize pills where we can)  - PRN narcan kit ordered for when she is d/c'd    # Provoked RUE subclavian DVT, nPOA  Likely provoked by multiple sticks, PICC line.  Diagnosed on 8/8  - cont full dose lovenox  consider OOB to recliner during the day to assist with day/night orientation--> cont to encourage activity/routines  lower extremity doppler discontinued--> no asymmetric swelling, no edema

## 2022-08-18 NOTE — PROGRESS NOTE ADULT - ASSESSMENT
86F w/ hx CAD s/p stents (1986, 1996), COPD, NPH s/p  shunt, DM, migraines, and HTN presenting with herpes zoster ophthalmicus/encephalitis affecting the right V1 dermatome, now with acute worsening of post herpetic neuralgia, and mrstran bacteremia (treated). She is s/p IV acyclovir, remains with pain, itching and behavioral disturbances c/b acute urinary retention, dior in place. Pain finally improved after NSG did R supra orbital marcaine nerve block 8/16,8/17 with significant improvement. Currently on morphine taper, consideration for methadone if having difficulty weaning, as suggested by Pain Mgmt.    # Coffee ground emesis vs hemoptysis, single episode on 8/16, nPOA  # Anemia, nPOA, worsening  Self limiting episode of emesis (?), though Hgb downtrending in the past 48 hours, does vacillate around 11. Pt has no hx of gastric ulcers. High risk for a bleed as she is on therapeutic Lovenox for DVT. Pt HDS and in NAD. No hematomas visualized on backside 8/17. Suspect blood loss anemia, source yet to be determined.  - Trend CBC, repeat at 2 pm [ ]  - IV PPI BID (8/16- )  - Home aspirin currently held  - Iron profile- ferritin in 60s, iron % 12  - Active type and screen from 8/17, maintain active  - Continuing therapeutic lovenox for now  - Cont stool count, monitor for black stools (brown on 8/18)  - No NSAIDs for now, toradol d/c'd    # Candida tropicalis in urine, 8/13  # Acute urinary retention s/p dior placement  Unclear significance of candida in urine on 8/13, less than 99K. UA w WBCs in the 400s, +LE. Initially urinary retention was thought 2/2 medication use (antipsychotics/narcotics), now w unclear significance of this candida. Note she was recently on carbapenems for cellulitis (supra bacterial infection of zoster).   - D/w ID (already consulted for VZV) if this is significant--> rec'd against treating for now  - TOV 8/17--> failed, family requesting to keep dior out for now  - Cont q 8 hour bladder scans    # Constipation, improving on 8/18  Last BM was 6 days ago as per daughter. Likely narcotic induced vs immobility.   - Miralax/senna daily, prune juice  - Dulcolax rectal prn  - Encourage OOB to chair    # Polypharmacy, nPOA  Discussed with daughter, will be tyring to pare back on high risk medications use now that patient's pain is better controlled post nerve block thanks to NSG procedure 8/16.   - D/c seroquel prns on 8/16, pt not utilizing them  - D/c toradol   - Cont lyrica, morphine, trazodone, amyitriptyline, mirtazapine  - Triletpal 600 mg BID added by Neuro last week--> in secondary of ongoing issues with lethargy and extensive SE panel of this medication (with unclear benefit for her pain), will taper 8/18--> 300 mg BID x 48 hours, then 150 mg BID x 48 hours then off  - Cont prn zyprexa for agitation, pt has not utilized in over 24 hours  - D/c atarax on 8/17  - Morphine taper: 7.5 mg q 4 hours scheduled--> 8/17 decr to 5 mg q 4 hours scheduled of morphine solution (high pill burden, trying to minimize pills where we can)--> 4 mg q 4 hours scheduled on 8/18  - PRN narcan kit ordered for when she is d/c'd  - Encourage oral tylenol use prns     # Provoked RUE subclavian DVT, nPOA  Likely provoked by multiple sticks, PICC line.  Diagnosed on 8/8  - cont full dose lovenox  consider OOB to recliner during the day to assist with day/night orientation--> cont to encourage activity/routines  lower extremity doppler discontinued--> no asymmetric swelling, no edema    # Inguinal fold intertrigo  - Cont nystatin powder  - Trial of duflucan 200 mg PO x once and reassess--> medication interaction risk assessed, class C interactions

## 2022-08-18 NOTE — PROGRESS NOTE ADULT - PROBLEM SELECTOR PLAN 7
NPH diagnosed 2011, pt has programmable  shunt installed by [facility] neurosurg, **must be programmed after MRI (page neurosurg [id number])**. 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

## 2022-08-18 NOTE — CHART NOTE - NSCHARTNOTEFT_GEN_A_CORE
Patient assessed by RDN on 8/11, now for nutrition consult and follow up. Spoke with pt's Aide/Dtr via phone and obtained subjective information from extensive chart review.     Current Diet : Soft and Bite Sized, Consistent Carbohydrate with Glucerna shakes x 3/day    PO intake: 50-75%    Current Weight: No current wts  Height (cm): 160   Weight (kg): 77.111   BMI (kg/m2): 30.1     Nutrition Interval Events: Spoke with pt's Aide and Dtr via phone; Dtr said that pt is eating fairly and would be eating better if pt wasn't in pain and dealing with her current medical issues. Food preferences taken and menu alternatives discussed. Dtr is requesting for Glucerna to be d/bismark as it makes pt upset just by looking at it. Contacted ACP to d/c supplement. Request new wt taken on pt to assess trend. No GI distress at present; pt with fecal incontinence. No edema nor pressure injuries identified. Continue to encourage oral intake as tolerated. RDN services to remain available as needed.     __________________ Pertinent Medications__________________   MEDICATIONS  (STANDING):  albuterol/ipratropium for Nebulization 3 milliLiter(s) Nebulizer every 6 hours  amitriptyline 10 milliGRAM(s) Oral at bedtime  artificial tears (preservative free) Ophthalmic Solution 1 Drop(s) Both EYES every 2 hours  buDESOnide    Inhalation Suspension 0.5 milliGRAM(s) Inhalation two times a day  BUpivacaine 0.5% (Preservative-Free) Injectable 5 milliLiter(s) Local Injection once  BUpivacaine liposome 1.3% Injectable 5 milliLiter(s) Local Injection once  cyanocobalamin 1000 MICROGram(s) Oral daily  dextrose 5%. 1000 milliLiter(s) (100 mL/Hr) IV Continuous <Continuous>  dextrose 5%. 1000 milliLiter(s) (50 mL/Hr) IV Continuous <Continuous>  dextrose 50% Injectable 25 Gram(s) IV Push once  dextrose 50% Injectable 12.5 Gram(s) IV Push once  dextrose 50% Injectable 25 Gram(s) IV Push once  enoxaparin Injectable 80 milliGRAM(s) SubCutaneous every 12 hours  erythromycin   Ointment 1 Application(s) Right EYE four times a day  folic acid 1 milliGRAM(s) Oral daily  furosemide    Tablet 20 milliGRAM(s) Oral daily  glucagon  Injectable 1 milliGRAM(s) IntraMuscular once  insulin glargine Injectable (LANTUS) 10 Unit(s) SubCutaneous at bedtime  insulin lispro (ADMELOG) corrective regimen sliding scale   SubCutaneous Before meals and at bedtime  lactobacillus acidophilus 1 Tablet(s) Oral daily  lidocaine 5% Ointment 1 Application(s) Topical two times a day  loratadine 10 milliGRAM(s) Oral daily  losartan 25 milliGRAM(s) Oral daily  metoprolol succinate ER 12.5 milliGRAM(s) Oral daily  mirtazapine 7.5 milliGRAM(s) Oral at bedtime  morphine   Solution 4 milliGRAM(s) Oral every 4 hours  mupirocin 2% Ointment 1 Application(s) Topical three times a day  nystatin Powder 1 Application(s) Topical two times a day  OXcarbazepine 300 milliGRAM(s) Oral two times a day  pantoprazole  Injectable 40 milliGRAM(s) IV Push two times a day  polyethylene glycol 3350 17 Gram(s) Oral daily  prednisoLONE acetate 1% Suspension 1 Drop(s) Right EYE three times a day  pregabalin 150 milliGRAM(s) Oral every 8 hours  senna 1 Tablet(s) Oral at bedtime  traZODone 100 milliGRAM(s) Oral at bedtime  triamcinolone 0.1% Ointment 1 Application(s) Topical two times a day  ursodiol Tablet 500 milliGRAM(s) Oral <User Schedule>  valACYclovir 1000 milliGRAM(s) Oral every 8 hours      __________________ Pertinent Labs__________________   08-18 Na133 mmol/L<L> Glu 95 mg/dL K+ 4.2 mmol/L Cr  0.62 mg/dL BUN 12 mg/dL 08-16 Phos 3.9 mg/dL 08-17 Alb 3.1 g/dL<L>        Skin: Intact    Estimated Needs:   [x] no change since previous assessment      Previous Nutrition Diagnosis:     Not Ready for Lifestyle Change    Nutrition Diagnosis is [x] ongoing        Goal(s):  1. Patient to meet > 75% estimated energy needs    Recommendations:   1. Please d/c Glucerna shakes per Dtr request.    Monitoring and Evaluation:   1. Monitor weights, labs, BMs, skin integrity, PO intake and edema.  2. RD services to remain available. Request obtaining new weight to assess trend and determine adequacy of PO intake.

## 2022-08-18 NOTE — PROGRESS NOTE ADULT - SUBJECTIVE AND OBJECTIVE BOX
[facility] Division of Hospital Medicine  Aline Gaviria MD  Pager (M-F, 8A-5P): [phone]  Other Times:  [phone]      SUBJECTIVE / OVERNIGHT EVENTS: Pt more lethargic this am compared to day prior. Able to open her L eye however, swelling appears improved. No further episodes of emesis. Stool 8/18 brown. Failed TOV overnight, however family requesting to do no further I/O caths.     MEDICATIONS  (STANDING):  albuterol/ipratropium for Nebulization 3 milliLiter(s) Nebulizer every 6 hours  amitriptyline 10 milliGRAM(s) Oral at bedtime  artificial tears (preservative free) Ophthalmic Solution 1 Drop(s) Both EYES every 2 hours  buDESOnide    Inhalation Suspension 0.5 milliGRAM(s) Inhalation two times a day  BUpivacaine 0.5% (Preservative-Free) Injectable 5 milliLiter(s) Local Injection once  BUpivacaine liposome 1.3% Injectable 5 milliLiter(s) Local Injection once  cyanocobalamin 1000 MICROGram(s) Oral daily  dextrose 5%. 1000 milliLiter(s) (100 mL/Hr) IV Continuous <Continuous>  dextrose 5%. 1000 milliLiter(s) (50 mL/Hr) IV Continuous <Continuous>  dextrose 50% Injectable 25 Gram(s) IV Push once  dextrose 50% Injectable 12.5 Gram(s) IV Push once  dextrose 50% Injectable 25 Gram(s) IV Push once  enoxaparin Injectable 80 milliGRAM(s) SubCutaneous every 12 hours  erythromycin   Ointment 1 Application(s) Right EYE four times a day  folic acid 1 milliGRAM(s) Oral daily  furosemide    Tablet 20 milliGRAM(s) Oral daily  glucagon  Injectable 1 milliGRAM(s) IntraMuscular once  insulin glargine Injectable (LANTUS) 10 Unit(s) SubCutaneous at bedtime  insulin lispro (ADMELOG) corrective regimen sliding scale   SubCutaneous Before meals and at bedtime  lactobacillus acidophilus 1 Tablet(s) Oral daily  lidocaine 5% Ointment 1 Application(s) Topical two times a day  loratadine 10 milliGRAM(s) Oral daily  losartan 25 milliGRAM(s) Oral daily  metoprolol succinate ER 12.5 milliGRAM(s) Oral daily  mirtazapine 7.5 milliGRAM(s) Oral at bedtime  morphine   Solution 4 milliGRAM(s) Oral every 4 hours  mupirocin 2% Ointment 1 Application(s) Topical three times a day  nystatin Powder 1 Application(s) Topical two times a day  OXcarbazepine 300 milliGRAM(s) Oral two times a day  pantoprazole  Injectable 40 milliGRAM(s) IV Push two times a day  polyethylene glycol 3350 17 Gram(s) Oral daily  prednisoLONE acetate 1% Suspension 1 Drop(s) Right EYE three times a day  pregabalin 150 milliGRAM(s) Oral every 8 hours  senna 1 Tablet(s) Oral at bedtime  traZODone 100 milliGRAM(s) Oral at bedtime  triamcinolone 0.1% Ointment 1 Application(s) Topical two times a day  ursodiol Tablet 500 milliGRAM(s) Oral <User Schedule>  valACYclovir 1000 milliGRAM(s) Oral every 8 hours    MEDICATIONS  (PRN):  AQUAPHOR (petrolatum Ointment) 1 Application(s) Topical three times a day PRN inguinal fold rash  dextrose Oral Gel 15 Gram(s) Oral once PRN Blood Glucose LESS THAN 70 milliGRAM(s)/deciliter  OLANZapine Injectable 1.25 milliGRAM(s) IntraMuscular every 6 hours PRN Severe Agitation  traZODone 50 milliGRAM(s) Oral <User Schedule> PRN agitation/restlessness/insomnia      I&O's Summary    17 Aug 2022 07:01  -  18 Aug 2022 07:00  --------------------------------------------------------  IN: 0 mL / OUT: 400 mL / NET: -400 mL        PHYSICAL EXAM:  Vital Signs Last 24 Hrs  T(C): 36.4 (18 Aug 2022 12:00), Max: 36.8 (17 Aug 2022 21:40)  T(F): 97.5 (18 Aug 2022 12:00), Max: 98.2 (17 Aug 2022 21:40)  HR: 86 (18 Aug 2022 12:00) (72 - 86)  BP: 100/78 (18 Aug 2022 12:00) (100/78 - 122/63)  BP(mean): --  RR: 19 (18 Aug 2022 12:00) (18 - 19)  SpO2: 95% (18 Aug 2022 12:00) (93% - 95%)    Parameters below as of 18 Aug 2022 12:00  Patient On (Oxygen Delivery Method): room air      CONSTITUTIONAL: NAD, lethargic  EYES: R eye swollen shut  ENMT: moist MM, normal dentition  RESPIRATORY: Normal respiratory effort; grossly b/l AE  CARDIOVASCULAR: Regular rate and rhythm; No lower extremity edema;   ABDOMEN: Nontender to palpation, normoactive bowel sounds  MUSCULOSKELETAL:no clubbing or cyanosis of digits; no joint swelling or tenderness to palpation, LE edema improved, no pitting, symmetric LEs  NEUROLOGY: CN 2-12 are intact and symmetric; no gross sensory deficits, oriented to person, place, month and season, slow processing  SKIN: R V1 dermatome no vesicles,extensive raw skin, lesions w beige exudate, no crusting at this time, no evidence of supra-infection, look to be improving on exam 8/17; inguinal folds exam on 8/17 w mild erythema no skin breakdown, pt rolled and back examined, no evidence of hematoma, wanda-rectal area with minimal erythema worse on the R with no skin breakdown or ulceration    LABS:                        9.8    7.42  )-----------( 354      ( 18 Aug 2022 06:55 )             31.7     08-18    133<L>  |  97<L>  |  12  ----------------------------<  95  4.2   |  26  |  0.62    Ca    8.4      18 Aug 2022 06:55    TPro  7.0  /  Alb  3.1<L>  /  TBili  0.2  /  DBili  x   /  AST  17  /  ALT  8   /  AlkPhos  106  08-17                RADIOLOGY & ADDITIONAL TESTS:  Results Reviewed:   Imaging Personally Reviewed:  Electrocardiogram Personally Reviewed:    COORDINATION OF CARE:  Care Discussed with Consultants/Other Providers [Y/N]: Neuro  Prior or Outpatient Records Reviewed [Y/N]:

## 2022-08-19 NOTE — PROGRESS NOTE ADULT - PROBLEM SELECTOR PLAN 1
completed acyclovir--> Optho suggests re-initiation of acyclovir 8/15, will d/w ID if oral vs IV-- ID recs oral 1 g TID for the next 2 weeks minimum then reassess  Morphine 7.5 mg every 4 hours standing. without PRNs;   cont trileptal 450 BID, sodium stable, monitor closely--> now on 600 mg BID  gabapentin transitioned to lyrica 150 mg every 8 hours--> ctm  NSG consulted for nerve block--> rec's OMFS consult 8/15  current pain  better controlled, sedated at times but arousable with intermittent bursts of yelling and agitation with scratching face  - Ophthalmology following, apprec recs: erythromycin ointment QID to eye &periocular lesions, artificial tears Q4H  - Appreciate derm recs:    - lidocaine ointment mixed with vaseline BID, triamcinolone ointment to red areas only BID    - apply vaseline to crusted areas Q2H    *Alternative pain mgmt therapies not yet tried: higher doses of gabapentin (pt received 600 mg TID earlier this month, but did not get higher doses than this), methadone also recommended by Pain Mgmt--> trying to avoid additional narcotics for her, but can consider these options if the nerve block is inadequate.*  - NSG repeat bupivicaine supraorbital nerve block on 8/19-- effective

## 2022-08-19 NOTE — PROGRESS NOTE ADULT - PROBLEM SELECTOR PLAN 11
unclear etiology, poss immobility, poss infection, poss due to narcotics  indwelling urinary catheter placed 8/13, failed TOV 8/17, placed again 8/19  UA+  Ucx P

## 2022-08-19 NOTE — PROGRESS NOTE ADULT - SUBJECTIVE AND OBJECTIVE BOX
Neurology Progress Note    S: Patient seen and examined.  daughter at bedside.  fever overnight. was a bit confused but better     Medication:  MEDICATIONS  (STANDING):  albuterol/ipratropium for Nebulization 3 milliLiter(s) Nebulizer every 6 hours  artificial tears (preservative free) Ophthalmic Solution 1 Drop(s) Both EYES every 2 hours  buDESOnide    Inhalation Suspension 0.5 milliGRAM(s) Inhalation two times a day  BUpivacaine 0.5% (Preservative-Free) Injectable 5 milliLiter(s) Local Injection once  BUpivacaine liposome 1.3% Injectable 5 milliLiter(s) Local Injection once  BUpivacaine liposome 1.3% Injectable 5 milliLiter(s) Local Injection once  cyanocobalamin 1000 MICROGram(s) Oral daily  dextrose 5%. 1000 milliLiter(s) (100 mL/Hr) IV Continuous <Continuous>  dextrose 5%. 1000 milliLiter(s) (50 mL/Hr) IV Continuous <Continuous>  dextrose 50% Injectable 25 Gram(s) IV Push once  dextrose 50% Injectable 12.5 Gram(s) IV Push once  dextrose 50% Injectable 25 Gram(s) IV Push once  enoxaparin Injectable 80 milliGRAM(s) SubCutaneous every 12 hours  erythromycin   Ointment 1 Application(s) Right EYE four times a day  folic acid 1 milliGRAM(s) Oral daily  furosemide    Tablet 20 milliGRAM(s) Oral daily  glucagon  Injectable 1 milliGRAM(s) IntraMuscular once  insulin glargine Injectable (LANTUS) 10 Unit(s) SubCutaneous at bedtime  insulin lispro (ADMELOG) corrective regimen sliding scale   SubCutaneous Before meals and at bedtime  lactobacillus acidophilus 1 Tablet(s) Oral daily  lidocaine 5% Ointment 1 Application(s) Topical two times a day  loratadine 10 milliGRAM(s) Oral daily  metoprolol succinate ER 12.5 milliGRAM(s) Oral daily  mirtazapine 7.5 milliGRAM(s) Oral at bedtime  morphine   Solution 4 milliGRAM(s) Oral every 4 hours  mupirocin 2% Ointment 1 Application(s) Topical three times a day  nystatin Powder 1 Application(s) Topical two times a day  OXcarbazepine 300 milliGRAM(s) Oral two times a day  pantoprazole    Tablet 40 milliGRAM(s) Oral before breakfast  piperacillin/tazobactam IVPB. 3.375 Gram(s) IV Intermittent once  piperacillin/tazobactam IVPB.. 3.375 Gram(s) IV Intermittent every 8 hours  polyethylene glycol 3350 17 Gram(s) Oral daily  prednisoLONE acetate 1% Suspension 1 Drop(s) Right EYE three times a day  pregabalin 150 milliGRAM(s) Oral every 8 hours  senna 1 Tablet(s) Oral at bedtime  traZODone 100 milliGRAM(s) Oral at bedtime  triamcinolone 0.1% Ointment 1 Application(s) Topical two times a day  ursodiol Tablet 500 milliGRAM(s) Oral <User Schedule>  valACYclovir 1000 milliGRAM(s) Oral every 8 hours  vancomycin  IVPB 1000 milliGRAM(s) IV Intermittent every 12 hours    MEDICATIONS  (PRN):  acetaminophen     Tablet .. 650 milliGRAM(s) Oral every 6 hours PRN Severe Pain (7 - 10)  AQUAPHOR (petrolatum Ointment) 1 Application(s) Topical three times a day PRN inguinal fold rash  dextrose Oral Gel 15 Gram(s) Oral once PRN Blood Glucose LESS THAN 70 milliGRAM(s)/deciliter  OLANZapine Injectable 1.25 milliGRAM(s) IntraMuscular every 6 hours PRN Severe Agitation      Vitals:    Vital Signs Last 24 Hrs  T(C): 37.1 (08-19-22 @ 05:30), Max: 38.4 (08-19-22 @ 00:45)  T(F): 98.7 (08-19-22 @ 05:30), Max: 101.1 (08-19-22 @ 00:45)  HR: 82 (08-19-22 @ 05:30) (70 - 105)  BP: 103/60 (08-19-22 @ 05:30) (100/78 - 153/62)  BP(mean): --  RR: 17 (08-19-22 @ 05:30) (17 - 19)  SpO2: 96% (08-19-22 @ 05:30) (91% - 100%)            General:  Constitutional: elderly female female, appears stated age, crying out during paroxysms of pain  Head: Normocephalic, prefers to keep eyes closed for comfort  Extremities: No cyanosis; Skin: No lauri edema of LE  Resp: breathing comfortably   Skin: crusted erythematous rash in R V1 distribution    Neurological (>12):  MS: Awake, alert.  Follows commands. Attends to examiner, AAOx1-2   Language: Speech is clear, fluent, normal volume, good repetition,  comprehension, registration of words.  CNs: PERRL (R 3mm, L 3mm). VFF. EOMI. V1-3 intact LT, No facial asymmetry b/l, full. Hearing grossly normal (rubbing fingers) b/l. Tongue midline.     Motor - Normal bulk throughout. All limbs at least anti-gravity.  Sensation: Intact to LT b/l. Cortical: Extinction on DSS (neglect): none  Reflexes L/R:  Biceps(C5) 2/2  BR(C6) 2/2   Triceps(C7)  2/2 Patellar(L4)   2/2   Toes: mute  Coordination: intact in upper extremities  Gait: deferred due to bedbound status      I personally reviewed the below data/images/labs:  CBC Full  -  ( 19 Aug 2022 02:09 )  WBC Count : 10.48 K/uL  RBC Count : 3.86 M/uL  Hemoglobin : 10.2 g/dL  Hematocrit : 31.4 %  Platelet Count - Automated : 347 K/uL  Mean Cell Volume : 81.3 fL  Mean Cell Hemoglobin : 26.4 pg  Mean Cell Hemoglobin Concentration : 32.5 gm/dL  Auto Neutrophil # : 8.74 K/uL  Auto Lymphocyte # : 0.95 K/uL  Auto Monocyte # : 0.57 K/uL  Auto Eosinophil # : 0.17 K/uL  Auto Basophil # : 0.02 K/uL  Auto Neutrophil % : 83.4 %  Auto Lymphocyte % : 9.1 %  Auto Monocyte % : 5.4 %  Auto Eosinophil % : 1.6 %  Auto Basophil % : 0.2 %    08-19    133<L>  |  96<L>  |  13  ----------------------------<  161<H>  4.3   |  27  |  0.67    Ca    8.6      19 Aug 2022 02:09  Phos  3.5     08-19  Mg     1.60     08-19    TPro  6.1  /  Alb  2.8<L>  /  TBili  <0.2  /  DBili  <0.2  /  AST  12  /  ALT  7   /  AlkPhos  97  08-19

## 2022-08-19 NOTE — CHART NOTE - NSCHARTNOTEFT_GEN_A_CORE
RN reported tachycardia     RN states pt maintaining HR in low 100s. Rectal temperature found to be 101.1. Chart reviewed, pt assessed at bedside. mentating at baseline responsive to verbal stimuli but unable to answer questions.     Vital Signs Last 24 Hrs  T(C): 38.4 (19 Aug 2022 00:45), Max: 38.4 (19 Aug 2022 00:45)  T(F): 101.1 (19 Aug 2022 00:45), Max: 101.1 (19 Aug 2022 00:45)  HR: 81 (19 Aug 2022 04:09) (70 - 105)  BP: 153/62 (18 Aug 2022 23:00) (100/78 - 153/62)  BP(mean): --  RR: 19 (18 Aug 2022 23:00) (18 - 19)  SpO2: 98% (19 Aug 2022 04:09) (91% - 100%)    Parameters below as of 19 Aug 2022 04:09  Patient On (Oxygen Delivery Method): nasal cannula    Physical Exam  Gen: NAD, AOx1  Heart: S1 S2 no MRC  Lungs: CTABL no wheezing rales or rhonchi  Abdomen: soft, nontender, nondistended, normal active bowel sounds   : no suprapubic tenderness, no CVAT     PLAN:     -acetaminophen 1000mg IV x1 STAT  -UA, CBC, BMP with Mg and Phos, blood cultures x2, RVP, lactate  -Chest x-ray RN reported tachycardia     RN states pt maintaining HR in low 100s. Rectal temperature found to be 101.1. Chart reviewed, pt assessed at bedside. Responsive to verbal stimuli but unable to answer questions, unable to obtain Hx and ROS.     Vital Signs Last 24 Hrs  T(C): 38.4 (19 Aug 2022 00:45), Max: 38.4 (19 Aug 2022 00:45)  T(F): 101.1 (19 Aug 2022 00:45), Max: 101.1 (19 Aug 2022 00:45)  HR: 81 (19 Aug 2022 04:09) (70 - 105)  BP: 153/62 (18 Aug 2022 23:00) (100/78 - 153/62)  BP(mean): --  RR: 19 (18 Aug 2022 23:00) (18 - 19)  SpO2: 98% (19 Aug 2022 04:09) (91% - 100%)    Parameters below as of 19 Aug 2022 04:09  Patient On (Oxygen Delivery Method): nasal cannula    Physical Exam  Gen: NAD, AOx1  Heart: S1 S2 no MRC  Lungs: CTABL no wheezing rales or rhonchi  Abdomen: soft, nontender, nondistended, normal active bowel sounds   : no suprapubic tenderness, no CVAT     PLAN:   -acetaminophen 1000mg IV x1 STAT  -UA, CBC, BMP with Mg and Phos, blood cultures x2, RVP, lactate  -Chest x-ray

## 2022-08-19 NOTE — PROGRESS NOTE ADULT - ASSESSMENT
86F w/ hx CAD s/p stents (1986, 1996), COPD, NPH s/p  shunt, DM, migraines, and HTN presenting with herpes zoster ophthalmicus/encephalitis affecting the right V1 dermatome, now with acute worsening of post herpetic neuralgia, and mrstran bacteremia (treated). She is s/p IV acyclovir, remains with pain, itching and behavioral disturbances c/b acute urinary retention, dior in place. Pain finally improved after NSG did R supra orbital marcaine nerve block 8/16,8/17 with significant improvement. Currently on morphine taper, consideration for methadone if having difficulty weaning, as suggested by Pain Mgmt. Fever on 8/19, multiple sources likely including pulmonary (PNA) and urine (UTI).     # Fever, new on 8/19, nPOA  # AHRF, acute  Suspect multifactorial-- UA w nitrite positive, ongoing issues w retention; also w worsening hypoxia, productive cough, and rhonchorous breath sounds. CXR w L pleural effusion and consilation of LLL on 8/19. Treat empirically for PNA as well as UTI. RVP neg. Note PICC line was removed on 8/18. Wound from zoster appears to be healing well, unlikely to be source of fever.   - F/u culture data- urine [ ], blood [ ], sputum [ ]  - Continue vanc (8/19- ), zosyn (8/19)--> transition to cefepime (8/19- ) to avoid nephrotoxicity; narrow based on culture data  - Cont duonebs q 6 hrs scheduled  - Incentive spirometry    # Coffee ground emesis vs hemoptysis, single episode on 8/16, nPOA, resolved  # Anemia, nPOA, stable  Self limiting episode of emesis (?), though Hgb downtrending in the past 48 hours, does vacillate around 11. Pt has no hx of gastric ulcers. High risk for a bleed as she is on therapeutic Lovenox for DVT. Pt HDS and in NAD. No hematomas visualized on backside 8/17. Suspect blood loss anemia, source yet to be determined. Possibly from event (gstric ulcer?) on 8/16 and frequent blood draws.  - IV PPI BID (8/16-8/18)--> oral protonix 8/19  - Home aspirin currently held  - Iron profile- ferritin in 60s, iron % 12  - Active type and screen from 8/17, maintain active  - Continuing therapeutic lovenox for now  - Cont stool count, monitor for black stools (brown on 8/18)  - No NSAIDs for now, toradol d/c'd  - Resume home folic acid and b12 supplementation    # Candida tropicalis in urine, 8/13  # Acute urinary retention s/p dior placement  Unclear significance of candida in urine on 8/13, less than 99K. UA w WBCs in the 400s, +LE. Initially urinary retention was thought 2/2 medication use (antipsychotics/narcotics), now w unclear significance of this candida. Note she was recently on carbapenems for cellulitis (supra bacterial infection of zoster).  Dior placed again on 8/19 after failed TOV.  - D/w ID (already consulted for VZV) if this is significant--> rec'd against treating for now  - Next TOV with be with flomax 0.4 mg nightly     # Constipation, improving on 8/18  Last BM was 6 days ago as per daughter. Likely narcotic induced vs immobility.   - Miralax/senna daily, prune juice  - Dulcolax rectal prn  - Encourage OOB to chair    # Polypharmacy, nPOA  Discussed with daughter, will be tyring to pare back on high risk medications use now that patient's pain is better controlled post nerve block thanks to NSG procedure 8/16.   - D/c seroquel prns on 8/16, pt not utilizing them  - D/c toradol   - Cont lyrica, morphine, trazodone, amyitriptyline (w hold parameters for sedation or AMS), mirtazapine  - Triletpal 600 mg BID added by Neuro last week--> in secondary of ongoing issues with lethargy and extensive SE panel of this medication (with unclear benefit for her pain), will taper 8/18--> 300 mg BID x 48 hours, then 150 mg BID x 48 hours then off  - Cont prn zyprexa for agitation, pt has not utilized in over 24 hours  - D/c atarax on 8/17  - Morphine taper: 7.5 mg q 4 hours scheduled--> 8/17 decr to 5 mg q 4 hours scheduled of morphine solution (high pill burden, trying to minimize pills where we can)--> 4 mg q 4 hours scheduled on 8/18--> 4 mg q 4 hours PRN on 8/19  - PRN narcan kit ordered for when she is d/c'd  - Encourage oral tylenol use prns     # Provoked RUE subclavian DVT, nPOA  Likely provoked by multiple sticks, PICC line.  Diagnosed on 8/8  - cont full dose lovenox  consider OOB to recliner during the day to assist with day/night orientation--> cont to encourage activity/routines  lower extremity doppler discontinued--> no asymmetric swelling, no edema  - Price check eliquis [ ]    # Inguinal fold intertrigo  - Cont nystatin powder  - Trial of duflucan 200 mg PO x once and reassess--> medication interaction risk assessed, class C interactions     86F w/ hx CAD s/p stents (1986, 1996), COPD, NPH s/p  shunt, DM, migraines, and HTN presenting with herpes zoster ophthalmicus/encephalitis affecting the right V1 dermatome, now with acute worsening of post herpetic neuralgia, and mrstran bacteremia (treated). She is s/p IV acyclovir, remains with pain, itching and behavioral disturbances c/b acute urinary retention, dior in place. Pain finally improved after NSG did R supra orbital marcaine nerve block 8/16,8/17 with significant improvement. Currently on morphine taper, consideration for methadone if having difficulty weaning, as suggested by Pain Mgmt. Fever on 8/19, multiple sources likely including pulmonary (PNA) and urine (UTI).     # Fever, new on 8/19, nPOA  # AHRF, acute  Suspect multifactorial-- UA w nitrite positive, ongoing issues w retention; also w worsening hypoxia, productive cough, and rhonchorous breath sounds. CXR w L pleural effusion and consilation of LLL on 8/19. Treat empirically for PNA as well as UTI. RVP neg. Note PICC line was removed on 8/18. Wound from zoster appears to be healing well, unlikely to be source of fever.   - F/u culture data- urine [ ], blood [ ], sputum [ ]  - Continue vanc (8/19- ), zosyn (8/19)--> transition to cefepime (8/19- ) to avoid nephrotoxicity; narrow based on culture data  - Cont duonebs q 6 hrs scheduled  - Incentive spirometry    # Coffee ground emesis vs hemoptysis, single episode on 8/16, nPOA, resolved  # Anemia, nPOA, stable  Self limiting episode of emesis (?), though Hgb downtrending in the past 48 hours, does vacillate around 11. Pt has no hx of gastric ulcers. High risk for a bleed as she is on therapeutic Lovenox for DVT. Pt HDS and in NAD. No hematomas visualized on backside 8/17. Suspect blood loss anemia, source yet to be determined. Possibly from event (gstric ulcer?) on 8/16 and frequent blood draws.  - IV PPI BID (8/16-8/18)--> oral protonix 8/19  - Home aspirin currently held  - Iron profile- ferritin in 60s, iron % 12  - Active type and screen from 8/17, maintain active  - Continuing therapeutic lovenox for now  - Cont stool count, monitor for black stools (brown on 8/18)  - No NSAIDs for now, toradol d/c'd  - Resume home folic acid and b12 supplementation    # Candida tropicalis in urine, 8/13  # Acute urinary retention s/p dior placement  Unclear significance of candida in urine on 8/13, less than 99K. UA w WBCs in the 400s, +LE. Initially urinary retention was thought 2/2 medication use (antipsychotics/narcotics), now w unclear significance of this candida. Note she was recently on carbapenems for cellulitis (supra bacterial infection of zoster).  Dior placed again on 8/19 after failed TOV.  - D/w ID (already consulted for VZV) if this is significant--> rec'd against treating for now  - Next TOV with be with flomax 0.4 mg nightly     # Constipation, improving on 8/18  Last BM was 6 days ago as per daughter. Likely narcotic induced vs immobility.   - Miralax/senna daily, prune juice  - Dulcolax rectal prn  - Encourage OOB to chair    # Polypharmacy, nPOA  Discussed with daughter, will be tyring to pare back on high risk medications use now that patient's pain is better controlled post nerve block thanks to NSG procedure 8/16.   - D/c seroquel prns on 8/16, pt not utilizing them  - D/c toradol   - Cont lyrica, morphine, trazodone, amyitriptyline (w hold parameters for sedation or AMS), mirtazapine; intermittent QTc monitoring, EKG on 8/19 showed QTc of 455  - Triletpal 600 mg BID added by Neuro last week--> in secondary of ongoing issues with lethargy and extensive SE panel of this medication (with unclear benefit for her pain), will taper 8/18--> 300 mg BID x 48 hours, then 150 mg BID x 48 hours then off  - Cont prn zyprexa for agitation, pt has not utilized in over 24 hours  - D/c atarax on 8/17  - Morphine taper: 7.5 mg q 4 hours scheduled--> 8/17 decr to 5 mg q 4 hours scheduled of morphine solution (high pill burden, trying to minimize pills where we can)--> 4 mg q 4 hours scheduled on 8/18--> 4 mg q 4 hours PRN on 8/19  - PRN narcan kit ordered for when she is d/c'd  - Encourage oral tylenol use prns     # Provoked RUE subclavian DVT, nPOA  Likely provoked by multiple sticks, PICC line.  Diagnosed on 8/8  - cont full dose lovenox  consider OOB to recliner during the day to assist with day/night orientation--> cont to encourage activity/routines  lower extremity doppler discontinued--> no asymmetric swelling, no edema  - Price check eliquis [ ]    # Inguinal fold intertrigo  - Cont nystatin powder  - Trial of duflucan 200 mg PO x once and reassess--> medication interaction risk assessed, class C interactions     86F w/ hx CAD s/p stents (1986, 1996), COPD, NPH s/p  shunt, DM, migraines, and HTN presenting with herpes zoster ophthalmicus/encephalitis affecting the right V1 dermatome, now with acute worsening of post herpetic neuralgia, and mrstran bacteremia (treated). She is s/p IV acyclovir, remains with pain, itching and behavioral disturbances c/b acute urinary retention, dior in place. Pain finally improved after NSG did R supra orbital marcaine nerve block 8/16,8/17 with significant improvement. Currently on morphine taper, consideration for methadone if having difficulty weaning, as suggested by Pain Mgmt. Fever on 8/19, multiple sources likely including pulmonary (PNA) and urine (UTI).     # Fever, new on 8/19, nPOA  # AHRF, acute  Suspect multifactorial-- UA w nitrite positive, ongoing issues w retention; also w worsening hypoxia, productive cough, and rhonchorous breath sounds. Possible aspiration event on 8/18. CXR w L pleural effusion and consilation of LLL on 8/19. Treat empirically for PNA as well as UTI. RVP neg. Note PICC line was removed on 8/18. Wound from zoster appears to be healing well, unlikely to be source of fever.   - F/u culture data- urine [ ], blood [ ], sputum [ ]  - Continue vanc (8/19- ), zosyn (8/19- )--> ideally would transition to cefepime to avoid nephrotoxicity however given this possible aspiration event will keep zosyn for now; narrow based on culture data  - Cont duonebs q 6 hrs scheduled  - Incentive spirometry  - Chest PT    # Coffee ground emesis vs hemoptysis, single episode on 8/16, nPOA, resolved  # Anemia, nPOA, stable  Self limiting episode of emesis (?), though Hgb downtrending in the past 48 hours, does vacillate around 11. Pt has no hx of gastric ulcers. High risk for a bleed as she is on therapeutic Lovenox for DVT. Pt HDS and in NAD. No hematomas visualized on backside 8/17. Suspect blood loss anemia, source yet to be determined. Possibly from event (gstric ulcer?) on 8/16 and frequent blood draws.  - IV PPI BID (8/16-8/18)--> oral protonix 8/19  - Home aspirin currently held  - Iron profile- ferritin in 60s, iron % 12  - Active type and screen from 8/17, maintain active  - Continuing therapeutic lovenox for now  - Cont stool count, monitor for black stools (brown on 8/18)  - No NSAIDs for now, toradol d/c'd  - Resume home folic acid and b12 supplementation    # Candida tropicalis in urine, 8/13  # Acute urinary retention s/p dior placement  Unclear significance of candida in urine on 8/13, less than 99K. UA w WBCs in the 400s, +LE. Initially urinary retention was thought 2/2 medication use (antipsychotics/narcotics), now w unclear significance of this candida. Note she was recently on carbapenems for cellulitis (supra bacterial infection of zoster).  Dior placed again on 8/19 after failed TOV.  - D/w ID (already consulted for VZV) if this is significant--> rec'd against treating for now  - Next TOV with be with flomax 0.4 mg nightly     # Constipation, improving on 8/18  Last BM was 6 days ago as per daughter. Likely narcotic induced vs immobility.   - Miralax/senna daily, prune juice  - Dulcolax rectal prn  - Encourage OOB to chair    # Polypharmacy, nPOA  Discussed with daughter, will be tyring to pare back on high risk medications use now that patient's pain is better controlled post nerve block thanks to NSG procedure 8/16.   - D/c seroquel prns on 8/16, pt not utilizing them  - D/c toradol   - Cont lyrica, morphine, trazodone, amyitriptyline (w hold parameters for sedation or AMS), mirtazapine; intermittent QTc monitoring, EKG on 8/19 showed QTc of 455  - Triletpal 600 mg BID added by Neuro last week--> in secondary of ongoing issues with lethargy and extensive SE panel of this medication (with unclear benefit for her pain), will taper 8/18--> 300 mg BID x 48 hours, then 150 mg BID x 48 hours then off  - Cont prn zyprexa for agitation, pt has not utilized in over 24 hours  - D/c atarax on 8/17  - Morphine taper: 7.5 mg q 4 hours scheduled--> 8/17 decr to 5 mg q 4 hours scheduled of morphine solution (high pill burden, trying to minimize pills where we can)--> 4 mg q 4 hours scheduled on 8/18--> 4 mg q 4 hours PRN on 8/19  - PRN narcan kit ordered for when she is d/c'd  - Encourage oral tylenol use prns     # Provoked RUE subclavian DVT, nPOA  Likely provoked by multiple sticks, PICC line.  Diagnosed on 8/8  - cont full dose lovenox  consider OOB to recliner during the day to assist with day/night orientation--> cont to encourage activity/routines  lower extremity doppler discontinued--> no asymmetric swelling, no edema  - Price check eliquis [ ]    # Inguinal fold intertrigo  - Cont nystatin powder  - Trial of duflucan 200 mg PO x once and reassess--> medication interaction risk assessed, class C interactions

## 2022-08-19 NOTE — CHART NOTE - NSCHARTNOTEFT_GEN_A_CORE
Dw patients daughter, pain had significantly recurred after original experal block. Right supraorbital region infiltrated with 2cc experal after area cleaned with isopropyl alcohol. No complications. Daughter at bedside. Plan d/w Dr. Pereyra.

## 2022-08-19 NOTE — PROGRESS NOTE ADULT - PROBLEM SELECTOR PLAN 4
Hx of severe HTN at home.  - continue home losartan--> decr 50 to 25 mg daily on 8/17, HOLD on 8/19 w low BPs, cont metoprolol  - Held lasix on admission --> resume lasix 20 mg 8/15 given pitting edema of lower extremities  - Holding spirololactone   BP control has been adequate, cont to monitor  elevated at times of pain/agitation monitor closely

## 2022-08-19 NOTE — PROGRESS NOTE ADULT - SUBJECTIVE AND OBJECTIVE BOX
[facility] DEPARTMENT OF OPHTHALMOLOGY  ------------------------------------------------------------------------------    Interval History: Following for R HZO. No acute events overnight. Today patient appears significantly more comfortable after supraorbital nerve block.      MEDICATIONS  (STANDING):  albuterol/ipratropium for Nebulization 3 milliLiter(s) Nebulizer every 6 hours  amitriptyline 10 milliGRAM(s) Oral at bedtime  artificial tears (preservative free) Ophthalmic Solution 1 Drop(s) Both EYES every 2 hours  buDESOnide    Inhalation Suspension 0.5 milliGRAM(s) Inhalation two times a day  BUpivacaine 0.5% (Preservative-Free) Injectable 5 milliLiter(s) Local Injection once  BUpivacaine liposome 1.3% Injectable 5 milliLiter(s) Local Injection once  BUpivacaine liposome 1.3% Injectable 5 milliLiter(s) Local Injection once  cyanocobalamin 1000 MICROGram(s) Oral daily  dextrose 5%. 1000 milliLiter(s) (100 mL/Hr) IV Continuous <Continuous>  dextrose 5%. 1000 milliLiter(s) (50 mL/Hr) IV Continuous <Continuous>  dextrose 50% Injectable 25 Gram(s) IV Push once  dextrose 50% Injectable 12.5 Gram(s) IV Push once  dextrose 50% Injectable 25 Gram(s) IV Push once  enoxaparin Injectable 80 milliGRAM(s) SubCutaneous every 12 hours  erythromycin   Ointment 1 Application(s) Right EYE four times a day  folic acid 1 milliGRAM(s) Oral daily  furosemide    Tablet 20 milliGRAM(s) Oral daily  glucagon  Injectable 1 milliGRAM(s) IntraMuscular once  insulin glargine Injectable (LANTUS) 10 Unit(s) SubCutaneous at bedtime  insulin lispro (ADMELOG) corrective regimen sliding scale   SubCutaneous Before meals and at bedtime  lactobacillus acidophilus 1 Tablet(s) Oral daily  lidocaine 5% Ointment 1 Application(s) Topical two times a day  loratadine 10 milliGRAM(s) Oral daily  magnesium sulfate  IVPB 2 Gram(s) IV Intermittent once  metoprolol succinate ER 12.5 milliGRAM(s) Oral daily  mirtazapine 7.5 milliGRAM(s) Oral at bedtime  mupirocin 2% Ointment 1 Application(s) Topical three times a day  nystatin Powder 1 Application(s) Topical two times a day  OXcarbazepine 150 milliGRAM(s) Oral every 12 hours  pantoprazole    Tablet 40 milliGRAM(s) Oral before breakfast  piperacillin/tazobactam IVPB.. 3.375 Gram(s) IV Intermittent every 8 hours  polyethylene glycol 3350 17 Gram(s) Oral daily  prednisoLONE acetate 1% Suspension 1 Drop(s) Right EYE three times a day  pregabalin 150 milliGRAM(s) Oral every 8 hours  senna 1 Tablet(s) Oral at bedtime  traZODone 100 milliGRAM(s) Oral at bedtime  triamcinolone 0.1% Ointment 1 Application(s) Topical two times a day  ursodiol Tablet 500 milliGRAM(s) Oral <User Schedule>  valACYclovir 1000 milliGRAM(s) Oral every 8 hours  vancomycin  IVPB 1000 milliGRAM(s) IV Intermittent every 12 hours    MEDICATIONS  (PRN):  acetaminophen     Tablet .. 650 milliGRAM(s) Oral every 6 hours PRN Severe Pain (7 - 10)  AQUAPHOR (petrolatum Ointment) 1 Application(s) Topical three times a day PRN inguinal fold rash  dextrose Oral Gel 15 Gram(s) Oral once PRN Blood Glucose LESS THAN 70 milliGRAM(s)/deciliter  morphine   Solution 4 milliGRAM(s) Oral every 4 hours PRN Severe Pain (7 - 10)  OLANZapine Injectable 1.25 milliGRAM(s) IntraMuscular every 6 hours PRN Severe Agitation      VITALS: T(C): 36.3 (08-19-22 @ 15:02)  T(F): 97.4 (08-19-22 @ 15:02), Max: 101.1 (08-19-22 @ 00:45)  HR: 88 (08-19-22 @ 15:02) (70 - 105)  BP: 134/58 (08-19-22 @ 15:02) (103/60 - 153/62)  RR:  (17 - 19)  SpO2:  (91% - 100%)  Wt(kg): --  General: AAO x 2-3, appropriate mood and affect    Ophthalmology Exam:  Visual acuity (sc): 20/70 OD, 20/30 OS  Pupils: 6mm min reactive OD, 1mm OS, no APD  Ttono: 11 OD, 10 OS  Extraocular movements (EOMs): 90% restriction in abduction OD, full OS    Pen Light Exam (PLE)  External: R sided forehead edema, resolution of brown crusting with underlying fresh skin, involvement of upper eyelid, no Metcalf sign, bandaged up  Lids/Lashes/Lacrimal Ducts: trace periorbital edema RUL, flat RLL, flat OS   Sclera/Conjunctiva: Tr injection OD, white and quiet OS.  Cornea: 1+ SPK OD, no pseudodendrites, tr D-folds OD, Cl OS  Anterior Chamber: Deep and formed OU.    Iris: Flat OU.  Lens: PCIOL OD, 3+ NS OS

## 2022-08-19 NOTE — PROGRESS NOTE ADULT - PROBLEM SELECTOR PLAN 7
NPH diagnosed 2011, pt has programmable  shunt installed by [facility] neurosurg, **must be programmed after MRI (page neurosurg [id number])**. 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

## 2022-08-19 NOTE — PROGRESS NOTE ADULT - SUBJECTIVE AND OBJECTIVE BOX
[facility] Division of Hospital Medicine  Aline Gaviria MD  Pager (M-F, 8A-5P): [phone]  Other Times:  [phone]      SUBJECTIVE / OVERNIGHT EVENTS: Pt febrile overnight with hypoxia in the low 90s on 2L o2. Possible aspiration event at dinner w choking w food as per daughter. Productive cough, green phlegm. Failed TOV, ongoing urinary retention. Indwelling dior placed . Most of patient's am meds were held due to hypotension.    Pt answering questions, currently denies pain, just recieved dose of morphine at around 9:30am.     MEDICATIONS  (STANDING):  albuterol/ipratropium for Nebulization 3 milliLiter(s) Nebulizer every 6 hours  amitriptyline 10 milliGRAM(s) Oral at bedtime  artificial tears (preservative free) Ophthalmic Solution 1 Drop(s) Both EYES every 2 hours  buDESOnide    Inhalation Suspension 0.5 milliGRAM(s) Inhalation two times a day  BUpivacaine 0.5% (Preservative-Free) Injectable 5 milliLiter(s) Local Injection once  BUpivacaine liposome 1.3% Injectable 5 milliLiter(s) Local Injection once  BUpivacaine liposome 1.3% Injectable 5 milliLiter(s) Local Injection once  cyanocobalamin 1000 MICROGram(s) Oral daily  dextrose 5%. 1000 milliLiter(s) (100 mL/Hr) IV Continuous <Continuous>  dextrose 5%. 1000 milliLiter(s) (50 mL/Hr) IV Continuous <Continuous>  dextrose 50% Injectable 25 Gram(s) IV Push once  dextrose 50% Injectable 12.5 Gram(s) IV Push once  dextrose 50% Injectable 25 Gram(s) IV Push once  enoxaparin Injectable 80 milliGRAM(s) SubCutaneous every 12 hours  erythromycin   Ointment 1 Application(s) Right EYE four times a day  folic acid 1 milliGRAM(s) Oral daily  furosemide    Tablet 20 milliGRAM(s) Oral daily  glucagon  Injectable 1 milliGRAM(s) IntraMuscular once  insulin glargine Injectable (LANTUS) 10 Unit(s) SubCutaneous at bedtime  insulin lispro (ADMELOG) corrective regimen sliding scale   SubCutaneous Before meals and at bedtime  lactobacillus acidophilus 1 Tablet(s) Oral daily  lidocaine 5% Ointment 1 Application(s) Topical two times a day  loratadine 10 milliGRAM(s) Oral daily  metoprolol succinate ER 12.5 milliGRAM(s) Oral daily  mirtazapine 7.5 milliGRAM(s) Oral at bedtime  mupirocin 2% Ointment 1 Application(s) Topical three times a day  nystatin Powder 1 Application(s) Topical two times a day  OXcarbazepine 150 milliGRAM(s) Oral every 12 hours  pantoprazole    Tablet 40 milliGRAM(s) Oral before breakfast  piperacillin/tazobactam IVPB.. 3.375 Gram(s) IV Intermittent every 8 hours  polyethylene glycol 3350 17 Gram(s) Oral daily  prednisoLONE acetate 1% Suspension 1 Drop(s) Right EYE three times a day  pregabalin 150 milliGRAM(s) Oral every 8 hours  senna 1 Tablet(s) Oral at bedtime  traZODone 100 milliGRAM(s) Oral at bedtime  triamcinolone 0.1% Ointment 1 Application(s) Topical two times a day  ursodiol Tablet 500 milliGRAM(s) Oral <User Schedule>  valACYclovir 1000 milliGRAM(s) Oral every 8 hours  vancomycin  IVPB 1000 milliGRAM(s) IV Intermittent every 12 hours    MEDICATIONS  (PRN):  acetaminophen     Tablet .. 650 milliGRAM(s) Oral every 6 hours PRN Severe Pain (7 - 10)  AQUAPHOR (petrolatum Ointment) 1 Application(s) Topical three times a day PRN inguinal fold rash  dextrose Oral Gel 15 Gram(s) Oral once PRN Blood Glucose LESS THAN 70 milliGRAM(s)/deciliter  morphine   Solution 4 milliGRAM(s) Oral every 4 hours PRN Severe Pain (7 - 10)  OLANZapine Injectable 1.25 milliGRAM(s) IntraMuscular every 6 hours PRN Severe Agitation      I&O's Summary    18 Aug 2022 07:  -  19 Aug 2022 07:00  --------------------------------------------------------  IN: 1000 mL / OUT: 1450 mL / NET: -450 mL    19 Aug 2022 07:  -  19 Aug 2022 13:57  --------------------------------------------------------  IN: 980 mL / OUT: 0 mL / NET: 980 mL        PHYSICAL EXAM:  Vital Signs Last 24 Hrs  T(C): 37.1 (19 Aug 2022 05:30), Max: 38.4 (19 Aug 2022 00:45)  T(F): 98.7 (19 Aug 2022 05:30), Max: 101.1 (19 Aug 2022 00:45)  HR: 85 (19 Aug 2022 10:52) (70 - 105)  BP: 103/60 (19 Aug 2022 05:30) (103/60 - 153/62)  BP(mean): --  RR: 17 (19 Aug 2022 05:30) (17 - 19)  SpO2: 96% (19 Aug 2022 05:30) (91% - 100%)    Parameters below as of 19 Aug 2022 05:30  Patient On (Oxygen Delivery Method): nasal cannula  O2 Flow (L/min): 2    CONSTITUTIONAL: NAD, answering questions  EYES: able to open R eye, ongoing swelling  ENMT: moist MM, normal dentition  RESPIRATORY: Normal respiratory effort; grossly b/l AE, rhonchorous breath sounds  CARDIOVASCULAR: Regular rate and rhythm; No lower extremity edema;   ABDOMEN: Nontender to palpation, normoactive bowel sounds  MUSCULOSKELETAL:no clubbing or cyanosis of digits; no joint swelling or tenderness to palpation, LE edema improved, no pitting, symmetric LEs  NEUROLOGY: CN 2-12 are intact and symmetric; no gross sensory deficits, oriented to person, not place, month and season, delayed processing  SKIN: R V1 dermatome no vesicles,extensive raw skin, lesions w beige exudate, no crusting at this time, no evidence of supra-infection, look to be improving on exam ; inguinal folds exam on  w mild erythema no skin breakdown, pt rolled and back examined, no evidence of hematoma, wanda-rectal area with minimal erythema worse on the R with no skin breakdown or ulcerationlesions    LABS:                        10.2   10.48 )-----------( 347      ( 19 Aug 2022 02:09 )             31.4     08-    133<L>  |  96<L>  |  13  ----------------------------<  161<H>  4.3   |  27  |  0.67    Ca    8.6      19 Aug 2022 02:09  Phos  3.5       Mg     1.60         TPro  6.1  /  Alb  2.8<L>  /  TBili  <0.2  /  DBili  <0.2  /  AST  12  /  ALT  7   /  AlkPhos  97            Urinalysis Basic - ( 19 Aug 2022 01:40 )    Color: Yellow / Appearance: Turbid / S.018 / pH: x  Gluc: x / Ketone: Negative  / Bili: Negative / Urobili: <2 mg/dL   Blood: x / Protein: 100 mg/dL / Nitrite: Positive   Leuk Esterase: Large / RBC: 19 /HPF / WBC >200 /HPF   Sq Epi: x / Non Sq Epi: Occasional / Bacteria: Many          RADIOLOGY & ADDITIONAL TESTS:  Results Reviewed:   Imaging Personally Reviewed:  Electrocardiogram Personally Reviewed:    COORDINATION OF CARE:  Care Discussed with Consultants/Other Providers [Y/N]: NSG, Neuro  Prior or Outpatient Records Reviewed [Y/N]:

## 2022-08-19 NOTE — PROGRESS NOTE ADULT - ASSESSMENT
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 Rash improving though mental status fluctuating 2/2 waxing and waning pain from zoster.    1. HZO OD with of R sided facial rash  - Likely had bacterial superinfection with crusting over area of edema and erythema. Current crusting continuing to improve.  - IOP 11/10, VA 20/70 OD and 20/30 OS, pt with improved cooperation today due to better control of pain. .  - Pt has dilated and minimally reactive R pupil. 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 Stable on CT.   - Appreciate derm and plastic surgery consults.    - Abx and antivirals per ID - would continue antivirals until EOM restriction and dilated pupil resolve  - Pt unable to tolerate sitting up at slit lamp - cannot assess anterior chamber for inflammatory reaction  - Cornea with Tr D-folds OD, no longer has bullae. 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   - C/w pred forte TID OD.   - C/w erythromycin ointment QID to R eye and periocular lesions  - C/w preservative-free artificial tears Q2H OU   - On DFE OD 8/16, exam grossly wnl (peripheral exam limited due to pt cooperation)    2. Intermittent AMS related to waxing and waning pain - improving with supraorbital nerve block.   - Appreciate neurology consult  - MRI brain showing no acute pathology, MRI orbits as above  - LP deferred per neurology and ID - reconsider as needed   - Pain appears much better controlled with supraorbital nerve block.   - findings and plan discussed with patient and primary team    Seen and discussed with Dr. Barker, attending.

## 2022-08-20 NOTE — PROGRESS NOTE ADULT - SUBJECTIVE AND OBJECTIVE BOX
[facility] Division of Hospital Medicine  Aline Gaviria MD  Pager (JOHN-ANI, 8A-5P): [phone]  Other Times:  [phone]      SUBJECTIVE / OVERNIGHT EVENTS: Pt afebrile overnight. Resting comfortably in bed. Bedside aide says she ate well this am, no episodes of choking on food like the day prior. Nurse states pt did not want to get out of bed to chair today. Will offer again in a few hours. Ongoing cough. Rates pain as 10/10, however able to fall back asleep after answering my questions. Not touching her dressing.     MEDICATIONS  (STANDING):  albuterol/ipratropium for Nebulization 3 milliLiter(s) Nebulizer every 6 hours  amitriptyline 10 milliGRAM(s) Oral at bedtime  artificial tears (preservative free) Ophthalmic Solution 1 Drop(s) Both EYES every 2 hours  buDESOnide    Inhalation Suspension 0.5 milliGRAM(s) Inhalation two times a day  BUpivacaine 0.5% (Preservative-Free) Injectable 5 milliLiter(s) Local Injection once  BUpivacaine liposome 1.3% Injectable 5 milliLiter(s) Local Injection once  BUpivacaine liposome 1.3% Injectable 5 milliLiter(s) Local Injection once  cyanocobalamin 1000 MICROGram(s) Oral daily  dextrose 5%. 1000 milliLiter(s) (100 mL/Hr) IV Continuous <Continuous>  dextrose 5%. 1000 milliLiter(s) (50 mL/Hr) IV Continuous <Continuous>  dextrose 50% Injectable 25 Gram(s) IV Push once  dextrose 50% Injectable 12.5 Gram(s) IV Push once  dextrose 50% Injectable 25 Gram(s) IV Push once  enoxaparin Injectable 80 milliGRAM(s) SubCutaneous every 12 hours  erythromycin   Ointment 1 Application(s) Right EYE four times a day  folic acid 1 milliGRAM(s) Oral daily  furosemide    Tablet 20 milliGRAM(s) Oral daily  glucagon  Injectable 1 milliGRAM(s) IntraMuscular once  insulin glargine Injectable (LANTUS) 10 Unit(s) SubCutaneous at bedtime  insulin lispro (ADMELOG) corrective regimen sliding scale   SubCutaneous Before meals and at bedtime  lactobacillus acidophilus 1 Tablet(s) Oral daily  lidocaine 5% Ointment 1 Application(s) Topical two times a day  loratadine 10 milliGRAM(s) Oral daily  metoprolol succinate ER 12.5 milliGRAM(s) Oral daily  mirtazapine 7.5 milliGRAM(s) Oral at bedtime  morphine   Solution 3 milliGRAM(s) Oral every 4 hours  mupirocin 2% Ointment 1 Application(s) Topical three times a day  nystatin Powder 1 Application(s) Topical two times a day  pantoprazole    Tablet 40 milliGRAM(s) Oral before breakfast  piperacillin/tazobactam IVPB.. 3.375 Gram(s) IV Intermittent every 8 hours  polyethylene glycol 3350 17 Gram(s) Oral daily  prednisoLONE acetate 1% Suspension 1 Drop(s) Right EYE three times a day  pregabalin 150 milliGRAM(s) Oral every 8 hours  senna 1 Tablet(s) Oral at bedtime  traZODone 100 milliGRAM(s) Oral at bedtime  ursodiol Tablet 500 milliGRAM(s) Oral <User Schedule>  valACYclovir 1000 milliGRAM(s) Oral every 8 hours  vancomycin  IVPB 1000 milliGRAM(s) IV Intermittent every 12 hours    MEDICATIONS  (PRN):  acetaminophen     Tablet .. 1000 milliGRAM(s) Oral every 8 hours PRN Severe Pain (7 - 10)  AQUAPHOR (petrolatum Ointment) 1 Application(s) Topical three times a day PRN inguinal fold rash  dextrose Oral Gel 15 Gram(s) Oral once PRN Blood Glucose LESS THAN 70 milliGRAM(s)/deciliter  OLANZapine Injectable 1.25 milliGRAM(s) IntraMuscular every 6 hours PRN Severe Agitation  pregabalin 150 milliGRAM(s) Oral at bedtime PRN severe pain      I&O's Summary    19 Aug 2022 07:01  -  20 Aug 2022 07:00  --------------------------------------------------------  IN: 980 mL / OUT: 3500 mL / NET: -2520 mL        PHYSICAL EXAM:  Vital Signs Last 24 Hrs  T(C): 36.8 (20 Aug 2022 06:35), Max: 37 (19 Aug 2022 21:20)  T(F): 98.2 (20 Aug 2022 06:35), Max: 98.6 (19 Aug 2022 21:20)  HR: 80 (20 Aug 2022 08:57) (80 - 90)  BP: 118/52 (20 Aug 2022 06:35) (118/52 - 150/67)  BP(mean): --  RR: 18 (20 Aug 2022 06:35) (17 - 18)  SpO2: 94% (20 Aug 2022 08:57) (92% - 100%)    Parameters below as of 20 Aug 2022 08:57  Patient On (Oxygen Delivery Method): nasal cannula    CONSTITUTIONAL: NAD, answering questions  EYES: able to open R eye, ongoing swelling  ENMT: moist MM, normal dentition  RESPIRATORY: Normal respiratory effort; grossly b/l AE, rhonchorous breath sounds  CARDIOVASCULAR: Regular rate and rhythm; No lower extremity edema;   ABDOMEN: Nontender to palpation, normoactive bowel sounds  MUSCULOSKELETAL:no clubbing or cyanosis of digits; no joint swelling or tenderness to palpation, LE edema improved, no pitting, symmetric LEs  NEUROLOGY: no gross sensory deficits, oriented to person, place, month  SKIN: R V1 dermatome no vesicles,extensive raw skin, lesions w beige exudate, now crusting over, no evidence of supra-infection, look to be improving on exam; inguinal folds exam on  w mild erythema no skin breakdown, pt rolled and back examined, no evidence of hematoma, wanda-rectal area with minimal erythema worse on the R with no skin breakdown or ulcerationlesions    LABS:                        9.7    7.30  )-----------( 363      ( 20 Aug 2022 11:36 )             30.3     08-20    134<L>  |  97<L>  |  9   ----------------------------<  123<H>  5.2   |  28  |  0.57    Ca    8.6      20 Aug 2022 07:00  Phos  3.4     08-20  Mg     2.10     08-20    TPro  6.1  /  Alb  2.8<L>  /  TBili  <0.2  /  DBili  <0.2  /  AST  12  /  ALT  7   /  AlkPhos  97  08-19          Urinalysis Basic - ( 19 Aug 2022 01:40 )    Color: Yellow / Appearance: Turbid / S.018 / pH: x  Gluc: x / Ketone: Negative  / Bili: Negative / Urobili: <2 mg/dL   Blood: x / Protein: 100 mg/dL / Nitrite: Positive   Leuk Esterase: Large / RBC: 19 /HPF / WBC >200 /HPF   Sq Epi: x / Non Sq Epi: Occasional / Bacteria: Many        Culture - Blood (collected 19 Aug 2022 07:15)  Source: .Blood Blood-Venous  Preliminary Report (20 Aug 2022 13:01):    No growth to date.    Culture - Blood (collected 19 Aug 2022 06:59)  Source: .Blood Blood-Peripheral  Preliminary Report (20 Aug 2022 13:01):    No growth to date.        RADIOLOGY & ADDITIONAL TESTS:  Results Reviewed:   Imaging Personally Reviewed:  Electrocardiogram Personally Reviewed:    COORDINATION OF CARE:  Care Discussed with Consultants/Other Providers [Y/N]: Neuro  Prior or Outpatient Records Reviewed [Y/N]:

## 2022-08-20 NOTE — PROGRESS NOTE ADULT - ASSESSMENT
86F w/ hx CAD s/p stents (1986, 1996), COPD, NPH s/p  shunt, DM, migraines, and HTN presenting with herpes zoster ophthalmicus/encephalitis affecting the right V1 dermatome, now with acute worsening of post herpetic neuralgia, and mrstran bacteremia (treated). She is s/p IV acyclovir, remains with pain, itching and behavioral disturbances c/b acute urinary retention, dior in place. Pain finally improved after NSG did R supra orbital marcaine nerve block 8/16,8/17 with significant improvement. Currently on morphine taper, consideration for methadone if having difficulty weaning, as suggested by Pain Mgmt. Fever on 8/19, multiple sources likely including pulmonary (PNA) and urine (UTI).     # Fever, new on 8/19, nPOA  # AHRF, acute  Suspect multifactorial-- UA w nitrite positive, ongoing issues w retention; also w worsening hypoxia, productive cough, and rhonchorous breath sounds. Possible aspiration event on 8/18. CXR w L pleural effusion and consilation of LLL on 8/19. Treat empirically for PNA as well as UTI. RVP neg. Note PICC line was removed on 8/18. Wound from zoster appears to be healing well, unlikely to be source of fever.   - F/u culture data- urine [ ], blood [NGTD ], sputum [ ]  - F/u MRSA PCR [ ]  - Continue vanc (8/19- ), zosyn (8/19- )--> ideally would transition to cefepime to avoid nephrotoxicity however given this possible aspiration event will keep zosyn for now; narrow based on culture data  - Cont duonebs q 6 hrs scheduled  - Incentive spirometry  - Chest PT    # Polypharmacy, nPOA  Discussed with daughter, will be tyring to pare back on high risk medications use now that patient's pain is better controlled post nerve block thanks to NSG procedure 8/16.   - D/c seroquel prns on 8/16, pt not utilizing them  - D/c toradol   - Cont lyrica, morphine, trazodone, amyitriptyline (w hold parameters for sedation or AMS), mirtazapine; intermittent QTc monitoring, EKG on 8/19 showed QTc of 455  - Triletpal 600 mg BID added by Neuro last week--> in secondary of ongoing issues with lethargy and extensive SE panel of this medication (with unclear benefit for her pain), will taper 8/18--> 300 mg BID x 48 hours, then 150 mg BID x 48 hours then off  - Cont prn zyprexa for agitation, pt has not utilized in over 24 hours  - D/c atarax on 8/17  - Morphine taper: 7.5 mg q 4 hours scheduled--> 8/17 decr to 5 mg q 4 hours scheduled of morphine solution (high pill burden, trying to minimize pills where we can)--> 4 mg q 4 hours scheduled on 8/18--> 4 mg q 4 hours PRN on 8/19--> back to scheduled on 8/19 afternoon--> 8/20 decr to 3 mg q 4 hours scheduled  - Additional pain PRN  meds: 1g tylenol q 8 hours, additional 150 mg once nightly prn lyrica  - PRN narcan kit ordered for when she is d/c'd  - Encourage oral tylenol use prns     # Provoked RUE subclavian DVT, nPOA  Likely provoked by multiple sticks, PICC line.  Diagnosed on 8/8  - cont full dose lovenox  consider OOB to recliner during the day to assist with day/night orientation--> cont to encourage activity/routines  lower extremity doppler discontinued--> no asymmetric swelling, no edema  - Price check eliquis [ ]    # Inguinal fold intertrigo, improving  - Cont nystatin powder  - Trial of duflucan 200 mg PO x once on 8/17 and reassess--> medication interaction risk assessed, class C interactions    # Coffee ground emesis vs hemoptysis, single episode on 8/16, nPOA, resolved  # Anemia, nPOA, stable  Self limiting episode of emesis (?), though Hgb downtrending in the past 48 hours, does vacillate around 11. Pt has no hx of gastric ulcers. High risk for a bleed as she is on therapeutic Lovenox for DVT. Pt HDS and in NAD. No hematomas visualized on backside 8/17. Suspect blood loss anemia, source yet to be determined. Possibly from event (gstric ulcer?) on 8/16 and frequent blood draws.  - IV PPI BID (8/16-8/18)--> oral protonix 8/19  - Home aspirin currently held  - Iron profile- ferritin in 60s, iron % 12  - Active type and screen from 8/17, maintain active  - Continuing therapeutic lovenox for now  - Resume home folic acid and b12 supplementation    # Candida tropicalis in urine, 8/13  # Acute urinary retention s/p dior placement  Unclear significance of candida in urine on 8/13, less than 99K. UA w WBCs in the 400s, +LE. Initially urinary retention was thought 2/2 medication use (antipsychotics/narcotics), now w unclear significance of this candida. Note she was recently on carbapenems for cellulitis (supra bacterial infection of zoster).  Dior placed again on 8/19 after failed TOV.  - D/w ID (already consulted for VZV) if this is significant--> rec'd against treating for now  - Next TOV with be with flomax 0.4 mg nightly     # Constipation, improving on 8/18  Last BM was 6 days ago as per daughter. Likely narcotic induced vs immobility.   - Miralax/senna daily, prune juice  - Dulcolax rectal prn  - Encourage OOB to chair

## 2022-08-20 NOTE — PROGRESS NOTE ADULT - PROBLEM SELECTOR PLAN 7
NPH diagnosed 2011, pt has programmable  shunt installed by [facility] neurosurg, **must be programmed after MRI (page neurosurg [id number])**. 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

## 2022-08-21 NOTE — PROGRESS NOTE ADULT - PROBLEM SELECTOR PLAN 7
NPH diagnosed 2011, pt has programmable  shunt installed by [facility] neurosurg, **must be programmed after MRI (page neurosurg [phone])**. 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

## 2022-08-21 NOTE — PROGRESS NOTE ADULT - SUBJECTIVE AND OBJECTIVE BOX
[facility] Division of Hospital Medicine  Aline Gaviria MD  Pager (M-F, 8A-5P): [phone]  Other Times:  [phone]      SUBJECTIVE / OVERNIGHT EVENTS: Pt had a 'rough' night as per aide and daughter. Was in pain, did not get her prns. Did not sleep well. Sleepier this am, did not eat breakfast. Pt endorsing some eye pain this am.     MEDICATIONS  (STANDING):  albuterol/ipratropium for Nebulization 3 milliLiter(s) Nebulizer every 6 hours  amitriptyline 10 milliGRAM(s) Oral at bedtime  artificial tears (preservative free) Ophthalmic Solution 1 Drop(s) Both EYES every 2 hours  buDESOnide    Inhalation Suspension 0.5 milliGRAM(s) Inhalation two times a day  BUpivacaine 0.5% (Preservative-Free) Injectable 5 milliLiter(s) Local Injection once  BUpivacaine liposome 1.3% Injectable 5 milliLiter(s) Local Injection once  BUpivacaine liposome 1.3% Injectable 5 milliLiter(s) Local Injection once  cefTRIAXone   IVPB 1000 milliGRAM(s) IV Intermittent every 24 hours  cyanocobalamin 1000 MICROGram(s) Oral daily  dextrose 5%. 1000 milliLiter(s) (50 mL/Hr) IV Continuous <Continuous>  dextrose 5%. 1000 milliLiter(s) (100 mL/Hr) IV Continuous <Continuous>  dextrose 50% Injectable 25 Gram(s) IV Push once  dextrose 50% Injectable 12.5 Gram(s) IV Push once  dextrose 50% Injectable 25 Gram(s) IV Push once  enoxaparin Injectable 80 milliGRAM(s) SubCutaneous every 12 hours  erythromycin   Ointment 1 Application(s) Right EYE four times a day  folic acid 1 milliGRAM(s) Oral daily  furosemide    Tablet 20 milliGRAM(s) Oral daily  glucagon  Injectable 1 milliGRAM(s) IntraMuscular once  insulin glargine Injectable (LANTUS) 10 Unit(s) SubCutaneous at bedtime  insulin lispro (ADMELOG) corrective regimen sliding scale   SubCutaneous Before meals and at bedtime  lactobacillus acidophilus 1 Tablet(s) Oral daily  lidocaine 5% Ointment 1 Application(s) Topical two times a day  loratadine 10 milliGRAM(s) Oral daily  metoprolol succinate ER 12.5 milliGRAM(s) Oral daily  mirtazapine 7.5 milliGRAM(s) Oral at bedtime  morphine   Solution 3 milliGRAM(s) Oral every 4 hours  mupirocin 2% Ointment 1 Application(s) Topical three times a day  nystatin Powder 1 Application(s) Topical two times a day  pantoprazole    Tablet 40 milliGRAM(s) Oral before breakfast  polyethylene glycol 3350 17 Gram(s) Oral daily  prednisoLONE acetate 1% Suspension 1 Drop(s) Right EYE three times a day  pregabalin 300 milliGRAM(s) Oral every 12 hours  senna 1 Tablet(s) Oral at bedtime  tamsulosin 0.4 milliGRAM(s) Oral at bedtime  traZODone 100 milliGRAM(s) Oral at bedtime  ursodiol Tablet 500 milliGRAM(s) Oral <User Schedule>  valACYclovir 1000 milliGRAM(s) Oral every 8 hours    MEDICATIONS  (PRN):  acetaminophen     Tablet .. 1000 milliGRAM(s) Oral every 8 hours PRN Severe Pain (7 - 10)  AQUAPHOR (petrolatum Ointment) 1 Application(s) Topical three times a day PRN inguinal fold rash  dextrose Oral Gel 15 Gram(s) Oral once PRN Blood Glucose LESS THAN 70 milliGRAM(s)/deciliter      I&O's Summary    20 Aug 2022 07:01  -  21 Aug 2022 07:00  --------------------------------------------------------  IN: 0 mL / OUT: 2375 mL / NET: -2375 mL        PHYSICAL EXAM:  Vital Signs Last 24 Hrs  T(C): 36.7 (21 Aug 2022 06:00), Max: 36.7 (21 Aug 2022 06:00)  T(F): 98.1 (21 Aug 2022 06:00), Max: 98.1 (21 Aug 2022 06:00)  HR: 84 (21 Aug 2022 08:22) (80 - 88)  BP: 129/66 (21 Aug 2022 06:00) (129/66 - 135/70)  BP(mean): --  RR: 18 (21 Aug 2022 06:00) (17 - 18)  SpO2: 95% (21 Aug 2022 08:22) (92% - 95%)    Parameters below as of 21 Aug 2022 08:22  Patient On (Oxygen Delivery Method): nasal cannula      CONSTITUTIONAL: NAD, answering questions  EYES: able to open R eye, ongoing swelling  ENMT: moist MM, normal dentition  RESPIRATORY: Normal respiratory effort; grossly b/l AE, rhonchorous breath sounds, not taking deep breaths  CARDIOVASCULAR: Regular rate and rhythm; No lower extremity edema;   ABDOMEN: Nontender to palpation, normoactive bowel sounds  MUSCULOSKELETAL:no clubbing or cyanosis of digits; no joint swelling or tenderness to palpation, LE edema improved, no pitting, symmetric LEs  NEUROLOGY: no gross sensory deficits, oriented to person, place, month, delirious (talking about her ABCs)  SKIN: R V1 dermatome no vesicles,extensive raw skin, lesions w beige exudate, now crusting over, no evidence of supra-infection, look to be improving on exam;     inguinal folds exam on 8/17 w mild erythema no skin breakdown, pt rolled and back examined, no evidence of hematoma, wanda-rectal area with minimal erythema worse on the R with no skin breakdown or ulcerationlesions    LABS:                        10.6   8.44  )-----------( 436      ( 21 Aug 2022 06:00 )             33.6     08-21    140  |  99  |  8   ----------------------------<  107<H>  4.0   |  27  |  0.56    Ca    8.8      21 Aug 2022 06:00  Phos  3.5     08-21  Mg     1.90     08-21                Culture - Blood (collected 19 Aug 2022 07:15)  Source: .Blood Blood-Venous  Preliminary Report (20 Aug 2022 13:01):    No growth to date.    Culture - Blood (collected 19 Aug 2022 06:59)  Source: .Blood Blood-Peripheral  Preliminary Report (20 Aug 2022 13:01):    No growth to date.        RADIOLOGY & ADDITIONAL TESTS:  Results Reviewed:   Imaging Personally Reviewed:  Electrocardiogram Personally Reviewed:    COORDINATION OF CARE:  Care Discussed with Consultants/Other Providers [Y/N]: Dr. Leandro Bustos  Prior or Outpatient Records Reviewed [Y/N]:

## 2022-08-21 NOTE — PROGRESS NOTE ADULT - SUBJECTIVE AND OBJECTIVE BOX
Neurology Progress Note    S: Patient seen and examined.  daughter at bedside.  c/o pain again     Medication:  MEDICATIONS  (STANDING):  albuterol/ipratropium for Nebulization 3 milliLiter(s) Nebulizer every 6 hours  amitriptyline 10 milliGRAM(s) Oral at bedtime  artificial tears (preservative free) Ophthalmic Solution 1 Drop(s) Both EYES every 2 hours  buDESOnide    Inhalation Suspension 0.5 milliGRAM(s) Inhalation two times a day  BUpivacaine 0.5% (Preservative-Free) Injectable 5 milliLiter(s) Local Injection once  BUpivacaine liposome 1.3% Injectable 5 milliLiter(s) Local Injection once  BUpivacaine liposome 1.3% Injectable 5 milliLiter(s) Local Injection once  cyanocobalamin 1000 MICROGram(s) Oral daily  dextrose 5%. 1000 milliLiter(s) (100 mL/Hr) IV Continuous <Continuous>  dextrose 5%. 1000 milliLiter(s) (50 mL/Hr) IV Continuous <Continuous>  dextrose 50% Injectable 25 Gram(s) IV Push once  dextrose 50% Injectable 12.5 Gram(s) IV Push once  dextrose 50% Injectable 25 Gram(s) IV Push once  enoxaparin Injectable 80 milliGRAM(s) SubCutaneous every 12 hours  erythromycin   Ointment 1 Application(s) Right EYE four times a day  folic acid 1 milliGRAM(s) Oral daily  furosemide    Tablet 20 milliGRAM(s) Oral daily  glucagon  Injectable 1 milliGRAM(s) IntraMuscular once  insulin glargine Injectable (LANTUS) 10 Unit(s) SubCutaneous at bedtime  insulin lispro (ADMELOG) corrective regimen sliding scale   SubCutaneous Before meals and at bedtime  lactobacillus acidophilus 1 Tablet(s) Oral daily  lidocaine 5% Ointment 1 Application(s) Topical two times a day  loratadine 10 milliGRAM(s) Oral daily  metoprolol succinate ER 12.5 milliGRAM(s) Oral daily  mirtazapine 7.5 milliGRAM(s) Oral at bedtime  morphine   Solution 3 milliGRAM(s) Oral every 4 hours  mupirocin 2% Ointment 1 Application(s) Topical three times a day  nystatin Powder 1 Application(s) Topical two times a day  pantoprazole    Tablet 40 milliGRAM(s) Oral before breakfast  piperacillin/tazobactam IVPB.. 3.375 Gram(s) IV Intermittent every 8 hours  polyethylene glycol 3350 17 Gram(s) Oral daily  prednisoLONE acetate 1% Suspension 1 Drop(s) Right EYE three times a day  pregabalin 300 milliGRAM(s) Oral every 12 hours  senna 1 Tablet(s) Oral at bedtime  traZODone 100 milliGRAM(s) Oral at bedtime  ursodiol Tablet 500 milliGRAM(s) Oral <User Schedule>  valACYclovir 1000 milliGRAM(s) Oral every 8 hours    MEDICATIONS  (PRN):  acetaminophen     Tablet .. 1000 milliGRAM(s) Oral every 8 hours PRN Severe Pain (7 - 10)  AQUAPHOR (petrolatum Ointment) 1 Application(s) Topical three times a day PRN inguinal fold rash  dextrose Oral Gel 15 Gram(s) Oral once PRN Blood Glucose LESS THAN 70 milliGRAM(s)/deciliter  OLANZapine Injectable 1.25 milliGRAM(s) IntraMuscular every 6 hours PRN Severe Agitation    Vitals:      Vital Signs Last 24 Hrs  T(C): 36.7 (21 Aug 2022 06:00), Max: 36.7 (21 Aug 2022 06:00)  T(F): 98.1 (21 Aug 2022 06:00), Max: 98.1 (21 Aug 2022 06:00)  HR: 84 (21 Aug 2022 08:22) (80 - 88)  BP: 129/66 (21 Aug 2022 06:00) (129/66 - 135/70)  BP(mean): --  RR: 18 (21 Aug 2022 06:00) (17 - 18)  SpO2: 95% (21 Aug 2022 08:22) (92% - 95%)    Parameters below as of 21 Aug 2022 08:22  Patient On (Oxygen Delivery Method): nasal cannula        General:  Constitutional: elderly female female, appears stated age, crying out during paroxysms of pain  Head: Normocephalic, prefers to keep eyes closed for comfort  Extremities: No cyanosis; Skin: No lauri edema of LE  Resp: breathing comfortably   Skin: crusted erythematous rash in R V1 distribution    Neurological (>12):  MS: Awake, alert.  Follows commands. Attends to examiner, AAOx1-2   Language: Speech is clear, fluent, normal volume, good repetition,  comprehension, registration of words.  CNs: PERRL (R 3mm, L 3mm). VFF. EOMI. V1-3 intact LT, No facial asymmetry b/l, full. Hearing grossly normal (rubbing fingers) b/l. Tongue midline.     Motor - Normal bulk throughout. All limbs at least anti-gravity.  Sensation: Intact to LT b/l. Cortical: Extinction on DSS (neglect): none  Reflexes L/R:  Biceps(C5) 2/2  BR(C6) 2/2   Triceps(C7)  2/2 Patellar(L4)   2/2   Toes: mute  Coordination: intact in upper extremities  Gait: deferred due to bedbound status      I personally reviewed the below data/images/labs:     CBC Full  -  ( 21 Aug 2022 06:00 )  WBC Count : 8.44 K/uL  RBC Count : 4.10 M/uL  Hemoglobin : 10.6 g/dL  Hematocrit : 33.6 %  Platelet Count - Automated : 436 K/uL  Mean Cell Volume : 82.0 fL  Mean Cell Hemoglobin : 25.9 pg  Mean Cell Hemoglobin Concentration : 31.5 gm/dL  Auto Neutrophil # : x  Auto Lymphocyte # : x  Auto Monocyte # : x  Auto Eosinophil # : x  Auto Basophil # : x  Auto Neutrophil % : x  Auto Lymphocyte % : x  Auto Monocyte % : x  Auto Eosinophil % : x  Auto Basophil % : x      08-21    140  |  99  |  8   ----------------------------<  107<H>  4.0   |  27  |  0.56    Ca    8.8      21 Aug 2022 06:00  Phos  3.5     08-21  Mg     1.90     08-21

## 2022-08-21 NOTE — PROGRESS NOTE ADULT - ASSESSMENT
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    8/16 s/p block by nsx helping, lyrica increased also helped. spoke with daughter at bedside again today.   8/19 fever overnight to 101, was confused but improved.     Impression: Herpes Zoster , some confusion is toxic metabolic  -  infectious workup,  on zosyn   - valtrex 1g q8hrs   - was on oxcarbazepine to 600 mg BID --> lowered to 300mg BID.  would continue to taper off, would go to 150mg BID next for few days then off , now off   - Increased pregabalin to 150 mg Q8H - per daughter seems to have helped --> would further increase to max dose 300mg BID, no further increase   - start  amitryptyline 10mg at bedtime, she has been on this before   -   pain management f/u, recs appreciatecs, getting morphine, has been lowered, patient c/o 10/10 pain. consider methadone?   -   Nsx for orbital block and is effective.  will consider repeat , repeated x 2   - GI/DVT ppx  - Thank you for allowing me to participate in the care of this patient. Call with questions.   - spoke with daughter at bedside again  today 8/21 , spoke with primary team as well   Leandro Lyon MD  Vascular Neurology

## 2022-08-21 NOTE — PROGRESS NOTE ADULT - ASSESSMENT
86F w/ hx CAD s/p stents (1986, 1996), COPD, NPH s/p  shunt, DM, migraines, and HTN presenting with herpes zoster ophthalmicus/encephalitis affecting the right V1 dermatome, now with acute worsening of post herpetic neuralgia, and mrstran bacteremia (treated). She is s/p IV acyclovir, remains with pain, itching and behavioral disturbances c/b acute urinary retention, dior in place. Pain finally improved after NSG did R supra orbital marcaine nerve block 8/16,8/17 with significant improvement. Currently on morphine taper, up titrating lyrica with aid of Neurology. Fever on 8/19, multiple sources likely including pulmonary (PNA) and urine (UTI). Second TOV on 8/21 for recurrent urinary retention.    # Fever, new on 8/19, nPOA  # AHRF, acute  Suspect multifactorial-- UA w nitrite positive, ongoing issues w retention; also w worsening hypoxia, productive cough, and rhonchorous breath sounds. Possible aspiration event on 8/18. CXR w L pleural effusion and consilation of LLL on 8/19. Treat empirically for PNA as well as UTI. RVP neg. Note PICC line was removed on 8/18. Wound from zoster appears to be healing well, unlikely to be source of fever.   - F/u culture data- urine [ ], blood [NGTD ], sputum [ ]  - F/u MRSA PCR [ ]  - Transitioned to CTX (8/21-8/23), d/c vanc (8/19-8/21 ), zosyn (8/19-8/21 )  - Cont duonebs q 6 hrs scheduled  - Incentive spirometry  - Chest PT    # Polypharmacy, nPOA  Discussed with daughter, will be tyring to pare back on high risk medications use now that patient's pain is better controlled post nerve block thanks to NSG procedure 8/16. High risk medication use in geriatric patient.  - D/c seroquel prns on 8/16, pt not utilizing them  - D/c toradol   - Cont lyrica, morphine, trazodone, amyitriptyline (w hold parameters for sedation or AMS), mirtazapine; intermittent QTc monitoring, EKG on 8/19 showed QTc of 455  - Triletpal 600 mg BID added by Neuro last week--> in secondary of ongoing issues with lethargy and extensive SE panel of this medication (with unclear benefit for her pain), will taper 8/18--> 300 mg BID x 48 hours, then 150 mg BID x 48 hours then off 8/21  - D/c prn zyprexa for agitation, pt has not utilized in over 24 hours  - D/c atarax on 8/17  - Up titrating lyrica on 8/21--> incr from 150 mg q 8 hrs to 300 mg BID, d/w Neurology this is the max dose; monitor for sedating effects  - Morphine taper: 7.5 mg q 4 hours scheduled--> 8/17 decr to 5 mg q 4 hours scheduled of morphine solution (high pill burden, trying to minimize pills where we can)--> 4 mg q 4 hours scheduled on 8/18--> 4 mg q 4 hours PRN on 8/19--> back to scheduled on 8/19 afternoon--> 8/20 decr to 3 mg q 4 hours scheduled  - Additional pain PRN  meds: 1g tylenol q 8 hours  - PRN narcan kit ordered for when she is d/c'd  - Encourage oral tylenol use prns     # Provoked RUE subclavian DVT, nPOA  Likely provoked by multiple sticks, PICC line.  Diagnosed on 8/8  - cont full dose lovenox  consider OOB to recliner during the day to assist with day/night orientation--> cont to encourage activity/routines  lower extremity doppler discontinued--> no asymmetric swelling, no edema  - Price check eliquis [ ]    # Inguinal fold intertrigo, improving  - Cont nystatin powder  - Trial of duflucan 200 mg PO x once on 8/17 and reassess--> medication interaction risk assessed, class C interactions    # Coffee ground emesis vs hemoptysis, single episode on 8/16, nPOA, resolved  # Anemia, nPOA, stable  Self limiting episode of emesis (?), though Hgb downtrending in the past 48 hours, does vacillate around 11. Pt has no hx of gastric ulcers. High risk for a bleed as she is on therapeutic Lovenox for DVT. Pt HDS and in NAD. No hematomas visualized on backside 8/17. Suspect blood loss anemia, source yet to be determined. Possibly from event (gstric ulcer?) on 8/16 and frequent blood draws.  - IV PPI BID (8/16-8/18)--> oral protonix 8/19  - Home aspirin currently held  - Iron profile- ferritin in 60s, iron % 12  - Active type and screen from 8/17, maintain active  - Continuing therapeutic lovenox for now  - Resume home folic acid and b12 supplementation    # Candida tropicalis in urine, 8/13  # Acute urinary retention s/p dior placement  Unclear significance of candida in urine on 8/13, less than 99K. UA w WBCs in the 400s, +LE. Initially urinary retention was thought 2/2 medication use (antipsychotics/narcotics), now w unclear significance of this candida. Note she was recently on carbapenems for cellulitis (supra bacterial infection of zoster).  Dior placed again on 8/19 after failed TOV.  - D/w ID (already consulted for VZV) if this is significant--> rec'd against treating for now  - Next TOV with be with flomax 0.4 mg nightly on 8/21    # Constipation, improving on 8/18  Last BM was 6 days ago as per daughter. Likely narcotic induced vs immobility.   - Miralax/senna daily, prune juice  - Dulcolax rectal prn  - Encourage OOB to chair

## 2022-08-22 NOTE — CONSULT NOTE ADULT - ASSESSMENT
86 yr old female admitted to medicine for management of Herpes Zooster/encephalitis c/b urinary retention. Retention not uncommon in setting of change from baseline and with positive UTI (Ecoli).  If pt fails TOV, then repeat may be attempted after appropriate treatment course for UTI and pt is optimized (& back to baseline)    Recommendation:  -f/u TOV today, if fails should have replacement of dior and tx of UTI + optimization.  if required, pt can have dior replaced and f/u o/p with Dr. Stovall for TOV.   -UCx + for ecoli, on CTX    Please call with any further questions.     Voiding trial: Optimize with all of the following:  - Encourage Ambulation / Out of Bed  - Initiate / Continue Bowel Regimen  - Minimize Narcotics / Benzodiazepines  - Minimize anticholinergics / antihistamines    Grupo Strauss MD    Urology     Reachable on Teams M-F 6am-6pm (preferred)   -if no response on teams: [facility] Urology Pager #[phone];  [facility] Urology Pager #[phone]    [facility]  [address] [place] [id number] [phone]   86 yr old female admitted to medicine for management of Herpes Zooster/encephalitis c/b urinary retention. Retention not uncommon in setting of change from baseline and with positive UTI (Ecoli).  If pt fails TOV, then repeat may be attempted after appropriate treatment course for UTI and pt is optimized (& back to baseline).      Recommendation:  -f/u TOV today, if fails should have replacement of dior and tx of UTI + optimization.  if required, pt can have dior replaced and f/u o/p with Dr. Stovall for TOV.   -UCx + for ecoli, on CTX    Please call with any further questions.     Voiding trial: Optimize with all of the following:  - treat UTI  - Encourage Ambulation / Out of Bed  - Initiate / Continue Bowel Regimen  - Minimize Narcotics / Benzodiazepines  - Minimize anticholinergics / antihistamines    Grupo Strauss MD    Urology     Reachable on Teams M-F 6am-6pm (preferred)   -if no response on teams: [facility] Urology Pager #[phone];  [facility] Urology Pager #[phone]    [facility]  [address] [place] [address], [place] [address]  [phone]

## 2022-08-22 NOTE — PROGRESS NOTE ADULT - PROBLEM SELECTOR PLAN 1
completed acyclovir--> Optho suggests re-initiation of acyclovir 8/15, will d/w ID if oral vs IV-- ID recs oral 1 g TID for the next 2 weeks minimum then reassess  Morphine 7.5 mg every 4 hours standing. without PRNs;   cont trileptal 450 BID, sodium stable, monitor closely--> now on 600 mg BID  gabapentin transitioned to lyrica 150 mg every 8 hours--> ctm  NSG consulted for nerve block--> rec's OMFS consult 8/15  current pain  better controlled, sedated at times but arousable with intermittent bursts of yelling and agitation with scratching face  - Ophthalmology following, apprec recs: erythromycin ointment QID to eye &periocular lesions, artificial tears Q4H  - Appreciate derm recs:    - lidocaine ointment mixed with vaseline BID, triamcinolone ointment to red areas only BID    - apply vaseline to crusted areas Q2H    *Alternative pain mgmt therapies not yet tried: higher doses of gabapentin (pt received 600 mg TID earlier this month, but did not get higher doses than this), methadone also recommended by Pain Mgmt--> trying to avoid additional narcotics for her, but can consider these options if the nerve block is inadequate.*  - NSG repeat bupivicaine supraorbital nerve block on 8/19-- effective NSG did repeat bupivicaine supraorbital nerve block on 8/22 to help with pain; monitor for improvement.  -c/w valtrex 1gram PO q8hr  - increased lyrica to 300 mg every 8 hours on 8/21  -on a morphine taper; currently on Morphine 3mg solution PO every 4 hours standing.  -Will reintroduce gabapentin at 300mg tid  -reconsult pain management for advise    c/w erythromycin ointment QID to eye & periocular lesions, artificial tears Q4H as recommended by opthalmology.  - Appreciate derm recs:    - lidocaine ointment mixed with vaseline BID, triamcinolone ointment to red areas only BID    - apply vaseline to crusted areas Q2H  -Daughter suggested we apply capsaicin cream to face to help facial pain but dermatology assessed no benefit.

## 2022-08-22 NOTE — PROGRESS NOTE ADULT - PROBLEM SELECTOR PLAN 9
Hypokalemia- monitor & replete prn   Hypophosphatemia- monitor & replete prn 120py smoking hx, quit in 2011. Uses Trelegy at home.   -Does not appear to have ongoing respiratory symptoms.  -added claritin as well as chest PT,  -encouraged incentive spirometry  - continue pulmicort and duo nebs    Possible aspiration event on 8/18. CXR w L pleural effusion and consolidation of LLL on 8/19. Treat empirically for PNA. RVP neg.   - blood Cx- NGTD   - Transitioned to CTX (8/21-8/23), d/c vanc (8/19-8/21 ), zosyn (8/19-8/21 )

## 2022-08-22 NOTE — CONSULT NOTE ADULT - CONSULT REQUESTED DATE/TIME
15-Aug-2022 11:50
09-Aug-2022 17:04
26-Jul-2022 18:16
01-Aug-2022 19:12
09-Aug-2022 12:59
22-Aug-2022 09:18
27-Jul-2022 10:17
01-Aug-2022 13:08
26-Jul-2022 17:33
15-Aug-2022 10:14
15-Aug-2022 12:31

## 2022-08-22 NOTE — CONSULT NOTE ADULT - PROVIDER SPECIALTY LIST ADULT
Dermatology
Neurology
Infectious Disease
Neurosurgery
OMFS
Ophthalmology
Geriatrics
Rehab Medicine
Pain Medicine
Plastic Surgery
Urology

## 2022-08-22 NOTE — CONSULT NOTE ADULT - CONSULT REQUESTED BY NAME
Medicine
ED
Dr. Castelan
Dr. Castelan
Medicine
Cosmos Bello, Neurosurgery
Medicine
Dr. Cheng
Primary
Primary
er

## 2022-08-22 NOTE — PROGRESS NOTE ADULT - PROBLEM SELECTOR PLAN 4
Hx of severe HTN at home.  - continue home losartan--> decr 50 to 25 mg daily on 8/17, HOLD on 8/19 w low BPs, cont metoprolol  - Held lasix on admission --> resume lasix 20 mg 8/15 given pitting edema of lower extremities  - Holding spirololactone   BP control has been adequate, cont to monitor  elevated at times of pain/agitation monitor closely Hx of severe HTN at home.  -BPs currently stable  -c/w  - continue home losartan--> decr 50 to 25 mg daily on 8/17, HOLD on 8/19 w low BPs, cont metoprolol  - Held lasix on admission --> resume lasix 20 mg 8/15 given pitting edema of lower extremities  - Holding spirololactone   BP control has been adequate, cont to monitor  elevated at times of pain/agitation monitor closely unclear etiology, poss immobility, poss infection, poss due to narcotics  indwelling urinary catheter placed 8/13, failed TOV 8/17, placed again 8/19; Failed TOV again on 8/22 and dior reinserted  Will maintain dior for now since multiple failed TOV    -Had candida in urine which was treated x 1 day; ID recommended against treating it.

## 2022-08-22 NOTE — PROGRESS NOTE ADULT - PROBLEM SELECTOR PLAN 7
NPH diagnosed 2011, pt has programmable  shunt installed by [facility] neurosurg, **must be programmed after MRI (page neurosurg [id number])**. 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 A1c 7.2.  Home regimen lantus 34U daily, trulicity, glipizide.  -c/w lantus 10 units qHS, SSI, adjust prn  - monitor premeal and bedtime FS

## 2022-08-22 NOTE — PROGRESS NOTE ADULT - ASSESSMENT
86 y.o. Female w/ hx CAD s/p stents (1986, 1996), COPD, NPH s/p  shunt, DM, migraines, and HTN p/w herpes zoster ophthalmicus/encephalitis affecting the right V1 dermatome, now with acute worsening of post herpetic neuralgia, and mrstran bacteremia (treated). She is s/p IV acyclovir, remains with pain, itching and behavioral disturbances c/b acute urinary retention, dior in place. Pain finally improved after NSG did R supra orbital marcaine nerve block 8/16,8/17 with significant improvement. Currently on morphine taper, up titrating lyrica with aid of Neurology. Fever on 8/19, multiple sources likely including pulmonary (PNA) and urine (UTI). Second TOV on 8/21 for recurrent urinary retention.    # Fever, new on 8/19, nPOA  # AHRF, acute  Suspect multifactorial-- UA w nitrite positive, ongoing issues w retention; also w worsening hypoxia, productive cough, and rhonchorous breath sounds. Possible aspiration event on 8/18. CXR w L pleural effusion and consilation of LLL on 8/19. Treat empirically for PNA as well as UTI. RVP neg. Note PICC line was removed on 8/18. Wound from zoster appears to be healing well, unlikely to be source of fever.   - F/u culture data- urine [ ], blood [NGTD ], sputum [ ]  - F/u MRSA PCR [ ]  - Transitioned to CTX (8/21-8/23), d/c vanc (8/19-8/21 ), zosyn (8/19-8/21 )  - Cont duonebs q 6 hrs scheduled  - Incentive spirometry  - Chest PT    # Polypharmacy, nPOA  Discussed with daughter, will be tyring to pare back on high risk medications use now that patient's pain is better controlled post nerve block thanks to NSG procedure 8/16. High risk medication use in geriatric patient.  - D/c seroquel prns on 8/16, pt not utilizing them  - D/c toradol   - Cont lyrica, morphine, trazodone, amyitriptyline (w hold parameters for sedation or AMS), mirtazapine; intermittent QTc monitoring, EKG on 8/19 showed QTc of 455  - Triletpal 600 mg BID added by Neuro last week--> in secondary of ongoing issues with lethargy and extensive SE panel of this medication (with unclear benefit for her pain), will taper 8/18--> 300 mg BID x 48 hours, then 150 mg BID x 48 hours then off 8/21  - D/c prn zyprexa for agitation, pt has not utilized in over 24 hours  - D/c atarax on 8/17  - Up titrating lyrica on 8/21--> incr from 150 mg q 8 hrs to 300 mg BID, d/w Neurology this is the max dose; monitor for sedating effects  - Morphine taper: 7.5 mg q 4 hours scheduled--> 8/17 decr to 5 mg q 4 hours scheduled of morphine solution (high pill burden, trying to minimize pills where we can)--> 4 mg q 4 hours scheduled on 8/18--> 4 mg q 4 hours PRN on 8/19--> back to scheduled on 8/19 afternoon--> 8/20 decr to 3 mg q 4 hours scheduled  - Additional pain PRN  meds: 1g tylenol q 8 hours  - PRN narcan kit ordered for when she is d/c'd  - Encourage oral tylenol use prns     # Provoked RUE subclavian DVT, nPOA  Likely provoked by multiple sticks, PICC line.  Diagnosed on 8/8  - cont full dose lovenox  consider OOB to recliner during the day to assist with day/night orientation--> cont to encourage activity/routines  lower extremity doppler discontinued--> no asymmetric swelling, no edema  - Price check eliquis [ ]    # Inguinal fold intertrigo, improving  - Cont nystatin powder  - Trial of duflucan 200 mg PO x once on 8/17 and reassess--> medication interaction risk assessed, class C interactions    # Coffee ground emesis vs hemoptysis, single episode on 8/16, nPOA, resolved  # Anemia, nPOA, stable  Self limiting episode of emesis (?), though Hgb downtrending in the past 48 hours, does vacillate around 11. Pt has no hx of gastric ulcers. High risk for a bleed as she is on therapeutic Lovenox for DVT. Pt HDS and in NAD. No hematomas visualized on backside 8/17. Suspect blood loss anemia, source yet to be determined. Possibly from event (gstric ulcer?) on 8/16 and frequent blood draws.  - IV PPI BID (8/16-8/18)--> oral protonix 8/19  - Home aspirin currently held  - Iron profile- ferritin in 60s, iron % 12  - Active type and screen from 8/17, maintain active  - Continuing therapeutic lovenox for now  - Resume home folic acid and b12 supplementation    # Candida tropicalis in urine, 8/13  # Acute urinary retention s/p dior placement  Unclear significance of candida in urine on 8/13, less than 99K. UA w WBCs in the 400s, +LE. Initially urinary retention was thought 2/2 medication use (antipsychotics/narcotics), now w unclear significance of this candida. Note she was recently on carbapenems for cellulitis (supra bacterial infection of zoster).  Dior placed again on 8/19 after failed TOV.  - D/w ID (already consulted for VZV) if this is significant--> rec'd against treating for now  - Next TOV with be with flomax 0.4 mg nightly on 8/21    # Constipation, improving on 8/18  Last BM was 6 days ago as per daughter. Likely narcotic induced vs immobility.   - Miralax/senna daily, prune juice  - Dulcolax rectal prn  - Encourage OOB to chair       86 y.o. Female w/ hx CAD s/p stents (1986, 1996), COPD, NPH s/p  shunt, DM, migraines, and HTN p/w herpes zoster ophthalmicus/encephalitis affecting the right V1 dermatome, now with acute worsening of post herpetic neuralgia, and mrstran bacteremia (treated). She is s/p IV acyclovir, remains with pain, itching and behavioral disturbances c/b acute urinary retention, dior in place. Pain finally improved after NSG did R supra orbital marcaine nerve block 8/16,8/17 with significant improvement. Currently on morphine taper, up titrating lyrica with aid of Neurology. Fever on 8/19, multiple sources likely including pulmonary (PNA) and urine (UTI). Failed second TOV on 8/21 for recurrent urinary retention so still with dior.    # Fever, new on 8/19, nPOA  # AHRF, acute  Suspect multifactorial-- UA w nitrite positive, ongoing issues w retention; also w worsening hypoxia, productive cough, and rhonchorous breath sounds. Possible aspiration event on 8/18. CXR w L pleural effusion and consolidation of LLL on 8/19. Treat empirically for PNA as well as UTI. RVP neg. Note PICC line was removed on 8/18. Wound from zoster appears to be healing well, unlikely to be source of fever.   - F/u culture data- urine [ ], blood [NGTD ], sputum  - F/u MRSA PCR  - Transitioned to CTX (8/21-8/23), d/c vanc (8/19-8/21 ), zosyn (8/19-8/21 )  - Cont duonebs q 6 hrs scheduled  - Incentive spirometry  - Chest PT    # Polypharmacy, nPOA  Discussed with daughter, will be tyring to pare back on high risk medications use now that patient's pain is better controlled post nerve block thanks to NSG procedure 8/16. High risk medication use in geriatric patient.  - D/c seroquel prns on 8/16, pt not utilizing them  - D/c toradol   - Cont lyrica, morphine, trazodone, amyitriptyline (w hold parameters for sedation or AMS), mirtazapine; intermittent QTc monitoring, EKG on 8/19 showed QTc of 455  - Triletpal 600 mg BID added by Neuro last week--> in secondary of ongoing issues with lethargy and extensive SE panel of this medication (with unclear benefit for her pain), will taper 8/18--> 300 mg BID x 48 hours, then 150 mg BID x 48 hours then off 8/21  - D/c prn zyprexa for agitation, pt has not utilized in over 24 hours  - D/c atarax on 8/17  - Up titrating lyrica on 8/21--> incr from 150 mg q 8 hrs to 300 mg BID, d/w Neurology this is the max dose; monitor for sedating effects  - Morphine taper: 7.5 mg q 4 hours scheduled--> 8/17 decr to 5 mg q 4 hours scheduled of morphine solution (high pill burden, trying to minimize pills where we can)--> 4 mg q 4 hours scheduled on 8/18--> 4 mg q 4 hours PRN on 8/19--> back to scheduled on 8/19 afternoon--> 8/20 decr to 3 mg q 4 hours scheduled  - Additional pain PRN  meds: 1g tylenol q 8 hours  - PRN narcan kit ordered for when she is d/c'd  - Encourage oral tylenol use prns     # Provoked RUE subclavian DVT, nPOA  Likely provoked by multiple sticks, PICC line.  Diagnosed on 8/8  - cont full dose lovenox  consider OOB to recliner during the day to assist with day/night orientation--> cont to encourage activity/routines  lower extremity doppler discontinued--> no asymmetric swelling, no edema  - Price check eliquis [ ]    # Inguinal fold intertrigo, improving  - Cont nystatin powder  - Trial of duflucan 200 mg PO x once on 8/17 and reassess--> medication interaction risk assessed, class C interactions    # Coffee ground emesis vs hemoptysis, single episode on 8/16, nPOA, resolved  # Anemia, nPOA, stable  Self limiting episode of emesis (?), though Hgb downtrending in the past 48 hours, does vacillate around 11. Pt has no hx of gastric ulcers. High risk for a bleed as she is on therapeutic Lovenox for DVT. Pt HDS and in NAD. No hematomas visualized on backside 8/17. Suspect blood loss anemia, source yet to be determined. Possibly from event (gstric ulcer?) on 8/16 and frequent blood draws.  - IV PPI BID (8/16-8/18)--> oral protonix 8/19  - Home aspirin currently held  - Iron profile- ferritin in 60s, iron % 12  - Active type and screen from 8/17, maintain active  - Continuing therapeutic lovenox for now  - Resume home folic acid and b12 supplementation    # Candida tropicalis in urine, 8/13  # Acute urinary retention s/p dior placement  Unclear significance of candida in urine on 8/13, less than 99K. UA w WBCs in the 400s, +LE. Initially urinary retention was thought 2/2 medication use (antipsychotics/narcotics), now w unclear significance of this candida. Note she was recently on carbapenems for cellulitis (supra bacterial infection of zoster).  Dior placed again on 8/19 after failed TOV.  - D/w ID (already consulted for VZV) if this is significant--> rec'd against treating for now  - Next TOV with be with flomax 0.4 mg nightly on 8/21    # Constipation, improving on 8/18  Likely narcotic induced vs immobility.   - Miralax/senna daily, prune juice  - Dulcolax rectal prn  - Encourage OOB to chair       86 y.o. Female w/ hx HTN, DM, CAD s/p stents (1986, 1996), COPD, NPH s/p  shunt, migraines p/w herpes zoster ophthalmicus/encephalitis affecting the right V1 dermatome but developed post herpetic neuralgia, and mrstran bacteremia (treated). She is s/p IV acyclovir, but continues to have neuralgia pain. This pain  improved after NSG did R supra orbital marcaine nerve block on 8/16, 8/17. She is currently on Currently on morphine IV q3hr, and Lyrica but patient and daughter still not satisfied with pain control.       # AHRF, acute  Suspect multifactorial-- UA w nitrite positive, ongoing issues w retention; also w worsening hypoxia, productive cough, and rhonchorous breath sounds. Possible aspiration event on 8/18. CXR w L pleural effusion and consolidation of LLL on 8/19. Treat empirically for PNA as well as UTI. RVP neg. Note PICC line was removed on 8/18. Wound from zoster appears to be healing well, unlikely to be source of fever.   - F/u culture data- urine [ ], blood [NGTD ], sputum  - F/u MRSA PCR  - Transitioned to CTX (8/21-8/23), d/c vanc (8/19-8/21 ), zosyn (8/19-8/21 )  - Cont duonebs q 6 hrs scheduled  - Incentive spirometry  - Chest PT    # Polypharmacy, nPOA  Discussed with daughter, will be tyring to pare back on high risk medications use now that patient's pain is better controlled post nerve block thanks to NSG procedure 8/16. High risk medication use in geriatric patient.  - D/c seroquel prns on 8/16, pt not utilizing them  - D/c toradol   - Cont lyrica, morphine, trazodone, amyitriptyline (w hold parameters for sedation or AMS), mirtazapine; intermittent QTc monitoring, EKG on 8/19 showed QTc of 455  - Triletpal 600 mg BID added by Neuro last week--> in secondary of ongoing issues with lethargy and extensive SE panel of this medication (with unclear benefit for her pain), will taper 8/18--> 300 mg BID x 48 hours, then 150 mg BID x 48 hours then off 8/21  - D/c prn zyprexa for agitation, pt has not utilized in over 24 hours  - D/c atarax on 8/17  - Up titrating lyrica on 8/21--> incr from 150 mg q 8 hrs to 300 mg BID, d/w Neurology this is the max dose; monitor for sedating effects  - Morphine taper: 7.5 mg q 4 hours scheduled--> 8/17 decr to 5 mg q 4 hours scheduled of morphine solution (high pill burden, trying to minimize pills where we can)--> 4 mg q 4 hours scheduled on 8/18--> 4 mg q 4 hours PRN on 8/19--> back to scheduled on 8/19 afternoon--> 8/20 decr to 3 mg q 4 hours scheduled  - Additional pain PRN  meds: 1g tylenol q 8 hours  - PRN narcan kit ordered for when she is d/c'd  - Encourage oral tylenol use prns     # Provoked RUE subclavian DVT, nPOA  Likely provoked by multiple sticks, PICC line.  Diagnosed on 8/8  - cont full dose lovenox  consider OOB to recliner during the day to assist with day/night orientation--> cont to encourage activity/routines  lower extremity doppler discontinued--> no asymmetric swelling, no edema  - Price check eliquis [ ]    # Inguinal fold intertrigo, improving  - Cont nystatin powder  - Trial of duflucan 200 mg PO x once on 8/17 and reassess--> medication interaction risk assessed, class C interactions    # Coffee ground emesis vs hemoptysis, single episode on 8/16, nPOA, resolved  # Anemia, nPOA, stable  Self limiting episode of emesis (?), though Hgb downtrending in the past 48 hours, does vacillate around 11. Pt has no hx of gastric ulcers. High risk for a bleed as she is on therapeutic Lovenox for DVT. Pt HDS and in NAD. No hematomas visualized on backside 8/17. Suspect blood loss anemia, source yet to be determined. Possibly from event (gstric ulcer?) on 8/16 and frequent blood draws.  - IV PPI BID (8/16-8/18)--> oral protonix 8/19  - Home aspirin currently held  - Iron profile- ferritin in 60s, iron % 12  - Active type and screen from 8/17, maintain active  - Continuing therapeutic lovenox for now  - Resume home folic acid and b12 supplementation    # Candida tropicalis in urine, 8/13  # Acute urinary retention s/p dior placement  Unclear significance of candida in urine on 8/13, less than 99K. UA w WBCs in the 400s, +LE. Initially urinary retention was thought 2/2 medication use (antipsychotics/narcotics), now w unclear significance of this candida. Note she was recently on carbapenems for cellulitis (supra bacterial infection of zoster).  Dior placed again on 8/19 after failed TOV.  - D/w ID (already consulted for VZV) if this is significant--> rec'd against treating for now  - Next TOV with be with flomax 0.4 mg nightly on 8/21    # Constipation, improving on 8/18  Likely narcotic induced vs immobility.   - Miralax/senna daily, prune juice  - Dulcolax rectal prn  - Encourage OOB to chair       86 y.o. Female w/ hx HTN, DM, CAD s/p stents (1986, 1996), COPD, NPH s/p  shunt, migraines p/w herpes zoster ophthalmicus/encephalitis affecting the right V1 dermatome but developed post herpetic neuralgia, and mrstran bacteremia (treated). She is s/p IV acyclovir, but continues to have neuralgia pain. This pain  improved after NSG did R supra orbital marcaine nerve block on 8/16, 8/17and 8/22. She is currently on Currently on morphine IV q3hr, and Lyrica but patient and daughter still not satisfied with pain control.       # AHRF, acute  Suspect multifactorial-- UA w nitrite positive, ongoing issues w retention; also w worsening hypoxia, productive cough, and rhonchorous breath sounds. Possible aspiration event on 8/18. CXR w L pleural effusion and consolidation of LLL on 8/19. Treat empirically for PNA as well as UTI. RVP neg. Note PICC line was removed on 8/18. Wound from zoster appears to be healing well, unlikely to be source of fever.   - F/u culture data- urine [ ], blood [NGTD ], sputum  - F/u MRSA PCR  - Transitioned to CTX (8/21-8/23), d/c vanc (8/19-8/21 ), zosyn (8/19-8/21 )  - Cont duonebs q 6 hrs scheduled  - Incentive spirometry  - Chest PT                # Inguinal fold intertrigo, improving  - Cont nystatin powder  - Trial of duflucan 200 mg PO x once on 8/17 and reassess--> medication interaction risk assessed, class C interactions    # Coffee ground emesis vs hemoptysis, single episode on 8/16, nPOA, resolved  # Anemia, nPOA, stable  Self limiting episode of emesis (?), though Hgb downtrending in the past 48 hours, does vacillate around 11. Pt has no hx of gastric ulcers. High risk for a bleed as she is on therapeutic Lovenox for DVT. Pt HDS and in NAD. No hematomas visualized on backside 8/17. Suspect blood loss anemia, source yet to be determined. Possibly from event (gstric ulcer?) on 8/16 and frequent blood draws.  - IV PPI BID (8/16-8/18)--> oral protonix 8/19  - Home aspirin currently held  - Iron profile- ferritin in 60s, iron % 12  - Active type and screen from 8/17, maintain active  - Continuing therapeutic lovenox for now  - Resume home folic acid and b12 supplementation    # Candida tropicalis in urine, 8/13  # Acute urinary retention s/p dior placement  Unclear significance of candida in urine on 8/13, less than 99K. UA w WBCs in the 400s, +LE. Initially urinary retention was thought 2/2 medication use (antipsychotics/narcotics), now w unclear significance of this candida. Note she was recently on carbapenems for cellulitis (supra bacterial infection of zoster).  Dior placed again on 8/19 after failed TOV.  - D/w ID (already consulted for VZV) if this is significant--> rec'd against treating for now  - Next TOV with be with flomax 0.4 mg nightly on 8/21    # Constipation, improving on 8/18  Likely narcotic induced vs immobility.   - Miralax/senna daily, prune juice  - Dulcolax rectal prn  - Encourage OOB to chair       86 y.o. Female w/ hx HTN, DM, CAD s/p stents (1986, 1996), COPD, NPH s/p  shunt, migraines p/w herpes zoster ophthalmicus/encephalitis affecting the right V1 dermatome but developed post herpetic neuralgia, and mrstran bacteremia (treated). She is s/p IV acyclovir, but continues to have neuralgia pain. This pain  improved after NSG did R supra orbital marcaine nerve block on 8/16, 8/17and 8/22. She is currently on Currently on morphine IV q3hr, and Lyrica but patient and daughter still not satisfied with pain control.                              86 y.o. Female w/ hx HTN, DM, CAD s/p stents (1986, 1996), COPD, NPH s/p  shunt, migraines p/w herpes zoster ophthalmicus/encephalitis affecting the right V1 dermatome but developed post herpetic neuralgia, and mrstran bacteremia (treated). She is s/p IV acyclovir, but continues to have neuralgia pain. This pain  improved after NSG did R supra orbital marcaine nerve block on 8/16, 8/17and 8/22. She is currently on Currently on morphine IV q3hr, and Lyrica but patient and daughter still not satisfied with pain control.

## 2022-08-22 NOTE — PROGRESS NOTE ADULT - PROBLEM SELECTOR PLAN 2
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   Patient does not have symptoms concerning for poor perfusion, will continue to monitor.

## 2022-08-22 NOTE — PROGRESS NOTE ADULT - PROBLEM SELECTOR PLAN 10
DVT ppx: subQ lovenox  Diet: soft and bite sized (S&S recd minced and moist however daughter accepts risks)     Dispo:  - pt has difficult vessels, picc in place if blood draws needed  - PT/OT recommending rehab  -Rpt swallow eval done cont current diet  will d/w daughter to consider home with her 24/7 care and home PT as may be difficult to manage pt at rehab with her outbursts  -nutrition consult DVT ppx: on therapeutic lovenox  Diet: soft and bite sized (S&S recd minced and moist however daughter accepts risks)     Dispo:  - pt has difficult vessels, picc in place if blood draws needed  - PT/OT recommending rehab

## 2022-08-22 NOTE — CHART NOTE - NSCHARTNOTEFT_GEN_A_CORE
Due to pain recurrence repeat supraorbital block given at bedside using 0.5% bupivicaine 1cc and 2cc exparel mixed. Prepped with isopropyl alcohol. Daughter at gave consent via phone. Patient experienced pain relief from block

## 2022-08-22 NOTE — PROGRESS NOTE ADULT - PROBLEM SELECTOR PLAN 8
120py smoking hx, quit in 2011. Uses Trelegy at home.   Does not appear to have ongoing respiratory symptoms, but inc secretions  added claritin as well as chest PT, incentive spirometry  - continue pulmicort and duo nebs    pt with mild O2 needs, CXR with some inc intersitial markings c/w volume overload; IVF stopped, lasix IV 40mg x 1, monitor for resolution of sx--> now on lasix 20 mg daily PO  improved resp status, back to RA NPH diagnosed 2011, pt has programmable  shunt installed by [facility] neurosurg, **must be programmed after MRI ([facility] neurosurg [id number])**. 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

## 2022-08-22 NOTE — PROGRESS NOTE ADULT - PROBLEM SELECTOR PLAN 3
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   Patient does not have symptoms concerning for poor perfusion, will continue to monitor. -Provoked RUE subclavian DVT, nPOA  -Likely provoked by multiple sticks, PICC line.  Diagnosed on 8/8  - cont full dose lovenox  consider OOB to recliner during the day to assist with day/night orientation--> cont to encourage activity/routines  lower extremity doppler discontinued--> no asymmetric swelling, no edema  - Price check eliquis [ ] -Provoked RUE subclavian DVT, nPOA  -Likely provoked by multiple sticks, PICC line.  Diagnosed on 8/8  - cont full dose lovenox 80mg q12h  -will consider eliquis

## 2022-08-22 NOTE — CONSULT NOTE ADULT - REASON FOR ADMISSION
Suspicion for Herpes Zoster opthalmicus/encephalitis

## 2022-08-22 NOTE — PROGRESS NOTE ADULT - PROBLEM SELECTOR PLAN 5
Hx of rash in inguinal folds.   - Continue nystatin powder BID  - Appreciate derm recs: mupirocin ointment TID to perianal erosions Hx of severe HTN at home.  -BPs currently stable  -c/w metoprolol and  lasix   - Holding spirololactone, and losartan

## 2022-08-22 NOTE — CONSULT NOTE ADULT - SUBJECTIVE AND OBJECTIVE BOX
87yo female w/PMH Cardiac stents post MI (1986, 1996), COPD, NPH, DM, Migraines, and suspected HTN presenting with 3 day history of herpes zoster rash over the right V1 dermatome w/ eye involvement, now presenting with 1 day history of altered mental status. On 7/17, patient had right sided headache attributed to her recurrent migraines that she normally has on a daily basis, in which she took furiocet for. On 7/20 night and 7/21 morning, Patient vomited and began taking naproxen and tylenol for her symptoms. On 7/21, patient later saw her outpatient internist, who requested a CT Head for the patient, which came with findings at baseline. On 7/21 night, the patient began to scream, and checked her BP, which was at 200/80. On 7/22 morning, the patient's repeat BP was 160/100. Her internist subsequently prescribed amlodipine for the patient, and at the time her mental status was at baseline (A&Ox4). Also on 7/22, the patient began to complain of blurry vision. On 7/23 morning, the patient had the first occurrence of her rash around the right side of her face, V1 dermatome distribution, and the rash has progressively worsened since. On 7/24, patient started valtrex and artificial tears and began to be more sleepy. On 7/25 Patient aide said that the patient was more confused and her mentation was getting worse up.     Urology consulted for acute urinary retention.  Patient had dior placed, cath specimen +Candida, ID recommend watching off antifungals.  Pt failed first TOV with 350 residual, repeat TOV today.  Per family, pt with no h/o urinary retention.     PAST MEDICAL & SURGICAL HISTORY:  Myocardial Infarction      Diabetes Mellitus Type II      Migraines      COPD (Chronic Obstructive Pulmonary Disease)      NPH (normal pressure hydrocephalus)      COPD, mild      CAD (coronary artery disease)      Type 2 diabetes mellitus      Migraines      Stented coronary artery          MEDICATIONS  (STANDING):  albuterol/ipratropium for Nebulization 3 milliLiter(s) Nebulizer every 6 hours  amitriptyline 10 milliGRAM(s) Oral at bedtime  artificial tears (preservative free) Ophthalmic Solution 1 Drop(s) Both EYES every 2 hours  buDESOnide    Inhalation Suspension 0.5 milliGRAM(s) Inhalation two times a day  BUpivacaine 0.5% (Preservative-Free) Injectable 5 milliLiter(s) Local Injection once  BUpivacaine 0.5% Injectable 5 milliLiter(s) Local Injection once  BUpivacaine liposome 1.3% Injectable 5 milliLiter(s) Local Injection once  BUpivacaine liposome 1.3% Injectable 5 milliLiter(s) Local Injection once  BUpivacaine liposome 1.3% Injectable 5 milliLiter(s) Local Injection once  cefTRIAXone   IVPB 1000 milliGRAM(s) IV Intermittent every 24 hours  cyanocobalamin 1000 MICROGram(s) Oral daily  dextrose 5%. 1000 milliLiter(s) (100 mL/Hr) IV Continuous <Continuous>  dextrose 5%. 1000 milliLiter(s) (50 mL/Hr) IV Continuous <Continuous>  dextrose 50% Injectable 25 Gram(s) IV Push once  dextrose 50% Injectable 12.5 Gram(s) IV Push once  dextrose 50% Injectable 25 Gram(s) IV Push once  enoxaparin Injectable 80 milliGRAM(s) SubCutaneous every 12 hours  erythromycin   Ointment 1 Application(s) Right EYE four times a day  folic acid 1 milliGRAM(s) Oral daily  furosemide    Tablet 20 milliGRAM(s) Oral daily  glucagon  Injectable 1 milliGRAM(s) IntraMuscular once  insulin glargine Injectable (LANTUS) 10 Unit(s) SubCutaneous at bedtime  insulin lispro (ADMELOG) corrective regimen sliding scale   SubCutaneous Before meals and at bedtime  lactobacillus acidophilus 1 Tablet(s) Oral daily  lidocaine 5% Ointment 1 Application(s) Topical two times a day  loratadine 10 milliGRAM(s) Oral daily  metoprolol succinate ER 12.5 milliGRAM(s) Oral daily  mirtazapine 7.5 milliGRAM(s) Oral at bedtime  morphine   Solution 3 milliGRAM(s) Oral every 4 hours  mupirocin 2% Ointment 1 Application(s) Topical three times a day  nystatin Powder 1 Application(s) Topical two times a day  pantoprazole    Tablet 40 milliGRAM(s) Oral before breakfast  polyethylene glycol 3350 17 Gram(s) Oral daily  prednisoLONE acetate 1% Suspension 1 Drop(s) Right EYE three times a day  pregabalin 300 milliGRAM(s) Oral every 12 hours  senna 1 Tablet(s) Oral at bedtime  tamsulosin 0.4 milliGRAM(s) Oral at bedtime  traZODone 100 milliGRAM(s) Oral at bedtime  ursodiol Tablet 500 milliGRAM(s) Oral <User Schedule>  valACYclovir 1000 milliGRAM(s) Oral every 8 hours    MEDICATIONS  (PRN):  acetaminophen     Tablet .. 1000 milliGRAM(s) Oral every 8 hours PRN Severe Pain (7 - 10)  AQUAPHOR (petrolatum Ointment) 1 Application(s) Topical three times a day PRN inguinal fold rash  dextrose Oral Gel 15 Gram(s) Oral once PRN Blood Glucose LESS THAN 70 milliGRAM(s)/deciliter      FAMILY HISTORY:  FH: myocardial infarction (Father)    FH: hypertension (Child)        Allergies    diltiazem (Other; Rash)    Intolerances    Haldol (Dystonic RXN)      SOCIAL HISTORY:    REVIEW OF SYSTEMS: Otherwise negative as stated in HPI    Physical Exam  Vital signs  T(C): 36.9 (08-22-22 @ 05:00), Max: 36.9 (08-22-22 @ 05:00)  HR: 92 (08-22-22 @ 05:00)  BP: 112/65 (08-22-22 @ 05:00)  SpO2: 93% (08-22-22 @ 05:00)  Wt(kg): --    Output      Gen:  NAD    Pulm:  No respiratory distress  	  CV:  RRR    GI:  S/ND/NT    :      MSK:      LABS:      08-21 @ 06:00    WBC 8.44  / Hct 33.6  / SCr 0.56     08-20 @ 11:36    WBC 7.30  / Hct 30.3  / SCr --       08-21    140  |  99  |  8   ----------------------------<  107<H>  4.0   |  27  |  0.56    Ca    8.8      21 Aug 2022 06:00  Phos  3.5     08-21  Mg     1.90     08-21            Urine Cx:  Culture Results:   >100,000 CFU/ml Escherichia coli (08.19.22 @ 14:32)      Blood Cx:    RADIOLOGY:     87yo female w/PMH Cardiac stents post MI (1986, 1996), COPD, NPH, DM, Migraines, and suspected HTN presenting with 3 day history of herpes zoster rash over the right V1 dermatome w/ eye involvement, now presenting with 1 day history of altered mental status. On 7/17, patient had right sided headache attributed to her recurrent migraines that she normally has on a daily basis, in which she took furiocet for. On 7/20 night and 7/21 morning, Patient vomited and began taking naproxen and tylenol for her symptoms. On 7/21, patient later saw her outpatient internist, who requested a CT Head for the patient, which came with findings at baseline. On 7/21 night, the patient began to scream, and checked her BP, which was at 200/80. On 7/22 morning, the patient's repeat BP was 160/100. Her internist subsequently prescribed amlodipine for the patient, and at the time her mental status was at baseline (A&Ox4). Also on 7/22, the patient began to complain of blurry vision. On 7/23 morning, the patient had the first occurrence of her rash around the right side of her face, V1 dermatome distribution, and the rash has progressively worsened since. On 7/24, patient started valtrex and artificial tears and began to be more sleepy. On 7/25 Patient aide said that the patient was more confused and her mentation was getting worse up.     Urology consulted for acute urinary retention.  Patient had dior placed, cath specimen +Candida, ID recommend watching off antifungals.  Pt failed first TOV with 350 residual, repeat TOV today.  Per family, pt with no h/o urinary retention.  Per patient she had catheter placed on admission, (currently removed for tov), denies dysuria.     PAST MEDICAL & SURGICAL HISTORY:  Myocardial Infarction      Diabetes Mellitus Type II      Migraines      COPD (Chronic Obstructive Pulmonary Disease)      NPH (normal pressure hydrocephalus)      COPD, mild      CAD (coronary artery disease)      Type 2 diabetes mellitus      Migraines      Stented coronary artery          MEDICATIONS  (STANDING):  albuterol/ipratropium for Nebulization 3 milliLiter(s) Nebulizer every 6 hours  amitriptyline 10 milliGRAM(s) Oral at bedtime  artificial tears (preservative free) Ophthalmic Solution 1 Drop(s) Both EYES every 2 hours  buDESOnide    Inhalation Suspension 0.5 milliGRAM(s) Inhalation two times a day  BUpivacaine 0.5% (Preservative-Free) Injectable 5 milliLiter(s) Local Injection once  BUpivacaine 0.5% Injectable 5 milliLiter(s) Local Injection once  BUpivacaine liposome 1.3% Injectable 5 milliLiter(s) Local Injection once  BUpivacaine liposome 1.3% Injectable 5 milliLiter(s) Local Injection once  BUpivacaine liposome 1.3% Injectable 5 milliLiter(s) Local Injection once  cefTRIAXone   IVPB 1000 milliGRAM(s) IV Intermittent every 24 hours  cyanocobalamin 1000 MICROGram(s) Oral daily  dextrose 5%. 1000 milliLiter(s) (100 mL/Hr) IV Continuous <Continuous>  dextrose 5%. 1000 milliLiter(s) (50 mL/Hr) IV Continuous <Continuous>  dextrose 50% Injectable 25 Gram(s) IV Push once  dextrose 50% Injectable 12.5 Gram(s) IV Push once  dextrose 50% Injectable 25 Gram(s) IV Push once  enoxaparin Injectable 80 milliGRAM(s) SubCutaneous every 12 hours  erythromycin   Ointment 1 Application(s) Right EYE four times a day  folic acid 1 milliGRAM(s) Oral daily  furosemide    Tablet 20 milliGRAM(s) Oral daily  glucagon  Injectable 1 milliGRAM(s) IntraMuscular once  insulin glargine Injectable (LANTUS) 10 Unit(s) SubCutaneous at bedtime  insulin lispro (ADMELOG) corrective regimen sliding scale   SubCutaneous Before meals and at bedtime  lactobacillus acidophilus 1 Tablet(s) Oral daily  lidocaine 5% Ointment 1 Application(s) Topical two times a day  loratadine 10 milliGRAM(s) Oral daily  metoprolol succinate ER 12.5 milliGRAM(s) Oral daily  mirtazapine 7.5 milliGRAM(s) Oral at bedtime  morphine   Solution 3 milliGRAM(s) Oral every 4 hours  mupirocin 2% Ointment 1 Application(s) Topical three times a day  nystatin Powder 1 Application(s) Topical two times a day  pantoprazole    Tablet 40 milliGRAM(s) Oral before breakfast  polyethylene glycol 3350 17 Gram(s) Oral daily  prednisoLONE acetate 1% Suspension 1 Drop(s) Right EYE three times a day  pregabalin 300 milliGRAM(s) Oral every 12 hours  senna 1 Tablet(s) Oral at bedtime  tamsulosin 0.4 milliGRAM(s) Oral at bedtime  traZODone 100 milliGRAM(s) Oral at bedtime  ursodiol Tablet 500 milliGRAM(s) Oral <User Schedule>  valACYclovir 1000 milliGRAM(s) Oral every 8 hours    MEDICATIONS  (PRN):  acetaminophen     Tablet .. 1000 milliGRAM(s) Oral every 8 hours PRN Severe Pain (7 - 10)  AQUAPHOR (petrolatum Ointment) 1 Application(s) Topical three times a day PRN inguinal fold rash  dextrose Oral Gel 15 Gram(s) Oral once PRN Blood Glucose LESS THAN 70 milliGRAM(s)/deciliter      FAMILY HISTORY:  FH: myocardial infarction (Father)    FH: hypertension (Child)        Allergies    diltiazem (Other; Rash)    Intolerances    Haldol (Dystonic RXN)      SOCIAL HISTORY:    REVIEW OF SYSTEMS: Otherwise negative as stated in HPI    Physical Exam  Vital signs  T(C): 36.9 (08-22-22 @ 05:00), Max: 36.9 (08-22-22 @ 05:00)  HR: 92 (08-22-22 @ 05:00)  BP: 112/65 (08-22-22 @ 05:00)  SpO2: 93% (08-22-22 @ 05:00)  Wt(kg): --    Output      Gen:  NAD    Pulm:  No respiratory distress  	  CV:  RRR    GI:  S/ND/NT    : tender to palpation of mons/labia, limited exam due to habitus/tenderness, no obvious POP appreciated.    LABS:      08-21 @ 06:00    WBC 8.44  / Hct 33.6  / SCr 0.56     08-20 @ 11:36    WBC 7.30  / Hct 30.3  / SCr --       08-21    140  |  99  |  8   ----------------------------<  107<H>  4.0   |  27  |  0.56    Ca    8.8      21 Aug 2022 06:00  Phos  3.5     08-21  Mg     1.90     08-21            Urine Cx:  Culture Results:   >100,000 CFU/ml Escherichia coli (08.19.22 @ 14:32)      Blood Cx:    RADIOLOGY:

## 2022-08-22 NOTE — CHART NOTE - NSCHARTNOTEFT_GEN_A_CORE
Pain management re consulted ([id number]) and patient will to be seen tomorrow. Will f/u recommendations.    LOBO Silva-BC

## 2022-08-22 NOTE — PROGRESS NOTE ADULT - SUBJECTIVE AND OBJECTIVE BOX
Patient is a 86y old  Female who presents with a chief complaint of Suspicion for Herpes Zoster opthalmicus/encephalitis (22 Aug 2022 09:17)      SUBJECTIVE / OVERNIGHT EVENTS:    MEDICATIONS  (STANDING):  albuterol/ipratropium for Nebulization 3 milliLiter(s) Nebulizer every 6 hours  amitriptyline 10 milliGRAM(s) Oral at bedtime  artificial tears (preservative free) Ophthalmic Solution 1 Drop(s) Both EYES every 2 hours  buDESOnide    Inhalation Suspension 0.5 milliGRAM(s) Inhalation two times a day  BUpivacaine 0.5% (Preservative-Free) Injectable 5 milliLiter(s) Local Injection once  BUpivacaine 0.5% Injectable 5 milliLiter(s) Local Injection once  BUpivacaine liposome 1.3% Injectable 5 milliLiter(s) Local Injection once  BUpivacaine liposome 1.3% Injectable 5 milliLiter(s) Local Injection once  BUpivacaine liposome 1.3% Injectable 5 milliLiter(s) Local Injection once  cefTRIAXone   IVPB 1000 milliGRAM(s) IV Intermittent every 24 hours  cyanocobalamin 1000 MICROGram(s) Oral daily  dextrose 5%. 1000 milliLiter(s) (100 mL/Hr) IV Continuous <Continuous>  dextrose 5%. 1000 milliLiter(s) (50 mL/Hr) IV Continuous <Continuous>  dextrose 50% Injectable 25 Gram(s) IV Push once  dextrose 50% Injectable 12.5 Gram(s) IV Push once  dextrose 50% Injectable 25 Gram(s) IV Push once  enoxaparin Injectable 80 milliGRAM(s) SubCutaneous every 12 hours  erythromycin   Ointment 1 Application(s) Right EYE four times a day  folic acid 1 milliGRAM(s) Oral daily  furosemide    Tablet 20 milliGRAM(s) Oral daily  glucagon  Injectable 1 milliGRAM(s) IntraMuscular once  insulin glargine Injectable (LANTUS) 10 Unit(s) SubCutaneous at bedtime  insulin lispro (ADMELOG) corrective regimen sliding scale   SubCutaneous Before meals and at bedtime  lactobacillus acidophilus 1 Tablet(s) Oral daily  lidocaine 5% Ointment 1 Application(s) Topical two times a day  loratadine 10 milliGRAM(s) Oral daily  metoprolol succinate ER 12.5 milliGRAM(s) Oral daily  mirtazapine 7.5 milliGRAM(s) Oral at bedtime  morphine   Solution 3 milliGRAM(s) Oral every 4 hours  mupirocin 2% Ointment 1 Application(s) Topical three times a day  nystatin Powder 1 Application(s) Topical two times a day  pantoprazole    Tablet 40 milliGRAM(s) Oral before breakfast  polyethylene glycol 3350 17 Gram(s) Oral daily  prednisoLONE acetate 1% Suspension 1 Drop(s) Right EYE three times a day  pregabalin 300 milliGRAM(s) Oral every 12 hours  senna 1 Tablet(s) Oral at bedtime  tamsulosin 0.4 milliGRAM(s) Oral at bedtime  traZODone 100 milliGRAM(s) Oral at bedtime  ursodiol Tablet 500 milliGRAM(s) Oral <User Schedule>  valACYclovir 1000 milliGRAM(s) Oral every 8 hours    MEDICATIONS  (PRN):  acetaminophen     Tablet .. 1000 milliGRAM(s) Oral every 8 hours PRN Severe Pain (7 - 10)  AQUAPHOR (petrolatum Ointment) 1 Application(s) Topical three times a day PRN inguinal fold rash  dextrose Oral Gel 15 Gram(s) Oral once PRN Blood Glucose LESS THAN 70 milliGRAM(s)/deciliter      Vital Signs Last 24 Hrs  T(C): 36.7 (22 Aug 2022 14:00), Max: 36.9 (22 Aug 2022 05:00)  T(F): 98 (22 Aug 2022 14:00), Max: 98.4 (22 Aug 2022 05:00)  HR: 73 (22 Aug 2022 14:00) (72 - 103)  BP: 113/50 (22 Aug 2022 14:00) (112/65 - 140/69)  BP(mean): --  RR: 18 (22 Aug 2022 14:00) (15 - 19)  SpO2: 95% (22 Aug 2022 14:00) (79% - 97%)    Parameters below as of 22 Aug 2022 14:00  Patient On (Oxygen Delivery Method): room air      CAPILLARY BLOOD GLUCOSE      POCT Blood Glucose.: 117 mg/dL (22 Aug 2022 11:50)  POCT Blood Glucose.: 186 mg/dL (22 Aug 2022 09:11)  POCT Blood Glucose.: 112 mg/dL (21 Aug 2022 22:25)  POCT Blood Glucose.: 121 mg/dL (21 Aug 2022 16:34)    I&O's Summary    22 Aug 2022 07:01  -  22 Aug 2022 15:52  --------------------------------------------------------  IN: 0 mL / OUT: 650 mL / NET: -650 mL        PHYSICAL EXAM:  GENERAL: NAD, well-developed  HEAD:  Atraumatic, Normocephalic  EYES: EOMI, PERRLA, conjunctiva and sclera clear  NECK: Supple, No JVD  CHEST/LUNG: Clear to auscultation bilaterally; No wheeze  HEART: Regular rate and rhythm; No murmurs, rubs, or gallops  ABDOMEN: Soft, Nontender, Nondistended; Bowel sounds present  EXTREMITIES:  2+ Peripheral Pulses, No clubbing, cyanosis, or edema  PSYCH: AAOx3  NEUROLOGY: non-focal  SKIN: No rashes or lesions    LABS:                        10.6   8.44  )-----------( 436      ( 21 Aug 2022 06:00 )             33.6     08-21    140  |  99  |  8   ----------------------------<  107<H>  4.0   |  27  |  0.56    Ca    8.8      21 Aug 2022 06:00  Phos  3.5     08-21  Mg     1.90     08-21                RADIOLOGY & ADDITIONAL TESTS:    Imaging Personally Reviewed:    Consultant(s) Notes Reviewed:      Care Discussed with Consultants/Other Providers:   Patient is a 86y old  Female who presents with a chief complaint of Suspicion for Herpes Zoster opthalmicus/encephalitis (22 Aug 2022 09:17)      SUBJECTIVE / OVERNIGHT EVENTS:  Patient c/o burning on face where zoster is located. She also c/o nausea no vomiting. Denies cp, SOB, abdominal pain, or diarrhea.     MEDICATIONS  (STANDING):  albuterol/ipratropium for Nebulization 3 milliLiter(s) Nebulizer every 6 hours  amitriptyline 10 milliGRAM(s) Oral at bedtime  artificial tears (preservative free) Ophthalmic Solution 1 Drop(s) Both EYES every 2 hours  buDESOnide    Inhalation Suspension 0.5 milliGRAM(s) Inhalation two times a day  BUpivacaine 0.5% (Preservative-Free) Injectable 5 milliLiter(s) Local Injection once  BUpivacaine 0.5% Injectable 5 milliLiter(s) Local Injection once  BUpivacaine liposome 1.3% Injectable 5 milliLiter(s) Local Injection once  BUpivacaine liposome 1.3% Injectable 5 milliLiter(s) Local Injection once  BUpivacaine liposome 1.3% Injectable 5 milliLiter(s) Local Injection once  cefTRIAXone   IVPB 1000 milliGRAM(s) IV Intermittent every 24 hours  cyanocobalamin 1000 MICROGram(s) Oral daily  dextrose 5%. 1000 milliLiter(s) (100 mL/Hr) IV Continuous <Continuous>  dextrose 5%. 1000 milliLiter(s) (50 mL/Hr) IV Continuous <Continuous>  dextrose 50% Injectable 25 Gram(s) IV Push once  dextrose 50% Injectable 12.5 Gram(s) IV Push once  dextrose 50% Injectable 25 Gram(s) IV Push once  enoxaparin Injectable 80 milliGRAM(s) SubCutaneous every 12 hours  erythromycin   Ointment 1 Application(s) Right EYE four times a day  folic acid 1 milliGRAM(s) Oral daily  furosemide    Tablet 20 milliGRAM(s) Oral daily  glucagon  Injectable 1 milliGRAM(s) IntraMuscular once  insulin glargine Injectable (LANTUS) 10 Unit(s) SubCutaneous at bedtime  insulin lispro (ADMELOG) corrective regimen sliding scale   SubCutaneous Before meals and at bedtime  lactobacillus acidophilus 1 Tablet(s) Oral daily  lidocaine 5% Ointment 1 Application(s) Topical two times a day  loratadine 10 milliGRAM(s) Oral daily  metoprolol succinate ER 12.5 milliGRAM(s) Oral daily  mirtazapine 7.5 milliGRAM(s) Oral at bedtime  morphine   Solution 3 milliGRAM(s) Oral every 4 hours  mupirocin 2% Ointment 1 Application(s) Topical three times a day  nystatin Powder 1 Application(s) Topical two times a day  pantoprazole    Tablet 40 milliGRAM(s) Oral before breakfast  polyethylene glycol 3350 17 Gram(s) Oral daily  prednisoLONE acetate 1% Suspension 1 Drop(s) Right EYE three times a day  pregabalin 300 milliGRAM(s) Oral every 12 hours  senna 1 Tablet(s) Oral at bedtime  tamsulosin 0.4 milliGRAM(s) Oral at bedtime  traZODone 100 milliGRAM(s) Oral at bedtime  ursodiol Tablet 500 milliGRAM(s) Oral <User Schedule>  valACYclovir 1000 milliGRAM(s) Oral every 8 hours    MEDICATIONS  (PRN):  acetaminophen     Tablet .. 1000 milliGRAM(s) Oral every 8 hours PRN Severe Pain (7 - 10)  AQUAPHOR (petrolatum Ointment) 1 Application(s) Topical three times a day PRN inguinal fold rash  dextrose Oral Gel 15 Gram(s) Oral once PRN Blood Glucose LESS THAN 70 milliGRAM(s)/deciliter      Vital Signs Last 24 Hrs  T(C): 36.7 (22 Aug 2022 14:00), Max: 36.9 (22 Aug 2022 05:00)  T(F): 98 (22 Aug 2022 14:00), Max: 98.4 (22 Aug 2022 05:00)  HR: 73 (22 Aug 2022 14:00) (72 - 103)  BP: 113/50 (22 Aug 2022 14:00) (112/65 - 140/69)  BP(mean): --  RR: 18 (22 Aug 2022 14:00) (15 - 19)  SpO2: 95% (22 Aug 2022 14:00) (79% - 97%)    Parameters below as of 22 Aug 2022 14:00  Patient On (Oxygen Delivery Method): room air      CAPILLARY BLOOD GLUCOSE      POCT Blood Glucose.: 117 mg/dL (22 Aug 2022 11:50)  POCT Blood Glucose.: 186 mg/dL (22 Aug 2022 09:11)  POCT Blood Glucose.: 112 mg/dL (21 Aug 2022 22:25)  POCT Blood Glucose.: 121 mg/dL (21 Aug 2022 16:34)    I&O's Summary    22 Aug 2022 07:01  -  22 Aug 2022 15:52  --------------------------------------------------------  IN: 0 mL / OUT: 650 mL / NET: -650 mL        PHYSICAL EXAM:   GENERAL: NAD, well-developed  HEAD:  Atraumatic, Normocephalic  EYES: EOMI, PERRLA, conjunctiva and sclera clear  NECK: Supple, No JVD  CHEST/LUNG: Clear to auscultation bilaterally; No wheeze  HEART: Regular rate and rhythm; No murmurs, rubs, or gallops  ABDOMEN: Soft, Nontender, Nondistended; Bowel sounds present  EXTREMITIES:  2+ Peripheral Pulses, No clubbing, cyanosis, or edema  PSYCH: AAOx3  NEUROLOGY: non-focal  SKIN: crusted erythematous rash in R V1 distribution    LABS:                        10.6   8.44  )-----------( 436      ( 21 Aug 2022 06:00 )             33.6     08-21    140  |  99  |  8   ----------------------------<  107<H>  4.0   |  27  |  0.56    Ca    8.8      21 Aug 2022 06:00  Phos  3.5     08-21  Mg     1.90     08-21                RADIOLOGY & ADDITIONAL TESTS:    Imaging Personally Reviewed:    Consultant(s) Notes Reviewed:      Care Discussed with Consultants/Other Providers:

## 2022-08-22 NOTE — PROGRESS NOTE ADULT - PROBLEM SELECTOR PLAN 6
A1c 7.2.  Home regimen lantus 34U daily, trulicity, glipizide.  - On lantus to 14 units qHS, SSI, adjust prn  - monitor premeal and bedtime FS Hx of rash in inguinal folds.   - Continue nystatin powder BID  - Appreciate derm recs: mupirocin ointment TID to perianal erosions

## 2022-08-23 PROBLEM — G91.2 (IDIOPATHIC) NORMAL PRESSURE HYDROCEPHALUS: Chronic | Status: ACTIVE | Noted: 2022-01-01

## 2022-08-23 PROBLEM — I25.10 ATHEROSCLEROTIC HEART DISEASE OF NATIVE CORONARY ARTERY WITHOUT ANGINA PECTORIS: Chronic | Status: ACTIVE | Noted: 2022-01-01

## 2022-08-23 PROBLEM — E11.9 TYPE 2 DIABETES MELLITUS WITHOUT COMPLICATIONS: Chronic | Status: ACTIVE | Noted: 2022-01-01

## 2022-08-23 PROBLEM — G43.909 MIGRAINE, UNSPECIFIED, NOT INTRACTABLE, WITHOUT STATUS MIGRAINOSUS: Chronic | Status: ACTIVE | Noted: 2022-01-01

## 2022-08-23 PROBLEM — J44.9 CHRONIC OBSTRUCTIVE PULMONARY DISEASE, UNSPECIFIED: Chronic | Status: ACTIVE | Noted: 2022-01-01

## 2022-08-23 NOTE — PROGRESS NOTE ADULT - PROBLEM SELECTOR PLAN 10
120py smoking hx, quit in 2011. Uses Trelegy at home.   -encouraged incentive spirometry  - continue pulmicort and duo nebs  -now with oxygen requirement likely due to aspiration

## 2022-08-23 NOTE — PROGRESS NOTE ADULT - PROBLEM SELECTOR PLAN 2
Patient hypoxic with O2 Sat 88% on RA  started 2LNC overnight.  CXR on 8/22 shows RML infiltrate  repeat swallow eval  started Zosyn IV x 5 days

## 2022-08-23 NOTE — PROGRESS NOTE ADULT - PROBLEM SELECTOR PLAN 1
NSG did repeat bupivicaine supraorbital nerve block on 8/22 to help with pain; monitor for improvement.  -c/w valtrex 1gram PO q8hr  - increased lyrica to 300 mg every 8 hours on 8/21  -added Gabapentin 300mg tid for better pain control on 8/23  -on a morphine taper; currently on Morphine 3mg solution PO every 4 hours standing.  -reconsult pain management for advise    c/w erythromycin ointment QID to eye & periocular lesions, artificial tears Q4H as recommended by opthalmology.  - Appreciate derm recs:    - lidocaine ointment mixed with vaseline BID, triamcinolone ointment to red areas only BID    - apply vaseline to crusted areas Q2H  -Daughter suggested we apply capsaicin cream to face to help facial pain but dermatology assessed no benefit.

## 2022-08-23 NOTE — PROGRESS NOTE ADULT - PROBLEM SELECTOR PLAN 11
DVT ppx: on therapeutic lovenox  Diet: soft and bite sized (S&S recd minced and moist however daughter accepts risks)     Dispo:  - pt has difficult vessels, picc in place if blood draws needed  - PT/OT recommending rehab

## 2022-08-23 NOTE — PROGRESS NOTE ADULT - PROBLEM SELECTOR PLAN 5
unclear etiology, poss immobility, poss infection, poss due to narcotics  indwelling urinary catheter placed 8/13, failed TOV 8/17, placed again 8/19; Failed TOV again on 8/22 and dior reinserted  Will maintain dior for now since multiple failed TOV    -Had candida in urine which was treated x 1 day; ID recommended against treating it.

## 2022-08-23 NOTE — PROGRESS NOTE ADULT - PROBLEM SELECTOR PLAN 8
A1c 7.2.  Home regimen lantus 34U daily, trulicity, glipizide.  -c/w lantus 10 units qHS, SSI, adjust prn  - monitor premeal and bedtime FS

## 2022-08-23 NOTE — PROGRESS NOTE ADULT - SUBJECTIVE AND OBJECTIVE BOX
Chief Complaint: Reconsult for right facial pain.    HPI:  Patient is an 85yo female w/PMH Cardiac stents post MI (1986, 1996), COPD, NPH, DM, Migraines, and suspected HTN presenting with 3 day history of herpes zoster rash over the right V1 dermatome w/ eye involvement, now presenting with 1 day history of altered mental status. On 7/17, patient had right sided headache attributed to her recurrent migraines that she normally has on a daily basis, in which she took furiocet for. On 7/20 night and 7/21 morning, Patient vomited and began taking naproxen and tylenol for her symptoms. On 7/21, patient later saw her outpatient internist, who requested a CT Head for the patient, which came with findings at baseline. On 7/21 night, the patient began to scream, and checked her BP, which was at 200/80. On 7/22 morning, the patient's repeat BP was 160/100. Her internist subsequently prescribed amlodipine for the patient, and at the time her mental status was at baseline (A&Ox4). Also on 7/22, the patient began to complain of blurry vision. On 7/23 morning, the patient had the first occurrence of her rash around the right side of her face, V1 dermatome distribution, and the rash has progressively worsened since. On 7/24, patient started valtrex and artificial tears and began to be more sleepy. On 7/25 Patient aide said that the patient was more confused and her mentation was getting worse up until now.    In the ED, patient was started on 1x Zosyn, 1x 500mg acyclovir. (26 Jul 2022 18:18)      PAST MEDICAL & SURGICAL HISTORY:  Myocardial Infarction      Diabetes Mellitus Type II      Migraines      COPD (Chronic Obstructive Pulmonary Disease)      NPH (normal pressure hydrocephalus)      COPD, mild      CAD (coronary artery disease)      Type 2 diabetes mellitus      Migraines      Stented coronary artery          FAMILY HISTORY:  FH: myocardial infarction (Father)    FH: hypertension (Child)        SOCIAL HISTORY:  [ ] Denies Smoking, Alcohol, or Drug Use    Allergies    diltiazem (Other; Rash)    Intolerances    Haldol (Dystonic RXN)      PAIN MEDICATIONS:  acetaminophen     Tablet .. 1000 milliGRAM(s) Oral every 8 hours PRN  amitriptyline 10 milliGRAM(s) Oral at bedtime  gabapentin 300 milliGRAM(s) Oral every 8 hours  mirtazapine 7.5 milliGRAM(s) Oral at bedtime  morphine   Solution 3 milliGRAM(s) Oral every 4 hours  pregabalin 300 milliGRAM(s) Oral every 12 hours  traZODone 100 milliGRAM(s) Oral at bedtime      Heme:  enoxaparin Injectable 80 milliGRAM(s) SubCutaneous every 12 hours    Antibiotics:  piperacillin/tazobactam IVPB.. 3.375 Gram(s) IV Intermittent every 8 hours  valACYclovir 1000 milliGRAM(s) Oral every 8 hours    Cardiovascular:  furosemide    Tablet 20 milliGRAM(s) Oral daily  metoprolol succinate ER 12.5 milliGRAM(s) Oral daily  tamsulosin 0.4 milliGRAM(s) Oral at bedtime    GI:  pantoprazole    Tablet 40 milliGRAM(s) Oral before breakfast  polyethylene glycol 3350 17 Gram(s) Oral daily  senna 1 Tablet(s) Oral at bedtime  ursodiol Tablet 500 milliGRAM(s) Oral <User Schedule>    Endocrine:  dextrose 50% Injectable 25 Gram(s) IV Push once  dextrose 50% Injectable 12.5 Gram(s) IV Push once  dextrose 50% Injectable 25 Gram(s) IV Push once  dextrose Oral Gel 15 Gram(s) Oral once PRN  glucagon  Injectable 1 milliGRAM(s) IntraMuscular once  insulin glargine Injectable (LANTUS) 10 Unit(s) SubCutaneous at bedtime  insulin lispro (ADMELOG) corrective regimen sliding scale   SubCutaneous Before meals and at bedtime    All Other Medications:  AQUAPHOR (petrolatum Ointment) 1 Application(s) Topical three times a day PRN  artificial tears (preservative free) Ophthalmic Solution 1 Drop(s) Both EYES every 2 hours  BUpivacaine 0.5% (Preservative-Free) Injectable 5 milliLiter(s) Local Injection once  BUpivacaine 0.5% Injectable 5 milliLiter(s) Local Injection once  BUpivacaine liposome 1.3% Injectable 5 milliLiter(s) Local Injection once  BUpivacaine liposome 1.3% Injectable 5 milliLiter(s) Local Injection once  BUpivacaine liposome 1.3% Injectable 5 milliLiter(s) Local Injection once  cyanocobalamin 1000 MICROGram(s) Oral daily  dextrose 5%. 1000 milliLiter(s) IV Continuous <Continuous>  dextrose 5%. 1000 milliLiter(s) IV Continuous <Continuous>  erythromycin   Ointment 1 Application(s) Right EYE four times a day  folic acid 1 milliGRAM(s) Oral daily  lactobacillus acidophilus 1 Tablet(s) Oral daily  lidocaine 5% Ointment 1 Application(s) Topical two times a day  mupirocin 2% Ointment 1 Application(s) Topical three times a day  nystatin Powder 1 Application(s) Topical two times a day  prednisoLONE acetate 1% Suspension 1 Drop(s) Right EYE three times a day        Vital Signs Last 24 Hrs  T(C): 36.8 (23 Aug 2022 13:30), Max: 36.8 (23 Aug 2022 09:30)  T(F): 98.3 (23 Aug 2022 13:30), Max: 98.3 (23 Aug 2022 13:30)  HR: 85 (23 Aug 2022 15:45) (71 - 96)  BP: 120/54 (23 Aug 2022 13:30) (118/47 - 129/57)  BP(mean): --  RR: 18 (23 Aug 2022 13:30) (17 - 18)  SpO2: 92% (23 Aug 2022 13:30) (81% - 97%)    Parameters below as of 23 Aug 2022 15:45  Patient On (Oxygen Delivery Method): nasal cannula        PAIN SCORE:         SCALE USED: (1-10 VNRS)               LABS:                          11.1   7.48  )-----------( 393      ( 23 Aug 2022 05:57 )             36.1     08-23    140  |  102  |  12  ----------------------------<  116<H>  3.8   |  27  |  0.65    Ca    8.9      23 Aug 2022 05:57  Phos  4.0     08-23  Mg     1.70     08-23    SUMMARY:    Patient seen at bedside. Patient states she has severe pain from her scalp to the right side of her face. Patient states the nerve block helped her a little bit at the time. Patient states the PO Morphine helps her with the pain, but the relief is not lasting long enough. Patient states she is unable to sleep at night because of her pain. Patient states she has not had any improvement in her pain since she came to the hospital. Spoke to Dr. Delgado (patient’s daughter) at bedside who states that the patient did not have any improvements in pain so far. The nerve block and the Lyrica helped a little as per daughter. Patient’s daughter states she has an appointment scheduled with Dr. Gwendolyn St for next Tuesday for outpatient follow up for her mother. Patient’s daughter also states that her mother’s pain was better controlled when she was on Morphine 7.5mg and that she thinks the Morphine 3 mg is not giving her pain relief for 3 to 4 hours. Patient alert and orientedx4. Patient answers questions appropriately. Not in any apparent distress.   RECOMMENDATIONS:  Discussed patient with Dr. Ruiz and her recommendations are as follows:  1) Recommend PO Methadone 5mg BID. Hold for sedation. Obtain baseline EKG prior to starting patient on Methadone and then routine EKGs while patient on Methadone to monitor for QT prolongation.  Patient with an appointment with Chronic pain management for Tuesday with Dr. Gwendolyn St. Discussed with daughter the importance of follow up outpatient once patient is started on Methadone for safe weaning of Methadone outpatient.  2) Recommend discontinuing current orders for PO Morphine instead order   PO Morphine 7.5mg Q4H standing for pain. Hold for over sedation. Not to be given within one hour of any immediate acting opioid.  3) Recommend continuous pulse oximetry while patient on opioids.  Pain service to sign off. Please call pain service if further assistance needed with pain management.

## 2022-08-23 NOTE — CHART NOTE - NSCHARTNOTEFT_GEN_A_CORE
86 yr old female admitted to medicine for management of Herpes Zooster/encephalitis c/b urinary retention. Retention not uncommon in setting of change from baseline and with positive UTI (Ecoli).  If pt fails TOV, then repeat may be attempted after appropriate treatment course for UTI and pt is optimized (& back to baseline).  Failed TOV 8/22, dior reinserted.    Urine culture showing e.coli resistance to CTX,     Recommendation:  -alternate abx tx for full UTI course   -c/w dior until completion of tx for UTI & optimized. if required, pt can have dior replaced and f/u o/p with Dr. Stovall for TOV.         Please call with any further questions.     Voiding trial: Optimize with all of the following:  - treat UTI  - Encourage Ambulation / Out of Bed  - Initiate / Continue Bowel Regimen  - Minimize Narcotics / Benzodiazepines  - Minimize anticholinergics / antihistamines    Grupo Strauss MD    Urology     Reachable on Teams M-F 6am-6pm (preferred)   -if no response on teams: [facility] Urology Pager #[phone];  [facility] Urology Pager #[phone]    [facility]rology  [address] [place] [address], [place] [address]  [phone]        Culture - Urine (08.19.22 @ 14:32)    -  Amikacin: S <=16    -  Amoxicillin/Clavulanic Acid: S <=8/4    -  Ampicillin: R >16 These ampicillin results predict results for amoxicillin    -  Ampicillin/Sulbactam: S <=4/2 Enterobacter, Klebsiella aerogenes, Citrobacter, and Serratia may develop resistance during prolonged therapy (3-4 days)    -  Aztreonam: R 16    -  Cefazolin: R >16 (MIC_CL_COM_ENTERIC_CEFAZU) For uncomplicated UTI with K. pneumoniae, E. coli, or P. mirablis: EDWARD <=16 is sensitive and EDWARD >=32 is resistant. This also predicts results for oral agents cefaclor, cefdinir, cefpodoxime, cefprozil, cefuroxime axetil, cephalexin and locarbef for uncomplicated UTI. Note that some isolates may be susceptible to these agents while testing resistant to cefazolin.    -  Cefepime: R >16    -  Ceftriaxone: R >32 Enterobacter, Klebsiella aerogenes, Citrobacter, and Serratia may develop resistance during prolonged therapy    -  Ciprofloxacin: S <=0.25    -  Gentamicin: S <=2    -  Imipenem: S <=1    -  Levofloxacin: S <=0.5    -  Meropenem: S <=1    -  Nitrofurantoin: S <=32 Should not be used to treat pyelonephritis    -  Ertapenem: S <=0.5    -  Piperacillin/Tazobactam: S <=8    -  Tigecycline: S <=2    -  Tobramycin: S <=2    -  Trimethoprim/Sulfamethoxazole: S 1/19    Specimen Source: Catheterized Catheterized    Culture Results:   >100,000 CFU/ml Escherichia coli ESBL    Organism Identification: Escherichia coli ESBL    Organism: Escherichia coli ESBL    Method Type: EDWARD

## 2022-08-23 NOTE — PROGRESS NOTE ADULT - SUBJECTIVE AND OBJECTIVE BOX
Patient is a 86y old  Female who presents with a chief complaint of Suspicion for Herpes Zoster opthalmicus/encephalitis (22 Aug 2022 15:51)      SUBJECTIVE / OVERNIGHT EVENTS:  Patient has no new complaints. She says facial pain improved after nerve back but still painful. Denies cp, SOB, abdominal pain, N/V/D     MEDICATIONS  (STANDING):  albuterol/ipratropium for Nebulization 3 milliLiter(s) Nebulizer every 6 hours  amitriptyline 10 milliGRAM(s) Oral at bedtime  artificial tears (preservative free) Ophthalmic Solution 1 Drop(s) Both EYES every 2 hours  buDESOnide    Inhalation Suspension 0.5 milliGRAM(s) Inhalation two times a day  BUpivacaine 0.5% (Preservative-Free) Injectable 5 milliLiter(s) Local Injection once  BUpivacaine 0.5% Injectable 5 milliLiter(s) Local Injection once  BUpivacaine liposome 1.3% Injectable 5 milliLiter(s) Local Injection once  BUpivacaine liposome 1.3% Injectable 5 milliLiter(s) Local Injection once  BUpivacaine liposome 1.3% Injectable 5 milliLiter(s) Local Injection once  cyanocobalamin 1000 MICROGram(s) Oral daily  dextrose 5%. 1000 milliLiter(s) (100 mL/Hr) IV Continuous <Continuous>  dextrose 5%. 1000 milliLiter(s) (50 mL/Hr) IV Continuous <Continuous>  dextrose 50% Injectable 25 Gram(s) IV Push once  dextrose 50% Injectable 12.5 Gram(s) IV Push once  dextrose 50% Injectable 25 Gram(s) IV Push once  enoxaparin Injectable 80 milliGRAM(s) SubCutaneous every 12 hours  erythromycin   Ointment 1 Application(s) Right EYE four times a day  folic acid 1 milliGRAM(s) Oral daily  furosemide    Tablet 20 milliGRAM(s) Oral daily  gabapentin 300 milliGRAM(s) Oral every 8 hours  glucagon  Injectable 1 milliGRAM(s) IntraMuscular once  insulin glargine Injectable (LANTUS) 10 Unit(s) SubCutaneous at bedtime  insulin lispro (ADMELOG) corrective regimen sliding scale   SubCutaneous Before meals and at bedtime  lactobacillus acidophilus 1 Tablet(s) Oral daily  lidocaine 5% Ointment 1 Application(s) Topical two times a day  loratadine 10 milliGRAM(s) Oral daily  metoprolol succinate ER 12.5 milliGRAM(s) Oral daily  mirtazapine 7.5 milliGRAM(s) Oral at bedtime  morphine   Solution 3 milliGRAM(s) Oral every 4 hours  mupirocin 2% Ointment 1 Application(s) Topical three times a day  nystatin Powder 1 Application(s) Topical two times a day  pantoprazole    Tablet 40 milliGRAM(s) Oral before breakfast  piperacillin/tazobactam IVPB.. 3.375 Gram(s) IV Intermittent every 8 hours  polyethylene glycol 3350 17 Gram(s) Oral daily  prednisoLONE acetate 1% Suspension 1 Drop(s) Right EYE three times a day  pregabalin 300 milliGRAM(s) Oral every 12 hours  senna 1 Tablet(s) Oral at bedtime  tamsulosin 0.4 milliGRAM(s) Oral at bedtime  traZODone 100 milliGRAM(s) Oral at bedtime  ursodiol Tablet 500 milliGRAM(s) Oral <User Schedule>  valACYclovir 1000 milliGRAM(s) Oral every 8 hours    MEDICATIONS  (PRN):  acetaminophen     Tablet .. 1000 milliGRAM(s) Oral every 8 hours PRN Severe Pain (7 - 10)  AQUAPHOR (petrolatum Ointment) 1 Application(s) Topical three times a day PRN inguinal fold rash  dextrose Oral Gel 15 Gram(s) Oral once PRN Blood Glucose LESS THAN 70 milliGRAM(s)/deciliter      Vital Signs Last 24 Hrs  T(C): 36.8 (23 Aug 2022 09:30), Max: 36.8 (23 Aug 2022 09:30)  T(F): 98.2 (23 Aug 2022 09:30), Max: 98.2 (23 Aug 2022 09:30)  HR: 76 (23 Aug 2022 10:56) (71 - 78)  BP: 129/57 (23 Aug 2022 09:30) (118/47 - 129/57)  BP(mean): --  RR: 18 (23 Aug 2022 09:30) (17 - 18)  SpO2: 97% (23 Aug 2022 09:30) (81% - 97%)    Parameters below as of 23 Aug 2022 09:30  Patient On (Oxygen Delivery Method): nasal cannula  O2 Flow (L/min): 2    CAPILLARY BLOOD GLUCOSE      POCT Blood Glucose.: 140 mg/dL (23 Aug 2022 11:35)  POCT Blood Glucose.: 117 mg/dL (23 Aug 2022 07:52)  POCT Blood Glucose.: 110 mg/dL (22 Aug 2022 21:34)  POCT Blood Glucose.: 136 mg/dL (22 Aug 2022 16:47)    I&O's Summary    22 Aug 2022 07:01  -  23 Aug 2022 07:00  --------------------------------------------------------  IN: 0 mL / OUT: 650 mL / NET: -650 mL          PHYSICAL EXAM:   GENERAL: NAD, well-developed  HEAD:  Atraumatic, Normocephalic  EYES: EOMI, PERRLA, conjunctiva and sclera clear  NECK: Supple, No JVD  CHEST/LUNG: Right lung crackles; No wheeze  HEART: Regular rate and rhythm; No murmurs, rubs, or gallops  ABDOMEN: Soft, Nontender, Nondistended; Bowel sounds present  EXTREMITIES:  2+ Peripheral Pulses, No clubbing, cyanosis, or edema  PSYCH: AAOx3  NEUROLOGY: non-focal  SKIN: crusted erythematous rash in R V1 distribution    LABS:                        11.1   7.48  )-----------( 393      ( 23 Aug 2022 05:57 )             36.1     08-23    140  |  102  |  12  ----------------------------<  116<H>  3.8   |  27  |  0.65    Ca    8.9      23 Aug 2022 05:57  Phos  4.0     08-23  Mg     1.70     08-23                RADIOLOGY & ADDITIONAL TESTS:    Imaging Personally Reviewed:    Consultant(s) Notes Reviewed:      Care Discussed with Consultants/Other Providers:

## 2022-08-23 NOTE — PROGRESS NOTE ADULT - ASSESSMENT
86 y.o. Female w/ hx HTN, DM, CAD s/p stents (1986, 1996), COPD, NPH s/p  shunt, migraines p/w herpes zoster ophthalmicus/encephalitis affecting the right V1 dermatome but developed post herpetic neuralgia, and mrstran bacteremia (treated). She is s/p IV acyclovir, but continues to have neuralgia pain. This pain  improved after NSG did R supra orbital marcaine nerve block on 8/16, 8/17and 8/22. She is currently on Currently on morphine IV q3hr, and Lyrica but patient and daughter still not satisfied with pain control.     Spoke to daughter at bedside on 8/23

## 2022-08-23 NOTE — PROGRESS NOTE ADULT - PROBLEM SELECTOR PLAN 9
NPH diagnosed 2011, pt has programmable  shunt installed by [facility] neurosurg, **must be programmed after MRI (page neurosurg [id number])**. 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

## 2022-08-23 NOTE — PROGRESS NOTE ADULT - PROBLEM SELECTOR PLAN 4
-Provoked RUE subclavian DVT, nPOA  -Likely provoked by multiple sticks, PICC line.  Diagnosed on 8/8  - cont full dose lovenox 80mg q12h  -will consider eliquis

## 2022-08-24 NOTE — SWALLOW BEDSIDE ASSESSMENT ADULT - ADDITIONAL RECOMMENDATIONS
1.Monitor for diet tolerance and reconsult this department as indicated
1) Monitor for PO tolerance and intake. 2) This service to follow up as schedule permits for dysphagia management. 3) Reconsult this service as needed.

## 2022-08-24 NOTE — SWALLOW BEDSIDE ASSESSMENT ADULT - SWALLOW EVAL: ORAL MUSCULATURE
generally intact
unable to assess due to poor participation/comprehension
unable to assess due to poor participation/comprehension

## 2022-08-24 NOTE — SWALLOW BEDSIDE ASSESSMENT ADULT - SPECIFY REASON(S)
To reassess swallow function
To reassess swallow function
to assess swallow function
to assess swallow mechanism

## 2022-08-24 NOTE — PROGRESS NOTE ADULT - PROBLEM SELECTOR PLAN 1
NSG did repeat bupivicaine supraorbital nerve block on 8/22 to help with pain; monitor for improvement.  -c/w valtrex 1gram PO q8hr  - increased lyrica to 300 mg every 8 hours on 8/21  -added Gabapentin 300mg tid for better pain control on 8/23  -Morphine increased to 7.5mg IR q4hr standing as per pain consult  -Reconsulted pain management and they advised also adding methadone but today patient's pain controlled without it. If pain becomes uncontrolled again with ad methadone.     c/w erythromycin ointment QID to eye & periocular lesions, artificial tears Q4H as recommended by opthalmology.  - Appreciate derm recs:    - lidocaine ointment mixed with vaseline BID, triamcinolone ointment to red areas only BID    - apply vaseline to crusted areas Q2H  -Daughter suggested we apply capsaicin cream to face to help facial pain but dermatology assessed no benefit.

## 2022-08-24 NOTE — PROGRESS NOTE ADULT - PROBLEM SELECTOR PLAN 2
Patient hypoxic with O2 Sat 88% on RA  started 2LNC overnight; Wean off oxygen as tolerated  CXR on 8/22 shows RML infiltrate  repeat swallow eval  c/w Zosyn IV D#2/5

## 2022-08-24 NOTE — SWALLOW BEDSIDE ASSESSMENT ADULT - SWALLOW EVAL: CURRENT DIET
Soft and bite sized solids with thin liquids, per MD order
NPO as per MD order
soft & bite sized with thin liquids
Soft and bite sized solids with thin liquids, per MD order

## 2022-08-24 NOTE — PROVIDER CONTACT NOTE (CRITICAL VALUE NOTIFICATION) - SITUATION
Blood cultures. Aerobic: gram + cocci cluster; anaerobic: gram + cocci clusters
Urine culture final results came back positive for ECOLI ESBL

## 2022-08-24 NOTE — PROGRESS NOTE ADULT - SUBJECTIVE AND OBJECTIVE BOX
[facility] DEPARTMENT OF OPHTHALMOLOGY  ------------------------------------------------------------------------------    Interval History: Following for R HZO. No acute events overnight. Today patient has much improved mental status. Alert and engaged in coherent conversation. States her pain is much better controlled.     MEDICATIONS  (STANDING):  albuterol/ipratropium for Nebulization 3 milliLiter(s) Nebulizer every 6 hours  amitriptyline 10 milliGRAM(s) Oral at bedtime  artificial tears (preservative free) Ophthalmic Solution 1 Drop(s) Both EYES every 2 hours  buDESOnide    Inhalation Suspension 0.5 milliGRAM(s) Inhalation two times a day  BUpivacaine 0.5% (Preservative-Free) Injectable 5 milliLiter(s) Local Injection once  BUpivacaine 0.5% Injectable 5 milliLiter(s) Local Injection once  BUpivacaine liposome 1.3% Injectable 5 milliLiter(s) Local Injection once  BUpivacaine liposome 1.3% Injectable 5 milliLiter(s) Local Injection once  BUpivacaine liposome 1.3% Injectable 5 milliLiter(s) Local Injection once  cyanocobalamin 1000 MICROGram(s) Oral daily  dextrose 5%. 1000 milliLiter(s) (100 mL/Hr) IV Continuous <Continuous>  dextrose 5%. 1000 milliLiter(s) (50 mL/Hr) IV Continuous <Continuous>  dextrose 50% Injectable 25 Gram(s) IV Push once  dextrose 50% Injectable 12.5 Gram(s) IV Push once  dextrose 50% Injectable 25 Gram(s) IV Push once  enoxaparin Injectable 80 milliGRAM(s) SubCutaneous every 12 hours  erythromycin   Ointment 1 Application(s) Right EYE four times a day  folic acid 1 milliGRAM(s) Oral daily  furosemide    Tablet 20 milliGRAM(s) Oral daily  gabapentin 300 milliGRAM(s) Oral every 8 hours  glucagon  Injectable 1 milliGRAM(s) IntraMuscular once  insulin glargine Injectable (LANTUS) 10 Unit(s) SubCutaneous at bedtime  insulin lispro (ADMELOG) corrective regimen sliding scale   SubCutaneous Before meals and at bedtime  lactobacillus acidophilus 1 Tablet(s) Oral daily  lidocaine 5% Ointment 1 Application(s) Topical two times a day  loratadine 10 milliGRAM(s) Oral daily  metoprolol succinate ER 12.5 milliGRAM(s) Oral daily  mirtazapine 7.5 milliGRAM(s) Oral at bedtime  morphine  IR 7.5 milliGRAM(s) Oral every 4 hours  mupirocin 2% Ointment 1 Application(s) Topical three times a day  nystatin Powder 1 Application(s) Topical two times a day  pantoprazole    Tablet 40 milliGRAM(s) Oral before breakfast  piperacillin/tazobactam IVPB.. 3.375 Gram(s) IV Intermittent every 8 hours  polyethylene glycol 3350 17 Gram(s) Oral daily  prednisoLONE acetate 1% Suspension 1 Drop(s) Right EYE three times a day  pregabalin 300 milliGRAM(s) Oral every 12 hours  senna 1 Tablet(s) Oral at bedtime  tamsulosin 0.4 milliGRAM(s) Oral at bedtime  traZODone 100 milliGRAM(s) Oral at bedtime  ursodiol Tablet 500 milliGRAM(s) Oral <User Schedule>  valACYclovir 1000 milliGRAM(s) Oral every 8 hours    MEDICATIONS  (PRN):  acetaminophen     Tablet .. 1000 milliGRAM(s) Oral every 8 hours PRN Severe Pain (7 - 10)  AQUAPHOR (petrolatum Ointment) 1 Application(s) Topical three times a day PRN inguinal fold rash  dextrose Oral Gel 15 Gram(s) Oral once PRN Blood Glucose LESS THAN 70 milliGRAM(s)/deciliter      VITALS: T(C): 36.7 (08-24-22 @ 17:40)  T(F): 98 (08-24-22 @ 17:40), Max: 98.2 (08-24-22 @ 07:17)  HR: 80 (08-24-22 @ 17:40) (67 - 93)  BP: 119/59 (08-24-22 @ 17:40) (117/55 - 141/55)  RR:  (17 - 22)  SpO2:  (81% - 99%)  Wt(kg): --  General: AAO x 3, appropriate mood and affect    Ophthalmology Exam:  Visual acuity (sc): 20/100 OD, 20/40 OS  Pupils: 5mm OD, nonreactive to light, 1mm OS, reactive.   Ttono: 9 OD, 11 OS  Extraocular movements (EOMs): 10% reduction in abduction OD, full OS    Pen Light Exam (PLE)  External: R sided forehead edema, resolution of eyelid rash, no Metcalf sign, forehead and head bandaged up  Lids/Lashes/Lacrimal Ducts: trace periorbital edema RUL, flat RLL, flat OS   Sclera/Conjunctiva: Tr injection OD, white and quiet OS.  Cornea: Tr SPK OD, no pseudodendrites, no D-folds OD, Cl OS  Anterior Chamber: Deep and formed OU.    Iris: Flat OU.  Lens: PCIOL OD, 3+ NS OS

## 2022-08-24 NOTE — PROVIDER CONTACT NOTE (CRITICAL VALUE NOTIFICATION) - ACTION/TREATMENT ORDERED:
team made aware, will determine if need to change antibiotics or keep current, awaiting orders.
provier notified

## 2022-08-24 NOTE — CHART NOTE - NSCHARTNOTEFT_GEN_A_CORE
Pt requires semi-electric hospital bed secondary to aspiration pneumonia. HOB to be elevated 30 degrees or more and frequent changes in body position are required which are not feasible in ordinary bed. Gel mattress overlay needed to prevent skin breakdown.     Transport wheelchair   Pt cannot self propel due to weakness and cannot utilize walker, cane, or crutches to assist in ADLs. Pt needs transport wheelchair to get to and from MDs appointments.     Kesha Lift   Pt is non-ambulatory secondary to being recently bedbound and requires kesha lift for transfers to and from wheelchair/chair/commode. This is 86F w/ hx CAD (MI 1986, 1996) s/p stents, COPD, NPH s/p  shunt, T2DM, migraines, suspected HTN admitted for herpes zoster ophthalmicus (R V1 dermatome) and encephalitis, s/p 7 day course of IV Vancomycin/Meropenem, s/p IV Acyclovir until 8/10. Course c/b positive DVT in R subclavian, was on therapeutic Lovenox, now changed to Eliquis.     Pt requires semi-electric hospital bed secondary to aspiration pneumonia. HOB to be elevated 30 degrees or more and frequent changes in body position are required which are not feasible in ordinary bed. Gel mattress overlay needed to prevent skin breakdown.     Transport wheelchair   Pt cannot self propel due to weakness and cannot utilize walker, cane, or crutches to assist in ADLs. Pt needs transport wheelchair to get to and from MDs appointments. Caregiver is willing to assist    Kesha Lift   Pt is non-ambulatory secondary to being recently bedbound and requires Kesha lift for transfers to and from wheelchair/chair/commode due to generalized muscle weakness and impaired balance.     Patient will need home Oxygen, patient's oxygen saturation on room air was 86% at rest. Patient will also need Nebulizer machine to continue with Duoneb treatment 3ml every 6hrs for Dyspnea and wheezing.

## 2022-08-24 NOTE — PROVIDER CONTACT NOTE (CRITICAL VALUE NOTIFICATION) - TEST AND RESULT REPORTED:
Urine culture collected 8/19 final results >100,000 Ecoli ESBL
Blood cultures. Aerobic: gram + cocci cluster; anaerobic: gram + cocci clusters

## 2022-08-24 NOTE — PROGRESS NOTE ADULT - SUBJECTIVE AND OBJECTIVE BOX
Patient is a 86y old  Female who presents with a chief complaint of Suspicion for Herpes Zoster opthalmicus/encephalitis (23 Aug 2022 16:20)      SUBJECTIVE / OVERNIGHT EVENTS:  Patient says pain in now controlled. Denies cp, SOB, abdominal pain, N/V/D     MEDICATIONS  (STANDING):  albuterol/ipratropium for Nebulization 3 milliLiter(s) Nebulizer every 6 hours  amitriptyline 10 milliGRAM(s) Oral at bedtime  artificial tears (preservative free) Ophthalmic Solution 1 Drop(s) Both EYES every 2 hours  buDESOnide    Inhalation Suspension 0.5 milliGRAM(s) Inhalation two times a day  BUpivacaine 0.5% (Preservative-Free) Injectable 5 milliLiter(s) Local Injection once  BUpivacaine 0.5% Injectable 5 milliLiter(s) Local Injection once  BUpivacaine liposome 1.3% Injectable 5 milliLiter(s) Local Injection once  BUpivacaine liposome 1.3% Injectable 5 milliLiter(s) Local Injection once  BUpivacaine liposome 1.3% Injectable 5 milliLiter(s) Local Injection once  cyanocobalamin 1000 MICROGram(s) Oral daily  dextrose 5%. 1000 milliLiter(s) (100 mL/Hr) IV Continuous <Continuous>  dextrose 5%. 1000 milliLiter(s) (50 mL/Hr) IV Continuous <Continuous>  dextrose 50% Injectable 25 Gram(s) IV Push once  dextrose 50% Injectable 12.5 Gram(s) IV Push once  dextrose 50% Injectable 25 Gram(s) IV Push once  enoxaparin Injectable 80 milliGRAM(s) SubCutaneous every 12 hours  erythromycin   Ointment 1 Application(s) Right EYE four times a day  folic acid 1 milliGRAM(s) Oral daily  furosemide    Tablet 20 milliGRAM(s) Oral daily  gabapentin 300 milliGRAM(s) Oral every 8 hours  glucagon  Injectable 1 milliGRAM(s) IntraMuscular once  insulin glargine Injectable (LANTUS) 10 Unit(s) SubCutaneous at bedtime  insulin lispro (ADMELOG) corrective regimen sliding scale   SubCutaneous Before meals and at bedtime  lactobacillus acidophilus 1 Tablet(s) Oral daily  lidocaine 5% Ointment 1 Application(s) Topical two times a day  loratadine 10 milliGRAM(s) Oral daily  metoprolol succinate ER 12.5 milliGRAM(s) Oral daily  mirtazapine 7.5 milliGRAM(s) Oral at bedtime  morphine  IR 7.5 milliGRAM(s) Oral every 4 hours  mupirocin 2% Ointment 1 Application(s) Topical three times a day  nystatin Powder 1 Application(s) Topical two times a day  pantoprazole    Tablet 40 milliGRAM(s) Oral before breakfast  piperacillin/tazobactam IVPB.. 3.375 Gram(s) IV Intermittent every 8 hours  polyethylene glycol 3350 17 Gram(s) Oral daily  prednisoLONE acetate 1% Suspension 1 Drop(s) Right EYE three times a day  pregabalin 300 milliGRAM(s) Oral every 12 hours  senna 1 Tablet(s) Oral at bedtime  tamsulosin 0.4 milliGRAM(s) Oral at bedtime  traZODone 100 milliGRAM(s) Oral at bedtime  ursodiol Tablet 500 milliGRAM(s) Oral <User Schedule>  valACYclovir 1000 milliGRAM(s) Oral every 8 hours    MEDICATIONS  (PRN):  acetaminophen     Tablet .. 1000 milliGRAM(s) Oral every 8 hours PRN Severe Pain (7 - 10)  AQUAPHOR (petrolatum Ointment) 1 Application(s) Topical three times a day PRN inguinal fold rash  dextrose Oral Gel 15 Gram(s) Oral once PRN Blood Glucose LESS THAN 70 milliGRAM(s)/deciliter      Vital Signs Last 24 Hrs  T(C): 36.7 (24 Aug 2022 09:40), Max: 36.8 (23 Aug 2022 13:30)  T(F): 98 (24 Aug 2022 09:40), Max: 98.3 (23 Aug 2022 13:30)  HR: 67 (24 Aug 2022 10:02) (67 - 99)  BP: 117/55 (24 Aug 2022 09:40) (117/55 - 141/55)  BP(mean): --  RR: 18 (24 Aug 2022 09:40) (17 - 18)  SpO2: 97% (24 Aug 2022 10:02) (92% - 99%)    Parameters below as of 24 Aug 2022 09:40  Patient On (Oxygen Delivery Method): nasal cannula  O2 Flow (L/min): 2    CAPILLARY BLOOD GLUCOSE      POCT Blood Glucose.: 121 mg/dL (24 Aug 2022 07:11)  POCT Blood Glucose.: 112 mg/dL (23 Aug 2022 21:09)  POCT Blood Glucose.: 149 mg/dL (23 Aug 2022 16:46)  POCT Blood Glucose.: 140 mg/dL (23 Aug 2022 11:35)    I&O's Summary    23 Aug 2022 07:01  -  24 Aug 2022 07:00  --------------------------------------------------------  IN: 0 mL / OUT: 950 mL / NET: -950 mL        PHYSICAL EXAM:   GENERAL: NAD, well-developed  HEAD:  Atraumatic, Normocephalic  EYES: EOMI, PERRLA, conjunctiva and sclera clear  NECK: Supple, No JVD  CHEST/LUNG: Right lung crackles; No wheeze  HEART: RRR; No murmurs, rubs, or gallops  ABDOMEN: Soft, Nontender, Nondistended; Bowel sounds present  EXTREMITIES:  2+ Peripheral Pulses, No clubbing, cyanosis, or edema  PSYCH: AAOx3  NEUROLOGY: non-focal  SKIN: crusted erythematous rash in R V1 distribution    LABS:                        10.7   6.73  )-----------( 412      ( 24 Aug 2022 07:06 )             34.5     08-24    139  |  101  |  9   ----------------------------<  117<H>  4.3   |  27  |  0.57    Ca    8.8      24 Aug 2022 07:06  Phos  3.9     08-24  Mg     1.70     08-24                RADIOLOGY & ADDITIONAL TESTS:    Imaging Personally Reviewed:    Consultant(s) Notes Reviewed:      Care Discussed with Consultants/Other Providers:

## 2022-08-24 NOTE — PROGRESS NOTE ADULT - PROBLEM SELECTOR PLAN 9
NPH diagnosed 2011, pt has programmable  shunt installed by [facility] neurosurg, **must be programmed after MRI (page neurosurg [id number])**. 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

## 2022-08-24 NOTE — PROGRESS NOTE ADULT - PROBLEM SELECTOR PLAN 6
Hx of severe HTN at home.  -BPs currently stable  -c/w metoprolol and  lasix   - Holding spirololactone, and losartan

## 2022-08-24 NOTE — SWALLOW BEDSIDE ASSESSMENT ADULT - ASR SWALLOW RECOMMEND DIAG
Objective testing NOT warranted given no observed s/sx of penetration/aspiration and clear lungs/VFSS/MBS
consider cinesophagram at MD's discretion given concerns of aspiration PNA per hospitalist note; Of note SLP provided education to patient regarding cinesophagram however patient declined to partcipate- ACP made aware.
Consider Cinesophagram at MD's discretion given recent CXR results

## 2022-08-24 NOTE — SWALLOW BEDSIDE ASSESSMENT ADULT - COMMENTS
Per Hospitalist Note 8/24/22: "86 y.o. Female w/ hx HTN, DM, CAD s/p stents (1986, 1996), COPD, NPH s/p  shunt, migraines p/w herpes zoster ophthalmicus/encephalitis affecting the right V1 dermatome but developed post herpetic neuralgia, and mrstran bacteremia (treated). She is s/p IV acyclovir, but continues to have neuralgia pain. This pain  improved after NSG did R supra orbital marcaine nerve block on 8/16, 8/17and 8/22. She is currently on Currently on morphine IV q3hr, and Lyrica but patient and daughter still not satisfied with pain control."    Patient is familiar to this department as the patient has been seen for multiple swallow evaluations (7/27, 8/4, 8/5) with the most recent recommending soft & bite sized with thin liquids. refer to consults for further details.     Patient received upright in bedside chair, awake and alert with 1:1 PCA present at bedside. Patient denies symptoms of dysphagia, though reports preference for soft diet due to current edentulous state, reporting dentures are at home.

## 2022-08-24 NOTE — PROGRESS NOTE ADULT - ASSESSMENT
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 Rash improved, mental status improved, pain improved.     1. HZO OD with of R sided facial rash  - R sided rash (zoster with likely bacterial superinfection) much improved. No longer involves eyelids.   - IOP 9/11, VA 20/100 OD and 20/40 OS, at pt's fluctuating baseline since her admission.   - Pt has dilated and minimally reactive R pupil. 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   - Abduction deficit OD 2/2 viral myositis much improved today  - Abx and antivirals per ID - would continue antivirals until EOM restriction and dilated pupil fully resolve  - Pt unable to tolerate sitting up at slit lamp - cannot assess anterior chamber for inflammatory reaction  - Cornea is clear without or bullae. 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 .   - C/w pred forte TID OD.   - C/w erythromycin ointment QID to R eye and periocular lesions  - C/w preservative-free artificial tears Q2H OU     2. Intermittent AMS related to waxing and waning pain - now A&OX3   - Appreciate neurology consult  - MRI brain showing no acute pathology, MRI orbits as above  - LP deferred per neurology and ID  - Pain appears much better controlled with supraorbital nerve block.   - findings and plan discussed with patient and primary team    Seen and discussed with Dr. Hernandez, cornea attending.

## 2022-08-24 NOTE — SWALLOW BEDSIDE ASSESSMENT ADULT - SWALLOW EVAL: DIAGNOSIS
Patient presents with oropharyngeal dysphagia given thin liquids, puree and soft & bite sized marked by adequate bolus containment, slowed bolus manipulation, slowed mastication with adequate ability to break down soft & bite sized solids and slowed anterior-posterior transport. adequate oral clearance noted. Pharyngeal stage marked by observed initiation of the pharyngeal swallow trigger judged via laryngeal palpation. No clinically overt s/sx of impaired airway protection for all trials.
1) Mild oral dysphagia for soft/bite sized solids marked by prolonged manipulation, slow chewing, and reduced collection resulting in delayed posterior bolus transfer. Lingual stasis noted which was cleared with liquid wash. 2) Functional oral stage of swallow for minced and moist, puree, mildly thick fluids, and thin fluids (liquids via straw sip only) marked by adequate containment, bolus manipulation/mastication, and coordination. Anterior to posterior oral transit/transfer noted. 3) Functional pharyngeal stage of swallow for soft and bite sized, minced and moist, puree, mildly thick fluids, and thin fluids marked by initiation of pharyngeal swallow trigger and hyolaryngeal excursion noted upon digital palpation. No overt signs/symptoms of penetration/aspiration noted.
1. Functional oral stage for thin liquids marked by adequate oral acceptance, collection and transfer. 2. Mild oral dysphagia for regular solids marked by adequate oral acceptance, with slow/prolonged chewing likely due to edentulous state with eventual transfer. 3. Functional pharyngeal phase for thin liquids and regular solids marked by a present pharyngeal swallow trigger with hyolaryngeal elevation upon digital palpation without evidence of airway penetration/aspiration. Of note, patient with baseline cough prior to PO trials.

## 2022-08-25 NOTE — PROGRESS NOTE ADULT - SUBJECTIVE AND OBJECTIVE BOX
Patient is a 86y old  Female who presents with a chief complaint of Suspicion for Herpes Zoster opthalmicus/encephalitis (24 Aug 2022 19:29)      SUBJECTIVE / OVERNIGHT EVENTS:  Patient says pain is a 10 out 10 since overnight. She was given a.m. meds so pain slightly improved. Daughter at bedside not satisfied with pain control overnight and wants methadone started. Patient has no other complaints. Denies cp, SOB, abdominal pain, N/V/D.     MEDICATIONS  (STANDING):  albuterol/ipratropium for Nebulization 3 milliLiter(s) Nebulizer every 6 hours  amitriptyline 10 milliGRAM(s) Oral at bedtime  artificial tears (preservative free) Ophthalmic Solution 1 Drop(s) Both EYES every 2 hours  buDESOnide    Inhalation Suspension 0.5 milliGRAM(s) Inhalation two times a day  BUpivacaine 0.5% (Preservative-Free) Injectable 5 milliLiter(s) Local Injection once  BUpivacaine 0.5% Injectable 5 milliLiter(s) Local Injection once  BUpivacaine liposome 1.3% Injectable 5 milliLiter(s) Local Injection once  BUpivacaine liposome 1.3% Injectable 5 milliLiter(s) Local Injection once  BUpivacaine liposome 1.3% Injectable 5 milliLiter(s) Local Injection once  cyanocobalamin 1000 MICROGram(s) Oral daily  dextrose 5%. 1000 milliLiter(s) (100 mL/Hr) IV Continuous <Continuous>  dextrose 5%. 1000 milliLiter(s) (50 mL/Hr) IV Continuous <Continuous>  dextrose 50% Injectable 25 Gram(s) IV Push once  dextrose 50% Injectable 12.5 Gram(s) IV Push once  dextrose 50% Injectable 25 Gram(s) IV Push once  enoxaparin Injectable 80 milliGRAM(s) SubCutaneous every 12 hours  erythromycin   Ointment 1 Application(s) Right EYE four times a day  folic acid 1 milliGRAM(s) Oral daily  furosemide    Tablet 20 milliGRAM(s) Oral daily  gabapentin 300 milliGRAM(s) Oral every 8 hours  glucagon  Injectable 1 milliGRAM(s) IntraMuscular once  insulin glargine Injectable (LANTUS) 10 Unit(s) SubCutaneous at bedtime  insulin lispro (ADMELOG) corrective regimen sliding scale   SubCutaneous Before meals and at bedtime  lactobacillus acidophilus 1 Tablet(s) Oral daily  lidocaine 5% Ointment 1 Application(s) Topical two times a day  loratadine 10 milliGRAM(s) Oral daily  methadone    Tablet 5 milliGRAM(s) Oral two times a day  metoprolol succinate ER 12.5 milliGRAM(s) Oral daily  mirtazapine 7.5 milliGRAM(s) Oral at bedtime  morphine  IR 7.5 milliGRAM(s) Oral every 4 hours  mupirocin 2% Ointment 1 Application(s) Topical three times a day  nystatin Powder 1 Application(s) Topical two times a day  pantoprazole    Tablet 40 milliGRAM(s) Oral before breakfast  piperacillin/tazobactam IVPB.. 3.375 Gram(s) IV Intermittent every 8 hours  polyethylene glycol 3350 17 Gram(s) Oral daily  prednisoLONE acetate 1% Suspension 1 Drop(s) Right EYE three times a day  pregabalin 300 milliGRAM(s) Oral every 12 hours  senna 1 Tablet(s) Oral at bedtime  tamsulosin 0.4 milliGRAM(s) Oral at bedtime  traZODone 100 milliGRAM(s) Oral at bedtime  ursodiol Tablet 500 milliGRAM(s) Oral <User Schedule>  valACYclovir 1000 milliGRAM(s) Oral every 8 hours    MEDICATIONS  (PRN):  acetaminophen     Tablet .. 1000 milliGRAM(s) Oral every 8 hours PRN Severe Pain (7 - 10)  AQUAPHOR (petrolatum Ointment) 1 Application(s) Topical three times a day PRN inguinal fold rash  dextrose Oral Gel 15 Gram(s) Oral once PRN Blood Glucose LESS THAN 70 milliGRAM(s)/deciliter      Vital Signs Last 24 Hrs  T(C): 36.8 (25 Aug 2022 06:15), Max: 36.8 (25 Aug 2022 06:15)  T(F): 98.2 (25 Aug 2022 06:15), Max: 98.2 (25 Aug 2022 06:15)  HR: 70 (25 Aug 2022 09:46) (68 - 87)  BP: 132/59 (25 Aug 2022 06:15) (119/59 - 145/77)  BP(mean): --  RR: 18 (25 Aug 2022 06:15) (18 - 22)  SpO2: 98% (25 Aug 2022 09:46) (81% - 98%)    Parameters below as of 25 Aug 2022 06:15  Patient On (Oxygen Delivery Method): nasal cannula  O2 Flow (L/min): 2    CAPILLARY BLOOD GLUCOSE      POCT Blood Glucose.: 119 mg/dL (25 Aug 2022 07:55)  POCT Blood Glucose.: 127 mg/dL (24 Aug 2022 21:52)  POCT Blood Glucose.: 156 mg/dL (24 Aug 2022 16:44)  POCT Blood Glucose.: 110 mg/dL (24 Aug 2022 11:38)    I&O's Summary    24 Aug 2022 07:01  -  25 Aug 2022 07:00  --------------------------------------------------------  IN: 0 mL / OUT: 3500 mL / NET: -3500 mL        PHYSICAL EXAM:   GENERAL: NAD, well-developed  HEAD:  Atraumatic, Normocephalic  EYES: EOMI, PERRLA, conjunctiva and sclera clear  NECK: Supple, No JVD  CHEST/LUNG: Right lung crackles; No wheeze  HEART: RRR; No murmurs, rubs, or gallops  ABDOMEN: Soft, Nontender, Nondistended; Bowel sounds present  EXTREMITIES:  2+ Peripheral Pulses, No clubbing, cyanosis, or edema  PSYCH: AAOx3  NEUROLOGY: non-focal  SKIN: crusted erythematous rash in R V1 distribution    LABS:                        10.4   7.70  )-----------( 410      ( 25 Aug 2022 06:00 )             32.7     08-24    139  |  101  |  9   ----------------------------<  117<H>  4.3   |  27  |  0.57    Ca    8.8      24 Aug 2022 07:06  Phos  3.5     08-25  Mg     1.70     08-24                RADIOLOGY & ADDITIONAL TESTS:    Imaging Personally Reviewed:    Consultant(s) Notes Reviewed:      Care Discussed with Consultants/Other Providers:

## 2022-08-25 NOTE — CHART NOTE - NSCHARTNOTEFT_GEN_A_CORE
Nutrition Follow-up Note    86 y.o. Female with history of HTN, DM, CAD s/p stents (1986, 1996), COPD, NPH s/p  shunt, migraines p/w herpes zoster ophthalmicus/encephalitis affecting the right V1 dermatome but developed post herpetic neuralgia, and mrstran bacteremia (treated), per chart.   Talked with patient's daughter on the phone to obtain information, patient's daughter stated that patient appetite has been improved since admission, patient is consuming 50% of her usual amount at home. Patient only likes salmon, egg provided in facility. Patient's daughter reports patient usual body weight of 180lbs, 6 weeks ago. Current weight obtained from bed scale: 178.9lbs. Weight loss of 0.6% BW noted. No reports of any nausea, vomiting, diarrhea, constipation at this time. Patient's daughter request to discontinue Glucerna. As per speech note dated 8/24/2022, to continue on soft and bite sized (per patient preference) with thin liquids.     Current Diet : Soft and Bite Sized, Consistent Carbohydrate {Evening Snack} (CSTCHOSN), Kosher  Supplement : Glucerna Shake 8oz 3x daily    Current Weight: 178.9lbs (8/25/2022)    __________________ Pertinent Medications__________________   MEDICATIONS  (STANDING):  albuterol/ipratropium for Nebulization 3 milliLiter(s) Nebulizer every 6 hours  amitriptyline 10 milliGRAM(s) Oral at bedtime  apixaban 5 milliGRAM(s) Oral two times a day  artificial tears (preservative free) Ophthalmic Solution 1 Drop(s) Both EYES every 2 hours  buDESOnide    Inhalation Suspension 0.5 milliGRAM(s) Inhalation two times a day  BUpivacaine 0.5% (Preservative-Free) Injectable 5 milliLiter(s) Local Injection once  BUpivacaine 0.5% Injectable 5 milliLiter(s) Local Injection once  BUpivacaine 0.5% Injectable 5 milliLiter(s) Local Injection once  BUpivacaine liposome 1.3% Injectable 5 milliLiter(s) Local Injection once  BUpivacaine liposome 1.3% Injectable 2 milliLiter(s) Local Injection once  BUpivacaine liposome 1.3% Injectable 5 milliLiter(s) Local Injection once  BUpivacaine liposome 1.3% Injectable 5 milliLiter(s) Local Injection once  cyanocobalamin 1000 MICROGram(s) Oral daily  dextrose 5%. 1000 milliLiter(s) (100 mL/Hr) IV Continuous <Continuous>  dextrose 5%. 1000 milliLiter(s) (50 mL/Hr) IV Continuous <Continuous>  dextrose 50% Injectable 25 Gram(s) IV Push once  dextrose 50% Injectable 12.5 Gram(s) IV Push once  dextrose 50% Injectable 25 Gram(s) IV Push once  erythromycin   Ointment 1 Application(s) Right EYE four times a day  folic acid 1 milliGRAM(s) Oral daily  furosemide    Tablet 20 milliGRAM(s) Oral daily  gabapentin 300 milliGRAM(s) Oral every 8 hours  glucagon  Injectable 1 milliGRAM(s) IntraMuscular once  insulin glargine Injectable (LANTUS) 10 Unit(s) SubCutaneous at bedtime  insulin lispro (ADMELOG) corrective regimen sliding scale   SubCutaneous Before meals and at bedtime  lactobacillus acidophilus 1 Tablet(s) Oral daily  lidocaine 5% Ointment 1 Application(s) Topical two times a day  loratadine 10 milliGRAM(s) Oral daily  methadone    Tablet 5 milliGRAM(s) Oral two times a day  metoprolol succinate ER 12.5 milliGRAM(s) Oral daily  mirtazapine 7.5 milliGRAM(s) Oral at bedtime  morphine  IR 7.5 milliGRAM(s) Oral every 4 hours  mupirocin 2% Ointment 1 Application(s) Topical three times a day  nystatin Powder 1 Application(s) Topical two times a day  pantoprazole    Tablet 40 milliGRAM(s) Oral before breakfast  piperacillin/tazobactam IVPB.. 3.375 Gram(s) IV Intermittent every 8 hours  polyethylene glycol 3350 17 Gram(s) Oral daily  prednisoLONE acetate 1% Suspension 1 Drop(s) Right EYE three times a day  pregabalin 300 milliGRAM(s) Oral every 12 hours  senna 1 Tablet(s) Oral at bedtime  tamsulosin 0.4 milliGRAM(s) Oral at bedtime  traZODone 100 milliGRAM(s) Oral at bedtime  ursodiol Tablet 500 milliGRAM(s) Oral <User Schedule>  valACYclovir 1000 milliGRAM(s) Oral every 8 hours    MEDICATIONS  (PRN):  acetaminophen     Tablet .. 1000 milliGRAM(s) Oral every 8 hours PRN Severe Pain (7 - 10)  AQUAPHOR (petrolatum Ointment) 1 Application(s) Topical three times a day PRN inguinal fold rash  dextrose Oral Gel 15 Gram(s) Oral once PRN Blood Glucose LESS THAN 70 milliGRAM(s)/deciliter      __________________ Pertinent Labs__________________   08-24 Na139 mmol/L Glu 117 mg/dL<H> K+ 4.3 mmol/L Cr  0.57 mg/dL BUN 9 mg/dL 08-25 Phos 3.5 mg/dL 08-19 Alb 2.8 g/dL<L>        Skin: No pressure injury noted at this time.     Estimated Needs:   [X ] no change since previous assessment  [ ] recalculated:     Nutrition Diagnosis is [X] ongoing  [ ] resolved [ ] not applicable     Recommend    [ ] Change Diet To:    [ ] Nutrition Supplement    [ ] Nutrition Support    [X ] Other: Discontinue Glucerna supplement, as per patient's daughter request.      Monitoring and Evaluation:     [X ] PO intake [X ] Tolerance to diet prescription [ X] weights [X ] follow up per protocol  [ ] other: Nutrition Follow-up Note    86 y.o. Female with history of HTN, DM, CAD s/p stents (1986, 1996), COPD, NPH s/p  shunt, migraines p/w herpes zoster ophthalmicus/encephalitis affecting the right V1 dermatome but developed post herpetic neuralgia, and mrstran bacteremia (treated), per chart.     Nutrition note: Talked with patient's daughter on the phone to obtain information, patient's daughter stated that patient appetite has been improving since admission. Patient prefers eating food from home, only likes salmon and egg provided in facility. Patient has been eating 50% of her usual intake from home. No reports of any nausea, vomiting, diarrhea, constipation at this time. Patient's daughter request to discontinue Glucerna. As per speech note dated 8/24/2022, to continue on soft and bite sized (per patient preference) with thin liquids. Noted with fluid related weight gain of 9.3lbs/ 5.4% in one month. Patient has 2+ generalized, left ankle, right ankle edema.     Current Diet : Soft and Bite Sized, Consistent Carbohydrate {Evening Snack} (CSTCHOSN), Kosher  Supplement : Glucerna Shake 8oz 3x daily    Current Weight: 178.9lbs (8/25/2022)  Weight history: 169.6# (7/26/22)  UBW: 180lbs (per patient's daughter)  Weight change: 9.3lbs/ 5.4% BW gain in one month      __________________ Pertinent Medications__________________   MEDICATIONS  (STANDING):  albuterol/ipratropium for Nebulization 3 milliLiter(s) Nebulizer every 6 hours  amitriptyline 10 milliGRAM(s) Oral at bedtime  apixaban 5 milliGRAM(s) Oral two times a day  artificial tears (preservative free) Ophthalmic Solution 1 Drop(s) Both EYES every 2 hours  buDESOnide    Inhalation Suspension 0.5 milliGRAM(s) Inhalation two times a day  BUpivacaine 0.5% (Preservative-Free) Injectable 5 milliLiter(s) Local Injection once  BUpivacaine 0.5% Injectable 5 milliLiter(s) Local Injection once  BUpivacaine 0.5% Injectable 5 milliLiter(s) Local Injection once  BUpivacaine liposome 1.3% Injectable 5 milliLiter(s) Local Injection once  BUpivacaine liposome 1.3% Injectable 2 milliLiter(s) Local Injection once  BUpivacaine liposome 1.3% Injectable 5 milliLiter(s) Local Injection once  BUpivacaine liposome 1.3% Injectable 5 milliLiter(s) Local Injection once  cyanocobalamin 1000 MICROGram(s) Oral daily  dextrose 5%. 1000 milliLiter(s) (100 mL/Hr) IV Continuous <Continuous>  dextrose 5%. 1000 milliLiter(s) (50 mL/Hr) IV Continuous <Continuous>  dextrose 50% Injectable 25 Gram(s) IV Push once  dextrose 50% Injectable 12.5 Gram(s) IV Push once  dextrose 50% Injectable 25 Gram(s) IV Push once  erythromycin   Ointment 1 Application(s) Right EYE four times a day  folic acid 1 milliGRAM(s) Oral daily  furosemide    Tablet 20 milliGRAM(s) Oral daily  gabapentin 300 milliGRAM(s) Oral every 8 hours  glucagon  Injectable 1 milliGRAM(s) IntraMuscular once  insulin glargine Injectable (LANTUS) 10 Unit(s) SubCutaneous at bedtime  insulin lispro (ADMELOG) corrective regimen sliding scale   SubCutaneous Before meals and at bedtime  lactobacillus acidophilus 1 Tablet(s) Oral daily  lidocaine 5% Ointment 1 Application(s) Topical two times a day  loratadine 10 milliGRAM(s) Oral daily  methadone    Tablet 5 milliGRAM(s) Oral two times a day  metoprolol succinate ER 12.5 milliGRAM(s) Oral daily  mirtazapine 7.5 milliGRAM(s) Oral at bedtime  morphine  IR 7.5 milliGRAM(s) Oral every 4 hours  mupirocin 2% Ointment 1 Application(s) Topical three times a day  nystatin Powder 1 Application(s) Topical two times a day  pantoprazole    Tablet 40 milliGRAM(s) Oral before breakfast  piperacillin/tazobactam IVPB.. 3.375 Gram(s) IV Intermittent every 8 hours  polyethylene glycol 3350 17 Gram(s) Oral daily  prednisoLONE acetate 1% Suspension 1 Drop(s) Right EYE three times a day  pregabalin 300 milliGRAM(s) Oral every 12 hours  senna 1 Tablet(s) Oral at bedtime  tamsulosin 0.4 milliGRAM(s) Oral at bedtime  traZODone 100 milliGRAM(s) Oral at bedtime  ursodiol Tablet 500 milliGRAM(s) Oral <User Schedule>  valACYclovir 1000 milliGRAM(s) Oral every 8 hours    MEDICATIONS  (PRN):  acetaminophen     Tablet .. 1000 milliGRAM(s) Oral every 8 hours PRN Severe Pain (7 - 10)  AQUAPHOR (petrolatum Ointment) 1 Application(s) Topical three times a day PRN inguinal fold rash  dextrose Oral Gel 15 Gram(s) Oral once PRN Blood Glucose LESS THAN 70 milliGRAM(s)/deciliter      __________________ Pertinent Labs__________________   08-24 Na139 mmol/L Glu 117 mg/dL<H> K+ 4.3 mmol/L Cr  0.57 mg/dL BUN 9 mg/dL 08-25 Phos 3.5 mg/dL 08-19 Alb 2.8 g/dL<L>        Skin: Skin tear to right buttock, per nursing flow sheet    Estimated Needs:   [X ] no change since previous assessment  [ ] recalculated:     Nutrition Diagnosis is [X] ongoing  [ ] resolved [ ] not applicable     Recommend    [ ] Change Diet To:    [ ] Nutrition Supplement    [ ] Nutrition Support    [X ] Other: Discontinue Glucerna supplement, as per patient's daughter request.      Monitoring and Evaluation:     [X ] PO intake [X ] Tolerance to diet prescription [ X] weights [X ] follow up per protocol  [ ] other: Nutrition Follow-up Note    86 y.o. Female with history of HTN, DM, CAD s/p stents (1986, 1996), COPD, NPH s/p  shunt, migraines p/w herpes zoster ophthalmicus/encephalitis affecting the right V1 dermatome but developed post herpetic neuralgia, and mrstran bacteremia (treated), per chart.     Nutrition note: Talked with patient's daughter on the phone to obtain information, patient's daughter stated that patient appetite has been improving since admission. Patient prefers eating food from home, only likes salmon and egg provided in facility. Patient has been eating 50% of her usual intake from home. No reports of any nausea, vomiting, diarrhea, constipation at this time. Patient's daughter request to discontinue Glucerna. As per speech note dated 8/24/2022, to continue on soft and bite sized (per patient preference) with thin liquids. Noted with fluid related weight gain of 9.3lbs/ 5.4% in one month. Patient has 2+ generalized, left ankle, right ankle edema.     Current Diet : Soft and Bite Sized, Consistent Carbohydrate {Evening Snack} (CSTCHOSN), Kosher  Supplement : Glucerna Shake 8oz 3x daily    Current Weight: 178.9lbs (8/25/2022)  Weight history: 169.6# (7/26/22)  UBW: 180lbs (per patient's daughter)  Weight change: 9.3lbs/ 5.4% BW gain in one month      __________________ Pertinent Medications__________________   MEDICATIONS  (STANDING):  albuterol/ipratropium for Nebulization 3 milliLiter(s) Nebulizer every 6 hours  amitriptyline 10 milliGRAM(s) Oral at bedtime  apixaban 5 milliGRAM(s) Oral two times a day  artificial tears (preservative free) Ophthalmic Solution 1 Drop(s) Both EYES every 2 hours  buDESOnide    Inhalation Suspension 0.5 milliGRAM(s) Inhalation two times a day  BUpivacaine 0.5% (Preservative-Free) Injectable 5 milliLiter(s) Local Injection once  BUpivacaine 0.5% Injectable 5 milliLiter(s) Local Injection once  BUpivacaine 0.5% Injectable 5 milliLiter(s) Local Injection once  BUpivacaine liposome 1.3% Injectable 5 milliLiter(s) Local Injection once  BUpivacaine liposome 1.3% Injectable 2 milliLiter(s) Local Injection once  BUpivacaine liposome 1.3% Injectable 5 milliLiter(s) Local Injection once  BUpivacaine liposome 1.3% Injectable 5 milliLiter(s) Local Injection once  cyanocobalamin 1000 MICROGram(s) Oral daily  dextrose 5%. 1000 milliLiter(s) (100 mL/Hr) IV Continuous <Continuous>  dextrose 5%. 1000 milliLiter(s) (50 mL/Hr) IV Continuous <Continuous>  dextrose 50% Injectable 25 Gram(s) IV Push once  dextrose 50% Injectable 12.5 Gram(s) IV Push once  dextrose 50% Injectable 25 Gram(s) IV Push once  erythromycin   Ointment 1 Application(s) Right EYE four times a day  folic acid 1 milliGRAM(s) Oral daily  furosemide    Tablet 20 milliGRAM(s) Oral daily  gabapentin 300 milliGRAM(s) Oral every 8 hours  glucagon  Injectable 1 milliGRAM(s) IntraMuscular once  insulin glargine Injectable (LANTUS) 10 Unit(s) SubCutaneous at bedtime  insulin lispro (ADMELOG) corrective regimen sliding scale   SubCutaneous Before meals and at bedtime  lactobacillus acidophilus 1 Tablet(s) Oral daily  lidocaine 5% Ointment 1 Application(s) Topical two times a day  loratadine 10 milliGRAM(s) Oral daily  methadone    Tablet 5 milliGRAM(s) Oral two times a day  metoprolol succinate ER 12.5 milliGRAM(s) Oral daily  mirtazapine 7.5 milliGRAM(s) Oral at bedtime  morphine  IR 7.5 milliGRAM(s) Oral every 4 hours  mupirocin 2% Ointment 1 Application(s) Topical three times a day  nystatin Powder 1 Application(s) Topical two times a day  pantoprazole    Tablet 40 milliGRAM(s) Oral before breakfast  piperacillin/tazobactam IVPB.. 3.375 Gram(s) IV Intermittent every 8 hours  polyethylene glycol 3350 17 Gram(s) Oral daily  prednisoLONE acetate 1% Suspension 1 Drop(s) Right EYE three times a day  pregabalin 300 milliGRAM(s) Oral every 12 hours  senna 1 Tablet(s) Oral at bedtime  tamsulosin 0.4 milliGRAM(s) Oral at bedtime  traZODone 100 milliGRAM(s) Oral at bedtime  ursodiol Tablet 500 milliGRAM(s) Oral <User Schedule>  valACYclovir 1000 milliGRAM(s) Oral every 8 hours    MEDICATIONS  (PRN):  acetaminophen     Tablet .. 1000 milliGRAM(s) Oral every 8 hours PRN Severe Pain (7 - 10)  AQUAPHOR (petrolatum Ointment) 1 Application(s) Topical three times a day PRN inguinal fold rash  dextrose Oral Gel 15 Gram(s) Oral once PRN Blood Glucose LESS THAN 70 milliGRAM(s)/deciliter      __________________ Pertinent Labs__________________   08-24 Na139 mmol/L Glu 117 mg/dL<H> K+ 4.3 mmol/L Cr  0.57 mg/dL BUN 9 mg/dL 08-25 Phos 3.5 mg/dL 08-19 Alb 2.8 g/dL<L>        Skin: Skin tear to right buttock, per nursing flow sheet    Estimated Needs:   [X ] no change since previous assessment  [ ] recalculated:     Nutrition Diagnosis is [X] ongoing  [ ] resolved [ ] not applicable     Recommend    [ ] Change Diet To:    [ ] Nutrition Supplement    [ ] Nutrition Support    [X ] Other: Discontinue Glucerna supplement, as per patient's daughter request.      Monitoring and Evaluation:     [X ] PO intake [X ] Tolerance to diet prescription [ X] weights [X ] follow up per protocol  [x ] other: Honor food preferences.

## 2022-08-25 NOTE — PROGRESS NOTE ADULT - PROBLEM SELECTOR PLAN 2
Patient hypoxic with O2 Sat 88% on RA  started 2LNC overnight; Wean off oxygen as tolerated  CXR on 8/22 shows RML infiltrate  repeat swallow eval  c/w Zosyn IV D#2/5 Patient hypoxic with O2 Sat 88% on RA  started 2LNC; Patient still hypoxic today so will need home oxygen  CXR on 8/22 shows RML infiltrate  repeat swallow eval on 8/24 recommended maintaining current diet.   c/w Zosyn IV D#3/5; can switch to PO augmentin or Levaquin/Flagyl on discharge to complete course.

## 2022-08-25 NOTE — PROGRESS NOTE ADULT - PROBLEM SELECTOR PLAN 9
NPH diagnosed 2011, pt has programmable  shunt installed by [facility] neurosurg, **must be programmed after MRI (page neurosurg [id number])**. 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

## 2022-08-25 NOTE — PROGRESS NOTE ADULT - ASSESSMENT
86 y.o. Female w/ hx HTN, DM, CAD s/p stents (1986, 1996), COPD, NPH s/p  shunt, migraines p/w herpes zoster ophthalmicus/encephalitis affecting the right V1 dermatome but developed post herpetic neuralgia, and mrstran bacteremia (treated). She is s/p IV acyclovir, but continues to have neuralgia pain. This pain  improved after NSG did R supra orbital marcaine nerve block on 8/16, 8/17and 8/22. She is currently on Currently on morphine 7/.5mg lyrica, and gabapentinut patient and daughter still not satisfied with pain control.     Spoke to daughter at bedside on 8/25                     86 y.o. Female w/ hx HTN, DM, CAD s/p stents (1986, 1996), COPD, NPH s/p  shunt, migraines p/w herpes zoster ophthalmicus/encephalitis affecting the right V1 dermatome but developed post herpetic neuralgia, and mrstran bacteremia (treated). She is s/p IV acyclovir, but continues to have neuralgia pain. This pain  improved after NSG did R supra orbital marcaine nerve block on 8/16, 8/17and 8/22. She is currently on morphine IR 7.5mg q4hr, Lyrica and gabapentin but daughter still not satisfied with pain control.     Spoke to daughter at bedside on 8/25

## 2022-08-25 NOTE — PROGRESS NOTE ADULT - PROBLEM SELECTOR PLAN 10
120py smoking hx, quit in 2011. Uses Trelegy at home.   -encouraged incentive spirometry  - continue pulmicort and duo nebs  -now with oxygen requirement likely due to aspiration 120py smoking hx, quit in 2011. Uses Trelegy at home.   -encouraged incentive spirometry  - continue pulmicort and duo nebs  -now with oxygen requirement likely due to aspiration  -O2 Sats 88% at rest on RA.; Will need home oxygen

## 2022-08-25 NOTE — PROGRESS NOTE ADULT - PROBLEM SELECTOR PLAN 11
DVT ppx: on therapeutic lovenox  Diet: soft and bite sized (S&S recd minced and moist however daughter accepts risks)     Dispo:  - pt has difficult vessels, picc in place if blood draws needed  - PT/OT recommending rehab DVT ppx: on therapeutic lovenox  Diet: soft and bite sized (S&S recd minced and moist however daughter accepts risks)     Dispo:  - pt has difficult vessels, picc in place if blood draws needed  - PT/OT recommending rehab; but daughter wants to take her home pending pain control and hospital bed delivery.

## 2022-08-25 NOTE — PROGRESS NOTE ADULT - PROBLEM SELECTOR PLAN 1
NSG did repeat bupivicaine supraorbital nerve block on 8/22 to help with pain; monitor for improvement.  -c/w valtrex 1gram PO q8hr  - increased lyrica to 300 mg every 8 hours on 8/21  -added Gabapentin 300mg tid for better pain control on 8/23  -Morphine increased to 7.5mg IR q4hr standing as per pain consult  -Reconsulted pain management and they advised also adding methadone but today patient's pain controlled without it. If pain becomes uncontrolled again with ad methadone.     c/w erythromycin ointment QID to eye & periocular lesions, artificial tears Q4H as recommended by opthalmology.  - Appreciate derm recs:    - lidocaine ointment mixed with vaseline BID, triamcinolone ointment to red areas only BID    - apply vaseline to crusted areas Q2H  -Daughter suggested we apply capsaicin cream to face to help facial pain but dermatology assessed no benefit. NSG did repeat bupivicaine supraorbital nerve block on 8/22 to help with pain; which appeared to have improved pain. Patient would have fleeting episodes of pain but mostly is sleeping comfortably in bed. But when asked she says her pain is a 10 out of 10 in severity.   - increased lyrica to 300 mg every 8 hours on 8/21  -added Gabapentin 300mg tid for better pain control on 8/23  -Morphine increased to 7.5mg IR q4hr standing as per pain consult  -Reconsulted pain management and they advised also adding methadone but today patient's pain controlled without it. If pain becomes uncontrolled again with ad methadone.     -c/w valtrex 1gram PO q8hr  -c/w erythromycin ointment QID to eye & periocular lesions, artificial tears Q4H as recommended by opthalmology.  - Appreciate derm recs:    - lidocaine ointment mixed with vaseline BID, triamcinolone ointment to red areas only BID    - apply vaseline to crusted areas Q2H  -Daughter suggested we apply capsaicin cream to face to help facial pain but dermatology assessed no benefit. NSG did repeat bupivicaine supraorbital nerve block on 8/22 to help with pain; which appeared to have improved pain. Patient would have fleeting episodes of pain but mostly is sleeping comfortably in bed. But when asked she says her pain is a 10 out of 10 in severity.   - increased lyrica to 300 mg every 8 hours on 8/21  -added Gabapentin 300mg tid for better pain control on 8/23  -Morphine increased to 7.5mg IR q4hr standing as per pain consult  -Reconsulted pain management and they advised also adding methadone 5mg bid. Spoke to pain management on 8/25 and they felt confident that methadone was appropriate for this situation and patient already had a F/U appointment with pain management clinic next to manage methadone as outpatient.   -Spoke to daughter at bedside and explained to her the risks of starting methadone in an elderly patient and she wanted to start methadone anyway.  -Start Methadone 5mg PO bid on 8/25; will monitor QTC and pulse oxy while inpatient.     -c/w valtrex 1gram PO q8hr  -c/w erythromycin ointment QID to eye & periocular lesions, artificial tears Q4H as recommended by opthalmology.  - Appreciate derm recs:    - lidocaine ointment mixed with vaseline BID, triamcinolone ointment to red areas only BID    - apply Vaseline to crusted areas Q2H

## 2022-08-25 NOTE — PROGRESS NOTE ADULT - SUBJECTIVE AND OBJECTIVE BOX
Patient is a 86y old  Female who presents with a chief complaint of Suspicion for Herpes Zoster opthalmicus/encephalitis (25 Aug 2022 10:32)      HPI:  Patient is an 87yo female w/PMH Cardiac stents post MI (1986, 1996), COPD, NPH, DM, Migraines, and suspected HTN presenting with 3 day history of herpes zoster rash over the right V1 dermatome w/ eye involvement, now presenting with 1 day history of altered mental status. On 7/17, patient had right sided headache attributed to her recurrent migraines that she normally has on a daily basis, in which she took furiocet for. On 7/20 night and 7/21 morning, Patient vomited and began taking naproxen and tylenol for her symptoms. On 7/21, patient later saw her outpatient internist, who requested a CT Head for the patient, which came with findings at baseline. On 7/21 night, the patient began to scream, and checked her BP, which was at 200/80. On 7/22 morning, the patient's repeat BP was 160/100. Her internist subsequently prescribed amlodipine for the patient, and at the time her mental status was at baseline (A&Ox4). Also on 7/22, the patient began to complain of blurry vision. On 7/23 morning, the patient had the first occurrence of her rash around the right side of her face, V1 dermatome distribution, and the rash has progressively worsened since. On 7/24, patient started valtrex and artificial tears and began to be more sleepy. On 7/25 Patient aide said that the patient was more confused and her mentation was getting worse up until now.    In the ED, patient was started on 1x Zosyn, 1x 500mg acyclovir. (26 Jul 2022 18:18)      REVIEW OF SYSTEMS  Constitutional - No fever, No weight loss, No fatigue  HEENT - No eye pain, No visual disturbances, No difficulty hearing, No tinnitus, No vertigo, No neck pain  Respiratory - No cough, No wheezing, No shortness of breath  Cardiovascular - No chest pain, No palpitations  Gastrointestinal - No abdominal pain, No nausea, No vomiting, No diarrhea, No constipation  Genitourinary - No dysuria, No frequency, No hematuria, No incontinence  Neurological - No headaches, No memory loss, No loss of strength, No numbness, No tremors  Skin - No itching, No rashes, No lesions   Endocrine - No temperature intolerance  Musculoskeletal - No joint pain, No joint swelling, No muscle pain  Psychiatric - No depression, No anxiety    PAST MEDICAL & SURGICAL HISTORY  Myocardial Infarction    Diabetes Mellitus Type II    Migraines    COPD (Chronic Obstructive Pulmonary Disease)    NPH (normal pressure hydrocephalus)    COPD, mild    CAD (coronary artery disease)    Type 2 diabetes mellitus    Migraines    HTN (hypertension)    Stented coronary artery        SOCIAL HISTORY  Smoking - Denied  EtOH - Denied   Drugs - Denied    FUNCTIONAL HISTORY  Lives   Independent    CURRENT FUNCTIONAL STATUS      FAMILY HISTORY   No pertinent family history in first degree relatives    FH: myocardial infarction (Father)    FH: hypertension (Child)        RECENT LABS/IMAGING  CBC Full  -  ( 25 Aug 2022 06:00 )  WBC Count : 7.70 K/uL  RBC Count : 4.05 M/uL  Hemoglobin : 10.4 g/dL  Hematocrit : 32.7 %  Platelet Count - Automated : 410 K/uL  Mean Cell Volume : 80.7 fL  Mean Cell Hemoglobin : 25.7 pg  Mean Cell Hemoglobin Concentration : 31.8 gm/dL  Auto Neutrophil # : x  Auto Lymphocyte # : x  Auto Monocyte # : x  Auto Eosinophil # : x  Auto Basophil # : x  Auto Neutrophil % : x  Auto Lymphocyte % : x  Auto Monocyte % : x  Auto Eosinophil % : x  Auto Basophil % : x    08-24    139  |  101  |  9   ----------------------------<  117<H>  4.3   |  27  |  0.57    Ca    8.8      24 Aug 2022 07:06  Phos  3.5     08-25  Mg     1.70     08-24          VITALS  T(C): 36.8 (08-25-22 @ 06:15), Max: 36.8 (08-25-22 @ 06:15)  HR: 70 (08-25-22 @ 09:46) (68 - 87)  BP: 132/59 (08-25-22 @ 06:15) (119/59 - 145/77)  RR: 18 (08-25-22 @ 06:15) (18 - 22)  SpO2: 98% (08-25-22 @ 09:46) (81% - 98%)  Wt(kg): --    ALLERGIES  diltiazem (Other; Rash)  Haldol (Dystonic RXN)      MEDICATIONS   acetaminophen     Tablet .. 1000 milliGRAM(s) Oral every 8 hours PRN  albuterol/ipratropium for Nebulization 3 milliLiter(s) Nebulizer every 6 hours  amitriptyline 10 milliGRAM(s) Oral at bedtime  apixaban 5 milliGRAM(s) Oral two times a day  AQUAPHOR (petrolatum Ointment) 1 Application(s) Topical three times a day PRN  artificial tears (preservative free) Ophthalmic Solution 1 Drop(s) Both EYES every 2 hours  buDESOnide    Inhalation Suspension 0.5 milliGRAM(s) Inhalation two times a day  BUpivacaine 0.5% (Preservative-Free) Injectable 5 milliLiter(s) Local Injection once  BUpivacaine 0.5% Injectable 5 milliLiter(s) Local Injection once  BUpivacaine 0.5% Injectable 5 milliLiter(s) Local Injection once  BUpivacaine liposome 1.3% Injectable 2 milliLiter(s) Local Injection once  BUpivacaine liposome 1.3% Injectable 5 milliLiter(s) Local Injection once  BUpivacaine liposome 1.3% Injectable 5 milliLiter(s) Local Injection once  BUpivacaine liposome 1.3% Injectable 5 milliLiter(s) Local Injection once  cyanocobalamin 1000 MICROGram(s) Oral daily  dextrose 5%. 1000 milliLiter(s) IV Continuous <Continuous>  dextrose 5%. 1000 milliLiter(s) IV Continuous <Continuous>  dextrose 50% Injectable 25 Gram(s) IV Push once  dextrose 50% Injectable 12.5 Gram(s) IV Push once  dextrose 50% Injectable 25 Gram(s) IV Push once  dextrose Oral Gel 15 Gram(s) Oral once PRN  erythromycin   Ointment 1 Application(s) Right EYE four times a day  folic acid 1 milliGRAM(s) Oral daily  furosemide    Tablet 20 milliGRAM(s) Oral daily  gabapentin 300 milliGRAM(s) Oral every 8 hours  glucagon  Injectable 1 milliGRAM(s) IntraMuscular once  insulin glargine Injectable (LANTUS) 10 Unit(s) SubCutaneous at bedtime  insulin lispro (ADMELOG) corrective regimen sliding scale   SubCutaneous Before meals and at bedtime  lactobacillus acidophilus 1 Tablet(s) Oral daily  lidocaine 5% Ointment 1 Application(s) Topical two times a day  loratadine 10 milliGRAM(s) Oral daily  methadone    Tablet 5 milliGRAM(s) Oral two times a day  metoprolol succinate ER 12.5 milliGRAM(s) Oral daily  mirtazapine 7.5 milliGRAM(s) Oral at bedtime  morphine  IR 7.5 milliGRAM(s) Oral every 4 hours  mupirocin 2% Ointment 1 Application(s) Topical three times a day  nystatin Powder 1 Application(s) Topical two times a day  pantoprazole    Tablet 40 milliGRAM(s) Oral before breakfast  piperacillin/tazobactam IVPB.. 3.375 Gram(s) IV Intermittent every 8 hours  polyethylene glycol 3350 17 Gram(s) Oral daily  prednisoLONE acetate 1% Suspension 1 Drop(s) Right EYE three times a day  pregabalin 300 milliGRAM(s) Oral every 12 hours  senna 1 Tablet(s) Oral at bedtime  tamsulosin 0.4 milliGRAM(s) Oral at bedtime  traZODone 100 milliGRAM(s) Oral at bedtime  ursodiol Tablet 500 milliGRAM(s) Oral <User Schedule>  valACYclovir 1000 milliGRAM(s) Oral every 8 hours      ----------------------------------------------------------------------------------------  PHYSICAL EXAM  Constitutional - NAD, Comfortable  HEENT - NCAT, EOMI  Neck - Supple, No limited ROM  Chest - CTA bilaterally, No wheeze, No rhonchi, No crackles  Cardiovascular - RRR, S1S2, No murmurs  Abdomen - BS+, Soft, NTND  Extremities - No C/C/E, No calf tenderness   Neurologic Exam -                    Cognitive - Awake, Alert, AAO to self, place, date, year, situation     Communication - Fluent, No dysarthria     Cranial Nerves - CN 2-12 intact     Motor - No focal deficits                    LEFT    UE - ShAB 5/5, EF 5/5, EE 5/5, WE 5/5,  5/5                    RIGHT UE - ShAB 5/5, EF 5/5, EE 5/5, WE 5/5,  5/5                    LEFT    LE - HF 5/5, KE 5/5, DF 5/5, PF 5/5                    RIGHT LE - HF 5/5, KE 5/5, DF 5/5, PF 5/5        Sensory - Intact to LT     Reflexes - DTR Intact, No primitive reflexive     Coordination - FTN intact     OculoVestibular - No saccades, No nystagmus, VOR         Balance - WNL Static  Psychiatric - Mood stable, Affect WNL  ----------------------------------------------------------------------------------------  ASSESSMENT/PLAN    Pain -  DVT PPX -   Rehab -    Recommend ACUTE inpatient rehabilitation for the functional deficits consisting of 3 hours of therapy/day & 24 hour RN/daily PMR physician for comorbid medical management. Will continue to follow for ongoing rehab needs and recommendations.   Recommend [facility], patient DOES NOT meet acute inpatient rehabilitation criteria   Recommend DC HOME with outpatient   Recommend DC HOME with homecare   Follow up with [facility] - Call [phone] for an appointment Patient is a 86y old  Female who presents with a chief complaint of Suspicion for Herpes Zoster opthalmicus/encephalitis (25 Aug 2022 10:32)      Interval history: patient had repeat supraorbital block today after pain recurred.  patient now also being treated for pneumonia after aspiration.  starting methadone  patient reports pain ranges from 2-10/10.    REVIEW OF SYSTEMS   +rash  +weakness  +constipation  +hard of hearing  +intermittent pain (improving)      PAST MEDICAL & SURGICAL HISTORY  Myocardial Infarction    Diabetes Mellitus Type II    Migraines    COPD (Chronic Obstructive Pulmonary Disease)    NPH (normal pressure hydrocephalus)    COPD, mild    CAD (coronary artery disease)    Type 2 diabetes mellitus    Migraines    HTN (hypertension)    Stented coronary artery         CURRENT FUNCTIONAL STATUS    Bed-Chair Transfer Training  Bed-to-Chair Transfer Training Rehab Potential: good, to achieve stated therapy goals  Bed-to-Chair Transfer Training Symptoms Noted During/After Treatment: none  Transfer Training Bed-to-Chair Transfer: moderate assist (50% patient effort);  1 person assist;  nonverbal cues (demo/gestures);  verbal cues;  full weight-bearing   rolling walker  Bed-to-Chair Transfer Training Transfer Safety Analysis: decreased weight-shifting ability;  decreased balance;  decreased strength;  impaired balance;  impaired motor control;  impaired postural control;  rolling walker    Sit-Stand Transfer Training  Sit-to-Stand Transfer Training Rehab Potential: good, to achieve stated therapy goals  Sit-to-Stand Transfer Training Symptoms Noted During/After Treatment: none  Transfer Training Sit-to-Stand Transfer: moderate assist (50% patient effort);  1 person assist;  nonverbal cues (demo/gestures);  verbal cues;  full weight-bearing   rolling walker;  x 2 reps   Transfer Training Stand-to-Sit Transfer: moderate assist (50% patient effort);  1 person assist;  nonverbal cues (demo/gestures);  verbal cues;  full weight-bearing   rolling walker;  x 2 reps   Sit-to-Stand Transfer Training Transfer Safety Analysis: decreased weight-shifting ability;  decreased balance;  decreased strength;  impaired balance;  impaired motor control;  impaired postural control;  rolling walker    Gait Training  Gait Training Rehab Potential: good, to achieve stated therapy goals  Gait Training Symptoms Noted During/After Treatment: fatigue  Gait Training: moderate assist (50% patient effort);  1 person assist;  nonverbal cues (demo/gestures);  verbal cues;  full weight-bearing   rolling walker;  bed to chair  Gait Analysis: 3-point gait   crouch;  decreased akira;  increased time in double stance;  decreased hip/knee flexion;  shuffling;  decreased step length;  decreased stride length;  decreased swing-to-stance ratio;  increased stride width;  impaired motor control;  impaired postural control;  impaired balance;  decreased strength;  Bed to Chair;  rolling walker    Therapeutic Exercise  Therapeutic Exercise Rehab Effort: good  Therapeutic Exercise Symptoms Noted During/After Treatment: none  Therapeutic Exercise Detail: seated knee extensions, seated marching; x 10 reps. sit<>stand x 2 reps       FAMILY HISTORY    FH: myocardial infarction (Father)  FH: hypertension (Child)        RECENT LABS/IMAGING  CBC Full  -  ( 25 Aug 2022 06:00 )  WBC Count : 7.70 K/uL  RBC Count : 4.05 M/uL  Hemoglobin : 10.4 g/dL  Hematocrit : 32.7 %  Platelet Count - Automated : 410 K/uL  Mean Cell Volume : 80.7 fL  Mean Cell Hemoglobin : 25.7 pg  Mean Cell Hemoglobin Concentration : 31.8 gm/dL  Auto Neutrophil # : x  Auto Lymphocyte # : x  Auto Monocyte # : x  Auto Eosinophil # : x  Auto Basophil # : x  Auto Neutrophil % : x  Auto Lymphocyte % : x  Auto Monocyte % : x  Auto Eosinophil % : x  Auto Basophil % : x    08-24    139  |  101  |  9   ----------------------------<  117<H>  4.3   |  27  |  0.57    Ca    8.8      24 Aug 2022 07:06  Phos  3.5     08-25  Mg     1.70     08-24          VITALS  T(C): 36.8 (08-25-22 @ 06:15), Max: 36.8 (08-25-22 @ 06:15)  HR: 70 (08-25-22 @ 09:46) (68 - 87)  BP: 132/59 (08-25-22 @ 06:15) (119/59 - 145/77)  RR: 18 (08-25-22 @ 06:15) (18 - 22)  SpO2: 98% (08-25-22 @ 09:46) (81% - 98%)  Wt(kg): --    ALLERGIES  diltiazem (Other; Rash)  Haldol (Dystonic RXN)      MEDICATIONS   acetaminophen     Tablet .. 1000 milliGRAM(s) Oral every 8 hours PRN  albuterol/ipratropium for Nebulization 3 milliLiter(s) Nebulizer every 6 hours  amitriptyline 10 milliGRAM(s) Oral at bedtime  apixaban 5 milliGRAM(s) Oral two times a day  AQUAPHOR (petrolatum Ointment) 1 Application(s) Topical three times a day PRN  artificial tears (preservative free) Ophthalmic Solution 1 Drop(s) Both EYES every 2 hours  buDESOnide    Inhalation Suspension 0.5 milliGRAM(s) Inhalation two times a day  BUpivacaine 0.5% (Preservative-Free) Injectable 5 milliLiter(s) Local Injection once  BUpivacaine 0.5% Injectable 5 milliLiter(s) Local Injection once  BUpivacaine 0.5% Injectable 5 milliLiter(s) Local Injection once  BUpivacaine liposome 1.3% Injectable 2 milliLiter(s) Local Injection once  BUpivacaine liposome 1.3% Injectable 5 milliLiter(s) Local Injection once  BUpivacaine liposome 1.3% Injectable 5 milliLiter(s) Local Injection once  BUpivacaine liposome 1.3% Injectable 5 milliLiter(s) Local Injection once  cyanocobalamin 1000 MICROGram(s) Oral daily  dextrose 5%. 1000 milliLiter(s) IV Continuous <Continuous>  dextrose 5%. 1000 milliLiter(s) IV Continuous <Continuous>  dextrose 50% Injectable 25 Gram(s) IV Push once  dextrose 50% Injectable 12.5 Gram(s) IV Push once  dextrose 50% Injectable 25 Gram(s) IV Push once  dextrose Oral Gel 15 Gram(s) Oral once PRN  erythromycin   Ointment 1 Application(s) Right EYE four times a day  folic acid 1 milliGRAM(s) Oral daily  furosemide    Tablet 20 milliGRAM(s) Oral daily  gabapentin 300 milliGRAM(s) Oral every 8 hours  glucagon  Injectable 1 milliGRAM(s) IntraMuscular once  insulin glargine Injectable (LANTUS) 10 Unit(s) SubCutaneous at bedtime  insulin lispro (ADMELOG) corrective regimen sliding scale   SubCutaneous Before meals and at bedtime  lactobacillus acidophilus 1 Tablet(s) Oral daily  lidocaine 5% Ointment 1 Application(s) Topical two times a day  loratadine 10 milliGRAM(s) Oral daily  methadone    Tablet 5 milliGRAM(s) Oral two times a day  metoprolol succinate ER 12.5 milliGRAM(s) Oral daily  mirtazapine 7.5 milliGRAM(s) Oral at bedtime  morphine  IR 7.5 milliGRAM(s) Oral every 4 hours  mupirocin 2% Ointment 1 Application(s) Topical three times a day  nystatin Powder 1 Application(s) Topical two times a day  pantoprazole    Tablet 40 milliGRAM(s) Oral before breakfast  piperacillin/tazobactam IVPB.. 3.375 Gram(s) IV Intermittent every 8 hours  polyethylene glycol 3350 17 Gram(s) Oral daily  prednisoLONE acetate 1% Suspension 1 Drop(s) Right EYE three times a day  pregabalin 300 milliGRAM(s) Oral every 12 hours  senna 1 Tablet(s) Oral at bedtime  tamsulosin 0.4 milliGRAM(s) Oral at bedtime  traZODone 100 milliGRAM(s) Oral at bedtime  ursodiol Tablet 500 milliGRAM(s) Oral <User Schedule>  valACYclovir 1000 milliGRAM(s) Oral every 8 hours      ----------------------------------------------------------------------------------------    PHYSICAL EXAM  Constitutional -NAD, holds head in pain at times,  sitting in chair with family at bedside  HEENT - + rash and dressing R scalp   Chest - no respiratory distress  Cardiovascular - RRR, S1S2   Abdomen -  Soft, NTND  Extremities -no edema, No calf tenderness   Neurologic Exam -                    Cognitive - alert, hard of hearing     Communication - Fluent, No dysarthria      Motor -                       LEFT    UE - 4/5                    RIGHT UE - 4/5                    LEFT    LE - HF 4/5                    RIGHT LE - HF 4/5     Sensory - Intact to LT      Balance - WNL Static  Psychiatric - Affect WNL  ----------------------------------------------------------------------------------------  ASSESSMENT/PLAN   87 yo f presented with herpes zoster with vzv encephalitis, with neuropathic pain  improved with supraorbital nerve block (repeat injection done today per neurosx)  valcyclovir  on zosyn for pneumonia  pain: morphine IR 7.5mg q 4, lyrica, gabapentin, amitryptiline, methadone  diet: soft and bite sized consistent carb kosher supplement glucerna  continue bedside PT and OT    DVT PPX - lovenox for dvt   Rehab -     Recommend [facility], patient DOES NOT meet acute inpatient rehabilitation criteria   discussed with Dr. Wong, patient's daughter, who is considering taking patient home. if going home would recommend home care services, PT and OT, and additional equipment (including RW, hospital bed, commode)

## 2022-08-25 NOTE — CHART NOTE - NSCHARTNOTEFT_GEN_A_CORE
Due to pain recurrence repeat supraorbital block given at bedside using 0.5% bupivicaine 1cc and 2cc exparel mixed. Prepped with isopropyl alcohol. Daughter gave consent via phone.

## 2022-08-25 NOTE — PROGRESS NOTE ADULT - PROBLEM SELECTOR PLAN 4
-Provoked RUE subclavian DVT, nPOA  -Likely provoked by multiple sticks, PICC line.  Diagnosed on 8/8  - cont full dose lovenox 80mg q12h  -will consider eliquis -Provoked RUE subclavian DVT, nPOA  -Likely provoked by multiple sticks, PICC line.  Diagnosed on 8/8  - on full dose lovenox 80mg q12h  -can switch to eliquis

## 2022-08-26 NOTE — PROGRESS NOTE ADULT - ASSESSMENT
86 y.o. Female w/ hx HTN, DM, CAD s/p stents (1986, 1996), COPD, NPH s/p  shunt, migraines p/w herpes zoster ophthalmicus/encephalitis affecting the right V1 dermatome but developed post herpetic neuralgia, and mrstran bacteremia (treated). She is s/p IV acyclovir, but continues to have neuralgia pain. This pain  improved after NSG did R supra orbital marcaine nerve block on 8/16, 8/17and 8/22. She is currently on morphine IR 7.5mg q4hr, Lyrica and gabapentin. Methadone started on on 8/25. Patient and daughter now satisfied with pain control.     Spoke to daughter at bedside on 8/26.

## 2022-08-26 NOTE — PROGRESS NOTE ADULT - PROBLEM SELECTOR PLAN 1
Patient hypoxic with O2 Sat 88% on RA  started 2LNC; Patient still hypoxic and unable to titrate today so will need home oxygen  CXR on 8/22 shows RML infiltrate  repeat swallow eval on 8/24 recommended maintaining current diet.   c/w Zosyn IV D#4/5; can switch to PO augmentin or Levaquin/Flagyl on discharge to complete course. NSG did repeat bupivicaine supraorbital nerve block on 8/22 to help with pain; which appeared to have improved pain. Patient would have fleeting episodes of pain but mostly is sleeping comfortably in bed. But when asked she says her pain is a 10 out of 10 in severity. Pain now better controlled  - increased lyrica to 300 mg every 8 hours on 8/21  -added Gabapentin 300mg tid for better pain control on 8/23  -Morphine increased to 7.5mg IR q4hr standing as per pain consult  -Reconsulted pain management and they advised also adding methadone 5mg bid. Spoke to pain management on 8/25 and they felt confident that methadone was appropriate for this situation and patient already had a F/U appointment with pain management clinic next to manage methadone as outpatient.   -Spoke to daughter at bedside and explained to her the risks of starting methadone in an elderly patient and she wanted to start methadone anyway.  -Start Methadone 5mg PO bid on 8/25; will monitor QTC and pulse oxy while inpatient.     -c/w valtrex 1gram PO q8hr  -c/w erythromycin ointment QID to eye & periocular lesions, artificial tears Q4H as recommended by opthalmology.  - Appreciate derm recs:    - lidocaine ointment mixed with vaseline BID, triamcinolone ointment to red areas only BID    - apply Vaseline to crusted areas Q2H.

## 2022-08-26 NOTE — PROGRESS NOTE ADULT - PROBLEM SELECTOR PLAN 3
-Provoked RUE subclavian DVT, nPOA  -Likely provoked by multiple sticks, PICC line.  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   Patient does not have symptoms concerning for poor perfusion, will continue to monitor.

## 2022-08-26 NOTE — PROGRESS NOTE ADULT - PROBLEM SELECTOR PLAN 8
A1c 7.2.  Home regimen lantus 34U daily, trulicity, glipizide.  -c/w lantus 10 units qHS, SSI, adjust prn  - monitor premeal and bedtime FS Hx of rash in inguinal folds.   - Continue nystatin powder BID  - Appreciate derm recs: mupirocin ointment TID to perianal erosions

## 2022-08-26 NOTE — PROGRESS NOTE ADULT - PROBLEM SELECTOR PLAN 4
unclear etiology, poss immobility, poss infection, poss due to narcotics  indwelling urinary catheter placed 8/13, failed TOV 8/17, placed again 8/19; Failed TOV again on 8/22 and dior reinserted  Will maintain dior for now since multiple failed TOV    -Had candida in urine which was treated x 1 day; ID recommended against treating it. -Provoked RUE subclavian DVT, nPOA  -Likely provoked by multiple sticks, PICC line.  Diagnosed on 8/8  - on full dose lovenox 80mg q12h  -can switch to eliquis

## 2022-08-26 NOTE — PROGRESS NOTE ADULT - PROBLEM SELECTOR PLAN 12
DVT ppx: on therapeutic lovenox  Diet: soft and bite sized (S&S recd minced and moist however daughter accepts risks)     Dispo:  - pt has difficult vessels, picc in place if blood draws needed  - PT/OT recommending rehab; but daughter wants to take her home pending pain control and hospital bed delivery.

## 2022-08-26 NOTE — PROGRESS NOTE ADULT - PROBLEM SELECTOR PLAN 6
Hx of severe HTN at home.  -BPs currently stable  -c/w metoprolol and  lasix   - Holding spirololactone, and losartan -Urine Cx on 8/19 ESBL E.coli  -Unclear if need to treat  -Patient does have dior with multiple failed TOV  -She has been on abx since hospitalization without fever or leukocytosis.   -Will ask ID if need to treat.

## 2022-08-26 NOTE — PROGRESS NOTE ADULT - PROBLEM SELECTOR PLAN 2
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   Patient does not have symptoms concerning for poor perfusion, will continue to monitor. Patient hypoxic with O2 Sat 88% on RA  started 2LNC; Patient still hypoxic and unable to titrate today so will need home oxygen  CXR on 8/22 shows RML infiltrate  repeat swallow eval on 8/24 recommended maintaining current diet.   c/w Zosyn IV D#4/5; can switch to PO augmentin or Levaquin/Flagyl on discharge to complete course.

## 2022-08-26 NOTE — PROGRESS NOTE ADULT - PROBLEM SELECTOR PLAN 11
DVT ppx: on therapeutic lovenox  Diet: soft and bite sized (S&S recd minced and moist however daughter accepts risks)     Dispo:  - pt has difficult vessels, picc in place if blood draws needed  - PT/OT recommending rehab; but daughter wants to take her home pending pain control and hospital bed delivery. 120py smoking hx, quit in 2011. Uses Trelegy at home.   -encouraged incentive spirometry  - continue pulmicort and duo nebs  -now with oxygen requirement likely due to aspiration  -O2 Sats 88% at rest on RA.; Will need home oxygen

## 2022-08-26 NOTE — PROGRESS NOTE ADULT - PROBLEM SELECTOR PLAN 5
-Urine Cx on 8/19 ESBL E.coli  -Unclear if need to treat  -Patient does have dior with multiple failed TOV  -She has been on abx since hospitalization without fever or leukocytosis.   -Will ask ID if need to treat. unclear etiology, poss immobility, poss infection, poss due to narcotics  indwelling urinary catheter placed 8/13, failed TOV 8/17, placed again 8/19; Failed TOV again on 8/22 and dior reinserted  Will maintain dior for now since multiple failed TOV    -Had candida in urine which was treated x 1 day; ID recommended against treating it.

## 2022-08-26 NOTE — PROGRESS NOTE ADULT - PROBLEM SELECTOR PLAN 10
120py smoking hx, quit in 2011. Uses Trelegy at home.   -encouraged incentive spirometry  - continue pulmicort and duo nebs  -now with oxygen requirement likely due to aspiration  -O2 Sats 88% at rest on RA.; Will need home oxygen NPH diagnosed 2011, pt has programmable  shunt installed by [facility] neurosurg, **must be programmed after MRI (page neurosurg [id number])**. 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

## 2022-08-26 NOTE — CHART NOTE - NSCHARTNOTEFT_GEN_A_CORE
Urine Culture form 8/19 with ESBL, remains on IV Zosyn which was sensitive to ESBL, results reviewed with Infectious Disease Fellow, patient remains afebrile, no leukocytosis, dior draining clear yellow urine. As per ID patient has received appropriate treatment for ESBL, and no further IV Zosyn is needed.

## 2022-08-26 NOTE — PROGRESS NOTE ADULT - SUBJECTIVE AND OBJECTIVE BOX
Patient is a 86y old  Female who presents with a chief complaint of Suspicion for Herpes Zoster opthalmicus/encephalitis (25 Aug 2022 12:25)      SUBJECTIVE / OVERNIGHT EVENTS:  Patient and daughter at bedside say pain is improved. No new complaints. Denies cp, SOB, abdominal pain, N/V/D     MEDICATIONS  (STANDING):  albuterol/ipratropium for Nebulization 3 milliLiter(s) Nebulizer every 6 hours  amitriptyline 10 milliGRAM(s) Oral at bedtime  apixaban 5 milliGRAM(s) Oral two times a day  artificial tears (preservative free) Ophthalmic Solution 1 Drop(s) Both EYES every 2 hours  buDESOnide    Inhalation Suspension 0.5 milliGRAM(s) Inhalation two times a day  BUpivacaine 0.5% (Preservative-Free) Injectable 5 milliLiter(s) Local Injection once  BUpivacaine 0.5% Injectable 5 milliLiter(s) Local Injection once  BUpivacaine 0.5% Injectable 5 milliLiter(s) Local Injection once  BUpivacaine liposome 1.3% Injectable 5 milliLiter(s) Local Injection once  BUpivacaine liposome 1.3% Injectable 2 milliLiter(s) Local Injection once  BUpivacaine liposome 1.3% Injectable 5 milliLiter(s) Local Injection once  BUpivacaine liposome 1.3% Injectable 5 milliLiter(s) Local Injection once  cyanocobalamin 1000 MICROGram(s) Oral daily  dextrose 5%. 1000 milliLiter(s) (100 mL/Hr) IV Continuous <Continuous>  dextrose 5%. 1000 milliLiter(s) (50 mL/Hr) IV Continuous <Continuous>  dextrose 50% Injectable 25 Gram(s) IV Push once  dextrose 50% Injectable 12.5 Gram(s) IV Push once  dextrose 50% Injectable 25 Gram(s) IV Push once  erythromycin   Ointment 1 Application(s) Right EYE four times a day  folic acid 1 milliGRAM(s) Oral daily  furosemide    Tablet 20 milliGRAM(s) Oral daily  gabapentin 300 milliGRAM(s) Oral every 8 hours  glucagon  Injectable 1 milliGRAM(s) IntraMuscular once  insulin glargine Injectable (LANTUS) 10 Unit(s) SubCutaneous at bedtime  insulin lispro (ADMELOG) corrective regimen sliding scale   SubCutaneous Before meals and at bedtime  lactobacillus acidophilus 1 Tablet(s) Oral daily  lidocaine 5% Ointment 1 Application(s) Topical two times a day  loratadine 10 milliGRAM(s) Oral daily  methadone    Tablet 5 milliGRAM(s) Oral two times a day  metoprolol succinate ER 12.5 milliGRAM(s) Oral daily  mirtazapine 7.5 milliGRAM(s) Oral at bedtime  morphine  IR 7.5 milliGRAM(s) Oral every 4 hours  mupirocin 2% Ointment 1 Application(s) Topical three times a day  nystatin Powder 1 Application(s) Topical two times a day  pantoprazole    Tablet 40 milliGRAM(s) Oral before breakfast  piperacillin/tazobactam IVPB.. 3.375 Gram(s) IV Intermittent every 8 hours  polyethylene glycol 3350 17 Gram(s) Oral daily  prednisoLONE acetate 1% Suspension 1 Drop(s) Right EYE three times a day  pregabalin 300 milliGRAM(s) Oral every 12 hours  senna 1 Tablet(s) Oral at bedtime  tamsulosin 0.4 milliGRAM(s) Oral at bedtime  traZODone 100 milliGRAM(s) Oral at bedtime  ursodiol Tablet 500 milliGRAM(s) Oral <User Schedule>  valACYclovir 1000 milliGRAM(s) Oral every 8 hours    MEDICATIONS  (PRN):  acetaminophen     Tablet .. 1000 milliGRAM(s) Oral every 8 hours PRN Severe Pain (7 - 10)  AQUAPHOR (petrolatum Ointment) 1 Application(s) Topical three times a day PRN inguinal fold rash  dextrose Oral Gel 15 Gram(s) Oral once PRN Blood Glucose LESS THAN 70 milliGRAM(s)/deciliter      Vital Signs Last 24 Hrs  T(C): 36.8 (26 Aug 2022 05:30), Max: 36.8 (25 Aug 2022 17:41)  T(F): 98.2 (26 Aug 2022 05:30), Max: 98.2 (25 Aug 2022 17:41)  HR: 82 (26 Aug 2022 09:10) (71 - 94)  BP: 109/63 (26 Aug 2022 09:10) (109/63 - 125/69)  BP(mean): --  RR: 18 (26 Aug 2022 09:10) (18 - 18)  SpO2: 95% (26 Aug 2022 09:10) (93% - 98%)    Parameters below as of 26 Aug 2022 09:10  Patient On (Oxygen Delivery Method): nasal cannula  O2 Flow (L/min): 2    CAPILLARY BLOOD GLUCOSE      POCT Blood Glucose.: 133 mg/dL (26 Aug 2022 07:26)  POCT Blood Glucose.: 123 mg/dL (25 Aug 2022 21:40)  POCT Blood Glucose.: 153 mg/dL (25 Aug 2022 16:36)  POCT Blood Glucose.: 107 mg/dL (25 Aug 2022 11:50)    I&O's Summary      PHYSICAL EXAM:   GENERAL: NAD, well-developed  HEAD:  Atraumatic, Normocephalic  EYES: EOMI, PERRLA, conjunctiva and sclera clear  NECK: Supple, No JVD  CHEST/LUNG: Right lung crackles; No wheeze  HEART: RRR; No murmurs, rubs, or gallops  ABDOMEN: Soft, Nontender, Nondistended; Bowel sounds present  EXTREMITIES:  2+ Peripheral Pulses, No clubbing, cyanosis, or edema  PSYCH: AAOx3  NEUROLOGY: non-focal  SKIN: crusted erythematous rash in R V1 distribution    LABS:                        11.2   6.27  )-----------( 405      ( 26 Aug 2022 07:08 )             36.6     08-26    141  |  102  |  6<L>  ----------------------------<  114<H>  3.5   |  27  |  0.57    Ca    9.0      26 Aug 2022 07:08  Phos  3.6     08-26  Mg     1.60     08-26                RADIOLOGY & ADDITIONAL TESTS:    Imaging Personally Reviewed:    Consultant(s) Notes Reviewed:      Care Discussed with Consultants/Other Providers:

## 2022-08-26 NOTE — PROGRESS NOTE ADULT - PROBLEM SELECTOR PLAN 9
NPH diagnosed 2011, pt has programmable  shunt installed by [facility] neurosurg, **must be programmed after MRI (page neurosurg [id number])**. 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 A1c 7.2.  Home regimen lantus 34U daily, trulicity, glipizide.  -c/w lantus 10 units qHS, SSI, adjust prn  - monitor premeal and bedtime FS

## 2022-08-26 NOTE — CHART NOTE - NSCHARTNOTEFT_GEN_A_CORE
Patient s/p aspiration pneumonia with copious oral secretions, requiring intermittent oropharyngeal suctioning.  updated will arrange for home suction.

## 2022-08-27 NOTE — PROGRESS NOTE ADULT - SUBJECTIVE AND OBJECTIVE BOX
Follow Up:  fever    Interval History/ROS:  RRT overnight for hypoxia, fever, tachycardia    Allergies  diltiazem (Other; Rash)      ANTIMICROBIALS:  acyclovir IVPB 650 every 12 hours  meropenem  IVPB 1000 every 8 hours  vancomycin  IVPB 1000 every 12 hours      OTHER MEDS:  MEDICATIONS  (STANDING):  acetaminophen     Tablet .. 1000 every 8 hours PRN  albuterol/ipratropium for Nebulization 3 every 6 hours  amitriptyline 10 at bedtime  buDESOnide    Inhalation Suspension 0.5 two times a day  dextrose 50% Injectable 25 once  dextrose 50% Injectable 12.5 once  dextrose 50% Injectable 25 once  dextrose Oral Gel 15 once PRN  enoxaparin Injectable 80 every 12 hours  furosemide    Tablet 20 daily  gabapentin 300 every 8 hours  glucagon  Injectable 1 once  insulin glargine Injectable (LANTUS) 10 at bedtime  insulin lispro (ADMELOG) corrective regimen sliding scale  Before meals and at bedtime  loratadine 10 daily  methadone Injectable 2.5 every 12 hours  metoprolol succinate ER 12.5 daily  mirtazapine 7.5 at bedtime  morphine  - Injectable 2.5 every 4 hours  pantoprazole    Tablet 40 before breakfast  polyethylene glycol 3350 17 daily  pregabalin 300 every 12 hours  senna 1 at bedtime  tamsulosin 0.4 at bedtime  traZODone 100 at bedtime  ursodiol Tablet 500 <User Schedule>      Vital Signs Last 24 Hrs  T(C): 37.3 (27 Aug 2022 06:00), Max: 39.3 (27 Aug 2022 03:30)  T(F): 99.1 (27 Aug 2022 06:00), Max: 102.7 (27 Aug 2022 03:30)  HR: 102 (27 Aug 2022 06:00) (77 - 122)  BP: 99/52 (27 Aug 2022 06:00) (99/52 - 137/41)  BP(mean): --  RR: 15 (27 Aug 2022 06:00) (15 - 23)  SpO2: 91% (27 Aug 2022 06:00) (68% - 98%)    Parameters below as of 27 Aug 2022 06:00  Patient On (Oxygen Delivery Method): nasal cannula  O2 Flow (L/min): 4      PHYSICAL EXAM:  Constitutional: non-toxic, no distress  HEAD/EYES: anicteric, no conjunctival injection  ENT:  supple, no thrush  Cardiovascular:   normal S1, S2, no murmur, no edema  Respiratory:  clear BS bilaterally, no wheezes, no rales  GI:  soft, non-tender, normal bowel sounds  :  no dior, no CVA tenderness  Musculoskeletal:  no synovitis, normal ROM  Neurologic: awake and alert, normal strength, no focal findings  Skin:  no rash, no erythema, no phlebitis  Heme/Onc: no lymphadenopathy   Psychiatric:  awake, alert, appropriate mood                            10.1   13.48 )-----------( 417      ( 27 Aug 2022 02:58 )             32.1       08-27    138  |  101  |  9   ----------------------------<  134<H>  3.4<L>   |  24  |  0.67    Ca    8.4      27 Aug 2022 02:58  Phos  3.6     08-26  Mg     1.60     08-26      MICROBIOLOGY:      RADIOLOGY:  < from: Xray Chest 1 View AP/PA (08.27.22 @ 03:55) >  IMPRESSION:  Unchanged lung patchy opacities from prior. No pleural effusions or   pneumothorax.  Stable cardiac and mediastinal silhouettes.  Generalized osteopenia and scoliotic spinal curvature again noted.  < end of copied text >     Follow Up:  fever    Interval History/ROS:  RRT overnight for hypoxia, fever, tachycardia    Allergies  diltiazem (Other; Rash)      ANTIMICROBIALS:  acyclovir IVPB 650 every 12 hours  meropenem  IVPB 1000 every 8 hours  vancomycin  IVPB 1000 every 12 hours      OTHER MEDS:  MEDICATIONS  (STANDING):  acetaminophen     Tablet .. 1000 every 8 hours PRN  albuterol/ipratropium for Nebulization 3 every 6 hours  amitriptyline 10 at bedtime  buDESOnide    Inhalation Suspension 0.5 two times a day  dextrose 50% Injectable 25 once  dextrose 50% Injectable 12.5 once  dextrose 50% Injectable 25 once  dextrose Oral Gel 15 once PRN  enoxaparin Injectable 80 every 12 hours  furosemide    Tablet 20 daily  gabapentin 300 every 8 hours  glucagon  Injectable 1 once  insulin glargine Injectable (LANTUS) 10 at bedtime  insulin lispro (ADMELOG) corrective regimen sliding scale  Before meals and at bedtime  loratadine 10 daily  methadone Injectable 2.5 every 12 hours  metoprolol succinate ER 12.5 daily  mirtazapine 7.5 at bedtime  morphine  - Injectable 2.5 every 4 hours  pantoprazole    Tablet 40 before breakfast  polyethylene glycol 3350 17 daily  pregabalin 300 every 12 hours  senna 1 at bedtime  tamsulosin 0.4 at bedtime  traZODone 100 at bedtime  ursodiol Tablet 500 <User Schedule>      Vital Signs Last 24 Hrs  T(C): 37.3 (27 Aug 2022 06:00), Max: 39.3 (27 Aug 2022 03:30)  T(F): 99.1 (27 Aug 2022 06:00), Max: 102.7 (27 Aug 2022 03:30)  HR: 102 (27 Aug 2022 06:00) (77 - 122)  BP: 99/52 (27 Aug 2022 06:00) (99/52 - 137/41)  BP(mean): --  RR: 15 (27 Aug 2022 06:00) (15 - 23)  SpO2: 91% (27 Aug 2022 06:00) (68% - 98%)    Parameters below as of 27 Aug 2022 06:00  Patient On (Oxygen Delivery Method): nasal cannula  O2 Flow (L/min): 4      PHYSICAL EXAM:  Constitutional:  no distress  HEAD/EYES: +healing R facial/wanda-orbital rash  ENT:  supple, no thrush  Cardiovascular:   normal S1, S2  Respiratory:  +rales bilaterally  GI:  soft, non-tender, +bowel sounds  :  +dior, no CVA tenderness  Musculoskeletal:  no synovitis  Neurologic: awake and alert  Skin:   no phlebitis                              10.1   13.48 )-----------( 417      ( 27 Aug 2022 02:58 )             32.1       08-27    138  |  101  |  9   ----------------------------<  134<H>  3.4<L>   |  24  |  0.67    Ca    8.4      27 Aug 2022 02:58  Phos  3.6     08-26  Mg     1.60     08-26      MICROBIOLOGY:      RADIOLOGY:  < from: Xray Chest 1 View AP/PA (08.27.22 @ 03:55) >  IMPRESSION:  Unchanged lung patchy opacities from prior. No pleural effusions or   pneumothorax.  Stable cardiac and mediastinal silhouettes.  Generalized osteopenia and scoliotic spinal curvature again noted.  < end of copied text >     Follow Up:  fever    Interval History/ROS:  RRT overnight for hypoxia, fever, tachycardia    Allergies  diltiazem (Other; Rash)      ANTIMICROBIALS:  acyclovir IVPB 650 every 12 hours  meropenem  IVPB 1000 every 8 hours  vancomycin  IVPB 1000 every 12 hours      OTHER MEDS:  MEDICATIONS  (STANDING):  acetaminophen     Tablet .. 1000 every 8 hours PRN  albuterol/ipratropium for Nebulization 3 every 6 hours  amitriptyline 10 at bedtime  buDESOnide    Inhalation Suspension 0.5 two times a day  dextrose 50% Injectable 25 once  dextrose 50% Injectable 12.5 once  dextrose 50% Injectable 25 once  dextrose Oral Gel 15 once PRN  enoxaparin Injectable 80 every 12 hours  furosemide    Tablet 20 daily  gabapentin 300 every 8 hours  glucagon  Injectable 1 once  insulin glargine Injectable (LANTUS) 10 at bedtime  insulin lispro (ADMELOG) corrective regimen sliding scale  Before meals and at bedtime  loratadine 10 daily  methadone Injectable 2.5 every 12 hours  metoprolol succinate ER 12.5 daily  mirtazapine 7.5 at bedtime  morphine  - Injectable 2.5 every 4 hours  pantoprazole    Tablet 40 before breakfast  polyethylene glycol 3350 17 daily  pregabalin 300 every 12 hours  senna 1 at bedtime  tamsulosin 0.4 at bedtime  traZODone 100 at bedtime  ursodiol Tablet 500 <User Schedule>      Vital Signs Last 24 Hrs  T(C): 37.3 (27 Aug 2022 06:00), Max: 39.3 (27 Aug 2022 03:30)  T(F): 99.1 (27 Aug 2022 06:00), Max: 102.7 (27 Aug 2022 03:30)  HR: 102 (27 Aug 2022 06:00) (77 - 122)  BP: 99/52 (27 Aug 2022 06:00) (99/52 - 137/41)  BP(mean): --  RR: 15 (27 Aug 2022 06:00) (15 - 23)  SpO2: 91% (27 Aug 2022 06:00) (68% - 98%)    Parameters below as of 27 Aug 2022 06:00  Patient On (Oxygen Delivery Method): nasal cannula  O2 Flow (L/min): 4      PHYSICAL EXAM:  Constitutional:  no distress  HEAD/EYES: +healing R facial/wanda-orbital rash  opens right eye partially  Cardiovascular:   normal S1, S2  Respiratory:  +rales bilaterally  GI:  soft, non-tender, +bowel sounds  :  +dior, no CVA tenderness  Musculoskeletal:  no synovitis  Neurologic: awake and alert  Skin:   no phlebitis, ecchymosis arms                              10.1   13.48 )-----------( 417      ( 27 Aug 2022 02:58 )             32.1       08-27    138  |  101  |  9   ----------------------------<  134<H>  3.4<L>   |  24  |  0.67    Ca    8.4      27 Aug 2022 02:58  Phos  3.6     08-26  Mg     1.60     08-26      MICROBIOLOGY:      RADIOLOGY:  < from: Xray Chest 1 View AP/PA (08.27.22 @ 03:55) >  IMPRESSION:  Unchanged lung patchy opacities from prior. No pleural effusions or   pneumothorax.  Stable cardiac and mediastinal silhouettes.  Generalized osteopenia and scoliotic spinal curvature again noted.  < end of copied text >

## 2022-08-27 NOTE — PROGRESS NOTE ADULT - ATTENDING COMMENTS
85 yo woman admitted one month ago with VZV V1 treated with iv acyclovir and po valtrex   RRT during night for fever 102.7 tachycardia, hypoxia.  Suspect aspiration   blood cultures sent.  Grace changed.  Agree with starting vanco and meropenem   d/c acyclovir  Will follow

## 2022-08-27 NOTE — PROGRESS NOTE ADULT - PROBLEM SELECTOR PLAN 6
-Urine Cx on 8/19 ESBL E.coli  -ID recommended Zosyn was adequate tx with zosyn but in light of RRT will treat with meropenem.  -Patient does have dior with multiple failed TOV  -c/w meropenem

## 2022-08-27 NOTE — PROGRESS NOTE ADULT - PROBLEM SELECTOR PLAN 2
Patient hypoxic with O2 Sat 88% on RA; s/p RRT on 8/26 for hypoxia  currently on 2LNC; Patient still hypoxic and unable to titrate today so will need home oxygen  CXR on 8/22 shows RML infiltrate  repeat CXR on 8/26 showed no change.  repeat swallow eval on 8/24 recommended maintaining current diet.   Currently now NPO for cinesophagram  received 4 days of Zosyn IV prior to RRT on 8/26  Patient switched to Meropenem/Vanco on 8/27  Place NGT for meds  start D5NS @ 100ml/hr

## 2022-08-27 NOTE — PROGRESS NOTE ADULT - PROBLEM SELECTOR PLAN 11
120py smoking hx, quit in 2011. Uses Trelegy at home.   -encouraged incentive spirometry  - continue pulmicort and duo nebs  -now with oxygen requirement likely due to aspiration  -O2 Sats 88% at rest on RA.; Will need home oxygen

## 2022-08-27 NOTE — RAPID RESPONSE TEAM SUMMARY - NSSITUATIONBACKGROUNDRRT_GEN_ALL_CORE
86 y.o. Female w/ hx HTN, DM, CAD s/p stents (1986, 1996), COPD, NPH s/p  shunt, migraines p/w herpes zoster ophthalmicus/encephalitis affecting the right V1 dermatome but developed post herpetic neuralgia, and mrstran bacteremia (treated). She is s/p IV acyclovir, but continues to have neuralgia pain. RRT called for hypoxia and encephalopathy. On my arrival, patient saturating 92% on NRB. bp 133/57, hr 117, t 102.7 rectal, .  Per primary team at bedside, patient had desaturated to 60s about 30 mins prior and was placed on NRB. AxOx3 at baseline.  Pt on zosyn, being treated for possible aspiration. On physical exam, patient lethargic, protecting airway, not following commands. Lung exam with course bilateral breath sounds. Eye exam with asymmetric pupils (of note, this is noted in prior optho note).  Patient had received methadone last at 10pm.  ABG was sent and CXR done. CXR with  prelim of right hazy opacities. ABG pH 7.42, Pc02 41, P02 77, lactate 1.4. CBC drawn just prior to rapid with increasing leukocytosis.  Patient had just received IV tylenol and zosyn prior to my arrival. On chart review, patient with history of ESBL E. Coli UTI and Staph hominis bacteremia. 1g meropenem, 1g vancomycin and IVF bolus given. At end of RRT, patient responding to name and where she is and Sp02 improved to 95-96%.    My main differential for the lethargy and hypoxia is sepsis 2/2 aspiration PNA at this time. Given eye exam at baseline, I have a lower suspicion for opiate overdose as the cause. Neurologic sequelae also lower on the differential given lack of FND.

## 2022-08-27 NOTE — PROGRESS NOTE ADULT - ASSESSMENT
86 y.o. Female w/ hx HTN, DM, CAD s/p stents (1986, 1996), COPD, NPH s/p  shunt, migraines p/w herpes zoster ophthalmicus/encephalitis affecting the right V1 dermatome but developed post herpetic neuralgia, and mrstran bacteremia (treated). She is s/p IV acyclovir, but continues to have neuralgia pain. This pain  improved after NSG did R supra orbital marcaine nerve block on 8/16, 8/17and 8/22. She is currently on morphine IR 7.5mg q4hr, Lyrica and gabapentin. Methadone started on on 8/25. Patient and daughter now satisfied with pain control. RRT on 8/26 for fever, hypoxia, and encephalopathy. Aspiration PNA suspected and ESBL UTI untreated. Patient's mental status now returned to baseline.     Spoke to daughter on the phone on 8/27.

## 2022-08-27 NOTE — PROGRESS NOTE ADULT - PROBLEM SELECTOR PLAN 12
DVT ppx: on therapeutic lovenox  Diet: soft and bite sized (S&S recd minced and moist however daughter accepts risks)     Dispo:  - pt has difficult vessels, picc in place if blood draws needed  - PT/OT recommending rehab; but daughter wants to take her home. Since had recent RRT will treat PNA and UTI for now inpatient.

## 2022-08-27 NOTE — PROGRESS NOTE ADULT - ATTENDING SUPERVISION STATEMENT
Fellow
Resident
Fellow
Resident

## 2022-08-27 NOTE — PROGRESS NOTE ADULT - PROBLEM SELECTOR PLAN 5
unclear etiology, poss immobility, poss infection, poss due to narcotics  indwelling urinary catheter placed 8/13, failed TOV 8/17, placed again 8/19; Failed TOV again on 8/22 and dior reinserted  Will maintain dior for now since multiple failed TOV    -Had candida in urine which was treated x 1 day; ID recommended against treating it. unclear etiology, poss immobility, poss infection, poss due to narcotics  indwelling urinary catheter placed 8/13, failed TOV 8/17, placed again 8/19; Failed TOV again on 8/22 and dior reinserted  Will maintain dior for now since multiple failed TOV; dior swapped on 8/26    -Had candida in urine which was treated x 1 day; ID recommended against treating it.

## 2022-08-27 NOTE — PROGRESS NOTE ADULT - ASSESSMENT
87 y/o F PMHx cardiac stents post MI (1986, 1996), COPD, NPH, DM, Migraines, and suspected HTN presented with vesicular rash on right face and AMS. Treated for HZO, s/p 2 weeks of IV acyclovir and extended course of Valtrex. Treated with course of vanc/shaina for superimposed SSTI/preseptal cellulitis. Course now c/b new episode of hypoxia, fever, and tachycardia. ID consulted for further management.    Tmax 102.7 (8/27), HR 110s, 4L NC  WBC 13K    Impression:  #Sepsis, Hypoxia - suspect new aspiration event    incomplete note  87 y/o F PMHx cardiac stents post MI (1986, 1996), COPD, NPH, DM, Migraines, and suspected HTN presented with vesicular rash on right face and AMS. Treated for HZO, s/p 2 weeks of IV acyclovir and extended course of Valtrex. Treated with course of vanc/shaina for superimposed SSTI/preseptal cellulitis. Course now c/b new episode of hypoxia, fever, and tachycardia. ID consulted for further management.    Tmax 102.7 (8/27), HR 110s, 4L NC  WBC 13K    Impression:  #Sepsis, Hypoxia - suspect aspiration event  #AMS - now back to baseline, suspect in setting of infection    Recs:  - d/c IV acyclovir. Low suspicion for VZV as etiology of decompensation overnight. Received an appropriate course of treatment, can hold off on further acyclovir as risks of nephrotoxicity/neurotoxicity outweigh benefits  - c/w Vancomycin and meropenem empirically given prior history of ESBL on culture  - f/u blood cultures  - send UA, urine culture    Plan discussed with primary team    Dontrell Holliday MD  Infectious Disease Fellow  Available on [employer] Teams  Before 9AM or after 5PM: [phone]

## 2022-08-27 NOTE — RAPID RESPONSE TEAM SUMMARY - NSADDTLFINDINGSRRT_GEN_ALL_CORE
RECOMMENDATIONS to primary team:  [] f/u blood cultures  [] consider urine culture (although yield may be low given patient already received abx)  [] Can de-escalate NRB to NC as able. Of note, given COPD history, aim for Sp02 ~90-92%.   [] Would make NPO given aspiration concern for now. Elevate HOB.   [] Can get CT head to evaluate for structural causes of encephalopathy.

## 2022-08-27 NOTE — PROGRESS NOTE ADULT - PROBLEM SELECTOR PLAN 10
NPH diagnosed 2011, pt has programmable  shunt installed by [facility] neurosurg, **must be programmed after MRI (page neurosurg [id number])**. 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

## 2022-08-27 NOTE — PROGRESS NOTE ADULT - PROBLEM SELECTOR PLAN 4
-Provoked RUE subclavian DVT, nPOA  -Likely provoked by multiple sticks, PICC line.  Diagnosed on 8/8  - on full dose lovenox 80mg q12h  -can switch to eliquis on discharge

## 2022-08-27 NOTE — PROGRESS NOTE ADULT - PROBLEM SELECTOR PLAN 1
NSG did repeat bupivicaine supraorbital nerve block on 8/22 to help with pain; which appeared to have improved pain. Patient would have fleeting episodes of pain but mostly is sleeping comfortably in bed. But when asked she says her pain is a 10 out of 10 in severity. Pain now better controlled  - increased lyrica to 300 mg every 8 hours on 8/21  -added Gabapentin 300mg tid for better pain control on 8/23  -Morphine increased to 7.5mg IR q4hr standing as per pain consult  -Reconsulted pain management and they advised also adding methadone 5mg bid. Spoke to pain management on 8/25 and they felt confident that methadone was appropriate for this situation and patient already had a F/U appointment with pain management clinic next to manage methadone as outpatient.   -Spoke to daughter at bedside and explained to her the risks of starting methadone in an elderly patient and she wanted to start methadone anyway.  -Started Methadone 5mg PO bid on 8/25;  but since patient with recent hypoxia, will decrease dose to 2.5mg bid    -c/w valtrex 1gram PO q8hr; currently switched to IV acyclovir since NPO  -c/w erythromycin ointment QID to eye & periocular lesions, artificial tears Q4H as recommended by opthalmology.  - Appreciate derm recs:    - lidocaine ointment mixed with vaseline BID, triamcinolone ointment to red areas only BID    - apply Vaseline to crusted areas Q2H.

## 2022-08-27 NOTE — PROGRESS NOTE ADULT - SUBJECTIVE AND OBJECTIVE BOX
Patient is a 86y old  Female who presents with a chief complaint of Suspicion for Herpes Zoster opthalmicus/encephalitis (26 Aug 2022 11:18)      SUBJECTIVE / OVERNIGHT EVENTS:  RRT called last night for encephalopathy, fever 102 and hypoxia. CXR unchanged from prior. Aspiration PNA suspected and patient made NPO and in the setting of ESBL UTI switched from Zosyn to meropenem/Vanco by RRT team.     MEDICATIONS  (STANDING):  acyclovir IVPB 650 milliGRAM(s) IV Intermittent every 12 hours  albuterol/ipratropium for Nebulization 3 milliLiter(s) Nebulizer every 6 hours  amitriptyline 10 milliGRAM(s) Oral at bedtime  artificial tears (preservative free) Ophthalmic Solution 1 Drop(s) Both EYES every 2 hours  buDESOnide    Inhalation Suspension 0.5 milliGRAM(s) Inhalation two times a day  BUpivacaine 0.5% (Preservative-Free) Injectable 5 milliLiter(s) Local Injection once  BUpivacaine 0.5% Injectable 5 milliLiter(s) Local Injection once  BUpivacaine 0.5% Injectable 5 milliLiter(s) Local Injection once  BUpivacaine liposome 1.3% Injectable 5 milliLiter(s) Local Injection once  BUpivacaine liposome 1.3% Injectable 5 milliLiter(s) Local Injection once  BUpivacaine liposome 1.3% Injectable 5 milliLiter(s) Local Injection once  BUpivacaine liposome 1.3% Injectable 2 milliLiter(s) Local Injection once  cyanocobalamin 1000 MICROGram(s) Oral daily  dextrose 5%. 1000 milliLiter(s) (100 mL/Hr) IV Continuous <Continuous>  dextrose 5%. 1000 milliLiter(s) (50 mL/Hr) IV Continuous <Continuous>  dextrose 50% Injectable 25 Gram(s) IV Push once  dextrose 50% Injectable 12.5 Gram(s) IV Push once  dextrose 50% Injectable 25 Gram(s) IV Push once  enoxaparin Injectable 80 milliGRAM(s) SubCutaneous every 12 hours  erythromycin   Ointment 1 Application(s) Right EYE four times a day  folic acid 1 milliGRAM(s) Oral daily  furosemide    Tablet 20 milliGRAM(s) Oral daily  gabapentin 300 milliGRAM(s) Oral every 8 hours  glucagon  Injectable 1 milliGRAM(s) IntraMuscular once  insulin glargine Injectable (LANTUS) 10 Unit(s) SubCutaneous at bedtime  insulin lispro (ADMELOG) corrective regimen sliding scale   SubCutaneous Before meals and at bedtime  lactobacillus acidophilus 1 Tablet(s) Oral daily  lidocaine 5% Ointment 1 Application(s) Topical two times a day  loratadine 10 milliGRAM(s) Oral daily  meropenem  IVPB 1000 milliGRAM(s) IV Intermittent every 8 hours  methadone Injectable 2.5 milliGRAM(s) IV Push every 12 hours  metoprolol succinate ER 12.5 milliGRAM(s) Oral daily  mirtazapine 7.5 milliGRAM(s) Oral at bedtime  morphine  - Injectable 2.5 milliGRAM(s) IV Push every 4 hours  mupirocin 2% Ointment 1 Application(s) Topical three times a day  nystatin Powder 1 Application(s) Topical two times a day  pantoprazole    Tablet 40 milliGRAM(s) Oral before breakfast  polyethylene glycol 3350 17 Gram(s) Oral daily  potassium chloride  10 mEq/100 mL IVPB 10 milliEquivalent(s) IV Intermittent every 1 hour  prednisoLONE acetate 1% Suspension 1 Drop(s) Right EYE three times a day  pregabalin 300 milliGRAM(s) Oral every 12 hours  senna 1 Tablet(s) Oral at bedtime  sodium chloride 0.9%. 1000 milliLiter(s) (60 mL/Hr) IV Continuous <Continuous>  tamsulosin 0.4 milliGRAM(s) Oral at bedtime  traZODone 100 milliGRAM(s) Oral at bedtime  ursodiol Tablet 500 milliGRAM(s) Oral <User Schedule>  vancomycin  IVPB 1000 milliGRAM(s) IV Intermittent every 12 hours    MEDICATIONS  (PRN):  acetaminophen     Tablet .. 1000 milliGRAM(s) Oral every 8 hours PRN Severe Pain (7 - 10)  AQUAPHOR (petrolatum Ointment) 1 Application(s) Topical three times a day PRN inguinal fold rash  dextrose Oral Gel 15 Gram(s) Oral once PRN Blood Glucose LESS THAN 70 milliGRAM(s)/deciliter      Vital Signs Last 24 Hrs  T(C): 37.3 (27 Aug 2022 06:00), Max: 39.3 (27 Aug 2022 03:30)  T(F): 99.1 (27 Aug 2022 06:00), Max: 102.7 (27 Aug 2022 03:30)  HR: 102 (27 Aug 2022 06:00) (77 - 122)  BP: 99/52 (27 Aug 2022 06:00) (99/52 - 137/41)  BP(mean): --  RR: 15 (27 Aug 2022 06:00) (15 - 23)  SpO2: 91% (27 Aug 2022 06:00) (68% - 98%)    Parameters below as of 27 Aug 2022 06:00  Patient On (Oxygen Delivery Method): nasal cannula  O2 Flow (L/min): 4    CAPILLARY BLOOD GLUCOSE      POCT Blood Glucose.: 168 mg/dL (27 Aug 2022 07:58)  POCT Blood Glucose.: 125 mg/dL (27 Aug 2022 02:59)  POCT Blood Glucose.: 168 mg/dL (26 Aug 2022 21:24)  POCT Blood Glucose.: 187 mg/dL (26 Aug 2022 17:21)  POCT Blood Glucose.: 113 mg/dL (26 Aug 2022 11:18)    I&O's Summary    26 Aug 2022 07:01  -  27 Aug 2022 07:00  --------------------------------------------------------  IN: 1800 mL / OUT: 1390 mL / NET: 410 mL        PHYSICAL EXAM:  GENERAL: NAD, well-developed  HEAD:  Atraumatic, Normocephalic  EYES: EOMI, PERRLA, conjunctiva and sclera clear  NECK: Supple, No JVD  CHEST/LUNG: Clear to auscultation bilaterally; No wheeze  HEART: Regular rate and rhythm; No murmurs, rubs, or gallops  ABDOMEN: Soft, Nontender, Nondistended; Bowel sounds present  EXTREMITIES:  2+ Peripheral Pulses, No clubbing, cyanosis, or edema  PSYCH: AAOx3  NEUROLOGY: non-focal  SKIN: No rashes or lesions    LABS:                        10.1   13.48 )-----------( 417      ( 27 Aug 2022 02:58 )             32.1     08-27    138  |  101  |  9   ----------------------------<  134<H>  3.4<L>   |  24  |  0.67    Ca    8.4      27 Aug 2022 02:58  Phos  3.6     08-26  Mg     1.60     08-26                RADIOLOGY & ADDITIONAL TESTS:    Imaging Personally Reviewed:    Consultant(s) Notes Reviewed:      Care Discussed with Consultants/Other Providers:   Patient is a 86y old  Female who presents with a chief complaint of Suspicion for Herpes Zoster opthalmicus/encephalitis (26 Aug 2022 11:18)      SUBJECTIVE / OVERNIGHT EVENTS:  RRT called last night for encephalopathy, fever 102 and hypoxia. CXR unchanged from prior. Aspiration PNA suspected and patient made NPO and in the setting of ESBL UTI switched from Zosyn to meropenem/Vanco by RRT team. Patient currently AA0x 3 and has minimal pain. Denies cp, SOB, abdominal pain, N/V/D.  Spoke to daughter on phone and is aware of management.         MEDICATIONS  (STANDING):  acyclovir IVPB 650 milliGRAM(s) IV Intermittent every 12 hours  albuterol/ipratropium for Nebulization 3 milliLiter(s) Nebulizer every 6 hours  amitriptyline 10 milliGRAM(s) Oral at bedtime  artificial tears (preservative free) Ophthalmic Solution 1 Drop(s) Both EYES every 2 hours  buDESOnide    Inhalation Suspension 0.5 milliGRAM(s) Inhalation two times a day  BUpivacaine 0.5% (Preservative-Free) Injectable 5 milliLiter(s) Local Injection once  BUpivacaine 0.5% Injectable 5 milliLiter(s) Local Injection once  BUpivacaine 0.5% Injectable 5 milliLiter(s) Local Injection once  BUpivacaine liposome 1.3% Injectable 5 milliLiter(s) Local Injection once  BUpivacaine liposome 1.3% Injectable 5 milliLiter(s) Local Injection once  BUpivacaine liposome 1.3% Injectable 5 milliLiter(s) Local Injection once  BUpivacaine liposome 1.3% Injectable 2 milliLiter(s) Local Injection once  cyanocobalamin 1000 MICROGram(s) Oral daily  dextrose 5%. 1000 milliLiter(s) (100 mL/Hr) IV Continuous <Continuous>  dextrose 5%. 1000 milliLiter(s) (50 mL/Hr) IV Continuous <Continuous>  dextrose 50% Injectable 25 Gram(s) IV Push once  dextrose 50% Injectable 12.5 Gram(s) IV Push once  dextrose 50% Injectable 25 Gram(s) IV Push once  enoxaparin Injectable 80 milliGRAM(s) SubCutaneous every 12 hours  erythromycin   Ointment 1 Application(s) Right EYE four times a day  folic acid 1 milliGRAM(s) Oral daily  furosemide    Tablet 20 milliGRAM(s) Oral daily  gabapentin 300 milliGRAM(s) Oral every 8 hours  glucagon  Injectable 1 milliGRAM(s) IntraMuscular once  insulin glargine Injectable (LANTUS) 10 Unit(s) SubCutaneous at bedtime  insulin lispro (ADMELOG) corrective regimen sliding scale   SubCutaneous Before meals and at bedtime  lactobacillus acidophilus 1 Tablet(s) Oral daily  lidocaine 5% Ointment 1 Application(s) Topical two times a day  loratadine 10 milliGRAM(s) Oral daily  meropenem  IVPB 1000 milliGRAM(s) IV Intermittent every 8 hours  methadone Injectable 2.5 milliGRAM(s) IV Push every 12 hours  metoprolol succinate ER 12.5 milliGRAM(s) Oral daily  mirtazapine 7.5 milliGRAM(s) Oral at bedtime  morphine  - Injectable 2.5 milliGRAM(s) IV Push every 4 hours  mupirocin 2% Ointment 1 Application(s) Topical three times a day  nystatin Powder 1 Application(s) Topical two times a day  pantoprazole    Tablet 40 milliGRAM(s) Oral before breakfast  polyethylene glycol 3350 17 Gram(s) Oral daily  potassium chloride  10 mEq/100 mL IVPB 10 milliEquivalent(s) IV Intermittent every 1 hour  prednisoLONE acetate 1% Suspension 1 Drop(s) Right EYE three times a day  pregabalin 300 milliGRAM(s) Oral every 12 hours  senna 1 Tablet(s) Oral at bedtime  sodium chloride 0.9%. 1000 milliLiter(s) (60 mL/Hr) IV Continuous <Continuous>  tamsulosin 0.4 milliGRAM(s) Oral at bedtime  traZODone 100 milliGRAM(s) Oral at bedtime  ursodiol Tablet 500 milliGRAM(s) Oral <User Schedule>  vancomycin  IVPB 1000 milliGRAM(s) IV Intermittent every 12 hours    MEDICATIONS  (PRN):  acetaminophen     Tablet .. 1000 milliGRAM(s) Oral every 8 hours PRN Severe Pain (7 - 10)  AQUAPHOR (petrolatum Ointment) 1 Application(s) Topical three times a day PRN inguinal fold rash  dextrose Oral Gel 15 Gram(s) Oral once PRN Blood Glucose LESS THAN 70 milliGRAM(s)/deciliter      Vital Signs Last 24 Hrs  T(C): 37.3 (27 Aug 2022 06:00), Max: 39.3 (27 Aug 2022 03:30)  T(F): 99.1 (27 Aug 2022 06:00), Max: 102.7 (27 Aug 2022 03:30)  HR: 102 (27 Aug 2022 06:00) (77 - 122)  BP: 99/52 (27 Aug 2022 06:00) (99/52 - 137/41)  BP(mean): --  RR: 15 (27 Aug 2022 06:00) (15 - 23)  SpO2: 91% (27 Aug 2022 06:00) (68% - 98%)    Parameters below as of 27 Aug 2022 06:00  Patient On (Oxygen Delivery Method): nasal cannula  O2 Flow (L/min): 4    CAPILLARY BLOOD GLUCOSE      POCT Blood Glucose.: 168 mg/dL (27 Aug 2022 07:58)  POCT Blood Glucose.: 125 mg/dL (27 Aug 2022 02:59)  POCT Blood Glucose.: 168 mg/dL (26 Aug 2022 21:24)  POCT Blood Glucose.: 187 mg/dL (26 Aug 2022 17:21)  POCT Blood Glucose.: 113 mg/dL (26 Aug 2022 11:18)    I&O's Summary    26 Aug 2022 07:01  -  27 Aug 2022 07:00  --------------------------------------------------------  IN: 1800 mL / OUT: 1390 mL / NET: 410 mL        PHYSICAL EXAM:   GENERAL: NAD, well-developed  HEAD:  Atraumatic, Normocephalic  EYES: EOMI, PERRLA, conjunctiva and sclera clear  NECK: Supple, No JVD  CHEST/LUNG: Right lung crackles; No wheeze  HEART: RRR; No murmurs, rubs, or gallops  ABDOMEN: Soft, Nontender, Nondistended; Bowel sounds present  EXTREMITIES:  2+ Peripheral Pulses, No clubbing, cyanosis, or edema  PSYCH: AAOx3  NEUROLOGY: non-focal  SKIN: crusted erythematous rash in R V1 distribution    LABS:                        10.1   13.48 )-----------( 417      ( 27 Aug 2022 02:58 )             32.1     08-27    138  |  101  |  9   ----------------------------<  134<H>  3.4<L>   |  24  |  0.67    Ca    8.4      27 Aug 2022 02:58  Phos  3.6     08-26  Mg     1.60     08-26                RADIOLOGY & ADDITIONAL TESTS:    Imaging Personally Reviewed:    Consultant(s) Notes Reviewed:      Care Discussed with Consultants/Other Providers:

## 2022-08-27 NOTE — CHART NOTE - NSCHARTNOTEFT_GEN_A_CORE
Notified by RN that the pt was minimally responsive and hypoxic to 68% on RA. RN Placed pt on %, pt's SP02 increased to 95%.     Patient was seen and evaluated at bedside. Pt remained minimally responsive, only opening eyes to sternal rub. Pt noted with wet, coarse breath sounds. Coughing. Febrile to 100.8F.   STAT Chest X-Ray, CBC, BMP, ABG w/ lactate and procalcitonin was performed. 1G IV Tylenol given for fever. Pt remained minimally responsive and RRT was called.    ABG - ( 27 Aug 2022 02:58 )  pH, Arterial: 7.42  pH, Blood: x     /  pCO2: 41    /  pO2: 77    / HCO3: 27    / Base Excess: 1.9   /  SaO2: 96.9                            10.1   13.48 )-----------( 417      ( 27 Aug 2022 02:58 )             32.1   08-27    138  |  101  |  9   ----------------------------<  134<H>  3.4<L>   |  24  |  0.67    Ca    8.4      27 Aug 2022 02:58  Phos  3.6     08-26  Mg     1.60     08-26    Procalcitonin, Serum: 0.20    During RRT, pt had rectal temperature of 102.7F. Pt's WBC increased from 6k-->13k. Pt was tachycardic, w/ HR 120s meeting sepsis criteria. IV Tylenol already running, Ice placed on pt. 1G IV Vancomycin, 1G IV Meropenem and IVF were administered.  CXR showed unchanged right lung opacities.     Concern for Sepsis 2/2 to Aspiration PNA. Also pt had UTI w/ UCx growing ESBL, concern for worsening UTI/bacteremia.   - Pt made NPO. Most PO meds switched to IV (Eliquis--> Lovenox, PO Valacyclovir--> IV Acyclovir + NS, PO Morphine--> IV, PO Methadone--> IV). Conversions were assisted by Pharmacist.   - S&S had recommended cineesophagogram on prior Eval. Cine ordered.   - Zosyn dc'sanchez. Pt started on standing Vancomycin and Meropenem.   - Repeat UCx and BCx. Ordered for RN to replace Grace.   - By end of RRT, pt was more awake and responsive, answering questions.   - Primary team to Re-consult ID c/s in AM.  - Tonight's events were discussed with [facility] Dr. Dietrich, who agreed with plan, and also recommended a CT Head (ordered).  - Pt's daughter Dr. Luna Barrientosy was called and made aware of tonight's events. She is in agreement with plan, all questions answered. If pt's mental status is not back to baseline in AM, she is amendable to NGT if needed for medications. Notified by RN that the pt was minimally responsive and hypoxic to 68% on RA. RN Placed pt on %, pt's SP02 increased to 95%.     Patient was seen and evaluated at bedside. Pt remained minimally responsive, only opening eyes to sternal rub. Pt noted with wet, coarse breath sounds. Coughing. Febrile to 100.8F.   STAT Chest X-Ray, CBC, BMP, ABG w/ lactate and procalcitonin was performed. 1G IV Tylenol given for fever. Pt remained minimally responsive and RRT was called.    ABG - ( 27 Aug 2022 02:58 )  pH, Arterial: 7.42  pH, Blood: x     /  pCO2: 41    /  pO2: 77    / HCO3: 27    / Base Excess: 1.9   /  SaO2: 96.9                            10.1   13.48 )-----------( 417      ( 27 Aug 2022 02:58 )             32.1   08-27    138  |  101  |  9   ----------------------------<  134<H>  3.4<L>   |  24  |  0.67    Ca    8.4      27 Aug 2022 02:58  Phos  3.6     08-26  Mg     1.60     08-26    Procalcitonin, Serum: 0.20    During RRT, pt had rectal temperature of 102.7F. Pt's WBC increased from 6k-->13k. Pt was tachycardic, w/ HR 120s meeting sepsis criteria. IV Tylenol already running, Ice placed on pt. 1G IV Vancomycin, 1G IV Meropenem and IVF were administered.  CXR showed unchanged right lung opacities.     Concern for Sepsis 2/2 to Aspiration PNA. Also pt had UTI w/ UCx growing ESBL, concern for worsening UTI/bacteremia.   - Pt made NPO. Most PO meds switched to IV (Eliquis--> Lovenox, PO Valacyclovir--> IV Acyclovir + NS, PO Morphine--> IV, PO Methadone--> IV). Conversions were assisted by Pharmacist.   - S&S had recommended cineesophagogram on prior Eval. Cine ordered.   - HOB elevation and aspiration precautions ordered   - Zosyn dc'sanchez. Pt started on standing Vancomycin and Meropenem.   - Repeat UCx and BCx. Ordered for RN to replace Grace.   - By end of RRT, pt was more awake and responsive, answering questions.   - Primary team to Re-consult ID  in AM.  - Tonight's events were discussed with [facility] Dr. Dietrich, who agreed with plan, and also recommended a CT Head (ordered).  - Pt's daughter Dr. Luna Wong Danny was called and made aware of tonight's events. She is in agreement with plan, all questions answered. If pt's mental status is not back to baseline in AM, she is amendable to NGT if needed for medications.

## 2022-08-28 NOTE — CHART NOTE - NSCHARTNOTEFT_GEN_A_CORE
Patient s/p RRT,  with aspiration pneumonia and Cinesophagram pending. Patient asking to eat, daughter who is the health care proxy, and a [facility] physician is asking if her mother can have pleasure feeds, aware that her mother is high risk of aspiration. Patient and daughter willing to accept the risks associated, with possible further complicating pneumonia, sepsis, and further demise that could lead up to including death. Patient case reviewed and discussed with Attending Physician Dr Mayfield, who also had a conversation discussing the risks, as well as myself, and agrees patient can have pleasure feeds, with aspiration precautions in place. Patient s/p RRT, likely aspiration pneumonia with Cinesophagram pending. Patient asking to eat, daughter who is the health care proxy, and a [facility] physician is asking if her mother can have pleasure feeds, aware that her mother is high risk of aspiration. Patient and daughter willing to accept the risks associated, with possible further complicating pneumonia, sepsis, and further demise that could lead up to including death. Patient case reviewed and discussed with Attending Physician Dr Mayfield, who also had a conversation discussing the risks, as well as myself, and agrees patient can have pleasure feeds, with aspiration precautions in place.

## 2022-08-28 NOTE — PROGRESS NOTE ADULT - SUBJECTIVE AND OBJECTIVE BOX
Patient is a 86y old  Female who presents with a chief complaint of Suspicion for Herpes Zoster opthalmicus/encephalitis (27 Aug 2022 09:43)      SUBJECTIVE / OVERNIGHT EVENTS:  Patient refused NGT placement.     MEDICATIONS  (STANDING):  albuterol/ipratropium for Nebulization 3 milliLiter(s) Nebulizer every 6 hours  amitriptyline 10 milliGRAM(s) Oral at bedtime  artificial tears (preservative free) Ophthalmic Solution 1 Drop(s) Both EYES every 2 hours  buDESOnide    Inhalation Suspension 0.5 milliGRAM(s) Inhalation two times a day  BUpivacaine 0.5% (Preservative-Free) Injectable 5 milliLiter(s) Local Injection once  BUpivacaine 0.5% Injectable 5 milliLiter(s) Local Injection once  BUpivacaine 0.5% Injectable 5 milliLiter(s) Local Injection once  BUpivacaine liposome 1.3% Injectable 5 milliLiter(s) Local Injection once  BUpivacaine liposome 1.3% Injectable 2 milliLiter(s) Local Injection once  BUpivacaine liposome 1.3% Injectable 5 milliLiter(s) Local Injection once  BUpivacaine liposome 1.3% Injectable 5 milliLiter(s) Local Injection once  cyanocobalamin 1000 MICROGram(s) Oral daily  dextrose 5% + sodium chloride 0.9%. 1000 milliLiter(s) (60 mL/Hr) IV Continuous <Continuous>  dextrose 5%. 1000 milliLiter(s) (50 mL/Hr) IV Continuous <Continuous>  dextrose 5%. 1000 milliLiter(s) (100 mL/Hr) IV Continuous <Continuous>  dextrose 50% Injectable 25 Gram(s) IV Push once  dextrose 50% Injectable 12.5 Gram(s) IV Push once  dextrose 50% Injectable 25 Gram(s) IV Push once  enoxaparin Injectable 80 milliGRAM(s) SubCutaneous every 12 hours  erythromycin   Ointment 1 Application(s) Right EYE four times a day  folic acid 1 milliGRAM(s) Oral daily  furosemide    Tablet 20 milliGRAM(s) Oral daily  gabapentin 300 milliGRAM(s) Oral every 8 hours  glucagon  Injectable 1 milliGRAM(s) IntraMuscular once  insulin glargine Injectable (LANTUS) 10 Unit(s) SubCutaneous at bedtime  insulin lispro (ADMELOG) corrective regimen sliding scale   SubCutaneous Before meals and at bedtime  lactobacillus acidophilus 1 Tablet(s) Oral daily  lidocaine 5% Ointment 1 Application(s) Topical two times a day  loratadine 10 milliGRAM(s) Oral daily  meropenem  IVPB 1000 milliGRAM(s) IV Intermittent every 8 hours  methadone Injectable 2.5 milliGRAM(s) IV Push every 12 hours  metoprolol succinate ER 12.5 milliGRAM(s) Oral daily  mirtazapine 7.5 milliGRAM(s) Oral at bedtime  morphine  - Injectable 2.5 milliGRAM(s) IV Push every 4 hours  mupirocin 2% Ointment 1 Application(s) Topical three times a day  nystatin Powder 1 Application(s) Topical two times a day  pantoprazole    Tablet 40 milliGRAM(s) Oral before breakfast  polyethylene glycol 3350 17 Gram(s) Oral daily  potassium chloride  10 mEq/100 mL IVPB 10 milliEquivalent(s) IV Intermittent every 1 hour  prednisoLONE acetate 1% Suspension 1 Drop(s) Right EYE three times a day  pregabalin 300 milliGRAM(s) Oral every 12 hours  senna 1 Tablet(s) Oral at bedtime  tamsulosin 0.4 milliGRAM(s) Oral at bedtime  traZODone 100 milliGRAM(s) Oral at bedtime  ursodiol Tablet 500 milliGRAM(s) Oral <User Schedule>  vancomycin  IVPB 1000 milliGRAM(s) IV Intermittent every 12 hours    MEDICATIONS  (PRN):  acetaminophen     Tablet .. 1000 milliGRAM(s) Oral every 8 hours PRN Severe Pain (7 - 10)  AQUAPHOR (petrolatum Ointment) 1 Application(s) Topical three times a day PRN inguinal fold rash  dextrose Oral Gel 15 Gram(s) Oral once PRN Blood Glucose LESS THAN 70 milliGRAM(s)/deciliter      Vital Signs Last 24 Hrs  T(C): 36.7 (28 Aug 2022 05:37), Max: 37.1 (27 Aug 2022 12:00)  T(F): 98.1 (28 Aug 2022 05:37), Max: 98.8 (27 Aug 2022 16:00)  HR: 99 (28 Aug 2022 08:50) (82 - 100)  BP: 130/65 (28 Aug 2022 05:37) (110/55 - 138/60)  BP(mean): --  RR: 18 (28 Aug 2022 05:37) (16 - 18)  SpO2: 95% (28 Aug 2022 08:50) (92% - 96%)    Parameters below as of 28 Aug 2022 05:37  Patient On (Oxygen Delivery Method): nasal cannula  O2 Flow (L/min): 3    CAPILLARY BLOOD GLUCOSE      POCT Blood Glucose.: 157 mg/dL (28 Aug 2022 07:29)  POCT Blood Glucose.: 129 mg/dL (27 Aug 2022 22:12)  POCT Blood Glucose.: 189 mg/dL (27 Aug 2022 16:55)  POCT Blood Glucose.: 152 mg/dL (27 Aug 2022 11:49)    I&O's Summary    27 Aug 2022 07:01  -  28 Aug 2022 07:00  --------------------------------------------------------  IN: 1040 mL / OUT: 500 mL / NET: 540 mL        PHYSICAL EXAM:   GENERAL: NAD, well-developed  HEAD:  Atraumatic, Normocephalic  EYES: EOMI, PERRLA, conjunctiva and sclera clear  NECK: Supple, No JVD  CHEST/LUNG: Right lung crackles; No wheeze  HEART: RRR; No murmurs, rubs, or gallops  ABDOMEN: Soft, Nontender, Nondistended; Bowel sounds present  EXTREMITIES:  2+ Peripheral Pulses, No clubbing, cyanosis, or edema  PSYCH: AAOx3  NEUROLOGY: non-focal  SKIN: crusted erythematous rash in R V1 distributionLABS:                        10.1   13.48 )-----------( 417      ( 27 Aug 2022 02:58 )             32.1         138  |  101  |  9   ----------------------------<  134<H>  3.4<L>   |  24  |  0.67    Ca    8.4      27 Aug 2022 02:58            Urinalysis Basic - ( 27 Aug 2022 13:45 )    Color: Yellow / Appearance: Clear / S.029 / pH: x  Gluc: x / Ketone: Trace  / Bili: Negative / Urobili: 3 mg/dL   Blood: x / Protein: 30 mg/dL / Nitrite: Negative   Leuk Esterase: Large / RBC: 2 /HPF / WBC 76 /HPF   Sq Epi: x / Non Sq Epi: Moderate / Bacteria: Few        RADIOLOGY & ADDITIONAL TESTS:    Imaging Personally Reviewed:    Consultant(s) Notes Reviewed:      Care Discussed with Consultants/Other Providers:   Patient is a 86y old  Female who presents with a chief complaint of Suspicion for Herpes Zoster opthalmicus/encephalitis (27 Aug 2022 09:43)      SUBJECTIVE / OVERNIGHT EVENTS:  Patient refused NGT placement after explaining to her the importance of it. Daughter at bedside. Patient says pain worsened since yesterday and explained to her its because she isn't getting a few pain meds because she is NPO and she still refused NGT. Patient has no other complaints. Denies cp, SOB, abdominal pain, N/V/D     MEDICATIONS  (STANDING):  albuterol/ipratropium for Nebulization 3 milliLiter(s) Nebulizer every 6 hours  amitriptyline 10 milliGRAM(s) Oral at bedtime  artificial tears (preservative free) Ophthalmic Solution 1 Drop(s) Both EYES every 2 hours  buDESOnide    Inhalation Suspension 0.5 milliGRAM(s) Inhalation two times a day  BUpivacaine 0.5% (Preservative-Free) Injectable 5 milliLiter(s) Local Injection once  BUpivacaine 0.5% Injectable 5 milliLiter(s) Local Injection once  BUpivacaine 0.5% Injectable 5 milliLiter(s) Local Injection once  BUpivacaine liposome 1.3% Injectable 5 milliLiter(s) Local Injection once  BUpivacaine liposome 1.3% Injectable 2 milliLiter(s) Local Injection once  BUpivacaine liposome 1.3% Injectable 5 milliLiter(s) Local Injection once  BUpivacaine liposome 1.3% Injectable 5 milliLiter(s) Local Injection once  cyanocobalamin 1000 MICROGram(s) Oral daily  dextrose 5% + sodium chloride 0.9%. 1000 milliLiter(s) (60 mL/Hr) IV Continuous <Continuous>  dextrose 5%. 1000 milliLiter(s) (50 mL/Hr) IV Continuous <Continuous>  dextrose 5%. 1000 milliLiter(s) (100 mL/Hr) IV Continuous <Continuous>  dextrose 50% Injectable 25 Gram(s) IV Push once  dextrose 50% Injectable 12.5 Gram(s) IV Push once  dextrose 50% Injectable 25 Gram(s) IV Push once  enoxaparin Injectable 80 milliGRAM(s) SubCutaneous every 12 hours  erythromycin   Ointment 1 Application(s) Right EYE four times a day  folic acid 1 milliGRAM(s) Oral daily  furosemide    Tablet 20 milliGRAM(s) Oral daily  gabapentin 300 milliGRAM(s) Oral every 8 hours  glucagon  Injectable 1 milliGRAM(s) IntraMuscular once  insulin glargine Injectable (LANTUS) 10 Unit(s) SubCutaneous at bedtime  insulin lispro (ADMELOG) corrective regimen sliding scale   SubCutaneous Before meals and at bedtime  lactobacillus acidophilus 1 Tablet(s) Oral daily  lidocaine 5% Ointment 1 Application(s) Topical two times a day  loratadine 10 milliGRAM(s) Oral daily  meropenem  IVPB 1000 milliGRAM(s) IV Intermittent every 8 hours  methadone Injectable 2.5 milliGRAM(s) IV Push every 12 hours  metoprolol succinate ER 12.5 milliGRAM(s) Oral daily  mirtazapine 7.5 milliGRAM(s) Oral at bedtime  morphine  - Injectable 2.5 milliGRAM(s) IV Push every 4 hours  mupirocin 2% Ointment 1 Application(s) Topical three times a day  nystatin Powder 1 Application(s) Topical two times a day  pantoprazole    Tablet 40 milliGRAM(s) Oral before breakfast  polyethylene glycol 3350 17 Gram(s) Oral daily  potassium chloride  10 mEq/100 mL IVPB 10 milliEquivalent(s) IV Intermittent every 1 hour  prednisoLONE acetate 1% Suspension 1 Drop(s) Right EYE three times a day  pregabalin 300 milliGRAM(s) Oral every 12 hours  senna 1 Tablet(s) Oral at bedtime  tamsulosin 0.4 milliGRAM(s) Oral at bedtime  traZODone 100 milliGRAM(s) Oral at bedtime  ursodiol Tablet 500 milliGRAM(s) Oral <User Schedule>  vancomycin  IVPB 1000 milliGRAM(s) IV Intermittent every 12 hours    MEDICATIONS  (PRN):  acetaminophen     Tablet .. 1000 milliGRAM(s) Oral every 8 hours PRN Severe Pain (7 - 10)  AQUAPHOR (petrolatum Ointment) 1 Application(s) Topical three times a day PRN inguinal fold rash  dextrose Oral Gel 15 Gram(s) Oral once PRN Blood Glucose LESS THAN 70 milliGRAM(s)/deciliter      Vital Signs Last 24 Hrs  T(C): 36.7 (28 Aug 2022 05:37), Max: 37.1 (27 Aug 2022 12:00)  T(F): 98.1 (28 Aug 2022 05:37), Max: 98.8 (27 Aug 2022 16:00)  HR: 99 (28 Aug 2022 08:50) (82 - 100)  BP: 130/65 (28 Aug 2022 05:37) (110/55 - 138/60)  BP(mean): --  RR: 18 (28 Aug 2022 05:37) (16 - 18)  SpO2: 95% (28 Aug 2022 08:50) (92% - 96%)    Parameters below as of 28 Aug 2022 05:37  Patient On (Oxygen Delivery Method): nasal cannula  O2 Flow (L/min): 3    CAPILLARY BLOOD GLUCOSE      POCT Blood Glucose.: 157 mg/dL (28 Aug 2022 07:29)  POCT Blood Glucose.: 129 mg/dL (27 Aug 2022 22:12)  POCT Blood Glucose.: 189 mg/dL (27 Aug 2022 16:55)  POCT Blood Glucose.: 152 mg/dL (27 Aug 2022 11:49)    I&O's Summary    27 Aug 2022 07:01  -  28 Aug 2022 07:00  --------------------------------------------------------  IN: 1040 mL / OUT: 500 mL / NET: 540 mL        PHYSICAL EXAM:   GENERAL: NAD, well-developed  HEAD:  Atraumatic, Normocephalic  EYES: EOMI, PERRLA, conjunctiva and sclera clear  NECK: Supple, No JVD  CHEST/LUNG: Right lung crackles; No wheeze  HEART: RRR; No murmurs, rubs, or gallops  ABDOMEN: Soft, Nontender, Nondistended; Bowel sounds present  EXTREMITIES:  2+ Peripheral Pulses, No clubbing, cyanosis, or edema  PSYCH: AAOx3  NEUROLOGY: non-focal  SKIN: crusted erythematous rash in R V1 distributionLABS:                        10.1   13.48 )-----------( 417      ( 27 Aug 2022 02:58 )             32.1     08-    138  |  101  |  9   ----------------------------<  134<H>  3.4<L>   |  24  |  0.67    Ca    8.4      27 Aug 2022 02:58            Urinalysis Basic - ( 27 Aug 2022 13:45 )    Color: Yellow / Appearance: Clear / S.029 / pH: x  Gluc: x / Ketone: Trace  / Bili: Negative / Urobili: 3 mg/dL   Blood: x / Protein: 30 mg/dL / Nitrite: Negative   Leuk Esterase: Large / RBC: 2 /HPF / WBC 76 /HPF   Sq Epi: x / Non Sq Epi: Moderate / Bacteria: Few        RADIOLOGY & ADDITIONAL TESTS:    Imaging Personally Reviewed:    Consultant(s) Notes Reviewed:      Care Discussed with Consultants/Other Providers:

## 2022-08-28 NOTE — PROGRESS NOTE ADULT - ASSESSMENT
Ms. Wong is an 85 yo woman with herpes zoster right V1 with recurrent pain after having to stop oral meds. Her pain had significantly improved on those medications.   Change gabapentin 600 mg BID (the daughter wished to change to BID dosing and increase the dose.) Watch for sedation.  Continue pregabalin 300 mg BID  D/W hospitalist, NP and daughter.  Continue pain management.  D/W neurologist following her during the week.     Thank you

## 2022-08-28 NOTE — PROGRESS NOTE ADULT - PROBLEM SELECTOR PLAN 1
NSG did repeat bupivicaine supraorbital nerve block on 8/22 to help with pain; which appeared to have improved pain. Patient would have fleeting episodes of pain but mostly is sleeping comfortably in bed. But when asked she says her pain is a 10 out of 10 in severity. Pain now better controlled  - increased lyrica to 300 mg every 8 hours on 8/21  -added Gabapentin 300mg tid for better pain control on 8/23  -Morphine increased to 7.5mg IR q4hr standing as per pain consult  -Reconsulted pain management and they advised also adding methadone 5mg bid. Spoke to pain management on 8/25 and they felt confident that methadone was appropriate for this situation and patient already had a F/U appointment with pain management clinic next to manage methadone as outpatient.   -Spoke to daughter at bedside and explained to her the risks of starting methadone in an elderly patient and she wanted to start methadone anyway.  -Started Methadone 5mg PO bid on 8/25;  but since patient with recent hypoxia, decreased dose to 2.5mg bid after RRT    -completed course of valtrex 1gram PO q8hr follwoed by to IV acyclovir after aspiration; ID assessed adequate tx.  -c/w erythromycin ointment QID to eye & periocular lesions, artificial tears Q4H as recommended by opthalmology.  - Appreciate derm recs:    - lidocaine ointment mixed with vaseline BID, triamcinolone ointment to red areas only BID    - apply Vaseline to crusted areas Q2H. NSG did repeat bupivicaine supraorbital nerve block on 8/22 to help with pain; which appeared to have improved pain. Patient would have fleeting episodes of pain but mostly is sleeping comfortably in bed. But when asked she says her pain is a 10 out of 10 in severity. Pain now better controlled  - increased lyrica to 300 mg every 8 hours on 8/21  -added Gabapentin 300mg tid for better pain control on 8/23  -decreased Morphine from 7.5 to 2.5 mg IV q4hr standing after RRT  -Reconsulted pain management and they advised also adding methadone 5mg bid. Spoke to pain management on 8/25 and they felt confident that methadone was appropriate for this situation and patient already had a F/U appointment with pain management clinic next week to manage methadone as outpatient.   -Spoke to daughter at bedside and explained to her the risks of starting methadone in an elderly patient and she wanted to start methadone anyway.  -Started Methadone 5mg PO bid on 8/25;  but since patient became hypoxic with RRT, decreased dose to 2.5mg bid.   -Will switch meds back to PO since daughter agreed to pleasure feeds.     -completed course of valtrex 1gram PO q8hr followed by to IV acyclovir after aspiration; ID assessed adequate tx.  -c/w erythromycin ointment QID to eye & periocular lesions, artificial tears Q4H as recommended by opthalmology.  - Appreciate derm recs:    - lidocaine ointment mixed with vaseline BID, triamcinolone ointment to red areas only BID    - apply Vaseline to crusted areas Q2H.

## 2022-08-28 NOTE — PROGRESS NOTE ADULT - PROBLEM SELECTOR PLAN 2
Patient hypoxic with O2 Sat 88% on RA; s/p RRT on 8/26 for hypoxia  currently on 2LNC; Patient still hypoxic and unable to titrate today so will need home oxygen  CXR on 8/22 shows RML infiltrate  repeat CXR on 8/26 showed no change.  repeat swallow eval on 8/24 recommended maintaining current diet.   Currently now NPO for cinesophagram  received 4 days of Zosyn IV prior to RRT on 8/26  Patient switched to Meropenem/Vanco on 8/27  Patient refused  NGT for administration of meds  start D5NS @ 100ml/hr Patient hypoxic with O2 Sat 88% on RA; s/p RRT on 8/26 for hypoxia  currently on 2LNC; still unable to wean so will need home oxygen  CXR on 8/22 shows RML infiltrate  repeat CXR on 8/26 showed no changes.  repeat swallow eval on 8/24 recommended maintaining current diet.   Will advance diet to pleasure feeds today since patient would ultimately refuse PEG anyway as she did NGT placement.   F/U cinesophagram  received 4 days of Zosyn IV prior to RRT on 8/26  Patient switched to Meropenem/Vanco on 8/27; still spike fever of 102 today  Decreased D5NS to 50ml/hr

## 2022-08-28 NOTE — PROGRESS NOTE ADULT - PROBLEM SELECTOR PLAN 5
unclear etiology, poss immobility, poss infection, poss due to narcotics  indwelling urinary catheter placed 8/13, failed TOV 8/17, placed again 8/19; Failed TOV again on 8/22 and dior reinserted  Will maintain dior for now since multiple failed TOV; dior swapped on 8/26    -Had candida in urine which was treated x 1 day; ID recommended against treating it. unclear etiology, poss immobility, poss infection, poss due to narcotics  indwelling urinary catheter placed 8/13, failed TOV 8/17, placed again 8/19; Failed TOV again on 8/22 and dior reinserted  Will maintain dior for now since multiple failed TOV; dior changed on 8/26    -Had candida in urine which was treated x 1 day; ID recommended against treating it.  -Currently treating ESBL UTI

## 2022-08-28 NOTE — PROGRESS NOTE ADULT - PROBLEM SELECTOR PLAN 12
DVT ppx: on therapeutic lovenox  Diet: soft and bite sized (S&S recd minced and moist however daughter accepts risks)     Dispo:  - pt has difficult vessels, picc in place if blood draws needed  - PT/OT recommending rehab; but daughter wants to take her home. Since had recent RRT will treat PNA and UTI for now inpatient. DVT ppx: on therapeutic lovenox  Diet: advance diet to soft and bite sized (S&S recd minced and moist however daughter accepts risks)     Dispo:  - pt has difficult vessels, picc in place if blood draws needed  - PT/OT recommending rehab; but daughter wants to take her home. Since had recent RRT will treat PNA and UTI for now inpatient.

## 2022-08-28 NOTE — PROVIDER CONTACT NOTE (OTHER) - ACTION/TREATMENT ORDERED:
ACP notified and aware. As per ACP Alex, ok to hold evening oral meds at this time, due to patient sleepy and unable to tolerate. Will continue to monitor. Patient safety maintained.

## 2022-08-28 NOTE — PROGRESS NOTE ADULT - ASSESSMENT
86 y.o. Female w/ hx HTN, DM, CAD s/p stents (1986, 1996), COPD, NPH s/p  shunt, migraines p/w herpes zoster ophthalmicus/encephalitis affecting the right V1 dermatome but developed post herpetic neuralgia, and mrstran bacteremia (treated). She is s/p IV acyclovir, but continues to have neuralgia pain. This pain  improved after NSG did R supra orbital marcaine nerve block on 8/16, 8/17and 8/22. She is currently on morphine IR 7.5mg q4hr, Lyrica and gabapentin. Methadone started on on 8/25. Patient and daughter now satisfied with pain control. RRT on 8/26 for fever, hypoxia, and encephalopathy. Aspiration PNA suspected and ESBL UTI untreated. Patient's mental status now returned to baseline.     Spoke to daughter on the phone on 8/27.                     86 y.o. Female w/ hx HTN, DM, CAD s/p stents (1986, 1996), COPD, NPH s/p  shunt, migraines p/w herpes zoster ophthalmicus/encephalitis affecting the right V1 dermatome but developed post herpetic neuralgia, and mrstran bacteremia (treated). She is s/p IV acyclovir, but continues to have neuralgia pain. This pain  improved after NSG did R supra orbital marcaine nerve block on 8/16, 8/17and 8/22. She is currently on morphine IR 7.5mg q4hr, Lyrica and gabapentin. Methadone started on on 8/25. Patient and daughter now satisfied with pain control. RRT on 8/26 for fever, hypoxia, and encephalopathy. Aspiration PNA suspected and ESBL UTI untreated. Patient's mental status now returned to baseline but refused NGT placement so spoke to daughter about feeding patient knowing the risks of aspiration and she agreed to pleasure feeds. This is understanding that patient refuses PEG as well.     Spoke to daughter at bedside on 8/27.                     86 y.o. Female w/ hx HTN, DM, CAD s/p stents (1986, 1996), COPD, NPH s/p  shunt, migraines p/w herpes zoster ophthalmicus/encephalitis affecting the right V1 dermatome but developed post herpetic neuralgia, and mrstran bacteremia (treated). She is s/p IV acyclovir, but continues to have neuralgia pain. This pain  improved after NSG did R supra orbital marcaine nerve block on 8/16, 8/17and 8/22. She was on morphine IV q4hr, Lyrica and gabapentin. Methadone started on on 8/25. Patient and daughter now satisfied with pain control. RRT on 8/26 for fever, hypoxia, and encephalopathy. Aspiration PNA suspected and ESBL UTI untreated. Patient's mental status now returned to baseline but refused NGT placement so spoke to daughter about feeding patient knowing the risks of aspiration and she agreed to pleasure feeds. This is understanding that patient refuses PEG as well.     Spoke to daughter at bedside on 8/28.

## 2022-08-28 NOTE — PROGRESS NOTE ADULT - PROBLEM SELECTOR PLAN 6
-Urine Cx on 8/19 ESBL E.coli  -ID recommended Zosyn was adequate tx with zosyn but in light of RRT will treat with meropenem.  -Patient does have dior with multiple failed TOV  -c/w meropenem -Urine Cx on 8/19 ESBL E. coli  -ID recommended that Zosyn was adequate tx with zosyn but in light of RRT on 8/26, will treat with UTI with meropenem.  -Patient does have dior with multiple failed TOV  -c/w meropenem D# 2/5

## 2022-08-28 NOTE — PROGRESS NOTE ADULT - PROBLEM SELECTOR PLAN 10
NPH diagnosed 2011, pt has programmable  shunt installed by [facility] neurosurg, **must be programmed after MRI (page neurosurg [id number])**. 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

## 2022-08-28 NOTE — PROGRESS NOTE ADULT - SUBJECTIVE AND OBJECTIVE BOX
Recurrent pain when oral meds needed to be stopped - they have been restarted.     During exam - 2 episodes of distress due to pain.

## 2022-08-28 NOTE — CHART NOTE - NSCHARTNOTEFT_GEN_A_CORE
ACP NIGHT MEDICINE COVERAGE    Spoke to pt about wanting to try an NG tube to allow administration of PO pain medications.  Pt adamantly refusing NG tube at this time after I explained that it would allow resumption of Gabapentin and Lyrica for pain control.  Pt is alert.  Additional dose of Morphine 2mg IVP x1 ordered for breakthrough pain.  Will have day team f/u regarding next steps for swallow eval given aspiration risk.    Pt stable at this time, will continue to monitor.    Jack Healy PA-C  Department of Medicine - ACP  In-House Pager: #[phone]

## 2022-08-29 NOTE — PROGRESS NOTE ADULT - PROBLEM SELECTOR PLAN 2
Patient hypoxic with O2 Sat 88% on RA; s/p RRT on 8/26 for hypoxia  currently on 2LNC; still unable to wean so will need home oxygen  CXR on 8/22 shows RML infiltrate  repeat CXR on 8/26 showed no changes.  repeat swallow eval on 8/24 recommended maintaining current diet.   pleasure feeds today since patient would ultimately refuse PEG anyway as she did NGT placement.   F/U cinesophagram- nml  received 4 days of Zosyn IV prior to RRT on 8/26  Patient switched to Meropenem/Vanco on 8/27; still spike fevers

## 2022-08-29 NOTE — PROGRESS NOTE ADULT - PROBLEM SELECTOR PLAN 1
NSG did repeat bupivicaine supraorbital nerve block on 8/22 to help with pain; which appeared to have improved pain. Patient would have fleeting episodes of pain but mostly is sleeping comfortably in bed. But when asked she says her pain is a 10 out of 10 in severity. Pain now better controlled  - increased lyrica to 300 mg every 8 hours on 8/21  -added Gabapentin 300mg tid for better pain control on 8/23  -decreased Morphine from 7.5 to 2.5 mg IV q4hr standing after RRT  -Reconsulted pain management and they advised also adding methadone 5mg bid. Spoke to pain management on 8/25 and they felt confident that methadone was appropriate for this situation and patient already had a F/U appointment with pain management clinic next week to manage methadone as outpatient.   -Spoke to daughter at bedside and explained to her the risks of starting methadone in an elderly patient and she wanted to start methadone anyway.  -Started Methadone 5mg PO bid on 8/25;  but since patient became hypoxic with RRT, decreased dose to 2.5mg bid--> back to 5mg BID  -Will switch meds back to PO since daughter agreed to pleasure feeds.     -completed course of valtrex 1gram PO q8hr followed by to IV acyclovir after aspiration; ID assessed adequate tx.  -c/w erythromycin ointment QID to eye & periocular lesions, artificial tears Q4H as recommended by opthalmology.  - Appreciate derm recs:    - lidocaine ointment mixed with vaseline BID, triamcinolone ointment to red areas only BID    - apply Vaseline to crusted areas Q2H. NSG did repeat bupivicaine supraorbital nerve block on 8/22 to help with pain; which appeared to have improved pain. Patient would have fleeting episodes of pain but mostly is sleeping comfortably in bed. But when asked she says her pain is a 10 out of 10 in severity. Pain now better controlled  - increased lyrica to 300 mg every 8 hours on 8/21  -added Gabapentin 300mg tid for better pain control on 8/23  -decreased Morphine from 7.5 to 2.5 mg IV q4hr standing after RRT--> now back on oral 5 mg q 4 hours solution of morphine as requested by karla.  -Reconsulted pain management and they advised also adding methadone 5mg bid. Spoke to pain management on 8/25 and they felt confident that methadone was appropriate for this situation and patient already had a F/U appointment with pain management clinic next week to manage methadone as outpatient.   -Spoke to daughter at bedside and explained to her the risks of starting methadone in an elderly patient and she wanted to start methadone anyway.  -Started Methadone 5mg PO bid on 8/25;  but since patient became hypoxic with RRT, decreased dose to 2.5mg bid--> back to 5mg BID  -Will switch meds back to PO since daughter agreed to pleasure feeds.     -completed course of valtrex 1gram PO q8hr followed by to IV acyclovir after aspiration; ID assessed adequate tx.  -c/w erythromycin ointment QID to eye & periocular lesions, artificial tears Q4H as recommended by opthalmology.  - Appreciate derm recs:    - lidocaine ointment mixed with vaseline BID, triamcinolone ointment to red areas only BID    - apply Vaseline to crusted areas Q2H.

## 2022-08-29 NOTE — PROGRESS NOTE ADULT - PROBLEM SELECTOR PLAN 5
unclear etiology, poss immobility, poss infection, poss due to narcotics  indwelling urinary catheter placed 8/13, failed TOV 8/17, placed again 8/19; Failed TOV again on 8/22 and dior reinserted  Will maintain dior for now since multiple failed TOV; dior changed on 8/26    -Had candida in urine, received a dose of diflucan x once for intertrigo; ID recommended against treating yeast in urine.  -Currently treating ESBL UTI  - GPC in urine now growing as well as yeast

## 2022-08-29 NOTE — PROVIDER CONTACT NOTE (OTHER) - ASSESSMENT
Patient VSS, denies pain, discomfort at this time.
VSS. Patient had pain throughout the night. Managed with standing morphine order and PRN Tylenol.
Pt complaining of pain on head (site of herpes zoster), No other signs/symptoms or complaints of distress noted.
edema noted and difficult stick. Multiple attempts made by PCA and RN.
patient slow to answer, vitals stable. denies SOB. or discomfort at this time. all orally meds on hold as per daughter's request. satting 96% on 2Lnc.
Bladder scan showed approx 500cc urine
No signs/symptoms or complaints of distress noted.
Other vitals WDL (HR 79, O2 100%). Pt c/o pain/itchiness at shingles site
PT bladder scan complete, 350-400 ml retaining.  PT was very agitated and uncooperative during procedure.  PT showing S/S of pain.
VSS. Patient had pain throughout the night. Managed with standing morphine order and PRN Tylenol.
Pt diaphoretic. Lung sound diminished. Other vitals WDL. Pt c/o pain/itchiness at shingles site
VSS, arousable to verbal and manual stimulation, coughing and congested.
EKG done, CXR completed. no complaints voiced.

## 2022-08-29 NOTE — PROGRESS NOTE ADULT - SUBJECTIVE AND OBJECTIVE BOX
[facility] Division of Hospital Medicine  Aline Gaviria MD  Pager (JOHN-F, 8A-5P): [phone]  Other Times:  [phone]      SUBJECTIVE / OVERNIGHT EVENTS: Pt had another aspiration event over the weekend. S/S cine performed this am. Family wants pleasure feeds and does not want to go down on the morphine today as it was just adjusted yesterday. Describes 8/10 pain, but says her pain is improving.    MEDICATIONS  (STANDING):  albuterol/ipratropium for Nebulization 3 milliLiter(s) Nebulizer every 6 hours  amitriptyline 10 milliGRAM(s) Oral at bedtime  artificial tears (preservative free) Ophthalmic Solution 1 Drop(s) Both EYES every 2 hours  ascorbic acid 500 milliGRAM(s) Oral daily  buDESOnide    Inhalation Suspension 0.5 milliGRAM(s) Inhalation two times a day  BUpivacaine 0.5% (Preservative-Free) Injectable 5 milliLiter(s) Local Injection once  BUpivacaine 0.5% Injectable 5 milliLiter(s) Local Injection once  BUpivacaine 0.5% Injectable 5 milliLiter(s) Local Injection once  BUpivacaine liposome 1.3% Injectable 2 milliLiter(s) Local Injection once  BUpivacaine liposome 1.3% Injectable 5 milliLiter(s) Local Injection once  BUpivacaine liposome 1.3% Injectable 5 milliLiter(s) Local Injection once  BUpivacaine liposome 1.3% Injectable 5 milliLiter(s) Local Injection once  cyanocobalamin 1000 MICROGram(s) Oral daily  dextrose 5% + sodium chloride 0.9%. 1000 milliLiter(s) (50 mL/Hr) IV Continuous <Continuous>  dextrose 5%. 1000 milliLiter(s) (50 mL/Hr) IV Continuous <Continuous>  dextrose 5%. 1000 milliLiter(s) (100 mL/Hr) IV Continuous <Continuous>  dextrose 50% Injectable 25 Gram(s) IV Push once  dextrose 50% Injectable 12.5 Gram(s) IV Push once  dextrose 50% Injectable 25 Gram(s) IV Push once  enoxaparin Injectable 80 milliGRAM(s) SubCutaneous every 12 hours  erythromycin   Ointment 1 Application(s) Right EYE four times a day  folic acid 1 milliGRAM(s) Oral daily  furosemide    Tablet 20 milliGRAM(s) Oral daily  gabapentin 600 milliGRAM(s) Oral every 12 hours  glucagon  Injectable 1 milliGRAM(s) IntraMuscular once  insulin glargine Injectable (LANTUS) 10 Unit(s) SubCutaneous at bedtime  insulin lispro (ADMELOG) corrective regimen sliding scale   SubCutaneous Before meals and at bedtime  lactated ringers. 1000 milliLiter(s) (75 mL/Hr) IV Continuous <Continuous>  lactobacillus acidophilus 1 Tablet(s) Oral daily  lidocaine 5% Ointment 1 Application(s) Topical two times a day  loratadine 10 milliGRAM(s) Oral daily  meropenem  IVPB 1000 milliGRAM(s) IV Intermittent every 8 hours  methadone   Solution 5 milliGRAM(s) Oral every 12 hours  metoprolol succinate ER 12.5 milliGRAM(s) Oral daily  mirtazapine 7.5 milliGRAM(s) Oral at bedtime  morphine   Solution 5 milliGRAM(s) Oral every 4 hours  mupirocin 2% Ointment 1 Application(s) Topical three times a day  nystatin Powder 1 Application(s) Topical two times a day  pantoprazole    Tablet 40 milliGRAM(s) Oral before breakfast  polyethylene glycol 3350 17 Gram(s) Oral daily  potassium chloride  10 mEq/100 mL IVPB 10 milliEquivalent(s) IV Intermittent every 1 hour  prednisoLONE acetate 1% Suspension 1 Drop(s) Right EYE three times a day  senna 1 Tablet(s) Oral at bedtime  tamsulosin 0.4 milliGRAM(s) Oral at bedtime  traZODone 100 milliGRAM(s) Oral at bedtime  ursodiol Tablet 500 milliGRAM(s) Oral <User Schedule>  vancomycin  IVPB 1000 milliGRAM(s) IV Intermittent every 12 hours    MEDICATIONS  (PRN):  acetaminophen     Tablet .. 1000 milliGRAM(s) Oral every 8 hours PRN Severe Pain (7 - 10)  AQUAPHOR (petrolatum Ointment) 1 Application(s) Topical three times a day PRN inguinal fold rash  dextrose Oral Gel 15 Gram(s) Oral once PRN Blood Glucose LESS THAN 70 milliGRAM(s)/deciliter      I&O's Summary    29 Aug 2022 07:01  -  29 Aug 2022 13:16  --------------------------------------------------------  IN: 0 mL / OUT: 700 mL / NET: -700 mL        PHYSICAL EXAM:  Vital Signs Last 24 Hrs  T(C): 36.4 (29 Aug 2022 03:25), Max: 36.9 (28 Aug 2022 17:09)  T(F): 97.6 (29 Aug 2022 03:25), Max: 98.4 (28 Aug 2022 17:09)  HR: 92 (29 Aug 2022 10:15) (81 - 98)  BP: 129/53 (29 Aug 2022 06:50) (101/47 - 137/89)  BP(mean): --  RR: 18 (29 Aug 2022 06:27) (16 - 18)  SpO2: 95% (29 Aug 2022 06:27) (94% - 100%)    Parameters below as of 29 Aug 2022 06:27  Patient On (Oxygen Delivery Method): nasal cannula  O2 Flow (L/min): 3    PHYSICAL EXAM:   GENERAL: NAD, well-developed  HEAD:  Atraumatic, Normocephalic  EYES: PERRLA, conjunctiva and sclera clear, opens eyes bilaterally  NECK: Supple, No JVD  CHEST/LUNG: Right lung crackles; No wheeze  HEART: RRR; No murmurs, rubs, or gallops  ABDOMEN: Soft, Nontender, Nondistended; Bowel sounds present  EXTREMITIES:  2+ Peripheral Pulses, No clubbing, cyanosis, or edema  PSYCH: AAOx3, though doesn't know that she is at [facility], just [facility]  NEUROLOGY: non-focal  SKIN: crusted erythematous rash, covered in ointment, in R V1 distribution,  LABS:                        9.7    9.32  )-----------( 401      ( 29 Aug 2022 09:15 )             31.2     08-    144  |  106  |  7   ----------------------------<  194<H>  3.5   |  27  |  0.49<L>    Ca    8.8      29 Aug 2022 09:15  Phos  2.4     08-29  Mg     1.70     08-29            Urinalysis Basic - ( 27 Aug 2022 13:45 )    Color: Yellow / Appearance: Clear / S.029 / pH: x  Gluc: x / Ketone: Trace  / Bili: Negative / Urobili: 3 mg/dL   Blood: x / Protein: 30 mg/dL / Nitrite: Negative   Leuk Esterase: Large / RBC: 2 /HPF / WBC 76 /HPF   Sq Epi: x / Non Sq Epi: Moderate / Bacteria: Few        Culture - Urine (collected 27 Aug 2022 14:50)  Source: Catheterized Catheterized  Preliminary Report (28 Aug 2022 22:35):    10,000 - 49,000 CFU/mL Gram positive organisms    Culture - Blood (collected 27 Aug 2022 13:30)  Source: .Blood Blood-Peripheral  Preliminary Report (28 Aug 2022 17:01):    No growth to date.    Culture - Blood (collected 27 Aug 2022 03:00)  Source: .Blood Blood-Peripheral  Preliminary Report (28 Aug 2022 07:02):    No growth to date.        RADIOLOGY & ADDITIONAL TESTS:  Results Reviewed:   Imaging Personally Reviewed:  Electrocardiogram Personally Reviewed:    COORDINATION OF CARE:  Care Discussed with Consultants/Other Providers [Y/N]: ID. Oz, Rads  Prior or Outpatient Records Reviewed [Y/N]:

## 2022-08-29 NOTE — SWALLOW VFSS/MBS ASSESSMENT ADULT - ORAL PHASE
Delayed oral transit time/Reduced anterior - posterior transport/Residue in oral cavity Delayed oral transit time/Reduced anterior - posterior transport/Residue in oral cavity/Uncontrolled bolus / spillover in hypopharynx

## 2022-08-29 NOTE — SWALLOW VFSS/MBS ASSESSMENT ADULT - DIAGNOSTIC IMPRESSIONS
1) Mild Oral Dysphagia for soft and bite sized, puree, moderately thick, mildly thick, and thin liquids marked by adequate oral containment, prolonged manipulation and mastication, with slow lingual motion. Delayed anterior-posterior transfer of the bolus. Premature spillage into the level of the hypopharynx noted for thin liquids secondary to reduced tongue to palate seal. Trace oral clearance deficits located on lingual surface post swallow which cleared with liquid wash.   2) Mild Pharyngeal Dysphagia for soft and bite sized, puree, and moderately thick liquids marked by delayed swallow trigger, reduced laryngeal elevation, reduced tongue base propulsion, reduced epiglottic deflection, and reduced pharyngeal constriction. Trace pharyngeal clearance deficits located primarily at the base of tongue and valleculae post swallow, which is cleared with spontaneous re-swallow. There is Trace Laryngeal Penetration with full retrieval, during the swallow, noted for a trial of puree and large cups sips of mildly thick liquid. Given reduced bolus size/teaspoon presentations, adequate airway protection is maintained and penetration is not reduplicated. No Aspiration noted across all consistencies, before, during, or after the swallow. 1) Mild Oral Dysphagia for soft and bite sized, puree, moderately thick, mildly thick, and thin liquids marked by adequate oral containment, prolonged manipulation and mastication, with slow lingual motion. Delayed anterior-posterior transfer of the bolus. Premature spillage into the level of the hypopharynx noted for thin liquids secondary to reduced tongue to palate seal. Trace oral clearance deficits located on lingual surface post swallow which cleared with liquid wash.   2) Mild Pharyngeal Dysphagia for soft and bite sized, puree, moderately thick, mildly thick, and thin liquids marked by delayed swallow trigger, reduced laryngeal elevation, reduced tongue base propulsion, reduced epiglottic deflection, and reduced pharyngeal constriction. Trace pharyngeal clearance deficits located primarily at the base of tongue and valleculae post swallow, which is cleared with spontaneous re-swallow. There is Trace Laryngeal Penetration with full retrieval, during the swallow, noted for 1 trial of puree and large cups sips of mildly thick liquids. Given reduced bolus size/teaspoon presentations, adequate airway protection is maintained and penetration is not reduplicated. No Aspiration noted across all consistencies, before, during, or after the swallow.

## 2022-08-29 NOTE — SWALLOW VFSS/MBS ASSESSMENT ADULT - COMMENTS
Hospitalist note 8/28 "86 y.o. Female w/ hx HTN, DM, CAD s/p stents (1986, 1996), COPD, NPH s/p  shunt, migraines p/w herpes zoster ophthalmicus/encephalitis affecting the right V1 dermatome but developed post herpetic neuralgia, and mrstran bacteremia (treated). She is s/p IV acyclovir, but continues to have neuralgia pain. This pain  improved after NSG did R supra orbital marcaine nerve block on 8/16, 8/17and 8/22. She was on morphine IV q4hr, Lyrica and gabapentin. Methadone started on on 8/25. Patient and daughter now satisfied with pain control. RRT on 8/26 for fever, hypoxia, and encephalopathy. Aspiration PNA suspected and ESBL UTI untreated. Patient's mental status now returned to baseline but refused NGT placement so spoke to daughter about feeding patient knowing the risks of aspiration and she agreed to pleasure feeds."    WBC is elevated. CXR 8/27 "Unchanged lung patchy opacities from prior. No pleural effusions or pneumothorax."    Patient is familiar to this department as the patient has been seen for multiple swallow evaluations (7/27, 8/4, 8/5, 8/24) with the most recent recommendations for soft & bite sized with thin liquids (see reports for details).     Patient seen seated upright in Radiology suite for Cinesophagram this AM. Patient able to answer simple directives and able to make wants/needs known. Patient was cooperative and receptive to PO trials.

## 2022-08-29 NOTE — SWALLOW VFSS/MBS ASSESSMENT ADULT - THE ABOVE FINDINGS WERE DISCUSSED WITH
SLP discussed results/recommendations with patient's daughter/Family SLP discussed results/recommendations with patient's daughter/Physician/Family

## 2022-08-29 NOTE — PROGRESS NOTE ADULT - PROBLEM SELECTOR PLAN 10
NPH diagnosed 2011, pt has programmable  shunt installed by [facility] neurosurg, **must be programmed after MRI (page neurosurg [phone])**. 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  - Repeat CT head appeared unchanged from July

## 2022-08-29 NOTE — PROVIDER CONTACT NOTE (OTHER) - BACKGROUND
Pt admitted for AMS. PMH CAD , DM2, HTN, MI
Grace placed 8/13 for urine retention.
patient admitted for Altered mental status
patient admitted for altered mental status
Herpes zoster, aspiration PNA
patient admitted for altered mental status
Patient admitted for herpes zoster, RRT called on saturday for hypoxia (aspiration likely).
Altered mental status, DM2, COPD, Herpes zoster
PT here for shingles and altered mental status
patient admitted for altered mental status
Altered mental status, DM2, COPD
herpes zoster and aspiration PNA.

## 2022-08-29 NOTE — PROVIDER CONTACT NOTE (OTHER) - REASON
Daughter requesting mother be assessed
Pt O2 saturation is 89% & Pt is due for pain medications.
Pt sleepy, arousable but unable to tolerate orally.
daughter concern about patient mentation
Pt O2 saturation was 86%
Retaining urine
Unable to obtain blood.
Daughter requesting mother be assessed
Patient sleepy, arousable, yet unable to tolerate anything oral.
Temp 102.2
BP out of range
Bladder Scan & PT's pain + agitation
elevate temp and tachycardic

## 2022-08-29 NOTE — PROGRESS NOTE ADULT - ASSESSMENT
86 y.o. Female w/ hx HTN, DM, CAD s/p stents (1986, 1996), COPD, NPH s/p  shunt, migraines p/w herpes zoster ophthalmicus/encephalitis affecting the right V1 dermatome but developed post herpetic neuralgia, and mrstran bacteremia (treated). She is s/p IV acyclovir, but continues to have neuralgia pain. This pain  improved after NSG did R supra orbital marcaine nerve block on 8/16, 8/17and 8/22. She was on morphine IV q4hr, Lyrica and gabapentin. Methadone started on on 8/25. Patient and daughter now satisfied with pain control. RRT on 8/26 for fever, hypoxia, and encephalopathy. Aspiration PNA suspected (s/p zosyn) and ESBL UTI grew in cultures, started on meropenem (8/25- ), yeast also grew in urine again, d/w ID [ ]. Patient's mental status now returned to baseline but refused NGT placement so spoke to daughter about feeding patient knowing the risks of aspiration and she agreed to pleasure feeds. This is understanding that patient refuses PEG as well.     Ongoing issues with polypharmacy-- currently pt is oversedated w remeron, elavil, morphine, methadone, gabapentin and lyrica. I suspect this recurring aspiration events are directly related to these sedating meds. Daughter does not want to wean morphine today, but rather reassess pain needs in am.     Updated daughter daily at bedside.                     86 y.o. Female w/ hx HTN, DM, CAD s/p stents (1986, 1996), COPD, NPH s/p  shunt, migraines p/w herpes zoster ophthalmicus/encephalitis affecting the right V1 dermatome but developed post herpetic neuralgia, and mrstran bacteremia (treated). She is s/p IV acyclovir, but continues to have neuralgia pain. This pain  improved after NSG did R supra orbital marcaine nerve block on 8/16, 8/17and 8/22. She was on morphine IV q4hr, Lyrica and gabapentin. Methadone started on on 8/25. Patient and daughter now satisfied with pain control. RRT on 8/26 for fever, hypoxia, and encephalopathy. Aspiration PNA suspected (s/p zosyn) and ESBL UTI grew in cultures, started on meropenem (8/27- ), d/w ID, hold off on PICC for now. Patient's mental status now returned to baseline but refused NGT placement so spoke to daughter about feeding patient knowing the risks of aspiration and she agreed to pleasure feeds. This is understanding that patient refuses PEG as well.     Ongoing issues with polypharmacy-- currently pt is oversedated w remeron, elavil, morphine, methadone, gabapentin and lyrica. I suspect this recurring aspiration events are directly related to these sedating meds. Daughter does not want to wean morphine today, but rather reassess pain needs in am.     Updated daughter daily at bedside.

## 2022-08-29 NOTE — PROGRESS NOTE ADULT - SUBJECTIVE AND OBJECTIVE BOX
DAILY PROGRESS NOTE:         24 hr events:      Objective:    Vital Signs Last 24 Hrs  T(C): 37 (29 Aug 2022 22:00), Max: 37 (29 Aug 2022 22:00)  T(F): 98.6 (29 Aug 2022 22:00), Max: 98.6 (29 Aug 2022 22:00)  HR: 98 (29 Aug 2022 22:00) (92 - 98)  BP: 132/53 (29 Aug 2022 22:00) (132/53 - 132/53)  BP(mean): --  RR: 16 (29 Aug 2022 22:00) (16 - 16)  SpO2: 92% (29 Aug 2022 22:00) (92% - 92%)    Parameters below as of 29 Aug 2022 22:00  Patient On (Oxygen Delivery Method): room air  O2 Flow (L/min): 3      I&O's Detail    29 Aug 2022 07:01  -  30 Aug 2022 07:00  --------------------------------------------------------  IN:  Total IN: 0 mL    OUT:    Indwelling Catheter - Urethral (mL): 700 mL  Total OUT: 700 mL    Total NET: -700 mL          Physical Exam:    General: NAD, well-nourished  Resp: Breathing comfortably on RA  CV: regular rate   : barby burnette/ les       Laboratory Results:                          9.7    9.32  )-----------( 401      ( 29 Aug 2022 09:15 )             31.2     08-29    144  |  106  |  7   ----------------------------<  194<H>  3.5   |  27  |  0.49<L>    Ca    8.8      29 Aug 2022 09:15  Phos  2.4     08-29  Mg     1.70     08-29

## 2022-08-29 NOTE — PROVIDER CONTACT NOTE (OTHER) - DATE AND TIME:
01-Aug-2022 06:34
18-Aug-2022 06:55
19-Aug-2022 05:40
03-Aug-2022 06:00
12-Aug-2022 15:00
12-Aug-2022 16:34
25-Aug-2022 07:10
01-Aug-2022 12:00
19-Aug-2022 01:00
28-Aug-2022 22:51
29-Aug-2022 02:50
25-Aug-2022 08:06
28-Aug-2022 18:29
13-Aug-2022 04:31

## 2022-08-29 NOTE — SWALLOW VFSS/MBS ASSESSMENT ADULT - ADDITIONAL RECOMMENDATIONS
1) Monitor for PO intake and tolerance 2) Reconsult this service as needed 3) This service to follow up as schedule permits 4) Patient will benefit from swallow therapy pending discharge (i.e., homecare vs rehab vs [facility] [phone])

## 2022-08-29 NOTE — CHART NOTE - NSCHARTNOTEFT_GEN_A_CORE
Due to pain recurrence repeat supraorbital block given at bedside using 0.5% bupivicaine 1cc and 2cc exparel mixed. Prepped with isopropyl alcohol. Daughter at bedside.

## 2022-08-29 NOTE — PROGRESS NOTE ADULT - ASSESSMENT
86 yr old female admitted to medicine for management of Herpes Zooster/encephalitis c/b urinary retention. Retention not uncommon in setting of change from baseline and with positive UTI (Ecoli).  If pt fails TOV, then repeat may be attempted after appropriate treatment course for UTI and pt is optimized (& back to baseline).      Recommendation:  --Discussed in detail with patient's daughter options for bladder management  --Daughter is eager to take patient home. As such discussed that if patient d/c earlier in the week (before Friday 9/2) will plan to d/c with dior catheter.   --If she is here through Friday, plan for a repeat TOV on Friday morning. If fails, replace catheter and d/c home with dior catheter  --Plan to follow up with Dr Stovall with [facility] 1-2 wks following discharge    please reach out with further questions   Available via Teams (Rusty Mccall) from 6a-6p, M-F. Please page [phone] for issues that arise on nights and weekends

## 2022-08-29 NOTE — PROVIDER CONTACT NOTE (OTHER) - NAME OF MD/NP/PA/DO NOTIFIED:
Glen Correa
JOHN Burch ACP
ACP [id number]
Fer Trivedi MD
[facility] [id number]
JOHN Burch ACP
Monica Moise
Zohaib CARRILLO
Zohaib CARRILLO
[facility] Raf[address]- [phone]
Monica Moise
Xiang
[facility] Raf[place]-[phone]
Rica FRIEND

## 2022-08-29 NOTE — PROGRESS NOTE ADULT - PROBLEM SELECTOR PLAN 12
DVT ppx: on therapeutic lovenox  Diet: advance diet to soft and bite sized (S&S recd minced and moist however daughter accepts risks)     Dispo:  - pt has difficult vessels, picc in place if blood draws needed  - PT/OT recommending rehab; but daughter wants to take her home. Since had recent RRT will treat PNA and UTI for now inpatient.

## 2022-08-29 NOTE — PROGRESS NOTE ADULT - PROBLEM SELECTOR PLAN 6
-Urine Cx on 8/19 ESBL E. coli  -ID recommended that Zosyn was adequate tx with zosyn but in light of RRT on 8/26, will treat with UTI with meropenem.  -Patient does have dior with multiple failed TOV  -c/w meropenem x 7 days

## 2022-08-29 NOTE — SWALLOW VFSS/MBS ASSESSMENT ADULT - RECOMMENDED FEEDING/EATING TECHNIQUES
solids/liquids via TEASPOON; slow pace; small bites; allow for swallow between intakes; alternate food with liquids; crush medications; oral hygiene; position upright 90 degrees

## 2022-08-30 NOTE — PROGRESS NOTE ADULT - SUBJECTIVE AND OBJECTIVE BOX
Patient is a 86y old  Female who presents with a chief complaint of Suspicion for Herpes Zoster opthalmicus/encephalitis (30 Aug 2022 13:53)      Interval history:   RRT on Friday, febrile, now afebrile on abx.  treated for UTI.  + dior for urinary retention  tolerating methadone 5mg twice a day, has not needed prn morphine today.  Received supraorbital block yesterday.      REVIEW OF SYSTEMS  sleepy, awakens to voice, limited ROS    PAST MEDICAL & SURGICAL HISTORY  Myocardial Infarction    Diabetes Mellitus Type II    Migraines    COPD (Chronic Obstructive Pulmonary Disease)    NPH (normal pressure hydrocephalus)    COPD, mild    CAD (coronary artery disease)    Type 2 diabetes mellitus    Migraines    HTN (hypertension)    Stented coronary artery        CURRENT FUNCTIONAL STATUS  8/28  Bed Mobility  Bed Mobility Training Rehab Potential: good, to achieve stated therapy goals  Bed Mobility Training Symptoms Noted During/After Treatment: none  Bed Mobility Training Rolling/Turning: moderate assist (50% patient effort);  1 person assist;  2 person assist  Bed Mobility Training Sit-to-Supine: moderate assist (50% patient effort);  1 person assist;  2 person assist  Bed Mobility Training Supine-to-Sit: moderate assist (50% patient effort);  1 person assist;  2 person assist  Bed Mobility Training Limitations: decreased strength;  pain    Bed-Chair Transfer Training  Bed-to-Chair Transfer Training Rehab Potential: good, to achieve stated therapy goals  Bed-to-Chair Transfer Training Symptoms Noted During/After Treatment: fatigue  Transfer Training Bed-to-Chair Transfer: moderate assist (50% patient effort);  1 person assist;  2 person assist;  full weight-bearing   UE support   Bed-to-Chair Transfer Training Transfer Safety Analysis: decreased strength;  impaired balance;  pain    Sit-Stand Transfer Training  Sit-to-Stand Transfer Training Rehab Potential: good, to achieve stated therapy goals  Sit-to-Stand Transfer Training Symptoms Noted During/After Treatment: fatigue  Transfer Training Sit-to-Stand Transfer: moderate assist (50% patient effort);  1 person assist;  2 person assist;  full weight-bearing   UE support   Sit-to-Stand Transfer Training Transfer Safety Analysis: decreased strength;  impaired balance    Gait Training  Gait Training Rehab Potential: fair, will monitor progress closely  Gait Training Symptoms Noted During/After Treatment: fatigue  Gait Training: moderate assist (50% patient effort);  maximum assist (25% patient effort);  1 person assist;  2 person assist;  full weight-bearing   rolling walker;  forward/backward steps ;  bed to chair  Gait Number of Times:: x 1        FAMILY HISTORY    FH: myocardial infarction (Father)  FH: hypertension (Child)        RECENT LABS/IMAGING  CBC Full  -  ( 30 Aug 2022 07:05 )  WBC Count : 7.04 K/uL  RBC Count : 3.48 M/uL  Hemoglobin : 9.0 g/dL  Hematocrit : 28.9 %  Platelet Count - Automated : 345 K/uL  Mean Cell Volume : 83.0 fL  Mean Cell Hemoglobin : 25.9 pg  Mean Cell Hemoglobin Concentration : 31.1 gm/dL  Auto Neutrophil # : x  Auto Lymphocyte # : x  Auto Monocyte # : x  Auto Eosinophil # : x  Auto Basophil # : x  Auto Neutrophil % : x  Auto Lymphocyte % : x  Auto Monocyte % : x  Auto Eosinophil % : x  Auto Basophil % : x    08-30    143  |  106  |  6<L>  ----------------------------<  139<H>  3.3<L>   |  27  |  0.47<L>    Ca    8.3<L>      30 Aug 2022 07:05  Phos  3.0     08-30  Mg     1.60     08-30          VITALS  T(C): 37.1 (08-30-22 @ 10:35), Max: 37.1 (08-30-22 @ 10:35)  HR: 89 (08-30-22 @ 15:29) (88 - 98)  BP: 119/57 (08-30-22 @ 10:35) (118/53 - 168/65)  RR: 18 (08-30-22 @ 10:35) (16 - 18)  SpO2: 97% (08-30-22 @ 15:29) (92% - 100%)  Wt(kg): --    ALLERGIES  diltiazem (Other; Rash)  Haldol (Dystonic RXN)      MEDICATIONS   acetaminophen     Tablet .. 1000 milliGRAM(s) Oral every 8 hours PRN  albuterol/ipratropium for Nebulization 3 milliLiter(s) Nebulizer every 6 hours  amitriptyline 10 milliGRAM(s) Oral at bedtime  AQUAPHOR (petrolatum Ointment) 1 Application(s) Topical three times a day PRN  artificial tears (preservative free) Ophthalmic Solution 1 Drop(s) Both EYES every 2 hours  ascorbic acid 500 milliGRAM(s) Oral daily  buDESOnide    Inhalation Suspension 0.5 milliGRAM(s) Inhalation two times a day  BUpivacaine 0.5% (Preservative-Free) Injectable 5 milliLiter(s) Local Injection once  BUpivacaine 0.5% (Preservative-Free) Injectable 5 milliLiter(s) Local Injection once  BUpivacaine 0.5% Injectable 5 milliLiter(s) Local Injection once  BUpivacaine 0.5% Injectable 5 milliLiter(s) Local Injection once  BUpivacaine liposome 1.3% Injectable 5 milliLiter(s) Local Injection once  BUpivacaine liposome 1.3% Injectable 2 milliLiter(s) Local Injection once  BUpivacaine liposome 1.3% Injectable 5 milliLiter(s) Local Injection once  BUpivacaine liposome 1.3% Injectable 5 milliLiter(s) Local Injection once  BUpivacaine liposome 1.3% Injectable 5 milliLiter(s) Local Injection once  cyanocobalamin 1000 MICROGram(s) Oral daily  dextrose 5% + sodium chloride 0.9%. 1000 milliLiter(s) IV Continuous <Continuous>  dextrose 5%. 1000 milliLiter(s) IV Continuous <Continuous>  dextrose 5%. 1000 milliLiter(s) IV Continuous <Continuous>  dextrose 50% Injectable 25 Gram(s) IV Push once  dextrose 50% Injectable 12.5 Gram(s) IV Push once  dextrose 50% Injectable 25 Gram(s) IV Push once  dextrose Oral Gel 15 Gram(s) Oral once PRN  enoxaparin Injectable 80 milliGRAM(s) SubCutaneous every 12 hours  erythromycin   Ointment 1 Application(s) Right EYE four times a day  folic acid 1 milliGRAM(s) Oral daily  furosemide    Tablet 20 milliGRAM(s) Oral daily  gabapentin 600 milliGRAM(s) Oral every 12 hours  glucagon  Injectable 1 milliGRAM(s) IntraMuscular once  insulin glargine Injectable (LANTUS) 10 Unit(s) SubCutaneous at bedtime  insulin lispro (ADMELOG) corrective regimen sliding scale   SubCutaneous Before meals and at bedtime  lactated ringers. 1000 milliLiter(s) IV Continuous <Continuous>  lactobacillus acidophilus 1 Tablet(s) Oral daily  lidocaine 5% Ointment 1 Application(s) Topical two times a day  loratadine 10 milliGRAM(s) Oral daily  meropenem  IVPB 1000 milliGRAM(s) IV Intermittent every 8 hours  methadone   Solution 5 milliGRAM(s) Oral every 12 hours  metoprolol succinate ER 12.5 milliGRAM(s) Oral daily  mirtazapine 7.5 milliGRAM(s) Oral at bedtime  morphine   Solution 5 milliGRAM(s) Oral every 4 hours PRN  mupirocin 2% Ointment 1 Application(s) Topical three times a day  nystatin Powder 1 Application(s) Topical two times a day  pantoprazole    Tablet 40 milliGRAM(s) Oral before breakfast  polyethylene glycol 3350 17 Gram(s) Oral daily  potassium chloride  10 mEq/100 mL IVPB 10 milliEquivalent(s) IV Intermittent every 1 hour  prednisoLONE acetate 1% Suspension 1 Drop(s) Right EYE three times a day  pregabalin 300 milliGRAM(s) Oral two times a day  senna 1 Tablet(s) Oral at bedtime  tamsulosin 0.4 milliGRAM(s) Oral at bedtime  traZODone 100 milliGRAM(s) Oral at bedtime  ursodiol Tablet 500 milliGRAM(s) Oral <User Schedule>      ----------------------------------------------------------------------------------------  PHYSICAL EXAM  Constitutional -NAD, sleepy but awakens to voice  HEENT - + rash and dressing R scalp. wearing nasal cannula  Chest - no respiratory distress  Cardiovascular - RRR, S1S2   Abdomen -  Soft, NTND  Neurologic Exam -                    Cognitive - sleepy but awakens to voice     Communication - Fluent, No dysarthria      Motor -                       LEFT    UE - 4/5                    RIGHT UE - 4/5                    LEFT    LE - HF 2                    RIGHT LE - HF 2     Sensory - Intact to LT      Balance - WNL Static  Psychiatric -calm   ----------------------------------------------------------------------------------------  ASSESSMENT/PLAN   85 yo f presented with herpes zoster with vzv encephalitis, with neuropathic pain  improved with supraorbital nerve block. on methadone 5mg bid.  morphine prn. bowel regimen  continue bedside PT and OT for bed mobility, transfers, ambulation as tolerated, adls, OOB to chair as tolerates  DVT PPX - lovenox for dvt   Rehab -     Recommended for [facility], however family prefers discharge home.  If going home will require equipment, home care services, PT.   family aware.

## 2022-08-30 NOTE — PROGRESS NOTE ADULT - SUBJECTIVE AND OBJECTIVE BOX
[facility] Division of Hospital Medicine  Aline Gaviria MD  Pager (M-F, 8A-5P): [phone]  Other Times:  [phone]      SUBJECTIVE / OVERNIGHT EVENTS: Pt evaluated twice this am, lethargic but arousable. Aid reports that she ate breakfast and did some exercises this am that may have tired her out. Did not receive morphine overnight. Issue w IV access yesterday, but not has a peripheral IV in L hand.     MEDICATIONS  (STANDING):  albuterol/ipratropium for Nebulization 3 milliLiter(s) Nebulizer every 6 hours  amitriptyline 10 milliGRAM(s) Oral at bedtime  artificial tears (preservative free) Ophthalmic Solution 1 Drop(s) Both EYES every 2 hours  ascorbic acid 500 milliGRAM(s) Oral daily  buDESOnide    Inhalation Suspension 0.5 milliGRAM(s) Inhalation two times a day  BUpivacaine 0.5% (Preservative-Free) Injectable 5 milliLiter(s) Local Injection once  BUpivacaine 0.5% (Preservative-Free) Injectable 5 milliLiter(s) Local Injection once  BUpivacaine 0.5% Injectable 5 milliLiter(s) Local Injection once  BUpivacaine 0.5% Injectable 5 milliLiter(s) Local Injection once  BUpivacaine liposome 1.3% Injectable 5 milliLiter(s) Local Injection once  BUpivacaine liposome 1.3% Injectable 2 milliLiter(s) Local Injection once  BUpivacaine liposome 1.3% Injectable 5 milliLiter(s) Local Injection once  BUpivacaine liposome 1.3% Injectable 5 milliLiter(s) Local Injection once  BUpivacaine liposome 1.3% Injectable 5 milliLiter(s) Local Injection once  cyanocobalamin 1000 MICROGram(s) Oral daily  dextrose 5% + sodium chloride 0.9%. 1000 milliLiter(s) (50 mL/Hr) IV Continuous <Continuous>  dextrose 5%. 1000 milliLiter(s) (50 mL/Hr) IV Continuous <Continuous>  dextrose 5%. 1000 milliLiter(s) (100 mL/Hr) IV Continuous <Continuous>  dextrose 50% Injectable 25 Gram(s) IV Push once  dextrose 50% Injectable 12.5 Gram(s) IV Push once  dextrose 50% Injectable 25 Gram(s) IV Push once  enoxaparin Injectable 80 milliGRAM(s) SubCutaneous every 12 hours  erythromycin   Ointment 1 Application(s) Right EYE four times a day  folic acid 1 milliGRAM(s) Oral daily  furosemide    Tablet 20 milliGRAM(s) Oral daily  gabapentin 600 milliGRAM(s) Oral every 12 hours  glucagon  Injectable 1 milliGRAM(s) IntraMuscular once  insulin glargine Injectable (LANTUS) 10 Unit(s) SubCutaneous at bedtime  insulin lispro (ADMELOG) corrective regimen sliding scale   SubCutaneous Before meals and at bedtime  lactated ringers. 1000 milliLiter(s) (75 mL/Hr) IV Continuous <Continuous>  lactobacillus acidophilus 1 Tablet(s) Oral daily  lidocaine 5% Ointment 1 Application(s) Topical two times a day  loratadine 10 milliGRAM(s) Oral daily  meropenem  IVPB 1000 milliGRAM(s) IV Intermittent every 8 hours  methadone   Solution 5 milliGRAM(s) Oral every 12 hours  metoprolol succinate ER 12.5 milliGRAM(s) Oral daily  mirtazapine 7.5 milliGRAM(s) Oral at bedtime  mupirocin 2% Ointment 1 Application(s) Topical three times a day  nystatin Powder 1 Application(s) Topical two times a day  pantoprazole    Tablet 40 milliGRAM(s) Oral before breakfast  polyethylene glycol 3350 17 Gram(s) Oral daily  potassium chloride  10 mEq/100 mL IVPB 10 milliEquivalent(s) IV Intermittent every 1 hour  prednisoLONE acetate 1% Suspension 1 Drop(s) Right EYE three times a day  pregabalin 300 milliGRAM(s) Oral two times a day  senna 1 Tablet(s) Oral at bedtime  tamsulosin 0.4 milliGRAM(s) Oral at bedtime  traZODone 100 milliGRAM(s) Oral at bedtime  ursodiol Tablet 500 milliGRAM(s) Oral <User Schedule>    MEDICATIONS  (PRN):  acetaminophen     Tablet .. 1000 milliGRAM(s) Oral every 8 hours PRN Severe Pain (7 - 10)  AQUAPHOR (petrolatum Ointment) 1 Application(s) Topical three times a day PRN inguinal fold rash  dextrose Oral Gel 15 Gram(s) Oral once PRN Blood Glucose LESS THAN 70 milliGRAM(s)/deciliter  morphine   Solution 5 milliGRAM(s) Oral every 4 hours PRN Severe Pain (7 - 10)      I&O's Summary    29 Aug 2022 07:01  -  30 Aug 2022 07:00  --------------------------------------------------------  IN: 0 mL / OUT: 700 mL / NET: -700 mL        PHYSICAL EXAM:  Vital Signs Last 24 Hrs  T(C): 37.1 (30 Aug 2022 10:35), Max: 37.1 (30 Aug 2022 10:35)  T(F): 98.8 (30 Aug 2022 10:35), Max: 98.8 (30 Aug 2022 10:35)  HR: 88 (30 Aug 2022 10:35) (88 - 98)  BP: 119/57 (30 Aug 2022 10:35) (118/53 - 168/65)  BP(mean): 73 (30 Aug 2022 09:24) (73 - 73)  RR: 18 (30 Aug 2022 10:35) (16 - 18)  SpO2: 100% (30 Aug 2022 10:35) (92% - 100%)    Parameters below as of 30 Aug 2022 10:35  Patient On (Oxygen Delivery Method): room air    GENERAL: NAD, well-developed, lethargic but awakens to verbal stimuli  HEAD:  Atraumatic, Normocephalic  EYES: PERRLA, conjunctiva and sclera clear, opens eyes bilaterally  NECK: Supple, No JVD  CHEST/LUNG: Bibasilar lung crackles in posterior lung fields; No wheeze  HEART: RRR; No murmurs, rubs, or gallops  ABDOMEN: Soft, Nontender, Nondistended; Bowel sounds present  EXTREMITIES:  2+ Peripheral Pulses, No clubbing, cyanosis, or edema  PSYCH: AAOx3, though doesn't know that she is at [facility]  NEUROLOGY: non-focal  SKIN: crusted erythematous rash, covered in ointment, in R V1 distribution    LABS:                        9.0    7.04  )-----------( 345      ( 30 Aug 2022 07:05 )             28.9     08-30    143  |  106  |  6<L>  ----------------------------<  139<H>  3.3<L>   |  27  |  0.47<L>    Ca    8.3<L>      30 Aug 2022 07:05  Phos  3.0     08-30  Mg     1.60     08-30                Culture - Urine (collected 27 Aug 2022 14:50)  Source: Catheterized Catheterized  Final Report (29 Aug 2022 13:17):    10,000 - 49,000 CFU/mL Coag Negative Staphylococcus "Susceptibilities not    performed"        RADIOLOGY & ADDITIONAL TESTS:  Results Reviewed:   Imaging Personally Reviewed:  Electrocardiogram Personally Reviewed:    COORDINATION OF CARE:  Care Discussed with Consultants/Other Providers [Y/N]: Dr. Morris, ID, Urology  Prior or Outpatient Records Reviewed [Y/N]:

## 2022-08-30 NOTE — PROGRESS NOTE ADULT - SUBJECTIVE AND OBJECTIVE BOX
Follow Up:  fever    Interval History/ROS:    "not too bad today"  breathing is "good"  getting back into bed with help of aide.     Allergies  diltiazem (Other; Rash)      ANTIMICROBIALS:  meropenem  IVPB 1000 every 8 hours      OTHER MEDS:  MEDICATIONS  (STANDING):  acetaminophen     Tablet .. 1000 every 8 hours PRN  albuterol/ipratropium for Nebulization 3 every 6 hours  amitriptyline 10 at bedtime  buDESOnide    Inhalation Suspension 0.5 two times a day  dextrose 50% Injectable 25 once  dextrose 50% Injectable 12.5 once  dextrose 50% Injectable 25 once  dextrose Oral Gel 15 once PRN  enoxaparin Injectable 80 every 12 hours  furosemide    Tablet 20 daily  gabapentin 300 every 8 hours  glucagon  Injectable 1 once  insulin glargine Injectable (LANTUS) 10 at bedtime  insulin lispro (ADMELOG) corrective regimen sliding scale  Before meals and at bedtime  loratadine 10 daily  methadone Injectable 2.5 every 12 hours  metoprolol succinate ER 12.5 daily  mirtazapine 7.5 at bedtime  morphine  - Injectable 2.5 every 4 hours  pantoprazole    Tablet 40 before breakfast  polyethylene glycol 3350 17 daily  pregabalin 300 every 12 hours  senna 1 at bedtime  tamsulosin 0.4 at bedtime  traZODone 100 at bedtime  ursodiol Tablet 500 <User Schedule>    Vital Signs Last 24 Hrs  T(F): 98.8 (08-30-22 @ 10:35), Max: 98.8 (08-30-22 @ 10:35)  HR: 88 (08-30-22 @ 10:35)  BP: 119/57 (08-30-22 @ 10:35)  RR: 18 (08-30-22 @ 10:35)  SpO2: 100% (08-30-22 @ 10:35) (92% - 100%)      PHYSICAL EXAM:  Constitutional:  no distress  HEAD/EYES: +healing R facial/wanda-orbital rash  opens right eye partially  Cardiovascular:  distant  Respiratory: no distress on nasal o2  GI:  soft, non-tender, +bowel sounds  :  +dior  Neurologic: awake and alert, answers appropriately, motor weak general  Skin:  ecchymosis arms                          9.0    7.04  )-----------( 345      ( 30 Aug 2022 07:05 )             28.9 08-30    143  |  106  |  6   ----------------------------<  139  3.3   |  27  |  0.47  Ca    8.3      30 Aug 2022 07:05Phos  3.0     08-30Mg     1.60     08-30        MICROBIOLOGY:    8/27 blood cultures x 2 no growth to date  8/27 urine 10- 50K staph epi  RADIOLOGY:  < from: Xray Chest 1 View AP/PA (08.27.22 @ 03:55) >  IMPRESSION:  Unchanged lung patchy opacities from prior. No pleural effusions or   pneumothorax.  Stable cardiac and mediastinal silhouettes.  Generalized osteopenia and scoliotic spinal curvature again noted.  < end of copied text >

## 2022-08-30 NOTE — PROGRESS NOTE ADULT - PROBLEM SELECTOR PLAN 6
-Urine Cx on 8/19 ESBL E. coli  -ID recommended that Zosyn was adequate tx with zosyn but in light of RRT on 8/26, will treat with UTI with meropenem.  -Patient does have dior with multiple failed TOV  -c/w meropenem x 5 days (end date of 8/31)

## 2022-08-30 NOTE — PROGRESS NOTE ADULT - PROBLEM SELECTOR PLAN 5
unclear etiology, poss immobility, poss infection, poss due to narcotics  indwelling urinary catheter placed 8/13, failed TOV 8/17, placed again 8/19; Failed TOV again on 8/22 and dior reinserted  Will maintain dior for now since multiple failed TOV; dior changed on 8/26    -Had candida in urine, received a dose of diflucan x once for intertrigo; ID recommended against treating yeast in urine.  -Currently treating ESBL UTI

## 2022-08-30 NOTE — PROGRESS NOTE ADULT - PROBLEM SELECTOR PLAN 2
Patient hypoxic with O2 Sat 88% on RA; s/p RRT on 8/26 for hypoxia  currently on 2LNC; still unable to wean so will need home oxygen  CXR on 8/22 shows RML infiltrate  repeat CXR on 8/26 showed no changes.  repeat swallow eval on 8/24 recommended maintaining current diet.   pleasure feeds today since patient would ultimately refuse PEG anyway as she did NGT placement.   F/U cinesophagram- nml  received 4 days of Zosyn IV prior to RRT on 8/26  Patient switched to Meropenem/Vanco on 8/27; still spike fevers, vanc d/c'd 8/29 given CONS in urine likely a colonizer  - Can finish out course of antibiotics w oral augmentin at time of dc to complete 7 days

## 2022-08-30 NOTE — PROGRESS NOTE ADULT - ASSESSMENT
87 y/o F PMHx cardiac stents post MI (1986, 1996), COPD, NPH, DM, Migraines, and suspected HTN presented with vesicular rash on right face and AMS. Treated for HZO, s/p 2 weeks of IV acyclovir and extended course of Valtrex. Treated with course of vanc/shaina for superimposed SSTI/preseptal cellulitis. Course c/b new episode of hypoxia, fever 102.7, and tachycardia on 8/27. ID consulted for further management.    #Sepsis, Hypoxia - suspect aspiration event now improved - afebrile HR 88 more awake and verbal  #AMS - now back to baseline, suspect in setting of infection    blood culture 8/27 no growth to date  urine 10- 50K staph epi (likely colonizer)  MSSA/ MRSA PCR neg    planning d/c soon    Recs:    - when ready to d/c home can transition to augmentin 500 mg po q 12 h --> 9/2

## 2022-08-30 NOTE — PROGRESS NOTE ADULT - ASSESSMENT
86 y.o. Female w/ hx HTN, DM, CAD s/p stents (1986, 1996), COPD, NPH s/p  shunt, migraines p/w herpes zoster ophthalmicus/encephalitis affecting the right V1 dermatome but developed post herpetic neuralgia, and mrstran bacteremia (treated). She is s/p IV acyclovir, but continues to have neuralgia pain. This pain  improved after NSG did R supra orbital marcaine nerve block on 8/16, 8/17and 8/22. She was on morphine IV q4hr, Lyrica and gabapentin. Methadone started on on 8/25. Improved pain control. RRT on 8/26 for fever, hypoxia, and encephalopathy. Aspiration PNA suspected (s/p zosyn) and ESBL UTI grew in catheterized sample cultures, started on meropenem (8/27- ), d/w ID, hold off on PIC for now, plan for oral augmentin to complete antibiotic course at time of d/c. Patient's mental status now returned to baseline but refused NGT placement so spoke to daughter about feeding patient knowing the risks of aspiration and she agreed to pleasure feeds. (Pt does not want PEG feeds in long term.)     Urinary retention s/p TOV x 2 this admission as below, Urology consulted and rec's d/c w dior and follow up w Urology on Friday for a TOV.     Ongoing issues with polypharmacy-- currently pt is oversedated w remeron, elavil, morphine, methadone, gabapentin and lyrica. I suspect this recurring aspiration events are directly related to these sedating meds. Transitioning morphine from 5 mg q 4 hours scheduled to prn dosing on 8/30. Would NOT go up on the morphine dose given intermittent lethargy noted during rounds (twice) on 8/30. Pt w Pain Mgmt outpatient follow up appt already set up.    Update daughter daily at bedside.

## 2022-08-30 NOTE — PROGRESS NOTE ADULT - PROBLEM SELECTOR PLAN 10
NPH diagnosed 2011, pt has programmable  shunt installed by [facility] neurosurg, **must be programmed after MRI (page neurosurg [id number])**. 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  - Repeat CT head appeared unchanged from July

## 2022-08-31 NOTE — PROGRESS NOTE ADULT - PROBLEM SELECTOR PLAN 1
NSG did repeat bupivicaine supraorbital nerve block on 8/22 to help with pain; which appeared to have improved pain. Patient would have fleeting episodes of pain but mostly is sleeping comfortably in bed. But when asked she says her pain is a 10 out of 10 in severity. Pain now better controlled  - increased lyrica to 300 mg every 8 hours on 8/21  -added Gabapentin 300mg tid for better pain control on 8/23  -decreased Morphine from 7.5 to 2.5 mg IV q4hr standing after RRT--> now back on oral 5 mg q 4 hours solution of morphine as requested by karla-- transitioned to prn on 8/30.  -Reconsulted pain management and they advised also adding methadone 5mg bid. Spoke to pain management on 8/25 and they felt confident that methadone was appropriate for this situation and patient already had a F/U appointment with pain management clinic next week to manage methadone as outpatient.   -Spoke to daughter at bedside and explained to her the risks of starting methadone in an elderly patient and she wanted to start methadone anyway.  -Started Methadone 5mg PO bid on 8/25;  but since patient became hypoxic with RRT, decreased dose to 2.5mg bid--> back to 5mg BID      -completed course of valtrex 1gram PO q8hr followed by to IV acyclovir after aspiration; ID assessed adequate tx.  -c/w erythromycin ointment QID to eye & periocular lesions, artificial tears Q4H as recommended by opthalmology.  - Appreciate derm recs:    - lidocaine ointment mixed with vaseline BID, triamcinolone ointment to red areas only BID    - apply Vaseline to crusted areas Q2H.

## 2022-08-31 NOTE — PROGRESS NOTE ADULT - SUBJECTIVE AND OBJECTIVE BOX
[facility] DEPARTMENT OF OPHTHALMOLOGY  ------------------------------------------------------------------------------    Interval History: No acute events overnight. Today patient denying any new eye pain or visual changes.     MEDICATIONS  (STANDING):  albuterol/ipratropium for Nebulization 3 milliLiter(s) Nebulizer every 6 hours  amitriptyline 10 milliGRAM(s) Oral at bedtime  artificial tears (preservative free) Ophthalmic Solution 1 Drop(s) Both EYES every 2 hours  ascorbic acid 500 milliGRAM(s) Oral daily  buDESOnide    Inhalation Suspension 0.5 milliGRAM(s) Inhalation two times a day  BUpivacaine 0.5% (Preservative-Free) Injectable 5 milliLiter(s) Local Injection once  BUpivacaine 0.5% (Preservative-Free) Injectable 5 milliLiter(s) Local Injection once  BUpivacaine 0.5% Injectable 5 milliLiter(s) Local Injection once  BUpivacaine 0.5% Injectable 5 milliLiter(s) Local Injection once  BUpivacaine liposome 1.3% Injectable 5 milliLiter(s) Local Injection once  BUpivacaine liposome 1.3% Injectable 2 milliLiter(s) Local Injection once  BUpivacaine liposome 1.3% Injectable 5 milliLiter(s) Local Injection once  BUpivacaine liposome 1.3% Injectable 5 milliLiter(s) Local Injection once  BUpivacaine liposome 1.3% Injectable 5 milliLiter(s) Local Injection once  cyanocobalamin 1000 MICROGram(s) Oral daily  dextrose 5% + sodium chloride 0.9%. 1000 milliLiter(s) (50 mL/Hr) IV Continuous <Continuous>  dextrose 5%. 1000 milliLiter(s) (50 mL/Hr) IV Continuous <Continuous>  dextrose 5%. 1000 milliLiter(s) (100 mL/Hr) IV Continuous <Continuous>  dextrose 50% Injectable 25 Gram(s) IV Push once  dextrose 50% Injectable 12.5 Gram(s) IV Push once  dextrose 50% Injectable 25 Gram(s) IV Push once  enoxaparin Injectable 80 milliGRAM(s) SubCutaneous every 12 hours  erythromycin   Ointment 1 Application(s) Right EYE four times a day  folic acid 1 milliGRAM(s) Oral daily  furosemide    Tablet 20 milliGRAM(s) Oral daily  gabapentin 600 milliGRAM(s) Oral every 12 hours  glucagon  Injectable 1 milliGRAM(s) IntraMuscular once  insulin glargine Injectable (LANTUS) 10 Unit(s) SubCutaneous at bedtime  insulin lispro (ADMELOG) corrective regimen sliding scale   SubCutaneous Before meals and at bedtime  lactated ringers. 1000 milliLiter(s) (75 mL/Hr) IV Continuous <Continuous>  lactobacillus acidophilus 1 Tablet(s) Oral daily  lidocaine 5% Ointment 1 Application(s) Topical two times a day  loratadine 10 milliGRAM(s) Oral daily  meropenem  IVPB 1000 milliGRAM(s) IV Intermittent every 8 hours  methadone   Solution 5 milliGRAM(s) Oral every 12 hours  metoprolol succinate ER 12.5 milliGRAM(s) Oral daily  mirtazapine 7.5 milliGRAM(s) Oral at bedtime  mupirocin 2% Ointment 1 Application(s) Topical three times a day  nystatin Powder 1 Application(s) Topical two times a day  pantoprazole    Tablet 40 milliGRAM(s) Oral before breakfast  polyethylene glycol 3350 17 Gram(s) Oral daily  prednisoLONE acetate 1% Suspension 1 Drop(s) Right EYE three times a day  pregabalin 300 milliGRAM(s) Oral two times a day  senna 1 Tablet(s) Oral at bedtime  traZODone 100 milliGRAM(s) Oral at bedtime  ursodiol Tablet 500 milliGRAM(s) Oral <User Schedule>    MEDICATIONS  (PRN):  acetaminophen     Tablet .. 1000 milliGRAM(s) Oral every 8 hours PRN Severe Pain (7 - 10)  AQUAPHOR (petrolatum Ointment) 1 Application(s) Topical three times a day PRN inguinal fold rash  dextrose Oral Gel 15 Gram(s) Oral once PRN Blood Glucose LESS THAN 70 milliGRAM(s)/deciliter  morphine   Solution 5 milliGRAM(s) Oral every 4 hours PRN Severe Pain (7 - 10)      VITALS: T(C): 36.9 (08-31-22 @ 05:40)  T(F): 98.4 (08-31-22 @ 05:40), Max: 98.4 (08-31-22 @ 05:40)  HR: 77 (08-31-22 @ 09:28) (75 - 88)  BP: 119/51 (08-31-22 @ 05:40) (119/51 - 123/64)  RR:  (17 - 17)  SpO2:  (94% - 99%)  Wt(kg): --  General: AAO x 2-3, appropriate mood and affect    Ophthalmology Exam:  Visual acuity (sc): 20/70 OD, 20/40 OS  Pupils: 5mm OD minimally reactive, 1mm OS  Ttono: 18 OD, 12 OS  Extraocular movements (EOMs): 20% restriction OD, full OS    Pen Light Exam (PLE)  External: R sided forehead edema, resolution of eyelid rash, no Metcalf sign, forehead and head bandaged up  Lids/Lashes/Lacrimal Ducts: trace periorbital edema RUL, flat RLL, flat OS   Sclera/Conjunctiva: Tr injection OD, white and quiet OS.  Cornea: Tr SPK OD, no pseudodendrites, no D-folds OD, Cl OS  Anterior Chamber: Deep and formed OU.    Iris: Flat OU.  Lens: PCIOL OD, 3+ NS OS

## 2022-08-31 NOTE — PROGRESS NOTE ADULT - PROBLEM SELECTOR PLAN 10
NPH diagnosed 2011, pt has programmable  [facility], **must be programmed after MRI (page neurosurg [id number])**. 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  - Repeat CT head appeared unchanged from July

## 2022-08-31 NOTE — PROGRESS NOTE ADULT - SUBJECTIVE AND OBJECTIVE BOX
[facility] Division of Hospital Medicine  Aline Gaviria MD  Pager (M-F, 8A-5P): [phone]  Other Times:  [phone]      SUBJECTIVE / OVERNIGHT EVENTS: Pt ate breakfast this am. Napping at time of exam. Awakens to verbal stimuli, participates in exam. Got one dose of morphine at 7 am prn, otherwise no morphine needs overnight.     MEDICATIONS  (STANDING):  albuterol/ipratropium for Nebulization 3 milliLiter(s) Nebulizer every 6 hours  amitriptyline 10 milliGRAM(s) Oral at bedtime  artificial tears (preservative free) Ophthalmic Solution 1 Drop(s) Both EYES every 2 hours  ascorbic acid 500 milliGRAM(s) Oral daily  buDESOnide    Inhalation Suspension 0.5 milliGRAM(s) Inhalation two times a day  BUpivacaine 0.5% (Preservative-Free) Injectable 5 milliLiter(s) Local Injection once  BUpivacaine 0.5% (Preservative-Free) Injectable 5 milliLiter(s) Local Injection once  BUpivacaine 0.5% Injectable 5 milliLiter(s) Local Injection once  BUpivacaine 0.5% Injectable 5 milliLiter(s) Local Injection once  BUpivacaine liposome 1.3% Injectable 5 milliLiter(s) Local Injection once  BUpivacaine liposome 1.3% Injectable 2 milliLiter(s) Local Injection once  BUpivacaine liposome 1.3% Injectable 5 milliLiter(s) Local Injection once  BUpivacaine liposome 1.3% Injectable 5 milliLiter(s) Local Injection once  BUpivacaine liposome 1.3% Injectable 5 milliLiter(s) Local Injection once  cyanocobalamin 1000 MICROGram(s) Oral daily  dextrose 5% + sodium chloride 0.9%. 1000 milliLiter(s) (50 mL/Hr) IV Continuous <Continuous>  dextrose 5%. 1000 milliLiter(s) (100 mL/Hr) IV Continuous <Continuous>  dextrose 5%. 1000 milliLiter(s) (50 mL/Hr) IV Continuous <Continuous>  dextrose 50% Injectable 25 Gram(s) IV Push once  dextrose 50% Injectable 12.5 Gram(s) IV Push once  dextrose 50% Injectable 25 Gram(s) IV Push once  enoxaparin Injectable 80 milliGRAM(s) SubCutaneous every 12 hours  erythromycin   Ointment 1 Application(s) Right EYE four times a day  folic acid 1 milliGRAM(s) Oral daily  furosemide    Tablet 20 milliGRAM(s) Oral daily  gabapentin 600 milliGRAM(s) Oral every 12 hours  glucagon  Injectable 1 milliGRAM(s) IntraMuscular once  insulin glargine Injectable (LANTUS) 10 Unit(s) SubCutaneous at bedtime  insulin lispro (ADMELOG) corrective regimen sliding scale   SubCutaneous Before meals and at bedtime  lactated ringers. 1000 milliLiter(s) (75 mL/Hr) IV Continuous <Continuous>  lactobacillus acidophilus 1 Tablet(s) Oral daily  lidocaine 5% Ointment 1 Application(s) Topical two times a day  loratadine 10 milliGRAM(s) Oral daily  magnesium sulfate  IVPB 2 Gram(s) IV Intermittent once  meropenem  IVPB 1000 milliGRAM(s) IV Intermittent every 8 hours  methadone   Solution 5 milliGRAM(s) Oral every 12 hours  metoprolol succinate ER 12.5 milliGRAM(s) Oral daily  mirtazapine 7.5 milliGRAM(s) Oral at bedtime  mupirocin 2% Ointment 1 Application(s) Topical three times a day  nystatin Powder 1 Application(s) Topical two times a day  pantoprazole    Tablet 40 milliGRAM(s) Oral before breakfast  polyethylene glycol 3350 17 Gram(s) Oral daily  prednisoLONE acetate 1% Suspension 1 Drop(s) Right EYE three times a day  pregabalin 300 milliGRAM(s) Oral two times a day  senna 1 Tablet(s) Oral at bedtime  traZODone 100 milliGRAM(s) Oral at bedtime  ursodiol Tablet 500 milliGRAM(s) Oral <User Schedule>    MEDICATIONS  (PRN):  acetaminophen     Tablet .. 1000 milliGRAM(s) Oral every 8 hours PRN Severe Pain (7 - 10)  AQUAPHOR (petrolatum Ointment) 1 Application(s) Topical three times a day PRN inguinal fold rash  dextrose Oral Gel 15 Gram(s) Oral once PRN Blood Glucose LESS THAN 70 milliGRAM(s)/deciliter  morphine   Solution 5 milliGRAM(s) Oral every 4 hours PRN Severe Pain (7 - 10)      I&O's Summary      PHYSICAL EXAM:  Vital Signs Last 24 Hrs  T(C): 36.9 (31 Aug 2022 05:40), Max: 36.9 (31 Aug 2022 05:40)  T(F): 98.4 (31 Aug 2022 05:40), Max: 98.4 (31 Aug 2022 05:40)  HR: 77 (31 Aug 2022 09:28) (75 - 89)  BP: 119/51 (31 Aug 2022 05:40) (119/51 - 123/64)  BP(mean): --  RR: 17 (31 Aug 2022 05:40) (17 - 17)  SpO2: 99% (31 Aug 2022 09:28) (94% - 99%)    Parameters below as of 31 Aug 2022 11:20  Patient On (Oxygen Delivery Method): nasal cannula    GENERAL: NAD, well-developed, lethargic but awakens to verbal stimuli  HEAD:  Atraumatic, Normocephalic  EYES: PERRLA, conjunctiva and sclera clear, opens eyes bilaterally  NECK: Supple, No JVD  CHEST/LUNG: Bibasilar lung crackles in posterior lung fields; No wheeze, incentive drew to 1000  HEART: RRR; No murmurs, rubs, or gallops  ABDOMEN: Soft, Nontender, Nondistended; Bowel sounds present  EXTREMITIES:  2+ Peripheral Pulses, No clubbing, cyanosis, or edema  PSYCH: AAOx3, though doesn't know that she is at [facility]  NEUROLOGY: non-focal  SKIN: crusted erythematous rash, covered in ointment, in R V1 distribution  LABS:                        9.4    8.08  )-----------( 377      ( 31 Aug 2022 11:31 )             29.9     08-31    140  |  104  |  8   ----------------------------<  151<H>  3.9   |  27  |  0.63    Ca    8.1<L>      31 Aug 2022 11:31  Phos  3.1     08-31  Mg     1.40     08-31                  RADIOLOGY & ADDITIONAL TESTS:  Results Reviewed:   Imaging Personally Reviewed:  Electrocardiogram Personally Reviewed:    COORDINATION OF CARE:  Care Discussed with Consultants/Other Providers [Y/N]: PCP Dr Rich  Prior or Outpatient Records Reviewed [Y/N]:

## 2022-08-31 NOTE — PROGRESS NOTE ADULT - PROBLEM SELECTOR PLAN 6
None -Urine Cx on 8/19 ESBL E. coli  -ID recommended that Zosyn was adequate tx with zosyn but in light of RRT on 8/26, will treat with UTI with meropenem.  -Patient does have dior with multiple failed TOV  -c/w meropenem x 5 days (end date of 8/31) as per ID

## 2022-08-31 NOTE — PROGRESS NOTE ADULT - PROBLEM SELECTOR PLAN 2
Patient hypoxic with O2 Sat 88% on RA; s/p RRT on 8/26 for hypoxia  currently on 2LNC; still unable to wean so will need home oxygen  CXR on 8/22 shows RML infiltrate  repeat CXR on 8/26 showed no changes.  repeat swallow eval on 8/24 recommended maintaining current diet.   pleasure feeds today since patient would ultimately refuse PEG anyway as she did NGT placement.   F/U cinesophagram- nml  received 4 days of Zosyn IV prior to RRT on 8/26  Patient switched to Meropenem/Vanco on 8/27; still spike fevers, vanc d/c'd 8/29 given CONS in urine likely a colonizer  - flutter valve  - Can finish out course of antibiotics w oral augmentin at time of dc to complete 7 days, end date of 9/2.

## 2022-08-31 NOTE — PROGRESS NOTE ADULT - ASSESSMENT
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 Rash improved, mental status improved, pain improved.     1. HZO OD with of R sided facial rash  - R sided rash (zoster with likely bacterial superinfection) much improved. No longer involves eyelids.   - IOP 18/12, VA 20/70 OD and 20/40 OS, at pt's fluctuating baseline since her admission.   - Pt has dilated and minimally reactive R pupil. 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   - Abduction deficit OD 2/2 viral myositis continuing to improve.  - Abx and antivirals per ID - would continue antivirals until EOM restriction and dilated pupil fully resolve  - Pt unable to tolerate sitting up at slit lamp - cannot assess anterior chamber for inflammatory reaction  - Cornea is clear without or bullae. 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 .   - C/w pred forte TID OD.   - C/w erythromycin ointment QID to R eye and periocular lesions  - C/w preservative-free artificial tears Q2H OU     2. Intermittent AMS related to waxing and waning pain - now A&OX3   - Appreciate neurology consult  - MRI brain showing no acute pathology, MRI orbits as above  - LP deferred per neurology and ID  - Pain appears much better controlled with supraorbital nerve block.   - findings and plan discussed with patient and primary team    To be discussed with Dr. Barker, attending.

## 2022-08-31 NOTE — PROGRESS NOTE ADULT - ASSESSMENT
86 y.o. Female w/ hx HTN, DM, CAD s/p stents (1986, 1996), COPD, NPH s/p  shunt, migraines p/w herpes zoster ophthalmicus/encephalitis affecting the right V1 dermatome but developed post herpetic neuralgia, and mrstran bacteremia (treated). She is s/p IV acyclovir, but continues to have neuralgia pain. This pain  improved after NSG did R supra orbital marcaine nerve block on 8/16, 8/17and 8/22. She was on morphine IV q4hr, Lyrica and gabapentin. Methadone started on on 8/25. Improved pain control. RRT on 8/26 for fever, hypoxia, and encephalopathy. Aspiration PNA suspected (s/p zosyn) and ESBL UTI grew in catheterized sample cultures, started on meropenem (8/27-9/1), d/w ID, hold off on PIC for now, plan for oral augmentin to complete antibiotic course at time of d/c. Patient's mental status now returned to baseline but refused NGT placement so spoke to daughter about feeding patient knowing the risks of aspiration and she agreed to pleasure feeds. (Pt does not want PEG feeds in long term.)     Urinary retention s/p TOV x 2 this admission as below, Urology consulted and rec's d/c w dior and follow up w Urology on Friday for a TOV.     Ongoing issues with polypharmacy-- currently pt is oversedated w remeron, elavil, morphine, methadone, gabapentin and lyrica. I suspect this recurring aspiration events are directly related to these sedating meds. Transitioning morphine from 5 mg q 4 hours scheduled to prn dosing on 8/30. Pt w Pain Mgmt outpatient follow up appt already set up. Plan for d/c 9/1 after meropenem completion. Updated PCP over the phone on 8/31.     Update daughter daily at bedside.

## 2022-09-01 NOTE — CHART NOTE - NSCHARTNOTEFT_GEN_A_CORE
NUTRITION FOLLOW-UP:    86 y.o. Female w/ hx HTN, DM, CAD s/p stents (1986, 1996), COPD, NPH s/p  shunt, migraines p/w herpes zoster ophthalmicus/encephalitis affecting the right V1 dermatome but developed post herpetic neuralgia, and mrstran bacteremia (treated). She is s/p IV acyclovir, but continues to have neuralgia pain. This pain  improved after NSG did R supra orbital marcaine nerve block on 8/16, 8/17and 8/22. She was on morphine IV q4hr, Lyrica and gabapentin. Methadone started on on 8/25. Improved pain control. RRT on 8/26 for fever, hypoxia, and encephalopathy. Aspiration PNA suspected (s/p zosyn) and ESBL UTI grew in catheterized sample cultures, started on meropenem (8/27-9/1), d/w ID, hold off on PIC for now, plan for oral augmentin to complete antibiotic course at time of d/c. Patient's mental status now returned to baseline but refused NGT placement so spoke to daughter about feeding patient knowing the risks of aspiration and she agreed to pleasure feeds. (Pt does not want PEG feeds in long term.)     Pt f/u as per protocol, reports 50% intake of meals with assist. No GI distress (nausea/vomiting/diarrhea/constipation.) at present. As per SLP re-eval- Continue with Soft and Bite Sized with Thin Liquids. Noted +1 generalized edema and Rt buttocks skin tear per nursing flow sheet. Noted fluid related wt. fluctuation ongoing.     Weight: 189.8# (9/1/22) via bed scale, 178.9lbs (8/25/2022)  Weight history: 169.6# (7/26/22)  UBW: 180lbs (per patient's daughter)    Labs:  09-01 Na 137 mmol/L Glu 142 mg/dL<H> K+ 4.3 mmol/L Cr 0.55 mg/dL BUN 8 mg/dL Phos 3.1 mg/dL    09-01 @ 11:32 POCT 130 mg/dL  09-01 @ 07:07 POCT 122 mg/dL  08-31 @ 22:32 POCT 109 mg/dL  08-31 @ 16:42 POCT 145 mg/dL    Current Diet: Soft and Bite sized, Consistent CHO, Kosher    Skin- +1 generalized edema and Rt buttocks skin tear per nursing flow sheet as per RN flow sheet    MEDICATIONS  (STANDING):  albuterol/ipratropium for Nebulization 3 milliLiter(s) Nebulizer every 6 hours  amitriptyline 10 milliGRAM(s) Oral at bedtime  artificial tears (preservative free) Ophthalmic Solution 1 Drop(s) Both EYES every 2 hours  ascorbic acid 500 milliGRAM(s) Oral daily  buDESOnide    Inhalation Suspension 0.5 milliGRAM(s) Inhalation two times a day  BUpivacaine 0.5% (Preservative-Free) Injectable 5 milliLiter(s) Local Injection once  BUpivacaine 0.5% (Preservative-Free) Injectable 5 milliLiter(s) Local Injection once  BUpivacaine 0.5% (Preservative-Free) Injectable 3 milliLiter(s) Local Injection once  BUpivacaine 0.5% Injectable 5 milliLiter(s) Local Injection once  BUpivacaine 0.5% Injectable 5 milliLiter(s) Local Injection once  BUpivacaine liposome 1.3% Injectable 5 milliLiter(s) Local Injection once  BUpivacaine liposome 1.3% Injectable 2 milliLiter(s) Local Injection once  BUpivacaine liposome 1.3% Injectable 5 milliLiter(s) Local Injection once  BUpivacaine liposome 1.3% Injectable 5 milliLiter(s) Local Injection once  BUpivacaine liposome 1.3% Injectable 5 milliLiter(s) Local Injection once  BUpivacaine liposome 1.3% Injectable 5 milliLiter(s) Local Injection once  cyanocobalamin 1000 MICROGram(s) Oral daily  dextrose 5% + sodium chloride 0.9%. 1000 milliLiter(s) (50 mL/Hr) IV Continuous <Continuous>  dextrose 5%. 1000 milliLiter(s) (50 mL/Hr) IV Continuous <Continuous>  dextrose 5%. 1000 milliLiter(s) (100 mL/Hr) IV Continuous <Continuous>  dextrose 50% Injectable 25 Gram(s) IV Push once  dextrose 50% Injectable 12.5 Gram(s) IV Push once  dextrose 50% Injectable 25 Gram(s) IV Push once  enoxaparin Injectable 80 milliGRAM(s) SubCutaneous every 12 hours  erythromycin   Ointment 1 Application(s) Right EYE four times a day  folic acid 1 milliGRAM(s) Oral daily  furosemide    Tablet 20 milliGRAM(s) Oral daily  gabapentin 600 milliGRAM(s) Oral every 12 hours  glucagon  Injectable 1 milliGRAM(s) IntraMuscular once  insulin glargine Injectable (LANTUS) 10 Unit(s) SubCutaneous at bedtime  insulin lispro (ADMELOG) corrective regimen sliding scale   SubCutaneous Before meals and at bedtime  lactated ringers. 1000 milliLiter(s) (75 mL/Hr) IV Continuous <Continuous>  lactobacillus acidophilus 1 Tablet(s) Oral daily  lidocaine 5% Ointment 1 Application(s) Topical two times a day  loratadine 10 milliGRAM(s) Oral daily  meropenem  IVPB 1000 milliGRAM(s) IV Intermittent every 8 hours  methadone   Solution 5 milliGRAM(s) Oral every 12 hours  metoprolol succinate ER 12.5 milliGRAM(s) Oral daily  mirtazapine 7.5 milliGRAM(s) Oral at bedtime  mupirocin 2% Ointment 1 Application(s) Topical three times a day  nystatin Powder 1 Application(s) Topical two times a day  pantoprazole    Tablet 40 milliGRAM(s) Oral before breakfast  polyethylene glycol 3350 17 Gram(s) Oral daily  prednisoLONE acetate 1% Suspension 1 Drop(s) Right EYE three times a day  pregabalin 300 milliGRAM(s) Oral two times a day  senna 1 Tablet(s) Oral at bedtime  traZODone 100 milliGRAM(s) Oral at bedtime  ursodiol Tablet 500 milliGRAM(s) Oral <User Schedule>    Estimated needs:  NO changes since previous assessment     Nutrition Diagnosis:  Ongoing    RD to Remain Available:    Additional Recommendations:   Continue with current diet as ordered.  Monitor PO intake, weight trends, nutrition related lab values, BMs/GI distress, hydration status, skin integrity.       Dahlia Armstrong RDN #[id number]

## 2022-09-01 NOTE — DISCHARGE NOTE NURSING/CASE MANAGEMENT/SOCIAL WORK - NSDCFUADDAPPT_GEN_ALL_CORE_FT
Please follow up with your primary care physician regarding your hospitalization and for further monitoring/management.  Please follow up with Pain Management Dr. Gwendolyn St within the week from discharge.   Please follow up with Urology upon discharge.  Please follow up with Ophthalmology upon discharge. Please call to make an appointment.  Please follow up with Neurology upon discharge. Please call to make an appointment.  Please follow up with your Cardiologist upon discharge. Please call to make an appointment.

## 2022-09-01 NOTE — CHART NOTE - NSCHARTNOTEFT_GEN_A_CORE
Pt O2 saturation 84% when resting on RA and requires oxygen. Pt O2 saturation is 84% when resting on room air and requires home oxygen.

## 2022-09-01 NOTE — DISCHARGE NOTE NURSING/CASE MANAGEMENT/SOCIAL WORK - PATIENT PORTAL LINK FT
You can access the FollowMyHealth Patient Portal offered by [facility] by registering at the following website: [email or web address]. By joining [employer]’s FollowMyHealth portal, you will also be able to view your health information using other applications (apps) compatible with our system.

## 2022-09-01 NOTE — DISCHARGE NOTE NURSING/CASE MANAGEMENT/SOCIAL WORK - NSDCPEFALRISK_GEN_ALL_CORE
For information on Fall & Injury Prevention, visit: [email or web address] OR  [email or web address] OR  https://www.cdc.gov/steadi/patient.html

## 2022-09-02 PROBLEM — Z86.39 HISTORY OF OBESITY: Status: RESOLVED | Noted: 2022-01-01 | Resolved: 2022-01-01

## 2022-09-02 PROBLEM — Z86.69 HISTORY OF MIGRAINE HEADACHES: Status: RESOLVED | Noted: 2022-01-01 | Resolved: 2022-01-01

## 2022-09-02 PROBLEM — Z87.09 HISTORY OF CHRONIC OBSTRUCTIVE LUNG DISEASE: Status: RESOLVED | Noted: 2022-01-01 | Resolved: 2022-01-01

## 2022-09-02 PROBLEM — Z87.898 HISTORY OF INSOMNIA: Status: RESOLVED | Noted: 2022-01-01 | Resolved: 2022-01-01

## 2022-09-02 PROBLEM — B02.9 SHINGLES: Status: ACTIVE | Noted: 2022-01-01

## 2022-09-05 NOTE — HISTORY OF PRESENT ILLNESS
[FreeTextEntry1] : Teetee Wong with PMHx severe migraines, insomnia, DM2, COPD (60 PPD hx), CAD s/p 2 stents, NPH 2011 s/p R shunt @ [facility], obesity, and broken leg 2015 presents today to establish care as a new PC patient.\par \par History obtained from daughter Rebeca. Per daughter, pt had severe R sided HA 7/17 that was unrelieved by Fioricet, Aleve, and Naproxen. She followed with her internist 7/21 and had a CT scan of the head that was at baseline. Pt then was dx with Zoster and prescribed Valtrex 7/24. Pt became hospitalized 7/26 s/p fevers at home and was treated with Acyclovir, Vancomycin, and Meropenem in the hosptial for UTI, aspiration PNA, and she had an CHARLINE Cr 2.8 inpatient. She was treated with Neurontin 8/7 in the hospital and pain management recommended methadone/morphine; however Teetee was not started on these meds until ~1week ago. While inpatient she was treated with Toradol, Lyrica, Neurontin, and Trilipta. She had episodes of confusin/agitation/pain and was treated x2 for aspiration PNA and GIB. Pt was d/c from the hospital yesterday after about 37 days. \par \par Rebeca reports pt has been having ANDRADE/SOB increased from baseline and her functional status has declined tremendously. Pt was not having PT in the hospital consistently and cannot now walk independently. Pt c/o R head pain described as burning and 7/10. Per Rebeca, pt has been on the Methadone x8 days now but has not used the PRN morphine prescribed by the hospital for the last 5 days. She takes Tylenol 975mg up to TID which helps with HA symptoms. Last QTc on EKG was a few days ago inpatient which was ~450. Teetee is also receiving nerve block injections q3 days which have been helping with her pain in addition.\par \par Pt had urinary retention inpatient and still has a dior in place, to have trial of void in 1-2 weeks.

## 2022-09-05 NOTE — REASON FOR VISIT
[Home] : at home, [unfilled] , at the time of the visit. [Medical Office: ([facility])___] : at the medical office located in  [This encounter was initiated by telehealth (audio with video) and converted to telephone (audio only) due to technical difficulties.] : This encounter was initiated by telehealth (audio with video) and converted to telephone (audio only) due to technical difficulties. [Initial Evaluation] : an initial evaluation [Family Member] : family member [FreeTextEntry4] : Daughter- Luna

## 2022-09-05 NOTE — PHYSICAL EXAM
[] : no respiratory distress [FreeTextEntry1] : large area of erythema to R scalp extending to R eyebrow - healthy granulation tissue noted

## 2022-09-05 NOTE — END OF VISIT
[] : Fellow [Time Spent: ___ minutes] : I have spent [unfilled] minutes of time on the encounter. [FreeTextEntry3] : agree with fellows assessment and plan\par would recc to c/w methadone for 1 month and re-eval pain\par patient still c/o 7/10 pain

## 2022-09-06 PROBLEM — J30.2 SEASONAL ALLERGIES: Status: ACTIVE | Noted: 2022-01-01

## 2022-09-06 PROBLEM — K92.2 GI BLEED: Status: ACTIVE | Noted: 2022-01-01

## 2022-09-06 PROBLEM — K21.9 GERD WITHOUT ESOPHAGITIS: Status: ACTIVE | Noted: 2022-01-01

## 2022-09-06 PROBLEM — Z95.5 HISTORY OF HEART ARTERY STENT: Status: ACTIVE | Noted: 2022-01-01

## 2022-09-06 PROBLEM — Z87.891 FORMER SMOKER: Status: ACTIVE | Noted: 2022-01-01

## 2022-09-07 PROBLEM — Z99.81 OXYGEN DEPENDENT: Status: ACTIVE | Noted: 2022-01-01

## 2022-09-07 PROBLEM — E83.42 HYPOMAGNESEMIA: Status: ACTIVE | Noted: 2022-01-01

## 2022-09-07 PROBLEM — G50.0 TRIGEMINAL NEURALGIA: Status: ACTIVE | Noted: 2022-01-01

## 2022-09-07 PROBLEM — F51.04 CHRONIC INSOMNIA: Status: ACTIVE | Noted: 2022-01-01

## 2022-09-07 PROBLEM — I25.10 CORONARY ARTERY DISEASE: Status: ACTIVE | Noted: 2022-01-01

## 2022-09-07 PROBLEM — I77.9 CAROTID ARTERY DISEASE: Status: ACTIVE | Noted: 2022-01-01

## 2022-09-07 PROBLEM — Z29.8 NEED FOR PROPHYLAXIS AGAINST URINARY TRACT INFECTION: Status: ACTIVE | Noted: 2022-01-01

## 2022-09-07 PROBLEM — G91.2 NPH (NORMAL PRESSURE HYDROCEPHALUS): Status: ACTIVE | Noted: 2022-01-01

## 2022-09-07 PROBLEM — Z86.61 HISTORY OF ENCEPHALITIS: Status: RESOLVED | Noted: 2022-01-01 | Resolved: 2022-01-01

## 2022-09-07 PROBLEM — Z98.2 VP (VENTRICULOPERITONEAL) SHUNT STATUS: Status: ACTIVE | Noted: 2022-01-01

## 2022-09-07 PROBLEM — A49.8 METHICILLIN RESISTANT STAPHYLOCOCCUS EPIDERMIDIS INFECTION: Status: RESOLVED | Noted: 2022-01-01 | Resolved: 2022-01-01

## 2022-09-07 PROBLEM — B02.29 POST HERPETIC NEURALGIA: Status: ACTIVE | Noted: 2022-01-01

## 2022-09-07 PROBLEM — D64.9 ANEMIA: Status: ACTIVE | Noted: 2022-01-01

## 2022-09-07 PROBLEM — K80.20 GALLSTONES: Status: ACTIVE | Noted: 2022-01-01

## 2022-09-08 NOTE — PHYSICAL EXAM
[No Acute Distress] : no acute distress [Well Nourished] : well nourished [Well Developed] : well developed [Normal Voice/Communication] : normal voice communication [Normal Sclera/Conjunctiva] : normal sclera/conjunctiva [PERRL] : pupils equal, round and reactive to light [Normal Outer Ear/Nose] : the ears and nose were normal in appearance [Normal Oropharynx] : the oropharynx was normal [No JVD] : no jugular venous distention [Supple] : the neck was supple [No Respiratory Distress] : no respiratory distress [Clear to Auscultation] : lungs were clear to auscultation bilaterally [No Accessory Muscle Use] : no accessory muscle use [Normal Rate] : heart rate was normal  [Regular Rhythm] : with a regular rhythm [Normal S1, S2] : normal S1 and S2 [No Murmurs] : no murmurs heard [No Edema] : there was no peripheral edema [Normal Bowel Sounds] : normal bowel sounds [Non Tender] : non-tender [Soft] : abdomen soft [Not Distended] : not distended [Normal Post Cervical Nodes] : no posterior cervical lymphadenopathy [Normal Anterior Cervical Nodes] : no anterior cervical lymphadenopathy [No CVA Tenderness] : no ~M costovertebral angle tenderness [No Spinal Tenderness] : no spinal tenderness [Kyphosis] :  kyphosis present [No Joint Swelling] : no joint swelling seen [Scoliosis] : scoliosis not present [de-identified] : pleasantly confused [de-identified] : (R) TM ruptured [de-identified] : weak hand grasp [de-identified] : herpes rash from (R) mid head to below (R) eye with wet and dry areas [de-identified] : A&0x1-2

## 2022-09-08 NOTE — REVIEW OF SYSTEMS
[Fatigue] : fatigue [Shortness Of Breath] : shortness of breath [Cough] : cough [Muscle Weakness] : muscle weakness [FreeTextEntry3] : as noted in HPI [FreeTextEntry8] : as noted in HPI [de-identified] : as noted in HPI

## 2022-09-08 NOTE — HISTORY OF PRESENT ILLNESS
[FreeTextEntry2] : Patient denies fever, cough, trouble breathing, rash, vomiting and diarrhea. Patient has not been in close contact with someone COVID positive. \par Surgical mask and eye wear  used during visit [Y]. Total face to face time with patient is [90] min. 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, ESBL UTI, urinary retention with catheter placement, (R) subclavian DVT (on Eliquis), S/P PCI with stent placement (1986, 1996),  s/p  Shunt (2011)\par \par Pt in kunal-chair on arrival, pleasantly confused - report she is "ok"\par HPI/ROS provider by daughter Dr Luna Hunt\par Pt was living in [place] in 7/22 developed headache after further testing, including Head CT, found to have herpes zoster of V1 dermatome w/ eye involvement; started on valtrex and artificial tears on 7/24- presented to ER on 7/26 after developing AMS; had prolonged hospital stay complicated by post herpetic neuralgia pain; aspiration pneumonia (felt to be from polypharmacy);  ESBL UTI; urinary retention w/ catheter placement and failed trials of voiding; (R) subclavian DV T. 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 She was on morphine IV q4hr, Lyrica and gabapentin. Methadone started on on 8/25 w/ improved pain control; telehealth visit done with palliative care on 9/2 - now on methadone TID, Lyrica 300mg BID, Gabapentin 600mg BID, amitriptyline 10mg daily; morphine 15mg for breakthrough pain,  - has not needed morphine since being home;   on 8/26- daughter requesting weekly EKGs, labs\par Ophthalmology consulted and rec c/w erythromycin ointment QID to eye & periocular lesions, artificial tears Q4H.\par Dermatology consulted  - lidocaine ointment mixed with vaseline BID; now on gentamicin ointment\par \par Aspiration PNA  treated w/ Zosyn then ESBL UTI grew in catheterized sample cultures, started on meropenem & vanco (8/27-9/1), then oral Augmentin; Remains on oxgen 2L/NC and was unable to wean - daughter reports pulse ox as low as 80 on room air\par \par Carotid artery disease: 50-79% stenosis in (L) ICA and 16-49% stenosis in (R) ICA\par \par 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, now on Eliquis \par \par Urinary retention: Had candida in urine which was treated x 1 day; EBSL UTI treated w/ meropenem (8/27-9/1) then d/c home on Augmentin; indwelling urinary catheter placed 8/13, failed TOV 8/17, placed again 8/19; failed TOV again on 8/22 and dior reinserted; 14 Fr 10ml silicone dior catheter placed  on 8/26; has urology f/u on 9/12 but daughter requesting another TOV at home- made aware  I would need to arrange this with home care; will order 16Fr 10ml balloon silicone catheters\par \par CAD/HTN: history of severe HTN at home- on metoprolol and lasix; had been on spirololactone, and losartan prior to admission to hospital; has stents; on Eliquis\par \par Type 2 diabetes mellitus: A1c 7.2 in 7/27; now on lantus 10 units QHS; previously on lantus 34U daily, Trulicity, and glipizide; -120\par \par NPH (normal pressure hydrocephalus): -NPH diagnosed 2011, pt has programmable  shunt- no longer following w/ neurosurg\par \par COPD: -120py smoking hx, quit in 2011; had been on Trelegy; changed to Pulmicort MDI but unable to manipulate device- will change to budesonide nebs; encouraged incentive spirometry; sent home with Databanq device- will attempt to follow up with pulm or RT on how to use- aide made aware as I do not know the device; daughter concerned over increased cough which is worse at night  - offered CXR but she declined\par \par Pt on trazodone & mirtazapine prior to hospitalization for sleep- amitriptyline added in hospital - discussed concern of these meds in addition to methadone, lyrica, gabapentin and morphine with daughter who reports she is aware and decreased trazodone and mirtazapine doses; polypharmacy was also a concern in the hospital  - will order Narcan \par

## 2022-09-08 NOTE — COUNSELING
[Overweight - ( BMI 25.1 - 29.9 )] : overweight -  ( BMI 25.1 - 29.9 ) [Continue diet as tolerated] : continue diet as tolerated based on goals of care [Non - Smoker] : non-smoker [Date: ___] : diabetic screening completed on [unfilled] [] : foot exam [Improve pain control] : improve pain control [Discussed disease trajectory with patient/caregiver] : discussed disease trajectory with patient/caregiver [Advanced Directives discussed: ____] : Advanced directives discussed: [unfilled] [Completed Healthcare Proxy] : completed healthcare proxy [Completed Medical Orders for Life-Sustaining Treatment] : completed medical orders for life-sustaining treatment [Full Code] : Code Status: Full Code [No Limitations] : Treatment Guidelines: No limitations [Trial of Intubation] : Intubation: Trial of Intubation [[facility]: _____[facility]/last verification date: [unfilled] [ - New patient with 2 or more chronic conditions; CCM discussed and patient-centered care plan established] : New patient with 2 or more chronic conditions; CCM discussed and patient-centered care plan established [FreeTextEntry4] : return to previous state of health

## 2022-09-14 PROBLEM — Z74.01 BEDBOUND: Status: ACTIVE | Noted: 2022-01-01

## 2022-09-19 PROBLEM — R13.12 OROPHARYNGEAL DYSPHAGIA: Status: ACTIVE | Noted: 2022-01-01

## 2022-09-19 NOTE — CHRONIC CARE ASSESSMENT
[Other: ____] : [unfilled] [Can not Exercise (Disability)] : Exercise: The patient can not exercise due to disability [Diabetic Diet] : diabetic

## 2022-09-19 NOTE — REASON FOR VISIT
[Acute] : an acute visit [Formal Caregiver] : formal caregiver [Pre-Visit Preparation] : pre-visit preparation was done

## 2022-09-20 PROBLEM — D50.9 IRON DEFICIENCY ANEMIA: Status: ACTIVE | Noted: 2022-01-01

## 2022-09-20 PROBLEM — N39.0 ACUTE UTI (URINARY TRACT INFECTION): Status: RESOLVED | Noted: 2022-01-01 | Resolved: 2022-01-01

## 2022-09-20 PROBLEM — Z97.8 FOLEY CATHETER IN PLACE: Status: ACTIVE | Noted: 2022-01-01

## 2022-09-20 PROBLEM — R33.9 URINARY RETENTION: Status: ACTIVE | Noted: 2022-01-01

## 2022-09-20 NOTE — PHYSICAL EXAM
[No Acute Distress] : no acute distress [Well Nourished] : well nourished [Well Developed] : well developed [Normal Voice/Communication] : normal voice communication [Normal Sclera/Conjunctiva] : normal sclera/conjunctiva [Normal Outer Ear/Nose] : the ears and nose were normal in appearance [Normal Oropharynx] : the oropharynx was normal [No JVD] : no jugular venous distention [Supple] : the neck was supple [No Respiratory Distress] : no respiratory distress [Clear to Auscultation] : lungs were clear to auscultation bilaterally [No Accessory Muscle Use] : no accessory muscle use [Normal Rate] : heart rate was normal  [Regular Rhythm] : with a regular rhythm [Normal S1, S2] : normal S1 and S2 [No Murmurs] : no murmurs heard [No Edema] : there was no peripheral edema [Normal Bowel Sounds] : normal bowel sounds [Non Tender] : non-tender [Soft] : abdomen soft [Not Distended] : not distended [Normal Post Cervical Nodes] : no posterior cervical lymphadenopathy [Normal Anterior Cervical Nodes] : no anterior cervical lymphadenopathy [No CVA Tenderness] : no ~M costovertebral angle tenderness [No Spinal Tenderness] : no spinal tenderness [Kyphosis] :  kyphosis present [No Joint Swelling] : no joint swelling seen [Scoliosis] : scoliosis not present [de-identified] : pleasantly confused [de-identified] : (R) pupil non-reactive [de-identified] : (R) TM ruptured [de-identified] : weak hand grasp [de-identified] : herpes rash from (R) mid head to below (R) eye with dry areas [de-identified] : A&0x1-2

## 2022-09-20 NOTE — CURRENT MEDS
[Medication and Allergies Reconciled] : medication and allergies reconciled [High Risk Medications Reviewed and Reconciled (Beers Criteria)] : high risk medications reviewed and reconciled [Reviewed patient reported medication adherence from Comprehensive Assessment] : Reviewed patient reported medication adherence from comprehensive assessment [Adherent to medications] : Patient is adherent to medications as prescribed [de-identified] : managed by family

## 2022-09-20 NOTE — HISTORY OF PRESENT ILLNESS
[Family Member] : family member [FreeTextEntry2] : Patient denies fever, cough, trouble breathing, rash, vomiting and diarrhea. Patient has not been in close contact with someone COVID positive. \par Surgical mask and eye wear  used during visit [Y]. Total face to face time with patient is [30] min. 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, ESBL UTI, urinary retention with catheter placement, (R) subclavian DVT (on Eliquis), S/P PCI with stent placement (1986, 1996),  s/p  Shunt (2011)\par \par Pt in kunal-chair on arrival, pleasantly confused- Zoster rash on (R) head and side of face much improved- had opthalmology follow up on 9/15- has non reactive (R) pupil thought to be from zoster\par Treated for UTI with Bactrim x 2days  and Invanz x5 days from 9/8- 9/14\par Failed trial of void when dior removed so it was reinserted - private urology to attempt again next week- has sediment in dior tubing\par Has Iron deficiency anemia- ferrous sulfate restarted- will repeat labs in 2 week\par Had hemoptysis- Eliquis stopped by recommendations of private hematologist - US of subclavian vein pending\par Daughter requesting Covid & Flu vaccines today \par \par ____________________________________________________________________________\par 9/6/22 visit\par Pt was living in [place] in 7/22 developed headache after further testing, including Head CT, found to have herpes zoster of V1 dermatome w/ eye involvement; started on valtrex and artificial tears on 7/24- presented to ER on 7/26 after developing AMS; had prolonged hospital stay complicated by post herpetic neuralgia pain; aspiration pneumonia (felt to be from polypharmacy);  ESBL UTI; urinary retention w/ catheter placement and failed trials of voiding; (R) subclavian DV T. 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 She was on morphine IV q4hr, Lyrica and gabapentin. Methadone started on on 8/25 w/ improved pain control; telehealth visit done with palliative care on 9/2 - now on methadone TID, Lyrica 300mg BID, Gabapentin 600mg BID, amitriptyline 10mg daily; morphine 15mg for breakthrough pain,  - has not needed morphine since being home;   on 8/26- daughter requesting weekly EKGs, labs\par Ophthalmology consulted and rec c/w erythromycin ointment QID to eye & periocular lesions, artificial tears Q4H.\par Dermatology consulted  - lidocaine ointment mixed with vaseline BID; now on gentamicin ointment\par \par Aspiration PNA  treated w/ Zosyn then ESBL UTI grew in catheterized sample cultures, started on meropenem & vanco (8/27-9/1), then oral Augmentin; Remains on oxgen 2L/NC and was unable to wean - daughter reports pulse ox as low as 80 on room air\par \par Carotid artery disease: 50-79% stenosis in (L) ICA and 16-49% stenosis in (R) ICA\par \par 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, now on Eliquis \par \par Urinary retention: Had candida in urine which was treated x 1 day; EBSL UTI treated w/ meropenem (8/27-9/1) then d/c home on Augmentin; indwelling urinary catheter placed 8/13, failed TOV 8/17, placed again 8/19; failed TOV again on 8/22 and dior reinserted; 14 Fr 10ml silicone dior catheter placed  on 8/26; has urology f/u on 9/12 but daughter requesting another TOV at home- made aware  I would need to arrange this with home care; will order 16Fr 10ml balloon silicone catheters\par \par CAD/HTN: history of severe HTN at home- on metoprolol and lasix; had been on spirololactone, and losartan prior to admission to hospital; has stents; on Eliquis\par \par Type 2 diabetes mellitus: A1c 7.2 in 7/27; now on lantus 10 units QHS; previously on lantus 34U daily, Trulicity, and glipizide; -120\par \par NPH (normal pressure hydrocephalus): -NPH diagnosed 2011, pt has programmable  shunt- no longer following w/ neurosurg\par \par COPD: -120py smoking hx, quit in 2011; had been on Trelegy; changed to Pulmicort MDI but unable to manipulate device- will change to budesonide nebs; encouraged incentive spirometry; sent home with XIPWIRE device- will attempt to follow up with pulm or RT on how to use- aide made aware as I do not know the device; daughter concerned over increased cough which is worse at night  - offered CXR but she declined\par \par Pt on trazodone & mirtazapine prior to hospitalization for sleep- amitriptyline added in hospital - discussed concern of these meds in addition to methadone, lyrica, gabapentin and morphine with daughter who reports she is aware and decreased trazodone and mirtazapine doses; polypharmacy was also a concern in the hospital  - will order Narcan \par

## 2022-09-20 NOTE — REVIEW OF SYSTEMS
[Fatigue] : fatigue [Muscle Weakness] : muscle weakness [Negative] : Heme/Lymph [Shortness Of Breath] : no shortness of breath [Cough] : no cough [FreeTextEntry3] : as noted in HPI [FreeTextEntry8] : as noted in HPI [de-identified] : as noted in HPI

## 2022-09-20 NOTE — ASSESSMENT
[FreeTextEntry1] : \par ROSEY FRANCISCO educated ADAL LIZARRAGA or their legal representative about the Pfizer COVID-19 vaccine and provided the information sheet. \par \par ROSEY FRANCISCO educated ADAL LIZARRAGA or their legal representative that this vaccine booster is recommended to boost the vaccine's effectiveness.\par ROSEY FRANCISCO educated that there will be no cost to ADAL LIZARRAGA for this vaccine and that any monies or benefits for administering the vaccine will be assigned and transferred to the vaccinating provider, including benefits/monies from ADAL LIZARRAGA's  health plan, Medicare or other third parties who are financially responsible for their medical care. \par \par ADAL LIZARRAGA or their legal representative consents to the release of all information needed (including but not limited to medical records, copies of claims and itemized bills) to verify payment and as needed for other public health purposes, including reporting to applicable vaccine registries.\par \par ROSEY FRANCISCO provided ADAL LIZARRAGA or their legal representative a chance to ask questions and reviewed the benefits and risks of the vaccination as described. ADAL LIZARRAGA or their legal representative consents to the administration of the COVID-19 vaccination\par \par ADAL GALEAS Nov 17 1935 being screened for COVID-19 vaccine administration visit. \par Information received by  daughter/HCP\par \par ADAL LIZARRAGA denies moderate to severe acute illness with or without fever.\par ADAL LIZARRAGA denies having a positive test for COVID-19 and denies being told by a doctor that she has COVID-19 in the past 14 days.\par ADAL LIZARRAGA denies receiving passive antibody therapy (monoclonal antibodies or convalescent plasma) as a treatment for COVID-19 in the past 90 days (3 months).\par \par ADAL LIZARRAGA denies ever having an immediate allergic reaction of any severity to a prior vaccine or injectable therapy, and denies anaphylaxis from any cause, including polysorbate-80 or polyethylene glycol (Miralax).\par ADAL LIZARRAGA's legal representative is: daughter HCP \par \par Patient screened as above.\par After consent verbally received, the Pfizer-BioNTech COVID19 vaccine vial was gently inverted 10 times, and in an upright position, using aseptic technique, a 0.3 mL dose was drawn up into a 1 mL syringe.  The [right] deltoid was prepped with alcohol swab, and vaccine was administered at a 90 degree angle.  A bandage was applied.\par Patient was monitored for [15] minutes afterwards.  Complications included: [none].\par \par

## 2022-09-23 NOTE — PROGRESS NOTE ADULT - PROBLEM SELECTOR PLAN 7
Discussed AREDS supplements, BP Control, and dark leafy green vegetables. Hx of severe HTN at home.  -BPs currently stable  -c/w metoprolol and  lasix   - Holding spirololactone, and losartan

## 2022-11-03 PROBLEM — Z79.899 ENCOUNTER FOR LONG-TERM CURRENT USE OF MEDICATION: Status: ACTIVE | Noted: 2022-01-01

## 2022-11-09 PROBLEM — Z51.5 ENCOUNTER FOR PALLIATIVE CARE: Status: ACTIVE | Noted: 2022-01-01

## 2022-11-09 PROBLEM — R51.9 CHRONIC PAIN IN FACE: Status: ACTIVE | Noted: 2022-01-01

## 2022-11-09 PROBLEM — R53.81 DEBILITY: Status: ACTIVE | Noted: 2022-01-01

## 2022-11-09 PROBLEM — R63.0 DECREASED APPETITE: Status: ACTIVE | Noted: 2022-01-01

## 2022-11-09 NOTE — REVIEW OF SYSTEMS
[As Noted in HPI] : as noted in HPI [Feeling Tired] : feeling tired [Eye Pain] : eye pain [Skin Wound] : skin wound [Limb Weakness] : limb weakness [Negative] : Heme/Lymph

## 2022-11-09 NOTE — HISTORY OF PRESENT ILLNESS
[FreeTextEntry1] : Patient is an 87 y/o F whom comes for f/u for pain symptoms w/ palliative care via telehealth. \par Hx provided by daughter Luna and MAGALIS Kiera.\par \par PMH:\par HEENT: Herpes zoster of V1 dermatome w/ eye involvement\par Neuro: Migraines, NPH s/p  shunt 2011\par Cardio: HTN, CAD s/p 2 stents (1986, 1989)\par Pulm: COPD, s/p COVID, s/p ASP pna \par GI:\par : ESBL UTI, urinary retention s/p dior\par Renal:\par Musculo:\par Endo:DM\par Vascular: \par Skin: DTI sacrum \par Heme/Onc: Right subclavian DVT was on AC w/ eliquis pending transition to ASA\par \par #acute Delirium \par house calls following- has patient on Augmentin \par today clear baseline mental status; no fevers or chills\par \par #Sacral ulcer, DTI \par PCP helping with wound care- improving as per daughter\par \par # H/o shinles V1 dermatome\par unclear if corneal abrasion related to shingles\par optho on case - currently right eye sewn/temp fixture \par pending decision for plastic sx intervention\par completed valtrex?\par \par # Trigeminal Neuralgia\par on amitriptyline 10 mg qhs\par on trazodone 50 mg qhs \par \par # Chronic Facial Pain \par on gabapentin 300 mg BID may be transitioning to lyrica?\par on methadone 5-2.5-5\par on morphine 15 mg q4h prn \par \par # Depression, insomnia, low appetite\par on mirtazapine 7.5 mg qhs \par # Advanced COPD\par \par \par \par \par

## 2022-11-09 NOTE — REASON FOR VISIT
[Home] : at home, [unfilled] , at the time of the visit. [Medical Office: ([facility])___] : at the medical office located in  [This encounter was initiated by telehealth (audio with video) and converted to telephone (audio only) due to technical difficulties.] : This encounter was initiated by telehealth (audio with video) and converted to telephone (audio only) due to technical difficulties. [Follow-Up] : a follow-up visit [Family Member] : family member [FreeTextEntry4] : Daughter- Luna

## 2022-11-09 NOTE — PHYSICAL EXAM
[] : no respiratory distress [Hearing Threshold Finger Rub Not [place]] : hearing was normal [Affect] : the affect was normal [Mood] : the mood was normal [FreeTextEntry1] : sacral/coccyx pressure ulcer

## 2022-11-22 PROBLEM — B36.9 DERMATITIS FUNGAL: Status: RESOLVED | Noted: 2022-01-01 | Resolved: 2022-01-01

## 2022-11-22 PROBLEM — B02.30: Status: ACTIVE | Noted: 2022-01-01

## 2022-11-22 PROBLEM — Z23 NEED FOR COVID-19 VACCINE: Status: RESOLVED | Noted: 2022-01-01 | Resolved: 2022-01-01

## 2022-11-22 PROBLEM — F11.20 UNCOMPLICATED OPIOID DEPENDENCE: Status: ACTIVE | Noted: 2022-01-01

## 2022-11-22 PROBLEM — G43.119 INTRACTABLE MIGRAINE WITH AURA WITHOUT STATUS MIGRAINOSUS: Status: RESOLVED | Noted: 2022-01-01 | Resolved: 2022-01-01

## 2022-11-22 PROBLEM — K59.00 CONSTIPATION IN FEMALE: Status: ACTIVE | Noted: 2022-01-01

## 2022-11-22 PROBLEM — L89.156 PRESSURE-INDUCED DEEP TISSUE DAMAGE OF SACRAL REGION: Status: ACTIVE | Noted: 2022-01-01

## 2022-11-22 PROBLEM — I82.B11 THROMBOSIS OF RIGHT SUBCLAVIAN VEIN: Status: ACTIVE | Noted: 2022-01-01

## 2022-11-22 PROBLEM — Z87.898 HISTORY OF DYSURIA: Status: RESOLVED | Noted: 2022-01-01 | Resolved: 2022-01-01

## 2022-11-22 PROBLEM — J96.11 CHRONIC RESPIRATORY FAILURE WITH HYPOXIA, ON HOME O2 THERAPY: Status: ACTIVE | Noted: 2022-01-01

## 2022-11-22 PROBLEM — Z23 ENCOUNTER FOR IMMUNIZATION: Status: RESOLVED | Noted: 2022-01-01 | Resolved: 2022-01-01

## 2022-11-22 NOTE — COUNSELING
[Overweight - ( BMI 25.1 - 29.9 )] : overweight -  ( BMI 25.1 - 29.9 ) [Continue diet as tolerated] : continue diet as tolerated based on goals of care [Non - Smoker] : non-smoker [Date: ___] : diabetic screening completed on [unfilled] [] : foot exam [Improve pain control] : improve pain control [Discussed disease trajectory with patient/caregiver] : discussed disease trajectory with patient/caregiver [Advanced Directives discussed: ____] : Advanced directives discussed: [unfilled] [Completed Healthcare Proxy] : completed healthcare proxy [Completed Medical Orders for Life-Sustaining Treatment] : completed medical orders for life-sustaining treatment [Full Code] : Code Status: Full Code [No Limitations] : Treatment Guidelines: No limitations [Trial of Intubation] : Intubation: Trial of Intubation [[facility]: _____] [facility] Completion/last verification date: [unfilled] [FreeTextEntry4] : return to previous state of health

## 2022-11-22 NOTE — PHYSICAL EXAM
[No Acute Distress] : no acute distress [Well Nourished] : well nourished [Well Developed] : well developed [Normal Voice/Communication] : normal voice communication [Normal Sclera/Conjunctiva] : normal sclera/conjunctiva [Normal Outer Ear/Nose] : the ears and nose were normal in appearance [Normal Oropharynx] : the oropharynx was normal [No JVD] : no jugular venous distention [Supple] : the neck was supple [No Respiratory Distress] : no respiratory distress [Clear to Auscultation] : lungs were clear to auscultation bilaterally [No Accessory Muscle Use] : no accessory muscle use [Normal Rate] : heart rate was normal  [Regular Rhythm] : with a regular rhythm [Normal S1, S2] : normal S1 and S2 [No Murmurs] : no murmurs heard [No Edema] : there was no peripheral edema [Normal Bowel Sounds] : normal bowel sounds [Non Tender] : non-tender [Soft] : abdomen soft [Not Distended] : not distended [Normal Post Cervical Nodes] : no posterior cervical lymphadenopathy [Normal Anterior Cervical Nodes] : no anterior cervical lymphadenopathy [No CVA Tenderness] : no ~M costovertebral angle tenderness [No Spinal Tenderness] : no spinal tenderness [Kyphosis] :  kyphosis present [No Joint Swelling] : no joint swelling seen [Scoliosis] : scoliosis not present [de-identified] : pleasantly confused [de-identified] : (R) pupil non-reactive, lids partially sutured closed [de-identified] : (R) TM ruptured [de-identified] : weak hand grasp [de-identified] : Zoster rash on (R) head and side of face resolved- has minimal scabbing but chronic red, raw, tight appearance [de-identified] : A&0x1-2

## 2022-11-22 NOTE — CURRENT MEDS
[Medication and Allergies Reconciled] : medication and allergies reconciled [High Risk Medications Reviewed and Reconciled (Beers Criteria)] : high risk medications reviewed and reconciled [Reviewed patient reported medication adherence from Comprehensive Assessment] : Reviewed patient reported medication adherence from comprehensive assessment [Adherent to medications] : Patient is adherent to medications as prescribed [de-identified] : managed by family

## 2022-11-22 NOTE — HISTORY OF PRESENT ILLNESS
[Family Member] : family member [FreeTextEntry2] : Patient denies fever, cough, trouble breathing, rash, vomiting and diarrhea. Patient has not been in close contact with someone COVID positive. \par Surgical mask and eye wear  used during visit [Y]. Total face to face time with patient is [30] min. 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, ESBL UTI, urinary retention with catheter placement, (R) subclavian DVT (on Eliquis), S/P PCI with stent placement (1986, 1996),  s/p  Shunt (2011)\par \par In bed for visit - pleasant, asking if she will continue to need oxygen \par Recently finished Augmentin for probable pneumonia- reports she is still coughing, does have wet cough, unable to expectorate sputum- will get CXR\par Also had breakdown on (L) upper  buttocks - daughter using allevyn and oil emulsion gauze to area- area looks wet w/ \par Zoster rash on (R) head and side of face resolved- has minimal scabbing but chronic red, raw, tight appearance- remains on Valtrex 1000mg TID\par (R) eyelids partially sutured together to protect cornea \par Dior remains in place- incontinent of stool- reports some abd pain- will get abd Xray \par \par 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 - as above\par  on methadone, morphine 15mg PRN, Lyrica 300mg BID, gabapentin 300mg BID;  amitriptyline 10mg daily; followed by opthalmology, palliative care\par \par Carotid artery disease: 50-79% stenosis in (L) ICA and 16-49% stenosis in (R) ICA\par \par 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\par Urinary retention: has 16Fr 10ml balloon silicone catheter in place\par \par CAD/HTN: history of severe HTN at home- on metoprolol and lasix; had been on spirololactone, and losartan prior to admission to hospital; has stents; off Eliquis\par \par Type 2 diabetes mellitus: A1c 6.9 in 9/22; now on lantus 10 units QHS; previously on lantus 34U daily, Trulicity, and glipizide; -120\par \par NPH (normal pressure hydrocephalus): -NPH diagnosed 2011, pt has programmable  shunt- no longer following w/ neurosurg\par \par COPD: -120py smoking hx, quit in 2011;  on Trelegy; Duonebs \par \par Iron deficiency anemia- on ferrous sulfate\par \par Pt on trazodone, mirtazapine, amitriptyline, methadone, lyrica, gabapentin and morphine- concern for  polypharmacy has been discussed with daughter-  Narcan ordered\par \par  \par \par ____________________________________________________________________________\par 9/6/22 visit\par Pt was living in [place] in 7/22 developed headache after further testing, including Head CT, found to have herpes zoster of V1 dermatome w/ eye involvement; started on valtrex and artificial tears on 7/24- presented to ER on 7/26 after developing AMS; had prolonged hospital stay complicated by post herpetic neuralgia pain; aspiration pneumonia (felt to be from polypharmacy);  ESBL UTI; urinary retention w/ catheter placement and failed trials of voiding; (R) subclavian DV T. 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 She was on morphine IV q4hr, Lyrica and gabapentin. Methadone started on on 8/25 w/ improved pain control; telehealth visit done with palliative care on 9/2 - now on methadone TID, Lyrica 300mg BID, Gabapentin 600mg BID, amitriptyline 10mg daily; morphine 15mg for breakthrough pain,  - has not needed morphine since being home;   on 8/26- daughter requesting weekly EKGs, labs\par Ophthalmology consulted and rec c/w erythromycin ointment QID to eye & periocular lesions, artificial tears Q4H.\par Dermatology consulted  - lidocaine ointment mixed with vaseline BID; now on gentamicin ointment\par \par Aspiration PNA  treated w/ Zosyn then ESBL UTI grew in catheterized sample cultures, started on meropenem & vanco (8/27-9/1), then oral Augmentin; Remains on oxgen 2L/NC and was unable to wean - daughter reports pulse ox as low as 80 on room air\par \par Carotid artery disease: 50-79% stenosis in (L) ICA and 16-49% stenosis in (R) ICA\par \par 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, now on Eliquis \par \par Urinary retention: Had candida in urine which was treated x 1 day; EBSL UTI treated w/ meropenem (8/27-9/1) then d/c home on Augmentin; indwelling urinary catheter placed 8/13, failed TOV 8/17, placed again 8/19; failed TOV again on 8/22 and dior reinserted; 14 Fr 10ml silicone dior catheter placed  on 8/26; has urology f/u on 9/12 but daughter requesting another TOV at home- made aware  I would need to arrange this with home care; will order 16Fr 10ml balloon silicone catheters\par \par CAD/HTN: history of severe HTN at home- on metoprolol and lasix; had been on spirololactone, and losartan prior to admission to hospital; has stents; on Eliquis\par \par Type 2 diabetes mellitus: A1c 7.2 in 7/27; now on lantus 10 units QHS; previously on lantus 34U daily, Trulicity, and glipizide; -120\par \par NPH (normal pressure hydrocephalus): -NPH diagnosed 2011, pt has programmable  shunt- no longer following w/ neurosurg\par \par COPD: -120py smoking hx, quit in 2011; had been on Trelegy; changed to Pulmicort MDI but unable to manipulate device- will change to budesonide nebs; encouraged incentive spirometry; sent home with [employer] device- will attempt to follow up with pulm or RT on how to use- aide made aware as I do not know the device; daughter concerned over increased cough which is worse at night  - offered CXR but she declined\par \par Pt on trazodone & mirtazapine prior to hospitalization for sleep- amitriptyline added in hospital - discussed concern of these meds in addition to methadone, lyrica, gabapentin and morphine with daughter who reports she is aware and decreased trazodone and mirtazapine doses; polypharmacy was also a concern in the hospital  - will order Narcan \par

## 2022-11-22 NOTE — REVIEW OF SYSTEMS
[Fatigue] : fatigue [Muscle Weakness] : muscle weakness [Negative] : Heme/Lymph [Cough] : cough [Shortness Of Breath] : no shortness of breath [FreeTextEntry3] : as noted in HPI [FreeTextEntry6] : as noted in HPI [FreeTextEntry8] : as noted in HPI [de-identified] : as noted in HPI

## 2022-11-22 NOTE — REASON FOR VISIT
[Formal Caregiver] : formal caregiver [Pre-Visit Preparation] : pre-visit preparation was done [Follow-Up] : a follow-up visit [Other: _____] : [unfilled] [FreeTextEntry1] : for chronic conditions

## 2022-11-30 PROBLEM — J69.0 ASPIRATION PNEUMONIA, UNSPECIFIED ASPIRATION PNEUMONIA TYPE, UNSPECIFIED LATERALITY, UNSPECIFIED PART OF LUNG: Status: ACTIVE | Noted: 2022-01-01

## 2022-12-08 PROBLEM — E44.0 MALNUTRITION OF MODERATE DEGREE: Status: ACTIVE | Noted: 2022-01-01

## 2022-12-23 PROBLEM — R30.0 DYSURIA: Status: ACTIVE | Noted: 2022-01-01

## 2022-12-24 NOTE — ED PROVIDER NOTE - PROGRESS NOTE DETAILS
Long discussion with a daughter pediatric oncologist on staff of [facility] ,pt has 2  home attendant  24 h ,doesn't want to keep her in a hospital ,wants to follow up MRI outpatient.   wILL do bed side echo to r/o vegetation ,start ASA and Plavix  and follow up with neuro ZR

## 2022-12-24 NOTE — ED PROVIDER NOTE - CLINICAL SUMMARY MEDICAL DECISION MAKING FREE TEXT BOX
Impression: 87-year-old female pmhx of COPD 4 L baseline satting at 85 to 88%, diabetes, stroke in 2011 comes to ED w/ right-sided weakness. Their symptoms of R sided weakness, exam findings of 4+motor L, 0+ motor R are concerning for stroke, TIA.    Ordered labs, imaging, medications for diagnosis, management, and treatment. stroke code    87-year-old female pmhx of COPD 4 L baseline satting at 85 to 88%, diabetes, stroke in 2011, comes to ED w/ right-sided weakness.  seen base line at 3 am ,Their symptoms of R sided weakness, exam findings of 4+motor L, 0+ motor R are concerning for stroke, TIA .hypertensive emergency     Ordered labs, imaging, medications for diagnosis, management,  neuro ,reassess ZR

## 2022-12-24 NOTE — ED PROVIDER NOTE - PHYSICAL EXAMINATION
General: Uncomfortable appearing  HEENT: NCAT, PERRL  Cardiac: RRR, no murmurs, 2+ peripheral pulses  Chest: CTAB  Abdomen: soft, non-distended, bowel sounds present, no ttp, no rebound or guarding  Extremities: no peripheral edema, calf tenderness  Skin: no rashes  Neuro: AAOx3, 4+motor L, 0+ motor R  Psych: mood and affect appropriate General: Uncomfortable appearing  HEENT: NCAT, PERRL  Cardiac: RRR, no murmurs, 2+ peripheral pulses  Chest: CTAB  Abdomen: soft, non-distended, bowel sounds present, no ttp, no rebound or guarding  Extremities: no peripheral edema, calf tenderness  Skin: no rashes scars on rt side of forehead after recent herpes zoster   Neuro: AAOx3, 4+motor L, 0+ motor R  Psych: mood and affect appropriate

## 2022-12-24 NOTE — CONSULT NOTE ADULT - ASSESSMENT
86 year old female with PMH of DM, HTN,  Shunt, Herpes Zoster Opthalmicus with herpetic neuralalgia, previous ischemic stroke (, L. frontal infarction, no residual defecits), RUE DVT (on Eliquis) presents with acute onset RHP.     NIHSS 8  MRS 5 (bed bound at baseline)   CTH (to my eye) demonstrates no acute infarction; Chronic L. frontal encephelomalacia. Bilateral hygromas unchanged in size.  shunt in place. No ventriculomegaly.   CTA h/n (to my eye) demonstrates moderate stenosis of the L. ICA orgin (~50-60%), otherwise no hemodynamically significant stenosis.   CTP negative  No tpa as outside window   NO MT as no LVO     Impression: Acute onset RHP consistent w/ L. brain dysfunction, likely secondary to acute ischemic stroke versus recrudescence. Mechanism of stroke uncertain due to incomplete workup, however, possibly Large Artery Atherosclerotic disease (artery to artery embolism related to a moderate grade stenosis of the L. ICA orgin) versus small vessel disease () versus ESUS. Patients family requesting to take the patient home and to receive the stroke workup as an outpatient.    Plan  [] TTE being performed now; f/u results   [] MR brain will be coordinated outpatient with Dr. Lyon and his team; If stroke appears embolic, and related to the L. carotid, Dr. Lyon will have discussion with family regarding possible revascularization options (non-emergently).   [] Will likely need ILR; This will be coordinated by Dr. Lyon in the clinic   [] Load Plavix 300mg; Start ASA 81mg + Plavix 75mg for 3 weeks followed by ASA 81mg indefinitely per CHANCE trial   [] No neurovascular contraindication to discharge and having workup outpatient at the request of family, with Dr. Leandro Lyon (); Patient should see Dr. Lyon on Tuesday.  86 year old female with PMH of DM, HTN,  Shunt, Herpes Zoster Opthalmicus with herpetic neuralalgia, previous ischemic stroke (, L. frontal infarction, no residual defecits), RUE DVT (on Eliquis) presents with acute onset RHP.     NIHSS 8  MRS 5 (bed bound at baseline)   CTH (to my eye) demonstrates no acute infarction; Chronic L. frontal encephelomalacia. Bilateral hygromas unchanged in size.  shunt in place. No ventriculomegaly.   CTA h/n (to my eye) demonstrates moderate stenosis of the L. ICA orgin (~50-60%), otherwise no hemodynamically significant stenosis.   CTP negative  No tpa as outside window   NO MT as no LVO     Impression: Acute onset RHP consistent w/ L. brain dysfunction, likely secondary to acute ischemic stroke versus recrudescence. Mechanism of stroke uncertain due to incomplete workup, however, possibly Large Artery Atherosclerotic disease (artery to artery embolism related to a moderate grade stenosis of the L. ICA orgin) versus small vessel disease () versus ESUS. Patients family requesting to take the patient home and to receive the stroke workup as an outpatient.    Plan  [] TTE being performed now; f/u results   [] MR brain will be coordinated outpatient with Dr. Lyon and his team; If stroke appears embolic, and related to the L. carotid, Dr. Lyon will have discussion with family regarding possible revascularization options (non-emergently).   [] Will likely need ILR; This will be coordinated by Dr. Lyon in the clinic   [] Load Plavix 300mg; Start ASA 81mg + Plavix 75mg for 3 weeks followed by ASA 81mg indefinitely per CHANCE trial   [] No neurovascular contraindication to discharge and having workup outpatient at the request of family, with Dr. Leandro Lyon (); Patient should see Dr. Lyon on Tuesday.     discharged prior to attending evaluation. quick outpatient f/u

## 2022-12-24 NOTE — ED PROVIDER NOTE - OBJECTIVE STATEMENT
87-year-old female pmhx of COPD 4 L baseline satting at 85 to 88%, diabetes, stroke in 2011, shingles with encephalitis comes to ED w/ right-sided weakness.  Facility that the patient states that reports that the patient was noted to have right-sided weakness when she woke up this morning around 7 AM, when they try to turn her in the middle of the night as well around 3 AM she appeared to have right-sided weakness at that time.  Weakness persisted patient was transported to ED for concerns for stroke. Their symptom is moderate to severe, located in the arms and legs, constant, non mediating with rest.

## 2022-12-24 NOTE — ED PROVIDER NOTE - NSFOLLOWUPCLINICS_GEN_ALL_ED_FT
[facility]  Neurology  [address] [place] [address] [address] [place] [address]  Phone: [phone]  Fax:

## 2022-12-24 NOTE — ED ADULT TRIAGE NOTE - CHIEF COMPLAINT QUOTE
R sided weakness and L sided facial droop since 0730 AM  last normal 2330 last night  FS - 157  code stroke called 0923

## 2022-12-24 NOTE — ED PROVIDER NOTE - NSFOLLOWUPINSTRUCTIONS_ED_ALL_ED_FT
You were seen in the Emergency Department for concern for stroke/TIA. The neurology team evaluated patient  and  determined  you were safe for outpatient neurology follow-up and MRI.  Schedule as soon as possible.    1) Advance activity as tolerated.   2) New prescription sent to pharmacy indicated in interview take prescription as prescribed. Continue all previously prescribed medications as directed.    3) Follow up with neurology and your primary care physician in 24-48 hours - take copies of your results.    4) Return to the Emergency Department for worsening or persistent symptoms, and/or ANY NEW OR CONCERNING SYMPTOMS.

## 2022-12-24 NOTE — ED PROVIDER NOTE - PATIENT PORTAL LINK FT
You can access the FollowMyHealth Patient Portal offered by [facility] by registering at the following website: [email or web address]. By joining [facility] FollowMyHealth portal, you will also be able to view your health information using other applications (apps) compatible with our system.

## 2022-12-24 NOTE — ED CLERICAL - NS ED CLERK NOTE PRE-ARRIVAL INFORMATION; ADDITIONAL PRE-ARRIVAL INFORMATION

## 2022-12-24 NOTE — CONSULT NOTE ADULT - SUBJECTIVE AND OBJECTIVE BOX
86 year old female with PMH of DM, HTN,  Shunt, Herpes Zoster Opthalmicus with hereptic neurolalgia  ADAL LIZARRAGA  Female  MRN-[id number]    HPI:    86 year old female with PMH of DM, HTN,  Shunt, Herpes Zoster Opthalmicus with herpetic neuralalgia, previous ischemic stroke (2011, L. frontal infarction, no residual defecits, on ASA) present      NIHSS:  MRS:     ROS: All negative except as mentioned in HPI    PAST MEDICAL & SURGICAL HISTORY:  Myocardial Infarction      Diabetes Mellitus Type II      Migraines      COPD (Chronic Obstructive Pulmonary Disease)      NPH (normal pressure hydrocephalus)      COPD, mild      CAD (coronary artery disease)      Type 2 diabetes mellitus      Migraines      Stented coronary artery        MEDICATIONS  (STANDING):    MEDICATIONS  (PRN):    Allergies    diltiazem (Other; Rash)    Intolerances    Haldol (Dystonic RXN)      VITAL SIGNS:  T(F): --  HR: 70  BP: 120/70  RR: 20  SpO2: 88%    MS: Oriented x3. Recent and remote recall intact. Fluent. Follows crossed commands. Patient able to verbalize past history.   CN: VFF. EOMI. V1-V3 intact. Face symmetric. T/u midline. Should shrug intact bilaterally.   Motor: Normal tone. Full strength throughout.   Sensory: Intact to LT and PP throughout   Reflexes: 2+ throughout. Babinski absent bilaterally.   Coordination: No dysmetria on FNF or ataxia on HTS bilaterally   Gait: Normal     LABS:                RADIOLOGY & ADDITIONAL STUDIES:           ADAL LIZARRAGA  Female  [id number]    HPI:    86 year old female with PMH of DM, HTN,  Shunt, Herpes Zoster Opthalmicus with herpetic neuralalgia, previous ischemic stroke (2011, L. frontal infarction, no residual defecits), RUE DVT (on Eliquis) presents with acute onset right sided weakness.     Patient lives in facility. Per team there, patient was last seen well at 0300 12/24. At 0530, she was found to have right sided weakness. She was brought in for these complaints.       NIHSS:  MRS:     ROS: All negative except as mentioned in HPI    PAST MEDICAL & SURGICAL HISTORY:  Myocardial Infarction      Diabetes Mellitus Type II      Migraines      COPD (Chronic Obstructive Pulmonary Disease)      NPH (normal pressure hydrocephalus)      COPD, mild      CAD (coronary artery disease)      Type 2 diabetes mellitus      Migraines      Stented coronary artery        MEDICATIONS  (STANDING):    MEDICATIONS  (PRN):    Allergies    diltiazem (Other; Rash)    Intolerances    Haldol (Dystonic RXN)      VITAL SIGNS:  T(F): --  HR: 70  BP: 120/70  RR: 20  SpO2: 88%    MS: Oriented x3. Recent and remote recall intact. Fluent. Follows crossed commands. Patient able to verbalize past history.   CN: VFF. EOMI. V1-V3 intact. Face symmetric. T/u midline. Should shrug intact bilaterally.   Motor: Normal tone. Full strength throughout.   Sensory: Intact to LT and PP throughout   Reflexes: 2+ throughout. Babinski absent bilaterally.   Coordination: No dysmetria on FNF or ataxia on HTS bilaterally   Gait: Normal     LABS:                RADIOLOGY & ADDITIONAL STUDIES:           ADAL WONG  Female  [id number]    HPI:    86 year old female with PMH of DM, HTN,  Shunt, Herpes Zoster Opthalmicus with herpetic neuralalgia, previous ischemic stroke (2011, L. frontal infarction, no residual defecits), RUE DVT (on Eliquis) presents with acute onset right sided weakness.     Patient lives in facility. Per team there, patient was last seen well at 2300 12/23. At 0530 12/24, she was found to have right sided weakness. She was brought in for these complaints. History obtained from patient and her daughter, Dr. Wong, at bedside. Patient lives at home with 24 hour assist and help from her family. Per daughter, patient was last seen well at 2300 12/23. At 0530, patient was noted to have weakness of her right arm and possible droop in her face. She was brought in for these complaints.     Code stroke called on arrival  NIHSS 8  MRS 5 (bed bound at baseline)   CTH (to my eye) demonstrates no acute infarction; Chronic L. frontal encephelomalacia. Bilateral hygromas unchanged in size.  shunt in place. No ventriculomegaly.   CTA h/n (to my eye) demonstrates moderate stenosis of the L. ICA orgin (~50-60%), otherwise no hemodynamically significant stenosis.   CTP negative  No tpa as outside window   NO MT as no LVO     ROS: All negative except as mentioned in HPI    PAST MEDICAL & SURGICAL HISTORY:  Myocardial Infarction      Diabetes Mellitus Type II      Migraines      COPD (Chronic Obstructive Pulmonary Disease)      NPH (normal pressure hydrocephalus)      COPD, mild      CAD (coronary artery disease)      Type 2 diabetes mellitus      Migraines      Stented coronary artery        MEDICATIONS  (STANDING):    MEDICATIONS  (PRN):    Allergies    diltiazem (Other; Rash)    Intolerances    Haldol (Dystonic RXN)      VITAL SIGNS:  T(F): --  HR: 70  BP: 120/70  RR: 20  SpO2: 88%    MS: Oriented x3. Recent and remote recall intact. Fluent. Follows crossed commands. Patient able to verbalize past history.   CN: VFF. EOMI. V1-V3 intact. R. nasolabial flattening. T/u midline. Should shrug intact bilaterally.   Motor: RUE no effort against gravity; LUE full; RLE and LLE some movement within the plane of the bed. LUE/LLE resting tremor.   Sensory: Intact to LT and PP throughout   Coordination: No dysmetria on FNF or ataxia on HTS bilaterally   Gait: Deferred     LABS:        12-24 Na 140  12-24 K 4.2  12-24 P --  12-24 Mg --  12-24 Ca 9.3  12-24 Cr 0.65                  RADIOLOGY & ADDITIONAL STUDIES:    12-24    140  |  98  |  14  ----------------------------<  246<H>  4.2   |  32<H>  |  0.65    Ca    9.3      24 Dec 2022 09:54    TPro  7.0  /  Alb  3.1<L>  /  TBili  0.4  /  DBili  x   /  AST  14  /  ALT  8<L>  /  AlkPhos  110  12-24                            9.8    8.98  )-----------( 426      ( 24 Dec 2022 09:54 )             33.2       PT/INR - ( 24 Dec 2022 09:54 )   PT: 15.1 sec;   INR: 1.31 ratio         PTT - ( 24 Dec 2022 09:54 )  PTT:38.1 sec    LIVER FUNCTIONS - ( 24 Dec 2022 09:54 )  Alb: 3.1 g/dL / Pro: 7.0 g/dL / ALK PHOS: 110 U/L / ALT: 8 U/L / AST: 14 U/L / GGT: x

## 2022-12-24 NOTE — ED PROVIDER NOTE - NS ED ROS FT
Constitutional: no fevers, chills  HEENT: no cough, rhinorrhea  Cardiac:  no chest pain, palpitations  Respiratory: SOB (baseline COPD)  GI: no n/v, abd pain, bloody or dark stools  : no dysuria, frequency, or hematuria  MSK: no joint pain  Skin: R forehead scar/rash chronic from shingles  Neuro: Focal right-sided weakness. no headache, change in vision.  Psych: negative

## 2022-12-24 NOTE — ED ADULT NURSE NOTE - OBJECTIVE STATEMENT
87 year old female PMH of COPD on 4L NC at baseline with O2 sat 85-92%, shingles (being treated since , UTI - chronic dior  (receives home care with RN and Aides), CVA in 2011, on Eliquis for "blood clots," presents to the ED via EMS from home with daughter who is MD for right sided weakness (acute onset) of right upper and lower extremities (unable to help turn in bed with home health aide at 0330. Patient arrived to ED awake, alert, A&Ox3, r side weakness noted. 20g peripheral IV placed by ED MD in CT. As per daughter, she has been "debilitated" since last hospitalization in september with processive weakness. Denies falls/head trauma. denies chest pain, sob, ha, n/v/d, abdominal pain, f/c.

## 2023-01-01 ENCOUNTER — LABORATORY RESULT (OUTPATIENT)
Age: 88
End: 2023-01-01

## 2023-01-01 ENCOUNTER — TRANSCRIPTION ENCOUNTER (OUTPATIENT)
Age: 88
End: 2023-01-01

## 2023-01-01 ENCOUNTER — NON-APPOINTMENT (OUTPATIENT)
Age: 88
End: 2023-01-01

## 2023-01-01 ENCOUNTER — FORM ENCOUNTER (OUTPATIENT)
Age: 88
End: 2023-01-01

## 2023-01-01 ENCOUNTER — APPOINTMENT (OUTPATIENT)
Dept: ENDOCRINOLOGY | Facility: CLINIC | Age: 88
End: 2023-01-01
Payer: MEDICARE

## 2023-01-01 DIAGNOSIS — B37.31 ACUTE CANDIDIASIS OF VULVA AND VAGINA: ICD-10-CM

## 2023-01-01 DIAGNOSIS — E11.9 TYPE 2 DIABETES MELLITUS W/OUT COMPLICATIONS: ICD-10-CM

## 2023-01-01 DIAGNOSIS — E11.65 TYPE 2 DIABETES MELLITUS WITH HYPERGLYCEMIA: ICD-10-CM

## 2023-01-01 DIAGNOSIS — M85.80 OTHER SPECIFIED DISORDERS OF BONE DENSITY AND STRUCTURE, UNSPECIFIED SITE: ICD-10-CM

## 2023-01-01 DIAGNOSIS — K11.9 DISEASE OF SALIVARY GLAND, UNSPECIFIED: ICD-10-CM

## 2023-01-01 DIAGNOSIS — I10 ESSENTIAL (PRIMARY) HYPERTENSION: ICD-10-CM

## 2023-01-01 PROCEDURE — 99205 OFFICE O/P NEW HI 60 MIN: CPT | Mod: 95

## 2023-01-01 RX ORDER — GLYCOPYRROLATE 1 MG/5ML
1 SOLUTION ORAL EVERY 4 HOURS
Qty: 1 | Refills: 0 | Status: DISCONTINUED | COMMUNITY
Start: 2023-01-01 | End: 2023-01-01

## 2023-01-01 RX ORDER — METHADONE HYDROCHLORIDE 5 MG/1
5 TABLET ORAL 3 TIMES DAILY
Qty: 90 | Refills: 0 | Status: ACTIVE | COMMUNITY
Start: 2022-01-01 | End: 1900-01-01

## 2023-01-01 RX ORDER — FUROSEMIDE 20 MG/1
20 TABLET ORAL TWICE DAILY
Qty: 180 | Refills: 3 | Status: ACTIVE | COMMUNITY
Start: 2022-01-01 | End: 1900-01-01

## 2023-01-01 RX ORDER — FLUCONAZOLE 150 MG/1
150 TABLET ORAL
Qty: 1 | Refills: 1 | Status: ACTIVE | COMMUNITY
Start: 2023-01-01 | End: 1900-01-01

## 2023-01-01 RX ORDER — SULFAMETHOXAZOLE AND TRIMETHOPRIM 800; 160 MG/1; MG/1
800-160 TABLET ORAL
Qty: 14 | Refills: 0 | Status: ACTIVE | COMMUNITY
Start: 2022-01-01 | End: 1900-01-01

## 2023-01-01 RX ORDER — DULAGLUTIDE 0.75 MG/.5ML
0.75 INJECTION, SOLUTION SUBCUTANEOUS
Qty: 1 | Refills: 5 | Status: ACTIVE | COMMUNITY
Start: 2023-01-01 | End: 1900-01-01

## 2023-01-01 RX ORDER — GLYCOPYRROLATE 1 MG/1
1 TABLET ORAL
Qty: 30 | Refills: 5 | Status: ACTIVE | COMMUNITY
Start: 2023-01-01 | End: 1900-01-01

## 2023-01-01 RX ORDER — VALACYCLOVIR 1 G/1
1 TABLET, FILM COATED ORAL
Qty: 90 | Refills: 3 | Status: ACTIVE | COMMUNITY
Start: 2022-01-01 | End: 1900-01-01

## 2023-01-01 RX ORDER — METRONIDAZOLE 500 MG/1
500 TABLET ORAL 3 TIMES DAILY
Qty: 30 | Refills: 1 | Status: ACTIVE | COMMUNITY
Start: 2023-01-01 | End: 1900-01-01

## 2023-01-01 RX ORDER — SODIUM CHLORIDE 9 G/ML
0.9 INJECTION, SOLUTION INTRAVENOUS
Qty: 2000 | Refills: 0 | Status: ACTIVE | COMMUNITY
Start: 2023-01-01 | End: 1900-01-01

## 2023-01-01 RX ORDER — LEVOFLOXACIN 750 MG/1
750 TABLET, FILM COATED ORAL DAILY
Qty: 7 | Refills: 0 | Status: ACTIVE | COMMUNITY
Start: 2022-01-01 | End: 1900-01-01

## 2023-01-04 PROBLEM — E11.9 DIABETES MELLITUS: Noted: 2022-01-01

## 2023-01-04 PROBLEM — E11.65 TYPE 2 DIABETES MELLITUS WITH HYPERGLYCEMIA: Status: ACTIVE | Noted: 2023-01-01

## 2023-01-04 PROBLEM — I10 BENIGN ESSENTIAL HYPERTENSION: Status: ACTIVE | Noted: 2023-01-01

## 2023-01-04 PROBLEM — M85.80 OSTEOPENIA: Status: ACTIVE | Noted: 2023-01-01

## 2023-01-04 NOTE — ASSESSMENT
[FreeTextEntry1] : 87F Type 2 DM presents for initial visit via telehealth.\par \par 1) Type 2 DM\par obtain updated HbA1c, goal 7-8% given age \par given recent CVA and central adiposity will add back Truliicty\par prior dose was 1.5mg but last used > 6 months ago\par will resume Trulicity at 0.75 mg SQ weekly x 4 weeks then increase to 1.5 mg SQ weekly\par For now maintain Lantus at 15 units qhs\par If any glucose approaching or below 100 mg/dl will begin process of titrating down Lantus dose.\par Obtain urine microalbumin, none prior found in records\par foot exam by home visit doctor\par annual eye exam as able\par Home glucose monitoring twice daily\par Avoid hypoglycemia\par \par 2) Osteopenia vs osteoporosis\par Tibia fracture in 2019 with mild fall\par on calcitonin nasal spray\par home bound clarify if can get DXA\par contact prior PCP for old DXA\par consider oral bisphosphonate (per daughter has not been on this)\par continue vitamin D 5000 u daily, recent level wnl\par dietary calcium\par \par 3) HTN\par BP well controlled 112/57, 109/60\par on metoprolol and lasix\par \par 4) HLD \par no statin on med list\par  recent stroke\par should be on statin \par check lipid profile\par \par \par \par

## 2023-01-04 NOTE — PHYSICAL EXAM
[Alert] : alert [Well Nourished] : well nourished [No Acute Distress] : no acute distress [Well Developed] : well developed [Normal Sclera/Conjunctiva] : normal sclera/conjunctiva [EOMI] : extra ocular movement intact [No Proptosis] : no proptosis [Normal Oropharynx] : the oropharynx was normal [No Accessory Muscle Use] : no accessory muscle use [No Edema] : no peripheral edema [Normal Anterior Cervical Nodes] : no anterior cervical lymphadenopathy [Normal Posterior Cervical Nodes] : no posterior cervical lymphadenopathy [No Spinal Tenderness] : no spinal tenderness [Spine Straight] : spine straight [Acanthosis Nigricans] : no acanthosis nigricans [de-identified] : nasal cannula in place [de-identified] : + central adiposity [de-identified] : bed bound [de-identified] : unable to assess, minimally interacting during encounter

## 2023-01-04 NOTE — HISTORY OF PRESENT ILLNESS
[FreeTextEntry1] : 87F with longstanding Type 2 DM presents for initial endocrinology visit, with daughter ( Judith Delgado) who is HCP present for visit and providing most of the information for visit. Home health aide also present.\par Patient is bed bound, uses house call doctor.\par \par DM2 > 20 years\par Currently receiving Lantus 15 units qhs monotherapy\par AM glucose 130s\par Postprandial can go to 210 (rare 300s)\par Last HbA1c 7.2% - 7/27/22\par History of poor diet with sweets, now more controlled\par + central adiposity\par \par Had prolonged hospital stay at [facility] July to Sept 2022 with Shingles, aspiration Pna, UTI, pain management requiring methadone\par \par Prior to hospital old DM regimen was Lantus 34 units qhs, Trulicity 1.5 mg SQ weekly, "glucacel" supplement\par \par Daughter reports proteinuria but in setting of frequent UTI with indwelling dior. No microalbumin seen on chart review.\par No known neuropathy\par No known retinopathy, + cataracts\par + CVA 10 days ago treated conservatively with left side weakness, went to ED, given ASA, plavix then went home.\par \par Daughter believes some history of osteopenia or osteoporosis\par patient received calcitonin nasal spray\par chest x rays noting generalized osteopenia\par unknown when last DXA was\par pcp Dr. Chidi Pinedo [phone]\par Fractured tibia in 2019\par Takes vitamin D 5000 units daily\par \par HTN - used to be harder to control now only on metoprolol and lasix

## 2023-01-04 NOTE — REASON FOR VISIT
[Home] : at home, [unfilled] , at the time of the visit. [Medical Office: ([facility])___] : at the medical office located in  [Family Member] : family member [Formal Caregiver] : formal caregiver [Initial Evaluation] : an initial evaluation [DM Type 2] : DM Type 2 [FreeTextEntry2] : Luna Delgado [FreeTextEntry3] : Daughter

## 2023-01-13 PROBLEM — B37.31 VAGINAL CANDIDIASIS: Status: RESOLVED | Noted: 2023-01-01 | Resolved: 2023-02-12

## 2023-01-18 PROBLEM — K11.9 SALIVARY GLAND DISTURBANCE: Status: ACTIVE | Noted: 2023-01-01

## 2023-02-05 PROBLEM — J44.9 COPD (CHRONIC OBSTRUCTIVE PULMONARY DISEASE): Status: ACTIVE | Noted: 2022-01-01

## 2023-03-27 ENCOUNTER — APPOINTMENT (OUTPATIENT)
Dept: ENDOCRINOLOGY | Facility: CLINIC | Age: 88
End: 2023-03-27

## 2023-07-19 NOTE — PROGRESS NOTE ADULT - ASSESSMENT
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 Tazorac Counseling:  Patient advised that medication is irritating and drying.  Patient may need to apply sparingly and wash off after an hour before eventually leaving it on overnight.  The patient verbalized understanding of the proper use and possible adverse effects of tazorac.  All of the patient's questions and concerns were addressed.
